# Patient Record
Sex: FEMALE | Race: WHITE | NOT HISPANIC OR LATINO | Employment: FULL TIME | URBAN - METROPOLITAN AREA
[De-identification: names, ages, dates, MRNs, and addresses within clinical notes are randomized per-mention and may not be internally consistent; named-entity substitution may affect disease eponyms.]

---

## 2017-01-02 ENCOUNTER — ALLIED HEALTH/NURSE VISIT (OUTPATIENT)
Dept: EDUCATION SERVICES | Facility: CLINIC | Age: 37
End: 2017-01-02
Payer: COMMERCIAL

## 2017-01-02 DIAGNOSIS — E11.21 TYPE 2 DIABETES MELLITUS WITH DIABETIC NEPHROPATHY, WITH LONG-TERM CURRENT USE OF INSULIN (H): Primary | ICD-10-CM

## 2017-01-02 DIAGNOSIS — Z79.4 TYPE 2 DIABETES MELLITUS WITH DIABETIC NEPHROPATHY, WITH LONG-TERM CURRENT USE OF INSULIN (H): Primary | ICD-10-CM

## 2017-01-02 PROCEDURE — 95250 CONT GLUC MNTR PHYS/QHP EQP: CPT

## 2017-01-02 NOTE — PROGRESS NOTES
"Diabetes Self Management Training: Professional Continuous Glucose Monitor Insertion    SUBJECTIVE:   Marie Vogel is accompanied by self    Patient concerns: \"I've been under a lot of stress lately due to several deaths, including my mother passing away this past Oct\". States she is surprised that her A1c was as good as it was. Is currently using a Medtronic pump.    OBJECTIVE:    Lab Results:  A1C      7.2   12/27/2016   GLC      103   3/12/2016  HDL       45   9/28/2016    Vitals:  There were no vitals taken for this visit.    ASSESSMENT:    DexCom sensor started today.  DexCom sensor (Lot # [id number], Expiration date 2017-03-28) was inserted with no resistance or bleeding at insertion site.    Pt will plan to wear the sensor until Jan. 8th.    WRITTEN AND VERBAL INFORMATION GIVEN TO SUPPORT UNDERSTANDING OF:  DexCom CGM: Charge  for only 1-3 hours at most when low battery indicator appears. Sensor insertion, intention of monitoring for 7 days and avoiding swimming more than 30 minutes. SMBG at least 2 times after 2-3 hours wearing initially, then at least 1 time every 12 hours, enter events of food intake, medications/insulin, exercise, hypoglycemia. Understanding of program screens, alarms, use of buttons, graphs available for viewing, sample sensor reports, trouble shooting, emergency contact, removal of sensor, stop sensor when prompted, need to leave sensor unit and data collection sheets at clinic at designated time/date.    Can purchase 3M medical tape if more tape necessary for adhesion.       Patient verbalizes understanding of how to remove sensor and all instructions provided.     Educational and other materials:  Food/exercise/medication log sheets  Contact information  Return Check List  Medtronic 630G with Enlite Sensor    PLAN:  Pt was given instructions for tracking BG, medications, food intake and activity.    See Patient Instructions, AVS printed and provided to patient " today.    Follow-up:    Patient to return all items associated with professional Continuous Glucose Monitor System to the [facility] by Jan.9th.   Return visit to review CGM report scheduled for Jan. 16th at 12:30.    Piedad Garcia RD, CDE  Diabetes     Time Spent: 60 minutes  Encounter Type: Individual

## 2017-01-02 NOTE — PATIENT INSTRUCTIONS
"1. After 2 hours, you will be prompted to enter 2 blood sugar readings to calibrate the . Take two different samples (from different fingers). Wash your hands well before calibrating.    2. Check blood sugar once at least every 12 hours and enter it into the Dexcom  to calibrate. Checking blood sugar before meals and/or at bedtime is recommended. Blood sugar levels must be between  for the  to accept the calibration.    3. Record what you eat at meals and snacks with portion eaten. Record amount of carbohydrate grams in the Dexcom  when you eat to help track response to food intake. Record exercise type and time. Record medications you take and the time they are taken.     4. When monitor reads that \"Sensor needs to be Changed,\" go into menu function and hit \"STOP\" (NOT shutdown)    5. If battery power is low (will see indicator on monitor) and need to recharge the monitor, only charge for 1-3 hours.  DO NOT charge for more than 3 hours since it can damage monitor!    6. Avoid taking Tylenol while wearing the Dexcom, as it can cause inaccurate glucose readings.     7. Return all equipment and any food/exercise/medication logs to the [facility] on 1/9/2017 (see checklist).    8. Follow-up appointment for review of sensor reports schedule for Mon, Jan 16 at 12:30.    "

## 2017-01-02 NOTE — MR AVS SNAPSHOT
"              After Visit Summary   1/2/2017    Marie Vogel    MRN: [id number]           Patient Information     Date Of Birth          1980        Visit Information        Provider Department      1/2/2017 12:30 PM  DIABETIC ED [facility]        Care Instructions    1. After 2 hours, you will be prompted to enter 2 blood sugar readings to calibrate the . Take two different samples (from different fingers). Wash your hands well before calibrating.    2. Check blood sugar once at least every 12 hours and enter it into the Dexcom  to calibrate. Checking blood sugar before meals and/or at bedtime is recommended. Blood sugar levels must be between  for the  to accept the calibration.    3. Record what you eat at meals and snacks with portion eaten. Record amount of carbohydrate grams in the Dexcom  when you eat to help track response to food intake. Record exercise type and time. Record medications you take and the time they are taken.     4. When monitor reads that \"Sensor needs to be Changed,\" go into menu function and hit \"STOP\" (NOT shutdown)    5. If battery power is low (will see indicator on monitor) and need to recharge the monitor, only charge for 1-3 hours.  DO NOT charge for more than 3 hours since it can damage monitor!    6. Avoid taking Tylenol while wearing the Dexcom, as it can cause inaccurate glucose readings.     7. Return all equipment and any food/exercise/medication logs to the [facility] on 1/9/2017 (see checklist).    8. Follow-up appointment for review of sensor reports schedule for Mon, Jan 16 at 12:30.          Follow-ups after your visit        Your next 10 appointments already scheduled     Jan 16, 2017 12:30 PM   Diabetic Education with  DIABETIC ED [facility] ([facility])    [address]  [address]  [place] [address] [phone]            Mar 27, 2017 " 10:00 AM   Return Visit with Zita Fuentes MD   [facility] [place] ([facility])    [address]  [address]  [place] [address] [phone]              Who to contact     If you have questions or need follow up information about today's clinic visit or your schedule please contact [facility] directly at [phone].  Normal or non-critical lab and imaging results will be communicated to you by MyChart, letter or phone within 4 business days after the clinic has received the results. If you do not hear from us within 7 days, please contact the clinic through [employer] or phone. If you have a critical or abnormal lab result, we will notify you by phone as soon as possible.  Submit refill requests through [employer] or call your pharmacy and they will forward the refill request to us. Please allow 3 business days for your refill to be completed.          Additional Information About Your Visit        [employer] Information     [employer] gives you secure access to your electronic health record. If you see a primary care provider, you can also send messages to your care team and make appointments. If you have questions, please call your primary care clinic.  If you do not have a primary care provider, please call [phone] and they will assist you.         Blood Pressure from Last 3 Encounters:   12/27/16 112/70   11/16/16 119/84   11/06/16 137/89    Weight from Last 3 Encounters:   12/27/16 83.462 kg (184 lb)   11/16/16 84.278 kg (185 lb 12.8 oz)   11/06/16 85.503 kg (188 lb 8 oz)              Today, you had the following     No orders found for display       Primary Care Provider Office Phone # Fax #    Alan Paredes [phone]       [facility] [address] [place] [address] [address] [place] [address]        Thank you!     Thank you for choosing [facility]  for your care. Our goal is always to provide you with excellent care. Hearing back from  our patients is one way we can continue to improve our services. Please take a few minutes to complete the written survey that you may receive in the mail after your visit with us. Thank you!             Your Updated Medication List - Protect others around you: Learn how to safely use, store and throw away your medicines at www.disposemymeds.org.          This list is accurate as of: 1/2/17  1:40 PM.  Always use your most recent med list.                   Brand Name Dispense Instructions for use    acetone (Urine) test Strp     1 strip    1 strip by In Vitro route daily as needed.       ADVIL 200 MG tablet   Generic drug:  ibuprofen      Take 200 mg by mouth every 4 hours as needed.       albuterol 108 (90 BASE) MCG/ACT Inhaler    PROAIR HFA/PROVENTIL HFA/VENTOLIN HFA    1 Inhaler    Inhale 2 puffs into the lungs every 4 hours as needed for shortness of breath / dyspnea or wheezing       aspirin 81 MG tablet     90 tablet    Take 1 tablet (81 mg) by mouth daily       PEPE CONTOUR NEXT test strip   Generic drug:  blood glucose monitoring          blood glucose monitoring lancets     100 each    1 Device See Admin Instructions. Use up to 5 times daily for blood sugar checks.       blood glucose monitoring meter device kit    no brand specified    1 kit    Use to test blood sugar 6 times daily and as needed.       cetirizine 10 MG tablet    zyrTEC    90 tablet    Take 10 mg by mouth 2 times daily       EPINEPHrine 0.3 MG/0.3ML injection     2 each    Inject 0.3 mLs (0.3 mg) into the muscle once as needed for anaphylaxis       ergocalciferol 33377 UNITS capsule    ERGOCALCIFEROL    14 capsule    Take 1 capsule (50,000 Units) by mouth once a week       insulin aspart 100 UNIT/ML injection    NovoLOG PEN    3 Month    With insulin pump. About 80 units/day. Vial.       * insulin cartridge misc pump supply     30 each    Change every 3 days1 applicator every 72 hours Change every 3 days       * infusion set misc pump  supply     30 each    Change every 3 days as directed       insulin pump infusion     1 pump    7/3/12 [employer] Minimed: Model 723 BASAL: 12 [place]: 0.900 units/hr 10:00:  0.875 units/hr 14:00:  0.900 units/hr CARB RATIO: 00:00 1:8 1600  1:7 C. Factor: 27 mg/dL BLOOD GLUCOSE TARGET and times: 12   AM (midnight):  7     AM:  90 - 100 11   PM:  Active Insulin Time:  4 hours Basal to Bolus Ratio:  48 % to 52 % Sensor:  No Carelink / Diasend username:  [email or web address] Carelink / Diasend Password:  [email or web address]       losartan 50 MG tablet    COZAAR    135 tablet    Take 1.5 tablets (75 mg) by mouth daily Take 1 tab ( 50 mg)  qam and 1/2 tab ( 25 mg)  q pm       metFORMIN 500 MG 24 hr tablet    GLUCOPHAGE-XR    120 tablet    Take 2 tablets (1,000 mg) by mouth 2 times daily (with meals)       montelukast 10 MG tablet    SINGULAIR    90 tablet    Take 1 tablet (10 mg) by mouth At Bedtime       MULTI VITAMIN/MINERALS PO      Take 1 each by mouth daily.       omeprazole 40 MG capsule    priLOSEC    90 capsule    Take 1 capsule (40 mg) by mouth daily       order for DME     100 each    Equipment being ordered: skin prep wipes       simvastatin 10 MG tablet    ZOCOR    90 tablet    Take 1 tablet (10 mg) by mouth every morning       SUMAtriptan 25 MG tablet    IMITREX    8 tablet    Take 25-100mg one time. May repeat 25mg every two hours up to a maximum of 200mg in 24 hours.       * Notice:  This list has 2 medication(s) that are the same as other medications prescribed for you. Read the directions carefully, and ask your doctor or other care provider to review them with you.

## 2017-01-17 ENCOUNTER — VIRTUAL VISIT (OUTPATIENT)
Dept: EDUCATION SERVICES | Facility: CLINIC | Age: 37
End: 2017-01-17
Payer: COMMERCIAL

## 2017-01-17 DIAGNOSIS — Z79.4 TYPE 2 DIABETES MELLITUS WITH DIABETIC NEPHROPATHY, WITH LONG-TERM CURRENT USE OF INSULIN (H): Primary | ICD-10-CM

## 2017-01-17 DIAGNOSIS — E11.21 TYPE 2 DIABETES MELLITUS WITH DIABETIC NEPHROPATHY, WITH LONG-TERM CURRENT USE OF INSULIN (H): Primary | ICD-10-CM

## 2017-01-17 PROCEDURE — 99207 ZZC NO BILLABLE SERVICE THIS VISIT: CPT

## 2017-01-17 NOTE — PROGRESS NOTES
Dexcom Professional Continuous Glucose Monitor Interpretation     Patient History:   1. Type of Diabetes: Type 2 diabetes  2. Duration of diabetes or year of diagnosis: age 21  3. Most Recent A1c Result:  A1C  7.2   12/27/2016  4. Current treatment regimen (include all diabetes medications, dose & dosing frequency/timing):     Diabetes Medication(s)     Biguanides Sig    metFORMIN (GLUCOPHAGE-XR) 500 MG 24 hr tablet Take 2 tablets (1,000 mg) by mouth 2 times daily (with meals)    Insulin Sig    INSULIN PUMP - OUTPATIENT 01/17/17:  [email or web address]: Model 723  BASAL:  00:00: 1.0 units/hr  10:30: 0.95  14:00: 1.10  CARB RATIO:  00:00 1:7  1600  1:8  C. Factor:  27 mg/dL  BLOOD GLUCOSE TARGET and times:  12   AM (midnight):   Active Insulin Time:  3 hours  Basal to Bolus Ratio:   Sensor:  No  Carelink / Diasend username:  [email or web address]  Carelink / Diasend Password:  [email or web address]    insulin aspart (NOVOLOG PEN) 100 UNIT/ML injection With insulin pump. About 80 units/day. Vial.          5. 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    Trend Patterns:      Hourly glucose ranges and averages:    Statistics:   1. Total number of sensor values is 1781. This averages to 288 values on each of the full days. 288 per day is ideal (the first and last day generally provide half or less of the usual number of readings because these are typically not full days).  The test is not optimal if <50% of the readings are available per day.  2. Number of meter values and paired readings is adequate.  Less than 2 (q 12 hours) paired readings per day may be associated with a less reliable test.  3. Standard deviation is: 59.  Standard deviation (SD) indicates how variable sensor readings are.  Patients who do not  have diabetes may have a SD around 20, while someone with suboptimal diabetes control may have a SD of 60 or more.  4. Statistics using target range  mg/dl:   Percent in target is: 46%  Percent above target is: 36%  Percent below target is: 18%                        Data evaluation:   1. Sensor modal day evaluation shows the followin. Consistent day-to-day patterns noted: no specific patterns noted, +glucose variability with frequent hypoglycemia day and night.  2. Average blood sugar: 130 mg/dL.  3. The overnight ranges from < mg/dL. The average BG on all nights ranges from 126-149 mg/dL.  4. The premeal is 37-54% at target range.   5. The postmeal is 37-66% at target range.    Patient's Logbook shows the following:   Carbohydrate counting is: accurate  Medication and/or insulin dosing is: accurate     Assessment and Plan:  Consider a reduction in basal rate and/or use of a lower temp basal for higher activity days (while at work, etc).  Also noted at previous pump download, patient is changing infusion site every 5 days instead of FDA recommendation of every 3 days. Will also discuss with patient.   CDE appointment 17.    Piedad Garcia RD, CDE  Diabetes     Time Spent: 30 minutes  Any diabetes medication dose changes were made via the CDE Protocol and Collaborative Practice Agreement with the patient's endocrinology provider. A copy of this encounter was shared with the provider.

## 2017-01-17 NOTE — Clinical Note
Hi Dr. Fuentes, Adam- CGM report per your request. Please bill using code [id number] as usual.    Thank you! Piedad Garcia RD, CDE Diabetes

## 2017-01-22 DIAGNOSIS — E55.9 VITAMIN D DEFICIENCY: ICD-10-CM

## 2017-01-22 DIAGNOSIS — R80.9 MICROALBUMINURIA: ICD-10-CM

## 2017-01-22 NOTE — TELEPHONE ENCOUNTER
Vitamin D 50,000 units      Last Written Prescription Date: 11/24/15  Last Fill Quantity: 14,  # refills: 3   Last Office Visit with [facility], [facility] or [facility] prescribing provider: 12/27/2016                                         Next 5 appointments (look out 90 days)     Mar 27, 2017 10:00 AM   Return Visit with Zita Fuentes MD   [facility] ([facility])    [address] [place] [address] [phone]                  Kingsley Adame  [facility]  [phone]

## 2017-01-24 ENCOUNTER — ALLIED HEALTH/NURSE VISIT (OUTPATIENT)
Dept: EDUCATION SERVICES | Facility: CLINIC | Age: 37
End: 2017-01-24
Payer: COMMERCIAL

## 2017-01-24 DIAGNOSIS — Z79.4 TYPE 2 DIABETES MELLITUS WITH DIABETIC NEPHROPATHY, WITH LONG-TERM CURRENT USE OF INSULIN (H): Primary | ICD-10-CM

## 2017-01-24 DIAGNOSIS — E11.21 TYPE 2 DIABETES MELLITUS WITH DIABETIC NEPHROPATHY, WITH LONG-TERM CURRENT USE OF INSULIN (H): Primary | ICD-10-CM

## 2017-01-24 PROCEDURE — G0108 DIAB MANAGE TRN  PER INDIV: HCPCS

## 2017-01-24 NOTE — TELEPHONE ENCOUNTER
Last Vit D 2/23/16:    Vitamin D Deficiency screening 45 20 - 75 ug/L Final     Routing refill request to provider for review/approval because:  Drug not on the [facility] refill protocol for high dosage    Pattie Dunn RN

## 2017-01-24 NOTE — Clinical Note
Adam Bentley Dr.- patient seen for review of CGM report and pump follow up. I slightly reduced one of her basal rates, and also showed her how to use a temp basal for increased activity at work. Recommended that she consider a Medtronic pump upgrade with CGM.  Thank you! Piedad Garcia RD, CDE Diabetes

## 2017-01-24 NOTE — TELEPHONE ENCOUNTER
Most recent vit D level reviewed  Not clear how long Dr Alford wanted to continue this regimen based on last note  Should follow-up with endocrine

## 2017-01-26 NOTE — TELEPHONE ENCOUNTER
Pt calling back, notified as per 's note. She says that  wanted her to continue the high dose even when her vitamin D level is in normal range. Pt is willing to wait till  review her lab results & advise on it. Pt is aware that  won't be in till Monday(01/30).    Mara, RN  Triage Nurse

## 2017-01-26 NOTE — TELEPHONE ENCOUNTER
Left message for patient to call us back. She has an appointment scheduled with Dr Fuentes on 3/27. Please inform her of Dr Paredes's message about her Vit D refill.

## 2017-01-27 NOTE — PROGRESS NOTES
"Diabetes Self Management Training: Follow-up Visit    Marie Vogel presents today for review of CGM report related to Type 2 diabetes.    She is accompanied by self    Patient's diabetes management related comments/concerns: patient wore Dexcom CGM for one week (see full interpretation dated 1/17 virtual visit).   States \"I didn't know I ran such frequent low blood sugars\".    Patient would like this visit to be focused around the following diabetes-related behaviors and goals: Problem Solving    ASSESSMENT:  Patient Problem List reviewed for relevant medical history and current medical status.    Current Diabetes Management per Patient:       Diabetes Medication(s)     Biguanides Sig    metFORMIN (GLUCOPHAGE-XR) 500 MG 24 hr tablet Take 2 tablets (1,000 mg) by mouth 2 times daily (with meals)    Insulin Sig    INSULIN PUMP - OUTPATIENT 01/17/17:  [employer]: Model 723  BASAL:  00:00: 1.0 units/hr  10:30: 0.95  14:00: 1.10  CARB RATIO:  00:00 1:7  1600  1:8  C. Factor:  27 mg/dL  BLOOD GLUCOSE TARGET and times:  12   AM (midnight):   Active Insulin Time:  3 hours  Basal to Bolus Ratio:   Sensor:  No  Carelink / Diasend username:  [email or web address]  Carelink / Diasend Password:  [email or web address]    insulin aspart (NOVOLOG PEN) 100 UNIT/ML injection With insulin pump. About 80 units/day. Vial.        Patient glucose self monitoring as follows: 1-4x/day.   BG meter: Contour Next USB Link (with Medtronic Pump) meter    Per pump download 1/10/1/23:  Using Continuous Glucose Monitor in conjunction with pump? No  Avg BG (mg/dl) 120 +/- 47  BG Readings /day  3.8  Readings above Target:  28 %  Readings below Target: 13 %  BG/carbs/boluses:             Dexcom pattern snapshot (1/3-1/9):        Patient's most recent A1C  7.2   12/27/2016 is not meeting goal of <7.0    Nutrition:  Patient counts carbohydrates in grams. Reports that she sometimes forgets to bolus (eg noted high BG excursion on Jan. 7th- went to bed after " "eating without taking a bolus).  Avg Daily Carbs (g)  183 +/- 98    Biggest Challenge to Healthy Eating: lack of time    Physical Activity:    Work as a nurse  Wants to get back to regular exercise    Diabetes Complications:  Acute Complications: At risk for hypoglycemia? yes  Symptoms of low blood sugar? yes  Patient carries a carbohydrate source with them regularly: Yes     Vitals:  Wt Readings from Last 3 Encounters:   12/27/16 83.462 kg (184 lb)   11/16/16 84.278 kg (185 lb 12.8 oz)   11/06/16 85.503 kg (188 lb 8 oz)     Estimated body mass index is 31.57 kg/(m^2) as calculated from the following:    Height as of 11/16/16: 1.626 m (5' 4\").    Weight as of 12/27/16: 83.462 kg (184 lb).   Last 3 BP:   BP Readings from Last 3 Encounters:   12/27/16 112/70   11/16/16 119/84   11/06/16 137/89       History   Smoking status     Former Smoker     Types: Cigarettes     Quit date: 10/24/2005   Smokeless tobacco     Former User     Comment: States only smokes when drinks maybe 2x/year     Labs:  A1C      7.2   12/27/2016  GLC      103   3/12/2016  LDL       98   9/28/2016  HDL CHOLESTEROL   Date Value Ref Range Status   09/28/2016 45* >49 mg/dL Final   ]  GFR ESTIMATE   Date Value Ref Range Status   03/12/2016 89 >60 mL/min/1.7m2 Final     Comment:     Non  GFR Calc     GFR ESTIMATE IF BLACK   Date Value Ref Range Status   03/12/2016 >90   GFR Calc   >60 mL/min/1.7m2 Final     CR     0.73   3/12/2016  No results found for this basename: microalbumin    Health Beliefs and Attitudes:   Patient Activation Measure Survey Score:  SADIA Score (Last Two) 10/7/2009   SADIA Raw Score 46   Activation Score 75.3   SADIA Level 4     Stage of Change: ACTION (Actively working towards change)    Patient is comfortable making pump setting changes independently: Yes  Patient understands and demonstrates accurate pump use: Yes  Opportunities/education identified by which patient would have increased benefit from " "pump therapy:  Use of temp basal, need for infusion site changes q 3 days instead of 5 days.    Symptoms of low blood sugar (hypoglycemia:sweating, shaky, weak, dizzy, blurred vision, confusion)? Yes    Patient carries a carbohydrate source with them regularly: Yes      ASSESSMENT:  CGM report, pump download reviewed. Discussed safety concerns regarding frequent hypoglycemia.     Patient learning needs: Problem Solving     Education Provided:  Reviewed CGM report, and recommended personal CGM- however patient stated \"insurance does not cover since she has T2DM\". However discussed with patient, that if documented frequent hypoglycemia is a safety concern- insurance may consider. Patient may be a good candidate for Medtronic pump upgrade to the 630G with Enlite sensor. She is hesitant at this time to consider that option. She was willing to make a slight reduction in her basal rate.   Also reviewed use of temp basal rates, and the recommended schedule for infusion site changes.     PLAN:  Reduce basal rate at 2:00 pm to 1.05.  Also recommended use of temp basal as needed, for increased physical activity to help decrease risk of hypoglycemia.   Change infusion site q 3 days (rather than 5 days).     Follow-up:    CDE follow-up as needed.   Chart routed to referring provider.    Ongoing plan for education and support: Follow-up visit with diabetes educator  and Follow-up with primary care provider    Piedad Garcia RD, CDE  Diabetes     Time Spent: 60 minutes  Encounter Type: Individual    Any diabetes medication dose changes were made via the CDE Protocol and Collaborative Practice Agreement with the patient's endocrinology provider. A copy of this encounter was shared with the provider.      "

## 2017-01-30 RX ORDER — ERGOCALCIFEROL 1.25 MG/1
50000 CAPSULE ORAL WEEKLY
Qty: 14 CAPSULE | Refills: 3 | Status: SHIPPED | OUTPATIENT
Start: 2017-01-30 | End: 2017-10-24

## 2017-01-31 ENCOUNTER — DOCUMENTATION ONLY (OUTPATIENT)
Dept: ENDOCRINOLOGY | Facility: CLINIC | Age: 37
End: 2017-01-31
Payer: COMMERCIAL

## 2017-01-31 DIAGNOSIS — Z79.4 TYPE 2 DIABETES MELLITUS WITH DIABETIC NEPHROPATHY, WITH LONG-TERM CURRENT USE OF INSULIN (H): Primary | ICD-10-CM

## 2017-01-31 DIAGNOSIS — E11.21 TYPE 2 DIABETES MELLITUS WITH DIABETIC NEPHROPATHY, WITH LONG-TERM CURRENT USE OF INSULIN (H): Primary | ICD-10-CM

## 2017-01-31 PROCEDURE — 95251 CONT GLUC MNTR ANALYSIS I&R: CPT | Performed by: INTERNAL MEDICINE

## 2017-01-31 NOTE — PROGRESS NOTES
Statistics:    1. Total number of sensor values is 1781. This averages to 288 values on each of the full days. 288 per day is ideal (the first and last day generally provide half or less of the usual number of readings because these are typically not full days).  The test is not optimal if <50% of the readings are available per day.  2. Number of meter values and paired readings is adequate.  Less than 2 (q 12 hours) paired readings per day may be associated with a less reliable test.  3. Standard deviation is: 59.  Standard deviation (SD) indicates how variable sensor readings are.  Patients who do not have diabetes may have a SD around 20, while someone with suboptimal diabetes control may have a SD of 60 or more.  4. Statistics using target range  mg/dl:  Percent in target is: 46%  Percent above target is: 36%  Percent below target is: 18%                        Data evaluation:    1. Sensor modal day evaluation shows the followin. Consistent day-to-day patterns noted: no specific patterns noted, +glucose variability with frequent hypoglycemia day and night.  2. Average blood sugar: 130 mg/dL.  3. The overnight ranges from < mg/dL. The average BG on all nights ranges from 126-149 mg/dL.  4. The premeal is 37-54% at target range.    5. The postmeal is 37-66% at target range.    Patient's Logbook shows the following:    Carbohydrate counting is: accurate  Medication and/or insulin dosing is: accurate     Assessment and Plan:  Consider a reduction in basal rate and/or use of a lower temp basal for higher activity days (while at work, etc).  Also noted at previous pump download, patient is changing infusion site every 5 days instead of FDA recommendation of every 3 days. Will also discuss with patient.      CGM data reviewed.  Diabetic Educator Assessment and plan reviewed.    I agree with CGM data assessment and plan.    Zita Fuentes

## 2017-02-16 ENCOUNTER — OFFICE VISIT (OUTPATIENT)
Dept: PEDIATRICS | Facility: CLINIC | Age: 37
End: 2017-02-16
Payer: COMMERCIAL

## 2017-02-16 ENCOUNTER — TELEPHONE (OUTPATIENT)
Dept: PEDIATRICS | Facility: CLINIC | Age: 37
End: 2017-02-16

## 2017-02-16 VITALS
HEIGHT: 64 IN | OXYGEN SATURATION: 98 % | SYSTOLIC BLOOD PRESSURE: 110 MMHG | WEIGHT: 185 LBS | BODY MASS INDEX: 31.58 KG/M2 | DIASTOLIC BLOOD PRESSURE: 70 MMHG | TEMPERATURE: 98.3 F | HEART RATE: 92 BPM

## 2017-02-16 DIAGNOSIS — G43.809 OTHER MIGRAINE WITHOUT STATUS MIGRAINOSUS, NOT INTRACTABLE: ICD-10-CM

## 2017-02-16 DIAGNOSIS — S93.602A FOOT SPRAIN, LEFT, INITIAL ENCOUNTER: Primary | ICD-10-CM

## 2017-02-16 DIAGNOSIS — Z91.018 NUT ALLERGY: ICD-10-CM

## 2017-02-16 PROCEDURE — 99213 OFFICE O/P EST LOW 20 MIN: CPT | Performed by: INTERNAL MEDICINE

## 2017-02-16 RX ORDER — SUMATRIPTAN 25 MG/1
TABLET, FILM COATED ORAL
Qty: 8 TABLET | Refills: 6 | Status: SHIPPED | OUTPATIENT
Start: 2017-02-16 | End: 2017-10-24

## 2017-02-16 RX ORDER — EPINEPHRINE 0.3 MG/.3ML
0.3 INJECTION SUBCUTANEOUS
Qty: 0.3 ML | Refills: 11 | Status: SHIPPED | OUTPATIENT
Start: 2017-02-16 | End: 2020-11-07

## 2017-02-16 NOTE — PROGRESS NOTES
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    Description  Location: left foot    Intensity:  moderate    Accompanying signs and symptoms: ?swelling    History  Previous similar problem: no   Previous evaluation:  none    Precipitating or alleviating factors:  Trauma or overuse: YES-   Aggravating factors include: walking and climbing stairs    Therapies tried and outcome: rest/inactivity       Patient Active Problem List   Diagnosis     Allergic rhinitis     HYPERLIPIDEMIA LDL GOAL <100     Family history of other cardiovascular diseases     Health Care Home     Microalbuminuria     Insulin pump in place     Vitamin D deficiency     Nut allergy     IUD (intrauterine device) in place     Type 2 diabetes mellitus with diabetic nephropathy     Diabetic gastroparesis (H)     Hypertrophic obstructive cardiomyopathy(425.11)     PFO (patent foramen ovale)     Other migraine without status migrainosus, not intractable     Past Surgical History   Procedure Laterality Date     C induced abortn by d&c            Hc remove tonsils/adenoids,<9 y/o       T &A     Cholecystectomy, laporoscopic       Cholecystectomy, Laparoscopic     C/section, low transverse  6/25/10     , Low Transverse     C iud,mirena  8/10/10, 7/28/15     Esophagoscopy, gastroscopy, duodenoscopy (egd), combined N/A 2016     Procedure: COMBINED ESOPHAGOSCOPY, GASTROSCOPY, DUODENOSCOPY (EGD), BIOPSY SINGLE OR MULTIPLE;  Surgeon: Fadi Hunter MD;  Location:  GI      esophageal motility study N/A 2016     Procedure: ESOPHAGEAL MOTILITY STUDY;  Surgeon: Fadi Hunter MD;  Location:  GI       Social History   Substance Use Topics      Smoking status: Former Smoker     Types: Cigarettes     Quit date: 10/24/2005     Smokeless tobacco: Former User      Comment: States only smokes when drinks maybe 2x/year     Alcohol use No     Family History   Problem Relation Age of Onset     Cardiovascular Mother      hypertrophic cardiomyopathy     Cardiovascular Brother      hypertrophic cardiomyopathy     Hypertension Paternal Grandfather      high bp     Genitourinary Problems Mother      gallbladder disease     Genitourinary Problems Maternal Grandmother      gallbladder disease     Genitourinary Problems Paternal Grandmother      gallbladder disease     DIABETES Father      type 2     DIABETES Mother      drug induced     CEREBROVASCULAR DISEASE Paternal Grandfather      DIABETES Brother      type 2     Hypertension Father      Other - See Comments Mother      heart transplant 11/23/2005         Current Outpatient Prescriptions   Medication Sig Dispense Refill     EPINEPHrine 0.3 MG/0.3ML injection Inject 0.3 mLs (0.3 mg) into the muscle once as needed for anaphylaxis 0.3 mL 11     SUMAtriptan (IMITREX) 25 MG tablet Take 25-100mg one time. May repeat 25mg every two hours up to a maximum of 200mg in 24 hours. 8 tablet 6     order for DME Equipment being ordered: post-op shoe 1 Units 0     ergocalciferol (ERGOCALCIFEROL) 10369 UNITS capsule Take 1 capsule (50,000 Units) by mouth once a week 14 capsule 3     INSULIN PUMP - OUTPATIENT Updated 1/27/17:  Medtronic Minimed: Model 723  BASAL:  00:00: 1.0 units/hr  10:30: 0.95  14:00: 1.05  CARB RATIO:  00:00 1:7  1600  1:8  C. Factor:  27 mg/dL  BLOOD GLUCOSE TARGET and times:  12   AM (midnight):   Active Insulin Time:  3 hours  Basal to Bolus Ratio:   Sensor:  No  Carelink / Diasend username:  [email or web address]  Carelink / Diasend Password:  [email or web address]       metFORMIN (GLUCOPHAGE-XR) 500 MG 24 hr tablet Take 2 tablets (1,000 mg) by mouth 2 times daily (with meals) 120 tablet 3     insulin cartridge (PARADIGM 3ML)  "misc pump supply Change every 3 days1 applicator every 72 hours Change every 3 days 30 each 3     infusion set (PARADIGM QUICK-SET 23\" 9MM) misc pump supply Change every 3 days as directed 30 each 3     insulin aspart (NOVOLOG PEN) 100 UNIT/ML injection With insulin pump. About 80 units/day. Vial. 3 Month 3     PEPE CONTOUR NEXT test strip        albuterol (PROAIR HFA, PROVENTIL HFA, VENTOLIN HFA) 108 (90 BASE) MCG/ACT inhaler Inhale 2 puffs into the lungs every 4 hours as needed for shortness of breath / dyspnea or wheezing 1 Inhaler 1     blood glucose monitoring (NO BRAND SPECIFIED) meter device kit Use to test blood sugar 6 times daily and as needed. 1 kit 0     omeprazole (PRILOSEC) 40 MG capsule Take 1 capsule (40 mg) by mouth daily 90 capsule 1     simvastatin (ZOCOR) 10 MG tablet Take 1 tablet (10 mg) by mouth every morning 90 tablet 0     montelukast (SINGULAIR) 10 MG tablet Take 1 tablet (10 mg) by mouth At Bedtime 90 tablet 1     order for DME Equipment being ordered: skin prep wipes 100 each prn     losartan (COZAAR) 50 MG tablet Take 1.5 tablets (75 mg) by mouth daily Take 1 tab ( 50 mg)  qam and 1/2 tab ( 25 mg)  q pm 135 tablet 3     aspirin 81 MG tablet Take 1 tablet (81 mg) by mouth daily 90 tablet 3     cetirizine (ZYRTEC) 10 MG tablet Take 10 mg by mouth 2 times daily  90 tablet 3     Multiple Vitamins-Minerals (MULTI VITAMIN/MINERALS PO) Take 1 each by mouth daily.       Acetone, Urine, Test STRP 1 strip by In Vitro route daily as needed. 1 strip prn     ibuprofen (ADVIL) 200 MG tablet Take 200 mg by mouth every 4 hours as needed.       Lancets (MICROLET) MISC 1 Device See Admin Instructions. Use up to 5 times daily for blood sugar checks. 100 each prn     [DISCONTINUED] SUMAtriptan (IMITREX) 25 MG tablet Take 25-100mg one time. May repeat 25mg every two hours up to a maximum of 200mg in 24 hours. 8 tablet 0       OBJECTIVE:                                                    /70 (Cuff " "Size: Adult Regular)  Pulse 92  Temp 98.3  F (36.8  C) (Oral)  Ht 5' 4\" (1.626 m)  Wt 185 lb (83.9 kg)  SpO2 98%  BMI 31.76 kg/m2 Body mass index is 31.76 kg/(m^2).  GENERAL:  alert,  no distress  MS: extremities- no edema     Left foot: ankle-nontender without effusion, FROM      4th 5th metatarsals nontender.  Subjective tenderness between metatarsals with compression/flexion     ASSESSMENT/PLAN:                                                        ICD-10-CM    1. Foot sprain, left, initial encounter S93.602A order for DME     Midfoot sprain.  Ace wrap with post op shoe when weight bearing until pain resolves       Alan Paredes MD  [facility]     "

## 2017-02-16 NOTE — PATIENT INSTRUCTIONS
INSTRUCTIONS FOR TODAY:     foot sprain--post-op shoe when weight bearing for the next week or until pain resolves    Dr Paredes

## 2017-02-16 NOTE — NURSING NOTE
"Chief Complaint   Patient presents with     Musculoskeletal Problem       Initial /70 (Cuff Size: Adult Regular)  Pulse 92  Temp 98.3  F (36.8  C) (Oral)  Ht 5' 4\" (1.626 m)  Wt 185 lb (83.9 kg)  SpO2 98%  BMI 31.76 kg/m2 Estimated body mass index is 31.76 kg/(m^2) as calculated from the following:    Height as of this encounter: 5' 4\" (1.626 m).    Weight as of this encounter: 185 lb (83.9 kg).  Medication Reconciliation: complete  "

## 2017-02-16 NOTE — MR AVS SNAPSHOT
After Visit Summary   2/16/2017    Marie Vogel    MRN: [id number]           Patient Information     Date Of Birth          1980        Visit Information        Provider Department      2/16/2017 3:15 PM Alan Paredes MD [facility]3]        Today's Diagnoses     Foot sprain, left, initial encounter    -  1    Nut allergy        Other migraine without status migrainosus, not intractable          Care Instructions    INSTRUCTIONS FOR TODAY:     foot sprain--post-op shoe when weight bearing for the next week or until pain resolves    Dr Paredes          Follow-ups after your visit        Your next 10 appointments already scheduled     Mar 27, 2017 10:00 AM CDT   Return Visit with Zita Fuentes MD   [facility]3] ([facility]3])    [address]  [address]  [facility] [address] [phone]              Who to contact     If you have questions or need follow up information about today's clinic visit or your schedule please contact [facility] directly at [phone].  Normal or non-critical lab and imaging results will be communicated to you by [employer] in [employer] Boxhart, letter or phone within 4 business days after the clinic has received the results. If you do not hear from us within 7 days, please contact the clinic through [employer] or phone. If you have a critical or abnormal lab result, we will notify you by phone as soon as possible.  Submit refill requests through [facility]Plus or call your pharmacy and they will forward the refill request to us. Please allow 3 business days for your refill to be completed.          Additional Information About Your Visit        Jack in the Boxhart Information     [facility]Plus gives you secure access to your electronic health record. If you see a primary care provider, you can also send messages to your care team and make appointments. If you have questions, please call your primary care clinic.  If you do not have a primary  "care provider, please call [phone] and they will assist you.        Care EveryWhere ID     This is your Care EveryWhere ID. This could be used by other organizations to access your [place] medical records  [id number]        Your Vitals Were     Pulse Temperature Height Pulse Oximetry BMI (Body Mass Index)       92 98.3  F (36.8  C) (Oral) 5' 4\" (1.626 m) 98% 31.76 kg/m2        Blood Pressure from Last 3 Encounters:   02/16/17 110/70   12/27/16 112/70   11/16/16 119/84    Weight from Last 3 Encounters:   02/16/17 185 lb (83.9 kg)   12/27/16 184 lb (83.5 kg)   11/16/16 185 lb 12.8 oz (84.3 kg)              Today, you had the following     No orders found for display         Today's Medication Changes          These changes are accurate as of: 2/16/17  3:47 PM.  If you have any questions, ask your nurse or doctor.               These medicines have changed or have updated prescriptions.        Dose/Directions    * order for DME   This may have changed:  Another medication with the same name was added. Make sure you understand how and when to take each.   Used for:  Type 2 diabetes, HbA1c goal < 7% (H)   Changed by:  Nancy Yeager MD        Equipment being ordered: skin prep wipes   Quantity:  100 each   Refills:  prn       * order for DME   This may have changed:  You were already taking a medication with the same name, and this prescription was added. Make sure you understand how and when to take each.   Used for:  Foot sprain, left, initial encounter   Changed by:  Alan Paredes MD        Equipment being ordered: post-op shoe   Quantity:  1 Units   Refills:  0       * Notice:  This list has 2 medication(s) that are the same as other medications prescribed for you. Read the directions carefully, and ask your doctor or other care provider to review them with you.         Where to get your medicines      These medications were sent to [facility] [place], [place] - [address], [place] [address]     Phone:  [phone]     EPINEPHrine 0.3 MG/0.3ML injection    SUMAtriptan 25 MG tablet         Some of these will need a paper prescription and others can be bought over the counter.  Ask your nurse if you have questions.     Bring a paper prescription for each of these medications     order for DME                Primary Care Provider Office Phone # Fax #    Alan Paredes [phone]       [facility] [address] [place] [address]        Thank you!     Thank you for choosing [facility]  for your care. Our goal is always to provide you with excellent care. Hearing back from our patients is one way we can continue to improve our services. Please take a few minutes to complete the written survey that you may receive in the mail after your visit with us. Thank you!             Your Updated Medication List - Protect others around you: Learn how to safely use, store and throw away your medicines at www.disposemymeds.org.          This list is accurate as of: 2/16/17  3:47 PM.  Always use your most recent med list.                   Brand Name Dispense Instructions for use    acetone (Urine) test Strp     1 strip    1 strip by In Vitro route daily as needed.       ADVIL 200 MG tablet   Generic drug:  ibuprofen      Take 200 mg by mouth every 4 hours as needed.       albuterol 108 (90 BASE) MCG/ACT Inhaler    PROAIR HFA/PROVENTIL HFA/VENTOLIN HFA    1 Inhaler    Inhale 2 puffs into the lungs every 4 hours as needed for shortness of breath / dyspnea or wheezing       aspirin 81 MG tablet     90 tablet    Take 1 tablet (81 mg) by mouth daily       PEPE CONTOUR NEXT test strip   Generic drug:  blood glucose monitoring          blood glucose monitoring lancets     100 each    1 Device See Admin Instructions. Use up to 5 times daily for blood sugar checks.       blood glucose monitoring meter device kit    no  brand specified    1 kit    Use to test blood sugar 6 times daily and as needed.       cetirizine 10 MG tablet    zyrTEC    90 tablet    Take 10 mg by mouth 2 times daily       EPINEPHrine 0.3 MG/0.3ML injection     0.3 mL    Inject 0.3 mLs (0.3 mg) into the muscle once as needed for anaphylaxis       ergocalciferol 36574 UNITS capsule    ERGOCALCIFEROL    14 capsule    Take 1 capsule (50,000 Units) by mouth once a week       insulin aspart 100 UNIT/ML injection    NovoLOG PEN    3 Month    With insulin pump. About 80 units/day. Vial.       * insulin cartridge misc pump supply     30 each    Change every 3 days1 applicator every 72 hours Change every 3 days       * infusion set misc pump supply     30 each    Change every 3 days as directed       insulin pump infusion      Updated 1/27/17: [employer] MinimGam[employer]ee: Model 723 BASAL: 00:00: 1.0 units/hr 10:30: 0.95 14:00: 1.05 CARB RATIO: 00:00 1:7 1600  1:8 C. Factor: 27 mg/dL BLOOD GLUCOSE TARGET and times: 12   AM (midnight):  Active Insulin Time:  3 hours Basal to Bolus Ratio:  Sensor:  No Carelink / Diasend username:  [email or web address] Carelink / Diasend Password:  [email or web address]       losartan 50 MG tablet    COZAAR    135 tablet    Take 1.5 tablets (75 mg) by mouth daily Take 1 tab ( 50 mg)  qam and 1/2 tab ( 25 mg)  q pm       metFORMIN 500 MG 24 hr tablet    GLUCOPHAGE-XR    120 tablet    Take 2 tablets (1,000 mg) by mouth 2 times daily (with meals)       montelukast 10 MG tablet    SINGULAIR    90 tablet    Take 1 tablet (10 mg) by mouth At Bedtime       MULTI VITAMIN/MINERALS PO      Take 1 each by mouth daily.       omeprazole 40 MG capsule    priLOSEC    90 capsule    Take 1 capsule (40 mg) by mouth daily       * order for DME     100 each    Equipment being ordered: skin prep wipes       * order for DME     1 Units    Equipment being ordered: post-op shoe       simvastatin 10 MG tablet    ZOCOR    90 tablet    Take 1 tablet (10 mg) by mouth every morning        SUMAtriptan 25 MG tablet    IMITREX    8 tablet    Take 25-100mg one time. May repeat 25mg every two hours up to a maximum of 200mg in 24 hours.       * Notice:  This list has 4 medication(s) that are the same as other medications prescribed for you. Read the directions carefully, and ask your doctor or other care provider to review them with you.

## 2017-02-16 NOTE — TELEPHONE ENCOUNTER
"Patient states that a few days ago her 55# daughter stepped on her left foot (her foot was in a \"sideways position\").  Since this time, has had pain at medial aspect of foot and across the top of the foot.  No ecchymosis noted.  Foot is swollen, as her shoe feels tight.  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  Has not been able to elevate her foot.  Pain is increasing, and she would like an appointment today for evaluation.  Appointment scheduled with Dr. Paredes this afternoon.  No further questions.  MIGDALIA Robles RN    "

## 2017-02-26 ENCOUNTER — OFFICE VISIT (OUTPATIENT)
Dept: URGENT CARE | Facility: URGENT CARE | Age: 37
End: 2017-02-26
Payer: COMMERCIAL

## 2017-02-26 VITALS
TEMPERATURE: 99.5 F | SYSTOLIC BLOOD PRESSURE: 122 MMHG | WEIGHT: 187.8 LBS | DIASTOLIC BLOOD PRESSURE: 80 MMHG | HEART RATE: 100 BPM | BODY MASS INDEX: 32.24 KG/M2 | OXYGEN SATURATION: 100 %

## 2017-02-26 DIAGNOSIS — L01.00 IMPETIGO: Primary | ICD-10-CM

## 2017-02-26 DIAGNOSIS — R30.0 DYSURIA: ICD-10-CM

## 2017-02-26 DIAGNOSIS — N39.0 URINARY TRACT INFECTION, SITE UNSPECIFIED: ICD-10-CM

## 2017-02-26 LAB
ALBUMIN UR-MCNC: >=300 MG/DL
APPEARANCE UR: ABNORMAL
BACTERIA #/AREA URNS HPF: ABNORMAL /HPF
BILIRUB UR QL STRIP: ABNORMAL
COLOR UR AUTO: YELLOW
GLUCOSE UR STRIP-MCNC: 250 MG/DL
HGB UR QL STRIP: ABNORMAL
HYALINE CASTS #/AREA URNS LPF: ABNORMAL /LPF (ref 0–2)
KETONES UR STRIP-MCNC: NEGATIVE MG/DL
LEUKOCYTE ESTERASE UR QL STRIP: NEGATIVE
NITRATE UR QL: POSITIVE
NON-SQ EPI CELLS #/AREA URNS LPF: ABNORMAL /LPF
PH UR STRIP: 5.5 PH (ref 5–7)
RBC #/AREA URNS AUTO: ABNORMAL /HPF (ref 0–2)
SP GR UR STRIP: >1.03 (ref 1–1.03)
URN SPEC COLLECT METH UR: ABNORMAL
UROBILINOGEN UR STRIP-ACNC: 0.2 EU/DL (ref 0.2–1)
WBC #/AREA URNS AUTO: ABNORMAL /HPF (ref 0–2)

## 2017-02-26 PROCEDURE — 87186 SC STD MICRODIL/AGAR DIL: CPT | Performed by: PHYSICIAN ASSISTANT

## 2017-02-26 PROCEDURE — 87086 URINE CULTURE/COLONY COUNT: CPT | Performed by: PHYSICIAN ASSISTANT

## 2017-02-26 PROCEDURE — 81001 URINALYSIS AUTO W/SCOPE: CPT | Performed by: PHYSICIAN ASSISTANT

## 2017-02-26 PROCEDURE — 99213 OFFICE O/P EST LOW 20 MIN: CPT | Performed by: PHYSICIAN ASSISTANT

## 2017-02-26 PROCEDURE — 87088 URINE BACTERIA CULTURE: CPT | Performed by: PHYSICIAN ASSISTANT

## 2017-02-26 RX ORDER — CLINDAMYCIN HCL 300 MG
300 CAPSULE ORAL 3 TIMES DAILY
Qty: 21 CAPSULE | Refills: 0 | Status: SHIPPED | OUTPATIENT
Start: 2017-02-26 | End: 2017-03-05

## 2017-02-26 RX ORDER — CIPROFLOXACIN 500 MG/1
500 TABLET, FILM COATED ORAL 2 TIMES DAILY
Qty: 6 TABLET | Refills: 0 | Status: SHIPPED | OUTPATIENT
Start: 2017-02-26 | End: 2017-03-01

## 2017-02-26 RX ORDER — MUPIROCIN CALCIUM 20 MG/G
CREAM TOPICAL 3 TIMES DAILY
Qty: 15 G | Refills: 0 | Status: SHIPPED | OUTPATIENT
Start: 2017-02-26 | End: 2017-12-04

## 2017-02-26 NOTE — MR AVS SNAPSHOT
After Visit Summary   2/26/2017    Marie Vogel    MRN: [id number]           Patient Information     Date Of Birth          1980        Visit Information        Provider Department      2/26/2017 11:30 AM Nikki Johnson PA-C [facility]        Today's Diagnoses     Impetigo    -  1    Dysuria        Urinary tract infection, site unspecified           Follow-ups after your visit        Your next 10 appointments already scheduled     Apr 17, 2017 11:30 AM CDT   Return Visit with Zita Fuentes MD   [facility] ([facility]    [address] [place] [address]  [address]  [facility] [id number] [phone]              Who to contact     If you have questions or need follow up information about today's clinic visit or your schedule please contact [facility] directly at [phone].  Normal or non-critical lab and imaging results will be communicated to you by MyChart, letter or phone within 4 business days after the clinic has received the results. If you do not hear from us within 7 days, please contact the clinic through [email or web address] or phone. If you have a critical or abnormal lab result, we will notify you by phone as soon as possible.  Submit refill requests through [employer] or call your pharmacy and they will forward the refill request to us. Please allow 3 business days for your refill to be completed.          Additional Information About Your Visit        MyChart Information     [employer] gives you secure access to your electronic health record. If you see a primary care provider, you can also send messages to your care team and make appointments. If you have questions, please call your primary care clinic.  If you do not have a primary care provider, please call [phone] and they will assist you.        Care EveryWhere ID     This is your Care EveryWhere ID. This could be used by other organizations to access your  [place] medical records  [id number]        Your Vitals Were     Pulse Temperature Pulse Oximetry BMI (Body Mass Index)          100 99.5  F (37.5  C) (Tympanic) 100% 32.24 kg/m2         Blood Pressure from Last 3 Encounters:   02/26/17 122/80   02/16/17 110/70   12/27/16 112/70    Weight from Last 3 Encounters:   02/26/17 187 lb 12.8 oz (85.2 kg)   02/16/17 185 lb (83.9 kg)   12/27/16 184 lb (83.5 kg)              We Performed the Following     *UA reflex to Microscopic and Culture ([facility] (except [place] and [place])     Urine Culture Aerobic Bacterial     Urine Microscopic          Today's Medication Changes          These changes are accurate as of: 2/26/17 11:59 PM.  If you have any questions, ask your nurse or doctor.               Start taking these medicines.        Dose/Directions    ciprofloxacin 500 MG tablet   Commonly known as:  CIPRO   Used for:  Urinary tract infection, site unspecified   Started by:  Nikki Johnson PA-C        Dose:  500 mg   Take 1 tablet (500 mg) by mouth 2 times daily for 3 days   Quantity:  6 tablet   Refills:  0       clindamycin 300 MG capsule   Commonly known as:  CLEOCIN   Used for:  Impetigo   Started by:  Nikki Johnson PA-C        Dose:  300 mg   Take 1 capsule (300 mg) by mouth 3 times daily for 7 days   Quantity:  21 capsule   Refills:  0       mupirocin 2 % cream   Commonly known as:  BACTROBAN   Used for:  Impetigo   Started by:  Nikki Johnson PA-C        Apply topically 3 times daily   Quantity:  15 g   Refills:  0            Where to get your medicines      These medications were sent to [facility] [address] [place] [address] & [address] [address]    Hours:  24-hours Phone:  [phone]     ciprofloxacin 500 MG tablet    clindamycin 300 MG capsule    mupirocin 2 % cream                Primary Care Provider Office Phone #  Fax #    Alan Paredes [phone]       [address] [place] [address]        Thank you!     Thank you for choosing [facility]  for your care. Our goal is always to provide you with excellent care. Hearing back from our patients is one way we can continue to improve our services. Please take a few minutes to complete the written survey that you may receive in the mail after your visit with us. Thank you!             Your Updated Medication List - Protect others around you: Learn how to safely use, store and throw away your medicines at www.disposemymeds.org.          This list is accurate as of: 2/26/17 11:59 PM.  Always use your most recent med list.                   Brand Name Dispense Instructions for use    acetone (Urine) test Strp     1 strip    1 strip by In Vitro route daily as needed.       ADVIL 200 MG tablet   Generic drug:  ibuprofen      Take 200 mg by mouth every 4 hours as needed.       albuterol 108 (90 BASE) MCG/ACT Inhaler    PROAIR HFA/PROVENTIL HFA/VENTOLIN HFA    1 Inhaler    Inhale 2 puffs into the lungs every 4 hours as needed for shortness of breath / dyspnea or wheezing       aspirin 81 MG tablet     90 tablet    Take 1 tablet (81 mg) by mouth daily       blood glucose monitoring lancets     100 each    1 Device See Admin Instructions. Use up to 5 times daily for blood sugar checks.       blood glucose monitoring meter device kit    no brand specified    1 kit    Use to test blood sugar 6 times daily and as needed.       cetirizine 10 MG tablet    zyrTEC    90 tablet    Take 10 mg by mouth 2 times daily       ciprofloxacin 500 MG tablet    CIPRO    6 tablet    Take 1 tablet (500 mg) by mouth 2 times daily for 3 days       clindamycin 300 MG capsule    CLEOCIN    21 capsule    Take 1 capsule (300 mg) by mouth 3 times daily for 7 days       EPINEPHrine 0.3 MG/0.3ML injection     0.3 mL    Inject 0.3 mLs (0.3 mg) into  the muscle once as needed for anaphylaxis       ergocalciferol 29174 UNITS capsule    ERGOCALCIFEROL    14 capsule    Take 1 capsule (50,000 Units) by mouth once a week       insulin aspart 100 UNIT/ML injection    NovoLOG PEN    3 Month    With insulin pump. About 80 units/day. Vial.       * insulin cartridge misc pump supply     30 each    Change every 3 days1 applicator every 72 hours Change every 3 days       * infusion set misc pump supply     30 each    Change every 3 days as directed       insulin pump infusion      Updated 1/27/17: Medtronic Minimed: Model 723 BASAL: 00:00: 1.0 units/hr 10:30: 0.95 14:00: 1.05 CARB RATIO: 00:00 1:7 1600  1:8 C. Factor: 27 mg/dL BLOOD GLUCOSE TARGET and times: 12   AM (midnight):  Active Insulin Time:  3 hours Basal to Bolus Ratio:  Sensor:  No Carelink / Diasend username:  [email or web address] Carelink / Diasend Password:  [email or web address]!       metFORMIN 500 MG 24 hr tablet    GLUCOPHAGE-XR    120 tablet    Take 2 tablets (1,000 mg) by mouth 2 times daily (with meals)       montelukast 10 MG tablet    SINGULAIR    90 tablet    Take 1 tablet (10 mg) by mouth At Bedtime       MULTI VITAMIN/MINERALS PO      Take 1 each by mouth daily.       mupirocin 2 % cream    BACTROBAN    15 g    Apply topically 3 times daily       * order for DME     100 each    Equipment being ordered: skin prep wipes       * order for DME     1 Units    Equipment being ordered: post-op shoe       SUMAtriptan 25 MG tablet    IMITREX    8 tablet    Take 25-100mg one time. May repeat 25mg every two hours up to a maximum of 200mg in 24 hours.       * Notice:  This list has 4 medication(s) that are the same as other medications prescribed for you. Read the directions carefully, and ask your doctor or other care provider to review them with you.

## 2017-02-28 ENCOUNTER — TELEPHONE (OUTPATIENT)
Dept: URGENT CARE | Facility: URGENT CARE | Age: 37
End: 2017-02-28

## 2017-02-28 LAB
BACTERIA SPEC CULT: ABNORMAL
MICRO REPORT STATUS: ABNORMAL
MICROORGANISM SPEC CULT: ABNORMAL
SPECIMEN SOURCE: ABNORMAL

## 2017-02-28 NOTE — TELEPHONE ENCOUNTER
Called pt and informed of culture results.  Pt verb understanding and would like rx sent to [facility].      Called rx into [facility].    Brooke Olson CMA                                2/28/2017 5:24 PM

## 2017-03-06 DIAGNOSIS — Z79.4 TYPE 2 DIABETES MELLITUS WITH DIABETIC NEPHROPATHY, WITH LONG-TERM CURRENT USE OF INSULIN (H): Primary | ICD-10-CM

## 2017-03-06 DIAGNOSIS — R80.9 MICROALBUMINURIA: ICD-10-CM

## 2017-03-06 DIAGNOSIS — E11.21 TYPE 2 DIABETES MELLITUS WITH DIABETIC NEPHROPATHY, WITH LONG-TERM CURRENT USE OF INSULIN (H): Primary | ICD-10-CM

## 2017-03-06 NOTE — TELEPHONE ENCOUNTER
PEPE CONTOUR NEXT TEST STRIP         Last Written Prescription Date: 06/15/16 PATIENT REPORTED AND NEW METER DEVICE AND SUPPLIES (NO BRAND SPECIFIED) 11/01/16  Last Fill Quantity: NOT LISTED, # refills: NOT LISTED  Last Office Visit with [facility], [facility] or [facility] prescribing provider:  12/27/16   Next 5 appointments (look out 90 days)     Mar 27, 2017 10:00 AM CDT   Return Visit with Zita Fuentes MD   [facility]    [address] [place] [address]  [address]  [facility] [address] [phone]                   BP Readings from Last 3 Encounters:   02/26/17 122/80   02/16/17 110/70   12/27/16 112/70     Lab Results   Component Value Date    MICROL 1400 09/27/2016     No results found for: MICROALBUMIN  Creatinine   Date Value Ref Range Status   03/12/2016 0.73 0.52 - 1.04 mg/dL Final   ]  GFR Estimate   Date Value Ref Range Status   03/12/2016 89 >60 mL/min/1.7m2 Final     Comment:     Non  GFR Calc   02/23/2016 87 >60 mL/min/1.7m2 Final     Comment:     Non  GFR Calc   03/31/2015 >90  Non  GFR Calc   >60 mL/min/1.7m2 Final     GFR Estimate If Black   Date Value Ref Range Status   03/12/2016 >90   GFR Calc   >60 mL/min/1.7m2 Final   02/23/2016 >90   GFR Calc   >60 mL/min/1.7m2 Final   03/31/2015 >90   GFR Calc   >60 mL/min/1.7m2 Final     Lab Results   Component Value Date    CHOL 162 09/28/2016     Lab Results   Component Value Date    HDL 45 09/28/2016     Lab Results   Component Value Date    LDL 98 09/28/2016     Lab Results   Component Value Date    TRIG 99 09/28/2016     Lab Results   Component Value Date    CHOLHDLRATIO 4.5 07/01/2015     Lab Results   Component Value Date    AST 21 03/12/2016     Lab Results   Component Value Date    ALT 32 12/27/2016     Lab Results   Component Value Date    A1C 7.2 12/27/2016    A1C 7.1 09/27/2016    A1C 7.3 02/22/2016    A1C 8.2  11/24/2015    A1C 7.8 07/01/2015     Potassium   Date Value Ref Range Status   03/12/2016 3.6 3.4 - 5.3 mmol/L Final     QUICK SERTER INSERTION DEVICE      Last Written Prescription Date: NO INFORMATION LOCATED OR LISTED ON REFILL REQUEST  Last Fill Quantity:  ,  # refills:     Last Office Visit with G, P or [facility] prescribing provider: 12/27/16                                         Next 5 appointments (look out 90 days)     Mar 27, 2017 10:00 AM CDT   Return Visit with Zita Fuentes MD   [facility] ([facility]    [address] [place] [address]  [address] [place] [id number] [phone]

## 2017-03-06 NOTE — TELEPHONE ENCOUNTER
LOSARTAN 50MG      Last Written Prescription Date: 12/1/2015  Last Fill Quantity: 135, # refills: 3  Last Office Visit with [facility], [facility] or [facility] prescribing provider: 2/26/2017  Next 5 appointments (look out 90 days)     Mar 27, 2017 10:00 AM CDT   Return Visit with Zita Fuentes MD   [facility]    [address] [place] [address] [phone]                   Potassium   Date Value Ref Range Status   03/12/2016 3.6 3.4 - 5.3 mmol/L Final     Creatinine   Date Value Ref Range Status   03/12/2016 0.73 0.52 - 1.04 mg/dL Final     BP Readings from Last 3 Encounters:   02/26/17 122/80   02/16/17 110/70   12/27/16 112/70

## 2017-03-07 DIAGNOSIS — E78.5 HYPERLIPIDEMIA LDL GOAL <100: ICD-10-CM

## 2017-03-07 RX ORDER — SIMVASTATIN 10 MG
10 TABLET ORAL EVERY MORNING
Qty: 90 TABLET | Refills: 1 | Status: SHIPPED | OUTPATIENT
Start: 2017-03-07 | End: 2017-12-04 | Stop reason: ALTCHOICE

## 2017-03-07 RX ORDER — LOSARTAN POTASSIUM 50 MG/1
75 TABLET ORAL DAILY
Qty: 135 TABLET | Refills: 0 | Status: SHIPPED | OUTPATIENT
Start: 2017-03-07 | End: 2017-06-30

## 2017-03-07 NOTE — TELEPHONE ENCOUNTER
Simvastatin 10 MG     Last Written Prescription Date: 8/8/16  Last Fill Quantity: 90, # refills: 0  Last Office Visit with [facility], U[facility] or [facility] prescribing provider: 2/16/17  Next 5 appointments (look out 90 days)     Mar 27, 2017 10:00 AM CDT   Return Visit with Zita Fuentes MD   [facility] ([facility]    [address] [place] [id number] [phone]                   Lab Results   Component Value Date    CHOL 162 09/28/2016     Lab Results   Component Value Date    HDL 45 09/28/2016     Lab Results   Component Value Date    LDL 98 09/28/2016     Lab Results   Component Value Date    TRIG 99 09/28/2016     Lab Results   Component Value Date    CHOLHDLRATIO 4.5 07/01/2015

## 2017-03-07 NOTE — TELEPHONE ENCOUNTER
Patient calling that she is out and is traveling in 2 days. Requesting a 3 mo refill since goes to mail order. Patient notified that she is due for a lab for the losartan. Will send one refill  and patient will come in for labs. Lab order placed.   Pattie Dunn RN

## 2017-03-07 NOTE — TELEPHONE ENCOUNTER
Patient calling back that prescription went to wrong pharmacy. Doesn't want to go to mail order. Wants to another [place] pharmacy. Called pharmacy and transferred.   Pattie Dunn RN

## 2017-03-10 ENCOUNTER — TELEPHONE (OUTPATIENT)
Dept: ENDOCRINOLOGY | Facility: CLINIC | Age: 37
End: 2017-03-10

## 2017-03-10 NOTE — TELEPHONE ENCOUNTER
Fax notification received that Jacque Contour Next Strips require a PA.  Form put in Dr. Fuentes's in basket to complete.

## 2017-03-15 DIAGNOSIS — R80.9 MICROALBUMINURIA: ICD-10-CM

## 2017-03-15 LAB
ANION GAP SERPL CALCULATED.3IONS-SCNC: 8 MMOL/L (ref 3–14)
BUN SERPL-MCNC: 11 MG/DL (ref 7–30)
CALCIUM SERPL-MCNC: 8.6 MG/DL (ref 8.5–10.1)
CHLORIDE SERPL-SCNC: 108 MMOL/L (ref 94–109)
CO2 SERPL-SCNC: 25 MMOL/L (ref 20–32)
CREAT SERPL-MCNC: 0.68 MG/DL (ref 0.52–1.04)
GFR SERPL CREATININE-BSD FRML MDRD: ABNORMAL ML/MIN/1.7M2
GLUCOSE SERPL-MCNC: 190 MG/DL (ref 70–99)
POTASSIUM SERPL-SCNC: 4.4 MMOL/L (ref 3.4–5.3)
SODIUM SERPL-SCNC: 141 MMOL/L (ref 133–144)

## 2017-03-15 PROCEDURE — 36415 COLL VENOUS BLD VENIPUNCTURE: CPT | Performed by: INTERNAL MEDICINE

## 2017-03-15 PROCEDURE — 80048 BASIC METABOLIC PNL TOTAL CA: CPT | Performed by: INTERNAL MEDICINE

## 2017-03-22 NOTE — TELEPHONE ENCOUNTER
Check with CDE if that is the one that is compatible with glucometer.  If he has then proceeded PA

## 2017-03-26 DIAGNOSIS — K21.9 GASTROESOPHAGEAL REFLUX DISEASE WITHOUT ESOPHAGITIS: ICD-10-CM

## 2017-03-26 DIAGNOSIS — K44.9 HIATAL HERNIA: ICD-10-CM

## 2017-03-26 RX ORDER — OMEPRAZOLE 40 MG/1
40 CAPSULE, DELAYED RELEASE ORAL DAILY
Qty: 90 CAPSULE | Refills: 1 | Status: CANCELLED | OUTPATIENT
Start: 2017-03-26

## 2017-03-26 NOTE — TELEPHONE ENCOUNTER
omeprazole (PRILOSEC) 40 MG capsule      Last Written Prescription Date: 09-  Last Fill Quantity: 90,  # refills: 1   Last Office Visit with FM[facility], UMP or [facility] prescribing provider: 02-                                         Next 5 appointments (look out 90 days)     Apr 17, 2017 11:30 AM CDT   Return Visit with Zita Fuentes MD   [facility]    [address] [place] [address]  [address] [place] [id number] [phone]

## 2017-03-27 DIAGNOSIS — K44.9 HIATAL HERNIA: ICD-10-CM

## 2017-03-27 DIAGNOSIS — K21.9 GASTROESOPHAGEAL REFLUX DISEASE WITHOUT ESOPHAGITIS: ICD-10-CM

## 2017-03-27 RX ORDER — OMEPRAZOLE 40 MG/1
40 CAPSULE, DELAYED RELEASE ORAL DAILY
Qty: 90 CAPSULE | Refills: 1 | Status: SHIPPED | OUTPATIENT
Start: 2017-03-27 | End: 2017-11-08

## 2017-03-27 NOTE — TELEPHONE ENCOUNTER
omeprazole  Last Written Prescription Date:  9/20/16  Last Fill Quantity: 90,   # refills: 1  Last Office Visit : 9/29/16  Future Office visit:  no

## 2017-03-28 NOTE — PROGRESS NOTES
"SUBJECTIVE:   Marie Vogel is a 36 year old female presenting with a chief complaint of nasal congestion, rhinorrhea, \"cold symptoms\", cough  and also with crusted lesion on left side of upper lip.  Thought cold sore and using OTC med but getting worse.  .  Onset of symptoms was 1 week(s) ago.  Course of illness is worsening.    Severity moderate  Current and Associated symptoms: none  Treatment measures tried include Fluids, OTC meds and Rest.  Predisposing factors include None.  Also with UTI sx for the past day.  Frequency and urgency.  No blood    Past Medical History:   Diagnosis Date     Allergic rhinitis due to pollen      Atypical glandular cells on Pap smear 3/1108     PFO (patent foramen ovale)      Type II or unspecified type diabetes mellitus without mention of complication, not stated as uncontrolled 2002    onset was gestational in 2001     Current Outpatient Prescriptions   Medication Sig Dispense Refill     mupirocin (BACTROBAN) 2 % cream Apply topically 3 times daily 15 g 0     EPINEPHrine 0.3 MG/0.3ML injection Inject 0.3 mLs (0.3 mg) into the muscle once as needed for anaphylaxis 0.3 mL 11     SUMAtriptan (IMITREX) 25 MG tablet Take 25-100mg one time. May repeat 25mg every two hours up to a maximum of 200mg in 24 hours. 8 tablet 6     order for DME Equipment being ordered: post-op shoe 1 Units 0     ergocalciferol (ERGOCALCIFEROL) 45299 UNITS capsule Take 1 capsule (50,000 Units) by mouth once a week 14 capsule 3     INSULIN PUMP - OUTPATIENT Updated 1/27/17:  Medtronic Minimed: Model 723  BASAL:  00:00: 1.0 units/hr  10:30: 0.95  14:00: 1.05  CARB RATIO:  00:00 1:7  1600  1:8  C. Factor:  27 mg/dL  BLOOD GLUCOSE TARGET and times:  12   AM (midnight):   Active Insulin Time:  3 hours  Basal to Bolus Ratio:   Sensor:  No  Carelink / Diasend username:  [email or web address]  Carelink / Diasend Password:  [email or web address]!       metFORMIN (GLUCOPHAGE-XR) 500 MG 24 hr tablet Take 2 tablets (1,000 mg) by " "mouth 2 times daily (with meals) 120 tablet 3     insulin cartridge (PARADIGM 3ML) misc pump supply Change every 3 days1 applicator every 72 hours Change every 3 days 30 each 3     infusion set (PARADIGM QUICK-SET 23\" 9MM) misc pump supply Change every 3 days as directed 30 each 3     insulin aspart (NOVOLOG PEN) 100 UNIT/ML injection With insulin pump. About 80 units/day. Vial. 3 Month 3     albuterol (PROAIR HFA, PROVENTIL HFA, VENTOLIN HFA) 108 (90 BASE) MCG/ACT inhaler Inhale 2 puffs into the lungs every 4 hours as needed for shortness of breath / dyspnea or wheezing 1 Inhaler 1     blood glucose monitoring (NO BRAND SPECIFIED) meter device kit Use to test blood sugar 6 times daily and as needed. 1 kit 0     montelukast (SINGULAIR) 10 MG tablet Take 1 tablet (10 mg) by mouth At Bedtime 90 tablet 1     order for DME Equipment being ordered: skin prep wipes 100 each prn     aspirin 81 MG tablet Take 1 tablet (81 mg) by mouth daily 90 tablet 3     cetirizine (ZYRTEC) 10 MG tablet Take 10 mg by mouth 2 times daily  90 tablet 3     Multiple Vitamins-Minerals (MULTI VITAMIN/MINERALS PO) Take 1 each by mouth daily.       Acetone, Urine, Test STRP 1 strip by In Vitro route daily as needed. 1 strip prn     ibuprofen (ADVIL) 200 MG tablet Take 200 mg by mouth every 4 hours as needed.       Lancets (MICROLET) MISC 1 Device See Admin Instructions. Use up to 5 times daily for blood sugar checks. 100 each prn     omeprazole (PRILOSEC) 40 MG capsule Take 1 capsule (40 mg) by mouth daily 90 capsule 1     losartan (COZAAR) 50 MG tablet Take 1.5 tablets (75 mg) by mouth daily Take 1 tab ( 50 mg)  qam and 1/2 tab ( 25 mg)  q pm 135 tablet 0     simvastatin (ZOCOR) 10 MG tablet Take 1 tablet (10 mg) by mouth every morning 90 tablet 1     PEPE CONTOUR NEXT test strip Check 4 times/day 120 each 3     Social History   Substance Use Topics     Smoking status: Former Smoker     Types: Cigarettes     Quit date: 10/24/2005     Smokeless " tobacco: Former User      Comment: States only smokes when drinks maybe 2x/year     Alcohol use No       ROS:  Review of systems negative except as stated above.    OBJECTIVE  :/80  Pulse 100  Temp 99.5  F (37.5  C) (Tympanic)  Wt 187 lb 12.8 oz (85.2 kg)  SpO2 100%  BMI 32.24 kg/m2  GENERAL APPEARANCE: healthy, alert and no distress  EYES: EOMI,  PERRL, conjunctiva clear  HENT: TM's normal bilaterally, oral mucous membranes moist, no erythema noted and honely crusted lesions on left side of upper lip  NECK: supple, nontender, no lymphadenopathy  RESP: lungs clear to auscultation - no rales, rhonchi or wheezes  CV: regular rates and rhythm, normal S1 S2, no murmur noted  ABDOMEN:  soft, nontender, no HSM or masses and bowel sounds normal  SKIN: no suspicious lesions or rashes    Results for orders placed or performed in visit on 02/26/17   *UA reflex to Microscopic and Culture ([facility] (except [place] and [place])   Result Value Ref Range    Color Urine Yellow     Appearance Urine Slightly Cloudy     Glucose Urine 250 (A) NEG mg/dL    Bilirubin Urine (A) NEG     Small  This is an unconfirmed screening test result. A positive result may be false.      Ketones Urine Negative NEG mg/dL    Specific Gravity Urine >1.030 1.003 - 1.035    Blood Urine Small (A) NEG    pH Urine 5.5 5.0 - 7.0 pH    Protein Albumin Urine >=300 (A) NEG mg/dL    Urobilinogen Urine 0.2 0.2 - 1.0 EU/dL    Nitrite Urine Positive (A) NEG    Leukocyte Esterase Urine Negative NEG    Source Midstream Urine    Urine Microscopic   Result Value Ref Range    WBC Urine 5-10 (A) 0 - 2 /HPF    RBC Urine O - 2 0 - 2 /HPF    Hyaline Casts O - 2 0 - 2 /LPF    Squamous Epithelial /LPF Urine Few FEW /LPF    Bacteria Urine Many (A) NEG /HPF   Urine Culture Aerobic Bacterial   Result Value Ref Range    Specimen Description Midstream Urine     Culture Micro (A)      >100,000 colonies/mL Escherichia coli  <10,000  colonies/mL mixed urogenital nii Susceptibility testing not routinely   done      Micro Report Status FINAL 02/28/2017     Organism: >100,000 colonies/mL Escherichia coli        Susceptibility    >100,000 colonies/ml escherichia coli (regino) -  (no method available)     AMPICILLIN >=32 Resistant  ug/mL     CEFAZOLIN Value in next row  ug/mL      8 SusceptibleCefazolin REGINO breakpoints are for the treatment of uncomplicated urinary tract infections.  For the treatment of systemic infections, please contact the laboratory for additional testing.     CEFOXITIN Value in next row  ug/mL      8 SusceptibleCefazolin REGINO breakpoints are for the treatment of uncomplicated urinary tract infections.  For the treatment of systemic infections, please contact the laboratory for additional testing.     CEFTAZIDIME Value in next row  ug/mL      8 SusceptibleCefazolin REGINO breakpoints are for the treatment of uncomplicated urinary tract infections.  For the treatment of systemic infections, please contact the laboratory for additional testing.     CEFTRIAXONE Value in next row  ug/mL      8 SusceptibleCefazolin REGINO breakpoints are for the treatment of uncomplicated urinary tract infections.  For the treatment of systemic infections, please contact the laboratory for additional testing.     CIPROFLOXACIN Value in next row  ug/mL      8 SusceptibleCefazolin REGINO breakpoints are for the treatment of uncomplicated urinary tract infections.  For the treatment of systemic infections, please contact the laboratory for additional testing.     GENTAMICIN Value in next row  ug/mL      8 SusceptibleCefazolin REGINO breakpoints are for the treatment of uncomplicated urinary tract infections.  For the treatment of systemic infections, please contact the laboratory for additional testing.     LEVOFLOXACIN Value in next row  ug/mL      8 SusceptibleCefazolin REGINO breakpoints are for the treatment of uncomplicated urinary tract infections.  For the  treatment of systemic infections, please contact the laboratory for additional testing.     NITROFURANTOIN Value in next row  ug/mL      8 SusceptibleCefazolin JUAN C breakpoints are for the treatment of uncomplicated urinary tract infections.  For the treatment of systemic infections, please contact the laboratory for additional testing.     TOBRAMYCIN Value in next row  ug/mL      8 SusceptibleCefazolin JUAN C breakpoints are for the treatment of uncomplicated urinary tract infections.  For the treatment of systemic infections, please contact the laboratory for additional testing.     Trimethoprim/Sulfa Value in next row  ug/mL      8 SusceptibleCefazolin JUAN C breakpoints are for the treatment of uncomplicated urinary tract infections.  For the treatment of systemic infections, please contact the laboratory for additional testing.     AMPICILLIN/SULBACTAM Value in next row  ug/mL      8 SusceptibleCefazolin JUAN C breakpoints are for the treatment of uncomplicated urinary tract infections.  For the treatment of systemic infections, please contact the laboratory for additional testing.     Piperacillin/Tazo Value in next row  ug/mL      8 SusceptibleCefazolin JUAN C breakpoints are for the treatment of uncomplicated urinary tract infections.  For the treatment of systemic infections, please contact the laboratory for additional testing.     CEFEPIME Value in next row  ug/mL      8 SusceptibleCefazolin JUAN C breakpoints are for the treatment of uncomplicated urinary tract infections.  For the treatment of systemic infections, please contact the laboratory for additional testing.         assessment/plan:  (L01.00) Impetigo  (primary encounter diagnosis)  Comment:   Plan: Urine Microscopic, clindamycin (CLEOCIN) 300 MG        capsule, mupirocin (BACTROBAN) 2 % cream        Med as directed and OTC  Med for sx relief.  FU with PCP as needed if sx worsen.      (R30.0) Dysuria  Comment:   Plan: *UA reflex to Microscopic and Culture          ([facility] (except         [place] and [place]), Urine Culture Aerobic        Bacterial           (N39.0) Urinary tract infection, site unspecified  Comment:   Plan: ciprofloxacin (CIPRO) 500 MG tablet         Med as directed and culture pending.

## 2017-04-16 ENCOUNTER — HOSPITAL ENCOUNTER (EMERGENCY)
Facility: CLINIC | Age: 37
Discharge: HOME | End: 2017-04-16
Attending: INTERNAL MEDICINE | Admitting: INTERNAL MEDICINE
Payer: COMMERCIAL

## 2017-04-16 VITALS
RESPIRATION RATE: 12 BRPM | HEART RATE: 113 BPM | DIASTOLIC BLOOD PRESSURE: 80 MMHG | SYSTOLIC BLOOD PRESSURE: 122 MMHG | TEMPERATURE: 98.5 F | OXYGEN SATURATION: 96 % | WEIGHT: 189.82 LBS | BODY MASS INDEX: 32.58 KG/M2

## 2017-04-16 DIAGNOSIS — E83.42 HYPOMAGNESEMIA: ICD-10-CM

## 2017-04-16 DIAGNOSIS — R00.2 PALPITATIONS: ICD-10-CM

## 2017-04-16 LAB
ALBUMIN SERPL-MCNC: 3.3 G/DL (ref 3.4–5)
ALP SERPL-CCNC: 77 U/L (ref 40–150)
ALT SERPL W P-5'-P-CCNC: 25 U/L (ref 0–50)
ANION GAP SERPL CALCULATED.3IONS-SCNC: 10 MMOL/L (ref 3–14)
AST SERPL W P-5'-P-CCNC: 14 U/L (ref 0–45)
BASOPHILS # BLD AUTO: 0 10E9/L (ref 0–0.2)
BASOPHILS NFR BLD AUTO: 0.4 %
BILIRUB SERPL-MCNC: 0.3 MG/DL (ref 0.2–1.3)
BUN SERPL-MCNC: 18 MG/DL (ref 7–30)
CALCIUM SERPL-MCNC: 8.2 MG/DL (ref 8.5–10.1)
CHLORIDE SERPL-SCNC: 110 MMOL/L (ref 94–109)
CO2 SERPL-SCNC: 23 MMOL/L (ref 20–32)
CREAT SERPL-MCNC: 0.86 MG/DL (ref 0.52–1.04)
CRP SERPL-MCNC: <2.9 MG/L (ref 0–8)
DIFFERENTIAL METHOD BLD: NORMAL
EOSINOPHIL # BLD AUTO: 0.5 10E9/L (ref 0–0.7)
EOSINOPHIL NFR BLD AUTO: 6 %
ERYTHROCYTE [DISTWIDTH] IN BLOOD BY AUTOMATED COUNT: 12.2 % (ref 10–15)
GFR SERPL CREATININE-BSD FRML MDRD: 73 ML/MIN/1.7M2
GLUCOSE SERPL-MCNC: 147 MG/DL (ref 70–99)
HCT VFR BLD AUTO: 36.8 % (ref 35–47)
HGB BLD-MCNC: 12.2 G/DL (ref 11.7–15.7)
IMM GRANULOCYTES # BLD: 0 10E9/L (ref 0–0.4)
IMM GRANULOCYTES NFR BLD: 0.2 %
LYMPHOCYTES # BLD AUTO: 2.9 10E9/L (ref 0.8–5.3)
LYMPHOCYTES NFR BLD AUTO: 35.9 %
MAGNESIUM SERPL-MCNC: 1.5 MG/DL (ref 1.6–2.3)
MCH RBC QN AUTO: 31 PG (ref 26.5–33)
MCHC RBC AUTO-ENTMCNC: 33.2 G/DL (ref 31.5–36.5)
MCV RBC AUTO: 94 FL (ref 78–100)
MONOCYTES # BLD AUTO: 0.7 10E9/L (ref 0–1.3)
MONOCYTES NFR BLD AUTO: 8.7 %
NEUTROPHILS # BLD AUTO: 4 10E9/L (ref 1.6–8.3)
NEUTROPHILS NFR BLD AUTO: 48.8 %
NRBC # BLD AUTO: 0 10*3/UL
NRBC BLD AUTO-RTO: 0 /100
PLATELET # BLD AUTO: 310 10E9/L (ref 150–450)
POTASSIUM SERPL-SCNC: 4 MMOL/L (ref 3.4–5.3)
PROT SERPL-MCNC: 6.6 G/DL (ref 6.8–8.8)
RBC # BLD AUTO: 3.93 10E12/L (ref 3.8–5.2)
SODIUM SERPL-SCNC: 143 MMOL/L (ref 133–144)
TROPONIN I SERPL-MCNC: NORMAL UG/L (ref 0–0.04)
TSH SERPL DL<=0.005 MIU/L-ACNC: 2.06 MU/L (ref 0.4–4)
WBC # BLD AUTO: 8.2 10E9/L (ref 4–11)

## 2017-04-16 PROCEDURE — 84484 ASSAY OF TROPONIN QUANT: CPT | Performed by: INTERNAL MEDICINE

## 2017-04-16 PROCEDURE — 84443 ASSAY THYROID STIM HORMONE: CPT | Performed by: INTERNAL MEDICINE

## 2017-04-16 PROCEDURE — 80053 COMPREHEN METABOLIC PANEL: CPT | Performed by: INTERNAL MEDICINE

## 2017-04-16 PROCEDURE — 99285 EMERGENCY DEPT VISIT HI MDM: CPT | Mod: 25 | Performed by: INTERNAL MEDICINE

## 2017-04-16 PROCEDURE — 85025 COMPLETE CBC W/AUTO DIFF WBC: CPT | Performed by: INTERNAL MEDICINE

## 2017-04-16 PROCEDURE — 86140 C-REACTIVE PROTEIN: CPT | Performed by: INTERNAL MEDICINE

## 2017-04-16 PROCEDURE — 83735 ASSAY OF MAGNESIUM: CPT | Performed by: INTERNAL MEDICINE

## 2017-04-16 PROCEDURE — 96365 THER/PROPH/DIAG IV INF INIT: CPT | Performed by: INTERNAL MEDICINE

## 2017-04-16 PROCEDURE — 99284 EMERGENCY DEPT VISIT MOD MDM: CPT | Mod: 25 | Performed by: INTERNAL MEDICINE

## 2017-04-16 PROCEDURE — 93005 ELECTROCARDIOGRAM TRACING: CPT | Performed by: INTERNAL MEDICINE

## 2017-04-16 PROCEDURE — 25000125 ZZHC RX 250: Performed by: INTERNAL MEDICINE

## 2017-04-16 PROCEDURE — 25000128 H RX IP 250 OP 636: Performed by: INTERNAL MEDICINE

## 2017-04-16 PROCEDURE — 93010 ELECTROCARDIOGRAM REPORT: CPT | Mod: Z6 | Performed by: INTERNAL MEDICINE

## 2017-04-16 PROCEDURE — 96361 HYDRATE IV INFUSION ADD-ON: CPT | Performed by: INTERNAL MEDICINE

## 2017-04-16 RX ADMIN — Medication 2 G: at 21:09

## 2017-04-16 RX ADMIN — SODIUM CHLORIDE 500 ML: 0.9 INJECTION, SOLUTION INTRAVENOUS at 20:15

## 2017-04-16 ASSESSMENT — ENCOUNTER SYMPTOMS
DIFFICULTY URINATING: 0
FATIGUE: 1
ABDOMINAL PAIN: 0
PALPITATIONS: 1
LIGHT-HEADEDNESS: 0
SHORTNESS OF BREATH: 1
NUMBNESS: 0
FEVER: 0
TROUBLE SWALLOWING: 0
WEAKNESS: 0

## 2017-04-16 NOTE — ED AVS SNAPSHOT
[facility], Emergency Department    [address] [address]    Phone:  [phone]                                       Marie Vogel   MRN: [id number]    Department:  [facility], Emergency Department   Date of Visit:  4/16/2017           Patient Information     Date Of Birth          1980        Your diagnoses for this visit were:     Palpitations     Hypomagnesemia        You were seen by Danis Conner MD.      Follow-up Information     Follow up with Alan Paredes MD.    Specialties:  Internal Medicine, Pediatrics    Contact information:    [facility]  [address] [place] [address]  [phone]          Discharge Instructions       Continue your regular medications.  Return or follow up with cardiology if you continue to note palpitations.    Future Appointments        Provider Department Dept Phone Center    4/17/2017 11:30 AM Zita Fuentes MD [facility] [phone] [facility]    5/15/2017 9:30 AM [facility] [phone] [facility]      24 Hour Appointment Hotline       To make an appointment at any [facility], call [phone] ([phone]). If you don't have a family doctor or clinic, we will help you find one. [facility] are conveniently located to serve the needs of you and your family.             Review of your medicines      Our records show that you are taking the medicines listed below. If these are incorrect, please call your family doctor or clinic.        Dose / Directions Last dose taken    acetone (Urine) test Strp   Dose:  1 strip   Quantity:  1 strip        1 strip by In Vitro route daily as needed.   Refills:  prn        ADVIL 200 MG tablet   Dose:  200 mg   Generic drug:  ibuprofen        Take 200 mg by mouth every 4 hours as needed.   Refills:  0        albuterol 108 (90 BASE) MCG/ACT Inhaler   Commonly known as:  PROAIR HFA/PROVENTIL HFA/VENTOLIN HFA   Dose:   2 puff   Quantity:  1 Inhaler        Inhale 2 puffs into the lungs every 4 hours as needed for shortness of breath / dyspnea or wheezing   Refills:  1        aspirin 81 MG tablet   Dose:  81 mg   Quantity:  90 tablet        Take 1 tablet (81 mg) by mouth daily   Refills:  3        PEPE CONTOUR NEXT test strip   Quantity:  120 each   Generic drug:  blood glucose monitoring        Check 4 times/day   Refills:  3        blood glucose monitoring lancets   Dose:  1 Device   Quantity:  100 each        1 Device See Admin Instructions. Use up to 5 times daily for blood sugar checks.   Refills:  prn        blood glucose monitoring meter device kit   Commonly known as:  no brand specified   Quantity:  1 kit        Use to test blood sugar 6 times daily and as needed.   Refills:  0        cetirizine 10 MG tablet   Commonly known as:  zyrTEC   Dose:  10 mg   Quantity:  90 tablet        Take 10 mg by mouth 2 times daily   Refills:  3        EPINEPHrine 0.3 MG/0.3ML injection   Dose:  0.3 mg   Quantity:  0.3 mL        Inject 0.3 mLs (0.3 mg) into the muscle once as needed for anaphylaxis   Refills:  11        ergocalciferol 61199 UNITS capsule   Commonly known as:  ERGOCALCIFEROL   Dose:  19782 Units   Quantity:  14 capsule        Take 1 capsule (50,000 Units) by mouth once a week   Refills:  3        insulin aspart 100 UNIT/ML injection   Commonly known as:  NovoLOG PEN   Quantity:  3 Month        With insulin pump. About 80 units/day. Vial.   Refills:  3        * insulin cartridge misc pump supply   Quantity:  30 each        Change every 3 days1 applicator every 72 hours Change every 3 days   Refills:  3        * infusion set misc pump supply   Quantity:  30 each        Change every 3 days as directed   Refills:  3        insulin pump infusion        Updated 1/27/17: Medtronic Minimed: Model 723 BASAL: 00:00: 1.0 units/hr 10:30: 0.95 14:00: 1.05 CARB RATIO: 00:00 1:7 1600  1:8 C. Factor: 27 mg/dL BLOOD GLUCOSE TARGET and  times: 12   AM (midnight):  Active Insulin Time:  3 hours Basal to Bolus Ratio:  Sensor:  No Carelink / Diasend username:  [email or web address] Carelink / Diasend Password:  [email or web address]   Refills:  0        losartan 50 MG tablet   Commonly known as:  COZAAR   Dose:  75 mg   Quantity:  135 tablet        Take 1.5 tablets (75 mg) by mouth daily Take 1 tab ( 50 mg)  qam and 1/2 tab ( 25 mg)  q pm   Refills:  0        metFORMIN 500 MG 24 hr tablet   Commonly known as:  GLUCOPHAGE-XR   Dose:  1000 mg   Quantity:  120 tablet        Take 2 tablets (1,000 mg) by mouth 2 times daily (with meals)   Refills:  3        montelukast 10 MG tablet   Commonly known as:  SINGULAIR   Dose:  10 mg   Quantity:  90 tablet        Take 1 tablet (10 mg) by mouth At Bedtime   Refills:  1        MULTI VITAMIN/MINERALS PO   Dose:  1 each        Take 1 each by mouth daily.   Refills:  0        mupirocin 2 % cream   Commonly known as:  BACTROBAN   Quantity:  15 g        Apply topically 3 times daily   Refills:  0        omeprazole 40 MG capsule   Commonly known as:  priLOSEC   Dose:  40 mg   Quantity:  90 capsule        Take 1 capsule (40 mg) by mouth daily   Refills:  1        * order for DME   Quantity:  100 each        Equipment being ordered: skin prep wipes   Refills:  prn        * order for DME   Quantity:  1 Units        Equipment being ordered: post-op shoe   Refills:  0        simvastatin 10 MG tablet   Commonly known as:  ZOCOR   Dose:  10 mg   Quantity:  90 tablet        Take 1 tablet (10 mg) by mouth every morning   Refills:  1        SUMAtriptan 25 MG tablet   Commonly known as:  IMITREX   Quantity:  8 tablet        Take 25-100mg one time. May repeat 25mg every two hours up to a maximum of 200mg in 24 hours.   Refills:  6        * Notice:  This list has 4 medication(s) that are the same as other medications prescribed for you. Read the directions carefully, and ask your doctor or other care provider to review them with you.             Procedures and tests performed during your visit     CBC with platelets differential    CRP inflammation    Cardiac Continuous Monitoring    Comprehensive metabolic panel    EKG 12-lead, tracing only    Magnesium    Saline Lock IV    TSH with free T4 reflex    Troponin I      Orders Needing Specimen Collection     None      Pending Results     Date and Time Order Name Status Description    4/16/2017 1943 EKG 12-lead, tracing only Preliminary             Pending Culture Results     No orders found from 4/14/2017 to 4/17/2017.            Thank you for choosing [facility]       Thank you for choosing [facility] for your care. Our goal is always to provide you with excellent care. Hearing back from our patients is one way we can continue to improve our services. Please take a few minutes to complete the written survey that you may receive in the mail after you visit with us. Thank you!        [employer] Information     [employer] gives you secure access to your electronic health record. If you see a primary care provider, you can also send messages to your care team and make appointments. If you have questions, please call your primary care clinic.  If you do not have a primary care provider, please call [phone] and they will assist you.        Care EveryWhere ID     This is your Care EveryWhere ID. This could be used by other organizations to access your [facility] medical records  [id number]        After Visit Summary       This is your record. Keep this with you and show to your community pharmacist(s) and doctor(s) at your next visit.

## 2017-04-16 NOTE — ED AVS SNAPSHOT
[facility], [place], Emergency Department    [address] [place] [address]    Phone:  [phone]                                       Marie Vogel   MRN: [id number]    Department:  [facility], Emergency Department   Date of Visit:  4/16/2017           After Visit Summary Signature Page     I have received my discharge instructions, and my questions have been answered. I have discussed any challenges I see with this plan with the nurse or doctor.    ..........................................................................................................................................  Patient/Patient Representative Signature      ..........................................................................................................................................  Patient Representative Print Name and Relationship to Patient    ..................................................               ................................................  Date                                            Time    ..........................................................................................................................................  Reviewed by Signature/Title    ...................................................              ..............................................  Date                                                            Time

## 2017-04-17 ENCOUNTER — OFFICE VISIT (OUTPATIENT)
Dept: ENDOCRINOLOGY | Facility: CLINIC | Age: 37
End: 2017-04-17
Payer: COMMERCIAL

## 2017-04-17 ENCOUNTER — OFFICE VISIT (OUTPATIENT)
Dept: OPTOMETRY | Facility: CLINIC | Age: 37
End: 2017-04-17
Payer: COMMERCIAL

## 2017-04-17 VITALS
BODY MASS INDEX: 32.6 KG/M2 | HEART RATE: 70 BPM | SYSTOLIC BLOOD PRESSURE: 133 MMHG | TEMPERATURE: 98.2 F | DIASTOLIC BLOOD PRESSURE: 90 MMHG | WEIGHT: 189.9 LBS

## 2017-04-17 DIAGNOSIS — E11.21 TYPE 2 DIABETES MELLITUS WITH DIABETIC NEPHROPATHY, WITH LONG-TERM CURRENT USE OF INSULIN (H): Primary | ICD-10-CM

## 2017-04-17 DIAGNOSIS — H31.013: ICD-10-CM

## 2017-04-17 DIAGNOSIS — H52.13 MYOPIA WITH ASTIGMATISM, BILATERAL: ICD-10-CM

## 2017-04-17 DIAGNOSIS — Z79.4 TYPE 2 DIABETES MELLITUS WITH DIABETIC NEPHROPATHY, WITH LONG-TERM CURRENT USE OF INSULIN (H): Primary | ICD-10-CM

## 2017-04-17 DIAGNOSIS — E78.5 HYPERLIPIDEMIA LDL GOAL <100: ICD-10-CM

## 2017-04-17 DIAGNOSIS — R80.9 MICROALBUMINURIA: ICD-10-CM

## 2017-04-17 DIAGNOSIS — Z96.41 INSULIN PUMP IN PLACE: ICD-10-CM

## 2017-04-17 DIAGNOSIS — H52.203 MYOPIA WITH ASTIGMATISM, BILATERAL: ICD-10-CM

## 2017-04-17 LAB
HBA1C MFR BLD: 7.2 % (ref 4.3–6)
INTERPRETATION ECG - MUSE: NORMAL

## 2017-04-17 PROCEDURE — 99214 OFFICE O/P EST MOD 30 MIN: CPT | Performed by: INTERNAL MEDICINE

## 2017-04-17 PROCEDURE — 92015 DETERMINE REFRACTIVE STATE: CPT | Performed by: OPTOMETRIST

## 2017-04-17 PROCEDURE — 83036 HEMOGLOBIN GLYCOSYLATED A1C: CPT | Performed by: INTERNAL MEDICINE

## 2017-04-17 PROCEDURE — 36415 COLL VENOUS BLD VENIPUNCTURE: CPT | Performed by: INTERNAL MEDICINE

## 2017-04-17 PROCEDURE — 92014 COMPRE OPH EXAM EST PT 1/>: CPT | Performed by: OPTOMETRIST

## 2017-04-17 ASSESSMENT — VISUAL ACUITY
OS_CC: 20/30
OD_CC: 20/30
CORRECTION_TYPE: GLASSES
OS_SC: 20/150
OS_CC: 20/20
METHOD: SNELLEN - LINEAR
OD_SC: 20/150
OD_CC: 20/20

## 2017-04-17 ASSESSMENT — REFRACTION_MANIFEST
OS_AXIS: 055
OD_AXIS: 112
OD_SPHERE: -3.25
OD_SPHERE: -4.75
OS_AXIS: 059
OD_CYLINDER: +1.25
METHOD_AUTOREFRACTION: 1
OS_CYLINDER: +2.00
OD_AXIS: 107
OS_SPHERE: -3.75
OD_CYLINDER: +2.00
OS_CYLINDER: +2.00
OS_SPHERE: -3.50

## 2017-04-17 ASSESSMENT — KERATOMETRY
OS_K1POWER_DIOPTERS: 45.75
OS_K2POWER_DIOPTERS: 48.25
OD_AXISANGLE_DEGREES: 95
OD_AXISANGLE2_DEGREES: 5
OD_K1POWER_DIOPTERS: 44.87
OS_AXISANGLE_DEGREES: 73
METHOD_AUTO_MANUAL: AUTOMATED
OS_AXISANGLE2_DEGREES: 163
OD_K2POWER_DIOPTERS: 47.87

## 2017-04-17 ASSESSMENT — TONOMETRY
IOP_METHOD: APPLANATION
OD_IOP_MMHG: 17
OS_IOP_MMHG: 17

## 2017-04-17 ASSESSMENT — EXTERNAL EXAM - RIGHT EYE: OD_EXAM: NORMAL

## 2017-04-17 ASSESSMENT — CONF VISUAL FIELD
OS_NORMAL: 1
OD_NORMAL: 1

## 2017-04-17 ASSESSMENT — CUP TO DISC RATIO
OS_RATIO: 0.45
OD_RATIO: 0.5

## 2017-04-17 ASSESSMENT — REFRACTION
OD_CYLINDER: +2.00
OD_AXIS: 107
OS_AXIS: 060
OS_CYLINDER: +2.00
OS_SPHERE: -3.75
OD_SPHERE: -4.25

## 2017-04-17 ASSESSMENT — REFRACTION_WEARINGRX
SPECS_TYPE: SVL
OS_SPHERE: -4.00
OS_CYLINDER: +2.50
OD_SPHERE: -5.00
OS_AXIS: 060
OD_CYLINDER: +2.50
OD_AXIS: 104

## 2017-04-17 ASSESSMENT — SLIT LAMP EXAM - LIDS
COMMENTS: NORMAL
COMMENTS: NORMAL

## 2017-04-17 ASSESSMENT — EXTERNAL EXAM - LEFT EYE: OS_EXAM: NORMAL

## 2017-04-17 NOTE — DISCHARGE INSTRUCTIONS
Continue your regular medications.  Return or follow up with cardiology if you continue to note palpitations.

## 2017-04-17 NOTE — PROGRESS NOTES
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  glasses    Distance Vision Acuity: Satisfied with vision    Near Vision Acuity: Satisfied with vision while reading  with glasses    Eye Comfort: good  Do you use eye drops? : No  Occupation or Hobbies: Nurse/Computers/ Works in dark sometimes    Last MR: 2016  -4.75+2.25x106   -4.00+2.00x059  Medical, surgical and family histories reviewed and updated 4/17/2017.       OBJECTIVE: See Ophthalmology exam    ASSESSMENT:    ICD-10-CM    1. Type 2 diabetes mellitus with diabetic nephropathy, with long-term current use of insulin (H) E11.21 EYE EXAM (SIMPLE-NONBILLABLE)    Z79.4    2. Myopia with astigmatism, bilateral H52.13 REFRACTION    H52.203 EYE EXAM (SIMPLE-NONBILLABLE)   3. Scars of posterior pole of chorioretina, bilateral H31.013         PLAN:  She will see Retina at some point, I could photo document here, however this would not enter into epic, as our system is not integrated  Should be followed for neovascular membrane / OCT /FA  Marie Vogel aware  eye exam results will be sent to Alan Paredes.    Gabriela Becker OD

## 2017-04-17 NOTE — PROGRESS NOTES
Name: Marie Vogel  Seen at the request of Esteban Tate for Diabetes. MOIRA   HPI:  Marie Vogel is a 36 year old female who presents for the evaluation/management of DM.  Had CGM in 1/2017.  Multiple hypoglycemia episodes were noted.  Adjustment made after that.  Reports less hypoglycemic episodes since then.  Thinking about moving to [place] in next 3 years.    Here for f/u. Previously has seen endo at [facility] ( Dr Aguilera and Dr Zhou and ). 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 Concerned about weight gain.   H/o cardiomyopathy. Followed by cardiology.    1. Type 2 DM:  Orginally diagnosed at the age of: 21 with GDM during her first pregnancy. Diagnosed with DM 2 in 1/2002, diet controlled for 3 years, then started on metformin. In 2010 during her second pregnancy, started on insulin and then insulin pump therapy in 2012.   Current Regimen: Insulin pump therapy ( Medtronic Minimed Paradigm), metformin XR 1000 mg bid  BS checks: 3.8 per day  Average Meter Download: 166. FBGs OK. Not much data during day. Sometimes in 200s. No major episodes of hypoglycemia. Did not check BG X 3 days in last 15 days.      Current Regimen:   Insulin pump -   Time Rate (U/hr)   0000-10.30 1.00   6665-6384 0.950   6346-9178 1.0     Carbohydrate Ratio -    Time Ratio   1103-2608 7   9599-7574 8     Sensitivity   27   Active Insulin Time  3 hours   Basal  49%   Bolus   51%   Total Carbohydrates/day  171   Total Insulin/day  50   Average Blood Sugar 166   BS Checks    Care Link Username-   Password-         Complications:   Diabetes Complications  Description / Detail    Diabetic Retinopathy  No   CAD / PAD  No   Neuropathy  No   Nephropathy / Microalbuminuria  Yes: microalbuminuria. ON cozaar.   Gastroparesis  No   Hypoglycemia [place]  No     2. Hypertension: Blood Pressure today:   BP Readings from Last 3 Encounters:   04/17/17 133/90   04/16/17 122/80    17 122/80     Takes medications everyday without forgetting a dose.  Denies feeling lightheaded or dizzy.    3. Hyperlipidemia:   Denies muscle aches of pains.       PMH/PSH:  Past Medical History:   Diagnosis Date     Allergic rhinitis due to pollen      Atypical glandular cells on Pap smear 3/1108     PFO (patent foramen ovale)      Type II or unspecified type diabetes mellitus without mention of complication, not stated as uncontrolled     onset was gestational in      Past Surgical History:   Procedure Laterality Date     C INDUCED ABORTN BY D&C           C IUD,MIRENA  8/10/10, 7/28/15     C/SECTION, LOW TRANSVERSE  6/25/10    , Low Transverse     CHOLECYSTECTOMY, LAPOROSCOPIC      Cholecystectomy, Laparoscopic     ESOPHAGOSCOPY, GASTROSCOPY, DUODENOSCOPY (EGD), COMBINED N/A 2016    Procedure: COMBINED ESOPHAGOSCOPY, GASTROSCOPY, DUODENOSCOPY (EGD), BIOPSY SINGLE OR MULTIPLE;  Surgeon: Fadi Hunter MD;  Location: [facility] ESOPHAGEAL MOTILITY STUDY N/A 2016    Procedure: ESOPHAGEAL MOTILITY STUDY;  Surgeon: Fadi Hunter MD;  Location:  GI      REMOVE TONSILS/ADENOIDS,<13 Y/O      T &A     Family Hx:  Family History   Problem Relation Age of Onset     Cardiovascular Mother      hypertrophic cardiomyopathy     Genitourinary Problems Mother      gallbladder disease     DIABETES Mother      drug induced     Other - See Comments Mother      heart transplant 2005     DIABETES Father      type 2     Hypertension Father      Genitourinary Problems Maternal Grandmother      gallbladder disease     Genitourinary Problems Paternal Grandmother      gallbladder disease     Hypertension Paternal Grandfather      high bp     CEREBROVASCULAR DISEASE Paternal Grandfather      Cardiovascular Brother      hypertrophic cardiomyopathy     DIABETES Brother      type 2     Thyroid disease: No         DM2: Yes - father and mother         Autoimmune:  DM1, SLE, RA, Vitiligo No    Social Hx:  Social History     Social History     Marital status:      Spouse name: N/A     Number of children: 2     Years of education: 14     Occupational History     nurse      Social History Main Topics     Smoking status: Former Smoker     Types: Cigarettes     Quit date: 10/24/2005     Smokeless tobacco: Former User      Comment: States only smokes when drinks maybe 2x/year     Alcohol use No     Drug use: No     Sexual activity: Yes     Partners: Male     Birth control/ protection: IUD      Comment: Mirena IUD 7/28/15     Other Topics Concern     Not on file     Social History Narrative    Caffeine intake/servings daily - 6-7    Calcium intake/servings daily - 2-3 yogurt, milk, cheese    Exercise 1 times weekly - describe aerobics    Sunscreen used - Yes    Seatbelts used - Yes    Guns stored in the home - No    Self Breast Exam - Yes    Pap test up to date -  Yes    Eye exam up to date -  Yes    Dental exam up to date -  Yes    DEXA scan up to date -  Not Applicable    Flex Sig/Colonoscopy up to date -  Not Applicable    Mammography up to date -  Not Applicable    Immunizations reviewed and up to date - Yes    Abuse: Current or Past (Physical, Sexual or Emotional) - No    Do you feel safe in your environment - Yes    Do you cope well with stress - Yes    Do you suffer from insomnia - Yes     Last updated by: Tatianna Lacey  5/9/2005              MEDICATIONS:  has a current medication list which includes the following prescription(s): omeprazole, losartan, simvastatin, alina contour next, mupirocin, epinephrine, sumatriptan, order for dme, ergocalciferol, insulin pump, metformin, insulin cartridge, infusion set, insulin aspart, albuterol, blood glucose monitoring, montelukast, order for dme, aspirin, cetirizine, multiple vitamins-minerals, acetone (urine) test, ibuprofen, and blood glucose monitoring.    ROS     ROS: 10 point ROS neg other than the symptoms noted above in  the HPI.    Physical Exam   VS: /90  Pulse 70  Temp 98.2  F (36.8  C) (Tympanic)  Wt 86.1 kg (189 lb 14.4 oz)  BMI 32.6 kg/m2    GENERAL: AXOX3, NAD, well dressed, answering questions appropriately, appears stated age.  GENERAL: NAD, well dressed, answering questions appropriately, appears stated age.  HEENT: no exopthalmous, no proptosis, EOMI, no lig lag, no retraction, no scleral icterus  RESPIRATORY: Normal respiratory effort.  CARDIOVASCULAR: No peripheral edema.  Abdo: +BS  NEUROLOGY: No tremors  MSK: Normal gait and station.  PSYCH: Intact judgment and insight. A&OX3 with a cordial affect.    LABS:  A1c:   Lab Results   Component Value Date    A1C 7.2 04/17/2017    A1C 7.2 12/27/2016    A1C 7.1 09/27/2016    A1C 7.3 02/22/2016    A1C 8.2 11/24/2015       Basic Metabolic Panel:  Last Basic Metabolic Panel:  NA      141   3/12/2016   POTASSIUM      3.6   3/12/2016  CHLORIDE      107   3/12/2016  BILLY      8.7   3/12/2016  CO2       27   3/12/2016  BUN       15   3/12/2016  CR     0.73   3/12/2016  GLC      103   3/12/2016        LFTS/Cholesterol Panel:  Recent Labs   Lab Test  09/28/16   0811  07/01/15   0612  03/27/14   1022   CHOL  162  190  146   HDL  45*  42*  32*   LDL  98  96  77   TRIG  99  260*  186*   CHOLHDLRATIO   --   4.5  4.5     Liver Function Studies -   Recent Labs   Lab Test  03/12/16   0003   PROTTOTAL  6.6*   ALBUMIN  3.4   BILITOTAL  0.3   ALKPHOS  70   AST  21   ALT  39           Thyroid Function:  ENDO THYROID LABS-[facility] Latest Ref Rng 9/27/2016 4/2/2014   TSH 0.40 - 4.00 mU/L 2.17 1.09   T4 FREE 0.70 - 1.85 ng/dL  0.75       Urine MicroAlbumin:  MICROL     2180   3/31/2015  MICROALBUMIN   1415.58   3/31/2015     Ref. Range 9/27/2016 16:37   Albumin Urine mg/g Cr Latest Ref Range: 0-25 mg/g Cr 1166.67 (H)   Albumin Urine mg/L Latest Units: mg/L 1400     Vitamin D:  Vitamin D Deficiency Screening Results:  Lab Results   Component Value Date    VITDT 39 09/27/2016    VITDT 45  02/23/2016    VITDT 31 03/31/2015    VITDT 29 (L) 03/27/2014    VITDT 32 09/24/2013         All pertinent notes, labs, and images personally reviewed by me.     A/P  Ms.Erica WILLIAM Vogel is a 34 year old here for the evaluation/management of diabetes:    1. DM2 - Under Fair control.A1c 7.2%. BG in general under good control but limited data avaialble.  Continue current settings.  The patient is advised to Make better food choices: reduce carbs, Reduce portion size, weight loss and exercise 3-4 times a week.  Will benefit from personal CGM. Discussed Dexcom and to contact insurance company.  Labs today.    Time Rate (U/hr)   0000-10.30 1.00   8981-6070 0.950   4193-7906 1.0     Carbohydrate Ratio -    Time Ratio   0051-3789 7   3199-0009 8     Prevention    Flu Shot- done  Pneumovax- 1/13  Opthalmology-Yes, 3/15  Dental-Yes  ASA-No  Smoking- No    2. Hypertension - Under Good control.  Blood pressure medications include cozaar 75 mg qd. Need aggressive BP/glycmeic control.    3. Hyperlipidemia - Under fair control.TG high with low HDL, LD at 98, HDL 45. Takes zocor 10 mg for lipid control.  Continue current meds.  -- consider increasing dose (goal LDL < 70)    4. Microalbuminuria:  MICROL     1400   9/27/2016  No results found for this basename: microalbumin  Recommend strict BG control.  Continue Cozaar        Labs ordered today:   Orders Placed This Encounter   Procedures     Hemoglobin A1c     Hemoglobin A1c     All questions were answered.  The patient indicates understanding of the above issues and agrees with the plan set forth.       More than 50% of face to face time spent with Ms. Dino Vogel on counseling / coordinating her care.  Total appointment times was 30 minutes.      Follow-up:  follow up in 3 months    Zita Fuentes MD  Endocrinology   [facility]    CC: Alan Paredes    Addendum to above note and clinic visit:    Labs reviewed.    See result note/telephone  encounter.

## 2017-04-17 NOTE — MR AVS SNAPSHOT
After Visit Summary   4/17/2017    Marie Vogel    MRN: [id number]           Patient Information     Date Of Birth          1980        Visit Information        Provider Department      4/17/2017 1:30 PM Gabriela Becker OD [facility]        Today's Diagnoses     Type 2 diabetes mellitus with diabetic nephropathy, with long-term current use of insulin (H)    -  1    Myopia with astigmatism, bilateral        Scars of posterior pole of chorioretina, bilateral          Care Instructions    Blood glucose should be below 120 in order to prevent ocular complications of diabetes. Each 1% decrease in your A1c significantly reduces your progression of diabetic retinopathy. Controlling diseases such as cholesterol and  blood pressure will further decrease your chance of diabetic eye disease loss.          Follow-ups after your visit        Follow-up notes from your care team     Return in about 1 year (around 4/17/2018) for Annual Visit.      Your next 10 appointments already scheduled     May 15, 2017  9:30 AM CDT   (Arrive by 9:15 AM)   Hearing Aid Evaluation with Hilda Montemayor [facility] ([facility])    [address] [place] [id number] [phone]           Please see your medical professional for ear cleaning prior to this appointment if you believe wax buildup may be an issue. All patients are required to have a physician's order stating the medical reason for the hearing test. Your doctor can send an electronic order, use their own form or we have provided a form (called Physician's Order for Audiology Services). It states that there is a medical reason for your exam. Without an order you may need to be rescheduled until the order can be obtained.              Future tests that were ordered for you today     Open Future Orders        Priority Expected Expires Ordered    Hemoglobin A1c Routine  3/17/2018  4/17/2017            Who to contact     If you have questions or need follow up information about today's clinic visit or your schedule please contact [facility] directly at [phone].  Normal or non-critical lab and imaging results will be communicated to you by MyChart, letter or phone within 4 business days after the clinic has received the results. If you do not hear from us within 7 days, please contact the clinic through MyChart or phone. If you have a critical or abnormal lab result, we will notify you by phone as soon as possible.  Submit refill requests through [facility] or call your pharmacy and they will forward the refill request to us. Please allow 3 business days for your refill to be completed.          Additional Information About Your Visit        Agency System[employer] Information     [facility] gives you secure access to your electronic health record. If you see a primary care provider, you can also send messages to your care team and make appointments. If you have questions, please call your primary care clinic.  If you do not have a primary care provider, please call [phone] and they will assist you.        Care EveryWhere ID     This is your Care EveryWhere ID. This could be used by other organizations to access your [place] medical records  [id number]         Blood Pressure from Last 3 Encounters:   04/17/17 133/90   04/16/17 122/80   02/26/17 122/80    Weight from Last 3 Encounters:   04/17/17 86.1 kg (189 lb 14.4 oz)   04/16/17 86.1 kg (189 lb 13.1 oz)   02/26/17 85.2 kg (187 lb 12.8 oz)              We Performed the Following     EYE EXAM (SIMPLE-NONBILLABLE)     REFRACTION        Primary Care Provider Office Phone # Fax #    Alan Paredes [phone]       [facility] [address] [place] [address]        Thank you!     [facility] you for choosing [facility]  for your care. Our goal is always to provide you with excellent care.  Hearing back from our patients is one way we can continue to improve our services. Please take a few minutes to complete the written survey that you may receive in the mail after your visit with us. Thank you!             Your Updated Medication List - Protect others around you: Learn how to safely use, store and throw away your medicines at www.disposemymeds.org.          This list is accurate as of: 4/17/17  2:37 PM.  Always use your most recent med list.                   Brand Name Dispense Instructions for use    acetone (Urine) test Strp     1 strip    1 strip by In Vitro route daily as needed.       ADVIL 200 MG tablet   Generic drug:  ibuprofen      Take 200 mg by mouth every 4 hours as needed.       albuterol 108 (90 BASE) MCG/ACT Inhaler    PROAIR HFA/PROVENTIL HFA/VENTOLIN HFA    1 Inhaler    Inhale 2 puffs into the lungs every 4 hours as needed for shortness of breath / dyspnea or wheezing       aspirin 81 MG tablet     90 tablet    Take 1 tablet (81 mg) by mouth daily       PEPE CONTOUR NEXT test strip   Generic drug:  blood glucose monitoring     120 each    Check 4 times/day       blood glucose monitoring lancets     100 each    1 Device See Admin Instructions. Use up to 5 times daily for blood sugar checks.       blood glucose monitoring meter device kit    no brand specified    1 kit    Use to test blood sugar 6 times daily and as needed.       cetirizine 10 MG tablet    zyrTEC    90 tablet    Take 10 mg by mouth 2 times daily       EPINEPHrine 0.3 MG/0.3ML injection     0.3 mL    Inject 0.3 mLs (0.3 mg) into the muscle once as needed for anaphylaxis       ergocalciferol 78665 UNITS capsule    ERGOCALCIFEROL    14 capsule    Take 1 capsule (50,000 Units) by mouth once a week       insulin aspart 100 UNIT/ML injection    NovoLOG PEN    3 Month    With insulin pump. About 80 units/day. Vial.       * insulin cartridge misc pump supply     30 each    Change every 3 days1 applicator every 72 hours  Change every 3 days       * infusion set [facility] pump supply     30 each    Change every 3 days as directed       insulin pump infusion      Updated 1/27/17: Medtronic Minimed: Model 723 BASAL: 00:00: 1.0 units/hr 10:30: 0.95 14:00: 1.05 CARB RATIO: 00:00 1:7 1600  1:8 C. Factor: 27 mg/dL BLOOD GLUCOSE TARGET and times: 12   AM (midnight):  Active Insulin Time:  3 hours Basal to Bolus Ratio:  Sensor:  No Carelink / Diasend username:  [email or web address] Carelink / Diasend Password:  [email or web address]       losartan 50 MG tablet    COZAAR    135 tablet    Take 1.5 tablets (75 mg) by mouth daily Take 1 tab ( 50 mg)  qam and 1/2 tab ( 25 mg)  q pm       metFORMIN 500 MG 24 hr tablet    GLUCOPHAGE-XR    120 tablet    Take 2 tablets (1,000 mg) by mouth 2 times daily (with meals)       montelukast 10 MG tablet    SINGULAIR    90 tablet    Take 1 tablet (10 mg) by mouth At Bedtime       MULTI VITAMIN/MINERALS PO      Take 1 each by mouth daily.       mupirocin 2 % cream    BACTROBAN    15 g    Apply topically 3 times daily       omeprazole 40 MG capsule    priLOSEC    90 capsule    Take 1 capsule (40 mg) by mouth daily       * order for DME     100 each    Equipment being ordered: skin prep wipes       * order for DME     1 Units    Equipment being ordered: post-op shoe       simvastatin 10 MG tablet    ZOCOR    90 tablet    Take 1 tablet (10 mg) by mouth every morning       SUMAtriptan 25 MG tablet    IMITREX    8 tablet    Take 25-100mg one time. May repeat 25mg every two hours up to a maximum of 200mg in 24 hours.       * Notice:  This list has 4 medication(s) that are the same as other medications prescribed for you. Read the directions carefully, and ask your doctor or other care provider to review them with you.

## 2017-04-17 NOTE — NURSING NOTE
"Chief Complaint   Patient presents with     Diabetes       Initial /90  Pulse 70  Temp 98.2  F (36.8  C) (Tympanic)  Wt 189 lb 14.4 oz (86.1 kg)  BMI 32.6 kg/m2 Estimated body mass index is 32.6 kg/(m^2) as calculated from the following:    Height as of 2/16/17: 5' 4\" (1.626 m).    Weight as of this encounter: 189 lb 14.4 oz (86.1 kg).  Medication Reconciliation: complete    "

## 2017-04-17 NOTE — ED NOTES
"Arrived to ED d/t c/o palpitations, hx of cardiomyopathy and DM, last BG 91, has had palpitation episodes in the past but they usual resolved, \"this one has been going on for a few hours\", pulse running , other VSS, denies any SOB, chest pain or nausea  "

## 2017-04-17 NOTE — PATIENT INSTRUCTIONS
[facility]   Dr Fuentes, Endocrinology Department      [facility]   [address] [address]  Appointment [phone]  Fax: [phone]   Monday and Tuesday         [facility]   [address] [place] [address]  Appointment [phone]  Fax: [phone]  Wednesday and Thursday           To provide the best diabetic care, please bring your blood glucose meter to each and every visit with your Endocrinologist.  Your blood glucose meter/insulin pump will be downloaded at every appointment.      Please arrive 15 minutes before your scheduled appointment.  This will allow for your blood glucose meter/insulin pump to be downloaded and glycosylated hemoglobin (A1c) to be obtained prior to your appointment.    No change in pump settings  Labs in 3 months  Please make a lab appointment for blood work and follow up clinic appointment in 1 week after that to discuss results.  If Blood glucose are low more often-> 2-3 times/week- give us a call.  The patient is advised to Make better food choices: reduce carbs, Reduce portion size, weight loss and exercise 3-4 times a week.    Check with insurance if Dexcom is covered.

## 2017-04-17 NOTE — ED PROVIDER NOTES
History     Chief Complaint   Patient presents with     Palpitations     HPI  Maire Vogel is a 36 year old female with a history of type 2 diabetes mellitus and cardiomyopathy who presents to the Emergency Department palpitations. She states that her heart has been beating faster than normal. Patient states that she has had episodes like this that last few minutes but this episode has been ongoing for a few hours. She does not feel that her heart is skipping beats. She states that she feels fatigued with the increased heart rate and some shortness of breath. No chest pain. No leg pain or swelling. No lightheadedness. Her blood sugar was 91 earlier this evening.     She takes 50 mg Cozaar in the morning and 25 mg at night. She reports that she does not have any upcoming appointments with Cardiology.     I have reviewed the Medications, Allergies, Past Medical and Surgical History, and Social History in the [facility] system.    PAST MEDICAL HISTORY  Past Medical History:   Diagnosis Date     Allergic rhinitis due to pollen      Atypical glandular cells on Pap smear 3/1108     PFO (patent foramen ovale)      Type II or unspecified type diabetes mellitus without mention of complication, not stated as uncontrolled     onset was gestational in      PAST SURGICAL HISTORY  Past Surgical History:   Procedure Laterality Date     C INDUCED ABORTN BY D&C           C IUD,MIRENA  8/10/10, 7/28/15     C/SECTION, LOW TRANSVERSE  6/25/10    , Low Transverse     CHOLECYSTECTOMY, LAPOROSCOPIC      Cholecystectomy, Laparoscopic     ESOPHAGOSCOPY, GASTROSCOPY, DUODENOSCOPY (EGD), COMBINED N/A 2016    Procedure: COMBINED ESOPHAGOSCOPY, GASTROSCOPY, DUODENOSCOPY (EGD), BIOPSY SINGLE OR MULTIPLE;  Surgeon: Fadi Hunter MD;  Location: [facility] ESOPHAGEAL MOTILITY STUDY N/A 2016    Procedure: ESOPHAGEAL MOTILITY STUDY;  Surgeon: Fadi Hunter MD;  Location: [facility]  "REMOVE TONSILS/ADENOIDS,<13 Y/O  1987    T &A     FAMILY HISTORY  Family History   Problem Relation Age of Onset     Cardiovascular Mother      hypertrophic cardiomyopathy     Cardiovascular Brother      hypertrophic cardiomyopathy     Hypertension Paternal Grandfather      high bp     Genitourinary Problems Mother      gallbladder disease     Genitourinary Problems Maternal Grandmother      gallbladder disease     Genitourinary Problems Paternal Grandmother      gallbladder disease     DIABETES Father      type 2     DIABETES Mother      drug induced     CEREBROVASCULAR DISEASE Paternal Grandfather      DIABETES Brother      type 2     Hypertension Father      Other - See Comments Mother      heart transplant 11/23/2005     SOCIAL HISTORY  Social History   Substance Use Topics     Smoking status: Former Smoker     Types: Cigarettes     Quit date: 10/24/2005     Smokeless tobacco: Former User      Comment: States only smokes when drinks maybe 2x/year     Alcohol use No     MEDICATIONS  No current facility-administered medications for this encounter.      Current Outpatient Prescriptions   Medication     omeprazole (PRILOSEC) 40 MG capsule     losartan (COZAAR) 50 MG tablet     simvastatin (ZOCOR) 10 MG tablet     PEPE CONTOUR NEXT test strip     mupirocin (BACTROBAN) 2 % cream     EPINEPHrine 0.3 MG/0.3ML injection     SUMAtriptan (IMITREX) 25 MG tablet     order for DME     ergocalciferol (ERGOCALCIFEROL) 94100 UNITS capsule     INSULIN PUMP - OUTPATIENT     metFORMIN (GLUCOPHAGE-XR) 500 MG 24 hr tablet     insulin cartridge (PARADIGM 3ML) misc pump supply     infusion set (PARADIGM QUICK-SET 23\" 9MM) misc pump supply     insulin aspart (NOVOLOG PEN) 100 UNIT/ML injection     albuterol (PROAIR HFA, PROVENTIL HFA, VENTOLIN HFA) 108 (90 BASE) MCG/ACT inhaler     blood glucose monitoring (NO BRAND SPECIFIED) meter device kit     montelukast (SINGULAIR) 10 MG tablet     order for DME     aspirin 81 MG tablet     " cetirizine (ZYRTEC) 10 MG tablet     Multiple Vitamins-Minerals (MULTI VITAMIN/MINERALS PO)     Acetone, Urine, Test STRP     ibuprofen (ADVIL) 200 MG tablet     Lancets (MICROLET) MISC     ALLERGIES  Allergies   Allergen Reactions     Ivp Dye [Contrast Dye] Itching     Pt reports that throat itches     Nuts Anaphylaxis     [place] nuts only     Bactrim Swelling     Pt reaction was swelling in mouth and tongue and itchy mouth.  Resolved with benadryl and prednisone     Penicillins Hives     Cephalosporins Itching     Seasonal Allergies Other (See Comments)     Molds, trees, grass, dust..  Upper congestion     Atorvastatin      myalgias     Shellfish Allergy Rash     Clams only      Review of Systems   Constitutional: Positive for fatigue. Negative for fever.   HENT: Negative for trouble swallowing.    Eyes: Negative for visual disturbance.   Respiratory: Positive for shortness of breath.    Cardiovascular: Positive for palpitations. Negative for chest pain and leg swelling.   Gastrointestinal: Negative for abdominal pain.   Genitourinary: Negative for difficulty urinating.   Neurological: Negative for syncope, weakness, light-headedness and numbness.   All other systems reviewed and are negative.      Physical Exam   BP: (!) 145/92  Pulse: 113  Temp: 98.5  F (36.9  C)  Resp: 18  Weight: 86.1 kg (189 lb 13.1 oz)  SpO2: 98 %  Physical Exam   Constitutional: She is oriented to person, place, and time. She appears well-developed and well-nourished. No distress.   HENT:   Head: Normocephalic and atraumatic.   Right Ear: External ear normal.   Left Ear: External ear normal.   Nose: Nose normal.   Mouth/Throat: Oropharynx is clear and moist. No oropharyngeal exudate.   Eyes: EOM are normal. Pupils are equal, round, and reactive to light. No scleral icterus.   Neck: Normal range of motion. Neck supple.   Cardiovascular: Normal rate, regular rhythm and normal heart sounds.  Exam reveals no friction rub.    No murmur  heard.  Pulmonary/Chest: Effort normal and breath sounds normal. She has no wheezes. She has no rales.   Abdominal: Soft. Bowel sounds are normal. There is no tenderness. There is no rebound and no guarding.   Musculoskeletal: She exhibits no edema.   Neurological: She is alert and oriented to person, place, and time. No cranial nerve deficit.   Skin: Skin is warm and dry.   Psychiatric: She has a normal mood and affect. Her behavior is normal.   Nursing note and vitals reviewed.      ED Course     ED Course     Procedures             EKG Interpretation:      Interpreted by SHER VERGARA  Time reviewed: 1946  Symptoms at time of EKG: palpitations   Rhythm: normal sinus   Rate: Normal  Axis: Normal  Ectopy: none  Conduction: normal  ST Segments/ T Waves: T wave inversion III  Q Waves: none  Comparison to prior: Unchanged from 03/12/16    Clinical Impression: no acute changes and non-specific EKG      Labs/Imaging    Results for orders placed or performed during the hospital encounter of 04/16/17 (from the past 24 hour(s))   EKG 12-lead, tracing only   Result Value Ref Range    Interpretation ECG Click View Image link to view waveform and result    CBC with platelets differential   Result Value Ref Range    WBC 8.2 4.0 - 11.0 10e9/L    RBC Count 3.93 3.8 - 5.2 10e12/L    Hemoglobin 12.2 11.7 - 15.7 g/dL    Hematocrit 36.8 35.0 - 47.0 %    MCV 94 78 - 100 fl    MCH 31.0 26.5 - 33.0 pg    MCHC 33.2 31.5 - 36.5 g/dL    RDW 12.2 10.0 - 15.0 %    Platelet Count 310 150 - 450 10e9/L    Diff Method Automated Method     % Neutrophils 48.8 %    % Lymphocytes 35.9 %    % Monocytes 8.7 %    % Eosinophils 6.0 %    % Basophils 0.4 %    % Immature Granulocytes 0.2 %    Nucleated RBCs 0 0 /100    Absolute Neutrophil 4.0 1.6 - 8.3 10e9/L    Absolute Lymphocytes 2.9 0.8 - 5.3 10e9/L    Absolute Monocytes 0.7 0.0 - 1.3 10e9/L    Absolute Eosinophils 0.5 0.0 - 0.7 10e9/L    Absolute Basophils 0.0 0.0 - 0.2 10e9/L    Abs Immature  Granulocytes 0.0 0 - 0.4 10e9/L    Absolute Nucleated RBC 0.0    Comprehensive metabolic panel   Result Value Ref Range    Sodium 143 133 - 144 mmol/L    Potassium 4.0 3.4 - 5.3 mmol/L    Chloride 110 (H) 94 - 109 mmol/L    Carbon Dioxide 23 20 - 32 mmol/L    Anion Gap 10 3 - 14 mmol/L    Glucose 147 (H) 70 - 99 mg/dL    Urea Nitrogen 18 7 - 30 mg/dL    Creatinine 0.86 0.52 - 1.04 mg/dL    GFR Estimate 73 >60 mL/min/1.7m2    GFR Estimate If Black 89 >60 mL/min/1.7m2    Calcium 8.2 (L) 8.5 - 10.1 mg/dL    Bilirubin Total 0.3 0.2 - 1.3 mg/dL    Albumin 3.3 (L) 3.4 - 5.0 g/dL    Protein Total 6.6 (L) 6.8 - 8.8 g/dL    Alkaline Phosphatase 77 40 - 150 U/L    ALT 25 0 - 50 U/L    AST 14 0 - 45 U/L   CRP inflammation   Result Value Ref Range    CRP Inflammation <2.9 0.0 - 8.0 mg/L   Magnesium   Result Value Ref Range    Magnesium 1.5 (L) 1.6 - 2.3 mg/dL   TSH with free T4 reflex   Result Value Ref Range    TSH 2.06 0.40 - 4.00 mU/L   Troponin I   Result Value Ref Range    Troponin I ES  0.000 - 0.045 ug/L     <0.015  The 99th percentile for upper reference range is 0.045 ug/L.  Troponin values in   the range of 0.045 - 0.120 ug/L may be associated with risks of adverse   clinical events.           Assessments & Plan (with Medical Decision Making)   Impression:  Middle aged female with a history of mild septal familial hypertrophic cardiomyopathy extensively evaluated in October of 2015 with echo, cardiac MRI, and holter monitor showing no ectopy. She presents with rapid pulse tonight. She does not note irregular pulse. She is in sinus rhythm on EKG. She was observed on monitor and had no significant ectopy. Her pulse rate decreased from 115 to the 80 range with fluids and she felt better. Labs are unremarkable other than a mildly low magnesium, which was replaced.    I have reviewed the nursing notes.    I have reviewed the findings, diagnosis, plan and need for follow up with the patient.    New Prescriptions    No  medications on file       Final diagnoses:   Palpitations   Hypomagnesemia     I, Juwan Villegas, am serving as a trained medical scribe to document services personally performed by Danis Conner MD, based on the provider's statements to me.      IDanis MD, was physically present and have reviewed and verified the accuracy of this note documented by Juwan Villegas.    4/16/2017   [facility], [place], EMERGENCY DEPARTMENT     Danis Conner MD  04/16/17 4635

## 2017-04-17 NOTE — MR AVS SNAPSHOT
After Visit Summary   2017    Marie Vogel    MRN: [id number]           Patient Information     Date Of Birth          1980        Visit Information        Provider Department      2017 11:30 AM Zita Fuentes MD [facility]        Today's Diagnoses     Type 2 diabetes mellitus with diabetic nephropathy, with long-term current use of insulin (H)    -  1      Care Instructions    [facility]   Dr Fuentes, Endocrinology Department      [facility]   [address] [address]  Appointment [phone]  Fax: [phone]   Monday and Tuesday         [place] [address] [place] [address]  Appointment [phone]  Fax: [phone]  Wednesday and Thursday           To provide the best diabetic care, please bring your blood glucose meter to each and every visit with your Endocrinologist.  Your blood glucose meter/insulin pump will be downloaded at every appointment.      Please arrive 15 minutes before your scheduled appointment.  This will allow for your blood glucose meter/insulin pump to be downloaded and glycosylated hemoglobin (A1c) to be obtained prior to your appointment.    No change in pump settings  Labs in 3 months  Please make a lab appointment for blood work and follow up clinic appointment in 1 week after that to discuss results.  If Blood glucose are low more often-> 2-3 times/week- give us a call.  The patient is advised to Make better food choices: reduce carbs, Reduce portion size, weight loss and exercise 3-4 times a week.    Check with insurance if Dexcom is covered.           Follow-ups after your visit        Your next 10 appointments already scheduled     May 15, 2017  9:30 AM CDT   (Arrive by 9:15 AM)   Hearing Aid Evaluation with Dinora Holloway [facility] Audiology ([facility]  [place])    [address]  [address] [facility] [place] [address] [phone]           Please see your medical professional for ear cleaning prior to this appointment if you believe wax buildup may be an issue. All patients are required to have a physician's order stating the medical reason for the hearing test. Your doctor can send an electronic order, use their own form or we have provided a form (called Physician's Order for Audiology Services). It states that there is a medical reason for your exam. Without an order you may need to be rescheduled until the order can be obtained.              Who to contact     If you have questions or need follow up information about today's clinic visit or your schedule please contact [facility] directly at [phone].  Normal or non-critical lab and imaging results will be communicated to you by [employer], letter or phone within 4 business days after the clinic has received the results. If you do not hear from us within 7 days, please contact the clinic through [employer] or phone. If you have a critical or abnormal lab result, we will notify you by phone as soon as possible.  Submit refill requests through [employer] or call your pharmacy and they will forward the refill request to us. Please allow 3 business days for your refill to be completed.          Additional Information About Your Visit        [employer] Information     [employer] gives you secure access to your electronic health record. If you see a primary care provider, you can also send messages to your care team and make appointments. If you have questions, please call your primary care clinic.  If you do not have a primary care provider, please call [phone] and they will assist you.        Care EveryWhere ID     This is your Care EveryWhere ID. This could be used by other organizations to access your [place] medical records  [id number]        Your Vitals Were     Pulse Temperature BMI (Body Mass  Index)             70 98.2  F (36.8  C) (Tympanic) 32.6 kg/m2          Blood Pressure from Last 3 Encounters:   04/17/17 133/90   04/16/17 122/80   02/26/17 122/80    Weight from Last 3 Encounters:   04/17/17 189 lb 14.4 oz (86.1 kg)   04/16/17 189 lb 13.1 oz (86.1 kg)   02/26/17 187 lb 12.8 oz (85.2 kg)              We Performed the Following     Hemoglobin A1c        Primary Care Provider Office Phone # Fax #    Alan Paredes [phone]       [facility] [address] [place] [address]        Thank you!     Thank you for choosing [facility]]  for your care. Our goal is always to provide you with excellent care. Hearing back from our patients is one way we can continue to improve our services. Please take a few minutes to complete the written survey that you may receive in the mail after your visit with us. Thank you!             Your Updated Medication List - Protect others around you: Learn how to safely use, store and throw away your medicines at www.disposemymeds.org.          This list is accurate as of: 4/17/17 11:54 AM.  Always use your most recent med list.                   Brand Name Dispense Instructions for use    acetone (Urine) test Strp     1 strip    1 strip by In Vitro route daily as needed.       ADVIL 200 MG tablet   Generic drug:  ibuprofen      Take 200 mg by mouth every 4 hours as needed.       albuterol 108 (90 BASE) MCG/ACT Inhaler    PROAIR HFA/PROVENTIL HFA/VENTOLIN HFA    1 Inhaler    Inhale 2 puffs into the lungs every 4 hours as needed for shortness of breath / dyspnea or wheezing       aspirin 81 MG tablet     90 tablet    Take 1 tablet (81 mg) by mouth daily       PEPE CONTOUR NEXT test strip   Generic drug:  blood glucose monitoring     120 each    Check 4 times/day       blood glucose monitoring lancets     100 each    1 Device See Admin Instructions. Use up to 5 times daily for blood sugar checks.       blood glucose  monitoring meter device kit    no brand specified    1 kit    Use to test blood sugar 6 times daily and as needed.       cetirizine 10 MG tablet    zyrTEC    90 tablet    Take 10 mg by mouth 2 times daily       EPINEPHrine 0.3 MG/0.3ML injection     0.3 mL    Inject 0.3 mLs (0.3 mg) into the muscle once as needed for anaphylaxis       ergocalciferol 32773 UNITS capsule    ERGOCALCIFEROL    14 capsule    Take 1 capsule (50,000 Units) by mouth once a week       insulin aspart 100 UNIT/ML injection    NovoLOG PEN    3 Month    With insulin pump. About 80 units/day. Vial.       * insulin cartridge misc pump supply     30 each    Change every 3 days1 applicator every 72 hours Change every 3 days       * infusion set misc pump supply     30 each    Change every 3 days as directed       insulin pump infusion      Updated 1/27/17: [employer]: Model 723 BASAL: 00:00: 1.0 units/hr 10:30: 0.95 14:00: 1.05 CARB RATIO: 00:00 1:7 1600  1:8 C. Factor: 27 mg/dL BLOOD GLUCOSE TARGET and times: 12   AM (midnight):  Active Insulin Time:  3 hours Basal to Bolus Ratio:  Sensor:  No Carelink / Diasend username:  [email or web address] Carelink / Diasend Password:  [email or web address]!       losartan 50 MG tablet    COZAAR    135 tablet    Take 1.5 tablets (75 mg) by mouth daily Take 1 tab ( 50 mg)  qam and 1/2 tab ( 25 mg)  q pm       metFORMIN 500 MG 24 hr tablet    GLUCOPHAGE-XR    120 tablet    Take 2 tablets (1,000 mg) by mouth 2 times daily (with meals)       montelukast 10 MG tablet    SINGULAIR    90 tablet    Take 1 tablet (10 mg) by mouth At Bedtime       MULTI VITAMIN/MINERALS PO      Take 1 each by mouth daily.       mupirocin 2 % cream    BACTROBAN    15 g    Apply topically 3 times daily       omeprazole 40 MG capsule    priLOSEC    90 capsule    Take 1 capsule (40 mg) by mouth daily       * order for DME     100 each    Equipment being ordered: skin prep wipes       * order for DME     1 Units    Equipment being ordered: post-op shoe        simvastatin 10 MG tablet    ZOCOR    90 tablet    Take 1 tablet (10 mg) by mouth every morning       SUMAtriptan 25 MG tablet    IMITREX    8 tablet    Take 25-100mg one time. May repeat 25mg every two hours up to a maximum of 200mg in 24 hours.       * Notice:  This list has 4 medication(s) that are the same as other medications prescribed for you. Read the directions carefully, and ask your doctor or other care provider to review them with you.

## 2017-04-26 ENCOUNTER — TELEPHONE (OUTPATIENT)
Dept: ENDOCRINOLOGY | Facility: CLINIC | Age: 37
End: 2017-04-26

## 2017-04-26 NOTE — TELEPHONE ENCOUNTER
Fax received from [facility] for review and signature.  Put in Dr. Fuentes's in basket.

## 2017-04-27 NOTE — TELEPHONE ENCOUNTER
Forms/paperwork reviewed, completed and signed.  Please fax or send the papers as requested, document in chart and close the encounter.    Thank you.    Zita Fuentes

## 2017-05-01 ENCOUNTER — TELEPHONE (OUTPATIENT)
Dept: ENDOCRINOLOGY | Facility: CLINIC | Age: 37
End: 2017-05-01

## 2017-05-04 ENCOUNTER — MEDICAL CORRESPONDENCE (OUTPATIENT)
Dept: HEALTH INFORMATION MANAGEMENT | Facility: CLINIC | Age: 37
End: 2017-05-04

## 2017-05-15 ENCOUNTER — OFFICE VISIT (OUTPATIENT)
Dept: AUDIOLOGY | Facility: CLINIC | Age: 37
End: 2017-05-15

## 2017-05-15 DIAGNOSIS — H90.3 SENSORY HEARING LOSS, BILATERAL: Primary | ICD-10-CM

## 2017-05-15 NOTE — PROGRESS NOTES
AUDIOLOGY REPORT    SUBJECTIVE:  Marie Vogel is a 36 year old female who was scheduled in Audiology at the [facility] and [facility] for audiologic evaluation and hearing aid consultation.  The patient has been seen previously in this clinic in 2012 for assessment and results indicated borderline normal to moderate sensorineural hearing loss for the right ear and mild to moderate sensorineural hearing loss for the left ear. The patient was fit with binaural hearing amplification at this facility a number of years ago but she has misplaced one device and would like to consider an upgrade in hearing technology.    OBJECTIVE:  Otoscopic exam indicates ears are clear of cerumen bilaterally     Pure Tone Thresholds assessed using conventional audiometry with poor  reliability from 250-8000 Hz bilaterally using circumaural headphones. Poor reliability, patient re-instructed multiple times. Testing ceased per patient request prior to fully obtaining accurate air conduction thresholds    RIGHT:  Borderline normal to moderate hearing loss, bone conduction not completed to determine type of loss    LEFT:  Mild to moderately severe hearing loss, bone conduction not completed to determine type of loss    Tympanogram:    RIGHT: normal eardrum mobility    LEFT:   normal eardrum mobility    Reflexes (reported by stimulus ear):  RIGHT: Ipsilateral could not be assessed due to lack of seal  RIGHT: Contralateral is absent at frequencies tested  LEFT:   Ipsilateral is absent at frequencies tested  LEFT:   Contralateral could not be assessed due to lack of seal      Speech Reception Threshold:    RIGHT: Did not assess, testing ceased by patient    LEFT:   Did not assess, testing ceased by patient  Word Recognition Score:     RIGHT: Did not assess, testing ceased by patient    LEFT:   Did not assess, testing ceased by patient      ASSESSMENT:     ICD-10-CM    1. Sensory hearing loss, bilateral  H90.3 Tymps / Reflex   (44541)     Audiometry/Air   (81773)       Compared to patient's previous audiogram dated 1/23/2013, hearing may have decreased 15 dB 3000 and 8000 Hz for the right ear and 20 dB at 250 Hz left ear but the patient ceased testing prior to completing air conduction testing.     PLAN:  The patient has ceased testing and left the appointment stating that she would like a different provider.      Jamil Galarza.  Licensed Audiologist  [place] #[id number]

## 2017-05-15 NOTE — MR AVS SNAPSHOT
After Visit Summary   5/15/2017    Marie Vogel    MRN: [id number]           Patient Information     Date Of Birth          1980        Visit Information        Provider Department      5/15/2017 9:30 AM Hilda Montemayor AuD M [facility] Audiology        Today's Diagnoses     Sensory hearing loss, bilateral    -  1       Follow-ups after your visit        Who to contact     Please call your clinic at [phone] to:    Ask questions about your health    Make or cancel appointments    Discuss your medicines    Learn about your test results    Speak to your doctor   If you have compliments or concerns about an experience at your clinic, or if you wish to file a complaint, please contact [facility] Patient Relations at [phone] or email us at [email or web address]         Additional Information About Your Visit        MyChart Information     [employer] gives you secure access to your electronic health record. If you see a primary care provider, you can also send messages to your care team and make appointments. If you have questions, please call your primary care clinic.  If you do not have a primary care provider, please call [phone] and they will assist you.      [employer] is an electronic gateway that provides easy, online access to your medical records. With [employer], you can request a clinic appointment, read your test results, renew a prescription or communicate with your care team.     To access your existing account, please contact your [facility] or call [phone] for assistance.        Care EveryWhere ID     This is your Care EveryWhere ID. This could be used by other organizations to access your [place] medical records  [id number]         Blood Pressure from Last 3 Encounters:   04/17/17 133/90   04/16/17 122/80   02/26/17 122/80    Weight from Last 3 Encounters:   04/17/17 86.1 kg (189 lb 14.4 oz)   04/16/17  86.1 kg (189 lb 13.1 oz)   02/26/17 85.2 kg (187 lb 12.8 oz)              We Performed the Following     Audiometry/Air   (49988)     Tymps / Reflex   (53859)        Primary Care Provider Office Phone # Fax #    Alan Paredes [phone]       [facility] [address] [place] [address] [address] [place] [address]        Thank you!     Thank you for choosing [facility] AUDIOLOGY  for your care. Our goal is always to provide you with excellent care. Hearing back from our patients is one way we can continue to improve our services. Please take a few minutes to complete the written survey that you may receive in the mail after your visit with us. Thank you!             Your Updated Medication List - Protect others around you: Learn how to safely use, store and throw away your medicines at www.disposemymeds.org.          This list is accurate as of: 5/15/17 11:23 AM.  Always use your most recent med list.                   Brand Name Dispense Instructions for use    acetone (Urine) test Strp     1 strip    1 strip by In Vitro route daily as needed.       ADVIL 200 MG tablet   Generic drug:  ibuprofen      Take 200 mg by mouth every 4 hours as needed.       albuterol 108 (90 BASE) MCG/ACT Inhaler    PROAIR HFA/PROVENTIL HFA/VENTOLIN HFA    1 Inhaler    Inhale 2 puffs into the lungs every 4 hours as needed for shortness of breath / dyspnea or wheezing       aspirin 81 MG tablet     90 tablet    Take 1 tablet (81 mg) by mouth daily       PEPE CONTOUR NEXT test strip   Generic drug:  blood glucose monitoring     120 each    Check 4 times/day       blood glucose monitoring lancets     100 each    1 Device See Admin Instructions. Use up to 5 times daily for blood sugar checks.       blood glucose monitoring meter device kit    no brand specified    1 kit    Use to test blood sugar 6 times daily and as needed.       cetirizine 10 MG tablet    zyrTEC    90 tablet    Take 10 mg by mouth 2 times daily        EPINEPHrine 0.3 MG/0.3ML injection     0.3 mL    Inject 0.3 mLs (0.3 mg) into the muscle once as needed for anaphylaxis       ergocalciferol 45197 UNITS capsule    ERGOCALCIFEROL    14 capsule    Take 1 capsule (50,000 Units) by mouth once a week       insulin aspart 100 UNIT/ML injection    NovoLOG PEN    3 Month    With insulin pump. About 80 units/day. Vial.       * insulin cartridge misc pump supply     30 each    Change every 3 days1 applicator every 72 hours Change every 3 days       * infusion set misc pump supply     30 each    Change every 3 days as directed       insulin pump infusion      Updated 1/27/17: [employer]: Model 723 BASAL: 00:00: 1.0 units/hr 10:30: 0.95 14:00: 1.05 CARB RATIO: 00:00 1:7 1600  1:8 C. Factor: 27 mg/dL BLOOD GLUCOSE TARGET and times: 12   AM (midnight):  Active Insulin Time:  3 hours Basal to Bolus Ratio:  Sensor:  No Carelink / Diasend username:  [email or web address] Carelink / Diasend Password:  [email or web address]!       losartan 50 MG tablet    COZAAR    135 tablet    Take 1.5 tablets (75 mg) by mouth daily Take 1 tab ( 50 mg)  qam and 1/2 tab ( 25 mg)  q pm       metFORMIN 500 MG 24 hr tablet    GLUCOPHAGE-XR    120 tablet    Take 2 tablets (1,000 mg) by mouth 2 times daily (with meals)       montelukast 10 MG tablet    SINGULAIR    90 tablet    Take 1 tablet (10 mg) by mouth At Bedtime       MULTI VITAMIN/MINERALS PO      Take 1 each by mouth daily.       mupirocin 2 % cream    BACTROBAN    15 g    Apply topically 3 times daily       omeprazole 40 MG capsule    priLOSEC    90 capsule    Take 1 capsule (40 mg) by mouth daily       * order for DME     100 each    Equipment being ordered: skin prep wipes       * order for DME     1 Units    Equipment being ordered: post-op shoe       simvastatin 10 MG tablet    ZOCOR    90 tablet    Take 1 tablet (10 mg) by mouth every morning       SUMAtriptan 25 MG tablet    IMITREX    8 tablet    Take 25-100mg one time. May repeat 25mg every two  hours up to a maximum of 200mg in 24 hours.       * Notice:  This list has 4 medication(s) that are the same as other medications prescribed for you. Read the directions carefully, and ask your doctor or other care provider to review them with you.

## 2017-05-31 ENCOUNTER — HOSPITAL ENCOUNTER (EMERGENCY)
Facility: CLINIC | Age: 37
Discharge: HOME | End: 2017-05-31
Attending: EMERGENCY MEDICINE | Admitting: EMERGENCY MEDICINE
Payer: COMMERCIAL

## 2017-05-31 VITALS
TEMPERATURE: 97.7 F | DIASTOLIC BLOOD PRESSURE: 97 MMHG | OXYGEN SATURATION: 97 % | SYSTOLIC BLOOD PRESSURE: 129 MMHG | HEART RATE: 91 BPM | RESPIRATION RATE: 13 BRPM | HEIGHT: 63 IN | WEIGHT: 189.82 LBS | BODY MASS INDEX: 33.63 KG/M2

## 2017-05-31 DIAGNOSIS — R00.2 PALPITATIONS: ICD-10-CM

## 2017-05-31 LAB
ANION GAP SERPL CALCULATED.3IONS-SCNC: 7 MMOL/L (ref 3–14)
BASOPHILS # BLD AUTO: 0 10E9/L (ref 0–0.2)
BASOPHILS NFR BLD AUTO: 0.4 %
BUN SERPL-MCNC: 15 MG/DL (ref 7–30)
CALCIUM SERPL-MCNC: 9.2 MG/DL (ref 8.5–10.1)
CHLORIDE SERPL-SCNC: 104 MMOL/L (ref 94–109)
CO2 SERPL-SCNC: 26 MMOL/L (ref 20–32)
CREAT SERPL-MCNC: 0.72 MG/DL (ref 0.52–1.04)
DIFFERENTIAL METHOD BLD: NORMAL
EOSINOPHIL # BLD AUTO: 0.2 10E9/L (ref 0–0.7)
EOSINOPHIL NFR BLD AUTO: 2.6 %
ERYTHROCYTE [DISTWIDTH] IN BLOOD BY AUTOMATED COUNT: 12 % (ref 10–15)
GFR SERPL CREATININE-BSD FRML MDRD: ABNORMAL ML/MIN/1.7M2
GLUCOSE SERPL-MCNC: 214 MG/DL (ref 70–99)
HCT VFR BLD AUTO: 39.3 % (ref 35–47)
HGB BLD-MCNC: 13.1 G/DL (ref 11.7–15.7)
IMM GRANULOCYTES # BLD: 0 10E9/L (ref 0–0.4)
IMM GRANULOCYTES NFR BLD: 0.3 %
INTERPRETATION ECG - MUSE: NORMAL
LYMPHOCYTES # BLD AUTO: 2.8 10E9/L (ref 0.8–5.3)
LYMPHOCYTES NFR BLD AUTO: 29.6 %
MAGNESIUM SERPL-MCNC: 2.1 MG/DL (ref 1.6–2.3)
MCH RBC QN AUTO: 30.7 PG (ref 26.5–33)
MCHC RBC AUTO-ENTMCNC: 33.3 G/DL (ref 31.5–36.5)
MCV RBC AUTO: 92 FL (ref 78–100)
MONOCYTES # BLD AUTO: 0.9 10E9/L (ref 0–1.3)
MONOCYTES NFR BLD AUTO: 9.9 %
NEUTROPHILS # BLD AUTO: 5.3 10E9/L (ref 1.6–8.3)
NEUTROPHILS NFR BLD AUTO: 57.2 %
NRBC # BLD AUTO: 0 10*3/UL
NRBC BLD AUTO-RTO: 0 /100
PLATELET # BLD AUTO: 327 10E9/L (ref 150–450)
POTASSIUM SERPL-SCNC: 3.9 MMOL/L (ref 3.4–5.3)
RBC # BLD AUTO: 4.27 10E12/L (ref 3.8–5.2)
SODIUM SERPL-SCNC: 138 MMOL/L (ref 133–144)
WBC # BLD AUTO: 9.3 10E9/L (ref 4–11)

## 2017-05-31 PROCEDURE — 80048 BASIC METABOLIC PNL TOTAL CA: CPT | Performed by: EMERGENCY MEDICINE

## 2017-05-31 PROCEDURE — 99284 EMERGENCY DEPT VISIT MOD MDM: CPT | Performed by: EMERGENCY MEDICINE

## 2017-05-31 PROCEDURE — 83735 ASSAY OF MAGNESIUM: CPT | Performed by: EMERGENCY MEDICINE

## 2017-05-31 PROCEDURE — 93010 ELECTROCARDIOGRAM REPORT: CPT | Mod: Z6 | Performed by: EMERGENCY MEDICINE

## 2017-05-31 PROCEDURE — 93005 ELECTROCARDIOGRAM TRACING: CPT | Performed by: EMERGENCY MEDICINE

## 2017-05-31 PROCEDURE — 85025 COMPLETE CBC W/AUTO DIFF WBC: CPT | Performed by: EMERGENCY MEDICINE

## 2017-05-31 PROCEDURE — 99284 EMERGENCY DEPT VISIT MOD MDM: CPT | Mod: 25 | Performed by: EMERGENCY MEDICINE

## 2017-05-31 ASSESSMENT — ENCOUNTER SYMPTOMS
CONFUSION: 0
LIGHT-HEADEDNESS: 0
NAUSEA: 0
ABDOMINAL PAIN: 0
COUGH: 0
SHORTNESS OF BREATH: 0
WEAKNESS: 0
HEADACHES: 0
PALPITATIONS: 1
VOMITING: 0
DIZZINESS: 0
CHILLS: 0
DYSURIA: 0
FEVER: 0
BACK PAIN: 0

## 2017-05-31 NOTE — ED AVS SNAPSHOT
[facility], Emergency Department    [address] [facility] [address]    Phone:  [phone]                                       Marie Vogel   MRN: [id number]    Department:  [facility], Emergency Department   Date of Visit:  5/31/2017           Patient Information     Date Of Birth          1980        Your diagnoses for this visit were:     Palpitations        You were seen by Edson Clemons MD.        Discharge Instructions       All of your tests are normal, including your EKG.      I recommend that you follow up with your cardiologist who can address your concerns.    Future Appointments        Provider Department Dept Phone Center    7/13/2017 1:30 PM Delma Susan [facility] [phone] [facility]      24 Hour Appointment Hotline       To make an appointment at any [facility], call [phone] ([phone]). If you don't have a family doctor or clinic, we will help you find one. [place] clinics are conveniently located to serve the needs of you and your family.             Review of your medicines      Our records show that you are taking the medicines listed below. If these are incorrect, please call your family doctor or clinic.        Dose / Directions Last dose taken    acetone (Urine) test Strp   Dose:  1 strip   Quantity:  1 strip        1 strip by In Vitro route daily as needed.   Refills:  prn        ADVIL 200 MG tablet   Dose:  200 mg   Generic drug:  ibuprofen        Take 200 mg by mouth every 4 hours as needed.   Refills:  0        albuterol 108 (90 BASE) MCG/ACT Inhaler   Commonly known as:  PROAIR HFA/PROVENTIL HFA/VENTOLIN HFA   Dose:  2 puff   Quantity:  1 Inhaler        Inhale 2 puffs into the lungs every 4 hours as needed for shortness of breath / dyspnea or wheezing   Refills:  1        aspirin 81 MG tablet   Dose:  81 mg   Quantity:  90 tablet        Take 1 tablet (81 mg) by mouth daily   Refills:  3        PEPE CONTOUR NEXT  test strip   Quantity:  120 each   Generic drug:  blood glucose monitoring        Check 4 times/day   Refills:  3        blood glucose monitoring lancets   Dose:  1 Device   Quantity:  100 each        1 Device See Admin Instructions. Use up to 5 times daily for blood sugar checks.   Refills:  prn        blood glucose monitoring meter device kit   Commonly known as:  no brand specified   Quantity:  1 kit        Use to test blood sugar 6 times daily and as needed.   Refills:  0        cetirizine 10 MG tablet   Commonly known as:  zyrTEC   Dose:  10 mg   Quantity:  90 tablet        Take 10 mg by mouth 2 times daily   Refills:  3        EPINEPHrine 0.3 MG/0.3ML injection   Dose:  0.3 mg   Quantity:  0.3 mL        Inject 0.3 mLs (0.3 mg) into the muscle once as needed for anaphylaxis   Refills:  11        ergocalciferol 26279 UNITS capsule   Commonly known as:  ERGOCALCIFEROL   Dose:  38636 Units   Quantity:  14 capsule        Take 1 capsule (50,000 Units) by mouth once a week   Refills:  3        insulin aspart 100 UNIT/ML injection   Commonly known as:  NovoLOG PEN   Quantity:  3 Month        With insulin pump. About 80 units/day. Vial.   Refills:  3        * insulin cartridge misc pump supply   Quantity:  30 each        Change every 3 days1 applicator every 72 hours Change every 3 days   Refills:  3        * infusion set misc pump supply   Quantity:  30 each        Change every 3 days as directed   Refills:  3        insulin pump infusion        Updated 1/27/17: Medtronic Minimed: Model 723 BASAL: 00:00: 1.0 units/hr 10:30: 0.95 14:00: 1.05 CARB RATIO: 00:00 1:7 1600  1:8 C. Factor: 27 mg/dL BLOOD GLUCOSE TARGET and times: 12   AM (midnight):  Active Insulin Time:  3 hours Basal to Bolus Ratio:  Sensor:  No Carelink / Diasend username:  [email or web address] Carelink / Diasend Password:  [email or web address]   Refills:  0        losartan 50 MG tablet   Commonly known as:  COZAAR   Dose:  75 mg   Quantity:  135 tablet        Take 1.5  tablets (75 mg) by mouth daily Take 1 tab ( 50 mg)  qam and 1/2 tab ( 25 mg)  q pm   Refills:  0        metFORMIN 500 MG 24 hr tablet   Commonly known as:  GLUCOPHAGE-XR   Dose:  1000 mg   Quantity:  120 tablet        Take 2 tablets (1,000 mg) by mouth 2 times daily (with meals)   Refills:  3        montelukast 10 MG tablet   Commonly known as:  SINGULAIR   Dose:  10 mg   Quantity:  90 tablet        Take 1 tablet (10 mg) by mouth At Bedtime   Refills:  1        MULTI VITAMIN/MINERALS PO   Dose:  1 each        Take 1 each by mouth daily.   Refills:  0        mupirocin 2 % cream   Commonly known as:  BACTROBAN   Quantity:  15 g        Apply topically 3 times daily   Refills:  0        omeprazole 40 MG capsule   Commonly known as:  priLOSEC   Dose:  40 mg   Quantity:  90 capsule        Take 1 capsule (40 mg) by mouth daily   Refills:  1        * order for DME   Quantity:  100 each        Equipment being ordered: skin prep wipes   Refills:  prn        * order for DME   Quantity:  1 Units        Equipment being ordered: post-op shoe   Refills:  0        simvastatin 10 MG tablet   Commonly known as:  ZOCOR   Dose:  10 mg   Quantity:  90 tablet        Take 1 tablet (10 mg) by mouth every morning   Refills:  1        SUMAtriptan 25 MG tablet   Commonly known as:  IMITREX   Quantity:  8 tablet        Take 25-100mg one time. May repeat 25mg every two hours up to a maximum of 200mg in 24 hours.   Refills:  6        * Notice:  This list has 4 medication(s) that are the same as other medications prescribed for you. Read the directions carefully, and ask your doctor or other care provider to review them with you.            Procedures and tests performed during your visit     Basic metabolic panel    CBC with platelets differential    Cardiac Continuous Monitoring    EKG 12-lead, tracing only    Magnesium    Peripheral IV: Standard      Orders Needing Specimen Collection     None      Pending Results     No orders found from  5/29/2017 to 6/1/2017.            Pending Culture Results     No orders found from 5/29/2017 to 6/1/2017.            Pending Results Instructions     If you had any lab results that were not finalized at the time of your Discharge, you can call the ED Lab Result RN at [phone]. You will be contacted by this team for any positive Lab results or changes in treatment. The nurses are available 7 days a week from 10A to 6:30P.  You can leave a message 24 hours per day and they will return your call.        Thank you for choosing [facility]       Thank you for choosing [facility] for your care. Our goal is always to provide you with excellent care. Hearing back from our patients is one way we can continue to improve our services. Please take a few minutes to complete the written survey that you may receive in the mail after you visit with us. Thank you!        [employer] Information     [employer] gives you secure access to your electronic health record. If you see a primary care provider, you can also send messages to your care team and make appointments. If you have questions, please call your primary care clinic.  If you do not have a primary care provider, please call [phone] and they will assist you.        Care EveryWhere ID     This is your Care EveryWhere ID. This could be used by other organizations to access your [facility] medical records  [id number]        After Visit Summary       This is your record. Keep this with you and show to your community pharmacist(s) and doctor(s) at your next visit.

## 2017-05-31 NOTE — DISCHARGE INSTRUCTIONS
All of your tests are normal, including your EKG.      I recommend that you follow up with your cardiologist who can address your concerns.

## 2017-05-31 NOTE — ED AVS SNAPSHOT
[facility], [place], Emergency Department    [address] [place] [address]    Phone:  [phone]                                       Marie Vogel   MRN: [id number]    Department:  [facility], Emergency Department   Date of Visit:  5/31/2017           After Visit Summary Signature Page     I have received my discharge instructions, and my questions have been answered. I have discussed any challenges I see with this plan with the nurse or doctor.    ..........................................................................................................................................  Patient/Patient Representative Signature      ..........................................................................................................................................  Patient Representative Print Name and Relationship to Patient    ..................................................               ................................................  Date                                            Time    ..........................................................................................................................................  Reviewed by Signature/Title    ...................................................              ..............................................  Date                                                            Time

## 2017-05-31 NOTE — ED PROVIDER NOTES
History     Chief Complaint   Patient presents with     Tachycardia     HPI  Marie Vogel is a 36 year old female with a past medical history of DM2 (with insulin pump), hypertension, a cardiomyopathy, and GERD who presents after developing palpitations.  Patient works at [employer] in [place] and was finishing her coffee this morning when she began to develop palpitations acutely.  She denies any other symptoms such light headedness, dizziness, shortness of breath, chest pain, nausea or vomiting.  She states a similar episode occurred 4/16/2017, which also brought her to the ER.  Her EKG was normal and she was monitored for several hours.  Her magnesium was noted to be 1.5.  It was replaced at that time, but since then patient has continued to take a daily oral replacement (400 mg Magnesium Oxide daily).  She has not had any further episodes of palpitations since then.      Of note, patient has a hereditary cardiomyopathy and is followed by Cardiology.  She received an extensive workup including a cardiac MRI 8/2017 which showed a normal LV with an EF of 64%.  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     I have reviewed the Medications, Allergies, Past Medical and Surgical History, and Social History in the [employer] system.  Past Medical History:   Diagnosis Date     Allergic rhinitis due to pollen      Atypical glandular cells on Pap smear 3/1108     Gastroesophageal reflux disease      PFO (patent foramen ovale)      Type II or unspecified type diabetes mellitus without mention of complication, not stated as uncontrolled 2002    onset was gestational in 2001       Review of Systems   Constitutional: Negative for chills and fever.   HENT: Negative for congestion.    Respiratory: Negative for cough and shortness of breath.    Cardiovascular: Positive for palpitations. Negative for chest pain and leg swelling.   Gastrointestinal:  "Negative for abdominal pain, nausea and vomiting.   Genitourinary: Negative for dysuria.   Musculoskeletal: Negative for back pain.   Neurological: Negative for dizziness, syncope, weakness, light-headedness and headaches.   Psychiatric/Behavioral: Negative for confusion.       Physical Exam   BP: (!) 158/103  Pulse: 114  Temp: 97.7  F (36.5  C)  Resp: 18  Height: 160 cm (5' 3\")  Weight: 86.1 kg (189 lb 13.1 oz)  SpO2: 96 %  Physical Exam   Constitutional: She is oriented to person, place, and time. She appears well-developed and well-nourished.   HENT:   Head: Normocephalic.   Eyes: Conjunctivae are normal.   Neck: Normal range of motion. Neck supple.   Cardiovascular: Normal rate, regular rhythm and normal heart sounds.    No murmur heard.  Pulmonary/Chest: Effort normal and breath sounds normal.   Abdominal: Soft. Bowel sounds are normal. She exhibits no distension. There is no tenderness. There is no rebound.   Musculoskeletal: Normal range of motion. She exhibits no edema or tenderness.   Neurological: She is alert and oriented to person, place, and time.   Skin: No rash noted. No erythema. No pallor.   Psychiatric: She has a normal mood and affect. Her behavior is normal. Judgment and thought content normal.   Vitals reviewed.      ED Course     ED Course     Procedures             EKG Interpretation:      Interpreted by Nikki Kwong  Time reviewed: 5/31/2017 at 9:15 am  Symptoms at time of EKG: palpitations   Rhythm: normal sinus   Rate: Normal  Axis: Normal  Ectopy: none  Conduction: normal  ST Segments/ T Waves: T wave inversion III  Q Waves: none  Comparison to prior: Unchanged from 4/16    Clinical Impression: normal EKG                Critical Care time:  none      Labs Ordered and Resulted from Time of ED Arrival Up to the Time of Departure from the ED   BASIC METABOLIC PANEL - Abnormal; Notable for the following:        Result Value    Glucose 214 (*)     All other components within normal " limits   CBC WITH PLATELETS DIFFERENTIAL   MAGNESIUM   CARDIAC CONTINUOUS MONITORING   PERIPHERAL IV CATHETER            Assessments & Plan (with Medical Decision Making)   Patient is a 36 year old female with a past medical history of DM2 (with insulin pump), hypertension, a cardiomyopathy, and GERD who presented after developing palpitations.  Differential for patient's palpitations include electrolyte abnormalities, caffeine, dehydration, anxiety, hypoglycemia, beta blocker withdrawal, valvular disease, and a cardiomyopathy.  Her heart rate was less than 110 during observation and her EKG was NSR.  Her electrolytes were normal and her blood glucose was 214.   She was not having any symptoms other than intermittent palpitations, so she was discharged.  Since patient has a cardiomyopathy, we did recommend that patient follow up with her cardiologist.      Nikki Kwong M.D.  PGY-2, Family Medicine    This data collected with the Resident working in the Emergency Department.  Patient was seen and evaluated by myself and I repeated the history and physical exam with the patient.  The plan of care was discussed with them.  The key portions of the note including the entire assessment and plan reflect my documentation.  hysical examination:  General: Awake, alert, no distress  Cardiac: Regular rate and rhythm no murmurs rubs or gallops  Respiratory: Lungs are clear    Assessment and plan: 36-year-old female here with concerns about paations.  Her EKG shows normal sinus rhythm without ischemic changes or ectopy her routine labs are normal we'll discharge her home to follow up with her cardiologist.      I have reviewed the nursing notes.    I have reviewed the findings, diagnosis, plan and need for follow up with the patient.    Current Discharge Medication List          Final diagnoses:   Palpitations       5/31/2017   [facility], [place], EMERGENCY DEPARTMENT     Edson Clemons MD  05/31/17 1748

## 2017-05-31 NOTE — ED NOTES
36 year old female with history of DM and PFO, presents with complaints of acute onset of tachy after drinking caffeine beverage this about 20 minutes ago.  Patient denies shortness of breath or chest pain.

## 2017-06-30 DIAGNOSIS — Z79.4 TYPE 2 DIABETES MELLITUS WITH DIABETIC NEPHROPATHY, WITH LONG-TERM CURRENT USE OF INSULIN (H): ICD-10-CM

## 2017-06-30 DIAGNOSIS — R80.9 MICROALBUMINURIA: ICD-10-CM

## 2017-06-30 DIAGNOSIS — E11.21 TYPE 2 DIABETES MELLITUS WITH DIABETIC NEPHROPATHY, WITH LONG-TERM CURRENT USE OF INSULIN (H): ICD-10-CM

## 2017-06-30 RX ORDER — LOSARTAN POTASSIUM 50 MG/1
TABLET ORAL
Qty: 135 TABLET | Refills: 0 | Status: SHIPPED | OUTPATIENT
Start: 2017-06-30 | End: 2017-11-06

## 2017-06-30 RX ORDER — METFORMIN HCL 500 MG
1000 TABLET, EXTENDED RELEASE 24 HR ORAL 2 TIMES DAILY WITH MEALS
Qty: 360 TABLET | Refills: 0 | Status: SHIPPED | OUTPATIENT
Start: 2017-06-30 | End: 2017-12-15

## 2017-06-30 NOTE — TELEPHONE ENCOUNTER
metFORMIN (GLUCOPHAGE-XR) 500 MG 24 hr tablet      And     losartan (COZAAR) 50 MG tablet      Last Written Prescription Date:12- and 03-  Last Fill Quantity: 120/120 , # refills: 0  Last Office Visit with G, P or [facility] prescribing provider: 04-  Next 5 appointments (look out 90 days)     Jul 25, 2017  1:30 PM CDT   [facility]   [facility]    [address] [place] [address]  [address]  [facility] [address] [phone]                   BP Readings from Last 3 Encounters:   05/31/17 (!) 129/97   04/17/17 133/90   04/16/17 122/80     Lab Results   Component Value Date    MICROL 1400 09/27/2016     Lab Results   Component Value Date    UMALCR 1166.67 09/27/2016     Creatinine   Date Value Ref Range Status   05/31/2017 0.72 0.52 - 1.04 mg/dL Final   ]  GFR Estimate   Date Value Ref Range Status   05/31/2017 >90  Non  GFR Calc   >60 mL/min/1.7m2 Final   04/16/2017 73 >60 mL/min/1.7m2 Final     Comment:     Non  GFR Calc   03/15/2017 >90  Non  GFR Calc   >60 mL/min/1.7m2 Final     GFR Estimate If Black   Date Value Ref Range Status   05/31/2017 >90   GFR Calc   >60 mL/min/1.7m2 Final   04/16/2017 89 >60 mL/min/1.7m2 Final     Comment:      GFR Calc   03/15/2017 >90   GFR Calc   >60 mL/min/1.7m2 Final     Lab Results   Component Value Date    CHOL 162 09/28/2016     Lab Results   Component Value Date    HDL 45 09/28/2016     Lab Results   Component Value Date    LDL 98 09/28/2016     Lab Results   Component Value Date    TRIG 99 09/28/2016     Lab Results   Component Value Date    CHOLHDLRATIO 4.5 07/01/2015     Lab Results   Component Value Date    AST 14 04/16/2017     Lab Results   Component Value Date    ALT 25 04/16/2017     Lab Results   Component Value Date    A1C 7.2 04/17/2017    A1C 7.2 12/27/2016    A1C 7.1 09/27/2016     A1C 7.3 02/22/2016    A1C 8.2 11/24/2015     Potassium   Date Value Ref Range Status   05/31/2017 3.9 3.4 - 5.3 mmol/L Final     Patient is out of the Cozar Medication and at the pharmacy I am sending her over to speak to the nurse

## 2017-07-13 ENCOUNTER — OFFICE VISIT (OUTPATIENT)
Dept: AUDIOLOGY | Facility: CLINIC | Age: 37
End: 2017-07-13

## 2017-07-13 DIAGNOSIS — H90.3 SENSORY HEARING LOSS, BILATERAL: Primary | ICD-10-CM

## 2017-07-13 NOTE — MR AVS SNAPSHOT
After Visit Summary   7/13/2017    Marie Vogel    MRN: [id number]           Patient Information     Date Of Birth          1980        Visit Information        Provider Department      7/13/2017 1:30 PM Delma Davis AuD M [facility] Audiology         Follow-ups after your visit        Your next 10 appointments already scheduled     Jul 25, 2017  1:30 PM CDT   [facility]    [address] [place] [address]  [address]  [facility] [address]   [phone]            Aug 28, 2017  3:00 PM CDT   New Lifestyle Weight Management with Rosmery Cross RD   [facility] ([facility])    [facility]  [address]  [facility] [address]   [phone]            Aug 28, 2017  3:00 PM CDT   New Lifestyle Weight Management with Calvin Milton MD   [facility] ([facility])    [facility]  [address]  [facility] [address]   [phone]            Aug 31, 2017  2:00 PM CDT   Hearing Aid Fitting with Dinora Rdz [facility] Audiology ([facility])    [address] [place] [address] [phone]            Sep 13, 2017  2:00 PM CDT   (Arrive by 1:45 PM)   Initial Review Program with Dinora Rdz [facility] Audiology ([facility])    [address]  [address] [facility] [address] [phone]              Who to contact     Please call your clinic at [phone] to:    Ask questions about your health    Make or cancel appointments    Discuss your medicines    Learn about your test results    Speak to your doctor   If you have compliments or concerns about an experience at your clinic, or if you wish to file a complaint, please contact [facility] Patient Relations at [phone] or email us at  [email or web address]         Additional Information About Your Visit        Michellehart Information     aDealiohart gives you secure access to your electronic health record. If you see a primary care provider, you can also send messages to your care team and make appointments. If you have questions, please call your primary care clinic.  If you do not have a primary care provider, please call [phone] and they will assist you.      [employer] is an electronic gateway that provides easy, online access to your medical records. With [employer], you can request a clinic appointment, read your test results, renew a prescription or communicate with your care team.     To access your existing account, please contact your [facility] or call [phone] for assistance.        Care EveryWhere ID     This is your Care EveryWhere ID. This could be used by other organizations to access your [place] medical records  [id number]         Blood Pressure from Last 3 Encounters:   05/31/17 (!) 129/97   04/17/17 133/90   04/16/17 122/80    Weight from Last 3 Encounters:   05/31/17 86.1 kg (189 lb 13.1 oz)   04/17/17 86.1 kg (189 lb 14.4 oz)   04/16/17 86.1 kg (189 lb 13.1 oz)              Today, you had the following     No orders found for display       Primary Care Provider Office Phone # Fax #    Alan Davi Paredes [phone]       [facility] [address] [place] [address]        Equal Access to Services     KO PUGA AH: Hadii aad ku hadasho Soomaali, waaxda luqadaha, qaybta kaalmada adeegyada, wax casandrain hayivetten britta peralta . So [facility] [phone].    ATENCIÓN: Si habla español, tiene a woodruff disposición servicios gratuitos de asistencia lingüística. Llame al [phone].    We comply with applicable federal civil rights laws and [place] laws. We do not discriminate on the basis of race, color, national origin, age, disability sex, sexual  orientation or gender identity.            Thank you!     Thank you for choosing [facility] AUDIOLOGY  for your care. Our goal is always to provide you with excellent care. Hearing back from our patients is one way we can continue to improve our services. Please take a few minutes to complete the written survey that you may receive in the mail after your visit with us. Thank you!             Your Updated Medication List - Protect others around you: Learn how to safely use, store and throw away your medicines at www.disposemymeds.org.          This list is accurate as of: 7/13/17  2:58 PM.  Always use your most recent med list.                   Brand Name Dispense Instructions for use Diagnosis    acetone (Urine) test Strp     1 strip    1 strip by In Vitro route daily as needed.    Type 2 diabetes, HbA1c goal < 7% (H)       ADVIL 200 MG tablet   Generic drug:  ibuprofen      Take 200 mg by mouth every 4 hours as needed.        albuterol 108 (90 BASE) MCG/ACT Inhaler    PROAIR HFA/PROVENTIL HFA/VENTOLIN HFA    1 Inhaler    Inhale 2 puffs into the lungs every 4 hours as needed for shortness of breath / dyspnea or wheezing    Acute bronchitis with symptoms > 10 days       aspirin 81 MG tablet     90 tablet    Take 1 tablet (81 mg) by mouth daily    PFO (patent foramen ovale)       PEPE CONTOUR NEXT test strip   Generic drug:  blood glucose monitoring     120 each    Check 4 times/day    Type 2 diabetes mellitus with diabetic nephropathy, with long-term current use of insulin (H)       blood glucose monitoring lancets     100 each    1 Device See Admin Instructions. Use up to 5 times daily for blood sugar checks.    Type 2 diabetes, HbA1c goal < 7% (H)       blood glucose monitoring meter device kit    no brand specified    1 kit    Use to test blood sugar 6 times daily and as needed.    Type 2 diabetes mellitus with diabetic nephropathy (H)       cetirizine 10 MG tablet    zyrTEC    90 tablet    Take 10 mg by mouth 2  times daily        EPINEPHrine 0.3 MG/0.3ML injection     0.3 mL    Inject 0.3 mLs (0.3 mg) into the muscle once as needed for anaphylaxis    Nut allergy       ergocalciferol 08949 UNITS capsule    ERGOCALCIFEROL    14 capsule    Take 1 capsule (50,000 Units) by mouth once a week    Vitamin D deficiency, Microalbuminuria       insulin aspart 100 UNIT/ML injection    NovoLOG PEN    3 Month    With insulin pump. About 80 units/day. Vial.    Type 2 diabetes mellitus with diabetic nephropathy, with long-term current use of insulin (H)       * insulin cartridge misc pump supply     30 each    Change every 3 days1 applicator every 72 hours Change every 3 days    Type 2 diabetes mellitus with diabetic nephropathy, with long-term current use of insulin (H)       * infusion set misc pump supply     30 each    Change every 3 days as directed    Type 2 diabetes mellitus with diabetic nephropathy, with long-term current use of insulin (H)       insulin pump infusion      Updated 1/27/17: [employer] Minimed: Model 723 BASAL: 00:00: 1.0 units/hr 10:30: 0.95 14:00: 1.05 CARB RATIO: 00:00 1:7 1600  1:8 C. Factor: 27 mg/dL BLOOD GLUCOSE TARGET and times: 12   AM (midnight):  Active Insulin Time:  3 hours Basal to Bolus Ratio:  Sensor:  No Carelink / Diasend username:  [email or web address] Carelink / Diasend Password:  [email or web address]    Type 2 diabetes mellitus with diabetic nephropathy, with long-term current use of insulin (H)       losartan 50 MG tablet    COZAAR    135 tablet    Take 1 tab ( 50 mg)  qam and 1/2 tab ( 25 mg)  q pm    Microalbuminuria       metFORMIN 500 MG 24 hr tablet    GLUCOPHAGE-XR    360 tablet    Take 2 tablets (1,000 mg) by mouth 2 times daily (with meals)    Type 2 diabetes mellitus with diabetic nephropathy, with long-term current use of insulin (H)       montelukast 10 MG tablet    SINGULAIR    90 tablet    Take 1 tablet (10 mg) by mouth At Bedtime    Allergic rhinitis       MULTI VITAMIN/MINERALS PO      Take 1  each by mouth daily.        mupirocin 2 % cream    BACTROBAN    15 g    Apply topically 3 times daily    Impetigo       omeprazole 40 MG capsule    priLOSEC    90 capsule    Take 1 capsule (40 mg) by mouth daily    Gastroesophageal reflux disease without esophagitis, Hiatal hernia       * order for DME     100 each    Equipment being ordered: skin prep wipes    Type 2 diabetes, HbA1c goal < 7% (H)       * order for DME     1 Units    Equipment being ordered: post-op shoe    Foot sprain, left, initial encounter       simvastatin 10 MG tablet    ZOCOR    90 tablet    Take 1 tablet (10 mg) by mouth every morning    Hyperlipidemia LDL goal <100       SUMAtriptan 25 MG tablet    IMITREX    8 tablet    Take 25-100mg one time. May repeat 25mg every two hours up to a maximum of 200mg in 24 hours.    Other migraine without status migrainosus, not intractable       * Notice:  This list has 4 medication(s) that are the same as other medications prescribed for you. Read the directions carefully, and ask your doctor or other care provider to review them with you.

## 2017-07-14 ENCOUNTER — PRE VISIT (OUTPATIENT)
Dept: CARDIOLOGY | Facility: CLINIC | Age: 37
End: 2017-07-14

## 2017-07-14 NOTE — TELEPHONE ENCOUNTER
S-(situation): patient had called to see if she needed an ECHO prior to her next visit with Dr. Dewey. She has been to the ED twice within two months with palpitations. The first time she was dehydrated and had a low magnesium. Mg++ and fluids replaced in  ED. The second time she was told that she was having an anxiety attack.    B-(background):  Ms. Vogel is a pleasant 36 y.o.woman with type 2 diabetes and very strong family history of hypertrophic cardiomyopathy. She herself does not carry this diagnosis. She has insulin requiring diabetes. She uses an insulin pump and recent HbA1c level is 7.1%  She has hypertension and hyperlipidemia. Her family history of HCM is impressive, her mother was diagnosed with HCM in her mid to late 30's and her brother has HCM and several family members on her maternal side are affected by the disease (see below). There is no family history of SCD. She thinks she has had genetic testing along with her family members at [facility] but does not have any records with her. She is no aware of the genetic mutation (s) her family members carry    A-(assessment): palpitations    R-(recommendations): Will ask Dr. Dewey if a cardiac monitor is warranted.

## 2017-08-14 DIAGNOSIS — J30.9 ALLERGIC RHINITIS: Primary | ICD-10-CM

## 2017-08-14 NOTE — TELEPHONE ENCOUNTER
Montelukast 10mg      Last Written Prescription Date: 08/08/16  Last Fill Quantity: 90,  # refills: 1   Last Office Visit with [facility], [facility] or [facility] prescribing provider: 04/17/2017                                         Next 5 appointments (look out 90 days)     Sep 12, 2017 11:30 AM CDT   Return Visit with Zita Fuentes MD   [facility] ([facility])    [address] [place] [address] [phone]                   Thank you,  Taylor Urbano CPhT  [facility] Discharge

## 2017-08-15 ENCOUNTER — TRANSFERRED RECORDS (OUTPATIENT)
Dept: HEALTH INFORMATION MANAGEMENT | Facility: CLINIC | Age: 37
End: 2017-08-15

## 2017-08-16 ENCOUNTER — TRANSFERRED RECORDS (OUTPATIENT)
Dept: HEALTH INFORMATION MANAGEMENT | Facility: CLINIC | Age: 37
End: 2017-08-16

## 2017-08-16 ENCOUNTER — OFFICE VISIT (OUTPATIENT)
Dept: AUDIOLOGY | Facility: CLINIC | Age: 37
End: 2017-08-16

## 2017-08-16 DIAGNOSIS — H90.3 SENSORY HEARING LOSS, BILATERAL: Primary | ICD-10-CM

## 2017-08-16 RX ORDER — MONTELUKAST SODIUM 10 MG/1
10 TABLET ORAL AT BEDTIME
Qty: 90 TABLET | Refills: 1 | Status: SHIPPED | OUTPATIENT
Start: 2017-08-16 | End: 2018-05-16

## 2017-08-16 NOTE — PROGRESS NOTES
AUDIOLOGY REPORT    SUBJECTIVE: Marie Vogel is a 37 year old female who was seen in the Audiology Clinic at the [facility] for a fitting of binaural Widex Beyond 330 Fusion BTE hearing aids. Previous results have revealed right normal sloping to moderate sensorineural hearing loss, and left mild sloping to moderately-severe sensorineural hearing loss. The patient was given medical clearance to pursue amplification by Melvin Stubbs MD..    OBJECTIVE: The hearing aid conformity evaluation was completed.The hearing aids were placed and they provided a good fit. Real-ear-probe-microphone measurements were completed on the BodeTree system and were a good match to NAL-NL1 target with soft sounds audible, moderate sounds comfortable, and loud sounds below discomfort (the left hearing aid was done via simulated real ear, due to suspected pinched probe tube). UCLs are verified through maximum power output measures and demonstrate appropriate limiting of loud inputs. Marie was oriented to proper hearing aid use, care, cleaning (no water, dry brush), batteries (size 312, insertion/removal, toxicity, low-battery signal), aid insertion/removal, user booklet, warranty information, storage cases, and other hearing aid details. The patient confirmed understanding of hearing aid use and care, and showed proper insertion of hearing aid and batteries while in the office today. Marie reported the left ear sounded slightly loud, and the low frequency gain was turned down 3 dB. Marie reported good volume and sound quality today. The hearing aids were then paired to Marie's iPhone, and she was instructed on use of the mathew.  Hearing aids were programmed as follows:  Program 1: Universal    ASSESSMENT: Widex Beyond 330 Fusion BTE hearing aids were fit today. Verification measures were performed. Marie signed the Hearing Aid Purchase Agreement and was given a copy, as well as details on her hearing  aids.    PLAN: Marie will return for follow-up in 2-3 weeks for a hearing aid review appointment. Please call this clinic with questions regarding today s appointment.    Mariposa Dobbins M.A.  Audiology Doctoral Extern, #[id number]    I was present with the patient for the entire Audiology appointment including all procedures/testing performed by the AuD student, and agree with the student s assessment and plan as documented.      Jamil Kwan., [facility]  Licensed Audiologist  [place] #[id number]

## 2017-08-16 NOTE — TELEPHONE ENCOUNTER
LOV 2/16/17    Prescription approved per [facility] Refill Protocol.

## 2017-08-16 NOTE — MR AVS SNAPSHOT
After Visit Summary   8/16/2017    Maire Vogel    MRN: [id number]           Patient Information     Date Of Birth          1980        Visit Information        Provider Department      8/16/2017 3:00 PM Delma Davis AuD M [facility] Audiology        Today's Diagnoses     Sensory hearing loss, bilateral    -  1       Follow-ups after your visit        Your next 10 appointments already scheduled     Aug 28, 2017  3:00 PM CDT   New Lifestyle Weight Management with Rosmery Cross RD   [facility] ([facility])    [address], [facility] [address]  [facility] [address]   [phone]            Aug 28, 2017  3:00 PM CDT   New Lifestyle Weight Management with Calvin Milton MD   [facility] ([facility])    [address], [facility]  [facility] [address]   [phone]            Aug 31, 2017  3:00 PM CDT   (Arrive by 2:45 PM)   Initial Review Program with Dinora Rdz [facility] Audiology ([facility])    [address] [place] [address] [phone]            Sep 12, 2017 11:00 AM CDT   LAB with EA LAB   [facility])    [address] [place] [address]  [address]  [facility] [id number] [phone]           Patient must bring picture ID. Patient should be prepared to give a urine specimen  Please do not eat 10-12 hours before your appointment if you are coming in fasting for labs on lipids, cholesterol, or glucose (sugar). Pregnant women should follow their Care Team instructions. Water with medications is okay. Do not drink coffee or other fluids. If you have concerns about taking  your medications, please ask at office or if scheduling via [email or web address], send a message by clicking on Secure Messaging, Message Your Care Team.            Sep 12, 2017 11:30 AM CDT   Return Visit with Zita Fuentes MD   [facility] ([facility])    [address]  [address]  [facility] [address]   [phone]            Sep 13, 2017  2:00 PM CDT   (Arrive by 1:45 PM)   Initial Review Program with Dinora Rdz [facility] Audiology ([facility])    [address]  [address]th [address]  [facility] [address] [phone]            Dec 04, 2017  7:30 AM CST   (Arrive by 7:15 AM)   Return Visit with MD DANIEL Underwood [facility] Heart Care ([facility])    [address]  [address]rd Floor  [facility] [id number] [phone]              Who to contact     Please call your clinic at [phone] to:    Ask questions about your health    Make or cancel appointments    Discuss your medicines    Learn about your test results    Speak to your doctor   If you have compliments or concerns about an experience at your clinic, or if you wish to file a complaint, please contact [facility] Patient Relations at [phone] or email us at [email or web address]         Additional Information About Your Visit        [employer] Information     VoiceTrustt gives you secure access to your electronic health record. If you see a primary care provider, you can also send messages to your care team and make appointments. If you have questions, please call your primary care clinic.  If you do not have a primary care provider, please call [phone] and they will assist you.      [employer] is an electronic gateway that provides easy, online access to your medical records. With [employer], you can request a clinic appointment, read your test results, renew a prescription or communicate with your care team.     To access your existing account, please contact your [facility] or call [phone] for assistance.        Care EveryWhere ID     This is your Care EveryWhere ID. This could be used by other organizations to  access your [place] medical records  [id number]         Blood Pressure from Last 3 Encounters:   05/31/17 (!) 129/97   04/17/17 133/90   04/16/17 122/80    Weight from Last 3 Encounters:   05/31/17 86.1 kg (189 lb 13.1 oz)   04/17/17 86.1 kg (189 lb 14.4 oz)   04/16/17 86.1 kg (189 lb 13.1 oz)              We Performed the Following     Hearing Aid Fitting        Primary Care Provider Office Phone # Fax #    Alan Paredes [phone] [address] [place] [address]        Equal Access to Services     [facility]: Hadii carl otero hadheather Sodelicia, waaxda luqadaha, qaybta kaalmada galen, jr peralta . So [facility] [phone].    ATENCIÓN: Si habla español, tiene a woodruff disposición servicios gratuitos de asistencia lingüística. Llame al [phone].    We comply with applicable federal civil rights laws and [place] laws. We do not discriminate on the basis of race, color, national origin, age, disability sex, sexual orientation or gender identity.            Thank you!     Thank you for choosing [facility] AUDIOLOGY  for your care. Our goal is always to provide you with excellent care. Hearing back from our patients is one way we can continue to improve our services. Please take a few minutes to complete the written survey that you may receive in the mail after your visit with us. Thank you!             Your Updated Medication List - Protect others around you: Learn how to safely use, store and throw away your medicines at www.disposemymeds.org.          This list is accurate as of: 8/16/17  7:21 PM.  Always use your most recent med list.                   Brand Name Dispense Instructions for use Diagnosis    acetone (Urine) test Strp     1 strip    1 strip by In Vitro route daily as needed.    Type 2 diabetes, HbA1c goal < 7% (H)       ADVIL 200 MG tablet   Generic drug:  ibuprofen      Take 200 mg by mouth every 4 hours as needed.         albuterol 108 (90 BASE) MCG/ACT Inhaler    PROAIR HFA/PROVENTIL HFA/VENTOLIN HFA    1 Inhaler    Inhale 2 puffs into the lungs every 4 hours as needed for shortness of breath / dyspnea or wheezing    Acute bronchitis with symptoms > 10 days       aspirin 81 MG tablet     90 tablet    Take 1 tablet (81 mg) by mouth daily    PFO (patent foramen ovale)       PEPE CONTOUR NEXT test strip   Generic drug:  blood glucose monitoring     120 each    Check 4 times/day    Type 2 diabetes mellitus with diabetic nephropathy, with long-term current use of insulin (H)       blood glucose monitoring lancets     100 each    1 Device See Admin Instructions. Use up to 5 times daily for blood sugar checks.    Type 2 diabetes, HbA1c goal < 7% (H)       blood glucose monitoring meter device kit    no brand specified    1 kit    Use to test blood sugar 6 times daily and as needed.    Type 2 diabetes mellitus with diabetic nephropathy (H)       cetirizine 10 MG tablet    zyrTEC    90 tablet    Take 10 mg by mouth 2 times daily        EPINEPHrine 0.3 MG/0.3ML injection 2-pack    EPIPEN/ADRENACLICK/or ANY BX GENERIC EQUIV    0.3 mL    Inject 0.3 mLs (0.3 mg) into the muscle once as needed for anaphylaxis    Nut allergy       ergocalciferol 77002 UNITS capsule    ERGOCALCIFEROL    14 capsule    Take 1 capsule (50,000 Units) by mouth once a week    Vitamin D deficiency, Microalbuminuria       insulin aspart 100 UNIT/ML injection    NovoLOG PEN    3 Month    With insulin pump. About 80 units/day. Vial.    Type 2 diabetes mellitus with diabetic nephropathy, with long-term current use of insulin (H)       * insulin cartridge misc pump supply     30 each    Change every 3 days1 applicator every 72 hours Change every 3 days    Type 2 diabetes mellitus with diabetic nephropathy, with long-term current use of insulin (H)       * infusion set misc pump supply     30 each    Change every 3 days as directed    Type 2 diabetes mellitus with diabetic  nephropathy, with long-term current use of insulin (H)       insulin pump infusion      Updated 1/27/17: [employer] Minimed: Model 723 BASAL: 00:00: 1.0 units/hr 10:30: 0.95 14:00: 1.05 CARB RATIO: 00:00 1:7 1600  1:8 C. Factor: 27 mg/dL BLOOD GLUCOSE TARGET and times: 12   AM (midnight):  Active Insulin Time:  3 hours Basal to Bolus Ratio:  Sensor:  No Carelink / Diasend username:  [email or web address] Carelink / Diasend Password:  [email or web address]    Type 2 diabetes mellitus with diabetic nephropathy, with long-term current use of insulin (H)       losartan 50 MG tablet    COZAAR    135 tablet    Take 1 tab ( 50 mg)  qam and 1/2 tab ( 25 mg)  q pm    Microalbuminuria       metFORMIN 500 MG 24 hr tablet    GLUCOPHAGE-XR    360 tablet    Take 2 tablets (1,000 mg) by mouth 2 times daily (with meals)    Type 2 diabetes mellitus with diabetic nephropathy, with long-term current use of insulin (H)       montelukast 10 MG tablet    SINGULAIR    90 tablet    Take 1 tablet (10 mg) by mouth At Bedtime    Allergic rhinitis       MULTI VITAMIN/MINERALS PO      Take 1 each by mouth daily.        mupirocin 2 % cream    BACTROBAN    15 g    Apply topically 3 times daily    Impetigo       omeprazole 40 MG capsule    priLOSEC    90 capsule    Take 1 capsule (40 mg) by mouth daily    Gastroesophageal reflux disease without esophagitis, Hiatal hernia       * order for DME     100 each    Equipment being ordered: skin prep wipes    Type 2 diabetes, HbA1c goal < 7% (H)       * order for DME     1 Units    Equipment being ordered: post-op shoe    Foot sprain, left, initial encounter       simvastatin 10 MG tablet    ZOCOR    90 tablet    Take 1 tablet (10 mg) by mouth every morning    Hyperlipidemia LDL goal <100       SUMAtriptan 25 MG tablet    IMITREX    8 tablet    Take 25-100mg one time. May repeat 25mg every two hours up to a maximum of 200mg in 24 hours.    Other migraine without status migrainosus, not intractable       * Notice:  This list  has 4 medication(s) that are the same as other medications prescribed for you. Read the directions carefully, and ask your doctor or other care provider to review them with you.

## 2017-08-26 ENCOUNTER — MYC MEDICAL ADVICE (OUTPATIENT)
Dept: ENDOCRINOLOGY | Facility: CLINIC | Age: 37
End: 2017-08-26

## 2017-08-31 ENCOUNTER — OFFICE VISIT (OUTPATIENT)
Dept: AUDIOLOGY | Facility: CLINIC | Age: 37
End: 2017-08-31

## 2017-08-31 NOTE — PROGRESS NOTES
"AUDIOLOGY REPORT    BACKGROUND INFORMATION: Marie Vogel was seen in the Audiology Clinic at the [facility] on 8/31/2017 for follow-up. The patient has been seen previously in this clinic on 7/13/2017 and results revealed bilateral mild to moderately-severe sensorineural hearing loss. The patient was fit with Widex Beyond 330 Fusion behind-the-ear hearing aids on 8/16/2017.  The patient reports that fan and wind noise have been too loud with the hearing aids, and can drown out speech at times. In other quiet situations, she feels that she has been hearing very well with the hearing aids. She also reports that she has switched the left hearing aid to a tulip dome instead of a double bass dome because the double bass dome caused her ear to feel plugged.    TEST RESULTS AND PROCEDURES: A first follow-up was performed. The following adjustments were made to the advanced features of the hearing aid: Comfort balance was reduced to step 2 (two steps toward increased comfort), TrueSound impulse management was increased to True Sound plus, and the Speech in Noise setting was changed from \"speech enhancer\" to \"comfort in noise.\" Noise management for soft sounds remained at maximum, as programmed at her previous appointment. Additionally, gain for the low frequencies was reduced by 3 dB. The patient reported a decrease in fan noise and liking the new sound.We also breifly discussed that she may need to think about custom earmolds.    SUMMARY AND RECOMMENDATIONS: A hearing aid check was performed today and the patient reports that she has been hearing well in quiet situations but has been hearing too much fan and wind noise overall.  Adjustments were made as noted above and the patient will return as needed to address any concerns. Please call this clinic with questions regarding today s visit.    CHRISSY Larry.  Audiology Doctoral Extern, #[id number]    I was present with the patient for the " entire Audiology appointment including all procedures/testing performed by the AuD student, and agree with the student s assessment and plan as documented.      Jamil Kwan., [facility]  Licensed Audiologist  [place] #[id number]

## 2017-08-31 NOTE — MR AVS SNAPSHOT
After Visit Summary   8/31/2017    Marie Vogel    MRN: [id number]           Patient Information     Date Of Birth          1980        Visit Information        Provider Department      8/31/2017 3:00 PM Delma Davis AuD M [facility] Audiology        Today's Diagnoses     Asymmetrical sensorineural hearing loss of both ears    -  1       Follow-ups after your visit        Your next 10 appointments already scheduled     Sep 12, 2017 11:00 AM CDT   LAB with EA LAB   [facility] ([facility])    [address] [place] [address]  [address]  [facility] [id number] [phone]           Patient must bring picture ID. Patient should be prepared to give a urine specimen  Please do not eat 10-12 hours before your appointment if you are coming in fasting for labs on lipids, cholesterol, or glucose (sugar). Pregnant women should follow their Care Team instructions. Water with medications is okay. Do not drink coffee or other fluids. If you have concerns about taking  your medications, please ask at office or if scheduling via [email or web address], send a message by clicking on Secure Messaging, Message Your Care Team.            Sep 12, 2017 11:30 AM CDT   [facility] with Zita Fuentes [facility]   [facility] ([facility])    [address] [place] [address]  [address]  [facility] [address] [phone]            Sep 13, 2017  2:00 PM CDT   (Arrive by 1:45 PM)   Initial Review Program with Dinora Rdz [facility] Audiology ([facility])    [address] [place] [address]  4th [facility] [address] [phone]            Dec 04, 2017  7:30 AM CST   (Arrive by 7:15 AM)   Return Visit with MD DANIEL Underwood [facility] Heart Care ([facility])    [address] [place] [address]  3rd [facility] [address] [phone]              Who to contact     Please call your clinic at [phone] to:    Ask questions about  your health    Make or cancel appointments    Discuss your medicines    Learn about your test results    Speak to your doctor   If you have compliments or concerns about an experience at your clinic, or if you wish to file a complaint, please contact [facility] Patient Relations at [phone] or email us at [email or web address]         Additional Information About Your Visit        MyChart Information     [employer] gives you secure access to your electronic health record. If you see a primary care provider, you can also send messages to your care team and make appointments. If you have questions, please call your primary care clinic.  If you do not have a primary care provider, please call [phone] and they will assist you.      "[employer] is an electronic gateway that provides easy, online access to your medical records. With "[employer] you can request a clinic appointment, read your test results, renew a prescription or communicate with your care team.     To access your existing account, please contact your [facility] or call [phone] for assistance.        Care EveryWhere ID     This is your Care EveryWhere ID. This could be used by other organizations to access your [place] medical records  [id number]         Blood Pressure from Last 3 Encounters:   05/31/17 (!) 129/97   04/17/17 133/90   04/16/17 122/80    Weight from Last 3 Encounters:   05/31/17 86.1 kg (189 lb 13.1 oz)   04/17/17 86.1 kg (189 lb 14.4 oz)   04/16/17 86.1 kg (189 lb 13.1 oz)              We Performed the Following     No Charge, Hearing Aid Clinic Visit        Primary Care Provider Office Phone # Fax #    Alan Paredes [phone] [address] [place] [address]        Equal Access to Services     KO PUGA : Hema Weber, kj mondragon, jr de leon  ah. So [facility] [phone].    ATENCIÓN: Si monique whipple, tiene a woodruff disposición servicios gratuitos de asistencia lingüística. Tiffanie harris [phone].    We comply with applicable federal civil rights laws and [place] laws. We do not discriminate on the basis of race, color, national origin, age, disability sex, sexual orientation or gender identity.            Thank you!     Thank you for choosing [facility] AUDIOLOGY  for your care. Our goal is always to provide you with excellent care. Hearing back from our patients is one way we can continue to improve our services. Please take a few minutes to complete the written survey that you may receive in the mail after your visit with us. Thank you!             Your Updated Medication List - Protect others around you: Learn how to safely use, store and throw away your medicines at www.disposemymeds.org.          This list is accurate as of: 8/31/17 11:59 PM.  Always use your most recent med list.                   Brand Name Dispense Instructions for use Diagnosis    acetone (Urine) test Strp     1 strip    1 strip by In Vitro route daily as needed.    Type 2 diabetes, HbA1c goal < 7% (H)       ADVIL 200 MG tablet   Generic drug:  ibuprofen      Take 200 mg by mouth every 4 hours as needed.        albuterol 108 (90 BASE) MCG/ACT Inhaler    PROAIR HFA/PROVENTIL HFA/VENTOLIN HFA    1 Inhaler    Inhale 2 puffs into the lungs every 4 hours as needed for shortness of breath / dyspnea or wheezing    Acute bronchitis with symptoms > 10 days       aspirin 81 MG tablet     90 tablet    Take 1 tablet (81 mg) by mouth daily    PFO (patent foramen ovale)       PEPE CONTOUR NEXT test strip   Generic drug:  blood glucose monitoring     120 each    Check 4 times/day    Type 2 diabetes mellitus with diabetic nephropathy, with long-term current use of insulin (H)       blood glucose monitoring lancets     100 each    1 Device See Admin Instructions. Use up to 5 times daily for blood sugar  checks.    Type 2 diabetes, HbA1c goal < 7% (H)       blood glucose monitoring meter device kit    no brand specified    1 kit    Use to test blood sugar 6 times daily and as needed.    Type 2 diabetes mellitus with diabetic nephropathy (H)       cetirizine 10 MG tablet    zyrTEC    90 tablet    Take 10 mg by mouth 2 times daily        EPINEPHrine 0.3 MG/0.3ML injection 2-pack    EPIPEN/ADRENACLICK/or ANY BX GENERIC EQUIV    0.3 mL    Inject 0.3 mLs (0.3 mg) into the muscle once as needed for anaphylaxis    Nut allergy       ergocalciferol 69088 UNITS capsule    ERGOCALCIFEROL    14 capsule    Take 1 capsule (50,000 Units) by mouth once a week    Vitamin D deficiency, Microalbuminuria       insulin aspart 100 UNIT/ML injection    NovoLOG PEN    3 Month    With insulin pump. About 80 units/day. Vial.    Type 2 diabetes mellitus with diabetic nephropathy, with long-term current use of insulin (H)       * insulin cartridge misc pump supply     30 each    Change every 3 days1 applicator every 72 hours Change every 3 days    Type 2 diabetes mellitus with diabetic nephropathy, with long-term current use of insulin (H)       * infusion set misc pump supply     30 each    Change every 3 days as directed    Type 2 diabetes mellitus with diabetic nephropathy, with long-term current use of insulin (H)       insulin pump infusion      Updated 1/27/17: Medtronic MinimWundrbar: Model 723 BASAL: 00:00: 1.0 units/hr 10:30: 0.95 14:00: 1.05 CARB RATIO: 00:00 1:7 1600  1:8 C. Factor: 27 mg/dL BLOOD GLUCOSE TARGET and times: 12   AM (midnight):  Active Insulin Time:  3 hours Basal to Bolus Ratio:  Sensor:  No Carelink / Diasend username:  [email or web address] Carelink / Diasend Password:  [email or web address]    Type 2 diabetes mellitus with diabetic nephropathy, with long-term current use of insulin (H)       losartan 50 MG tablet    COZAAR    135 tablet    Take 1 tab ( 50 mg)  qam and 1/2 tab ( 25 mg)  q pm    Microalbuminuria       metFORMIN 500 MG 24 hr  tablet    GLUCOPHAGE-XR    360 tablet    Take 2 tablets (1,000 mg) by mouth 2 times daily (with meals)    Type 2 diabetes mellitus with diabetic nephropathy, with long-term current use of insulin (H)       montelukast 10 MG tablet    SINGULAIR    90 tablet    Take 1 tablet (10 mg) by mouth At Bedtime    Allergic rhinitis       MULTI VITAMIN/MINERALS PO      Take 1 each by mouth daily.        mupirocin 2 % cream    BACTROBAN    15 g    Apply topically 3 times daily    Impetigo       omeprazole 40 MG capsule    priLOSEC    90 capsule    Take 1 capsule (40 mg) by mouth daily    Gastroesophageal reflux disease without esophagitis, Hiatal hernia       * order for DME     100 each    Equipment being ordered: skin prep wipes    Type 2 diabetes, HbA1c goal < 7% (H)       * order for DME     1 Units    Equipment being ordered: post-op shoe    Foot sprain, left, initial encounter       simvastatin 10 MG tablet    ZOCOR    90 tablet    Take 1 tablet (10 mg) by mouth every morning    Hyperlipidemia LDL goal <100       SUMAtriptan 25 MG tablet    IMITREX    8 tablet    Take 25-100mg one time. May repeat 25mg every two hours up to a maximum of 200mg in 24 hours.    Other migraine without status migrainosus, not intractable       * Notice:  This list has 4 medication(s) that are the same as other medications prescribed for you. Read the directions carefully, and ask your doctor or other care provider to review them with you.

## 2017-09-13 ENCOUNTER — OFFICE VISIT (OUTPATIENT)
Dept: AUDIOLOGY | Facility: CLINIC | Age: 37
End: 2017-09-13

## 2017-09-13 DIAGNOSIS — H90.3 SENSORY HEARING LOSS, BILATERAL: Primary | ICD-10-CM

## 2017-09-13 NOTE — PROGRESS NOTES
AUDIOLOGY REPORT    BACKGROUND INFORMATION: Marie Vogel was seen in the Audiology Clinic at the [facility] on 9/13/2017 for follow-up.  The patient has been seen previously in this clinic on 7/13/2017 and results revealed right normal sloping to moderate sensorineural hearing loss, and left mild sloping to moderately-severe sensorineural hearing loss.  The patient was fit with Widex Beyond 330 Fusion behind-the-ear hearing aids on 8/16/2017.  The patient reports that the right hearing aid sounds louder than the left, and that she has been turning down the base and mids in her iPhone mathew to decrease the annoying hum that she hears.    TEST RESULTS AND PROCEDURES:   Adjustments were made including the overall gain in the right hearing aid was decreased by 3 dB. Additionally, the low and mid frequency gain for soft and moderate input levels was decreased by 2-4 dB.  The patient trialed the new settings by walking out into the lobby area, and reported feeling more balanced between the ears, and the low level hum was decreased. No charge visit, pt is in warranty.    SUMMARY AND RECOMMENDATIONS: A hearing aid follow-up was performed today and the patient reports good sound quality.  Adjustments were made as noted above and the patient will return as needed or at least every 4-6 months for cleaning and assessment of hearing aid.  Call this clinic with questions regarding today s visit.    Mariposa Dobbins M.A.  Audiology Doctoral Extern, #[id number]    I was present with the patient for the entire Audiology appointment including all procedures/testing performed by the AuD student, and agree with the student s assessment and plan as documented.      Clovis Kwan, [facility]-A  Licensed Audiologist  [place] #[id number]

## 2017-09-13 NOTE — MR AVS SNAPSHOT
After Visit Summary   9/13/2017    Marie Vogel    MRN: [id number]           Patient Information     Date Of Birth          1980        Visit Information        Provider Department      9/13/2017 2:00 PM Delma Davis AuD [facility] Audiology        Today's Diagnoses     Sensory hearing loss, bilateral    -  1       Follow-ups after your visit        Your next 10 appointments already scheduled     Oct 10, 2017 10:40 AM CDT   LAB with EA LAB   [facility] ([facility]    [address] [place] [address]  [address]  [facility] [address] [phone]           Patient must bring picture ID. Patient should be prepared to give a urine specimen  Please do not eat 10-12 hours before your appointment if you are coming in fasting for labs on lipids, cholesterol, or glucose (sugar). Pregnant women should follow their Care Team instructions. Water with medications is okay. Do not drink coffee or other fluids. If you have concerns about taking  your medications, please ask at office or if scheduling via [employer], send a message by clicking on Secure Messaging, Message Your Care Team.            Oct 10, 2017 11:30 AM CDT   Return Visit with Zita Fuentes MD   [facility] ([facility]    [address] [place] [address]  [address]  [employer] [facility] [address] [phone]            Dec 04, 2017  7:30 AM CST   (Arrive by 7:15 AM)   Return Visit with Melanie Dewey MD   [facility] ([facility])    [address] [place] [address] [phone]              Who to contact     Please call your clinic at [phone] to:    Ask questions about your health    Make or cancel appointments    Discuss your medicines    Learn about your test results    Speak to your doctor   If you have compliments or concerns about an experience at your clinic, or if you wish to file a complaint, please contact [facility]  [place] [facility] Patient Relations at [phone] or email us at [email or web address]         Additional Information About Your Visit        AppF[employer]hart Information     [employer] gives you secure access to your electronic health record. If you see a primary care provider, you can also send messages to your care team and make appointments. If you have questions, please call your primary care clinic.  If you do not have a primary care provider, please call [phone] and they will assist you.      [employer] is an electronic gateway that provides easy, online access to your medical records. With [employer], you can request a clinic appointment, read your test results, renew a prescription or communicate with your care team.     To access your existing account, please contact your [facility] or call [phone] for assistance.        Care EveryWhere ID     This is your Care EveryWhere ID. This could be used by other organizations to access your [place] medical records  [id number]         Blood Pressure from Last 3 Encounters:   05/31/17 (!) 129/97   04/17/17 133/90   04/16/17 122/80    Weight from Last 3 Encounters:   05/31/17 86.1 kg (189 lb 13.1 oz)   04/17/17 86.1 kg (189 lb 14.4 oz)   04/16/17 86.1 kg (189 lb 13.1 oz)              We Performed the Following     No Charge, Hearing Aid Clinic Visit        Primary Care Provider Office Phone # Fax #    Alan Paredes -[phone] [address] [place] [address]        Equal Access to Services     KO PUGA : Hadii aad ku hadasho Soomaali, waaxda luqadaha, qaybta kaalmada adeegyada, jr pickard. So [facility] [phone].    ATENCIÓN: Si habla español, tiene a woodruff disposición servicios gratuitos de asistencia lingüística. Llame al [phone].    We comply with applicable federal civil rights laws and [place] laws. We do not discriminate on the basis of  race, color, national origin, age, disability sex, sexual orientation or gender identity.            Thank you!     Thank you for choosing [facility] AUDIOLOGY  for your care. Our goal is always to provide you with excellent care. Hearing back from our patients is one way we can continue to improve our services. Please take a few minutes to complete the written survey that you may receive in the mail after your visit with us. Thank you!             Your Updated Medication List - Protect others around you: Learn how to safely use, store and throw away your medicines at www.disposemymeds.org.          This list is accurate as of: 9/13/17  3:12 PM.  Always use your most recent med list.                   Brand Name Dispense Instructions for use Diagnosis    acetone (Urine) test Strp     1 strip    1 strip by In Vitro route daily as needed.    Type 2 diabetes, HbA1c goal < 7% (H)       ADVIL 200 MG tablet   Generic drug:  ibuprofen      Take 200 mg by mouth every 4 hours as needed.        albuterol 108 (90 BASE) MCG/ACT Inhaler    PROAIR HFA/PROVENTIL HFA/VENTOLIN HFA    1 Inhaler    Inhale 2 puffs into the lungs every 4 hours as needed for shortness of breath / dyspnea or wheezing    Acute bronchitis with symptoms > 10 days       aspirin 81 MG tablet     90 tablet    Take 1 tablet (81 mg) by mouth daily    PFO (patent foramen ovale)       PEPE CONTOUR NEXT test strip   Generic drug:  blood glucose monitoring     120 each    Check 4 times/day    Type 2 diabetes mellitus with diabetic nephropathy, with long-term current use of insulin (H)       blood glucose monitoring lancets     100 each    1 Device See Admin Instructions. Use up to 5 times daily for blood sugar checks.    Type 2 diabetes, HbA1c goal < 7% (H)       blood glucose monitoring meter device kit    no brand specified    1 kit    Use to test blood sugar 6 times daily and as needed.    Type 2 diabetes mellitus with diabetic nephropathy (H)       cetirizine 10  MG tablet    zyrTEC    90 tablet    Take 10 mg by mouth 2 times daily        EPINEPHrine 0.3 MG/0.3ML injection 2-pack    EPIPEN/ADRENACLICK/or ANY BX GENERIC EQUIV    0.3 mL    Inject 0.3 mLs (0.3 mg) into the muscle once as needed for anaphylaxis    Nut allergy       ergocalciferol 89318 UNITS capsule    ERGOCALCIFEROL    14 capsule    Take 1 capsule (50,000 Units) by mouth once a week    Vitamin D deficiency, Microalbuminuria       insulin aspart 100 UNIT/ML injection    NovoLOG PEN    3 Month    With insulin pump. About 80 units/day. Vial.    Type 2 diabetes mellitus with diabetic nephropathy, with long-term current use of insulin (H)       * insulin cartridge misc pump supply     30 each    Change every 3 days1 applicator every 72 hours Change every 3 days    Type 2 diabetes mellitus with diabetic nephropathy, with long-term current use of insulin (H)       * infusion set misc pump supply     30 each    Change every 3 days as directed    Type 2 diabetes mellitus with diabetic nephropathy, with long-term current use of insulin (H)       insulin pump infusion      Updated 1/27/17: [employer]: Model 723 BASAL: 00:00: 1.0 units/hr 10:30: 0.95 14:00: 1.05 CARB RATIO: 00:00 1:7 1600  1:8 C. Factor: 27 mg/dL BLOOD GLUCOSE TARGET and times: 12   AM (midnight):  Active Insulin Time:  3 hours Basal to Bolus Ratio:  Sensor:  No Carelink / Diasend username:  [email or web address] Carelink / Diasend Password:  [email or web address]    Type 2 diabetes mellitus with diabetic nephropathy, with long-term current use of insulin (H)       losartan 50 MG tablet    COZAAR    135 tablet    Take 1 tab ( 50 mg)  qam and 1/2 tab ( 25 mg)  q pm    Microalbuminuria       metFORMIN 500 MG 24 hr tablet    GLUCOPHAGE-XR    360 tablet    Take 2 tablets (1,000 mg) by mouth 2 times daily (with meals)    Type 2 diabetes mellitus with diabetic nephropathy, with long-term current use of insulin (H)       montelukast 10 MG tablet    SINGULAIR    90 tablet     Take 1 tablet (10 mg) by mouth At Bedtime    Allergic rhinitis       MULTI VITAMIN/MINERALS PO      Take 1 each by mouth daily.        mupirocin 2 % cream    BACTROBAN    15 g    Apply topically 3 times daily    Impetigo       omeprazole 40 MG capsule    priLOSEC    90 capsule    Take 1 capsule (40 mg) by mouth daily    Gastroesophageal reflux disease without esophagitis, Hiatal hernia       * order for DME     100 each    Equipment being ordered: skin prep wipes    Type 2 diabetes, HbA1c goal < 7% (H)       * order for DME     1 Units    Equipment being ordered: post-op shoe    Foot sprain, left, initial encounter       simvastatin 10 MG tablet    ZOCOR    90 tablet    Take 1 tablet (10 mg) by mouth every morning    Hyperlipidemia LDL goal <100       SUMAtriptan 25 MG tablet    IMITREX    8 tablet    Take 25-100mg one time. May repeat 25mg every two hours up to a maximum of 200mg in 24 hours.    Other migraine without status migrainosus, not intractable       * Notice:  This list has 4 medication(s) that are the same as other medications prescribed for you. Read the directions carefully, and ask your doctor or other care provider to review them with you.

## 2017-10-19 ENCOUNTER — DOCUMENTATION ONLY (OUTPATIENT)
Dept: LAB | Facility: CLINIC | Age: 37
End: 2017-10-19

## 2017-10-19 DIAGNOSIS — Z79.4 TYPE 2 DIABETES MELLITUS WITH DIABETIC NEPHROPATHY, WITH LONG-TERM CURRENT USE OF INSULIN (H): Primary | ICD-10-CM

## 2017-10-19 DIAGNOSIS — E11.21 TYPE 2 DIABETES MELLITUS WITH DIABETIC NEPHROPATHY, WITH LONG-TERM CURRENT USE OF INSULIN (H): Primary | ICD-10-CM

## 2017-10-24 ENCOUNTER — TELEPHONE (OUTPATIENT)
Dept: ENDOCRINOLOGY | Facility: CLINIC | Age: 37
End: 2017-10-24

## 2017-10-24 ENCOUNTER — OFFICE VISIT (OUTPATIENT)
Dept: ENDOCRINOLOGY | Facility: CLINIC | Age: 37
End: 2017-10-24
Payer: COMMERCIAL

## 2017-10-24 VITALS
BODY MASS INDEX: 34.34 KG/M2 | HEART RATE: 94 BPM | OXYGEN SATURATION: 98 % | TEMPERATURE: 98.3 F | SYSTOLIC BLOOD PRESSURE: 120 MMHG | WEIGHT: 193.8 LBS | DIASTOLIC BLOOD PRESSURE: 80 MMHG | HEIGHT: 63 IN

## 2017-10-24 DIAGNOSIS — E78.5 HYPERLIPIDEMIA LDL GOAL <100: ICD-10-CM

## 2017-10-24 DIAGNOSIS — Z79.4 TYPE 2 DIABETES MELLITUS WITH DIABETIC NEPHROPATHY, WITH LONG-TERM CURRENT USE OF INSULIN (H): Primary | ICD-10-CM

## 2017-10-24 DIAGNOSIS — E55.9 VITAMIN D DEFICIENCY: ICD-10-CM

## 2017-10-24 DIAGNOSIS — E11.21 TYPE 2 DIABETES MELLITUS WITH DIABETIC NEPHROPATHY, WITH LONG-TERM CURRENT USE OF INSULIN (H): Primary | ICD-10-CM

## 2017-10-24 DIAGNOSIS — E11.21 TYPE 2 DIABETES MELLITUS WITH DIABETIC NEPHROPATHY, WITH LONG-TERM CURRENT USE OF INSULIN (H): ICD-10-CM

## 2017-10-24 DIAGNOSIS — G43.809 OTHER MIGRAINE WITHOUT STATUS MIGRAINOSUS, NOT INTRACTABLE: ICD-10-CM

## 2017-10-24 DIAGNOSIS — R80.9 MICROALBUMINURIA: ICD-10-CM

## 2017-10-24 DIAGNOSIS — Z79.4 TYPE 2 DIABETES MELLITUS WITH DIABETIC NEPHROPATHY, WITH LONG-TERM CURRENT USE OF INSULIN (H): ICD-10-CM

## 2017-10-24 LAB
ALBUMIN SERPL-MCNC: 3.4 G/DL (ref 3.4–5)
ALP SERPL-CCNC: 84 U/L (ref 40–150)
ALT SERPL W P-5'-P-CCNC: 42 U/L (ref 0–50)
ANION GAP SERPL CALCULATED.3IONS-SCNC: 9 MMOL/L (ref 3–14)
AST SERPL W P-5'-P-CCNC: 27 U/L (ref 0–45)
BILIRUB SERPL-MCNC: 0.8 MG/DL (ref 0.2–1.3)
BUN SERPL-MCNC: 17 MG/DL (ref 7–30)
CALCIUM SERPL-MCNC: 8.8 MG/DL (ref 8.5–10.1)
CHLORIDE SERPL-SCNC: 105 MMOL/L (ref 94–109)
CHOLEST SERPL-MCNC: 192 MG/DL
CO2 SERPL-SCNC: 27 MMOL/L (ref 20–32)
CREAT SERPL-MCNC: 0.81 MG/DL (ref 0.52–1.04)
CREAT UR-MCNC: 129 MG/DL
GFR SERPL CREATININE-BSD FRML MDRD: 78 ML/MIN/1.7M2
GLUCOSE SERPL-MCNC: 129 MG/DL (ref 70–99)
HBA1C MFR BLD: 8.5 % (ref 4.3–6)
HDLC SERPL-MCNC: 47 MG/DL
LDLC SERPL CALC-MCNC: 100 MG/DL
MICROALBUMIN UR-MCNC: 1510 MG/L
MICROALBUMIN/CREAT UR: 1170.54 MG/G CR (ref 0–25)
NONHDLC SERPL-MCNC: 145 MG/DL
POTASSIUM SERPL-SCNC: 4.5 MMOL/L (ref 3.4–5.3)
PROT SERPL-MCNC: 6.5 G/DL (ref 6.8–8.8)
SODIUM SERPL-SCNC: 141 MMOL/L (ref 133–144)
TRIGL SERPL-MCNC: 227 MG/DL

## 2017-10-24 PROCEDURE — 36415 COLL VENOUS BLD VENIPUNCTURE: CPT | Performed by: INTERNAL MEDICINE

## 2017-10-24 PROCEDURE — 80061 LIPID PANEL: CPT | Performed by: INTERNAL MEDICINE

## 2017-10-24 PROCEDURE — 82043 UR ALBUMIN QUANTITATIVE: CPT | Performed by: INTERNAL MEDICINE

## 2017-10-24 PROCEDURE — 83036 HEMOGLOBIN GLYCOSYLATED A1C: CPT | Performed by: INTERNAL MEDICINE

## 2017-10-24 PROCEDURE — 80053 COMPREHEN METABOLIC PANEL: CPT | Performed by: INTERNAL MEDICINE

## 2017-10-24 PROCEDURE — 99214 OFFICE O/P EST MOD 30 MIN: CPT | Performed by: INTERNAL MEDICINE

## 2017-10-24 RX ORDER — SUMATRIPTAN 25 MG/1
TABLET, FILM COATED ORAL
Qty: 8 TABLET | Refills: 6 | Status: SHIPPED | OUTPATIENT
Start: 2017-10-24 | End: 2019-02-05

## 2017-10-24 RX ORDER — ERGOCALCIFEROL 1.25 MG/1
50000 CAPSULE ORAL WEEKLY
Qty: 14 CAPSULE | Refills: 0 | Status: SHIPPED | OUTPATIENT
Start: 2017-10-24 | End: 2018-02-17

## 2017-10-24 NOTE — PATIENT INSTRUCTIONS
[facility]   Dr Fuentes, Endocrinology Department      [facility]   [address] [address]  Appointment [phone]  Fax: [phone]   Monday and Tuesday         [facility]   [address] [facility] [place] [address]  Appointment [phone]  Fax: [phone]  Wednesday and Thursday           Labs in 3 months  Please make a lab appointment for blood work and follow up clinic appointment in 1 week after that to discuss results.    Recommend checking blood sugars before meals and at bedtime.    If Blood glucose are low more often-> 2-3 times/week- give us a call.  The patient is advised to Make better food choices: reduce carbs, Reduce portion size, weight loss and exercise 3-4 times a week.  Discussed hypoglycemia signs and symptoms as well as management in detail.

## 2017-10-24 NOTE — PROGRESS NOTES
Name: Marie Vogel  Seen for f/u of DM  HPI:  Marie Vogel is a 37 year old female who presents for the evaluation/management of DM.  Had CGM in 1/2017.  On pump  Medtronic Minimed Paradigm+ CGM though not using sensors currently.    Here for f/u. Previously has seen endo at [place] ( Dr Aguilera and Dr Zhou and ). 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 Concerned about weight gain.   H/o cardiomyopathy. Followed by cardiology.    Had rough few weeks. Daughter is ill and was in ER.  Not much family support.    1. Type 2 DM:  Orginally diagnosed at the age of: 21 with GDM during her first pregnancy. Diagnosed with DM 2 in 1/2002, diet controlled for 3 years, then started on metformin. In 2010 during her second pregnancy, started on insulin and then insulin pump therapy in 2012.   Current Regimen: Insulin pump therapy ( Medtronic Minimed Paradigm), metformin XR 1000 mg bid  BS checks: 4-5 times per day  Average Meter Download: 204.  Blood sugars are high almost most of the time.  No major episodes of hypoglycemia noted.    Current Regimen:   Insulin pump -   Time Rate (U/hr)   0000-10.30 1.00   0154-8999 0.950   4581-6590 1.05     Carbohydrate Ratio -    Time Ratio   2709-0986 7   3682-7867 8     Sensitivity   27   Active Insulin Time  3 hours   Basal  46%   Bolus   54%   Total Carbohydrates/day  170   Total Insulin/day  52   Average Blood Sugar 204   BS Checks    Care Link Username-   Password-         Complications:   Diabetes Complications  Description / Detail    Diabetic Retinopathy  No   CAD / PAD  No   Neuropathy  No   Nephropathy / Microalbuminuria  Yes: microalbuminuria. ON cozaar.   Gastroparesis  No   Hypoglycemia [place]  No     2. Hypertension: Blood Pressure today:   BP Readings from Last 3 Encounters:   10/24/17 120/80   05/31/17 (!) 129/97   04/17/17 133/90     Takes medications everyday without forgetting a dose.  Denies feeling  lightheaded or dizzy.    3. Hyperlipidemia:   Denies muscle aches of pains.       PMH/PSH:  Past Medical History:   Diagnosis Date     Allergic rhinitis due to pollen      Atypical glandular cells on Pap smear 3/1108     Gastroesophageal reflux disease      PFO (patent foramen ovale)      Type II or unspecified type diabetes mellitus without mention of complication, not stated as uncontrolled     onset was gestational in      Past Surgical History:   Procedure Laterality Date     C INDUCED ABORTN BY D&C           C IUD,MIRENA  8/10/10, 7/28/15     C/SECTION, LOW TRANSVERSE  6/25/10    , Low Transverse     CHOLECYSTECTOMY, LAPOROSCOPIC      Cholecystectomy, Laparoscopic     ESOPHAGOSCOPY, GASTROSCOPY, DUODENOSCOPY (EGD), COMBINED N/A 2016    Procedure: COMBINED ESOPHAGOSCOPY, GASTROSCOPY, DUODENOSCOPY (EGD), BIOPSY SINGLE OR MULTIPLE;  Surgeon: Fadi Hunter MD;  Location: [facility] ESOPHAGEAL MOTILITY STUDY N/A 2016    Procedure: ESOPHAGEAL MOTILITY STUDY;  Surgeon: Fadi Hunter MD;  Location: [facility] REMOVE TONSILS/ADENOIDS,<11 Y/O      T &A     Family Hx:  Family History   Problem Relation Age of Onset     Cardiovascular Mother      hypertrophic cardiomyopathy     Genitourinary Problems Mother      gallbladder disease     DIABETES Mother      drug induced     Other - See Comments Mother      heart transplant 2005     DIABETES Father      type 2     Hypertension Father      Genitourinary Problems Maternal Grandmother      gallbladder disease     Genitourinary Problems Paternal Grandmother      gallbladder disease     Hypertension Paternal Grandfather      high bp     CEREBROVASCULAR DISEASE Paternal Grandfather      Cardiovascular Brother      hypertrophic cardiomyopathy     DIABETES Brother      type 2     Thyroid disease: No         DM2: Yes - father and mother         Autoimmune: DM1, SLE, RA, Vitiligo No    Social Hx:  Social History      Social History     Marital status:      Spouse name: N/A     Number of children: 2     Years of education: 14     Occupational History     nurse      Social History Main Topics     Smoking status: Former Smoker     Types: Cigarettes     Quit date: 10/24/2005     Smokeless tobacco: Former User      Comment: States only smokes when drinks maybe 2x/year     Alcohol use No     Drug use: No     Sexual activity: Yes     Partners: Male     Birth control/ protection: IUD      Comment: Mirena IUD 7/28/15     Other Topics Concern     Not on file     Social History Narrative    Caffeine intake/servings daily - 6-7    Calcium intake/servings daily - 2-3 yogurt, milk, cheese    Exercise 1 times weekly - describe aerobics    Sunscreen used - Yes    Seatbelts used - Yes    Guns stored in the home - No    Self Breast Exam - Yes    Pap test up to date -  Yes    Eye exam up to date -  Yes    Dental exam up to date -  Yes    DEXA scan up to date -  Not Applicable    Flex Sig/Colonoscopy up to date -  Not Applicable    Mammography up to date -  Not Applicable    Immunizations reviewed and up to date - Yes    Abuse: Current or Past (Physical, Sexual or Emotional) - No    Do you feel safe in your environment - Yes    Do you cope well with stress - Yes    Do you suffer from insomnia - Yes     Last updated by: Tatianna Lacey  5/9/2005              MEDICATIONS:  has a current medication list which includes the following prescription(s): ergocalciferol, alina contour next, insulin aspart, montelukast, losartan, metformin, omeprazole, simvastatin, mupirocin, epinephrine, sumatriptan, order for dme, insulin pump, insulin cartridge, infusion set, albuterol, blood glucose monitoring, order for dme, aspirin, cetirizine, multiple vitamins-minerals, acetone (urine) test, ibuprofen, and blood glucose monitoring.    ROS     ROS: 10 point ROS neg other than the symptoms noted above in the HPI.    Physical Exam   VS: /80  Pulse 94   "Temp 98.3  F (36.8  C) (Oral)  Ht 1.6 m (5' 3\")  Wt 87.9 kg (193 lb 12.8 oz)  SpO2 98%  BMI 34.33 kg/m2    GENERAL: AXOX3, NAD, well dressed, answering questions appropriately, appears stated age.  GENERAL: NAD, well dressed, answering questions appropriately, appears stated age.  HEENT: no exopthalmous, no proptosis, EOMI, no lig lag, no retraction, no scleral icterus  RESPIRATORY: Normal respiratory effort.  CARDIOVASCULAR: No peripheral edema.  Abdo: +BS  NEUROLOGY: No tremors  MSK: Normal gait and station.  PSYCH: Intact judgment and insight. A&OX3 with a cordial affect.    LABS:  A1c:   Lab Results   Component Value Date    A1C 7.2 04/17/2017    A1C 7.2 12/27/2016    A1C 7.1 09/27/2016    A1C 7.3 02/22/2016    A1C 8.2 11/24/2015       Basic Metabolic Panel:  Last Basic Metabolic Panel:  NA      141   3/12/2016   POTASSIUM      3.6   3/12/2016  CHLORIDE      107   3/12/2016  BILLY      8.7   3/12/2016  CO2       27   3/12/2016  BUN       15   3/12/2016  CR     0.73   3/12/2016  GLC      103   3/12/2016        LFTS/Cholesterol Panel:  Recent Labs   Lab Test  09/28/16   0811  07/01/15   0612  03/27/14   1022   CHOL  162  190  146   HDL  45*  42*  32*   LDL  98  96  77   TRIG  99  260*  186*   CHOLHDLRATIO   --   4.5  4.5     Liver Function Studies -   Recent Labs   Lab Test  03/12/16   0003   PROTTOTAL  6.6*   ALBUMIN  3.4   BILITOTAL  0.3   ALKPHOS  70   AST  21   ALT  39           Thyroid Function:  ENDO THYROID LABS-P Latest Ref Rng 9/27/2016 4/2/2014   TSH 0.40 - 4.00 mU/L 2.17 1.09   T4 FREE 0.70 - 1.85 ng/dL  0.75       Urine MicroAlbumin:  MICROL     2180   3/31/2015  MICROALBUMIN   1415.58   3/31/2015     Ref. Range 9/27/2016 16:37   Albumin Urine mg/g Cr Latest Ref Range: 0-25 mg/g Cr 1166.67 (H)   Albumin Urine mg/L Latest Units: mg/L 1400     Vitamin D:  Vitamin D Deficiency Screening Results:  Lab Results   Component Value Date    VITDT 39 09/27/2016    VITDT 45 02/23/2016    VITDT 31 03/31/2015    " VITDT 29 (L) 03/27/2014    VITDT 32 09/24/2013         All pertinent notes, labs, and images personally reviewed by me.     A/P  Ms.Erica WILLIAM Vogel is a 34 year old here for the evaluation/management of diabetes:    1. DM2 - Under Fair control.A1c 7.2%. BG in general under good control but limited data avaialble.  Continue current settings.  The patient is advised to Make better food choices: reduce carbs, Reduce portion size, weight loss and exercise 3-4 times a week.  Will benefit from continued use of Dexcom.  As BG are high throughout the day will increase basal rate as below  Rest of the settings will remain same.    Time Rate (U/hr)   0000-10.30 1.00-->1.1   9975-3793 0.950-->1.1   3656-9126 1.05-->1.15     Carbohydrate Ratio -  No change  Time Ratio   1491-6681 7   6379-5505 8     Prevention    Flu Shot- done  Pneumovax- 1/13  Opthalmology-Yes, 3/15  Dental-Yes  ASA-No  Smoking- No    2. Hypertension - Under Good control.  Blood pressure medications include cozaar 75 mg qd. Need aggressive BP/glycmeic control.    3. Hyperlipidemia - Under fair control.TG high with low HDL, LD at 98, HDL 45. Takes zocor 10 mg for lipid control.  Continue current meds.  -- consider increasing dose (goal LDL < 70)    4. Microalbuminuria:  MICROL     1400   9/27/2016  No results found for this basename: microalbumin  Recommend strict BG control.  Continue Cozaar    5. Vit D deficiency:  On high dose vit D replacement  Recheck labs with next labs        Labs ordered today:   Orders Placed This Encounter   Procedures     Hemoglobin A1c     Vitamin D Deficiency     All questions were answered.  The patient indicates understanding of the above issues and agrees with the plan set forth.       More than 50% of face to face time spent with Ms. Dino Vogel on counseling / coordinating her care.  Total appointment times was 30 minutes.      Follow-up:  follow up in 3 months    Zita Fuentes MD  Endocrinology   [facility]  Andrew/Anny    CC: Alan Paredes    Addendum to above note and clinic visit:    Labs reviewed.    See result note/telephone encounter.

## 2017-10-24 NOTE — NURSING NOTE
"Chief Complaint   Patient presents with     RECHECK     DM2, hyperlipidemia       Initial /80  Pulse 94  Temp 98.3  F (36.8  C) (Oral)  Ht 1.6 m (5' 3\")  Wt 87.9 kg (193 lb 12.8 oz)  SpO2 98%  BMI 34.33 kg/m2 Estimated body mass index is 34.33 kg/(m^2) as calculated from the following:    Height as of this encounter: 1.6 m (5' 3\").    Weight as of this encounter: 87.9 kg (193 lb 12.8 oz).  Medication Reconciliation: complete    "

## 2017-10-24 NOTE — MR AVS SNAPSHOT
After Visit Summary   10/24/2017    Marie Vogel    MRN: [id number]           Patient Information     Date Of Birth          1980        Visit Information        Provider Department      10/24/2017 11:30 AM Zita Fuentes MD [facility]        Today's Diagnoses     Type 2 diabetes mellitus with diabetic nephropathy, with long-term current use of insulin (H)    -  1    Hyperlipidemia LDL goal <100        Vitamin D deficiency        Microalbuminuria          Care Instructions    [facility]   Dr Fuentes, Endocrinology Department      [facility]   [address] [address]  Appointment [phone]  Fax: [phone]   Monday and Tuesday         [address] [place] [address]  Appointment [phone]  Fax: [phone]  Wednesday and Thursday           Labs in 3 months  Please make a lab appointment for blood work and follow up clinic appointment in 1 week after that to discuss results.    Recommend checking blood sugars before meals and at bedtime.    If Blood glucose are low more often-> 2-3 times/week- give us a call.  The patient is advised to Make better food choices: reduce carbs, Reduce portion size, weight loss and exercise 3-4 times a week.  Discussed hypoglycemia signs and symptoms as well as management in detail.                Follow-ups after your visit        Your next 10 appointments already scheduled     Dec 04, 2017  7:30 AM CST   (Arrive by 7:15 AM)   Return Visit with Melanie Dewey MD   [facility] ([facility])    [address] [place] [id number] [phone]              Future tests that were ordered for you today     Open Future Orders        Priority Expected Expires Ordered    Hemoglobin A1c ASAP 2017 2018 10/24/2017           "  Who to contact     If you have questions or need follow up information about today's clinic visit or your schedule please contact [facility] directly at [phone].  Normal or non-critical lab and imaging results will be communicated to you by MyChart, letter or phone within 4 business days after the clinic has received the results. If you do not hear from us within 7 days, please contact the clinic through mobiManagehart or phone. If you have a critical or abnormal lab result, we will notify you by phone as soon as possible.  Submit refill requests through [facility] or call your pharmacy and they will forward the refill request to us. Please allow 3 business days for your refill to be completed.          Additional Information About Your Visit        [facility] Information     [facility] gives you secure access to your electronic health record. If you see a primary care provider, you can also send messages to your care team and make appointments. If you have questions, please call your primary care clinic.  If you do not have a primary care provider, please call [phone] and they will assist you.        Care EveryWhere ID     This is your Care EveryWhere ID. This could be used by other organizations to access your [facility] medical records  [id number]        Your Vitals Were     Pulse Temperature Height Pulse Oximetry BMI (Body Mass Index)       94 98.3  F (36.8  C) (Oral) 1.6 m (5' 3\") 98% 34.33 kg/m2        Blood Pressure from Last 3 Encounters:   10/24/17 120/80   05/31/17 (!) 129/97   04/17/17 133/90    Weight from Last 3 Encounters:   10/24/17 87.9 kg (193 lb 12.8 oz)   05/31/17 86.1 kg (189 lb 13.1 oz)   04/17/17 86.1 kg (189 lb 14.4 oz)                 Where to get your medicines      These medications were sent to [facility] [place], [place] - [address]  [address] [address]     Phone:  [phone]     PEPE CONTOUR NEXT test strip    ergocalciferol " 00628 UNITS capsule    insulin aspart 100 UNIT/ML injection          Primary Care Provider Office Phone # Fax #    Alan Paredes [phone] [address] [place] [address]        Equal Access to Services     [facility] VIVIEN : Hadii aad ku carlieo Sodianaali, waaxda luqadaha, qaybta kaalmada adereynold, jr sutherlandbertrand neeraj. So [facility] [phone].    ATENCIÓN: Si habla español, tiene a woodruff disposición servicios gratuitos de asistencia lingüística. Llame al [phone].    We comply with applicable federal civil rights laws and [place] laws. We do not discriminate on the basis of race, color, national origin, age, disability, sex, sexual orientation, or gender identity.            Thank you!     Thank you for choosing [facility]  for your care. Our goal is always to provide you with excellent care. Hearing back from our patients is one way we can continue to improve our services. Please take a few minutes to complete the written survey that you may receive in the mail after your visit with us. Thank you!             Your Updated Medication List - Protect others around you: Learn how to safely use, store and throw away your medicines at www.disposemymeds.org.          This list is accurate as of: 10/24/17 12:10 PM.  Always use your most recent med list.                   Brand Name Dispense Instructions for use Diagnosis    acetone (Urine) test Strp     1 strip    1 strip by In Vitro route daily as needed.    Type 2 diabetes, HbA1c goal < 7% (H)       ADVIL 200 MG tablet   Generic drug:  ibuprofen      Take 200 mg by mouth every 4 hours as needed.        albuterol 108 (90 BASE) MCG/ACT Inhaler    PROAIR HFA/PROVENTIL HFA/VENTOLIN HFA    1 Inhaler    Inhale 2 puffs into the lungs every 4 hours as needed for shortness of breath / dyspnea or wheezing    Acute bronchitis with symptoms > 10 days       aspirin 81 MG tablet     90 tablet    Take 1 tablet  (81 mg) by mouth daily    PFO (patent foramen ovale)       PEPE CONTOUR NEXT test strip   Generic drug:  blood glucose monitoring     120 each    Check 4 times/day    Type 2 diabetes mellitus with diabetic nephropathy, with long-term current use of insulin (H)       blood glucose monitoring lancets     100 each    1 Device See Admin Instructions. Use up to 5 times daily for blood sugar checks.    Type 2 diabetes, HbA1c goal < 7% (H)       blood glucose monitoring meter device kit    no brand specified    1 kit    Use to test blood sugar 6 times daily and as needed.    Type 2 diabetes mellitus with diabetic nephropathy (H)       cetirizine 10 MG tablet    zyrTEC    90 tablet    Take 10 mg by mouth 2 times daily        EPINEPHrine 0.3 MG/0.3ML injection 2-pack    EPIPEN/ADRENACLICK/or ANY BX GENERIC EQUIV    0.3 mL    Inject 0.3 mLs (0.3 mg) into the muscle once as needed for anaphylaxis    Nut allergy       ergocalciferol 05178 UNITS capsule    ERGOCALCIFEROL    14 capsule    Take 1 capsule (50,000 Units) by mouth once a week    Vitamin D deficiency, Microalbuminuria       insulin aspart 100 UNIT/ML injection    NovoLOG PEN    7 mL    With insulin pump. About 80 units/day. Vial.    Type 2 diabetes mellitus with diabetic nephropathy, with long-term current use of insulin (H)       * insulin cartridge misc pump supply     30 each    Change every 3 days1 applicator every 72 hours Change every 3 days    Type 2 diabetes mellitus with diabetic nephropathy, with long-term current use of insulin (H)       * infusion set misc pump supply     30 each    Change every 3 days as directed    Type 2 diabetes mellitus with diabetic nephropathy, with long-term current use of insulin (H)       insulin pump infusion      Updated 1/27/17: TextPower: Model 723 BASAL: 00:00: 1.0 units/hr 10:30: 0.95 14:00: 1.05 CARB RATIO: 00:00 1:7 1600  1:8 C. Factor: 27 mg/dL BLOOD GLUCOSE TARGET and times: 12   AM (midnight):  Active  Insulin Time:  3 hours Basal to Bolus Ratio:  Sensor:  No Carelink / Diasend username:  [email or web address] Carelink / Diasend Password:  [email or web address]    Type 2 diabetes mellitus with diabetic nephropathy, with long-term current use of insulin (H)       losartan 50 MG tablet    COZAAR    135 tablet    Take 1 tab ( 50 mg)  qam and 1/2 tab ( 25 mg)  q pm    Microalbuminuria       metFORMIN 500 MG 24 hr tablet    GLUCOPHAGE-XR    360 tablet    Take 2 tablets (1,000 mg) by mouth 2 times daily (with meals)    Type 2 diabetes mellitus with diabetic nephropathy, with long-term current use of insulin (H)       montelukast 10 MG tablet    SINGULAIR    90 tablet    Take 1 tablet (10 mg) by mouth At Bedtime    Allergic rhinitis       MULTI VITAMIN/MINERALS PO      Take 1 each by mouth daily.        mupirocin 2 % cream    BACTROBAN    15 g    Apply topically 3 times daily    Impetigo       omeprazole 40 MG capsule    priLOSEC    90 capsule    Take 1 capsule (40 mg) by mouth daily    Gastroesophageal reflux disease without esophagitis, Hiatal hernia       * order for DME     100 each    Equipment being ordered: skin prep wipes    Type 2 diabetes, HbA1c goal < 7% (H)       * order for DME     1 Units    Equipment being ordered: post-op shoe    Foot sprain, left, initial encounter       simvastatin 10 MG tablet    ZOCOR    90 tablet    Take 1 tablet (10 mg) by mouth every morning    Hyperlipidemia LDL goal <100       SUMAtriptan 25 MG tablet    IMITREX    8 tablet    Take 25-100mg one time. May repeat 25mg every two hours up to a maximum of 200mg in 24 hours.    Other migraine without status migrainosus, not intractable       * Notice:  This list has 4 medication(s) that are the same as other medications prescribed for you. Read the directions carefully, and ask your doctor or other care provider to review them with you.

## 2017-11-06 DIAGNOSIS — R80.9 MICROALBUMINURIA: ICD-10-CM

## 2017-11-06 RX ORDER — LOSARTAN POTASSIUM 50 MG/1
TABLET ORAL
Qty: 135 TABLET | Refills: 1 | Status: SHIPPED | OUTPATIENT
Start: 2017-11-06 | End: 2018-07-18

## 2017-11-08 DIAGNOSIS — K21.9 GASTROESOPHAGEAL REFLUX DISEASE WITHOUT ESOPHAGITIS: ICD-10-CM

## 2017-11-08 DIAGNOSIS — K44.9 HIATAL HERNIA: ICD-10-CM

## 2017-11-10 RX ORDER — OMEPRAZOLE 40 MG/1
40 CAPSULE, DELAYED RELEASE ORAL DAILY
Qty: 90 CAPSULE | Refills: 0 | Status: SHIPPED | OUTPATIENT
Start: 2017-11-10 | End: 2018-02-15

## 2017-11-10 NOTE — TELEPHONE ENCOUNTER
Requested Prescriptions   Pending Prescriptions Disp Refills     omeprazole (PRILOSEC) 40 MG capsule 90 capsule 1     Sig: Take 1 capsule (40 mg) by mouth daily    PPI Protocol Passed    11/8/2017  2:21 PM       Passed - Not on Clopidogrel (unless Pantoprazole ordered)       Passed - No diagnosis of osteoporosis on record       Passed - Recent or future visit with authorizing provider's specialty    Patient had office visit in the last year or has a visit in the next 30 days with authorizing provider.  See chart review.          Passed - Patient is age 18 or older       Passed - No active pregnacy on record       Passed - No positive pregnancy test in past 12 months        Prescription approved per [facility] Refill Protocol.  This was prescribed by GI provider, may need to have this ordered by PCP in the future.     Yeny Vincent, RN  Triage Nurse

## 2017-11-26 ASSESSMENT — ENCOUNTER SYMPTOMS
TREMORS: 0
SPEECH CHANGE: 0
TINGLING: 0
SINUS PAIN: 1
SORE THROAT: 1
SINUS CONGESTION: 1
WEAKNESS: 0
SEIZURES: 0
DIZZINESS: 0
LOSS OF CONSCIOUSNESS: 0
MEMORY LOSS: 0
PARALYSIS: 0
DISTURBANCES IN COORDINATION: 0
NUMBNESS: 0
HEADACHES: 1

## 2017-11-30 ENCOUNTER — PRE VISIT (OUTPATIENT)
Dept: CARDIOLOGY | Facility: CLINIC | Age: 37
End: 2017-11-30

## 2017-11-30 DIAGNOSIS — I42.1 HYPERTROPHIC OBSTRUCTIVE CARDIOMYOPATHY (H): Primary | ICD-10-CM

## 2017-12-02 ENCOUNTER — OFFICE VISIT (OUTPATIENT)
Dept: URGENT CARE | Facility: URGENT CARE | Age: 37
End: 2017-12-02
Payer: COMMERCIAL

## 2017-12-02 VITALS
HEART RATE: 101 BPM | OXYGEN SATURATION: 96 % | TEMPERATURE: 97.9 F | WEIGHT: 191.4 LBS | BODY MASS INDEX: 33.9 KG/M2 | RESPIRATION RATE: 16 BRPM | DIASTOLIC BLOOD PRESSURE: 88 MMHG | SYSTOLIC BLOOD PRESSURE: 136 MMHG

## 2017-12-02 DIAGNOSIS — J01.90 ACUTE SINUSITIS WITH SYMPTOMS > 10 DAYS: Primary | ICD-10-CM

## 2017-12-02 PROCEDURE — 99214 OFFICE O/P EST MOD 30 MIN: CPT | Performed by: FAMILY MEDICINE

## 2017-12-02 RX ORDER — DOXYCYCLINE 100 MG/1
100 CAPSULE ORAL 2 TIMES DAILY
Qty: 20 CAPSULE | Refills: 0 | Status: SHIPPED | OUTPATIENT
Start: 2017-12-02 | End: 2018-01-23

## 2017-12-02 RX ORDER — FLUTICASONE PROPIONATE 50 MCG
1-2 SPRAY, SUSPENSION (ML) NASAL DAILY
Qty: 1 BOTTLE | Refills: 0 | Status: SHIPPED | OUTPATIENT
Start: 2017-12-02

## 2017-12-02 NOTE — NURSING NOTE
"Chief Complaint   Patient presents with     Urgent Care     URI     sinus congestion, cough, HA, pain in teeth, thick nasal discharge- 2 weeks        Initial /88 (BP Location: Right arm)  Pulse 101  Temp 97.9  F (36.6  C) (Oral)  Wt 191 lb 6.4 oz (86.8 kg)  SpO2 96%  BMI 33.9 kg/m2 Estimated body mass index is 33.9 kg/(m^2) as calculated from the following:    Height as of 10/24/17: 5' 3\" (1.6 m).    Weight as of this encounter: 191 lb 6.4 oz (86.8 kg).  Medication Reconciliation: complete     Kelle Martinez CMA.............................December 2, 2017 9:14 AM       "

## 2017-12-02 NOTE — MR AVS SNAPSHOT
After Visit Summary   12/2/2017    Marie Vogel    MRN: [id number]           Patient Information     Date Of Birth          1980        Visit Information        Provider Department      12/2/2017 9:05 AM Kennedy Puri MD [facility]        Today's Diagnoses     Acute sinusitis with symptoms > 10 days    -  1      Care Instructions    Okay to take ibuprofen 200 mg - 4 tablets (800 mg) every 8 hours as needed.  Okay to take tylenol 500 mg - 2 tablets (1000 mg) every 6-8 hours as needed, do not exceed 3000 mg in 24 hours.  Take full course of antibiotic.  Use flonase to help with sinus congestion.      Sinusitis (Antibiotic Treatment)    The sinuses are air-filled spaces within the bones of the face. They connect to the inside of the nose. Sinusitis is an inflammation of the tissue lining the sinus cavity. Sinus inflammation can occur during a cold. It can also be due to allergies to pollens and other particles in the air. Sinusitis can cause symptoms of sinus congestion and fullness. A sinus infection causes fever, headache and facial pain. There is often green or yellow drainage from the nose or into the back of the throat (post-nasal drip). You have been given antibiotics to treat this condition.  Home care:    Take the full course of antibiotics as instructed. Do not stop taking them, even if you feel better.    Drink plenty of water, hot tea, and other liquids. This may help thin mucus. It also may promote sinus drainage.    Heat may help soothe painful areas of the face. Use a towel soaked in hot water. Or,  the shower and direct the hot spray onto your face. Using a vaporizer along with a menthol rub at night may also help.     An expectorant containing guaifenesin may help thin the mucus and promote drainage from the sinuses.    Over-the-counter decongestants may be used unless a similar medicine was prescribed. Nasal sprays work the fastest. Use one that  contains phenylephrine or oxymetazoline. First blow the nose gently. Then use the spray. Do not use these medicines more often than directed on the label or symptoms may get worse. You may also use tablets containing pseudoephedrine. Avoid products that combine ingredients, because side effects may be increased. Read labels. You can also ask the pharmacist for help. (NOTE: Persons with high blood pressure should not use decongestants. They can raise blood pressure.)    Over-the-counter antihistamines may help if allergies contributed to your sinusitis.      Do not use nasal rinses or irrigation during an acute sinus infection, unless told to by your health care provider. Rinsing may spread the infection to other sinuses.    Use acetaminophen or ibuprofen to control pain, unless another pain medicine was prescribed. (If you have chronic liver or kidney disease or ever had a stomach ulcer, talk with your doctor before using these medicines. Aspirin should never be used in anyone under 18 years of age who is ill with a fever. It may cause severe liver damage.)    Don't smoke. This can worsen symptoms.  Follow-up care  Follow up with your healthcare provider or our staff if you are not improving within the next week.  When to seek medical advice  Call your healthcare provider if any of these occur:    Facial pain or headache becoming more severe    Stiff neck    Unusual drowsiness or confusion    Swelling of the forehead or eyelids    Vision problems, including blurred or double vision    Fever of 100.4 F (38 C) or higher, or as directed by your healthcare provider    Seizure    Breathing problems    Symptoms not resolving within 10 days  Date Last Reviewed: 4/13/2015 2000-2017 [employer]. [address] [place] [address], [place] [address]. All rights reserved. This information is not intended as a substitute for professional medical care. Always follow your healthcare professional's instructions.                 Follow-ups after your visit        Your next 10 appointments already scheduled     Dec 04, 2017  7:30 AM CST   (Arrive by 7:15 AM)   Return Visit with Melanie Dewey MD   [facility] ([facility])    [address]  [address] [facility] [address] [phone]              Who to contact     If you have questions or need follow up information about today's clinic visit or your schedule please contact [facility] directly at [phone].  Normal or non-critical lab and imaging results will be communicated to you by [employer], letter or phone within 4 business days after the clinic has received the results. If you do not hear from us within 7 days, please contact the clinic through [employer] or phone. If you have a critical or abnormal lab result, we will notify you by phone as soon as possible.  Submit refill requests through [employer] or call your pharmacy and they will forward the refill request to us. Please allow 3 business days for your refill to be completed.          Additional Information About Your Visit        [employer] Information     [employer] gives you secure access to your electronic health record. If you see a primary care provider, you can also send messages to your care team and make appointments. If you have questions, please call your primary care clinic.  If you do not have a primary care provider, please call [phone] and they will assist you.        Care EveryWhere ID     This is your Care EveryWhere ID. This could be used by other organizations to access your [facility] medical records  [id number]        Your Vitals Were     Pulse Temperature Respirations Pulse Oximetry Breastfeeding? BMI (Body Mass Index)    101 97.9  F (36.6  C) (Oral) 16 96% No 33.9 kg/m2       Blood Pressure from Last 3 Encounters:   12/02/17 136/88   10/24/17 120/80   05/31/17 (!) 129/97    Weight from Last 3 Encounters:   12/02/17 191 lb 6.4 oz (86.8 kg)   10/24/17  193 lb 12.8 oz (87.9 kg)   05/31/17 189 lb 13.1 oz (86.1 kg)              Today, you had the following     No orders found for display         Today's Medication Changes          These changes are accurate as of: 12/2/17  9:35 AM.  If you have any questions, ask your nurse or doctor.               Start taking these medicines.        Dose/Directions    doxycycline 100 MG capsule   Commonly known as:  VIBRAMYCIN   Used for:  Acute sinusitis with symptoms > 10 days   Started by:  Kennedy Puri MD        Dose:  100 mg   Take 1 capsule (100 mg) by mouth 2 times daily   Quantity:  20 capsule   Refills:  0       fluticasone 50 MCG/ACT spray   Commonly known as:  FLONASE   Used for:  Acute sinusitis with symptoms > 10 days   Started by:  Kennedy Puri MD        Dose:  1-2 spray   Spray 1-2 sprays into both nostrils daily   Quantity:  1 Bottle   Refills:  0            Where to get your medicines      These medications were sent to [facility] - [address]   [address], [place] [address]     Phone:  [phone]     doxycycline 100 MG capsule    fluticasone 50 MCG/ACT spray                Primary Care Provider Office Phone # Fax #    Alan Paredes [phone]       [address] [place] [address] [address] [place] [address]        Equal Access to Services     [facility] AH: Hadii carl ku hadasho Soomaali, waaxda luqadaha, qaybta kaalmada adeegyada, waxhailey guajardoin hayivetten britta peralta . So [facility] [phone].    ATENCIÓN: Si habla español, tiene a woodruff disposición servicios gratuitos de asistencia lingüística. Llame al [phone].    We comply with applicable federal civil rights laws and [place] laws. We do not discriminate on the basis of race, color, national origin, age, disability, sex, sexual orientation, or gender identity.            Thank you!     Thank you for choosing [facility]  for your care. Our goal is always to provide  you with excellent care. Hearing back from our patients is one way we can continue to improve our services. Please take a few minutes to complete the written survey that you may receive in the mail after your visit with us. Thank you!             Your Updated Medication List - Protect others around you: Learn how to safely use, store and throw away your medicines at www.disposemymeds.org.          This list is accurate as of: 12/2/17  9:35 AM.  Always use your most recent med list.                   Brand Name Dispense Instructions for use Diagnosis    acetone (Urine) test Strp     1 strip    1 strip by In Vitro route daily as needed.    Type 2 diabetes, HbA1c goal < 7% (H)       ADVIL 200 MG tablet   Generic drug:  ibuprofen      Take 200 mg by mouth every 4 hours as needed.        albuterol 108 (90 BASE) MCG/ACT Inhaler    PROAIR HFA/PROVENTIL HFA/VENTOLIN HFA    1 Inhaler    Inhale 2 puffs into the lungs every 4 hours as needed for shortness of breath / dyspnea or wheezing    Acute bronchitis with symptoms > 10 days       aspirin 81 MG tablet     90 tablet    Take 1 tablet (81 mg) by mouth daily    PFO (patent foramen ovale)       PEPE CONTOUR NEXT test strip   Generic drug:  blood glucose monitoring     120 each    Check 4 times/day    Type 2 diabetes mellitus with diabetic nephropathy, with long-term current use of insulin (H)       blood glucose monitoring lancets     100 each    1 Device See Admin Instructions. Use up to 5 times daily for blood sugar checks.    Type 2 diabetes, HbA1c goal < 7% (H)       blood glucose monitoring meter device kit    no brand specified    1 kit    Use to test blood sugar 6 times daily and as needed.    Type 2 diabetes mellitus with diabetic nephropathy (H)       cetirizine 10 MG tablet    zyrTEC    90 tablet    Take 10 mg by mouth 2 times daily        doxycycline 100 MG capsule    VIBRAMYCIN    20 capsule    Take 1 capsule (100 mg) by mouth 2 times daily    Acute sinusitis with  symptoms > 10 days       EPINEPHrine 0.3 MG/0.3ML injection 2-pack    EPIPEN/ADRENACLICK/or ANY BX GENERIC EQUIV    0.3 mL    Inject 0.3 mLs (0.3 mg) into the muscle once as needed for anaphylaxis    Nut allergy       ergocalciferol 20761 UNITS capsule    ERGOCALCIFEROL    14 capsule    Take 1 capsule (50,000 Units) by mouth once a week    Vitamin D deficiency, Microalbuminuria       fluticasone 50 MCG/ACT spray    FLONASE    1 Bottle    Spray 1-2 sprays into both nostrils daily    Acute sinusitis with symptoms > 10 days       insulin aspart 100 UNITS/ML injection    NovoLOG VIAL    30 mL    With insulin pump. Uses about 80 units/day.    Type 2 diabetes mellitus with diabetic nephropathy, with long-term current use of insulin (H)       * insulin cartridge misc pump supply     30 each    Change every 3 days1 applicator every 72 hours Change every 3 days    Type 2 diabetes mellitus with diabetic nephropathy, with long-term current use of insulin (H)       * infusion set misc pump supply     30 each    Change every 3 days as directed    Type 2 diabetes mellitus with diabetic nephropathy, with long-term current use of insulin (H)       insulin pump infusion      Updated 1/27/17: [employer]: Model 723 BASAL: 00:00: 1.0 units/hr 10:30: 0.95 14:00: 1.05 CARB RATIO: 00:00 1:7 1600  1:8 C. Factor: 27 mg/dL BLOOD GLUCOSE TARGET and times: 12   AM (midnight):  Active Insulin Time:  3 hours Basal to Bolus Ratio:  Sensor:  No Carelink / Diasend username:  [email or web address] Carelink / Diasend Password:  [email or web address]    Type 2 diabetes mellitus with diabetic nephropathy, with long-term current use of insulin (H)       losartan 50 MG tablet    COZAAR    135 tablet    Take 1 tab ( 50 mg)  qam and 1/2 tab ( 25 mg)  q pm    Microalbuminuria       metFORMIN 500 MG 24 hr tablet    GLUCOPHAGE-XR    360 tablet    Take 2 tablets (1,000 mg) by mouth 2 times daily (with meals)    Type 2 diabetes mellitus with diabetic nephropathy, with  long-term current use of insulin (H)       montelukast 10 MG tablet    SINGULAIR    90 tablet    Take 1 tablet (10 mg) by mouth At Bedtime    Allergic rhinitis       MULTI VITAMIN/MINERALS PO      Take 1 each by mouth daily.        mupirocin 2 % cream    BACTROBAN    15 g    Apply topically 3 times daily    Impetigo       omeprazole 40 MG capsule    priLOSEC    90 capsule    Take 1 capsule (40 mg) by mouth daily    Gastroesophageal reflux disease without esophagitis, Hiatal hernia       * order for DME     100 each    Equipment being ordered: skin prep wipes    Type 2 diabetes, HbA1c goal < 7% (H)       * order for DME     1 Units    Equipment being ordered: post-op shoe    Foot sprain, left, initial encounter       simvastatin 10 MG tablet    ZOCOR    90 tablet    Take 1 tablet (10 mg) by mouth every morning    Hyperlipidemia LDL goal <100       SUMAtriptan 25 MG tablet    IMITREX    8 tablet    Take 25-100mg one time. May repeat 25mg every two hours up to a maximum of 200mg in 24 hours.    Other migraine without status migrainosus, not intractable       * Notice:  This list has 4 medication(s) that are the same as other medications prescribed for you. Read the directions carefully, and ask your doctor or other care provider to review them with you.

## 2017-12-02 NOTE — PATIENT INSTRUCTIONS
Okay to take ibuprofen 200 mg - 4 tablets (800 mg) every 8 hours as needed.  Okay to take tylenol 500 mg - 2 tablets (1000 mg) every 6-8 hours as needed, do not exceed 3000 mg in 24 hours.  Take full course of antibiotic.  Use flonase to help with sinus congestion.      Sinusitis (Antibiotic Treatment)    The sinuses are air-filled spaces within the bones of the face. They connect to the inside of the nose. Sinusitis is an inflammation of the tissue lining the sinus cavity. Sinus inflammation can occur during a cold. It can also be due to allergies to pollens and other particles in the air. Sinusitis can cause symptoms of sinus congestion and fullness. A sinus infection causes fever, headache and facial pain. There is often green or yellow drainage from the nose or into the back of the throat (post-nasal drip). You have been given antibiotics to treat this condition.  Home care:    Take the full course of antibiotics as instructed. Do not stop taking them, even if you feel better.    Drink plenty of water, hot tea, and other liquids. This may help thin mucus. It also may promote sinus drainage.    Heat may help soothe painful areas of the face. Use a towel soaked in hot water. Or,  the shower and direct the hot spray onto your face. Using a vaporizer along with a menthol rub at night may also help.     An expectorant containing guaifenesin may help thin the mucus and promote drainage from the sinuses.    Over-the-counter decongestants may be used unless a similar medicine was prescribed. Nasal sprays work the fastest. Use one that contains phenylephrine or oxymetazoline. First blow the nose gently. Then use the spray. Do not use these medicines more often than directed on the label or symptoms may get worse. You may also use tablets containing pseudoephedrine. Avoid products that combine ingredients, because side effects may be increased. Read labels. You can also ask the pharmacist for help. (NOTE: Persons  with high blood pressure should not use decongestants. They can raise blood pressure.)    Over-the-counter antihistamines may help if allergies contributed to your sinusitis.      Do not use nasal rinses or irrigation during an acute sinus infection, unless told to by your health care provider. Rinsing may spread the infection to other sinuses.    Use acetaminophen or ibuprofen to control pain, unless another pain medicine was prescribed. (If you have chronic liver or kidney disease or ever had a stomach ulcer, talk with your doctor before using these medicines. Aspirin should never be used in anyone under 18 years of age who is ill with a fever. It may cause severe liver damage.)    Don't smoke. This can worsen symptoms.  Follow-up care  Follow up with your healthcare provider or our staff if you are not improving within the next week.  When to seek medical advice  Call your healthcare provider if any of these occur:    Facial pain or headache becoming more severe    Stiff neck    Unusual drowsiness or confusion    Swelling of the forehead or eyelids    Vision problems, including blurred or double vision    Fever of 100.4 F (38 C) or higher, or as directed by your healthcare provider    Seizure    Breathing problems    Symptoms not resolving within 10 days  Date Last Reviewed: 4/13/2015 2000-2017 [employer]. [address] [place] [address], [place] [address]. All rights reserved. This information is not intended as a substitute for professional medical care. Always follow your healthcare professional's instructions.

## 2017-12-02 NOTE — PROGRESS NOTES
SUBJECTIVE:   Marie Vogel is a 37 year old female presenting with a chief complaint of cough, sinus congestion, headache, purulent rhinitis.  Endorse fatigue.  Low grade fever.  Last sinus infection was last year.  Onset of symptoms was over 2 week(s) ago.  Course of illness is worsening.    Severity moderate  Current and Associated symptoms: sinus congestion, purulent rhinitis  Treatment measures tried include Tylenol/Ibuprofen, Fluids and Rest.  Predisposing factors include seasonal and environmental allergies and DM.    Past Medical History:   Diagnosis Date     Allergic rhinitis due to pollen      Atypical glandular cells on Pap smear 3/1108     Gastroesophageal reflux disease      PFO (patent foramen ovale)      Type II or unspecified type diabetes mellitus without mention of complication, not stated as uncontrolled 2002    onset was gestational in 2001     Current Outpatient Prescriptions   Medication Sig Dispense Refill     omeprazole (PRILOSEC) 40 MG capsule Take 1 capsule (40 mg) by mouth daily 90 capsule 0     losartan (COZAAR) 50 MG tablet Take 1 tab ( 50 mg)  qam and 1/2 tab ( 25 mg)  q pm 135 tablet 1     ergocalciferol (ERGOCALCIFEROL) 99114 UNITS capsule Take 1 capsule (50,000 Units) by mouth once a week 14 capsule 0     PEPE CONTOUR NEXT test strip Check 4 times/day 120 each 11     insulin aspart (NOVOLOG VIAL) 100 UNITS/ML injection With insulin pump. Uses about 80 units/day. 30 mL 3     SUMAtriptan (IMITREX) 25 MG tablet Take 25-100mg one time. May repeat 25mg every two hours up to a maximum of 200mg in 24 hours. 8 tablet 6     montelukast (SINGULAIR) 10 MG tablet Take 1 tablet (10 mg) by mouth At Bedtime 90 tablet 1     metFORMIN (GLUCOPHAGE-XR) 500 MG 24 hr tablet Take 2 tablets (1,000 mg) by mouth 2 times daily (with meals) 360 tablet 0     simvastatin (ZOCOR) 10 MG tablet Take 1 tablet (10 mg) by mouth every morning 90 tablet 1     mupirocin (BACTROBAN) 2 % cream Apply topically 3  "times daily 15 g 0     EPINEPHrine 0.3 MG/0.3ML injection Inject 0.3 mLs (0.3 mg) into the muscle once as needed for anaphylaxis 0.3 mL 11     order for DME Equipment being ordered: post-op shoe 1 Units 0     INSULIN PUMP - OUTPATIENT Updated 1/27/17:  [employer] Minimed: Model 723  BASAL:  00:00: 1.0 units/hr  10:30: 0.95  14:00: 1.05  CARB RATIO:  00:00 1:7  1600  1:8  C. Factor:  27 mg/dL  BLOOD GLUCOSE TARGET and times:  12   AM (midnight):   Active Insulin Time:  3 hours  Basal to Bolus Ratio:   Sensor:  No  Carelink / Diasend username:  [email or web address]  [employer] / [employer] Password:  [email or web address]       insulin cartridge (PARADIGM 3ML) misc pump supply Change every 3 days1 applicator every 72 hours Change every 3 days 30 each 3     infusion set (PARADIGM QUICK-SET 23\" 9MM) misc pump supply Change every 3 days as directed 30 each 3     albuterol (PROAIR HFA, PROVENTIL HFA, VENTOLIN HFA) 108 (90 BASE) MCG/ACT inhaler Inhale 2 puffs into the lungs every 4 hours as needed for shortness of breath / dyspnea or wheezing 1 Inhaler 1     blood glucose monitoring (NO BRAND SPECIFIED) meter device kit Use to test blood sugar 6 times daily and as needed. 1 kit 0     order for DME Equipment being ordered: skin prep wipes 100 each prn     aspirin 81 MG tablet Take 1 tablet (81 mg) by mouth daily 90 tablet 3     cetirizine (ZYRTEC) 10 MG tablet Take 10 mg by mouth 2 times daily  90 tablet 3     Multiple Vitamins-Minerals (MULTI VITAMIN/MINERALS PO) Take 1 each by mouth daily.       Acetone, Urine, Test STRP 1 strip by In Vitro route daily as needed. 1 strip prn     ibuprofen (ADVIL) 200 MG tablet Take 200 mg by mouth every 4 hours as needed.       Lancets (MICROLET) MISC 1 Device See Admin Instructions. Use up to 5 times daily for blood sugar checks. 100 each prn     Social History   Substance Use Topics     Smoking status: Former Smoker     Types: Cigarettes     Quit date: 10/24/2005     Smokeless tobacco: Former User      " Comment: States only smokes when drinks maybe 2x/year     Alcohol use No       ROS:  CONSTITUTIONAL:NEGATIVE for fever, chills, change in weight and POSITIVE  for fatigue and malaise  INTEGUMENTARY/SKIN: NEGATIVE for worrisome rashes, moles or lesions  ENT/MOUTH: POSITIVE for hoarseness, nasal congestion, postnasal drainage, rhinorrhea-purulent and sinus pressure  RESP:POSITIVE for cough-non productive and cough-productive  CV: NEGATIVE for chest pain, palpitations or peripheral edema  GI: NEGATIVE for nausea, abdominal pain, heartburn, or change in bowel habits    OBJECTIVE:  /88 (BP Location: Right arm)  Pulse 101  Temp 97.9  F (36.6  C) (Oral)  Resp 16  Wt 191 lb 6.4 oz (86.8 kg)  SpO2 96%  BMI 33.9 kg/m2  GENERAL APPEARANCE: healthy, alert and no distress  EYES: EOMI,  PERRL, conjunctiva clear  HENT: ear canals and TM's normal.  Nose and mouth without ulcers, erythema or lesions.  Tenderness on bilateral frontal and maxillary sinus on percussion  NECK: supple, nontender, no lymphadenopathy  RESP: lungs clear to auscultation - no rales, rhonchi or wheezes  CV: regular rates and rhythm, normal S1 S2, no murmur noted  Extremities: no peripheral edema or tenderness, peripheral pulses normal  SKIN: no suspicious lesions or rashes  PSYCH: mentation appears normal and affect normal/bright    ASSESSMENT/PLAN:  (J01.90) Acute sinusitis with symptoms > 10 days  (primary encounter diagnosis)  Plan: doxycycline (VIBRAMYCIN) 100 MG capsule,         fluticasone (FLONASE) 50 MCG/ACT spray            Reviewed symptomatic treatment, plenty of fluids and rest.  RX doxycycline given for prolong and worsening symptoms.  RX flonase given to help with symptoms, encourage to use netti pot.    Return to clinic if no resolution of symptoms.    Kennedy Puri MD  December 2, 2017 9:39 AM

## 2017-12-04 ENCOUNTER — OFFICE VISIT (OUTPATIENT)
Dept: CARDIOLOGY | Facility: CLINIC | Age: 37
End: 2017-12-04
Attending: INTERNAL MEDICINE
Payer: COMMERCIAL

## 2017-12-04 VITALS
OXYGEN SATURATION: 95 % | BODY MASS INDEX: 32.56 KG/M2 | WEIGHT: 190.7 LBS | DIASTOLIC BLOOD PRESSURE: 87 MMHG | SYSTOLIC BLOOD PRESSURE: 128 MMHG | HEIGHT: 64 IN | HEART RATE: 94 BPM

## 2017-12-04 DIAGNOSIS — R00.0 SINUS TACHYCARDIA: ICD-10-CM

## 2017-12-04 DIAGNOSIS — Z82.49 FAMILY HISTORY OF HYPERTROPHIC CARDIOMYOPATHY: Primary | ICD-10-CM

## 2017-12-04 DIAGNOSIS — I42.1 HYPERTROPHIC OBSTRUCTIVE CARDIOMYOPATHY (H): ICD-10-CM

## 2017-12-04 DIAGNOSIS — E11.21 TYPE 2 DIABETES MELLITUS WITH DIABETIC NEPHROPATHY, WITH LONG-TERM CURRENT USE OF INSULIN (H): ICD-10-CM

## 2017-12-04 DIAGNOSIS — Z79.4 TYPE 2 DIABETES MELLITUS WITH DIABETIC NEPHROPATHY, WITH LONG-TERM CURRENT USE OF INSULIN (H): ICD-10-CM

## 2017-12-04 PROCEDURE — 99213 OFFICE O/P EST LOW 20 MIN: CPT | Mod: ZF

## 2017-12-04 PROCEDURE — 99214 OFFICE O/P EST MOD 30 MIN: CPT | Mod: GC | Performed by: INTERNAL MEDICINE

## 2017-12-04 RX ORDER — ATENOLOL 25 MG/1
25 TABLET ORAL DAILY
Qty: 90 TABLET | Refills: 3 | Status: SHIPPED | OUTPATIENT
Start: 2017-12-04 | End: 2018-01-17

## 2017-12-04 RX ORDER — ATORVASTATIN CALCIUM 20 MG/1
20 TABLET, FILM COATED ORAL DAILY
Qty: 90 TABLET | Refills: 3 | Status: SHIPPED | OUTPATIENT
Start: 2017-12-04 | End: 2019-02-05

## 2017-12-04 ASSESSMENT — PAIN SCALES - GENERAL: PAINLEVEL: NO PAIN (0)

## 2017-12-04 NOTE — MR AVS SNAPSHOT
After Visit Summary   12/4/2017    Marie Vogel    MRN: [id number]           Patient Information     Date Of Birth          1980        Visit Information        Provider Department      12/4/2017 7:30 AM Melanie Dewey MD [facility]        Today's Diagnoses     Hypertension    -  1    Type 2 diabetes mellitus (H)        Hyperlipidemia LDL goal <100          Care Instructions    Patient Instructions:  It was a pleasure to see you in the cardiology clinic today.      If you have any questions, you can reach my nurse, Dahlia Kamara, at [phone].  Press Option #1 for the [facility], and then press Option #3 for nursing.  We are encouraging the use of Recoup to communicate with your HealthCare Provider    Note the new medications: Lipitor (Atorvastatin) 20 mg by mouth daily  Lipid panel is 6-8 weeks  Start taking Atenolol 25 mg by mouth everyday  Stop the following medications: Simvastatin    Follow the American Heart Association Diet and Lifestyle recommendations:  Limit saturated fat, trans fat, sodium, red meat, sweets and sugar-sweetened beverages. If you choose to eat red meat, compare labels and select the leanest cuts available.  Aim for at least 150 minutes of moderate physical activity or 75 minutes of vigorous physical activity - or an equal combination of both - each week.    The results from today include: none  Please follow up with Dr. Melanie Dewey in one year with an ECHO    Sincerely,    Melanie Dewey MD     If you have an urgent need after hours (8:00 am to 4:30 pm) please call [phone] and ask for the cardiology fellow on call.                    Follow-ups after your visit        Follow-up notes from your care team     Return in about 1 year (around 12/4/2018), or if symptoms worsen or fail to improve.      Future tests that were ordered for you today     Open Future Orders        Priority Expected Expires Ordered     "Echocardiogram Complete Routine  12/4/2018 12/4/2017    Lipid Profile Routine 1/15/2018 12/4/2018 12/4/2017            Who to contact     If you have questions or need follow up information about today's clinic visit or your schedule please contact [facility] directly at [phone].  Normal or non-critical lab and imaging results will be communicated to you by MyChart, letter or phone within 4 business days after the clinic has received the results. If you do not hear from us within 7 days, please contact the clinic through Ensynt or phone. If you have a critical or abnormal lab result, we will notify you by phone as soon as possible.  Submit refill requests through "[employer]" or call your pharmacy and they will forward the refill request to us. Please allow 3 business days for your refill to be completed.          Additional Information About Your Visit        [employer] Information     "[employer] gives you secure access to your electronic health record. If you see a primary care provider, you can also send messages to your care team and make appointments. If you have questions, please call your primary care clinic.  If you do not have a primary care provider, please call [phone] and they will assist you.        Care EveryWhere ID     This is your Care EveryWhere ID. This could be used by other organizations to access your [facility] medical records  [id number]        Your Vitals Were     Pulse Height Pulse Oximetry BMI (Body Mass Index)          94 1.626 m (5' 4\") 95% 32.73 kg/m2         Blood Pressure from Last 3 Encounters:   12/04/17 128/87   12/02/17 136/88   10/24/17 120/80    Weight from Last 3 Encounters:   12/04/17 86.5 kg (190 lb 11.2 oz)   12/02/17 86.8 kg (191 lb 6.4 oz)   10/24/17 87.9 kg (193 lb 12.8 oz)                 Today's Medication Changes          These changes are accurate as of: 12/4/17  8:46 AM.  If you have any questions, ask your nurse or doctor.               Start taking these " medicines.        Dose/Directions    atenolol 25 MG tablet   Commonly known as:  TENORMIN   Used for:  Hypertension   Started by:  Melanie Dewey MD        Dose:  25 mg   Take 1 tablet (25 mg) by mouth daily   Quantity:  90 tablet   Refills:  3       atorvastatin 20 MG tablet   Commonly known as:  LIPITOR   Used for:  Type 2 diabetes mellitus (H)   Started by:  Melanie Dewey MD        Dose:  20 mg   Take 1 tablet (20 mg) by mouth daily   Quantity:  90 tablet   Refills:  3         Stop taking these medicines if you haven't already. Please contact your care team if you have questions.     simvastatin 10 MG tablet   Commonly known as:  ZOCOR   Stopped by:  Melanie Dewey MD                Where to get your medicines      These medications were sent to [facility] - [address]   [address] [place] [address]     Phone:  [phone]     atenolol 25 MG tablet    atorvastatin 20 MG tablet                Primary Care Provider Office Phone # Fax #    Alan Paredes [phone]       [facility] [place] [address]        Equal Access to Services     [facility]: Hadii carl ku hadasho Soomaali, waaxda luqadaha, qaybta kaalmada aderichardyaneida, jr peralta . So [facility] [phone].    ATENCIÓN: Si habla español, tiene a woodruff disposición servicios gratuitos de asistencia lingüística. Llame al [phone].    We comply with applicable federal civil rights laws and [place] laws. We do not discriminate on the basis of race, color, national origin, age, disability, sex, sexual orientation, or gender identity.            Thank you!     Thank you for choosing [facility]  for your care. Our goal is always to provide you with excellent care. Hearing back from our patients is one way we can continue to improve our services. Please take a few minutes to complete the written survey that you may receive  in the mail after your visit with us. Thank you!             Your Updated Medication List - Protect others around you: Learn how to safely use, store and throw away your medicines at www.disposemymeds.org.          This list is accurate as of: 12/4/17  8:46 AM.  Always use your most recent med list.                   Brand Name Dispense Instructions for use Diagnosis    ADVIL 200 MG tablet   Generic drug:  ibuprofen      Take 200 mg by mouth every 4 hours as needed.        aspirin 81 MG tablet     90 tablet    Take 1 tablet (81 mg) by mouth daily    PFO (patent foramen ovale)       atenolol 25 MG tablet    TENORMIN    90 tablet    Take 1 tablet (25 mg) by mouth daily    Hypertension       atorvastatin 20 MG tablet    LIPITOR    90 tablet    Take 1 tablet (20 mg) by mouth daily    Type 2 diabetes mellitus (H)       PEPE CONTOUR NEXT test strip   Generic drug:  blood glucose monitoring     120 each    Check 4 times/day    Type 2 diabetes mellitus with diabetic nephropathy, with long-term current use of insulin (H)       blood glucose monitoring lancets     100 each    1 Device See Admin Instructions. Use up to 5 times daily for blood sugar checks.    Type 2 diabetes, HbA1c goal < 7% (H)       blood glucose monitoring meter device kit    no brand specified    1 kit    Use to test blood sugar 6 times daily and as needed.    Type 2 diabetes mellitus with diabetic nephropathy (H)       cetirizine 10 MG tablet    zyrTEC    90 tablet    Take 10 mg by mouth 2 times daily        doxycycline 100 MG capsule    VIBRAMYCIN    20 capsule    Take 1 capsule (100 mg) by mouth 2 times daily    Acute sinusitis with symptoms > 10 days       EPINEPHrine 0.3 MG/0.3ML injection 2-pack    EPIPEN/ADRENACLICK/or ANY BX GENERIC EQUIV    0.3 mL    Inject 0.3 mLs (0.3 mg) into the muscle once as needed for anaphylaxis    Nut allergy       ergocalciferol 47741 UNITS capsule    ERGOCALCIFEROL    14 capsule    Take 1 capsule (50,000 Units) by  mouth once a week    Vitamin D deficiency, Microalbuminuria       fluticasone 50 MCG/ACT spray    FLONASE    1 Bottle    Spray 1-2 sprays into both nostrils daily    Acute sinusitis with symptoms > 10 days       insulin aspart 100 UNITS/ML injection    NovoLOG VIAL    30 mL    With insulin pump. Uses about 80 units/day.    Type 2 diabetes mellitus with diabetic nephropathy, with long-term current use of insulin (H)       * insulin cartridge misc pump supply     30 each    Change every 3 days1 applicator every 72 hours Change every 3 days    Type 2 diabetes mellitus with diabetic nephropathy, with long-term current use of insulin (H)       * infusion set misc pump supply     30 each    Change every 3 days as directed    Type 2 diabetes mellitus with diabetic nephropathy, with long-term current use of insulin (H)       insulin pump infusion      Updated 1/27/17: [employer]: Model 723 BASAL: 00:00: 1.0 units/hr 10:30: 0.95 14:00: 1.05 CARB RATIO: 00:00 1:7 1600  1:8 C. Factor: 27 mg/dL BLOOD GLUCOSE TARGET and times: 12   AM (midnight):  Active Insulin Time:  3 hours Basal to Bolus Ratio:  Sensor:  No Carelink / Diasend username:  [email or web address] Carelink / Diasend Password:  [email or web address]    Type 2 diabetes mellitus with diabetic nephropathy, with long-term current use of insulin (H)       losartan 50 MG tablet    COZAAR    135 tablet    Take 1 tab ( 50 mg)  qam and 1/2 tab ( 25 mg)  q pm    Microalbuminuria       metFORMIN 500 MG 24 hr tablet    GLUCOPHAGE-XR    360 tablet    Take 2 tablets (1,000 mg) by mouth 2 times daily (with meals)    Type 2 diabetes mellitus with diabetic nephropathy, with long-term current use of insulin (H)       montelukast 10 MG tablet    SINGULAIR    90 tablet    Take 1 tablet (10 mg) by mouth At Bedtime    Allergic rhinitis       MULTI VITAMIN/MINERALS PO      Take 1 each by mouth daily.        omeprazole 40 MG capsule    priLOSEC    90 capsule    Take 1 capsule (40 mg) by mouth daily     Gastroesophageal reflux disease without esophagitis, Hiatal hernia       order for DME     100 each    Equipment being ordered: skin prep wipes    Type 2 diabetes, HbA1c goal < 7% (H)       SUMAtriptan 25 MG tablet    IMITREX    8 tablet    Take 25-100mg one time. May repeat 25mg every two hours up to a maximum of 200mg in 24 hours.    Other migraine without status migrainosus, not intractable       * Notice:  This list has 2 medication(s) that are the same as other medications prescribed for you. Read the directions carefully, and ask your doctor or other care provider to review them with you.

## 2017-12-04 NOTE — NURSING NOTE
Cardiac Testing: Patient given instructions regarding  echocardiogram . Discussed purpose, preparation, procedure and when to expect results reported back to the patient. Patient demonstrated understanding of this information and agreed to call with further questions or concerns.  Diet: Patient instructed regarding a heart healthy diet, including discussion of reduced fat and sodium intake. Patient demonstrated understanding of this information and agreed to call with further questions or concerns.  Labs: Patient was given results of the laboratory testing obtained today. Patient was instructed to return for the next laboratory testing in 6 weeks . Patient demonstrated understanding of this information and agreed to call with further questions or concerns.   Med Reconcile: Reviewed and verified all current medications with the patient. The updated medication list was printed and given to the patient.  New Medication: Patient was educated regarding newly prescribed medication, including discussion of  the indication, administration, side effects, and when to report to MD or RN. Patient demonstrated understanding of this information and agreed to call with further questions or concerns.  Return Appointment: Patient given instructions regarding scheduling next clinic visit. Patient demonstrated understanding of this information and agreed to call with further questions or concerns.  Medication Change: Patient was educated regarding prescribed medication change, including discussion of the indication, administration, side effects, and when to report to MD or RN. Patient demonstrated understanding of this information and agreed to call with further questions or concerns.  Patient stated she understood all health information given and agreed to call with further questions or concerns.

## 2017-12-04 NOTE — NURSING NOTE
Chief Complaint   Patient presents with     Follow Up For     manage family history of hypertrophic cardiomyopathy/last visit October 2016     Vitals were taken and medications were reconciled.     Catarino Palumbo MA  7:44 AM

## 2017-12-04 NOTE — LETTER
12/4/2017      RE: Marie Vogel  [address] [facility] [address]  [place] [address]       Dear Colleague,    Thank you for the opportunity to participate in the care of your patient, Marie Vogel, at the [facility]. Please see a copy of my visit note below.    Cardiology clinic follow up    HPI:  Ms. Vogel is a pleasant 37 y.o.woman with insulin requiring type 2 diabetes and a family history of hypertrophic cardiomyopathy. She has hypertension and hyperlipidemia.She has had ECHOs and MRI periodically during the last 23 years since her mother was diagnosed with HC.   Her most recent MRI on 8/8/2016 was suggestive of hypertropic cardiomyopathy, asymmetrical septal hypertrophy measuring 1.5 cm; systolic anterior motion of the mitral valve; late gadolinium enhancement imaging,with mild patchy hyperenhancement in the septal segments to suggest myocardial fibrosis.        Interval History:   Patient last seen in October 2016. She had treadmill stress test and Holter that did not demonstrate VT or hypotension. She was seen by our genetic counselor. She otherwise has no chest pain, shortness of breath, syncope but she mentions palpitations. She has visited the ED a couple of times with palpitations and she was found to have tachycardia.     PAST MEDICAL HISTORY:  Past Medical History:   Diagnosis Date     Allergic rhinitis due to pollen      Atypical glandular cells on Pap smear 3/1108     Gastroesophageal reflux disease      PFO (patent foramen ovale)      Type II or unspecified type diabetes mellitus without mention of complication, not stated as uncontrolled 2002    onset was gestational in 2001       CURRENT MEDICATIONS:  Current Outpatient Prescriptions   Medication Sig Dispense Refill     atenolol (TENORMIN) 25 MG tablet Take 1 tablet (25 mg) by mouth daily 90 tablet 3     atorvastatin (LIPITOR) 20 MG tablet Take 1 tablet (20 mg) by mouth daily  "90 tablet 3     doxycycline (VIBRAMYCIN) 100 MG capsule Take 1 capsule (100 mg) by mouth 2 times daily 20 capsule 0     fluticasone (FLONASE) 50 MCG/ACT spray Spray 1-2 sprays into both nostrils daily 1 Bottle 0     omeprazole (PRILOSEC) 40 MG capsule Take 1 capsule (40 mg) by mouth daily 90 capsule 0     losartan (COZAAR) 50 MG tablet Take 1 tab ( 50 mg)  qam and 1/2 tab ( 25 mg)  q pm 135 tablet 1     ergocalciferol (ERGOCALCIFEROL) 88568 UNITS capsule Take 1 capsule (50,000 Units) by mouth once a week 14 capsule 0     PEPE CONTOUR NEXT test strip Check 4 times/day 120 each 11     insulin aspart (NOVOLOG VIAL) 100 UNITS/ML injection With insulin pump. Uses about 80 units/day. 30 mL 3     SUMAtriptan (IMITREX) 25 MG tablet Take 25-100mg one time. May repeat 25mg every two hours up to a maximum of 200mg in 24 hours. 8 tablet 6     montelukast (SINGULAIR) 10 MG tablet Take 1 tablet (10 mg) by mouth At Bedtime 90 tablet 1     metFORMIN (GLUCOPHAGE-XR) 500 MG 24 hr tablet Take 2 tablets (1,000 mg) by mouth 2 times daily (with meals) 360 tablet 0     EPINEPHrine 0.3 MG/0.3ML injection Inject 0.3 mLs (0.3 mg) into the muscle once as needed for anaphylaxis 0.3 mL 11     INSULIN PUMP - OUTPATIENT Updated 1/27/17:  Medtronic Minimed: Model 723  BASAL:  00:00: 1.0 units/hr  10:30: 0.95  14:00: 1.05  CARB RATIO:  00:00 1:7  1600  1:8  C. Factor:  27 mg/dL  BLOOD GLUCOSE TARGET and times:  12   AM (midnight):   Active Insulin Time:  3 hours  Basal to Bolus Ratio:   Sensor:  No  Carelink / Diasend username:  [email or web address]  Carelink / Diasend Password:  [email or web address]       insulin cartridge (PARADIGM 3ML) misc pump supply Change every 3 days1 applicator every 72 hours Change every 3 days 30 each 3     infusion set (PARADIGM QUICK-SET 23\" 9MM) misc pump supply Change every 3 days as directed 30 each 3     blood glucose monitoring (NO BRAND SPECIFIED) meter device kit Use to test blood sugar 6 times daily and as needed. 1 kit 0     " order for DME Equipment being ordered: skin prep wipes 100 each prn     aspirin 81 MG tablet Take 1 tablet (81 mg) by mouth daily 90 tablet 3     cetirizine (ZYRTEC) 10 MG tablet Take 10 mg by mouth 2 times daily  90 tablet 3     ibuprofen (ADVIL) 200 MG tablet Take 200 mg by mouth every 4 hours as needed.       Lancets (MICROLET) MISC 1 Device See Admin Instructions. Use up to 5 times daily for blood sugar checks. 100 each prn     Multiple Vitamins-Minerals (MULTI VITAMIN/MINERALS PO) Take 1 each by mouth daily.         PAST SURGICAL HISTORY:  Past Surgical History:   Procedure Laterality Date     C INDUCED ABORTN BY D&C           C IUD,MIRENA  8/10/10, 7/28/15     C/SECTION, LOW TRANSVERSE  6/25/10    , Low Transverse     CHOLECYSTECTOMY, LAPOROSCOPIC      Cholecystectomy, Laparoscopic     ESOPHAGOSCOPY, GASTROSCOPY, DUODENOSCOPY (EGD), COMBINED N/A 2016    Procedure: COMBINED ESOPHAGOSCOPY, GASTROSCOPY, DUODENOSCOPY (EGD), BIOPSY SINGLE OR MULTIPLE;  Surgeon: Fadi Hunter MD;  Location: [facility] ESOPHAGEAL MOTILITY STUDY N/A 2016    Procedure: ESOPHAGEAL MOTILITY STUDY;  Surgeon: Fadi Hunter MD;  Location: [facility] REMOVE TONSILS/ADENOIDS,<11 Y/O      T &A       ALLERGIES     Allergies   Allergen Reactions     Ivp Dye [Contrast Dye] Itching     Pt reports that throat itches     Nuts Anaphylaxis     [place] nuts only     Bactrim Swelling     Pt reaction was swelling in mouth and tongue and itchy mouth.  Resolved with benadryl and prednisone     Penicillins Hives     Cephalosporins Itching     Seasonal Allergies Other (See Comments)     Molds, trees, grass, dust..  Upper congestion     Atorvastatin      myalgias     Shellfish Allergy Rash     Clams only       FAMILY HISTORY:  Family History   Problem Relation Age of Onset     Cardiovascular Mother      hypertrophic cardiomyopathy     Genitourinary Problems Mother      gallbladder disease     DIABETES  Mother      drug induced     Other - See Comments Mother      heart transplant 11/23/2005     DIABETES Father      type 2     Hypertension Father      Genitourinary Problems Maternal Grandmother      gallbladder disease     Genitourinary Problems Paternal Grandmother      gallbladder disease     Hypertension Paternal Grandfather      high bp     CEREBROVASCULAR DISEASE Paternal Grandfather      Cardiovascular Brother      hypertrophic cardiomyopathy     DIABETES Brother      type 2       SOCIAL HISTORY:  Social History     Social History     Marital status:      Spouse name: N/A     Number of children: 2     Years of education: 14     Occupational History     nurse      Social History Main Topics     Smoking status: Former Smoker     Types: Cigarettes     Quit date: 10/24/2005     Smokeless tobacco: Former User      Comment: States only smokes when drinks maybe 2x/year     Alcohol use No     Drug use: No     Sexual activity: Yes     Partners: Male     Birth control/ protection: IUD      Comment: Mirena IUD 7/28/15     Other Topics Concern     None     Social History Narrative    Caffeine intake/servings daily - 6-7    Calcium intake/servings daily - 2-3 yogurt, milk, cheese    Exercise 1 times weekly - describe aerobics    Sunscreen used - Yes    Seatbelts used - Yes    Guns stored in the home - No    Self Breast Exam - Yes    Pap test up to date -  Yes    Eye exam up to date -  Yes    Dental exam up to date -  Yes    DEXA scan up to date -  Not Applicable    Flex Sig/Colonoscopy up to date -  Not Applicable    Mammography up to date -  Not Applicable    Immunizations reviewed and up to date - Yes    Abuse: Current or Past (Physical, Sexual or Emotional) - No    Do you feel safe in your environment - Yes    Do you cope well with stress - Yes    Do you suffer from insomnia - Yes     Last updated by: Tatianna Lacey  5/9/2005           ROS:   Constitutional: No fever, chills, or sweats. No weight gain/loss  "  ENT: No visual disturbance, ear ache, epistaxis, sore throat  Allergies/Immunologic: Negative.   Respiratory: No cough, hemoptysia  Cardiovascular: As per HPI  GI: No nausea, vomiting, hematemesis, melena, or hematochezia  : No urinary frequency, dysuria, or hematuria  Integument: Negative  Psychiatric: Negative  Neuro: Negative  Endocrinology: Negative   Musculoskeletal: Negative    EXAM:  /87 (BP Location: Left arm, Cuff Size: Adult Regular)  Pulse 94  Ht 1.626 m (5' 4\")  Wt 86.5 kg (190 lb 11.2 oz)  SpO2 95%  BMI 32.73 kg/m2  In general, the patient is a pleasant female in no apparent distress.    HEENT: NC/AT.  PERRLA.  EOMI.  Sclerae white, not injected.  Nares clear.  Pharynx without erythema or exudate.  Dentition intact.    Neck: No adenopathy.  No thyromegaly. Carotids +4/4 bilaterally without bruits.  No jugular venous distension.   Heart: RRR. Normal S1, S2 splits physiologically. No murmur, rub, click, or gallop. The PMI is in the 5th ICS in the midclavicular line. There is no heave.    Lungs: CTA.  No ronchi, wheezes, rales.  No dullness to percussion.   Abdomen: Soft, nontender, nondistended. No organomegaly.  No bruits.   Extremities: No clubbing, cyanosis, or edema.  The pulses are +4/4 at the radial, brachial, femoral, popliteal, DP, and PT sites bilaterally.  No bruits are noted.  Neurologic: Alert and oriented to person/place/time, normal speech, gait and affect  Skin: No petechiae, purpura or rash.    Labs:  LIPID RESULTS:  Lab Results   Component Value Date    CHOL 192 10/24/2017    HDL 47 (L) 10/24/2017     (H) 10/24/2017    TRIG 227 (H) 10/24/2017    CHOLHDLRATIO 4.5 07/01/2015    NHDL 145 (H) 10/24/2017       LIVER ENZYME RESULTS:  Lab Results   Component Value Date    AST 27 10/24/2017    ALT 42 10/24/2017       CBC RESULTS:  Lab Results   Component Value Date    WBC 9.3 05/31/2017    RBC 4.27 05/31/2017    HGB 13.1 05/31/2017    HCT 39.3 05/31/2017    MCV 92 05/31/2017 "    MCH 30.7 05/31/2017    MCHC 33.3 05/31/2017    RDW 12.0 05/31/2017     05/31/2017       BMP RESULTS:  Lab Results   Component Value Date     10/24/2017    POTASSIUM 4.5 10/24/2017    CHLORIDE 105 10/24/2017    CO2 27 10/24/2017    ANIONGAP 9 10/24/2017     (H) 10/24/2017    BUN 17 10/24/2017    CR 0.81 10/24/2017    GFRESTIMATED 78 10/24/2017    GFRESTBLACK >90 10/24/2017    BILLY 8.8 10/24/2017        A1C RESULTS:  Lab Results   Component Value Date    A1C 8.5 (H) 10/24/2017       INR RESULTS:  Lab Results   Component Value Date    INR 1.07 11/15/2005       Cardiac data:  CMR 8/18/16:  1.  Normal left ventricular size and systolic function with a calculated ejection fraction of  64 %.  2.  Normal right ventricular size and systolic function with a calculated ejection fraction of 67%.    3.  Asymmetrical septal hypertrophy measuring 1.5 cm.    4.  There is systolic anterior motion of the mitral valve.    5.  On late gadolinium enhancement imaging, there is mild patchy hyperenhancement in the septal segments to suggest myocardial fibrosis.    6.  Overall this findings are suggestive of hypertropic cardiomyopathy     ECG May 2017  NSR no acute ST-T changes       Echo 6-25-15  There is borderline concentric left ventricular hypertrophy. The visual ejection fraction is estimated at 55-60%. Cannot exclude, but doubt small ASD.--Consider bubble study if concerned, but there is no right heart enlargement. The left atrium is borderline dilated.      Assessment and Plan:   Ms. Sun is a 37 y.o.woman with      1. HCM, CMR suggestive with asymmetrical septal hypertrophy of 15 mm  2. Family history of HCM  3. Insulin requiring DM, controlled  4. Obesity  5. Hiatal hernia, gastroparesis  6. Blood pressure controlled  7. Hypertension, treated  8. Hyperlipidemia, treated        It is very reassuring that Ms. Sun does not have any high risk features for SCD - septum is <30mm, no family history of SCD,  personal history of syncope, VT or hypotension with exercise.   --Will add atenolol today for tachycardia, given her recent palpitations  --Will switch to lipitor 20mg daily from simvastatin. States she is able to tolerate low dose lipitor  --Lipid panel in 6-8 weeks  --Will ask CV genetic counselor to follow up with Ms. Sun  --F/u 1 year    Patient seen and discussed with Dr. Zuleima Jimenez MD  Cardiology Fellow  [phone]    ATTENDING ATTESTATION:  This patient has been seen and examined by me December 4, 2017 with Dr. Jimenez. I have reviewed the vitals, laboratory and imaging data relevant to this patient's care. I have edited this note to reflect our joint assessment and plan, and discussed the plan with the patient.    Melanie Dewey MD, MS  Staff Cardiologist  Pager: [phone]    CC  Patient Care Team:  Janett Estrada MD as PCP - General (Internal Medicine)  Melanie Dewey MD as MD (Cardiology)  Prakash García MD as Resident (Student in organized health care education/training program)  Calvin Milton MD as MD (Family Practice)  Rosmery Cross RD as Registered Dietitian  JANETT ESTRADA

## 2017-12-04 NOTE — PATIENT INSTRUCTIONS
Patient Instructions:  It was a pleasure to see you in the cardiology clinic today.      If you have any questions, you can reach my nurse, Dahlia Kamara, at [phone].  Press Option #1 for the [facility], and then press Option #3 for nursing.  We are encouraging the use of Intellikine to communicate with your HealthCare Provider    Note the new medications: Lipitor (Atorvastatin) 20 mg by mouth daily  Lipid panel is 6-8 weeks  Start taking Atenolol 25 mg by mouth everyday  Stop the following medications: Simvastatin    Follow the American Heart Association Diet and Lifestyle recommendations:  Limit saturated fat, trans fat, sodium, red meat, sweets and sugar-sweetened beverages. If you choose to eat red meat, compare labels and select the leanest cuts available.  Aim for at least 150 minutes of moderate physical activity or 75 minutes of vigorous physical activity - or an equal combination of both - each week.    The results from today include: none  Please follow up with Dr. Melanie Dewey in one year with an ECHO    Sincerely,    Melanie Dewey MD     If you have an urgent need after hours (8:00 am to 4:30 pm) please call [phone] and ask for the cardiology fellow on call.

## 2017-12-04 NOTE — PROGRESS NOTES
Cardiology clinic follow up    HPI:  Ms. Vogel is a pleasant 37 y.o.woman with insulin requiring type 2 diabetes and a family history of hypertrophic cardiomyopathy. She has hypertension and hyperlipidemia.She has had ECHOs and MRI periodically during the last 23 years since her mother was diagnosed with HC.  Her most recent MRI on 8/8/2016 was suggestive of hypertropic cardiomyopathy, asymmetrical septal hypertrophy measuring 1.5 cm; systolic anterior motion of the mitral valve; late gadolinium enhancement imaging,with mild patchy hyperenhancement in the septal segments to suggest myocardial fibrosis.        Interval History:   Patient last seen in October 2016. She had treadmill stress test and Holter that did not demonstrate VT or hypotension. She was seen by our genetic counselor. She otherwise has no chest pain, shortness of breath, syncope but she mentions palpitations. She has visited the ED a couple of times with palpitations and she was found to have tachycardia.     PAST MEDICAL HISTORY:  Past Medical History:   Diagnosis Date     Allergic rhinitis due to pollen      Atypical glandular cells on Pap smear 3/1108     Gastroesophageal reflux disease      PFO (patent foramen ovale)      Type II or unspecified type diabetes mellitus without mention of complication, not stated as uncontrolled 2002    onset was gestational in 2001       CURRENT MEDICATIONS:  Current Outpatient Prescriptions   Medication Sig Dispense Refill     atenolol (TENORMIN) 25 MG tablet Take 1 tablet (25 mg) by mouth daily 90 tablet 3     atorvastatin (LIPITOR) 20 MG tablet Take 1 tablet (20 mg) by mouth daily 90 tablet 3     doxycycline (VIBRAMYCIN) 100 MG capsule Take 1 capsule (100 mg) by mouth 2 times daily 20 capsule 0     fluticasone (FLONASE) 50 MCG/ACT spray Spray 1-2 sprays into both nostrils daily 1 Bottle 0     omeprazole (PRILOSEC) 40 MG capsule Take 1 capsule (40 mg) by mouth daily 90 capsule 0     losartan (COZAAR) 50 MG  "tablet Take 1 tab ( 50 mg)  qam and 1/2 tab ( 25 mg)  q pm 135 tablet 1     ergocalciferol (ERGOCALCIFEROL) 49836 UNITS capsule Take 1 capsule (50,000 Units) by mouth once a week 14 capsule 0     PEPE CONTOUR NEXT test strip Check 4 times/day 120 each 11     insulin aspart (NOVOLOG VIAL) 100 UNITS/ML injection With insulin pump. Uses about 80 units/day. 30 mL 3     SUMAtriptan (IMITREX) 25 MG tablet Take 25-100mg one time. May repeat 25mg every two hours up to a maximum of 200mg in 24 hours. 8 tablet 6     montelukast (SINGULAIR) 10 MG tablet Take 1 tablet (10 mg) by mouth At Bedtime 90 tablet 1     metFORMIN (GLUCOPHAGE-XR) 500 MG 24 hr tablet Take 2 tablets (1,000 mg) by mouth 2 times daily (with meals) 360 tablet 0     EPINEPHrine 0.3 MG/0.3ML injection Inject 0.3 mLs (0.3 mg) into the muscle once as needed for anaphylaxis 0.3 mL 11     INSULIN PUMP - OUTPATIENT Updated 1/27/17:  Medtronic Minimed: Model 723  BASAL:  00:00: 1.0 units/hr  10:30: 0.95  14:00: 1.05  CARB RATIO:  00:00 1:7  1600  1:8  C. Factor:  27 mg/dL  BLOOD GLUCOSE TARGET and times:  12   AM (midnight):   Active Insulin Time:  3 hours  Basal to Bolus Ratio:   Sensor:  No  Carelink / Diasend username:  [email or web address]  Carelink / Diasend Password:  [email or web address]       insulin cartridge (PARADIGM 3ML) misc pump supply Change every 3 days1 applicator every 72 hours Change every 3 days 30 each 3     infusion set (PARADIGM QUICK-SET 23\" 9MM) misc pump supply Change every 3 days as directed 30 each 3     blood glucose monitoring (NO BRAND SPECIFIED) meter device kit Use to test blood sugar 6 times daily and as needed. 1 kit 0     order for DME Equipment being ordered: skin prep wipes 100 each prn     aspirin 81 MG tablet Take 1 tablet (81 mg) by mouth daily 90 tablet 3     cetirizine (ZYRTEC) 10 MG tablet Take 10 mg by mouth 2 times daily  90 tablet 3     ibuprofen (ADVIL) 200 MG tablet Take 200 mg by mouth every 4 hours as needed.       Lancets " (MICROLET) MISC 1 Device See Admin Instructions. Use up to 5 times daily for blood sugar checks. 100 each prn     Multiple Vitamins-Minerals (MULTI VITAMIN/MINERALS PO) Take 1 each by mouth daily.         PAST SURGICAL HISTORY:  Past Surgical History:   Procedure Laterality Date     C INDUCED ABORTN BY D&C           C IUD,MIRENA  8/10/10, 7/28/15     C/SECTION, LOW TRANSVERSE  6/25/10    , Low Transverse     CHOLECYSTECTOMY, LAPOROSCOPIC      Cholecystectomy, Laparoscopic     ESOPHAGOSCOPY, GASTROSCOPY, DUODENOSCOPY (EGD), COMBINED N/A 2016    Procedure: COMBINED ESOPHAGOSCOPY, GASTROSCOPY, DUODENOSCOPY (EGD), BIOPSY SINGLE OR MULTIPLE;  Surgeon: Fadi Hunter MD;  Location: [facility] ESOPHAGEAL MOTILITY STUDY N/A 2016    Procedure: ESOPHAGEAL MOTILITY STUDY;  Surgeon: Fadi Hunter MD;  Location:  GI      REMOVE TONSILS/ADENOIDS,<11 Y/O      T &A       ALLERGIES     Allergies   Allergen Reactions     Ivp Dye [Contrast Dye] Itching     Pt reports that throat itches     Nuts Anaphylaxis     [place] nuts only     Bactrim Swelling     Pt reaction was swelling in mouth and tongue and itchy mouth.  Resolved with benadryl and prednisone     Penicillins Hives     Cephalosporins Itching     Seasonal Allergies Other (See Comments)     Molds, trees, grass, dust..  Upper congestion     Atorvastatin      myalgias     Shellfish Allergy Rash     Clams only       FAMILY HISTORY:  Family History   Problem Relation Age of Onset     Cardiovascular Mother      hypertrophic cardiomyopathy     Genitourinary Problems Mother      gallbladder disease     DIABETES Mother      drug induced     Other - See Comments Mother      heart transplant 2005     DIABETES Father      type 2     Hypertension Father      Genitourinary Problems Maternal Grandmother      gallbladder disease     Genitourinary Problems Paternal Grandmother      gallbladder disease     Hypertension Paternal  Grandfather      high bp     CEREBROVASCULAR DISEASE Paternal Grandfather      Cardiovascular Brother      hypertrophic cardiomyopathy     DIABETES Brother      type 2       SOCIAL HISTORY:  Social History     Social History     Marital status:      Spouse name: N/A     Number of children: 2     Years of education: 14     Occupational History     nurse      Social History Main Topics     Smoking status: Former Smoker     Types: Cigarettes     Quit date: 10/24/2005     Smokeless tobacco: Former User      Comment: States only smokes when drinks maybe 2x/year     Alcohol use No     Drug use: No     Sexual activity: Yes     Partners: Male     Birth control/ protection: IUD      Comment: Mirena IUD 7/28/15     Other Topics Concern     None     Social History Narrative    Caffeine intake/servings daily - 6-7    Calcium intake/servings daily - 2-3 yogurt, milk, cheese    Exercise 1 times weekly - describe aerobics    Sunscreen used - Yes    Seatbelts used - Yes    Guns stored in the home - No    Self Breast Exam - Yes    Pap test up to date -  Yes    Eye exam up to date -  Yes    Dental exam up to date -  Yes    DEXA scan up to date -  Not Applicable    Flex Sig/Colonoscopy up to date -  Not Applicable    Mammography up to date -  Not Applicable    Immunizations reviewed and up to date - Yes    Abuse: Current or Past (Physical, Sexual or Emotional) - No    Do you feel safe in your environment - Yes    Do you cope well with stress - Yes    Do you suffer from insomnia - Yes     Last updated by: Tatianna Lacey  5/9/2005           ROS:   Constitutional: No fever, chills, or sweats. No weight gain/loss   ENT: No visual disturbance, ear ache, epistaxis, sore throat  Allergies/Immunologic: Negative.   Respiratory: No cough, hemoptysia  Cardiovascular: As per HPI  GI: No nausea, vomiting, hematemesis, melena, or hematochezia  : No urinary frequency, dysuria, or hematuria  Integument: Negative  Psychiatric:  "Negative  Neuro: Negative  Endocrinology: Negative   Musculoskeletal: Negative    EXAM:  /87 (BP Location: Left arm, Cuff Size: Adult Regular)  Pulse 94  Ht 1.626 m (5' 4\")  Wt 86.5 kg (190 lb 11.2 oz)  SpO2 95%  BMI 32.73 kg/m2  In general, the patient is a pleasant female in no apparent distress.    HEENT: NC/AT.  PERRLA.  EOMI.  Sclerae white, not injected.  Nares clear.  Pharynx without erythema or exudate.  Dentition intact.    Neck: No adenopathy.  No thyromegaly. Carotids +4/4 bilaterally without bruits.  No jugular venous distension.   Heart: RRR. Normal S1, S2 splits physiologically. No murmur, rub, click, or gallop. The PMI is in the 5th ICS in the midclavicular line. There is no heave.    Lungs: CTA.  No ronchi, wheezes, rales.  No dullness to percussion.   Abdomen: Soft, nontender, nondistended. No organomegaly.  No bruits.   Extremities: No clubbing, cyanosis, or edema.  The pulses are +4/4 at the radial, brachial, femoral, popliteal, DP, and PT sites bilaterally.  No bruits are noted.  Neurologic: Alert and oriented to person/place/time, normal speech, gait and affect  Skin: No petechiae, purpura or rash.    Labs:  LIPID RESULTS:  Lab Results   Component Value Date    CHOL 192 10/24/2017    HDL 47 (L) 10/24/2017     (H) 10/24/2017    TRIG 227 (H) 10/24/2017    CHOLHDLRATIO 4.5 07/01/2015    NHDL 145 (H) 10/24/2017       LIVER ENZYME RESULTS:  Lab Results   Component Value Date    AST 27 10/24/2017    ALT 42 10/24/2017       CBC RESULTS:  Lab Results   Component Value Date    WBC 9.3 05/31/2017    RBC 4.27 05/31/2017    HGB 13.1 05/31/2017    HCT 39.3 05/31/2017    MCV 92 05/31/2017    MCH 30.7 05/31/2017    MCHC 33.3 05/31/2017    RDW 12.0 05/31/2017     05/31/2017       BMP RESULTS:  Lab Results   Component Value Date     10/24/2017    POTASSIUM 4.5 10/24/2017    CHLORIDE 105 10/24/2017    CO2 27 10/24/2017    ANIONGAP 9 10/24/2017     (H) 10/24/2017    BUN 17 " 10/24/2017    CR 0.81 10/24/2017    GFRESTIMATED 78 10/24/2017    GFRESTBLACK >90 10/24/2017    BILLY 8.8 10/24/2017        A1C RESULTS:  Lab Results   Component Value Date    A1C 8.5 (H) 10/24/2017       INR RESULTS:  Lab Results   Component Value Date    INR 1.07 11/15/2005       Cardiac data:  CMR 8/18/16:  1.  Normal left ventricular size and systolic function with a calculated ejection fraction of  64 %.  2.  Normal right ventricular size and systolic function with a calculated ejection fraction of 67%.    3.  Asymmetrical septal hypertrophy measuring 1.5 cm.    4.  There is systolic anterior motion of the mitral valve.    5.  On late gadolinium enhancement imaging, there is mild patchy hyperenhancement in the septal segments to suggest myocardial fibrosis.    6.  Overall this findings are suggestive of hypertropic cardiomyopathy     ECG May 2017  NSR no acute ST-T changes       Echo 6-25-15  There is borderline concentric left ventricular hypertrophy. The visual ejection fraction is estimated at 55-60%. Cannot exclude, but doubt small ASD.--Consider bubble study if concerned, but there is no right heart enlargement. The left atrium is borderline dilated.      Assessment and Plan:   Ms. Sun is a 37 y.o.woman with      1. HCM, CMR suggestive with asymmetrical septal hypertrophy of 15 mm  2. Family history of HCM  3. Insulin requiring DM, controlled  4. Obesity  5. Hiatal hernia, gastroparesis  6. Blood pressure controlled  7. Hypertension, treated  8. Hyperlipidemia, treated        It is very reassuring that Ms. Sun does not have any high risk features for SCD - septum is <30mm, no family history of SCD, personal history of syncope, VT or hypotension with exercise.   --Will add atenolol today for tachycardia, given her recent palpitations  --Will switch to lipitor 20mg daily from simvastatin. States she is able to tolerate low dose lipitor  --Lipid panel in 6-8 weeks  --Will ask CV genetic counselor to  follow up with Ms. Sun  --F/u 1 year    Patient seen and discussed with Dr. Zuleima Jimenez MD  Cardiology Fellow  [phone]    ATTENDING ATTESTATION:  This patient has been seen and examined by me December 4, 2017 with Dr. Jimenez. I have reviewed the vitals, laboratory and imaging data relevant to this patient's care. I have edited this note to reflect our joint assessment and plan, and discussed the plan with the patient.    Melanie Dewey MD, MS  Staff Cardiologist  Pager: [phone]    CC  Patient Care Team:  Janett Estrada MD as PCP - General (Internal Medicine)  Melanie Dewey MD as MD (Cardiology)  Prakash García MD as Resident (Student in organized health care education/training program)  Calvin Milton MD as MD (Family Practice)  Rosmery Cross RD as Registered Dietitian  JANETT ESTRADA

## 2017-12-15 DIAGNOSIS — E11.21 TYPE 2 DIABETES MELLITUS WITH DIABETIC NEPHROPATHY, WITH LONG-TERM CURRENT USE OF INSULIN (H): ICD-10-CM

## 2017-12-15 DIAGNOSIS — Z79.4 TYPE 2 DIABETES MELLITUS WITH DIABETIC NEPHROPATHY, WITH LONG-TERM CURRENT USE OF INSULIN (H): ICD-10-CM

## 2017-12-19 RX ORDER — METFORMIN HCL 500 MG
1000 TABLET, EXTENDED RELEASE 24 HR ORAL 2 TIMES DAILY WITH MEALS
Qty: 360 TABLET | Refills: 0 | Status: SHIPPED | OUTPATIENT
Start: 2017-12-19 | End: 2018-05-16

## 2017-12-20 NOTE — TELEPHONE ENCOUNTER
Requested Prescriptions   Pending Prescriptions Disp Refills     metFORMIN (GLUCOPHAGE-XR) 500 MG 24 hr tablet 360 tablet 0     Sig: Take 2 tablets (1,000 mg) by mouth 2 times daily (with meals)    Biguanide Agents Failed    12/15/2017  7:31 PM       Failed - Patient does NOT have a diagnosis of CHF.       Passed - Patient's BP is less than 140/90    BP Readings from Last 3 Encounters:   12/04/17 128/87   12/02/17 136/88   10/24/17 120/80                Passed - Patient has documented LDL within the past 12 mos.    Recent Labs   Lab Test  10/24/17   1108   LDL  100*            Passed - Patient has had a Microalbumin in the past 12 mos.    Recent Labs   Lab Test  10/24/17   1107   MICROL  1510   UMALCR  1170.54*            Passed - Patient is age 10 or older       Passed - Patient has documented A1c within the specified period of time.    Recent Labs   Lab Test  10/24/17   1108   A1C  8.5*            Passed - Patient's CR is NOT>1.4 OR Patient's EGFR is NOT<45 within past 12 mos.    Recent Labs   Lab Test  10/24/17   1108   GFRESTIMATED  78   GFRESTBLACK  >90       Recent Labs   Lab Test  10/24/17   1108   CR  0.81            Passed - Patient is not pregnant       Passed - Patient has not had a positive pregnancy test within the past 12 mos.        Passed - Recent (6 mos) or future visit with authorizing provider's specialty    Patient had office visit in the last 6 months or has a visit in the next 30 days with authorizing provider.  See chart review.             Saw Dr. Alford 10-24-17 with f/u recommended in .  Medication is being filled for 1 time refill only due to:  appt will be due   Sandhya Shah RN

## 2018-01-12 ENCOUNTER — HOSPITAL ENCOUNTER (EMERGENCY)
Facility: CLINIC | Age: 38
Discharge: HOME | End: 2018-01-13
Attending: EMERGENCY MEDICINE | Admitting: EMERGENCY MEDICINE
Payer: OTHER MISCELLANEOUS

## 2018-01-12 ENCOUNTER — APPOINTMENT (OUTPATIENT)
Dept: GENERAL RADIOLOGY | Facility: CLINIC | Age: 38
End: 2018-01-12
Attending: EMERGENCY MEDICINE
Payer: OTHER MISCELLANEOUS

## 2018-01-12 VITALS
HEIGHT: 64 IN | WEIGHT: 193 LBS | SYSTOLIC BLOOD PRESSURE: 113 MMHG | DIASTOLIC BLOOD PRESSURE: 98 MMHG | RESPIRATION RATE: 18 BRPM | BODY MASS INDEX: 32.95 KG/M2 | OXYGEN SATURATION: 97 % | TEMPERATURE: 98.4 F | HEART RATE: 80 BPM

## 2018-01-12 DIAGNOSIS — M25.531 RIGHT WRIST PAIN: ICD-10-CM

## 2018-01-12 PROCEDURE — 99284 EMERGENCY DEPT VISIT MOD MDM: CPT | Mod: Z6 | Performed by: EMERGENCY MEDICINE

## 2018-01-12 PROCEDURE — 99284 EMERGENCY DEPT VISIT MOD MDM: CPT | Performed by: EMERGENCY MEDICINE

## 2018-01-12 PROCEDURE — 29125 APPL SHORT ARM SPLINT STATIC: CPT | Mod: RT | Performed by: EMERGENCY MEDICINE

## 2018-01-12 PROCEDURE — 73110 X-RAY EXAM OF WRIST: CPT | Mod: RT

## 2018-01-12 ASSESSMENT — ENCOUNTER SYMPTOMS
LIGHT-HEADEDNESS: 0
FEVER: 0
ARTHRALGIAS: 1
POLYDIPSIA: 0
BRUISES/BLEEDS EASILY: 0
COLOR CHANGE: 0
SHORTNESS OF BREATH: 0
ADENOPATHY: 0
BACK PAIN: 0
NAUSEA: 0
VOMITING: 0
CHILLS: 0
JOINT SWELLING: 0
AGITATION: 0
DIFFICULTY URINATING: 0
ABDOMINAL PAIN: 0
NECK PAIN: 0
NECK STIFFNESS: 0

## 2018-01-12 NOTE — ED AVS SNAPSHOT
[facility], [place], Emergency Department    [address] [place] [id number]    Phone:  [phone]                                       Marie Vogel   MRN: [id number]    Department:  [facility], Emergency Department   Date of Visit:  1/12/2018           After Visit Summary Signature Page     I have received my discharge instructions, and my questions have been answered. I have discussed any challenges I see with this plan with the nurse or doctor.    ..........................................................................................................................................  Patient/Patient Representative Signature      ..........................................................................................................................................  Patient Representative Print Name and Relationship to Patient    ..................................................               ................................................  Date                                            Time    ..........................................................................................................................................  Reviewed by Signature/Title    ...................................................              ..............................................  Date                                                            Time

## 2018-01-12 NOTE — ED AVS SNAPSHOT
[facility], Emergency Department    [address] [address]    Phone:  [phone]                                       Marie Vogel   MRN: [id number]    Department:  [facility], Emergency Department   Date of Visit:  1/12/2018           Patient Information     Date Of Birth          1980        Your diagnoses for this visit were:     Right wrist pain        You were seen by Raysa Tucker MD.        Discharge Instructions       Please make an appointment to follow up with [employer] (phone: [phone]) in 3 days for further evaluation and clearance to return to work.      Discharge References/Attachments     UPPER EXTREMITY CONTUSION (ENGLISH)      24 Hour Appointment Hotline       To make an appointment at any [facility] clinic, call [phone] ([phone]). If you don't have a family doctor or clinic, we will help you find one. [facility] clinics are conveniently located to serve the needs of you and your family.          ED Discharge Orders     Titan wrist support w/thumb                    Review of your medicines      START taking        Dose / Directions Last dose taken    naproxen 500 MG tablet   Commonly known as:  NAPROSYN   Dose:  500 mg   Quantity:  16 tablet        Take 1 tablet (500 mg) by mouth 2 times daily (with meals) for 8 days   Refills:  0          Our records show that you are taking the medicines listed below. If these are incorrect, please call your family doctor or clinic.        Dose / Directions Last dose taken    ADVIL 200 MG tablet   Dose:  200 mg   Generic drug:  ibuprofen        Take 200 mg by mouth every 4 hours as needed.   Refills:  0        aspirin 81 MG tablet   Dose:  81 mg   Quantity:  90 tablet        Take 1 tablet (81 mg) by mouth daily   Refills:  3        atenolol 25 MG tablet   Commonly known as:  TENORMIN   Dose:  25 mg   Quantity:  90 tablet        Take 1 tablet (25 mg) by mouth daily   Refills:  3         atorvastatin 20 MG tablet   Commonly known as:  LIPITOR   Dose:  20 mg   Quantity:  90 tablet        Take 1 tablet (20 mg) by mouth daily   Refills:  3        PEPE CONTOUR NEXT test strip   Quantity:  120 each   Generic drug:  blood glucose monitoring        Check 4 times/day   Refills:  11        blood glucose monitoring lancets   Dose:  1 Device   Quantity:  100 each        1 Device See Admin Instructions. Use up to 5 times daily for blood sugar checks.   Refills:  prn        blood glucose monitoring meter device kit   Commonly known as:  no brand specified   Quantity:  1 kit        Use to test blood sugar 6 times daily and as needed.   Refills:  0        cetirizine 10 MG tablet   Commonly known as:  zyrTEC   Dose:  10 mg   Quantity:  90 tablet        Take 10 mg by mouth 2 times daily   Refills:  3        doxycycline 100 MG capsule   Commonly known as:  VIBRAMYCIN   Dose:  100 mg   Quantity:  20 capsule        Take 1 capsule (100 mg) by mouth 2 times daily   Refills:  0        EPINEPHrine 0.3 MG/0.3ML injection 2-pack   Commonly known as:  EPIPEN/ADRENACLICK/or ANY BX GENERIC EQUIV   Dose:  0.3 mg   Quantity:  0.3 mL        Inject 0.3 mLs (0.3 mg) into the muscle once as needed for anaphylaxis   Refills:  11        ergocalciferol 38885 UNITS capsule   Commonly known as:  ERGOCALCIFEROL   Dose:  59325 Units   Quantity:  14 capsule        Take 1 capsule (50,000 Units) by mouth once a week   Refills:  0        fluticasone 50 MCG/ACT spray   Commonly known as:  FLONASE   Dose:  1-2 spray   Quantity:  1 Bottle        Spray 1-2 sprays into both nostrils daily   Refills:  0        insulin aspart 100 UNITS/ML injection   Commonly known as:  NovoLOG VIAL   Quantity:  30 mL        With insulin pump. Uses about 80 units/day.   Refills:  3        * insulin cartridge misc pump supply   Quantity:  30 each        Change every 3 days1 applicator every 72 hours Change every 3 days   Refills:  3        * infusion set misc pump  supply   Quantity:  30 each        Change every 3 days as directed   Refills:  3        insulin pump infusion        Updated 1/27/17: Medtronic Minimed: Model 723 BASAL: 00:00: 1.0 units/hr 10:30: 0.95 14:00: 1.05 CARB RATIO: 00:00 1:7 1600  1:8 C. Factor: 27 mg/dL BLOOD GLUCOSE TARGET and times: 12   AM (midnight):  Active Insulin Time:  3 hours Basal to Bolus Ratio:  Sensor:  No Carelink / Diasend username:  [email or web address] Carelink / Diasend Password:  [email or web address]   Refills:  0        losartan 50 MG tablet   Commonly known as:  COZAAR   Quantity:  135 tablet        Take 1 tab ( 50 mg)  qam and 1/2 tab ( 25 mg)  q pm   Refills:  1        metFORMIN 500 MG 24 hr tablet   Commonly known as:  GLUCOPHAGE-XR   Dose:  1000 mg   Quantity:  360 tablet        Take 2 tablets (1,000 mg) by mouth 2 times daily (with meals)   Refills:  0        montelukast 10 MG tablet   Commonly known as:  SINGULAIR   Dose:  10 mg   Quantity:  90 tablet        Take 1 tablet (10 mg) by mouth At Bedtime   Refills:  1        MULTI VITAMIN/MINERALS PO   Dose:  1 each        Take 1 each by mouth daily.   Refills:  0        omeprazole 40 MG capsule   Commonly known as:  priLOSEC   Dose:  40 mg   Quantity:  90 capsule        Take 1 capsule (40 mg) by mouth daily   Refills:  0        order for DME   Quantity:  100 each        Equipment being ordered: skin prep wipes   Refills:  prn        SUMAtriptan 25 MG tablet   Commonly known as:  IMITREX   Quantity:  8 tablet        Take 25-100mg one time. May repeat 25mg every two hours up to a maximum of 200mg in 24 hours.   Refills:  6        * Notice:  This list has 2 medication(s) that are the same as other medications prescribed for you. Read the directions carefully, and ask your doctor or other care provider to review them with you.            Prescriptions were sent or printed at these locations (1 Prescription)                   Other Prescriptions                Printed at Department/Unit printer (1 of 1)          naproxen (NAPROSYN) 500 MG tablet                Procedures and tests performed during your visit     Wrist XR, G/E 3 views, right      Orders Needing Specimen Collection     None      Pending Results     Date and Time Order Name Status Description    1/12/2018 2309 Wrist XR, G/E 3 views, right In process             Pending Culture Results     No orders found for last 3 day(s).            Pending Results Instructions     If you had any lab results that were not finalized at the time of your Discharge, you can call the ED Lab Result RN at [phone]. You will be contacted by this team for any positive Lab results or changes in treatment. The nurses are available 7 days a week from 10A to 6:30P.  You can leave a message 24 hours per day and they will return your call.        Thank you for choosing [facility]       Thank you for choosing [facility] for your care. Our goal is always to provide you with excellent care. Hearing back from our patients is one way we can continue to improve our services. Please take a few minutes to complete the written survey that you may receive in the mail after you visit with us. Thank you!        [employer] Information     [employer] gives you secure access to your electronic health record. If you see a primary care provider, you can also send messages to your care team and make appointments. If you have questions, please call your primary care clinic.  If you do not have a primary care provider, please call [phone] and they will assist you.        Care EveryWhere ID     This is your Care EveryWhere ID. This could be used by other organizations to access your [facility] medical records  [id number]        Equal Access to Services     KO PUGA : Hema Weber, kj mondragon, qaprabhakar kaalmajr cortes . So [facility] [phone].    ATENCIÓN: Si habla español, tiene a woodruff disposición servicios gratuitos de asistencia  bertrand Montoyaelly al [phone].    We comply with applicable federal civil rights laws and [place] laws. We do not discriminate on the basis of race, color, national origin, age, disability, sex, sexual orientation, or gender identity.            After Visit Summary       This is your record. Keep this with you and show to your community pharmacist(s) and doctor(s) at your next visit.

## 2018-01-13 RX ORDER — NAPROXEN 500 MG/1
500 TABLET ORAL 2 TIMES DAILY WITH MEALS
Qty: 16 TABLET | Refills: 0 | Status: SHIPPED | OUTPATIENT
Start: 2018-01-13 | End: 2018-01-21

## 2018-01-13 NOTE — ED PROVIDER NOTES
"    [place] EMERGENCY DEPARTMENT ([facility])  18   History     Chief Complaint   Patient presents with     Wrist Pain     HPI  Marie Vogel is a 37 year old female who presents to the Emergency Department for evaluation of right wrist pain.  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  She reports that this happened at approximately 2 PM and was immediately followed by a burning pain in her right wrist.  She states that this pain improved; however, at approximately 7 PM tonight the pain returned and her right thumb started getting cold.  She also notes that she has a \"weird bump\" at the base of her right thumb.  Pain is mild, throbbing, nonradiating, constant, nothing makes it better or worse.  She did not take any medications for pain.  She denies any history of osteoporosis.    I have reviewed the Medications, Allergies, Past Medical and Surgical History, and Social History in the [employer] system.    Past Medical History:   Diagnosis Date     Allergic rhinitis due to pollen      Atypical glandular cells on Pap smear 3/1108     Gastroesophageal reflux disease      PFO (patent foramen ovale)      Type II or unspecified type diabetes mellitus without mention of complication, not stated as uncontrolled     onset was gestational in        Past Surgical History:   Procedure Laterality Date     C INDUCED ABORTN BY D&C           C IUD,MIRENA  8/10/10, 7/28/15     C/SECTION, LOW TRANSVERSE  6/25/10    , Low Transverse     CHOLECYSTECTOMY, LAPOROSCOPIC      Cholecystectomy, Laparoscopic     ESOPHAGOSCOPY, GASTROSCOPY, DUODENOSCOPY (EGD), COMBINED N/A 2016    Procedure: COMBINED ESOPHAGOSCOPY, GASTROSCOPY, DUODENOSCOPY (EGD), BIOPSY SINGLE OR MULTIPLE;  Surgeon: Fadi Hunter MD;  Location:  GI      ESOPHAGEAL MOTILITY STUDY N/A 2016    Procedure: ESOPHAGEAL " "MOTILITY STUDY;  Surgeon: Fadi Hunter MD;  Location:  GI     HC REMOVE TONSILS/ADENOIDS,<11 Y/O  1987    T &A       Family History   Problem Relation Age of Onset     Cardiovascular Mother      hypertrophic cardiomyopathy     Genitourinary Problems Mother      gallbladder disease     DIABETES Mother      drug induced     Other - See Comments Mother      heart transplant 11/23/2005     DIABETES Father      type 2     Hypertension Father      Genitourinary Problems Maternal Grandmother      gallbladder disease     Genitourinary Problems Paternal Grandmother      gallbladder disease     Hypertension Paternal Grandfather      high bp     CEREBROVASCULAR DISEASE Paternal Grandfather      Cardiovascular Brother      hypertrophic cardiomyopathy     DIABETES Brother      type 2       Social History   Substance Use Topics     Smoking status: Former Smoker     Types: Cigarettes     Quit date: 10/24/2005     Smokeless tobacco: Former User      Comment: States only smokes when drinks maybe 2x/year     Alcohol use No       No current facility-administered medications for this encounter.      Current Outpatient Prescriptions   Medication     naproxen (NAPROSYN) 500 MG tablet     metFORMIN (GLUCOPHAGE-XR) 500 MG 24 hr tablet     atenolol (TENORMIN) 25 MG tablet     atorvastatin (LIPITOR) 20 MG tablet     doxycycline (VIBRAMYCIN) 100 MG capsule     fluticasone (FLONASE) 50 MCG/ACT spray     omeprazole (PRILOSEC) 40 MG capsule     losartan (COZAAR) 50 MG tablet     ergocalciferol (ERGOCALCIFEROL) 61960 UNITS capsule     PEPE CONTOUR NEXT test strip     insulin aspart (NOVOLOG VIAL) 100 UNITS/ML injection     SUMAtriptan (IMITREX) 25 MG tablet     montelukast (SINGULAIR) 10 MG tablet     EPINEPHrine 0.3 MG/0.3ML injection     INSULIN PUMP - OUTPATIENT     insulin cartridge (PARADIGM 3ML) [facility]     infusion set (PARADIGM QUICK-SET 23\" 9MM) [facility] pump supply     blood glucose monitoring (NO BRAND SPECIFIED) meter " "device kit     order for DME     aspirin 81 MG tablet     cetirizine (ZYRTEC) 10 MG tablet     Multiple Vitamins-Minerals (MULTI VITAMIN/MINERALS PO)     ibuprofen (ADVIL) 200 MG tablet     Lancets (MICROLET) MISC        Allergies   Allergen Reactions     Ivp Dye [Contrast Dye] Itching     Pt reports that throat itches     Nuts Anaphylaxis     [place]ariola nuts only     Bactrim Swelling     Pt reaction was swelling in mouth and tongue and itchy mouth.  Resolved with benadryl and prednisone     Penicillins Hives     Cephalosporins Itching     Seasonal Allergies Other (See Comments)     Molds, trees, grass, dust..  Upper congestion     Atorvastatin      myalgias     Shellfish Allergy Rash     Clams only         Review of Systems   Constitutional: Negative for chills and fever.   HENT: Negative for congestion.    Eyes: Negative for visual disturbance.   Respiratory: Negative for shortness of breath.    Cardiovascular: Negative for chest pain.   Gastrointestinal: Negative for abdominal pain, nausea and vomiting.   Endocrine: Negative for polydipsia and polyuria.   Genitourinary: Negative for difficulty urinating.   Musculoskeletal: Positive for arthralgias (right wrist pain). Negative for back pain, joint swelling, neck pain and neck stiffness.   Skin: Negative for color change.   Neurological: Negative for light-headedness.   Hematological: Negative for adenopathy. Does not bruise/bleed easily.   Psychiatric/Behavioral: Negative for agitation and behavioral problems.   All other systems reviewed and are negative.      Physical Exam   BP: (!) 113/98  Pulse: 80  Temp: 98.4  F (36.9  C)  Resp: 18  Height: 162.6 cm (5' 4\")  Weight: 87.5 kg (193 lb)  SpO2: 97 %      Physical Exam   Constitutional: She is oriented to person, place, and time. She appears well-developed and well-nourished. No distress.   HENT:   Head: Normocephalic.   Eyes: Conjunctivae and EOM are normal.   Neck: Normal range of motion.   Cardiovascular: Normal " rate.    Pulses:       Radial pulses are 2+ on the right side, and 2+ on the left side.   Pulmonary/Chest: Effort normal. No respiratory distress.   Musculoskeletal: Normal range of motion. She exhibits tenderness. She exhibits no edema or deformity.   Tenderness to palpation of anterior, radial aspect of right wrist.  There is no snuffbox tenderness in the right wrist.  Full range of motion, active and passive, and right wrist and all the digits of right hand.  No obvious deformity in right wrist.   Neurological: She is alert and oriented to person, place, and time. She has normal strength. No cranial nerve deficit or sensory deficit. She exhibits normal muscle tone. Coordination and gait normal. GCS eye subscore is 4. GCS verbal subscore is 5. GCS motor subscore is 6.   Skin: Skin is warm. No rash noted. She is not diaphoretic. No erythema.   Psychiatric: She has a normal mood and affect. Her behavior is normal. Judgment and thought content normal.   Nursing note and vitals reviewed.      ED Course   11:05 PM  The patient was seen and examined by Santana Tucker MD in [facility].     ED Course     Procedures             Critical Care time:  none             Labs Ordered and Resulted from Time of ED Arrival Up to the Time of Departure from the ED - No data to display         Assessments & Plan (with Medical Decision Making)   37-year-old woman presenting with right wrist pain.  Differential diagnosis: Contusion, bone bruise, sprain, fracture.    After thorough history and physical examination, patient appears to be in no acute distress.  I will obtain an x-ray for further diagnostic evaluation.  Patient agrees with the plan.  She also agrees to file a report through her employer since this was a work-related injury.    I reviewed patient's x-ray and I discussed it with the radiologist, Dr. Connelly; there is no evidence of fracture or dislocation.  Patient was placed in a thumb spica splint for comfort and will be  discharged with employee health follow-up since this was a work-related injury.  She agrees with the plan.    This part of the medical record was transcribed by Jhony Salmeron, Medical Scribe, from a dictation done by Santana Tucker MD.       I have reviewed the nursing notes.    I have reviewed the findings, diagnosis, plan and need for follow up with the patient.    New Prescriptions    NAPROXEN (NAPROSYN) 500 MG TABLET    Take 1 tablet (500 mg) by mouth 2 times daily (with meals) for 8 days       Final diagnoses:   Right wrist pain     I was physically present and have reviewed and verified the accuracy of this note documented by my scribe.    Raysa Tucker MD      1/12/2018   [facility], [place], EMERGENCY DEPARTMENT     Raysa Tucker MD  01/13/18 0019

## 2018-01-13 NOTE — DISCHARGE INSTRUCTIONS
Please make an appointment to follow up with Employee Health Services (phone: [phone]) in 3 days for further evaluation and clearance to return to work.

## 2018-01-13 NOTE — ED NOTES
Pt arrived in triage with concerns of a small hematoma on her R thumb. Pt hit wrist today on a bed at work and developed a small bruised bump. Hx of diabetes and on insulin pump.  in triage and gave 7.1 units of Novolog with her insulin pump.

## 2018-01-16 ENCOUNTER — TELEPHONE (OUTPATIENT)
Dept: CARDIOLOGY | Facility: CLINIC | Age: 38
End: 2018-01-16

## 2018-01-16 NOTE — TELEPHONE ENCOUNTER
Patient calling reporting that she started Atenolol and Lipitor in December.  Since that time patient has experienced leg cramps during the night and she also feels very fatigued.  Please call to discuss.  [phone].    January 17, 2018  Spoke with Marie about her leg cramping, she states that it's not all the time and she thinks she can tolerate it. I told her she should call if the cramping becomes worse.  In regards to the fatigue she will stay on the Atenolol but cut the dose to 12.5 mg and take it at night.

## 2018-01-17 RX ORDER — ATENOLOL 25 MG/1
12.5 TABLET ORAL DAILY
Qty: 90 TABLET | Refills: 3 | COMMUNITY
Start: 2018-01-17 | End: 2019-09-24

## 2018-01-23 ENCOUNTER — RADIANT APPOINTMENT (OUTPATIENT)
Dept: GENERAL RADIOLOGY | Facility: CLINIC | Age: 38
End: 2018-01-23
Attending: INTERNAL MEDICINE
Payer: COMMERCIAL

## 2018-01-23 ENCOUNTER — OFFICE VISIT (OUTPATIENT)
Dept: PEDIATRICS | Facility: CLINIC | Age: 38
End: 2018-01-23
Payer: COMMERCIAL

## 2018-01-23 VITALS
SYSTOLIC BLOOD PRESSURE: 108 MMHG | HEIGHT: 64 IN | HEART RATE: 91 BPM | WEIGHT: 193.9 LBS | TEMPERATURE: 98 F | DIASTOLIC BLOOD PRESSURE: 78 MMHG | BODY MASS INDEX: 33.1 KG/M2

## 2018-01-23 DIAGNOSIS — S69.91XA THUMB INJURY, RIGHT, INITIAL ENCOUNTER: Primary | ICD-10-CM

## 2018-01-23 DIAGNOSIS — S69.91XA THUMB INJURY, RIGHT, INITIAL ENCOUNTER: ICD-10-CM

## 2018-01-23 PROCEDURE — 99213 OFFICE O/P EST LOW 20 MIN: CPT | Performed by: INTERNAL MEDICINE

## 2018-01-23 PROCEDURE — 73140 X-RAY EXAM OF FINGER(S): CPT | Mod: RT

## 2018-01-23 RX ORDER — OMEGA-3/DHA/EPA/FISH OIL 60 MG-90MG
1 CAPSULE ORAL
COMMUNITY
End: 2024-09-10

## 2018-01-23 NOTE — MR AVS SNAPSHOT
"              After Visit Summary   1/23/2018    Marie Vogel    MRN: [id number]           Patient Information     Date Of Birth          1980        Visit Information        Provider Department      1/23/2018 8:20 AM Alan Paredes MD [facility]        Today's Diagnoses     Thumb injury, right, initial encounter    -  1       Follow-ups after your visit        Follow-up notes from your care team     Return if symptoms worsen or fail to improve.      Who to contact     If you have questions or need follow up information about today's clinic visit or your schedule please contact [facility] directly at [phone].  Normal or non-critical lab and imaging results will be communicated to you by MyChart, letter or phone within 4 business days after the clinic has received the results. If you do not hear from us within 7 days, please contact the clinic through [employer] or phone. If you have a critical or abnormal lab result, we will notify you by phone as soon as possible.  Submit refill requests through [employer] or call your pharmacy and they will forward the refill request to us. Please allow 3 business days for your refill to be completed.          Additional Information About Your Visit        MyChart Information     [employer]reet gives you secure access to your electronic health record. If you see a primary care provider, you can also send messages to your care team and make appointments. If you have questions, please call your primary care clinic.  If you do not have a primary care provider, please call [phone] and they will assist you.        Care EveryWhere ID     This is your Care EveryWhere ID. This could be used by other organizations to access your [facility] medical records  [id number]        Your Vitals Were     Pulse Temperature Height BMI (Body Mass Index)          91 98  F (36.7  C) (Oral) 5' 4\" (1.626 m) 33.28 kg/m2         Blood Pressure from Last 3 Encounters: "   01/23/18 108/78   01/12/18 (!) 113/98   12/04/17 128/87    Weight from Last 3 Encounters:   01/23/18 193 lb 14.4 oz (88 kg)   01/12/18 193 lb (87.5 kg)   12/04/17 190 lb 11.2 oz (86.5 kg)               Primary Care Provider Office Phone # Fax #    Alan Paredes [phone] [address] [place] [address]        Equal Access to Services     [facility]: Hadii aad ku hadasho Soomaali, waaxda luqadaha, qaybta kaalmada adeegyada, waxay idiin hayaan adeeg lisseth peralta . So [facility] [phone].    ATENCIÓN: Si habla español, tiene a woodruff disposición servicios gratuitos de asistencia lingüística. [facility] [phone].    We comply with applicable federal civil rights laws and [place] laws. We do not discriminate on the basis of race, color, national origin, age, disability, sex, sexual orientation, or gender identity.            Thank you!     Thank you for choosing [facility]  for your care. Our goal is always to provide you with excellent care. Hearing back from our patients is one way we can continue to improve our services. Please take a few minutes to complete the written survey that you may receive in the mail after your visit with us. Thank you!             Your Updated Medication List - Protect others around you: Learn how to safely use, store and throw away your medicines at www.disposemymeds.org.          This list is accurate as of: 1/23/18 10:47 AM.  Always use your most recent med list.                   Brand Name Dispense Instructions for use Diagnosis    ADVIL 200 MG tablet   Generic drug:  ibuprofen      Take 200 mg by mouth every 4 hours as needed.        aspirin 81 MG tablet     90 tablet    Take 1 tablet (81 mg) by mouth daily    PFO (patent foramen ovale)       atenolol 25 MG tablet    TENORMIN    90 tablet    Take 0.5 tablets (12.5 mg) by mouth daily        atorvastatin 20 MG tablet    LIPITOR    90 tablet    Take 1 tablet (20 mg) by mouth  daily        PEPE CONTOUR NEXT test strip   Generic drug:  blood glucose monitoring     120 each    Check 4 times/day    Type 2 diabetes mellitus with diabetic nephropathy, with long-term current use of insulin (H)       blood glucose monitoring lancets     100 each    1 Device See Admin Instructions. Use up to 5 times daily for blood sugar checks.    Type 2 diabetes, HbA1c goal < 7% (H)       blood glucose monitoring meter device kit    no brand specified    1 kit    Use to test blood sugar 6 times daily and as needed.    Type 2 diabetes mellitus with diabetic nephropathy (H)       cetirizine 10 MG tablet    zyrTEC    90 tablet    Take 10 mg by mouth 2 times daily        EPINEPHrine 0.3 MG/0.3ML injection 2-pack    EPIPEN/ADRENACLICK/or ANY BX GENERIC EQUIV    0.3 mL    Inject 0.3 mLs (0.3 mg) into the muscle once as needed for anaphylaxis    Nut allergy       ergocalciferol 01354 UNITS capsule    ERGOCALCIFEROL    14 capsule    Take 1 capsule (50,000 Units) by mouth once a week    Vitamin D deficiency, Microalbuminuria       Fish Oil 500 MG Caps      Take 1 capsule by mouth        fluticasone 50 MCG/ACT spray    FLONASE    1 Bottle    Spray 1-2 sprays into both nostrils daily    Acute sinusitis with symptoms > 10 days       insulin aspart 100 UNITS/ML injection    NovoLOG VIAL    30 mL    With insulin pump. Uses about 80 units/day.    Type 2 diabetes mellitus with diabetic nephropathy, with long-term current use of insulin (H)       * insulin cartridge misc pump supply     30 each    Change every 3 days1 applicator every 72 hours Change every 3 days    Type 2 diabetes mellitus with diabetic nephropathy, with long-term current use of insulin (H)       * infusion set misc pump supply     30 each    Change every 3 days as directed    Type 2 diabetes mellitus with diabetic nephropathy, with long-term current use of insulin (H)       insulin pump infusion      Updated 1/27/17: [employer]: Model 723 BASAL:  00:00: 1.0 units/hr 10:30: 0.95 14:00: 1.05 CARB RATIO: 00:00 1:7 1600  1:8 C. Factor: 27 mg/dL BLOOD GLUCOSE TARGET and times: 12   AM (midnight):  Active Insulin Time:  3 hours Basal to Bolus Ratio:  Sensor:  No Carelink / Diasend username:  erick Carelink / Diasend Password:  [email or web address]    Type 2 diabetes mellitus with diabetic nephropathy, with long-term current use of insulin (H)       losartan 50 MG tablet    COZAAR    135 tablet    Take 1 tab ( 50 mg)  qam and 1/2 tab ( 25 mg)  q pm    Microalbuminuria       MAGNESIUM OXIDE PO      Take 400 mg by mouth daily        metFORMIN 500 MG 24 hr tablet    GLUCOPHAGE-XR    360 tablet    Take 2 tablets (1,000 mg) by mouth 2 times daily (with meals)    Type 2 diabetes mellitus with diabetic nephropathy, with long-term current use of insulin (H)       montelukast 10 MG tablet    SINGULAIR    90 tablet    Take 1 tablet (10 mg) by mouth At Bedtime    Allergic rhinitis       MULTI VITAMIN/MINERALS PO      Take 1 each by mouth daily.        omeprazole 40 MG capsule    priLOSEC    90 capsule    Take 1 capsule (40 mg) by mouth daily    Gastroesophageal reflux disease without esophagitis, Hiatal hernia       order for DME     100 each    Equipment being ordered: skin prep wipes    Type 2 diabetes, HbA1c goal < 7% (H)       SUMAtriptan 25 MG tablet    IMITREX    8 tablet    Take 25-100mg one time. May repeat 25mg every two hours up to a maximum of 200mg in 24 hours.    Other migraine without status migrainosus, not intractable       * Notice:  This list has 2 medication(s) that are the same as other medications prescribed for you. Read the directions carefully, and ask your doctor or other care provider to review them with you.

## 2018-01-23 NOTE — NURSING NOTE
"Chief Complaint   Patient presents with     Thumb Discomfort     smashed right thumb in car door 2 days ago       Initial /78 (Cuff Size: Adult Regular)  Pulse 91  Temp 98  F (36.7  C) (Oral)  Ht 5' 4\" (1.626 m)  Wt 193 lb 14.4 oz (88 kg)  BMI 33.28 kg/m2 Estimated body mass index is 33.28 kg/(m^2) as calculated from the following:    Height as of this encounter: 5' 4\" (1.626 m).    Weight as of this encounter: 193 lb 14.4 oz (88 kg).  Medication Reconciliation: complete   Milla Koo LPN    "

## 2018-01-23 NOTE — PROGRESS NOTES
SUBJECTIVE:   Marie Vogel is a 37 year old female who presents to clinic today for the following health issues:      Right thumb discomfort      Duration: 2 days ago    Description (location/character/radiation): right thumb closed in car door    Intensity:  mild    Accompanying signs and symptoms: thumb feels numb at tip, throbbing    History (similar episodes/previous evaluation): None    Precipitating or alleviating factors: None    Therapies tried and outcome: iced         Patient Active Problem List   Diagnosis     Allergic rhinitis     HYPERLIPIDEMIA LDL GOAL <100     Family history of other cardiovascular diseases     Health Care Home     Microalbuminuria     Insulin pump in place     Vitamin D deficiency     Nut allergy     IUD (intrauterine device) in place     Type 2 diabetes mellitus with diabetic nephropathy     Diabetic gastroparesis (H)     Hypertrophic obstructive cardiomyopathy (H)     PFO (patent foramen ovale)     Other migraine without status migrainosus, not intractable     Scars of posterior pole of chorioretina, bilateral     Past Surgical History:   Procedure Laterality Date     C INDUCED ABORTN BY D&C           C IUD,MIRENA  8/10/10, 7/28/15     C/SECTION, LOW TRANSVERSE  6/25/10    , Low Transverse     CHOLECYSTECTOMY, LAPOROSCOPIC      Cholecystectomy, Laparoscopic     ESOPHAGOSCOPY, GASTROSCOPY, DUODENOSCOPY (EGD), COMBINED N/A 2016    Procedure: COMBINED ESOPHAGOSCOPY, GASTROSCOPY, DUODENOSCOPY (EGD), BIOPSY SINGLE OR MULTIPLE;  Surgeon: Fadi Hunter MD;  Location: [facility] ESOPHAGEAL MOTILITY STUDY N/A 2016    Procedure: ESOPHAGEAL MOTILITY STUDY;  Surgeon: Fadi Hunter MD;  Location: [facility] REMOVE TONSILS/ADENOIDS,<11 Y/O      T &A       Social History   Substance Use Topics     Smoking status: Former Smoker     Types: Cigarettes     Quit date: 10/24/2005     Smokeless tobacco: Former User      Comment:  States only smokes when drinks maybe 2x/year     Alcohol use No     Family History   Problem Relation Age of Onset     Cardiovascular Mother      hypertrophic cardiomyopathy     Genitourinary Problems Mother      gallbladder disease     DIABETES Mother      drug induced     Other - See Comments Mother      heart transplant 11/23/2005     DIABETES Father      type 2     Hypertension Father      Genitourinary Problems Maternal Grandmother      gallbladder disease     Genitourinary Problems Paternal Grandmother      gallbladder disease     Hypertension Paternal Grandfather      high bp     CEREBROVASCULAR DISEASE Paternal Grandfather      Cardiovascular Brother      hypertrophic cardiomyopathy     DIABETES Brother      type 2         Current Outpatient Prescriptions   Medication Sig Dispense Refill     Omega-3 Fatty Acids (FISH OIL) 500 MG CAPS Take 1 capsule by mouth       MAGNESIUM OXIDE PO Take 400 mg by mouth daily       atenolol (TENORMIN) 25 MG tablet Take 0.5 tablets (12.5 mg) by mouth daily 90 tablet 3     metFORMIN (GLUCOPHAGE-XR) 500 MG 24 hr tablet Take 2 tablets (1,000 mg) by mouth 2 times daily (with meals) 360 tablet 0     atorvastatin (LIPITOR) 20 MG tablet Take 1 tablet (20 mg) by mouth daily 90 tablet 3     fluticasone (FLONASE) 50 MCG/ACT spray Spray 1-2 sprays into both nostrils daily 1 Bottle 0     omeprazole (PRILOSEC) 40 MG capsule Take 1 capsule (40 mg) by mouth daily 90 capsule 0     losartan (COZAAR) 50 MG tablet Take 1 tab ( 50 mg)  qam and 1/2 tab ( 25 mg)  q pm 135 tablet 1     ergocalciferol (ERGOCALCIFEROL) 02129 UNITS capsule Take 1 capsule (50,000 Units) by mouth once a week 14 capsule 0     PEPE CONTOUR NEXT test strip Check 4 times/day 120 each 11     insulin aspart (NOVOLOG VIAL) 100 UNITS/ML injection With insulin pump. Uses about 80 units/day. 30 mL 3     SUMAtriptan (IMITREX) 25 MG tablet Take 25-100mg one time. May repeat 25mg every two hours up to a maximum of 200mg in 24 hours.  "8 tablet 6     montelukast (SINGULAIR) 10 MG tablet Take 1 tablet (10 mg) by mouth At Bedtime 90 tablet 1     EPINEPHrine 0.3 MG/0.3ML injection Inject 0.3 mLs (0.3 mg) into the muscle once as needed for anaphylaxis 0.3 mL 11     INSULIN PUMP - OUTPATIENT Updated 1/27/17:  Medtronic Minimed: Model 723  BASAL:  00:00: 1.0 units/hr  10:30: 0.95  14:00: 1.05  CARB RATIO:  00:00 1:7  1600  1:8  C. Factor:  27 mg/dL  BLOOD GLUCOSE TARGET and times:  12   AM (midnight):   Active Insulin Time:  3 hours  Basal to Bolus Ratio:   Sensor:  No  Carelink / Diasend username:  [email or web address]  Carelink / Diasend Password:  [email or web address]       insulin cartridge (PARADIGM 3ML) misc pump supply Change every 3 days1 applicator every 72 hours Change every 3 days 30 each 3     infusion set (PARADIGM QUICK-SET 23\" 9MM) misc pump supply Change every 3 days as directed 30 each 3     blood glucose monitoring (NO BRAND SPECIFIED) meter device kit Use to test blood sugar 6 times daily and as needed. 1 kit 0     order for DME Equipment being ordered: skin prep wipes 100 each prn     aspirin 81 MG tablet Take 1 tablet (81 mg) by mouth daily 90 tablet 3     cetirizine (ZYRTEC) 10 MG tablet Take 10 mg by mouth 2 times daily  90 tablet 3     Multiple Vitamins-Minerals (MULTI VITAMIN/MINERALS PO) Take 1 each by mouth daily.       ibuprofen (ADVIL) 200 MG tablet Take 200 mg by mouth every 4 hours as needed.       Lancets (MICROLET) MISC 1 Device See Admin Instructions. Use up to 5 times daily for blood sugar checks. 100 each prn       OBJECTIVE:                                                    /78 (Cuff Size: Adult Regular)  Pulse 91  Temp 98  F (36.7  C) (Oral)  Ht 5' 4\" (1.626 m)  Wt 193 lb 14.4 oz (88 kg)  BMI 33.28 kg/m2 Body mass index is 33.28 kg/(m^2).  GENERAL:  alert,  no distress  Ext: right thumb: FROM, ecchymoses distal phalanx with small subungual hematoma     Mild tenderness to palpation over distal phalanx     ASSESSMENT/PLAN: "                                                        ICD-10-CM    1. Thumb injury, right, initial encounter S69.91XA XR Finger Right G/E 2 Views      Plain films today negative for fx  Contusion thumb limited to distal phalanx  rec ice, tylenol as needed, follow-up in 2 weeks if sx persist    Alan Paredes MD  [facility]

## 2018-01-26 ENCOUNTER — HOSPITAL ENCOUNTER (EMERGENCY)
Facility: CLINIC | Age: 38
Discharge: HOME | End: 2018-01-26
Attending: EMERGENCY MEDICINE | Admitting: EMERGENCY MEDICINE
Payer: COMMERCIAL

## 2018-01-26 VITALS
OXYGEN SATURATION: 99 % | SYSTOLIC BLOOD PRESSURE: 128 MMHG | RESPIRATION RATE: 16 BRPM | HEART RATE: 74 BPM | TEMPERATURE: 96.7 F | DIASTOLIC BLOOD PRESSURE: 84 MMHG

## 2018-01-26 DIAGNOSIS — T78.40XA ALLERGIC REACTION, INITIAL ENCOUNTER: ICD-10-CM

## 2018-01-26 PROCEDURE — 25000125 ZZHC RX 250: Performed by: EMERGENCY MEDICINE

## 2018-01-26 PROCEDURE — 99284 EMERGENCY DEPT VISIT MOD MDM: CPT | Mod: Z6 | Performed by: EMERGENCY MEDICINE

## 2018-01-26 PROCEDURE — 25000128 H RX IP 250 OP 636: Performed by: EMERGENCY MEDICINE

## 2018-01-26 PROCEDURE — 96375 TX/PRO/DX INJ NEW DRUG ADDON: CPT | Performed by: EMERGENCY MEDICINE

## 2018-01-26 PROCEDURE — 96374 THER/PROPH/DIAG INJ IV PUSH: CPT | Performed by: EMERGENCY MEDICINE

## 2018-01-26 PROCEDURE — 96361 HYDRATE IV INFUSION ADD-ON: CPT | Performed by: EMERGENCY MEDICINE

## 2018-01-26 PROCEDURE — 99284 EMERGENCY DEPT VISIT MOD MDM: CPT | Mod: 25 | Performed by: EMERGENCY MEDICINE

## 2018-01-26 RX ORDER — METHYLPREDNISOLONE SODIUM SUCCINATE 125 MG/2ML
125 INJECTION, POWDER, LYOPHILIZED, FOR SOLUTION INTRAMUSCULAR; INTRAVENOUS ONCE
Status: COMPLETED | OUTPATIENT
Start: 2018-01-26 | End: 2018-01-26

## 2018-01-26 RX ORDER — DIPHENHYDRAMINE HYDROCHLORIDE 50 MG/ML
25 INJECTION INTRAMUSCULAR; INTRAVENOUS ONCE
Status: COMPLETED | OUTPATIENT
Start: 2018-01-26 | End: 2018-01-26

## 2018-01-26 RX ORDER — HYDROXYZINE HYDROCHLORIDE 50 MG/1
50 TABLET, FILM COATED ORAL 3 TIMES DAILY
Qty: 15 TABLET | Refills: 0 | Status: SHIPPED | OUTPATIENT
Start: 2018-01-26 | End: 2018-01-31

## 2018-01-26 RX ORDER — PREDNISONE 20 MG/1
TABLET ORAL
Qty: 10 TABLET | Refills: 0 | Status: SHIPPED | OUTPATIENT
Start: 2018-01-26 | End: 2018-03-06

## 2018-01-26 RX ADMIN — FAMOTIDINE 20 MG: 10 INJECTION, SOLUTION INTRAVENOUS at 16:18

## 2018-01-26 RX ADMIN — DIPHENHYDRAMINE HYDROCHLORIDE 25 MG: 50 INJECTION, SOLUTION INTRAMUSCULAR; INTRAVENOUS at 16:19

## 2018-01-26 RX ADMIN — METHYLPREDNISOLONE 125 MG: 125 INJECTION, POWDER, LYOPHILIZED, FOR SOLUTION INTRAMUSCULAR; INTRAVENOUS at 16:18

## 2018-01-26 RX ADMIN — SODIUM CHLORIDE 1000 ML: 9 INJECTION, SOLUTION INTRAVENOUS at 16:19

## 2018-01-26 ASSESSMENT — ENCOUNTER SYMPTOMS
FEVER: 0
SHORTNESS OF BREATH: 0

## 2018-01-26 NOTE — LETTER
January 26, 2018      To Whom It May Concern:      Marie Vogel was seen in our Emergency Department today, 01/26/18.  I expect her condition to improve over the next day.  She may not return to work/school until 1/28/18.    Sincerely,        Linwood Nelson MD, MD

## 2018-01-26 NOTE — ED PROVIDER NOTES
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  Patient states that her voice is starting to get a little scratchy.  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  The patient states that normally she has delayed reactions and is glad that she did not go home after her shift only to go into an anaphylaxis at home and instead started having her symptoms while still at work.  Patient presents here for evaluation. She denies any fevers or chest pain.    I have reviewed the Medications, Allergies, Past Medical and Surgical History, and Social History in the [email or web address] system.    Past Medical History:   Diagnosis Date     Allergic rhinitis due to pollen      Atypical glandular cells on Pap smear 3/1108     Gastroesophageal reflux disease      PFO (patent foramen ovale)      Type II or unspecified type diabetes mellitus without mention of complication, not stated as uncontrolled     onset was gestational in        Past Surgical History:   Procedure Laterality Date     C INDUCED ABORTN BY D&C           C IUD,MIRENA  8/10/10, 7/28/15     C/SECTION, LOW TRANSVERSE  6/25/10    , Low Transverse     CHOLECYSTECTOMY, LAPOROSCOPIC      Cholecystectomy, Laparoscopic     ESOPHAGOSCOPY, GASTROSCOPY, DUODENOSCOPY (EGD), COMBINED N/A 2016    Procedure: COMBINED ESOPHAGOSCOPY, GASTROSCOPY, DUODENOSCOPY (EGD), BIOPSY SINGLE OR MULTIPLE;  Surgeon: Fadi Hunter MD;  Location: [facility] ESOPHAGEAL MOTILITY STUDY N/A 2016    Procedure: ESOPHAGEAL MOTILITY STUDY;  Surgeon: Fadi Hunter MD;  Location: [facility]  "REMOVE TONSILS/ADENOIDS,<13 Y/O  1987    T &A       Family History   Problem Relation Age of Onset     Cardiovascular Mother      hypertrophic cardiomyopathy     Genitourinary Problems Mother      gallbladder disease     DIABETES Mother      drug induced     Other - See Comments Mother      heart transplant 11/23/2005     DIABETES Father      type 2     Hypertension Father      Genitourinary Problems Maternal Grandmother      gallbladder disease     Genitourinary Problems Paternal Grandmother      gallbladder disease     Hypertension Paternal Grandfather      high bp     CEREBROVASCULAR DISEASE Paternal Grandfather      Cardiovascular Brother      hypertrophic cardiomyopathy     DIABETES Brother      type 2       Social History   Substance Use Topics     Smoking status: Former Smoker     Types: Cigarettes     Quit date: 10/24/2005     Smokeless tobacco: Former User      Comment: States only smokes when drinks maybe 2x/year     Alcohol use No       Previous Medications    ASPIRIN 81 MG TABLET    Take 1 tablet (81 mg) by mouth daily    ATENOLOL (TENORMIN) 25 MG TABLET    Take 0.5 tablets (12.5 mg) by mouth daily    ATORVASTATIN (LIPITOR) 20 MG TABLET    Take 1 tablet (20 mg) by mouth daily    PEPE CONTOUR NEXT TEST STRIP    Check 4 times/day    BLOOD GLUCOSE MONITORING (NO BRAND SPECIFIED) METER DEVICE KIT    Use to test blood sugar 6 times daily and as needed.    CETIRIZINE (ZYRTEC) 10 MG TABLET    Take 10 mg by mouth 2 times daily     EPINEPHRINE 0.3 MG/0.3ML INJECTION    Inject 0.3 mLs (0.3 mg) into the muscle once as needed for anaphylaxis    ERGOCALCIFEROL (ERGOCALCIFEROL) 17669 UNITS CAPSULE    Take 1 capsule (50,000 Units) by mouth once a week    FLUTICASONE (FLONASE) 50 MCG/ACT SPRAY    Spray 1-2 sprays into both nostrils daily    IBUPROFEN (ADVIL) 200 MG TABLET    Take 200 mg by mouth every 4 hours as needed.    INFUSION SET (PARADIGM QUICK-SET 23\" 9MM) MISC PUMP SUPPLY    Change every 3 days as directed    " INSULIN ASPART (NOVOLOG VIAL) 100 UNITS/ML INJECTION    With insulin pump. Uses about 80 units/day.    INSULIN CARTRIDGE (PARADIGM 3ML) MISC PUMP SUPPLY    Change every 3 days1 applicator every 72 hours Change every 3 days    INSULIN PUMP - OUTPATIENT    Updated 1/27/17:  Medtronic Minimed: Model 723  BASAL:  00:00: 1.0 units/hr  10:30: 0.95  14:00: 1.05  CARB RATIO:  00:00 1:7  1600  1:8  C. Factor:  27 mg/dL  BLOOD GLUCOSE TARGET and times:  12   AM (midnight):   Active Insulin Time:  3 hours  Basal to Bolus Ratio:   Sensor:  No  Carelink / Diasend username:  [email or web address]  Carelink / Diasend Password:  [email or web address]    LANCETS (MICROLET) MISC    1 Device See Admin Instructions. Use up to 5 times daily for blood sugar checks.    LOSARTAN (COZAAR) 50 MG TABLET    Take 1 tab ( 50 mg)  qam and 1/2 tab ( 25 mg)  q pm    MAGNESIUM OXIDE PO    Take 400 mg by mouth daily    METFORMIN (GLUCOPHAGE-XR) 500 MG 24 HR TABLET    Take 2 tablets (1,000 mg) by mouth 2 times daily (with meals)    MONTELUKAST (SINGULAIR) 10 MG TABLET    Take 1 tablet (10 mg) by mouth At Bedtime    MULTIPLE VITAMINS-MINERALS (MULTI VITAMIN/MINERALS PO)    Take 1 each by mouth daily.    OMEGA-3 FATTY ACIDS (FISH OIL) 500 MG CAPS    Take 1 capsule by mouth    OMEPRAZOLE (PRILOSEC) 40 MG CAPSULE    Take 1 capsule (40 mg) by mouth daily    ORDER FOR DME    Equipment being ordered: skin prep wipes    SUMATRIPTAN (IMITREX) 25 MG TABLET    Take 25-100mg one time. May repeat 25mg every two hours up to a maximum of 200mg in 24 hours.        Allergies   Allergen Reactions     Ivp Dye [Contrast Dye] Itching     Pt reports that throat itches     Nuts Anaphylaxis     [place] nuts only     Bactrim Swelling     Pt reaction was swelling in mouth and tongue and itchy mouth.  Resolved with benadryl and prednisone     Penicillins Hives     Cephalosporins Itching     Seasonal Allergies Other (See Comments)     Molds, trees, grass, dust..  Upper congestion     Atorvastatin       myalgias     Shellfish Allergy Rash     Clams only      Review of Systems   Constitutional: Negative for fever.   HENT:        Itchy throat   Respiratory: Negative for shortness of breath.    Cardiovascular: Negative for chest pain.   Skin: Negative for rash.   All other systems reviewed and are negative.      Physical Exam   BP: 134/82  Heart Rate: 85  Temp: 96.7  F (35.9  C)  Resp: 16  SpO2: 95 %      Physical Exam   Constitutional: She is oriented to person, place, and time. No distress.   HENT:   Head: Atraumatic.   Mouth/Throat: Oropharynx is clear and moist.   Eyes: EOM are normal. Pupils are equal, round, and reactive to light.   Neck: Neck supple.   Cardiovascular: Normal heart sounds.    Pulmonary/Chest: Breath sounds normal. She has no wheezes.   Abdominal: Soft. There is no tenderness.   Musculoskeletal: She exhibits no edema or tenderness.   Neurological: She is alert and oriented to person, place, and time. No cranial nerve deficit.   Grossly intact and symmetric   Skin: No rash noted.   Psychiatric: She has a normal mood and affect.       ED Course     ED Course     Procedures        She checked her own blood sugar and it was in the 100s so no labs were deemed necessary at this time    Assessments & Plan (with Medical Decision Making)     I have reviewed the nursing notes.    Medications   sodium chloride (PF) 0.9% PF flush 3 mL (not administered)   sodium chloride (PF) 0.9% PF flush 3 mL (3 mLs Intracatheter Not Given 1/26/18 1703)   0.9% sodium chloride BOLUS (0 mLs Intravenous Stopped 1/26/18 1704)   famotidine (PEPCID) injection 20 mg (20 mg Intravenous Given 1/26/18 1618)   methylPREDNISolone sodium succinate (solu-MEDROL) injection 125 mg (125 mg Intravenous Given 1/26/18 1618)   diphenhydrAMINE (BENADRYL) injection 25 mg (25 mg Intravenous Given 1/26/18 1619)     Patient was reevaluated approximately 45 minutes after medications were given and had some improvement in her voice with no  worsening of her symptoms or development of other symptoms.  At this time the patient will be sent home on the medications below.  Patient does have an EpiPen in her purse    I have reviewed the findings, diagnosis, plan and need for follow up with the patient.    New Prescriptions    HYDROXYZINE (ATARAX) 50 MG TABLET    Take 1 tablet (50 mg) by mouth 3 times daily for 5 days    PREDNISONE (DELTASONE) 20 MG TABLET    Take two tablets (= 40mg) each day for 5 (five) days    RANITIDINE (ZANTAC) 150 MG TABLET    Take 1 tablet (150 mg) by mouth 2 times daily for 7 days       Final diagnoses:   Allergic reaction, initial encounter     Monitor your blood glucose while on prednisone.    Please make an appointment to follow up with Your Primary Care Provider in 5-7 days for recheck unless symptoms completely resolve.    Return to the ER for any worsening.  Routine discharge instructions were given for this diagnosis.  Work note given to be off until 1/28/2018.    MD DERICK Padgett Christopher M. Kirby, am serving as a trained medical scribe to document services personally performed by Linwood Nelson MD, based on the provider's statements to me.   ILinwood MD, was physically present and have reviewed and verified the accuracy of this note documented by Joel Menjivar.     1/26/2018   [facility], [place], EMERGENCY DEPARTMENT     Linwood Nelson MD  01/26/18 [id number]

## 2018-01-26 NOTE — ED AVS SNAPSHOT
[facility], Emergency Department    [address] [address]    Phone:  [phone]                                       Marie Vogel   MRN: [id number]    Department:  [facility], Emergency Department   Date of Visit:  1/26/2018           Patient Information     Date Of Birth          1980        Your diagnoses for this visit were:     Allergic reaction, initial encounter        You were seen by Linwood Nelson MD.        Discharge Instructions       Monitor your blood glucose while on prednisone.    Please make an appointment to follow up with Your Primary Care Provider in 5-7 days for recheck unless symptoms completely resolve.    Return to the ER for any worsening.    Discharge References/Attachments     ALLERGIC REACTIONS, GENERAL (ENGLISH)      24 Hour Appointment Hotline       To make an appointment at any [facility] clinic, call [phone] ([phone]). If you don't have a family doctor or clinic, we will help you find one. [facility] clinics are conveniently located to serve the needs of you and your family.             Review of your medicines      START taking        Dose / Directions Last dose taken    hydrOXYzine 50 MG tablet   Commonly known as:  ATARAX   Dose:  50 mg   Quantity:  15 tablet        Take 1 tablet (50 mg) by mouth 3 times daily for 5 days   Refills:  0        predniSONE 20 MG tablet   Commonly known as:  DELTASONE   Quantity:  10 tablet        Take two tablets (= 40mg) each day for 5 (five) days   Refills:  0        ranitidine 150 MG tablet   Commonly known as:  ZANTAC   Dose:  150 mg   Quantity:  14 tablet        Take 1 tablet (150 mg) by mouth 2 times daily for 7 days   Refills:  0          Our records show that you are taking the medicines listed below. If these are incorrect, please call your family doctor or clinic.        Dose / Directions Last dose taken    ADVIL 200 MG tablet   Dose:  200 mg   Generic drug:  ibuprofen        Take  200 mg by mouth every 4 hours as needed.   Refills:  0        aspirin 81 MG tablet   Dose:  81 mg   Quantity:  90 tablet        Take 1 tablet (81 mg) by mouth daily   Refills:  3        atenolol 25 MG tablet   Commonly known as:  TENORMIN   Dose:  12.5 mg   Quantity:  90 tablet        Take 0.5 tablets (12.5 mg) by mouth daily   Refills:  3        atorvastatin 20 MG tablet   Commonly known as:  LIPITOR   Dose:  20 mg   Quantity:  90 tablet        Take 1 tablet (20 mg) by mouth daily   Refills:  3        PEPE CONTOUR NEXT test strip   Quantity:  120 each   Generic drug:  blood glucose monitoring        Check 4 times/day   Refills:  11        blood glucose monitoring lancets   Dose:  1 Device   Quantity:  100 each        1 Device See Admin Instructions. Use up to 5 times daily for blood sugar checks.   Refills:  prn        blood glucose monitoring meter device kit   Commonly known as:  no brand specified   Quantity:  1 kit        Use to test blood sugar 6 times daily and as needed.   Refills:  0        cetirizine 10 MG tablet   Commonly known as:  zyrTEC   Dose:  10 mg   Quantity:  90 tablet        Take 10 mg by mouth 2 times daily   Refills:  3        EPINEPHrine 0.3 MG/0.3ML injection 2-pack   Commonly known as:  EPIPEN/ADRENACLICK/or ANY BX GENERIC EQUIV   Dose:  0.3 mg   Quantity:  0.3 mL        Inject 0.3 mLs (0.3 mg) into the muscle once as needed for anaphylaxis   Refills:  11        ergocalciferol 79095 UNITS capsule   Commonly known as:  ERGOCALCIFEROL   Dose:  58530 Units   Quantity:  14 capsule        Take 1 capsule (50,000 Units) by mouth once a week   Refills:  0        Fish Oil 500 MG Caps   Dose:  1 capsule        Take 1 capsule by mouth   Refills:  0        fluticasone 50 MCG/ACT spray   Commonly known as:  FLONASE   Dose:  1-2 spray   Quantity:  1 Bottle        Spray 1-2 sprays into both nostrils daily   Refills:  0        insulin aspart 100 UNITS/ML injection   Commonly known as:  NovoLOG VIAL    Quantity:  30 mL        With insulin pump. Uses about 80 units/day.   Refills:  3        * insulin cartridge misc pump supply   Quantity:  30 each        Change every 3 days1 applicator every 72 hours Change every 3 days   Refills:  3        * infusion set misc pump supply   Quantity:  30 each        Change every 3 days as directed   Refills:  3        insulin pump infusion        Updated 1/27/17: Medtronic Minimed: Model 723 BASAL: 00:00: 1.0 units/hr 10:30: 0.95 14:00: 1.05 CARB RATIO: 00:00 1:7 1600  1:8 C. Factor: 27 mg/dL BLOOD GLUCOSE TARGET and times: 12   AM (midnight):  Active Insulin Time:  3 hours Basal to Bolus Ratio:  Sensor:  No Carelink / Diasend username:  [email or web address] Carelink / Diasend Password:  [email or web address]   Refills:  0        losartan 50 MG tablet   Commonly known as:  COZAAR   Quantity:  135 tablet        Take 1 tab ( 50 mg)  qam and 1/2 tab ( 25 mg)  q pm   Refills:  1        MAGNESIUM OXIDE PO   Dose:  400 mg        Take 400 mg by mouth daily   Refills:  0        metFORMIN 500 MG 24 hr tablet   Commonly known as:  GLUCOPHAGE-XR   Dose:  1000 mg   Quantity:  360 tablet        Take 2 tablets (1,000 mg) by mouth 2 times daily (with meals)   Refills:  0        montelukast 10 MG tablet   Commonly known as:  SINGULAIR   Dose:  10 mg   Quantity:  90 tablet        Take 1 tablet (10 mg) by mouth At Bedtime   Refills:  1        MULTI VITAMIN/MINERALS PO   Dose:  1 each        Take 1 each by mouth daily.   Refills:  0        omeprazole 40 MG capsule   Commonly known as:  priLOSEC   Dose:  40 mg   Quantity:  90 capsule        Take 1 capsule (40 mg) by mouth daily   Refills:  0        order for DME   Quantity:  100 each        Equipment being ordered: skin prep wipes   Refills:  prn        SUMAtriptan 25 MG tablet   Commonly known as:  IMITREX   Quantity:  8 tablet        Take 25-100mg one time. May repeat 25mg every two hours up to a maximum of 200mg in 24 hours.   Refills:  6        * Notice:  This  list has 2 medication(s) that are the same as other medications prescribed for you. Read the directions carefully, and ask your doctor or other care provider to review them with you.            Prescriptions were sent or printed at these locations (3 Prescriptions)                   Other Prescriptions                Printed at Department/Unit printer (3 of 3)         predniSONE (DELTASONE) 20 MG tablet               hydrOXYzine (ATARAX) 50 MG tablet               ranitidine (ZANTAC) 150 MG tablet                Orders Needing Specimen Collection     None      Pending Results     No orders found from 1/24/2018 to 1/27/2018.            Pending Culture Results     No orders found from 1/24/2018 to 1/27/2018.            Pending Results Instructions     If you had any lab results that were not finalized at the time of your Discharge, you can call the ED Lab Result RN at [phone]. You will be contacted by this team for any positive Lab results or changes in treatment. The nurses are available 7 days a week from 10A to 6:30P.  You can leave a message 24 hours per day and they will return your call.        Thank you for choosing [facility]       Thank you for choosing [facility] for your care. Our goal is always to provide you with excellent care. Hearing back from our patients is one way we can continue to improve our services. Please take a few minutes to complete the written survey that you may receive in the mail after you visit with us. Thank you!        [employer] Information     [facility] gives you secure access to your electronic health record. If you see a primary care provider, you can also send messages to your care team and make appointments. If you have questions, please call your primary care clinic.  If you do not have a primary care provider, please call [phone] and they will assist you.        Care EveryWhere ID     This is your Care EveryWhere ID. This could be used by other organizations to access your  [facility] medical records  [id number]        Equal Access to Services     KO PUGA : Hema Weber, kj mondragon, jeanie aaron, jr pickard. So [facility] [phone].    ATENCIÓN: Si habla español, tiene a woodruff disposición servicios gratuitos de asistencia lingüística. Llame al [phone].    We comply with applicable federal civil rights laws and [place] laws. We do not discriminate on the basis of race, color, national origin, age, disability, sex, sexual orientation, or gender identity.            After Visit Summary       This is your record. Keep this with you and show to your community pharmacist(s) and doctor(s) at your next visit.

## 2018-01-26 NOTE — DISCHARGE INSTRUCTIONS
Monitor your blood glucose while on prednisone.    Please make an appointment to follow up with Your Primary Care Provider in 5-7 days for recheck unless symptoms completely resolve.    Return to the ER for any worsening.

## 2018-01-26 NOTE — ED NOTES
Pt comes in having an allergic reaction to hazelnut. Pt says usually the reaction is delayed, but feels herself getting a scratchy throat and hoarseness already. Hx anaphylaxis with hazelnut. Alert and oriented, vss.

## 2018-01-26 NOTE — ED AVS SNAPSHOT
[facility], [place], Emergency Department    [address] [place] [address]    Phone:  [phone]                                       Marie Vogel   MRN: [id number]    Department:  [facility], Emergency Department   Date of Visit:  1/26/2018           After Visit Summary Signature Page     I have received my discharge instructions, and my questions have been answered. I have discussed any challenges I see with this plan with the nurse or doctor.    ..........................................................................................................................................  Patient/Patient Representative Signature      ..........................................................................................................................................  Patient Representative Print Name and Relationship to Patient    ..................................................               ................................................  Date                                            Time    ..........................................................................................................................................  Reviewed by Signature/Title    ...................................................              ..............................................  Date                                                            Time

## 2018-01-28 ENCOUNTER — NURSE TRIAGE (OUTPATIENT)
Dept: NURSING | Facility: CLINIC | Age: 38
End: 2018-01-28

## 2018-02-01 ENCOUNTER — TELEPHONE (OUTPATIENT)
Dept: ENDOCRINOLOGY | Facility: CLINIC | Age: 38
End: 2018-02-01

## 2018-02-01 NOTE — TELEPHONE ENCOUNTER
Panel Management Review      Patient has the following on her problem list:     Diabetes    ASA: Passed    Last A1C  Lab Results   Component Value Date    A1C 8.5 10/24/2017    A1C 7.2 04/17/2017    A1C 7.2 12/27/2016    A1C 7.1 09/27/2016    A1C 7.3 02/22/2016     A1C tested: FAILED    Last LDL:    Lab Results   Component Value Date    CHOL 192 10/24/2017     Lab Results   Component Value Date    HDL 47 10/24/2017     Lab Results   Component Value Date     10/24/2017     Lab Results   Component Value Date    TRIG 227 10/24/2017     Lab Results   Component Value Date    CHOLHDLRATIO 4.5 07/01/2015     Lab Results   Component Value Date    NHDL 145 10/24/2017       Is the patient on a Statin? YES             Is the patient on Aspirin? YES    Medications     HMG CoA Reductase Inhibitors    atorvastatin (LIPITOR) 20 MG tablet    Salicylates    aspirin 81 MG tablet          Last three blood pressure readings:  BP Readings from Last 3 Encounters:   01/26/18 128/84   01/23/18 108/78   01/12/18 (!) 113/98       Date of last diabetes office visit: 10/24/17     Tobacco History:     History   Smoking Status     Former Smoker     Types: Cigarettes     Quit date: 10/24/2005   Smokeless Tobacco     Former User     Comment: States only smokes when drinks maybe 2x/year           Composite cancer screening  Chart review shows that this patient is due/due soon for the following None  Summary:    Patient is due/failing the following:   FOLLOW UP    Action needed:   Patient needs office visit for dm.    Type of outreach:    Sent S[employer] message.    Questions for provider review:    None                                                                                                                                    Milena Wilson CMA ([facility])       Chart routed to closed.

## 2018-02-15 DIAGNOSIS — K21.9 GASTROESOPHAGEAL REFLUX DISEASE WITHOUT ESOPHAGITIS: ICD-10-CM

## 2018-02-15 DIAGNOSIS — K44.9 HIATAL HERNIA: ICD-10-CM

## 2018-02-16 RX ORDER — OMEPRAZOLE 40 MG/1
40 CAPSULE, DELAYED RELEASE ORAL DAILY
Qty: 90 CAPSULE | Refills: 0 | Status: SHIPPED | OUTPATIENT
Start: 2018-02-16 | End: 2018-07-09

## 2018-02-16 NOTE — TELEPHONE ENCOUNTER
"Requested Prescriptions   Pending Prescriptions Disp Refills     omeprazole (PRILOSEC) 40 MG capsule  Last Written Prescription Date:  11/10/2017  Last Fill Quantity: 90 capsule,  # refills: 0   Last office visit: 1/23/2018 with prescribing provider:  Alan Paredes MD   Future Office Visit:       90 capsule 0     Sig: Take 1 capsule (40 mg) by mouth daily    PPI Protocol Passed    2/15/2018  7:39 PM       Passed - Not on Clopidogrel (unless Pantoprazole ordered)       Passed - No diagnosis of osteoporosis on record       Passed - Recent or future visit with authorizing provider's specialty    Patient had office visit in the last year or has a visit in the next 30 days with authorizing provider.  See \"Patient Info\" tab in inbasket, or \"Choose Columns\" in Meds & Orders section of the refill encounter.            Passed - Patient is age 18 or older       Passed - No active pregnacy on record       Passed - No positive pregnancy test in past 12 months          "

## 2018-02-16 NOTE — TELEPHONE ENCOUNTER
Prescription approved per [facility] Refill Protocol.    Linda GAONA RN, BSN, PHN  [employer] RN

## 2018-02-17 DIAGNOSIS — E55.9 VITAMIN D DEFICIENCY: ICD-10-CM

## 2018-02-17 DIAGNOSIS — R80.9 MICROALBUMINURIA: ICD-10-CM

## 2018-02-17 NOTE — TELEPHONE ENCOUNTER
ergocalciferol (ERGOCALCIFEROL) 13803 UNITS capsule      Last Written Prescription Date:  10/24/17  Last Fill Quantity: 14 CAPSULE,   # refills: 0  Last Office Visit: 1/23/18  Future Office visit:       Routing refill request to provider for review/approval because:  Drug not on the [facility], [facility] or [facility] refill protocol or controlled substance

## 2018-02-19 RX ORDER — ERGOCALCIFEROL 1.25 MG/1
50000 CAPSULE ORAL WEEKLY
Qty: 12 CAPSULE | Refills: 0 | Status: SHIPPED | OUTPATIENT
Start: 2018-02-19 | End: 2018-09-27

## 2018-02-19 NOTE — TELEPHONE ENCOUNTER
I will do one time.  Due for vit D levels and A1c.  Please make a lab appointment for blood work and follow up clinic appointment in 1 week after that to discuss results.    Please inform Marie through [employer].

## 2018-02-19 NOTE — TELEPHONE ENCOUNTER
Unable to fill per standing order routed to Dr. Fuentes for approval.  LOV: 10/24/17. No future appointment seen.       Component      Latest Ref Rng & Units 9/27/2016   Vitamin D Deficiency screening      20 - 75 ug/L 39

## 2018-03-02 ENCOUNTER — NURSE TRIAGE (OUTPATIENT)
Dept: NURSING | Facility: CLINIC | Age: 38
End: 2018-03-02

## 2018-03-02 ENCOUNTER — OFFICE VISIT (OUTPATIENT)
Dept: URGENT CARE | Facility: URGENT CARE | Age: 38
End: 2018-03-02
Payer: COMMERCIAL

## 2018-03-02 VITALS
HEART RATE: 75 BPM | SYSTOLIC BLOOD PRESSURE: 108 MMHG | DIASTOLIC BLOOD PRESSURE: 82 MMHG | TEMPERATURE: 98.5 F | OXYGEN SATURATION: 99 %

## 2018-03-02 DIAGNOSIS — N30.01 ACUTE CYSTITIS WITH HEMATURIA: Primary | ICD-10-CM

## 2018-03-02 LAB
ALBUMIN UR-MCNC: >=300 MG/DL
APPEARANCE UR: ABNORMAL
BACTERIA #/AREA URNS HPF: ABNORMAL /HPF
BILIRUB UR QL STRIP: NEGATIVE
COLOR UR AUTO: ABNORMAL
GLUCOSE UR STRIP-MCNC: NEGATIVE MG/DL
HGB UR QL STRIP: ABNORMAL
KETONES UR STRIP-MCNC: NEGATIVE MG/DL
LEUKOCYTE ESTERASE UR QL STRIP: ABNORMAL
NITRATE UR QL: NEGATIVE
NON-SQ EPI CELLS #/AREA URNS LPF: ABNORMAL /LPF
PH UR STRIP: 7 PH (ref 5–7)
RBC #/AREA URNS AUTO: >100 /HPF
SOURCE: ABNORMAL
SP GR UR STRIP: 1.02 (ref 1–1.03)
UROBILINOGEN UR STRIP-ACNC: 0.2 EU/DL (ref 0.2–1)
WBC #/AREA URNS AUTO: >100 /HPF

## 2018-03-02 PROCEDURE — 87086 URINE CULTURE/COLONY COUNT: CPT | Performed by: FAMILY MEDICINE

## 2018-03-02 PROCEDURE — 81001 URINALYSIS AUTO W/SCOPE: CPT | Performed by: PHYSICIAN ASSISTANT

## 2018-03-02 PROCEDURE — 87186 SC STD MICRODIL/AGAR DIL: CPT | Performed by: FAMILY MEDICINE

## 2018-03-02 PROCEDURE — 99213 OFFICE O/P EST LOW 20 MIN: CPT | Performed by: FAMILY MEDICINE

## 2018-03-02 PROCEDURE — 87088 URINE BACTERIA CULTURE: CPT | Performed by: FAMILY MEDICINE

## 2018-03-02 RX ORDER — NITROFURANTOIN 25; 75 MG/1; MG/1
100 CAPSULE ORAL 2 TIMES DAILY
Qty: 14 CAPSULE | Refills: 0 | Status: SHIPPED | OUTPATIENT
Start: 2018-03-02 | End: 2018-04-16

## 2018-03-02 NOTE — MR AVS SNAPSHOT
After Visit Summary   3/2/2018    Marie Vogel    MRN: [id number]           Patient Information     Date Of Birth          1980        Visit Information        Provider Department      3/2/2018 3:30 PM Melchor Chinchilla MD [facility] [place] [facility]        Today's Diagnoses     Acute cystitis with hematuria    -  1       Follow-ups after your visit        Who to contact     If you have questions or need follow up information about today's clinic visit or your schedule please contact [facility] directly at [phone].  Normal or non-critical lab and imaging results will be communicated to you by Enteloshart, letter or phone within 4 business days after the clinic has received the results. If you do not hear from us within 7 days, please contact the clinic through Heliost or phone. If you have a critical or abnormal lab result, we will notify you by phone as soon as possible.  Submit refill requests through S[employer] or call your pharmacy and they will forward the refill request to us. Please allow 3 business days for your refill to be completed.          Additional Information About Your Visit        MyChart Information     [employer] gives you secure access to your electronic health record. If you see a primary care provider, you can also send messages to your care team and make appointments. If you have questions, please call your primary care clinic.  If you do not have a primary care provider, please call [phone] and they will assist you.        Care EveryWhere ID     This is your Care EveryWhere ID. This could be used by other organizations to access your [facility] medical records  [id number]        Your Vitals Were     Pulse Temperature Pulse Oximetry             75 98.5  F (36.9  C) (Tympanic) 99%          Blood Pressure from Last 3 Encounters:   03/02/18 108/82   01/26/18 128/84   01/23/18 108/78    Weight from Last 3 Encounters:   01/23/18 193 lb 14.4 oz (88 kg)    01/12/18 193 lb (87.5 kg)   12/04/17 190 lb 11.2 oz (86.5 kg)              We Performed the Following     UA reflex to Microscopic and Culture     Urine Culture Aerobic Bacterial     Urine Microscopic          Today's Medication Changes          These changes are accurate as of 3/2/18  4:06 PM.  If you have any questions, ask your nurse or doctor.               Start taking these medicines.        Dose/Directions    nitroFURantoin (macrocrystal-monohydrate) 100 MG capsule   Commonly known as:  MACROBID   Used for:  Acute cystitis with hematuria        Dose:  100 mg   Take 1 capsule (100 mg) by mouth 2 times daily   Quantity:  14 capsule   Refills:  0            Where to get your medicines      These medications were sent to [facility] - [address]   [address] [place] [address]     Phone:  [phone]     nitroFURantoin (macrocrystal-monohydrate) 100 MG capsule                Primary Care Provider Office Phone # Fax #    Alan Paredes [phone]       [address] [place] [address] [address] [place] [address]        Equal Access to Services     [facility]: Hadii aad ku hadasho Soomaali, waaxda luqadaha, qaybta kaalmada adeegyada, jr peralta . So [facility] [phone].    ATENCIÓN: Si habla español, tiene a woodruff disposición servicios gratuitos de asistencia lingüística. Llame al [phone].    We comply with applicable federal civil rights laws and [place] laws. We do not discriminate on the basis of race, color, national origin, age, disability, sex, sexual orientation, or gender identity.            Thank you!     Thank you for choosing [facility]  for your care. Our goal is always to provide you with excellent care. Hearing back from our patients is one way we can continue to improve our services. Please take a few minutes to complete the written survey that you may receive in the mail  after your visit with us. Thank you!             Your Updated Medication List - Protect others around you: Learn how to safely use, store and throw away your medicines at www.disposemymeds.org.          This list is accurate as of 3/2/18  4:06 PM.  Always use your most recent med list.                   Brand Name Dispense Instructions for use Diagnosis    ADVIL 200 MG tablet   Generic drug:  ibuprofen      Take 200 mg by mouth every 4 hours as needed.        aspirin 81 MG tablet     90 tablet    Take 1 tablet (81 mg) by mouth daily    PFO (patent foramen ovale)       atenolol 25 MG tablet    TENORMIN    90 tablet    Take 0.5 tablets (12.5 mg) by mouth daily        atorvastatin 20 MG tablet    LIPITOR    90 tablet    Take 1 tablet (20 mg) by mouth daily        PEPE CONTOUR NEXT test strip   Generic drug:  blood glucose monitoring     120 each    Check 4 times/day    Type 2 diabetes mellitus with diabetic nephropathy, with long-term current use of insulin (H)       blood glucose monitoring lancets     100 each    1 Device See Admin Instructions. Use up to 5 times daily for blood sugar checks.    Type 2 diabetes, HbA1c goal < 7% (H)       blood glucose monitoring meter device kit    no brand specified    1 kit    Use to test blood sugar 6 times daily and as needed.    Type 2 diabetes mellitus with diabetic nephropathy (H)       cetirizine 10 MG tablet    zyrTEC    90 tablet    Take 10 mg by mouth 2 times daily        EPINEPHrine 0.3 MG/0.3ML injection 2-pack    EPIPEN/ADRENACLICK/or ANY BX GENERIC EQUIV    0.3 mL    Inject 0.3 mLs (0.3 mg) into the muscle once as needed for anaphylaxis    Nut allergy       ergocalciferol 02556 UNITS capsule    ERGOCALCIFEROL    12 capsule    Take 1 capsule (50,000 Units) by mouth once a week    Vitamin D deficiency, Microalbuminuria       Fish Oil 500 MG Caps      Take 1 capsule by mouth        fluticasone 50 MCG/ACT spray    FLONASE    1 Bottle    Spray 1-2 sprays into both nostrils  daily    Acute sinusitis with symptoms > 10 days       insulin aspart 100 UNITS/ML injection    NovoLOG VIAL    30 mL    With insulin pump. Uses about 80 units/day.    Type 2 diabetes mellitus with diabetic nephropathy, with long-term current use of insulin (H)       * insulin cartridge misc pump supply     30 each    Change every 3 days1 applicator every 72 hours Change every 3 days    Type 2 diabetes mellitus with diabetic nephropathy, with long-term current use of insulin (H)       * infusion set misc pump supply     30 each    Change every 3 days as directed    Type 2 diabetes mellitus with diabetic nephropathy, with long-term current use of insulin (H)       insulin pump infusion      Updated 1/27/17: [employer]: Model 723 BASAL: 00:00: 1.0 units/hr 10:30: 0.95 14:00: 1.05 CARB RATIO: 00:00 1:7 1600  1:8 C. Factor: 27 mg/dL BLOOD GLUCOSE TARGET and times: 12   AM (midnight):  Active Insulin Time:  3 hours Basal to Bolus Ratio:  Sensor:  No Carelink / Diasend username:  [email or web address] Carelink / Diasend Password:  [email or web address]    Type 2 diabetes mellitus with diabetic nephropathy, with long-term current use of insulin (H)       losartan 50 MG tablet    COZAAR    135 tablet    Take 1 tab ( 50 mg)  qam and 1/2 tab ( 25 mg)  q pm    Microalbuminuria       MAGNESIUM OXIDE PO      Take 400 mg by mouth daily        metFORMIN 500 MG 24 hr tablet    GLUCOPHAGE-XR    360 tablet    Take 2 tablets (1,000 mg) by mouth 2 times daily (with meals)    Type 2 diabetes mellitus with diabetic nephropathy, with long-term current use of insulin (H)       montelukast 10 MG tablet    SINGULAIR    90 tablet    Take 1 tablet (10 mg) by mouth At Bedtime    Allergic rhinitis       MULTI VITAMIN/MINERALS PO      Take 1 each by mouth daily.        nitroFURantoin (macrocrystal-monohydrate) 100 MG capsule    MACROBID    14 capsule    Take 1 capsule (100 mg) by mouth 2 times daily    Acute cystitis with hematuria       omeprazole 40 MG  capsule    priLOSEC    90 capsule    Take 1 capsule (40 mg) by mouth daily    Gastroesophageal reflux disease without esophagitis, Hiatal hernia       order for DME     100 each    Equipment being ordered: skin prep wipes    Type 2 diabetes, HbA1c goal < 7% (H)       predniSONE 20 MG tablet    DELTASONE    10 tablet    Take two tablets (= 40mg) each day for 5 (five) days        SUMAtriptan 25 MG tablet    IMITREX    8 tablet    Take 25-100mg one time. May repeat 25mg every two hours up to a maximum of 200mg in 24 hours.    Other migraine without status migrainosus, not intractable       * Notice:  This list has 2 medication(s) that are the same as other medications prescribed for you. Read the directions carefully, and ask your doctor or other care provider to review them with you.

## 2018-03-02 NOTE — PROGRESS NOTES
SUBJECTIVE:   Marie Vogel is a 37 year old female who  presents today for a possible UTI. Symptoms of dysuria and frequency have been going on for 1day(s).  Hematuria yes just now.  gradual onset and still presentand moderate.  There is no history of fever, chills, nausea or vomiting.  No history of vaginal or penile discharge. This patient does have a history of urinary tract infections. Patient denies long duration, rigors and flank pain or vaginal discharge.   Has some back pain on both sides    Past Medical History:   Diagnosis Date     Allergic rhinitis due to pollen      Atypical glandular cells on Pap smear 3/1108     Gastroesophageal reflux disease      PFO (patent foramen ovale)      Type II or unspecified type diabetes mellitus without mention of complication, not stated as uncontrolled 2002    onset was gestational in 2001     Current Outpatient Prescriptions   Medication Sig Dispense Refill     ergocalciferol (ERGOCALCIFEROL) 54618 UNITS capsule Take 1 capsule (50,000 Units) by mouth once a week 12 capsule 0     omeprazole (PRILOSEC) 40 MG capsule Take 1 capsule (40 mg) by mouth daily 90 capsule 0     predniSONE (DELTASONE) 20 MG tablet Take two tablets (= 40mg) each day for 5 (five) days 10 tablet 0     Omega-3 Fatty Acids (FISH OIL) 500 MG CAPS Take 1 capsule by mouth       MAGNESIUM OXIDE PO Take 400 mg by mouth daily       atenolol (TENORMIN) 25 MG tablet Take 0.5 tablets (12.5 mg) by mouth daily 90 tablet 3     metFORMIN (GLUCOPHAGE-XR) 500 MG 24 hr tablet Take 2 tablets (1,000 mg) by mouth 2 times daily (with meals) 360 tablet 0     atorvastatin (LIPITOR) 20 MG tablet Take 1 tablet (20 mg) by mouth daily 90 tablet 3     fluticasone (FLONASE) 50 MCG/ACT spray Spray 1-2 sprays into both nostrils daily 1 Bottle 0     losartan (COZAAR) 50 MG tablet Take 1 tab ( 50 mg)  qam and 1/2 tab ( 25 mg)  q pm 135 tablet 1     PEPE CONTOUR NEXT test strip Check 4 times/day 120 each 11     insulin  "aspart (NOVOLOG VIAL) 100 UNITS/ML injection With insulin pump. Uses about 80 units/day. 30 mL 3     SUMAtriptan (IMITREX) 25 MG tablet Take 25-100mg one time. May repeat 25mg every two hours up to a maximum of 200mg in 24 hours. 8 tablet 6     montelukast (SINGULAIR) 10 MG tablet Take 1 tablet (10 mg) by mouth At Bedtime 90 tablet 1     EPINEPHrine 0.3 MG/0.3ML injection Inject 0.3 mLs (0.3 mg) into the muscle once as needed for anaphylaxis 0.3 mL 11     INSULIN PUMP - OUTPATIENT Updated 1/27/17:  Medtronic Minimed: Model 723  BASAL:  00:00: 1.0 units/hr  10:30: 0.95  14:00: 1.05  CARB RATIO:  00:00 1:7  1600  1:8  C. Factor:  27 mg/dL  BLOOD GLUCOSE TARGET and times:  12   AM (midnight):   Active Insulin Time:  3 hours  Basal to Bolus Ratio:   Sensor:  No  Carelink / Diasend username:  [email or web address]  Carelink / Diasend Password:  [email or web address]       insulin cartridge (PARADIGM 3ML) misc pump supply Change every 3 days1 applicator every 72 hours Change every 3 days 30 each 3     infusion set (PARADIGM QUICK-SET 23\" 9MM) misc pump supply Change every 3 days as directed 30 each 3     blood glucose monitoring (NO BRAND SPECIFIED) meter device kit Use to test blood sugar 6 times daily and as needed. 1 kit 0     order for DME Equipment being ordered: skin prep wipes 100 each prn     aspirin 81 MG tablet Take 1 tablet (81 mg) by mouth daily 90 tablet 3     cetirizine (ZYRTEC) 10 MG tablet Take 10 mg by mouth 2 times daily  90 tablet 3     Multiple Vitamins-Minerals (MULTI VITAMIN/MINERALS PO) Take 1 each by mouth daily.       ibuprofen (ADVIL) 200 MG tablet Take 200 mg by mouth every 4 hours as needed.       Lancets (MICROLET) MISC 1 Device See Admin Instructions. Use up to 5 times daily for blood sugar checks. 100 each prn     Social History   Substance Use Topics     Smoking status: Former Smoker     Types: Cigarettes     Quit date: 10/24/2005     Smokeless tobacco: Former User      Comment: States only smokes when drinks " maybe 2x/year     Alcohol use No       ROS:   CONSTITUTIONAL:NEGATIVE for fever, chills, change in weight  INTEGUMENTARY/SKIN: NEGATIVE for worrisome rashes, moles or lesions    OBJECTIVE:  /82 (BP Location: Right arm, Patient Position: Chair, Cuff Size: Adult Regular)  Pulse 75  Temp 98.5  F (36.9  C) (Tympanic)  SpO2 99%     GENERAL APPEARANCE: healthy, alert and no distress  RESP: lungs clear to auscultation - no rales, rhonchi or wheezes  CV: regular rates and rhythm, normal S1 S2, no murmur noted  ABDOMEN:  soft, nontender, no HSM or masses and bowel sounds normal  BACK: No CVA tenderness  SKIN: no suspicious lesions or rashes    ASSESSMENT:   Lower, uncomplicated urinary tract infection.    PLAN:  Macrobid  As per ordered above  Drink plenty of fluids.  Prevention and treatment of UTI's discussed.Signs and symptoms of pyelonephritis mentioned.  Follow up with primary care physician if not improving   Will follow cultures given prior sensitivities and abx intolerances

## 2018-03-03 NOTE — TELEPHONE ENCOUNTER
Caller is diagnosed with UTI; has hematuria, and severe dysuria unrelieved with AZO and only has taken one dose of Macrobid;  Triage protocol reviewed  Advised to  take OTC analgesia and sitz bath  Advised to continue antibiotic and AZO  Advised to call back or be seen in UC/ED if symptoms are unbearable  Martina Freire RN  [facility]    Reason for Disposition    [1] SEVERE pain with urination  (e.g., excruciating) AND [2] not improved after 2 hours of pain medicine and Sitz bath    Additional Information    [1] Taking antibiotic < 72 hours (3 days) for UTI AND [2] painful urination or frequency not improved  (all triage questions negative)    Protocols used: URINATION PAIN - FEMALE-ADULT-, URINARY TRACT INFECTION ON ANTIBIOTIC FOLLOW-UP CALL - FEMALE-ADULT-

## 2018-03-04 LAB
BACTERIA SPEC CULT: ABNORMAL
BACTERIA SPEC CULT: ABNORMAL
SPECIMEN SOURCE: ABNORMAL

## 2018-03-06 ENCOUNTER — OFFICE VISIT (OUTPATIENT)
Dept: PEDIATRICS | Facility: CLINIC | Age: 38
End: 2018-03-06
Payer: COMMERCIAL

## 2018-03-06 VITALS
HEIGHT: 64 IN | WEIGHT: 196 LBS | RESPIRATION RATE: 16 BRPM | HEART RATE: 91 BPM | TEMPERATURE: 97.9 F | SYSTOLIC BLOOD PRESSURE: 112 MMHG | DIASTOLIC BLOOD PRESSURE: 68 MMHG | OXYGEN SATURATION: 99 % | BODY MASS INDEX: 33.46 KG/M2

## 2018-03-06 DIAGNOSIS — N30.00 ACUTE CYSTITIS WITHOUT HEMATURIA: ICD-10-CM

## 2018-03-06 DIAGNOSIS — M54.50 LOW BACK PAIN WITHOUT SCIATICA, UNSPECIFIED BACK PAIN LATERALITY, UNSPECIFIED CHRONICITY: Primary | ICD-10-CM

## 2018-03-06 LAB
ALBUMIN UR-MCNC: >=300 MG/DL
APPEARANCE UR: CLEAR
BACTERIA #/AREA URNS HPF: ABNORMAL /HPF
BILIRUB UR QL STRIP: NEGATIVE
COLOR UR AUTO: YELLOW
GLUCOSE UR STRIP-MCNC: NEGATIVE MG/DL
HGB UR QL STRIP: NEGATIVE
HYALINE CASTS #/AREA URNS LPF: ABNORMAL /LPF
KETONES UR STRIP-MCNC: ABNORMAL MG/DL
LEUKOCYTE ESTERASE UR QL STRIP: NEGATIVE
MUCOUS THREADS #/AREA URNS LPF: PRESENT /LPF
NITRATE UR QL: NEGATIVE
NON-SQ EPI CELLS #/AREA URNS LPF: ABNORMAL /LPF
PH UR STRIP: 5 PH (ref 5–7)
RBC #/AREA URNS AUTO: ABNORMAL /HPF
SOURCE: ABNORMAL
SP GR UR STRIP: 1.02 (ref 1–1.03)
UROBILINOGEN UR STRIP-ACNC: 0.2 EU/DL (ref 0.2–1)
WBC #/AREA URNS AUTO: ABNORMAL /HPF

## 2018-03-06 PROCEDURE — 99214 OFFICE O/P EST MOD 30 MIN: CPT | Performed by: INTERNAL MEDICINE

## 2018-03-06 PROCEDURE — 81001 URINALYSIS AUTO W/SCOPE: CPT | Performed by: INTERNAL MEDICINE

## 2018-03-06 PROCEDURE — 87086 URINE CULTURE/COLONY COUNT: CPT | Performed by: INTERNAL MEDICINE

## 2018-03-06 RX ORDER — CYCLOBENZAPRINE HCL 10 MG
10 TABLET ORAL
Qty: 20 TABLET | Refills: 0 | Status: SHIPPED | OUTPATIENT
Start: 2018-03-06 | End: 2019-01-13

## 2018-03-06 NOTE — LETTER
March 6, 2018      Marie Vogel  [address] [place] [address]        To Whom It May Concern:    Marie Vogel was seen in our clinic. She may return to work with the following restrictions on or about March 6, 2018 :  Should not lift, push or pull more than 10 lbs for the next 2 weeks.      Sincerely,        Alan Paredes MD, MD

## 2018-03-06 NOTE — PROGRESS NOTES
SUBJECTIVE:   Marie Vogel is a 37 year old female who presents to clinic today for the following health issues:      ED/UC Followup:    Facility:  [facility]  Date of visit: 03/02/2018  Reason for visit: flank pain, blood in the urine  Current Status: still having flank pain     37-year-old woman with a history of insulin-dependent diabetes presents today for follow-up of recent urgent care visit a few days ago for urinary tract infection.  Patient presented with complaints of dysuria, urinary frequency and new onset hematuria.  Prior history of recurrent urinary tract infections.  Patient had no complaints of nausea fevers or flank pain at presentation.  Patient was discharged on a course of nitrofurantoin and notes that the hematuria has resolved in past 24 hrs.  Concern today is bilateral mid thoracic back pain-patient is concerned she has a kidney infection.  Urine culture result reviewed today demonstrates E. coli sensitive to nitrofurantoin.    Patient gives history of recurrent problems with mid back pain.  Current symptoms are similar.  Denies work injury.  Does state she needs to do a fair amount of lifting throughout the day, aggravates sx.  Denies numbness paresthesias or weakness of the lower extremities.  Denies changes in bowel or bladder function apart from UTI symptoms.      Patient Active Problem List   Diagnosis     Allergic rhinitis     HYPERLIPIDEMIA LDL GOAL <100     Family history of other cardiovascular diseases     Health Care Home     Microalbuminuria     Insulin pump in place     Vitamin D deficiency     Nut allergy     IUD (intrauterine device) in place     Type 2 diabetes mellitus with diabetic nephropathy     Diabetic gastroparesis (H)     Hypertrophic obstructive cardiomyopathy (H)     PFO (patent foramen ovale)     Other migraine without status migrainosus, not intractable     Scars of posterior pole of chorioretina, bilateral     Past Surgical History:    Procedure Laterality Date     C INDUCED ABORTN BY D&C           C IUD,MIRENA  8/10/10, 7/28/15     C/SECTION, LOW TRANSVERSE  6/25/10    , Low Transverse     CHOLECYSTECTOMY, LAPOROSCOPIC      Cholecystectomy, Laparoscopic     ESOPHAGOSCOPY, GASTROSCOPY, DUODENOSCOPY (EGD), COMBINED N/A 2016    Procedure: COMBINED ESOPHAGOSCOPY, GASTROSCOPY, DUODENOSCOPY (EGD), BIOPSY SINGLE OR MULTIPLE;  Surgeon: Fadi Hunter MD;  Location:  GI      ESOPHAGEAL MOTILITY STUDY N/A 2016    Procedure: ESOPHAGEAL MOTILITY STUDY;  Surgeon: Fadi Hunter MD;  Location:  GI      REMOVE TONSILS/ADENOIDS,<11 Y/O  1987    T &A       Social History   Substance Use Topics     Smoking status: Former Smoker     Types: Cigarettes     Quit date: 10/24/2005     Smokeless tobacco: Former User      Comment: States only smokes when drinks maybe 2x/year     Alcohol use No     Family History   Problem Relation Age of Onset     Cardiovascular Mother      hypertrophic cardiomyopathy     Genitourinary Problems Mother      gallbladder disease     DIABETES Mother      drug induced     Other - See Comments Mother      heart transplant 2005     DIABETES Father      type 2     Hypertension Father      Genitourinary Problems Maternal Grandmother      gallbladder disease     Genitourinary Problems Paternal Grandmother      gallbladder disease     Hypertension Paternal Grandfather      high bp     CEREBROVASCULAR DISEASE Paternal Grandfather      Cardiovascular Brother      hypertrophic cardiomyopathy     DIABETES Brother      type 2         Current Outpatient Prescriptions   Medication Sig Dispense Refill     cyclobenzaprine (FLEXERIL) 10 MG tablet Take 1 tablet (10 mg) by mouth nightly as needed for muscle spasms 20 tablet 0     nitroFURantoin, macrocrystal-monohydrate, (MACROBID) 100 MG capsule Take 1 capsule (100 mg) by mouth 2 times daily 14 capsule 0     ergocalciferol (ERGOCALCIFEROL) 41526  "UNITS capsule Take 1 capsule (50,000 Units) by mouth once a week 12 capsule 0     omeprazole (PRILOSEC) 40 MG capsule Take 1 capsule (40 mg) by mouth daily 90 capsule 0     Omega-3 Fatty Acids (FISH OIL) 500 MG CAPS Take 1 capsule by mouth       MAGNESIUM OXIDE PO Take 400 mg by mouth daily       atenolol (TENORMIN) 25 MG tablet Take 0.5 tablets (12.5 mg) by mouth daily 90 tablet 3     metFORMIN (GLUCOPHAGE-XR) 500 MG 24 hr tablet Take 2 tablets (1,000 mg) by mouth 2 times daily (with meals) 360 tablet 0     atorvastatin (LIPITOR) 20 MG tablet Take 1 tablet (20 mg) by mouth daily 90 tablet 3     fluticasone (FLONASE) 50 MCG/ACT spray Spray 1-2 sprays into both nostrils daily 1 Bottle 0     losartan (COZAAR) 50 MG tablet Take 1 tab ( 50 mg)  qam and 1/2 tab ( 25 mg)  q pm 135 tablet 1     PEPE CONTOUR NEXT test strip Check 4 times/day 120 each 11     insulin aspart (NOVOLOG VIAL) 100 UNITS/ML injection With insulin pump. Uses about 80 units/day. 30 mL 3     SUMAtriptan (IMITREX) 25 MG tablet Take 25-100mg one time. May repeat 25mg every two hours up to a maximum of 200mg in 24 hours. 8 tablet 6     montelukast (SINGULAIR) 10 MG tablet Take 1 tablet (10 mg) by mouth At Bedtime 90 tablet 1     EPINEPHrine 0.3 MG/0.3ML injection Inject 0.3 mLs (0.3 mg) into the muscle once as needed for anaphylaxis 0.3 mL 11     INSULIN PUMP - OUTPATIENT Updated 1/27/17:  Medtronic Minimed: Model 723  BASAL:  00:00: 1.0 units/hr  10:30: 0.95  14:00: 1.05  CARB RATIO:  00:00 1:7  1600  1:8  C. Factor:  27 mg/dL  BLOOD GLUCOSE TARGET and times:  12   AM (midnight):   Active Insulin Time:  3 hours  Basal to Bolus Ratio:   Sensor:  No  Carelink / Diasend username:  [email or web address]  Carelink / Diasend Password:  [email or web address]       insulin cartridge (PARADIGM 3ML) misc pump supply Change every 3 days1 applicator every 72 hours Change every 3 days 30 each 3     infusion set (PARADIGM QUICK-SET 23\" 9MM) [facility] Change every 3 days as " "directed 30 each 3     blood glucose monitoring (NO BRAND SPECIFIED) meter device kit Use to test blood sugar 6 times daily and as needed. 1 kit 0     order for DME Equipment being ordered: skin prep wipes 100 each prn     aspirin 81 MG tablet Take 1 tablet (81 mg) by mouth daily 90 tablet 3     cetirizine (ZYRTEC) 10 MG tablet Take 10 mg by mouth 2 times daily  90 tablet 3     Multiple Vitamins-Minerals (MULTI VITAMIN/MINERALS PO) Take 1 each by mouth daily.       ibuprofen (ADVIL) 200 MG tablet Take 200 mg by mouth every 4 hours as needed.       Lancets (MICROLET) MISC 1 Device See Admin Instructions. Use up to 5 times daily for blood sugar checks. 100 each prn       ROS: The following systems have been completely reviewed and are negative except as noted in the HPI: CONSTITUTIONAL,  GASTROINTESTINAL, GENITOURINARY, DERMATOLOGIC, NEUROLOGIC    OBJECTIVE:                                                    /68 (BP Location: Right arm, Cuff Size: Adult Regular)  Pulse 91  Temp 97.9  F (36.6  C) (Oral)  Resp 16  Ht 5' 4\" (1.626 m)  Wt 196 lb (88.9 kg)  SpO2 99%  BMI 33.64 kg/m2 Body mass index is 33.64 kg/(m^2).  GENERAL:  alert,  no distress  RESP: lungs clear to auscultation - no rales, no rhonchi, no wheezes  CV: regular rates and rhythm, normal S1 S2, no S3 or S4 and no murmur, no click or rub -  Back: Without CVA tenderness.  Subjective tenderness to palpation over thoracic paraspinous muscles bilaterally.  Negative straight leg raise bilaterally.  Neuro: Motor exam, DTRs lower extremity are normal  ABDOMEN: soft, no tenderness, no  hepatosplenomegaly, no masses, normal bowel sounds  MS: extremities- no edema     Results for orders placed or performed in visit on 03/06/18   UA reflex to Microscopic   Result Value Ref Range    Color Urine Yellow     Appearance Urine Clear     Glucose Urine Negative NEG^Negative mg/dL    Bilirubin Urine Negative NEG^Negative    Ketones Urine Trace (A) NEG^Negative " mg/dL    Specific Gravity Urine 1.025 1.003 - 1.035    Blood Urine Negative NEG^Negative    pH Urine 5.0 5.0 - 7.0 pH    Protein Albumin Urine >=300 (A) NEG^Negative mg/dL    Urobilinogen Urine 0.2 0.2 - 1.0 EU/dL    Nitrite Urine Negative NEG^Negative    Leukocyte Esterase Urine Negative NEG^Negative    Source Midstream Urine    Urine Microscopic   Result Value Ref Range    WBC Urine 0 - 5 OTO5^0 - 5 /HPF    RBC Urine O - 2 OTO2^O - 2 /HPF    Hyaline Casts O - 2 OTO2^O - 2 /LPF    Squamous Epithelial /LPF Urine Moderate (A) FEW^Few /LPF    Bacteria Urine Few (A) NEG^Negative /HPF    Mucous Urine Present (A) NEG^Negative /LPF   Urine Culture Aerobic Bacterial   Result Value Ref Range    Specimen Description Midstream Urine     Culture Micro       <10,000 colonies/mL  mixed urogenital nii  Susceptibility testing not routinely done        ASSESSMENT/PLAN:                                                        ICD-10-CM    1. Low back pain without sciatica, unspecified back pain laterality, unspecified chronicity M54.5 cyclobenzaprine (FLEXERIL) 10 MG tablet     [facility]     Urine Microscopic      Urinalysis reviewed today negative for persistent pyuria.  Follow-up urine culture without significant growth.  Suspect recurrent musculoskeletal back pain without radicular sx.  Recommended course of physical therapy for core strengthening.  Start cyclobenzaprine as needed QHS-side effects reviewed     2. Acute cystitis without hematuria N30.00 UA reflex to Microscopic     Urine Culture Aerobic Bacterial      Cystitis.  No evidence of pyelonephritis.  Resolving-completing course of nitrofurantoin.        Alan Paredes MD  [facility]

## 2018-03-06 NOTE — MR AVS SNAPSHOT
After Visit Summary   3/6/2018    Marie Vogel    MRN: [id number]           Patient Information     Date Of Birth          1980        Visit Information        Provider Department      3/6/2018 2:00 PM Alan Paredes MD [facility]        Today's Diagnoses     Low back pain without sciatica, unspecified back pain laterality, unspecified chronicity    -  1    Acute cystitis without hematuria          Care Instructions    INSTRUCTIONS FOR TODAY:     low back pain.   ibuprofen as needed        Flexeril as needed for spasm (may cause sleepiness)        Schedule visit with PT     UTI         Rechecking urine test today.    Continue nitrofurantoin    Dr Paredes    When You Have Low Back Pain    Caring for Your Back  You are not alone.    Low back pain is very common. Nearly half of all adults have low back pain in any given year. The good news is that back pain is rarely a danger to your health. Most people can manage their back pain on their own and about half of them start feeling better within 2 weeks. In 9 out of 10 cases, low back pain goes away or no longer limits daily activity within 6 weeks.     Your outlook is good!    Your symptoms tell us that your low back pain is most likely not a danger to you. Most of the time we do not know the exact cause of low back pain, even if you see a doctor or have an MRI. However, treatment can still work without knowing the cause of the pain. Less than 1 in 100 people need surgery for their back pain.     What can I do about my low back pain?     There are three things you can do to ease low back pain and help it go away.    Use heat or cold packs.    Take medicine as directed.    Use positions, movements and exercises.     Using heat or cold packs    Try cold packs or gentle heat to ease your pain. Use whichever gives you the most relief. Apply the cold pack or heat for 15 minutes at a time, as often as needed.    Taking medicine       If your doctor has prescribed medicine, be sure to follow the directions.    If you take over-the-counter medicine, read and follow the directions.    Talk to your doctor if you have any questions.     Using positions, movements and exercises    Research tells us that moving your joints and muscles can help you recover from back pain. Such activity should be simple and gentle. Use the positions in the photos as well as walking to help relieve your pain. Try taking a short walk every 3 to 4 hours during the day. Walk for a few minutes inside your home or take longer walks outside, on a treadmill or at a mall. Slowly increase the amount of time you walk. Expect discomfort when you begin, but it should lessen as your back starts to heal. When your back feels better, walk daily to keep your back and body healthy.    Finding a comfortable position    When your back pain is new, certain positions will ease your pain. Gently try each of the positions below until you find one that is helpful. Once you find a position of comfort, use it as often as you like when you are resting. You will recover faster if you combine rest with activity.         Lie on your back with your legs bent. You can do this by placing a pillow under your knees. Or you may lie on the floor and rest your lower legs on the seat of a chair.       Lie on your side with your knees bent, and place a pillow between your knees.       Lie on your stomach over pillows.      When should I call my doctor?    Your back pain should improve over the first couple of weeks. As it improves, you should be able to return to your normal activities. But call your doctor if:    You have a sudden change in your ability to control your bladder or bowels.    You feel tingling in your groin or legs.    The pain spreads down your leg and into your foot.    Your toes, feet or leg muscles feel weak.    You feel generally unwell or sick.    Your pain does not get better or gets  worse.      If you are deaf or hard of hearing, please let us know. We provide many free services including sign language interpreters,oral interpreters, TTYs, telephone amplifiers, note takers and written materials.    For informational purposes only. Not to replace the advice of your health care provider. Copyright   2013 [facility]. All rights reserved. [employer] 285794 - Rev 06/14.           Follow-ups after your visit        Additional Services     [facility] PT, HAND, AND CHIROPRACTIC REFERRAL       **This order will print in the [facility] Scheduling Office**    Physical Therapy, Hand Therapy and Chiropractic Care are available through:    *[facility]  *[facility]  *[facility]    Call one number to schedule at any of the above locations: [phone].    Your provider has referred you to: Physical Therapy at [facility] or [facility]    Indication/Reason for Referral: low back pain  Onset of Illness:   Therapy Orders: Evaluate and Treat  Special Programs: None  Special Request: None    Nataly Ingram      Additional Comments for the Therapist or Chiropractor:       Please be aware that coverage of these services is subject to the terms and limitations of your health insurance plan.  Call member services at your health plan with any benefit or coverage questions.      Please bring the following to your appointment:    *Your personal calendar for scheduling future appointments  *Comfortable clothing                  Who to contact     If you have questions or need follow up information about today's clinic visit or your schedule please contact [facility] directly at [phone].  Normal or non-critical lab and imaging results will be communicated to you by MyChart, letter or phone within 4 business days after the clinic has received the results. If you do not hear from us within 7 days, please contact the clinic through MyChart or phone. If you have a  "critical or abnormal lab result, we will notify you by phone as soon as possible.  Submit refill requests through [employer] or call your pharmacy and they will forward the refill request to us. Please allow 3 business days for your refill to be completed.          Additional Information About Your Visit        [facility] Information     [employer] gives you secure access to your electronic health record. If you see a primary care provider, you can also send messages to your care team and make appointments. If you have questions, please call your primary care clinic.  If you do not have a primary care provider, please call [phone] and they will assist you.        Care EveryWhere ID     This is your Care EveryWhere ID. This could be used by other organizations to access your [facility] medical records  [id number]        Your Vitals Were     Pulse Temperature Respirations Height Pulse Oximetry BMI (Body Mass Index)    91 97.9  F (36.6  C) (Oral) 16 5' 4\" (1.626 m) 99% 33.64 kg/m2       Blood Pressure from Last 3 Encounters:   03/06/18 112/68   03/02/18 108/82   01/26/18 128/84    Weight from Last 3 Encounters:   03/06/18 196 lb (88.9 kg)   01/23/18 193 lb 14.4 oz (88 kg)   01/12/18 193 lb (87.5 kg)              We Performed the Following     [facility] PT, HAND, AND CHIROPRACTIC REFERRAL     UA reflex to Microscopic     Urine Culture Aerobic Bacterial          Today's Medication Changes          These changes are accurate as of 3/6/18  2:36 PM.  If you have any questions, ask your nurse or doctor.               Start taking these medicines.        Dose/Directions    cyclobenzaprine 10 MG tablet   Commonly known as:  FLEXERIL   Used for:  Low back pain without sciatica, unspecified back pain laterality, unspecified chronicity   Started by:  Alan Paredes MD        Dose:  10 mg   Take 1 tablet (10 mg) by mouth nightly as needed for muscle spasms   Quantity:  20 tablet   Refills:  0         Stop taking these medicines if you " haven't already. Please contact your care team if you have questions.     predniSONE 20 MG tablet   Commonly known as:  DELTASONE   Stopped by:  Alan Paredes MD                Where to get your medicines      These medications were sent to [facility] - [address]   [address] [place] [address] [address]     Phone:  [phone]     cyclobenzaprine 10 MG tablet                Primary Care Provider Office Phone # Fax #    Alan aPredes [phone]       [address] [place] [address]        Equal Access to Services     [facility]: Hadii aad ku hadasho Soomaali, waaxda luqadaha, qaybta kaalmada adeegyada, waxay idiin hayaan aderichard greenaracuca peralta . So [facility] [phone].    ATENCIÓN: Si habla español, tiene a woodruff disposición servicios gratuitos de asistencia lingüística. [facility] [phone].    We comply with applicable federal civil rights laws and [place] laws. We do not discriminate on the basis of race, color, national origin, age, disability, sex, sexual orientation, or gender identity.            Thank you!     Thank you for choosing [facility]  for your care. Our goal is always to provide you with excellent care. Hearing back from our patients is one way we can continue to improve our services. Please take a few minutes to complete the written survey that you may receive in the mail after your visit with us. Thank you!             Your Updated Medication List - Protect others around you: Learn how to safely use, store and throw away your medicines at www.disposemymeds.org.          This list is accurate as of 3/6/18  2:36 PM.  Always use your most recent med list.                   Brand Name Dispense Instructions for use Diagnosis    ADVIL 200 MG tablet   Generic drug:  ibuprofen      Take 200 mg by mouth every 4 hours as needed.        aspirin 81 MG tablet     90 tablet    Take 1 tablet (81  mg) by mouth daily    PFO (patent foramen ovale)       atenolol 25 MG tablet    TENORMIN    90 tablet    Take 0.5 tablets (12.5 mg) by mouth daily        atorvastatin 20 MG tablet    LIPITOR    90 tablet    Take 1 tablet (20 mg) by mouth daily        PEPE CONTOUR NEXT test strip   Generic drug:  blood glucose monitoring     120 each    Check 4 times/day    Type 2 diabetes mellitus with diabetic nephropathy, with long-term current use of insulin (H)       blood glucose monitoring lancets     100 each    1 Device See Admin Instructions. Use up to 5 times daily for blood sugar checks.    Type 2 diabetes, HbA1c goal < 7% (H)       blood glucose monitoring meter device kit    no brand specified    1 kit    Use to test blood sugar 6 times daily and as needed.    Type 2 diabetes mellitus with diabetic nephropathy (H)       cetirizine 10 MG tablet    zyrTEC    90 tablet    Take 10 mg by mouth 2 times daily        cyclobenzaprine 10 MG tablet    FLEXERIL    20 tablet    Take 1 tablet (10 mg) by mouth nightly as needed for muscle spasms    Low back pain without sciatica, unspecified back pain laterality, unspecified chronicity       EPINEPHrine 0.3 MG/0.3ML injection 2-pack    EPIPEN/ADRENACLICK/or ANY BX GENERIC EQUIV    0.3 mL    Inject 0.3 mLs (0.3 mg) into the muscle once as needed for anaphylaxis    Nut allergy       ergocalciferol 12945 UNITS capsule    ERGOCALCIFEROL    12 capsule    Take 1 capsule (50,000 Units) by mouth once a week    Vitamin D deficiency, Microalbuminuria       Fish Oil 500 MG Caps      Take 1 capsule by mouth        fluticasone 50 MCG/ACT spray    FLONASE    1 Bottle    Spray 1-2 sprays into both nostrils daily    Acute sinusitis with symptoms > 10 days       insulin aspart 100 UNITS/ML injection    NovoLOG VIAL    30 mL    With insulin pump. Uses about 80 units/day.    Type 2 diabetes mellitus with diabetic nephropathy, with long-term current use of insulin (H)       * insulin cartridge misc pump  supply     30 each    Change every 3 days1 applicator every 72 hours Change every 3 days    Type 2 diabetes mellitus with diabetic nephropathy, with long-term current use of insulin (H)       * infusion set misc pump supply     30 each    Change every 3 days as directed    Type 2 diabetes mellitus with diabetic nephropathy, with long-term current use of insulin (H)       insulin pump infusion      Updated 1/27/17: Medtronic Minimed: Model 723 BASAL: 00:00: 1.0 units/hr 10:30: 0.95 14:00: 1.05 CARB RATIO: 00:00 1:7 1600  1:8 C. Factor: 27 mg/dL BLOOD GLUCOSE TARGET and times: 12   AM (midnight):  Active Insulin Time:  3 hours Basal to Bolus Ratio:  Sensor:  No Carelink / Diasend username:  [email or web address] Carelink / Diasend Password:  [email or web address]    Type 2 diabetes mellitus with diabetic nephropathy, with long-term current use of insulin (H)       losartan 50 MG tablet    COZAAR    135 tablet    Take 1 tab ( 50 mg)  qam and 1/2 tab ( 25 mg)  q pm    Microalbuminuria       MAGNESIUM OXIDE PO      Take 400 mg by mouth daily        metFORMIN 500 MG 24 hr tablet    GLUCOPHAGE-XR    360 tablet    Take 2 tablets (1,000 mg) by mouth 2 times daily (with meals)    Type 2 diabetes mellitus with diabetic nephropathy, with long-term current use of insulin (H)       montelukast 10 MG tablet    SINGULAIR    90 tablet    Take 1 tablet (10 mg) by mouth At Bedtime    Allergic rhinitis       MULTI VITAMIN/MINERALS PO      Take 1 each by mouth daily.        nitroFURantoin (macrocrystal-monohydrate) 100 MG capsule    MACROBID    14 capsule    Take 1 capsule (100 mg) by mouth 2 times daily    Acute cystitis with hematuria       omeprazole 40 MG capsule    priLOSEC    90 capsule    Take 1 capsule (40 mg) by mouth daily    Gastroesophageal reflux disease without esophagitis, Hiatal hernia       order for DME     100 each    Equipment being ordered: skin prep wipes    Type 2 diabetes, HbA1c goal < 7% (H)       SUMAtriptan 25 MG tablet     IMITREX    8 tablet    Take 25-100mg one time. May repeat 25mg every two hours up to a maximum of 200mg in 24 hours.    Other migraine without status migrainosus, not intractable       * Notice:  This list has 2 medication(s) that are the same as other medications prescribed for you. Read the directions carefully, and ask your doctor or other care provider to review them with you.

## 2018-03-06 NOTE — PATIENT INSTRUCTIONS
INSTRUCTIONS FOR TODAY:     low back pain.   ibuprofen as needed        Flexeril as needed for spasm (may cause sleepiness)        Schedule visit with PT     UTI         Rechecking urine test today.    Continue nitrofurantoin    Dr Paredes    When You Have Low Back Pain    Caring for Your Back  You are not alone.    Low back pain is very common. Nearly half of all adults have low back pain in any given year. The good news is that back pain is rarely a danger to your health. Most people can manage their back pain on their own and about half of them start feeling better within 2 weeks. In 9 out of 10 cases, low back pain goes away or no longer limits daily activity within 6 weeks.     Your outlook is good!    Your symptoms tell us that your low back pain is most likely not a danger to you. Most of the time we do not know the exact cause of low back pain, even if you see a doctor or have an MRI. However, treatment can still work without knowing the cause of the pain. Less than 1 in 100 people need surgery for their back pain.     What can I do about my low back pain?     There are three things you can do to ease low back pain and help it go away.    Use heat or cold packs.    Take medicine as directed.    Use positions, movements and exercises.     Using heat or cold packs    Try cold packs or gentle heat to ease your pain. Use whichever gives you the most relief. Apply the cold pack or heat for 15 minutes at a time, as often as needed.    Taking medicine      If your doctor has prescribed medicine, be sure to follow the directions.    If you take over-the-counter medicine, read and follow the directions.    Talk to your doctor if you have any questions.     Using positions, movements and exercises    Research tells us that moving your joints and muscles can help you recover from back pain. Such activity should be simple and gentle. Use the positions in the photos as well as walking to help relieve your pain. Try taking a  short walk every 3 to 4 hours during the day. Walk for a few minutes inside your home or take longer walks outside, on a treadmill or at a mall. Slowly increase the amount of time you walk. Expect discomfort when you begin, but it should lessen as your back starts to heal. When your back feels better, walk daily to keep your back and body healthy.    Finding a comfortable position    When your back pain is new, certain positions will ease your pain. Gently try each of the positions below until you find one that is helpful. Once you find a position of comfort, use it as often as you like when you are resting. You will recover faster if you combine rest with activity.         Lie on your back with your legs bent. You can do this by placing a pillow under your knees. Or you may lie on the floor and rest your lower legs on the seat of a chair.       Lie on your side with your knees bent, and place a pillow between your knees.       Lie on your stomach over pillows.      When should I call my doctor?    Your back pain should improve over the first couple of weeks. As it improves, you should be able to return to your normal activities. But call your doctor if:    You have a sudden change in your ability to control your bladder or bowels.    You feel tingling in your groin or legs.    The pain spreads down your leg and into your foot.    Your toes, feet or leg muscles feel weak.    You feel generally unwell or sick.    Your pain does not get better or gets worse.      If you are deaf or hard of hearing, please let us know. We provide many free services including sign language interpreters,oral interpreters, TTYs, telephone amplifiers, note takers and written materials.    For informational purposes only. Not to replace the advice of your health care provider. Copyright   2013 [employer]. All rights reserved. [employer] 585173 - Rev 06/14.

## 2018-03-07 LAB
BACTERIA SPEC CULT: NORMAL
SPECIMEN SOURCE: NORMAL

## 2018-03-15 ENCOUNTER — THERAPY VISIT (OUTPATIENT)
Dept: PHYSICAL THERAPY | Facility: CLINIC | Age: 38
End: 2018-03-15
Payer: COMMERCIAL

## 2018-03-15 DIAGNOSIS — M54.9 BACK PAIN: Primary | ICD-10-CM

## 2018-03-15 PROCEDURE — 97161 PT EVAL LOW COMPLEX 20 MIN: CPT | Mod: GP | Performed by: PHYSICAL THERAPIST

## 2018-03-15 PROCEDURE — 97110 THERAPEUTIC EXERCISES: CPT | Mod: GP | Performed by: PHYSICAL THERAPIST

## 2018-03-15 NOTE — PROGRESS NOTES
[facility] Initial Evaluation  Subjective:  Patient is a 37 year old female presenting with rehab back hpi. The history is provided by the patient. No  was used.   Marie Vogel is a 37 year old female with a thoracic condition.  Condition occurred with:  Other reason.  Condition occurred: for unknown reasons.  This is a new condition  The patient is reporting mid to low thoracic back pain beginning around March 2, 2018. States the pain began at the same time she was diagnosed with a bladder infection. This has resolved with antibiotics but the back pain remains. Patient reports this is her 2nd episode of back pain. No complaints of arm or leg radiation. Taking flexeril to sleep at night. Saw her chiropractor last week, with some relief.     Is currently on lifting restrictions for work..    Patient reports pain:  Lower thoracic spine.  Radiates to:  No radiation.  Pain is described as aching and is intermittent and reported as 3/10.  Associated with: none. Pain is worse during the night and worse in the A.M..  Symptoms are exacerbated by stress, lifting and lying down and relieved by muscle relaxants, heat and activity/movement.  Since onset symptoms are unchanged.    Previous treatment includes chiropractic.  There was mild improvement following previous treatment.    Pertinent medical history includes:  Diabetes, overweight, heart problems and migraines.  Medical allergies: yes (see epic).  Other surgeries include:  Other.  Current medications:  Cardiac medication, muscle relaxants, high blood pressure medication and other (insulin, metformin, singulair, see epic).  Current occupation is Operating room.  Patient is working in normal job with restrictions.  Primary job tasks include:  Prolonged standing, lifting, repetitive tasks and other (computer, push/pull).                                Objective:  Standing Alignment:    Cervical/Thoracic:  Forward  head  Shoulder/UE:  Rounded shoulders  Lumbar:  Lordosis decr                           Lumbar/SI Evaluation  ROM:  Arom wnl lumbar: REIL: pain resolves.  AROM Lumbar:   Flexion:        Distal shins  Ext:                    50%   Side Bend:        Left:     Right:   Rotation:           Left:     Right:   Side Glide:        Left:     Right:         Strength: TrA Contraction: poor  Lumbar Myotomes:  normal                    Lumbar Palpation:    Tenderness present at Left:    PSIS  Tenderness present at Right: PSIS             Cervical/Thoracic Evaluation  Arom wnl thoracic: repeated thoracic ext: increases ROM, possible centralization.  AROM:    AROM Thoracic:    Flexion:             75%  Extension:          10%  Rotation:            Left:     Right:        Cervical Myotomes:  normal                                                                          General     ROS    Assessment/Plan:    Patient is a 37 year old female with thoracic complaints.    Patient has the following significant findings with corresponding treatment plan.                Diagnosis 1:  Back pain  Pain -  hot/cold therapy, manual therapy, education, directional preference exercise and home program  Decreased ROM/flexibility - manual therapy and therapeutic exercise  Decreased strength - therapeutic exercise and therapeutic activities  Impaired muscle performance - neuro re-education  Decreased function - therapeutic activities  Impaired posture - neuro re-education    Therapy Evaluation Codes:   1) History comprised of:   Personal factors that impact the plan of care:      Profession.    Comorbidity factors that impact the plan of care are:      Diabetes, Heart problems, Migraines/headaches, Overweight and Pain at night/rest.     Medications impacting care: Cardiac, High blood pressure and Muscle relaxant.  2) Examination of Body Systems comprised of:   Body structures and functions that impact the plan of care:      Thoracic  Spine.   Activity limitations that impact the plan of care are:      Bending, Lifting, Sleeping and Laying down.  3) Clinical presentation characteristics are:   Stable/Uncomplicated.  4) Decision-Making    Low complexity using standardized patient assessment instrument and/or measureable assessment of functional outcome.  Cumulative Therapy Evaluation is: Low complexity.    Previous and current functional limitations:  (See Goal Flow Sheet for this information)    Short term and Long term goals: (See Goal Flow Sheet for this information)     Communication ability:  Patient appears to be able to clearly communicate and understand verbal and written communication and follow directions correctly.  Treatment Explanation - The following has been discussed with the patient:   RX ordered/plan of care  Anticipated outcomes  Possible risks and side effects  This patient would benefit from PT intervention to resume normal activities.   Rehab potential is good.    Frequency:  1 X week, once daily  Duration:  for 6 weeks  Discharge Plan:  Achieve all LTG.  Independent in home treatment program.  Reach maximal therapeutic benefit.    Please refer to the daily flowsheet for treatment today, total treatment time and time spent performing 1:1 timed codes.

## 2018-03-22 ENCOUNTER — THERAPY VISIT (OUTPATIENT)
Dept: PHYSICAL THERAPY | Facility: CLINIC | Age: 38
End: 2018-03-22
Payer: COMMERCIAL

## 2018-03-22 DIAGNOSIS — M54.6 ACUTE THORACIC BACK PAIN, UNSPECIFIED BACK PAIN LATERALITY: ICD-10-CM

## 2018-03-22 PROCEDURE — 97112 NEUROMUSCULAR REEDUCATION: CPT | Mod: GP | Performed by: PHYSICAL THERAPIST

## 2018-03-22 PROCEDURE — 97110 THERAPEUTIC EXERCISES: CPT | Mod: GP | Performed by: PHYSICAL THERAPIST

## 2018-03-29 ENCOUNTER — THERAPY VISIT (OUTPATIENT)
Dept: PHYSICAL THERAPY | Facility: CLINIC | Age: 38
End: 2018-03-29
Payer: COMMERCIAL

## 2018-03-29 DIAGNOSIS — M54.6 ACUTE THORACIC BACK PAIN, UNSPECIFIED BACK PAIN LATERALITY: ICD-10-CM

## 2018-03-29 PROCEDURE — 97140 MANUAL THERAPY 1/> REGIONS: CPT | Mod: GP | Performed by: PHYSICAL THERAPIST

## 2018-03-29 PROCEDURE — 97110 THERAPEUTIC EXERCISES: CPT | Mod: GP | Performed by: PHYSICAL THERAPIST

## 2018-03-29 PROCEDURE — 97112 NEUROMUSCULAR REEDUCATION: CPT | Mod: GP | Performed by: PHYSICAL THERAPIST

## 2018-04-16 ENCOUNTER — OFFICE VISIT (OUTPATIENT)
Dept: PEDIATRICS | Facility: CLINIC | Age: 38
End: 2018-04-16
Payer: COMMERCIAL

## 2018-04-16 VITALS
BODY MASS INDEX: 31.76 KG/M2 | WEIGHT: 185 LBS | SYSTOLIC BLOOD PRESSURE: 102 MMHG | HEART RATE: 64 BPM | DIASTOLIC BLOOD PRESSURE: 60 MMHG | TEMPERATURE: 97.6 F

## 2018-04-16 DIAGNOSIS — Z79.4 TYPE 2 DIABETES MELLITUS WITH DIABETIC NEPHROPATHY, WITH LONG-TERM CURRENT USE OF INSULIN (H): Primary | ICD-10-CM

## 2018-04-16 DIAGNOSIS — E11.21 TYPE 2 DIABETES MELLITUS WITH DIABETIC NEPHROPATHY, WITH LONG-TERM CURRENT USE OF INSULIN (H): Primary | ICD-10-CM

## 2018-04-16 PROCEDURE — 99213 OFFICE O/P EST LOW 20 MIN: CPT | Performed by: INTERNAL MEDICINE

## 2018-04-16 NOTE — PROGRESS NOTES
SUBJECTIVE:   Marie Vogel is a 37 year old female who presents to clinic today for the following health issues:    Presents for FMLA form completion.  Needs FMLA to cover time off work for 1) Dialectic Behavioral Sessions with her daughter and 2) diabetes follow-up visits with Endocrinology  DBT sessions are once  every other week for pt and once weekly for daughter--pt generally has to leave between 1hr and 15min and 1 hr and 45mn early from her work shift to make the appointments.   Also needs to leave early for diabetes visits with Endocrinology every 3 months.    Patient Active Problem List   Diagnosis     Allergic rhinitis     HYPERLIPIDEMIA LDL GOAL <100     Family history of other cardiovascular diseases     Health Care Home     Microalbuminuria     Insulin pump in place     Vitamin D deficiency     Nut allergy     IUD (intrauterine device) in place     Type 2 diabetes mellitus with diabetic nephropathy     Diabetic gastroparesis (H)     Hypertrophic obstructive cardiomyopathy (H)     PFO (patent foramen ovale)     Other migraine without status migrainosus, not intractable     Scars of posterior pole of chorioretina, bilateral     Back pain     Past Surgical History:   Procedure Laterality Date     C INDUCED ABORTN BY D&C           C IUD,MIRENA  8/10/10, 7/28/15     C/SECTION, LOW TRANSVERSE  6/25/10    , Low Transverse     CHOLECYSTECTOMY, LAPOROSCOPIC      Cholecystectomy, Laparoscopic     ESOPHAGOSCOPY, GASTROSCOPY, DUODENOSCOPY (EGD), COMBINED N/A 2016    Procedure: COMBINED ESOPHAGOSCOPY, GASTROSCOPY, DUODENOSCOPY (EGD), BIOPSY SINGLE OR MULTIPLE;  Surgeon: Fadi Hunter MD;  Location: [facility] ESOPHAGEAL MOTILITY STUDY N/A 2016    Procedure: ESOPHAGEAL MOTILITY STUDY;  Surgeon: Fadi Hunter MD;  Location: [facility] REMOVE TONSILS/ADENOIDS,<13 Y/O      T &A       Social History   Substance Use Topics     Smoking status: Former  Smoker     Types: Cigarettes     Quit date: 10/24/2005     Smokeless tobacco: Former User      Comment: States only smokes when drinks maybe 2x/year     Alcohol use No     Family History   Problem Relation Age of Onset     Cardiovascular Mother      hypertrophic cardiomyopathy     Genitourinary Problems Mother      gallbladder disease     DIABETES Mother      drug induced     Other - See Comments Mother      heart transplant 11/23/2005     DIABETES Father      type 2     Hypertension Father      Genitourinary Problems Maternal Grandmother      gallbladder disease     Genitourinary Problems Paternal Grandmother      gallbladder disease     Hypertension Paternal Grandfather      high bp     CEREBROVASCULAR DISEASE Paternal Grandfather      Cardiovascular Brother      hypertrophic cardiomyopathy     DIABETES Brother      type 2         Current Outpatient Prescriptions   Medication Sig Dispense Refill     cyclobenzaprine (FLEXERIL) 10 MG tablet Take 1 tablet (10 mg) by mouth nightly as needed for muscle spasms 20 tablet 0     ergocalciferol (ERGOCALCIFEROL) 55599 UNITS capsule Take 1 capsule (50,000 Units) by mouth once a week 12 capsule 0     omeprazole (PRILOSEC) 40 MG capsule Take 1 capsule (40 mg) by mouth daily 90 capsule 0     Omega-3 Fatty Acids (FISH OIL) 500 MG CAPS Take 1 capsule by mouth       MAGNESIUM OXIDE PO Take 400 mg by mouth daily       atenolol (TENORMIN) 25 MG tablet Take 0.5 tablets (12.5 mg) by mouth daily 90 tablet 3     metFORMIN (GLUCOPHAGE-XR) 500 MG 24 hr tablet Take 2 tablets (1,000 mg) by mouth 2 times daily (with meals) 360 tablet 0     atorvastatin (LIPITOR) 20 MG tablet Take 1 tablet (20 mg) by mouth daily 90 tablet 3     fluticasone (FLONASE) 50 MCG/ACT spray Spray 1-2 sprays into both nostrils daily 1 Bottle 0     losartan (COZAAR) 50 MG tablet Take 1 tab ( 50 mg)  qam and 1/2 tab ( 25 mg)  q pm 135 tablet 1     PEPE CONTOUR NEXT test strip Check 4 times/day 120 each 11     insulin  "aspart (NOVOLOG VIAL) 100 UNITS/ML injection With insulin pump. Uses about 80 units/day. 30 mL 3     SUMAtriptan (IMITREX) 25 MG tablet Take 25-100mg one time. May repeat 25mg every two hours up to a maximum of 200mg in 24 hours. 8 tablet 6     montelukast (SINGULAIR) 10 MG tablet Take 1 tablet (10 mg) by mouth At Bedtime 90 tablet 1     EPINEPHrine 0.3 MG/0.3ML injection Inject 0.3 mLs (0.3 mg) into the muscle once as needed for anaphylaxis 0.3 mL 11     INSULIN PUMP - OUTPATIENT Updated 1/27/17:  Medtronic Minimed: Model 723  BASAL:  00:00: 1.0 units/hr  10:30: 0.95  14:00: 1.05  CARB RATIO:  00:00 1:7  1600  1:8  C. Factor:  27 mg/dL  BLOOD GLUCOSE TARGET and times:  12   AM (midnight):   Active Insulin Time:  3 hours  Basal to Bolus Ratio:   Sensor:  No  Carelink / Diasend username:  [email or web address]  Carelink / Diasend Password:  [email or web address]       insulin cartridge (PARADIGM 3ML) misc pump supply Change every 3 days1 applicator every 72 hours Change every 3 days 30 each 3     infusion set (PARADIGM QUICK-SET 23\" 9MM) misc pump supply Change every 3 days as directed 30 each 3     blood glucose monitoring (NO BRAND SPECIFIED) meter device kit Use to test blood sugar 6 times daily and as needed. 1 kit 0     order for DME Equipment being ordered: skin prep wipes 100 each prn     aspirin 81 MG tablet Take 1 tablet (81 mg) by mouth daily 90 tablet 3     cetirizine (ZYRTEC) 10 MG tablet Take 10 mg by mouth 2 times daily  90 tablet 3     Multiple Vitamins-Minerals (MULTI VITAMIN/MINERALS PO) Take 1 each by mouth daily.       ibuprofen (ADVIL) 200 MG tablet Take 200 mg by mouth every 4 hours as needed.       Lancets (MICROLET) MISC 1 Device See Admin Instructions. Use up to 5 times daily for blood sugar checks. 100 each prn         OBJECTIVE:                                                    /60 (Cuff Size: Adult Large)  Pulse 64  Temp 97.6  F (36.4  C) (Oral)  Wt 185 lb (83.9 kg)  BMI 31.76 kg/m2 Body mass index " is 31.76 kg/(m^2).     ASSESSMENT/PLAN:                                                        ICD-10-CM    1. Type 2 diabetes mellitus with diabetic nephropathy, with long-term current use of insulin (H) E11.21     Z79.4       FMLA forms completed for time off work for DBT sessions and diabetes follow-up   15 minutes spent face-to-face with patient today with greater than 50% of that time spent in counseling and coordination of care regarding the issues above.     Alan Paredes MD  [facility]

## 2018-05-16 ENCOUNTER — TELEPHONE (OUTPATIENT)
Dept: ENDOCRINOLOGY | Facility: CLINIC | Age: 38
End: 2018-05-16

## 2018-05-16 DIAGNOSIS — E11.21 TYPE 2 DIABETES MELLITUS WITH DIABETIC NEPHROPATHY, WITH LONG-TERM CURRENT USE OF INSULIN (H): ICD-10-CM

## 2018-05-16 DIAGNOSIS — E11.9 TYPE 2 DIABETES, HBA1C GOAL < 7% (H): ICD-10-CM

## 2018-05-16 DIAGNOSIS — J30.9 ALLERGIC RHINITIS: ICD-10-CM

## 2018-05-16 DIAGNOSIS — Z79.4 TYPE 2 DIABETES MELLITUS WITH DIABETIC NEPHROPATHY, WITH LONG-TERM CURRENT USE OF INSULIN (H): ICD-10-CM

## 2018-05-16 RX ORDER — METFORMIN HCL 500 MG
1000 TABLET, EXTENDED RELEASE 24 HR ORAL 2 TIMES DAILY WITH MEALS
Qty: 360 TABLET | Refills: 3 | Status: SHIPPED | OUTPATIENT
Start: 2018-05-16 | End: 2019-03-11

## 2018-05-16 RX ORDER — MONTELUKAST SODIUM 10 MG/1
10 TABLET ORAL AT BEDTIME
Qty: 90 TABLET | Refills: 3 | Status: SHIPPED | OUTPATIENT
Start: 2018-05-16 | End: 2019-05-21

## 2018-05-16 RX ORDER — INSULIN PUMP SYRINGE, 3 ML
EACH MISCELLANEOUS
Qty: 30 EACH | Refills: 3 | Status: SHIPPED | OUTPATIENT
Start: 2018-05-16 | End: 2018-09-27

## 2018-05-16 RX ORDER — INSULIN PUMP SYRINGE, 3 ML
EACH MISCELLANEOUS
Qty: 30 EACH | Refills: 0 | Status: SHIPPED | OUTPATIENT
Start: 2018-05-16 | End: 2018-05-16

## 2018-05-16 RX ORDER — METFORMIN HCL 500 MG
1000 TABLET, EXTENDED RELEASE 24 HR ORAL 2 TIMES DAILY WITH MEALS
Qty: 360 TABLET | Refills: 0 | Status: SHIPPED | OUTPATIENT
Start: 2018-05-16 | End: 2018-05-16

## 2018-05-16 RX ORDER — INFUSION SET FOR INSULIN PUMP
INFUSION SETS-PARAPHERNALIA MISCELLANEOUS
Qty: 30 EACH | Refills: 0 | Status: SHIPPED | OUTPATIENT
Start: 2018-05-16 | End: 2018-05-16

## 2018-05-16 RX ORDER — INFUSION SET FOR INSULIN PUMP
INFUSION SETS-PARAPHERNALIA MISCELLANEOUS
Qty: 30 EACH | Refills: 3 | Status: SHIPPED | OUTPATIENT
Start: 2018-05-16 | End: 2018-09-27

## 2018-05-16 NOTE — TELEPHONE ENCOUNTER
Pt   calls: [phone] (home) [phone] (work)    Calls, needs multiple medications refilled, all but 1 (Singulair) prescribed by Dr. Lyles. Pt has appointment scheduled with her on 6/19/18.     She also would like labs ordered that she can get done before the appointment-please place future orders as needed.      Singulair Prescription approved per [facility] Refill Protocol.    All diabetic medications are being filled for 1 time refill only due to:  PT needs labs and OV with crispin Moore RN

## 2018-05-16 NOTE — TELEPHONE ENCOUNTER
Rx sent.    Lab Results   Component Value Date    A1C 8.5 10/24/2017    A1C 7.2 04/17/2017    A1C 7.2 12/27/2016    A1C 7.1 09/27/2016    A1C 7.3 02/22/2016     Due for labs and clinic visit. Please send reminder.

## 2018-05-16 NOTE — TELEPHONE ENCOUNTER
Medication: Contour Next Test Strips        Other Information:    Patient Needs this kind to use with Pump      [facility]  Phone: [phone]  Fax: [phone]

## 2018-05-17 NOTE — TELEPHONE ENCOUNTER
PA Initiation    Medication: Contour Next Test Strips  Insurance Company: [employer] - Phone [phone] Fax [phone]  Pharmacy Filling the Rx: [facility] - [place], [place] - [facility] [address]  Filling Pharmacy Phone: [phone]  Filling Pharmacy Fax:    Start Date: 5/17/2018

## 2018-05-18 NOTE — TELEPHONE ENCOUNTER
Prior Authorization Approval    Authorization Effective Date: 5/18/2018  Authorization Expiration Date: 5/18/2019  Medication: Contour Next Test Strips - APPROVED   Approved Dose/Quantity:  Reference #:     Insurance Company: [employer] - Phone [phone] Fax [phone]  Expected CoPay:       CoPay Card Available:      Foundation Assistance Needed:    Which Pharmacy is filling the prescription (Not needed for infusion/clinic administered): [facility] - [place] [address]  Pharmacy Notified: Yes  Patient Notified: Yes

## 2018-05-22 NOTE — TELEPHONE ENCOUNTER
Patient's home number message on machine to call back.  Has Alfredo appointment 6- but no lab appointment.

## 2018-05-29 NOTE — TELEPHONE ENCOUNTER
Unable to reach patient again today. Message says OK to leave message on machine. Lab appointment due message left.

## 2018-06-15 DIAGNOSIS — Z79.4 TYPE 2 DIABETES MELLITUS WITH DIABETIC NEPHROPATHY, WITH LONG-TERM CURRENT USE OF INSULIN (H): ICD-10-CM

## 2018-06-15 DIAGNOSIS — E11.21 TYPE 2 DIABETES MELLITUS WITH DIABETIC NEPHROPATHY, WITH LONG-TERM CURRENT USE OF INSULIN (H): ICD-10-CM

## 2018-06-15 LAB
CHOLEST SERPL-MCNC: 184 MG/DL
HBA1C MFR BLD: 7.7 % (ref 0–5.6)
HDLC SERPL-MCNC: 35 MG/DL
LDLC SERPL CALC-MCNC: 111 MG/DL
NONHDLC SERPL-MCNC: 149 MG/DL
TRIGL SERPL-MCNC: 189 MG/DL

## 2018-06-15 PROCEDURE — 83036 HEMOGLOBIN GLYCOSYLATED A1C: CPT | Performed by: INTERNAL MEDICINE

## 2018-06-15 PROCEDURE — 36415 COLL VENOUS BLD VENIPUNCTURE: CPT | Performed by: INTERNAL MEDICINE

## 2018-06-15 PROCEDURE — 80061 LIPID PANEL: CPT | Performed by: INTERNAL MEDICINE

## 2018-06-19 ENCOUNTER — OFFICE VISIT (OUTPATIENT)
Dept: ENDOCRINOLOGY | Facility: CLINIC | Age: 38
End: 2018-06-19
Payer: COMMERCIAL

## 2018-06-19 VITALS
TEMPERATURE: 97.5 F | BODY MASS INDEX: 32.74 KG/M2 | WEIGHT: 191.8 LBS | HEART RATE: 86 BPM | SYSTOLIC BLOOD PRESSURE: 104 MMHG | DIASTOLIC BLOOD PRESSURE: 82 MMHG | HEIGHT: 64 IN | OXYGEN SATURATION: 99 %

## 2018-06-19 DIAGNOSIS — E55.9 VITAMIN D DEFICIENCY: ICD-10-CM

## 2018-06-19 DIAGNOSIS — Z79.4 TYPE 2 DIABETES MELLITUS WITH DIABETIC NEPHROPATHY, WITH LONG-TERM CURRENT USE OF INSULIN (H): Primary | ICD-10-CM

## 2018-06-19 DIAGNOSIS — E11.43 DIABETIC GASTROPARESIS (H): ICD-10-CM

## 2018-06-19 DIAGNOSIS — E11.21 TYPE 2 DIABETES MELLITUS WITH DIABETIC NEPHROPATHY, WITH LONG-TERM CURRENT USE OF INSULIN (H): Primary | ICD-10-CM

## 2018-06-19 DIAGNOSIS — K31.84 DIABETIC GASTROPARESIS (H): ICD-10-CM

## 2018-06-19 DIAGNOSIS — E78.5 HYPERLIPIDEMIA LDL GOAL <100: ICD-10-CM

## 2018-06-19 PROCEDURE — 99214 OFFICE O/P EST MOD 30 MIN: CPT | Performed by: INTERNAL MEDICINE

## 2018-06-19 NOTE — PATIENT INSTRUCTIONS
[facility] [facility]   Dr Fuentes, Endocrinology Department      [facility]   [address] [address]  Appointment [phone]  Fax: [phone]   Monday and Tuesday         [facility]   [address] [place] [address]  Appointment [phone]  Fax: [phone]  Wednesday and Thursday           To provide the best diabetic care, please bring your blood glucose meter to each and every visit with your Endocrinologist.  Your blood glucose meter/insulin pump will be downloaded at every appointment.      Please arrive 15 minutes before your scheduled appointment.  This will allow for your blood glucose meter/insulin pump to be downloaded and glycosylated hemoglobin (A1c) to be obtained prior to your appointment.    Your provider has referred you to Diabetes Education: For all [facility]:  Phone [phone]; Fax [phone]  Please call and make the appointment.--> new insulin pump- medtronic 670 G    Labs in 3 months  Please make a lab appointment for blood work and follow up clinic appointment in 1 week after that to discuss results.    Be Consistent with atorvastatin    Recommend checking blood sugars before meals and at bedtime.    If Blood glucose are low more often-> 2-3 times/week- give us a call.  The patient is advised to Make better food choices: reduce carbs, Reduce portion size, weight loss and exercise 3-4 times a week.  Discussed hypoglycemia signs and symptoms as well as management in detail.

## 2018-06-19 NOTE — PROGRESS NOTES
Name: Marie Vogel  Seen for f/u of DM  HPI:  Marie Vogel is a 37 year old female who presents for the evaluation/management of DM.  Had CGM in 1/2017.  On pump  Medtronic Minimed Paradigm+ CGM though not using sensors currently.    Here for f/u. Previously has seen endo at [facility] ( Dr Aguilera and Dr Zhou and ). 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 Concerned about weight gain.   H/o cardiomyopathy. Followed by cardiology.    Not checking BG everyday. Does bolus based on carbs. Sometimes does not take any bolus if busy at work.    1. Type 2 DM:  Orginally diagnosed at the age of: 21 with GDM during her first pregnancy. Diagnosed with DM 2 in 1/2002, diet controlled for 3 years, then started on metformin. In 2010 during her second pregnancy, started on insulin and then insulin pump therapy in 2012.   Current Regimen: Insulin pump therapy ( Medtronic Minimed Paradigm), metformin XR 1000 mg bid  BS checks: 4-5 times per day  Average Meter Download:     Current Regimen:   Insulin pump -   Time Rate (U/hr)   0000-10.30 1.65   8140-2913 1.40   3921-3615 1.20     Carbohydrate Ratio -    Time Ratio   9086-8060 7   4618-4296 7     Sensitivity   27   Active Insulin Time  3 hours   Basal  74%   Bolus   26 %   Total Carbohydrates/day  120   Total Insulin/day   46 +14   Average Blood Sugar 144   BS Checks    Care Link Username-   Password-         Complications:   Diabetes Complications  Description / Detail    Diabetic Retinopathy  No   CAD / PAD  No   Neuropathy  No   Nephropathy / Microalbuminuria  Yes: microalbuminuria. ON cozaar.   Gastroparesis  No   Hypoglycemia [place]  No     2. Hypertension: Blood Pressure today:   BP Readings from Last 3 Encounters:   06/19/18 104/82   04/16/18 102/60   03/06/18 112/68     Takes medications everyday without forgetting a dose.  Denies feeling lightheaded or dizzy.    3. Hyperlipidemia:   Denies muscle aches of  pains.       PMH/PSH:  Past Medical History:   Diagnosis Date     Allergic rhinitis due to pollen      Atypical glandular cells on Pap smear 3/1108     Gastroesophageal reflux disease      PFO (patent foramen ovale)      Type II or unspecified type diabetes mellitus without mention of complication, not stated as uncontrolled     onset was gestational in      Past Surgical History:   Procedure Laterality Date     C INDUCED ABORTN BY D&C           C IUD,MIRENA  8/10/10, 7/28/15     C/SECTION, LOW TRANSVERSE  6/25/10    , Low Transverse     CHOLECYSTECTOMY, LAPOROSCOPIC      Cholecystectomy, Laparoscopic     ESOPHAGOSCOPY, GASTROSCOPY, DUODENOSCOPY (EGD), COMBINED N/A 2016    Procedure: COMBINED ESOPHAGOSCOPY, GASTROSCOPY, DUODENOSCOPY (EGD), BIOPSY SINGLE OR MULTIPLE;  Surgeon: Fadi Hunter MD;  Location: [facility] ESOPHAGEAL MOTILITY STUDY N/A 2016    Procedure: ESOPHAGEAL MOTILITY STUDY;  Surgeon: Fadi Hunter MD;  Location: [facility] REMOVE TONSILS/ADENOIDS,<13 Y/O      T &A     Family Hx:  Family History   Problem Relation Age of Onset     Cardiovascular Mother      hypertrophic cardiomyopathy     Genitourinary Problems Mother      gallbladder disease     Diabetes Mother      drug induced     Other - See Comments Mother      heart transplant 2005     Diabetes Father      type 2     Hypertension Father      Genitourinary Problems Maternal Grandmother      gallbladder disease     Genitourinary Problems Paternal Grandmother      gallbladder disease     Hypertension Paternal Grandfather      high bp     Cerebrovascular Disease Paternal Grandfather      Cardiovascular Brother      hypertrophic cardiomyopathy     Diabetes Brother      type 2     Thyroid disease: No         DM2: Yes - father and mother         Autoimmune: DM1, SLE, RA, Vitiligo No    Social Hx:  Social History     Social History     Marital status:      Spouse name: N/A  "    Number of children: 2     Years of education: 14     Occupational History     nurse      Social History Main Topics     Smoking status: Former Smoker     Types: Cigarettes     Quit date: 10/24/2005     Smokeless tobacco: Former User      Comment: States only smokes when drinks maybe 2x/year     Alcohol use No     Drug use: No     Sexual activity: Yes     Partners: Male     Birth control/ protection: IUD      Comment: Mirena IUD 7/28/15     Other Topics Concern     Not on file     Social History Narrative    Caffeine intake/servings daily - 6-7    Calcium intake/servings daily - 2-3 yogurt, milk, cheese    Exercise 1 times weekly - describe aerobics    Sunscreen used - Yes    Seatbelts used - Yes    Guns stored in the home - No    Self Breast Exam - Yes    Pap test up to date -  Yes    Eye exam up to date -  Yes    Dental exam up to date -  Yes    DEXA scan up to date -  Not Applicable    Flex Sig/Colonoscopy up to date -  Not Applicable    Mammography up to date -  Not Applicable    Immunizations reviewed and up to date - Yes    Abuse: Current or Past (Physical, Sexual or Emotional) - No    Do you feel safe in your environment - Yes    Do you cope well with stress - Yes    Do you suffer from insomnia - Yes     Last updated by: Tatianna Lacey  5/9/2005              MEDICATIONS:  has a current medication list which includes the following prescription(s): aspirin, atenolol, atorvastatin, blood glucose monitoring, blood glucose monitoring, cetirizine, ergocalciferol, fluticasone, infusion set, insulin aspart, insulin cartridge, insulin pump, blood glucose monitoring, losartan, magnesium oxide, metformin, montelukast, multiple vitamins-minerals, fish oil, omeprazole, order for dme, sumatriptan, cyclobenzaprine, epinephrine, and ibuprofen.    ROS     ROS: 10 point ROS neg other than the symptoms noted above in the HPI.    Physical Exam   VS: /82  Pulse 86  Temp 97.5  F (36.4  C) (Oral)  Ht 1.626 m (5' 4\")  " Wt 87 kg (191 lb 12.8 oz)  SpO2 99%  BMI 32.92 kg/m2    GENERAL: AXOX3, NAD, well dressed, answering questions appropriately, appears stated age.  HEENT: No exopthalmous, no proptosis, EOMI, no lig lag, no retraction  NECK: Thyroid normal in size, non tender, no nodules were palpated.  CV: RRR  LUNGS: CTAB  ABDOMEN: +BS  NEUROLOGY: CN grossly intact, no tremors  PSYCH: normal affect and mood    LABS:  A1c:   Lab Results   Component Value Date    A1C 7.7 06/15/2018    A1C 8.5 10/24/2017    A1C 7.2 04/17/2017    A1C 7.2 12/27/2016    A1C 7.1 09/27/2016       Basic Metabolic Panel:  Creatinine   Date Value Ref Range Status   10/24/2017 0.81 0.52 - 1.04 mg/dL Final       LFTS/Cholesterol Panel:  Recent Labs   Lab Test  06/15/18   0732  10/24/17   1108   07/01/15   0612  03/27/14   1022   CHOL  184  192   < >  190  146   HDL  35*  47*   < >  42*  32*   LDL  111*  100*   < >  96  77   TRIG  189*  227*   < >  260*  186*   CHOLHDLRATIO   --    --    --   4.5  4.5    < > = values in this interval not displayed.       Thyroid Function:  ENDO THYROID LABS-[facility] Latest Ref Rng & Units 4/16/2017   TSH 0.40 - 4.00 mU/L 2.06     ENDO THYROID LABS-[facility] Latest Ref Rng 9/27/2016 4/2/2014   TSH 0.40 - 4.00 mU/L 2.17 1.09   T4 FREE 0.70 - 1.85 ng/dL  0.75       Urine MicroAlbumin:  Lab Results   Component Value Date    UMALCR 1170.54 10/24/2017      Vitamin D:  Vitamin D Deficiency Screening Results:  Lab Results   Component Value Date    VITDT 39 09/27/2016    VITDT 45 02/23/2016    VITDT 31 03/31/2015    VITDT 29 (L) 03/27/2014    VITDT 32 09/24/2013         All pertinent notes, labs, and images personally reviewed by me.     A/P  Ms.Erica WILLIAM Vogel is a 37 year old here for the evaluation/management of diabetes:    1. DM2 - Under Fair control.A1c 7.7%. BG in general under good control but limited data avaialble.  Has variable shifts at work.  Not checking blood sugar every day and is not taking bolus based on blood sugar.  Some  days she is not using bolus at all.  Will benefit from continued use of Dexcom.  Discussed Medtronic 670 G + CGM in detail-- she will check with insurance.  Continue current pump settings for now.  Discussed importance of compliance and recommend to use wizard bolus and check blood sugar before each meal.    Prevention    Most Recent Immunizations   Administered Date(s) Administered     Influenza (IIV3) PF 10/21/2010     MMR 09/17/2007     TD (ADULT, 7+) 01/01/2002     TDAP Vaccine (Adacel) 04/06/2012     Opthalmology-2017  Dental-Yes  ASA-81 mg   Smoking- No    Recommend checking blood sugars before meals and at bedtime.    If Blood glucose are low more often-> 2-3 times/week- give us a call.  The patient is advised to Make better food choices: reduce carbs, Reduce portion size, weight loss and exercise 3-4 times a week.  Discussed hypoglycemia signs and symptoms as well as management in detail.        2. Hypertension - Under Good control.  Blood pressure medications include cozaar 75 mg qd. Need aggressive BP/glycmeic control.    3. Hyperlipidemia - Under fair control.TG high with low HDL, LD at 98, HDL 45. Takes Lipitor 20 mg for lipid control.  Continue current meds.  -- consider increasing dose (goal LDL < 70)  Recommend to use it consistently.    4. Microalbuminuria:  Recommend strict BG control.  Continue Cozaar    5. Vit D deficiency:  On high dose vit D replacement  Recheck labs with next labs    All questions were answered.  The patient indicates understanding of the above issues and agrees with the plan set forth.       More than 50% of face to face time spent with Ms. Dino Vogel on counseling / coordinating her care.  Total appointment times was 30 minutes.      Follow-up:  follow up in 3 months    Zita Fuentes MD  Endocrinology   [facility]    CC: Alan Paredes    Addendum to above note and clinic visit:    Labs reviewed.    See result note/telephone  encounter.

## 2018-06-19 NOTE — MR AVS SNAPSHOT
After Visit Summary   2018    Marie Vogel    MRN: [id number]           Patient Information     Date Of Birth          1980        Visit Information        Provider Department      2018 3:00 PM Zita Fuentes MD [facility]        Today's Diagnoses     Type 2 diabetes mellitus with diabetic nephropathy, with long-term current use of insulin (H)    -  1      Care Instructions    [facility]   Dr Fuentes, Endocrinology Department      [facility]   [address] [address]  Appointment [phone]  Fax: [phone]   Monday and Tuesday         [place] [address] [place] [address]  Appointment [phone]  Fax: [phone]  Wednesday and Thursday           To provide the best diabetic care, please bring your blood glucose meter to each and every visit with your Endocrinologist.  Your blood glucose meter/insulin pump will be downloaded at every appointment.      Please arrive 15 minutes before your scheduled appointment.  This will allow for your blood glucose meter/insulin pump to be downloaded and glycosylated hemoglobin (A1c) to be obtained prior to your appointment.    Your provider has referred you to Diabetes Education: For all [facility]:  Phone [phone]; Fax [phone]  Please call and make the appointment.--> new insulin pump- medtronic 670 G    Labs in 3 months  Please make a lab appointment for blood work and follow up clinic appointment in 1 week after that to discuss results.    Be Consistent with atorvastatin    Recommend checking blood sugars before meals and at bedtime.    If Blood glucose are low more often-> 2-3 times/week- give us a call.  The patient is advised to Make better food choices: reduce carbs, Reduce portion size, weight loss and exercise 3-4 times a week.  Discussed  hypoglycemia signs and symptoms as well as management in detail.                Follow-ups after your visit        Additional Services     DIABETES EDUCATOR REFERRAL       Your provider has referred you to Diabetes Education: For all [facility]:  Phone [phone]; Fax [phone]    This is a Previous Diagnosis     Type of diabetes is Type 2 - On Insulin   Medicare covers: 10 hours of initial DSMT in 12 month period from the time of first visit, plus 2 hours of follow-up DSMT annually, and additional hours as requested for insulin training.         Diabetes Co-Morbidities: dyslipidemia and hypertension               A1C Goal:  <7.0       A1C is: Lab Results       Component                Value               Date                       A1C                      7.7                 06/15/2018              MEDICAL NUTRITION THERAPY (MNT) for Diabetes    Medical Nutrition Therapy with a Registered Dietitian can be provided in coordination with Diabetes Self-Management Training to assist in achieving optimal diabetes management.    MNT Type and Hours: Previous diagnosis: Annual follow-up MNT - 2 hours                       Medicare will cover: 3 hours initial MNT in 12 month period after first visit, plus 2 hours of follow-up MNT annually                                                          A1C is: Lab Results       Component                Value               Date                       A1C                      7.7                 06/15/2018            If an urgent visit is needed or A1C is above 12, Care Team to call the diabetes education team at [phone] or send a message to the diabetes education pool (P DIAB ED-PATIENT CARE).    Diabetes education focus: Insulin pump therapy Current pump user and Pre-pump start education      Education needs: None                                                                                                                                                      Please  be aware that coverage of these services is subject to the terms and limitations of your health insurance plan.  Call member services at your health plan to determine Diabetes Self-Management Training benefits and ask which blood glucose monitor brands are covered by your plan.      Please bring the following to your appointment:    -   List of current medications   -   List of blood glucose monitor brands that are covered by your insurance plan  -   Blood glucose monitor and log book  -   Food records for the 3 days prior to your visit      The Certified Diabetes Educator may make diabetes medication adjustments per  the CDE Protocol and Collaborative Practice Agreement.                  Future tests that were ordered for you today     Open Future Orders        Priority Expected Expires Ordered    Hemoglobin A1c ASAP 8/18/2018 4/15/2019 6/19/2018            Who to contact     If you have questions or need follow up information about today's clinic visit or your schedule please contact [facility] directly at [phone].  Normal or non-critical lab and imaging results will be communicated to you by MyChart, letter or phone within 4 business days after the clinic has received the results. If you do not hear from us within 7 days, please contact the clinic through [employer] or phone. If you have a critical or abnormal lab result, we will notify you by phone as soon as possible.  Submit refill requests through [employer] or call your pharmacy and they will forward the refill request to us. Please allow 3 business days for your refill to be completed.          Additional Information About Your Visit        MyChart Information     [employer] gives you secure access to your electronic health record. If you see a primary care provider, you can also send messages to your care team and make appointments. If you have questions, please call your primary care clinic.  If you do not have a primary care provider, please call  "[phone] and they will assist you.        Care EveryWhere ID     This is your Care EveryWhere ID. This could be used by other organizations to access your [facility] medical records  [id number]        Your Vitals Were     Pulse Temperature Height Pulse Oximetry BMI (Body Mass Index)       86 97.5  F (36.4  C) (Oral) 1.626 m (5' 4\") 99% 32.92 kg/m2        Blood Pressure from Last 3 Encounters:   06/19/18 104/82   04/16/18 102/60   03/06/18 112/68    Weight from Last 3 Encounters:   06/19/18 87 kg (191 lb 12.8 oz)   04/16/18 83.9 kg (185 lb)   03/06/18 88.9 kg (196 lb)              We Performed the Following     DIABETES EDUCATOR REFERRAL        Primary Care Provider Office Phone # Fax #    Alan Paredes [phone] [address] [place] [address]        Equal Access to Services     [facility]: Hadii aad ku hadasho Soomaali, waaxda luqadaha, qaybta kaalmada adeegyada, waxay idiin hayaan britta montanez lafelicityn . So [facility] [phone].    ATENCIÓN: Si habla español, tiene a woodruff disposición servicios gratuitos de asistencia lingüística. [facility] [phone].    We comply with applicable federal civil rights laws and [place] laws. We do not discriminate on the basis of race, color, national origin, age, disability, sex, sexual orientation, or gender identity.            Thank you!     Thank you for choosing [facility]  for your care. Our goal is always to provide you with excellent care. Hearing back from our patients is one way we can continue to improve our services. Please take a few minutes to complete the written survey that you may receive in the mail after your visit with us. Thank you!             Your Updated Medication List - Protect others around you: Learn how to safely use, store and throw away your medicines at www.disposemymeds.org.          This list is accurate as of 6/19/18  3:59 PM.  Always use your most recent med list.                   " Brand Name Dispense Instructions for use Diagnosis    ADVIL 200 MG tablet   Generic drug:  ibuprofen      Take 200 mg by mouth every 4 hours as needed.        aspirin 81 MG tablet     90 tablet    Take 1 tablet (81 mg) by mouth daily    PFO (patent foramen ovale)       atenolol 25 MG tablet    TENORMIN    90 tablet    Take 0.5 tablets (12.5 mg) by mouth daily        atorvastatin 20 MG tablet    LIPITOR    90 tablet    Take 1 tablet (20 mg) by mouth daily        blood glucose monitoring lancets     100 each    1 Device See Admin Instructions. Use up to 5 times daily for blood sugar checks.    Type 2 diabetes, HbA1c goal < 7% (H)       blood glucose monitoring meter device kit    no brand specified    1 kit    Use to test blood sugar 6 times daily and as needed.    Type 2 diabetes mellitus with diabetic nephropathy (H)       blood glucose monitoring test strip    PEPE CONTOUR NEXT    120 each    Check 4 times/day    Type 2 diabetes mellitus with diabetic nephropathy, with long-term current use of insulin (H)       cetirizine 10 MG tablet    zyrTEC    90 tablet    Take 10 mg by mouth 2 times daily        cyclobenzaprine 10 MG tablet    FLEXERIL    20 tablet    Take 1 tablet (10 mg) by mouth nightly as needed for muscle spasms    Low back pain without sciatica, unspecified back pain laterality, unspecified chronicity       EPINEPHrine 0.3 MG/0.3ML injection 2-pack    EPIPEN/ADRENACLICK/or ANY BX GENERIC EQUIV    0.3 mL    Inject 0.3 mLs (0.3 mg) into the muscle once as needed for anaphylaxis    Nut allergy       ergocalciferol 45716 units capsule    ERGOCALCIFEROL    12 capsule    Take 1 capsule (50,000 Units) by mouth once a week    Vitamin D deficiency, Microalbuminuria       Fish Oil 500 MG Caps      Take 1 capsule by mouth        fluticasone 50 MCG/ACT spray    FLONASE    1 Bottle    Spray 1-2 sprays into both nostrils daily    Acute sinusitis with symptoms > 10 days       * infusion set [facility] pump supply     30  each    Change every 3 days as directed    Type 2 diabetes mellitus with diabetic nephropathy, with long-term current use of insulin (H)       * insulin cartridge misc pump supply     30 each    Change every 3 days1 applicator every 72 hours Change every 3 days    Type 2 diabetes mellitus with diabetic nephropathy, with long-term current use of insulin (H)       insulin aspart 100 UNITS/ML injection    NovoLOG VIAL    30 mL    With insulin pump. Uses about 80 units/day.    Type 2 diabetes mellitus with diabetic nephropathy, with long-term current use of insulin (H)       insulin pump infusion      Updated 1/27/17: [employer]: Model 723 BASAL: 00:00: 1.0 units/hr 10:30: 0.95 14:00: 1.05 CARB RATIO: 00:00 1:7 1600  1:8 C. Factor: 27 mg/dL BLOOD GLUCOSE TARGET and times: 12   AM (midnight):  Active Insulin Time:  3 hours Basal to Bolus Ratio:  Sensor:  No Carelink / Diasend username:  [email or web address] Carelink / Diasend Password:  [email or web address]    Type 2 diabetes mellitus with diabetic nephropathy, with long-term current use of insulin (H)       losartan 50 MG tablet    COZAAR    135 tablet    Take 1 tab ( 50 mg)  qam and 1/2 tab ( 25 mg)  q pm    Microalbuminuria       MAGNESIUM OXIDE PO      Take 400 mg by mouth daily        metFORMIN 500 MG 24 hr tablet    GLUCOPHAGE-XR    360 tablet    Take 2 tablets (1,000 mg) by mouth 2 times daily (with meals)    Type 2 diabetes mellitus with diabetic nephropathy, with long-term current use of insulin (H)       montelukast 10 MG tablet    SINGULAIR    90 tablet    Take 1 tablet (10 mg) by mouth At Bedtime    Allergic rhinitis       MULTI VITAMIN/MINERALS PO      Take 1 each by mouth daily.        omeprazole 40 MG capsule    priLOSEC    90 capsule    Take 1 capsule (40 mg) by mouth daily    Gastroesophageal reflux disease without esophagitis, Hiatal hernia       order for DME     100 each    Equipment being ordered: skin prep wipes for insulin pump    Type 2 diabetes, HbA1c goal  < 7% (H)       SUMAtriptan 25 MG tablet    IMITREX    8 tablet    Take 25-100mg one time. May repeat 25mg every two hours up to a maximum of 200mg in 24 hours.    Other migraine without status migrainosus, not intractable       * Notice:  This list has 2 medication(s) that are the same as other medications prescribed for you. Read the directions carefully, and ask your doctor or other care provider to review them with you.

## 2018-06-19 NOTE — LETTER
6/19/2018         RE: Marie Vogel  [address] [place] [address]        Dear Colleague,    Thank you for referring your patient, Marie Vogel, to the [facility]. Please see a copy of my visit note below.    Name: Marie Vogel  Seen for f/u of DM  HPI:  Marie Vogel is a 37 year old female who presents for the evaluation/management of DM.  Had CGM in 1/2017.  On pump  Medtronic Minimed Paradigm+ CGM though not using sensors currently.    Here for f/u. Previously has seen endo at [facility] ( Dr Aguilera and Dr Zhou and ). 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 Concerned about weight gain.   H/o cardiomyopathy. Followed by cardiology.    Not checking BG everyday. Does bolus based on carbs. Sometimes does not take any bolus if busy at work.    1. Type 2 DM:  Orginally diagnosed at the age of: 21 with GDM during her first pregnancy. Diagnosed with DM 2 in 1/2002, diet controlled for 3 years, then started on metformin. In 2010 during her second pregnancy, started on insulin and then insulin pump therapy in 2012.   Current Regimen: Insulin pump therapy ( Medtronic Minimed Paradigm), metformin XR 1000 mg bid  BS checks: 4-5 times per day  Average Meter Download:     Current Regimen:   Insulin pump -   Time Rate (U/hr)   0000-10.30 1.65   3316-0103 1.40   8038-3792 1.20     Carbohydrate Ratio -    Time Ratio   4109-3662 7   0079-0384 7     Sensitivity   27   Active Insulin Time  3 hours   Basal  74%   Bolus   26 %   Total Carbohydrates/day  120   Total Insulin/day   46 +14   Average Blood Sugar 144   BS Checks    Care Link Username-   Password-         Complications:   Diabetes Complications  Description / Detail    Diabetic Retinopathy  No   CAD / PAD  No   Neuropathy  No   Nephropathy / Microalbuminuria  Yes: microalbuminuria. ON cozaar.   Gastroparesis  No   Hypoglycemia [place]  No     2. Hypertension:  Blood Pressure today:   BP Readings from Last 3 Encounters:   18 104/82   18 102/60   18 112/68     Takes medications everyday without forgetting a dose.  Denies feeling lightheaded or dizzy.    3. Hyperlipidemia:   Denies muscle aches of pains.       PMH/PSH:  Past Medical History:   Diagnosis Date     Allergic rhinitis due to pollen      Atypical glandular cells on Pap smear 3/1108     Gastroesophageal reflux disease      PFO (patent foramen ovale)      Type II or unspecified type diabetes mellitus without mention of complication, not stated as uncontrolled     onset was gestational in      Past Surgical History:   Procedure Laterality Date     C INDUCED ABORTN BY D&C           C IUD,MIRENA  8/10/10, 7/28/15     C/SECTION, LOW TRANSVERSE  6/25/10    , Low Transverse     CHOLECYSTECTOMY, LAPOROSCOPIC      Cholecystectomy, Laparoscopic     ESOPHAGOSCOPY, GASTROSCOPY, DUODENOSCOPY (EGD), COMBINED N/A 2016    Procedure: COMBINED ESOPHAGOSCOPY, GASTROSCOPY, DUODENOSCOPY (EGD), BIOPSY SINGLE OR MULTIPLE;  Surgeon: Fadi Hunter MD;  Location: [facility] ESOPHAGEAL MOTILITY STUDY N/A 2016    Procedure: ESOPHAGEAL MOTILITY STUDY;  Surgeon: Fadi Hunter MD;  Location: [facility] REMOVE TONSILS/ADENOIDS,<11 Y/O      T &A     Family Hx:  Family History   Problem Relation Age of Onset     Cardiovascular Mother      hypertrophic cardiomyopathy     Genitourinary Problems Mother      gallbladder disease     Diabetes Mother      drug induced     Other - See Comments Mother      heart transplant 2005     Diabetes Father      type 2     Hypertension Father      Genitourinary Problems Maternal Grandmother      gallbladder disease     Genitourinary Problems Paternal Grandmother      gallbladder disease     Hypertension Paternal Grandfather      high bp     Cerebrovascular Disease Paternal Grandfather      Cardiovascular Brother       hypertrophic cardiomyopathy     Diabetes Brother      type 2     Thyroid disease: No         DM2: Yes - father and mother         Autoimmune: DM1, SLE, RA, Vitiligo No    Social Hx:  Social History     Social History     Marital status:      Spouse name: N/A     Number of children: 2     Years of education: 14     Occupational History     nurse      Social History Main Topics     Smoking status: Former Smoker     Types: Cigarettes     Quit date: 10/24/2005     Smokeless tobacco: Former User      Comment: States only smokes when drinks maybe 2x/year     Alcohol use No     Drug use: No     Sexual activity: Yes     Partners: Male     Birth control/ protection: IUD      Comment: Mirena IUD 7/28/15     Other Topics Concern     Not on file     Social History Narrative    Caffeine intake/servings daily - 6-7    Calcium intake/servings daily - 2-3 yogurt, milk, cheese    Exercise 1 times weekly - describe aerobics    Sunscreen used - Yes    Seatbelts used - Yes    Guns stored in the home - No    Self Breast Exam - Yes    Pap test up to date -  Yes    Eye exam up to date -  Yes    Dental exam up to date -  Yes    DEXA scan up to date -  Not Applicable    Flex Sig/Colonoscopy up to date -  Not Applicable    Mammography up to date -  Not Applicable    Immunizations reviewed and up to date - Yes    Abuse: Current or Past (Physical, Sexual or Emotional) - No    Do you feel safe in your environment - Yes    Do you cope well with stress - Yes    Do you suffer from insomnia - Yes     Last updated by: Tatianna Lacey  5/9/2005              MEDICATIONS:  has a current medication list which includes the following prescription(s): aspirin, atenolol, atorvastatin, blood glucose monitoring, blood glucose monitoring, cetirizine, ergocalciferol, fluticasone, infusion set, insulin aspart, insulin cartridge, insulin pump, blood glucose monitoring, losartan, magnesium oxide, metformin, montelukast, multiple vitamins-minerals, fish oil,  "omeprazole, order for dme, sumatriptan, cyclobenzaprine, epinephrine, and ibuprofen.    ROS     ROS: 10 point ROS neg other than the symptoms noted above in the HPI.    Physical Exam   VS: /82  Pulse 86  Temp 97.5  F (36.4  C) (Oral)  Ht 1.626 m (5' 4\")  Wt 87 kg (191 lb 12.8 oz)  SpO2 99%  BMI 32.92 kg/m2    GENERAL: AXOX3, NAD, well dressed, answering questions appropriately, appears stated age.  HEENT: No exopthalmous, no proptosis, EOMI, no lig lag, no retraction  NECK: Thyroid normal in size, non tender, no nodules were palpated.  CV: RRR  LUNGS: CTAB  ABDOMEN: +BS  NEUROLOGY: CN grossly intact, no tremors  PSYCH: normal affect and mood    LABS:  A1c:   Lab Results   Component Value Date    A1C 7.7 06/15/2018    A1C 8.5 10/24/2017    A1C 7.2 04/17/2017    A1C 7.2 12/27/2016    A1C 7.1 09/27/2016       Basic Metabolic Panel:  Creatinine   Date Value Ref Range Status   10/24/2017 0.81 0.52 - 1.04 mg/dL Final       LFTS/Cholesterol Panel:  Recent Labs   Lab Test  06/15/18   0732  10/24/17   1108   07/01/15   0612  03/27/14   1022   CHOL  184  192   < >  190  146   HDL  35*  47*   < >  42*  32*   LDL  111*  100*   < >  96  77   TRIG  189*  227*   < >  260*  186*   CHOLHDLRATIO   --    --    --   4.5  4.5    < > = values in this interval not displayed.       Thyroid Function:  ENDO THYROID LABS-P Latest Ref Rng & Units 4/16/2017   TSH 0.40 - 4.00 mU/L 2.06     ENDO THYROID LABS-UMP Latest Ref Rng 9/27/2016 4/2/2014   TSH 0.40 - 4.00 mU/L 2.17 1.09   T4 FREE 0.70 - 1.85 ng/dL  0.75       Urine MicroAlbumin:  Lab Results   Component Value Date    UMALCR 1170.54 10/24/2017      Vitamin D:  Vitamin D Deficiency Screening Results:  Lab Results   Component Value Date    VITDT 39 09/27/2016    VITDT 45 02/23/2016    VITDT 31 03/31/2015    VITDT 29 (L) 03/27/2014    VITDT 32 09/24/2013         All pertinent notes, labs, and images personally reviewed by me.     A/P  Ms.Marie Vogel is a 37 year old " here for the evaluation/management of diabetes:    1. DM2 - Under Fair control.A1c 7.7%. BG in general under good control but limited data avaialble.  Has variable shifts at work.  Not checking blood sugar every day and is not taking bolus based on blood sugar.  Some days she is not using bolus at all.  Will benefit from continued use of Dexcom.  Discussed Medtronic 670 G + CGM in detail-- she will check with insurance.  Continue current pump settings for now.  Discussed importance of compliance and recommend to use wizard bolus and check blood sugar before each meal.    Prevention    Most Recent Immunizations   Administered Date(s) Administered     Influenza (IIV3) PF 10/21/2010     MMR 09/17/2007     TD (ADULT, 7+) 01/01/2002     TDAP Vaccine (Adacel) 04/06/2012     Opthalmology-2017  Dental-Yes  ASA-81 mg   Smoking- No    Recommend checking blood sugars before meals and at bedtime.    If Blood glucose are low more often-> 2-3 times/week- give us a call.  The patient is advised to Make better food choices: reduce carbs, Reduce portion size, weight loss and exercise 3-4 times a week.  Discussed hypoglycemia signs and symptoms as well as management in detail.        2. Hypertension - Under Good control.  Blood pressure medications include cozaar 75 mg qd. Need aggressive BP/glycmeic control.    3. Hyperlipidemia - Under fair control.TG high with low HDL, LD at 98, HDL 45. Takes Lipitor 20 mg for lipid control.  Continue current meds.  -- consider increasing dose (goal LDL < 70)  Recommend to use it consistently.    4. Microalbuminuria:  Recommend strict BG control.  Continue Cozaar    5. Vit D deficiency:  On high dose vit D replacement  Recheck labs with next labs    All questions were answered.  The patient indicates understanding of the above issues and agrees with the plan set forth.       More than 50% of face to face time spent with Ms. Dino Vogel on counseling / coordinating her care.  Total appointment  times was 30 minutes.      Follow-up:  follow up in 3 months    Zita Fuentes MD  Endocrinology   [facility]    CC: Alan Paredes    Addendum to above note and clinic visit:    Labs reviewed.    See result note/telephone encounter.                Again, thank you for allowing me to participate in the care of your patient.        Sincerely,        Zita Fuentes MD

## 2018-06-27 ENCOUNTER — TELEPHONE (OUTPATIENT)
Dept: AUDIOLOGY | Facility: CLINIC | Age: 38
End: 2018-06-27

## 2018-06-27 NOTE — TELEPHONE ENCOUNTER
[facility] Call Center    Phone Message    May a detailed message be left on voicemail: yes    Reason for Call: Other: Please follow up with pt, she is ready to purchase the hearing aids. Pt states she can pay the deductible as soon as possible.     Action Taken: Message routed to:  [facility] ([facility] tyshawn reyna

## 2018-06-28 ENCOUNTER — OFFICE VISIT (OUTPATIENT)
Dept: AUDIOLOGY | Facility: CLINIC | Age: 38
End: 2018-06-28

## 2018-06-28 DIAGNOSIS — H90.3 SENSORY HEARING LOSS, BILATERAL: Primary | ICD-10-CM

## 2018-06-28 NOTE — MR AVS SNAPSHOT
After Visit Summary   6/28/2018    Marie Vogel    MRN: [id number]           Patient Information     Date Of Birth          1980        Visit Information        Provider Department      6/28/2018 10:30 AM Delma Davis AuD M [facility] Audiology         Follow-ups after your visit        Who to contact     Please call your clinic at [phone] to:    Ask questions about your health    Make or cancel appointments    Discuss your medicines    Learn about your test results    Speak to your doctor            Additional Information About Your Visit        MyChart Information     [employer] gives you secure access to your electronic health record. If you see a primary care provider, you can also send messages to your care team and make appointments. If you have questions, please call your primary care clinic.  If you do not have a primary care provider, please call [phone] and they will assist you.      [employer] is an electronic gateway that provides easy, online access to your medical records. With [employer], you can request a clinic appointment, read your test results, renew a prescription or communicate with your care team.     To access your existing account, please contact your [facility] or call [phone] for assistance.        Care EveryWhere ID     This is your Care EveryWhere ID. This could be used by other organizations to access your [place] medical records  [id number]         Blood Pressure from Last 3 Encounters:   No data found for BP    Weight from Last 3 Encounters:   No data found for Wt              Today, you had the following     No orders found for display       Primary Care Provider Office Phone # Fax #    Alan Paredes [phone]       [facility] [place] [address]        Equal Access to Services     KO PUGA : kj Meek, jr de leon  reny francie maddoxaan ah. So [facility] [phone].    ATENCIÓN: Si dmitryla sarabjit, tiene a woodruff disposición servicios gratuitos de asistencia lingüística. Tiffanie al [phone].    We comply with applicable federal civil rights laws and [place] laws. We do not discriminate on the basis of race, color, national origin, age, disability, sex, sexual orientation, or gender identity.            Thank you!     Thank you for choosing [facility] AUDIOLOGY  for your care. Our goal is always to provide you with excellent care. Hearing back from our patients is one way we can continue to improve our services. Please take a few minutes to complete the written survey that you may receive in the mail after your visit with us. Thank you!             Your Updated Medication List - Protect others around you: Learn how to safely use, store and throw away your medicines at www.disposemymeds.org.          This list is accurate as of 6/28/18 11:59 PM.  Always use your most recent med list.                   Brand Name Dispense Instructions for use Diagnosis    ADVIL 200 MG tablet   Generic drug:  ibuprofen      Take 200 mg by mouth every 4 hours as needed.        aspirin 81 MG tablet     90 tablet    Take 1 tablet (81 mg) by mouth daily    PFO (patent foramen ovale)       atenolol 25 MG tablet    TENORMIN    90 tablet    Take 0.5 tablets (12.5 mg) by mouth daily        atorvastatin 20 MG tablet    LIPITOR    90 tablet    Take 1 tablet (20 mg) by mouth daily        blood glucose monitoring lancets     100 each    1 Device See Admin Instructions. Use up to 5 times daily for blood sugar checks.    Type 2 diabetes, HbA1c goal < 7% (H)       blood glucose monitoring meter device kit    no brand specified    1 kit    Use to test blood sugar 6 times daily and as needed.    Type 2 diabetes mellitus with diabetic nephropathy (H)       blood glucose monitoring test strip    PEPE CONTOUR NEXT    120 each    Check 4 times/day    Type 2  diabetes mellitus with diabetic nephropathy, with long-term current use of insulin (H)       cetirizine 10 MG tablet    zyrTEC    90 tablet    Take 10 mg by mouth 2 times daily        cyclobenzaprine 10 MG tablet    FLEXERIL    20 tablet    Take 1 tablet (10 mg) by mouth nightly as needed for muscle spasms    Low back pain without sciatica, unspecified back pain laterality, unspecified chronicity       EPINEPHrine 0.3 MG/0.3ML injection 2-pack    EPIPEN/ADRENACLICK/or ANY BX GENERIC EQUIV    0.3 mL    Inject 0.3 mLs (0.3 mg) into the muscle once as needed for anaphylaxis    Nut allergy       ergocalciferol 68919 units capsule    ERGOCALCIFEROL    12 capsule    Take 1 capsule (50,000 Units) by mouth once a week    Vitamin D deficiency, Microalbuminuria       Fish Oil 500 MG Caps      Take 1 capsule by mouth        fluticasone 50 MCG/ACT spray    FLONASE    1 Bottle    Spray 1-2 sprays into both nostrils daily    Acute sinusitis with symptoms > 10 days       * infusion set misc pump supply     30 each    Change every 3 days as directed    Type 2 diabetes mellitus with diabetic nephropathy, with long-term current use of insulin (H)       * insulin cartridge misc pump supply     30 each    Change every 3 days1 applicator every 72 hours Change every 3 days    Type 2 diabetes mellitus with diabetic nephropathy, with long-term current use of insulin (H)       insulin aspart 100 UNITS/ML injection    NovoLOG VIAL    30 mL    With insulin pump. Uses about 80 units/day.    Type 2 diabetes mellitus with diabetic nephropathy, with long-term current use of insulin (H)       insulin pump infusion      Updated 1/27/17: [employer]: Model 723 BASAL: 00:00: 1.0 units/hr 10:30: 0.95 14:00: 1.05 CARB RATIO: 00:00 1:7 1600  1:8 C. Factor: 27 mg/dL BLOOD GLUCOSE TARGET and times: 12   AM (midnight):  Active Insulin Time:  3 hours Basal to Bolus Ratio:  Sensor:  No Carelink / Diasend username:  [email or web address] Carelink / Diasend  Password:  [email or web address]    Type 2 diabetes mellitus with diabetic nephropathy, with long-term current use of insulin (H)       losartan 50 MG tablet    COZAAR    135 tablet    Take 1 tab ( 50 mg)  qam and 1/2 tab ( 25 mg)  q pm    Microalbuminuria       MAGNESIUM OXIDE PO      Take 400 mg by mouth daily        metFORMIN 500 MG 24 hr tablet    GLUCOPHAGE-XR    360 tablet    Take 2 tablets (1,000 mg) by mouth 2 times daily (with meals)    Type 2 diabetes mellitus with diabetic nephropathy, with long-term current use of insulin (H)       montelukast 10 MG tablet    SINGULAIR    90 tablet    Take 1 tablet (10 mg) by mouth At Bedtime    Allergic rhinitis       MULTI VITAMIN/MINERALS PO      Take 1 each by mouth daily.        omeprazole 40 MG capsule    priLOSEC    90 capsule    Take 1 capsule (40 mg) by mouth daily    Gastroesophageal reflux disease without esophagitis, Hiatal hernia       order for DME     100 each    Equipment being ordered: skin prep wipes for insulin pump    Type 2 diabetes, HbA1c goal < 7% (H)       SUMAtriptan 25 MG tablet    IMITREX    8 tablet    Take 25-100mg one time. May repeat 25mg every two hours up to a maximum of 200mg in 24 hours.    Other migraine without status migrainosus, not intractable       * Notice:  This list has 2 medication(s) that are the same as other medications prescribed for you. Read the directions carefully, and ask your doctor or other care provider to review them with you.

## 2018-06-28 NOTE — TELEPHONE ENCOUNTER
Called patient back and confirmed we will order Left replacement hearing aids. She would like t pay her $250.00 deductible today, so she is transfered to [facility] so she can complete that transaction. We will call her when in.       Clovis Kwan, VARGAS-A

## 2018-07-05 ENCOUNTER — TRANSFERRED RECORDS (OUTPATIENT)
Dept: HEALTH INFORMATION MANAGEMENT | Facility: CLINIC | Age: 38
End: 2018-07-05

## 2018-07-09 DIAGNOSIS — K21.9 GASTROESOPHAGEAL REFLUX DISEASE WITHOUT ESOPHAGITIS: ICD-10-CM

## 2018-07-09 DIAGNOSIS — K44.9 HIATAL HERNIA: ICD-10-CM

## 2018-07-09 NOTE — TELEPHONE ENCOUNTER
"Requested Prescriptions   Pending Prescriptions Disp Refills     omeprazole (PRILOSEC) 40 MG capsule [Pharmacy Med Name: OMEPRAZOLE 40MG CPDR]  Last Written Prescription Date:  02/16/2018  Last Fill Quantity: 90 capsule,  # refills: 0   Last office visit: 4/16/2018 with prescribing provider:  Alan Paredes MD   Future Office Visit:     90 capsule 0     Sig: TAKE ONE CAPSULE BY MOUTH EVERY DAY    PPI Protocol Passed    7/9/2018  4:38 PM       Passed - Not on Clopidogrel (unless Pantoprazole ordered)       Passed - No diagnosis of osteoporosis on record       Passed - Recent (12 mo) or future (30 days) visit within the authorizing provider's specialty    Patient had office visit in the last 12 months or has a visit in the next 30 days with authorizing provider or within the authorizing provider's specialty.  See \"Patient Info\" tab in inbasket, or \"Choose Columns\" in Meds & Orders section of the refill encounter.           Passed - Patient is age 18 or older       Passed - No active pregnacy on record       Passed - No positive pregnancy test in past 12 months          "

## 2018-07-10 RX ORDER — OMEPRAZOLE 40 MG/1
CAPSULE, DELAYED RELEASE ORAL
Qty: 90 CAPSULE | Refills: 2 | Status: SHIPPED | OUTPATIENT
Start: 2018-07-10 | End: 2019-05-21

## 2018-07-22 ENCOUNTER — NURSE TRIAGE (OUTPATIENT)
Dept: NURSING | Facility: CLINIC | Age: 38
End: 2018-07-22

## 2018-07-22 ENCOUNTER — OFFICE VISIT (OUTPATIENT)
Dept: URGENT CARE | Facility: URGENT CARE | Age: 38
End: 2018-07-22
Payer: COMMERCIAL

## 2018-07-22 ENCOUNTER — HOSPITAL ENCOUNTER (EMERGENCY)
Facility: CLINIC | Age: 38
Discharge: HOME | End: 2018-07-22
Attending: EMERGENCY MEDICINE | Admitting: EMERGENCY MEDICINE
Payer: COMMERCIAL

## 2018-07-22 VITALS
HEIGHT: 64 IN | DIASTOLIC BLOOD PRESSURE: 68 MMHG | SYSTOLIC BLOOD PRESSURE: 113 MMHG | OXYGEN SATURATION: 98 % | RESPIRATION RATE: 16 BRPM | HEART RATE: 70 BPM | TEMPERATURE: 98.4 F | BODY MASS INDEX: 31.93 KG/M2

## 2018-07-22 VITALS
TEMPERATURE: 98.8 F | WEIGHT: 186 LBS | BODY MASS INDEX: 31.76 KG/M2 | HEIGHT: 64 IN | SYSTOLIC BLOOD PRESSURE: 128 MMHG | DIASTOLIC BLOOD PRESSURE: 80 MMHG | OXYGEN SATURATION: 97 % | HEART RATE: 74 BPM

## 2018-07-22 DIAGNOSIS — R31.9 URINARY TRACT INFECTION WITH HEMATURIA, SITE UNSPECIFIED: ICD-10-CM

## 2018-07-22 DIAGNOSIS — R30.0 DYSURIA: Primary | ICD-10-CM

## 2018-07-22 DIAGNOSIS — Z11.3 SCREEN FOR STD (SEXUALLY TRANSMITTED DISEASE): ICD-10-CM

## 2018-07-22 DIAGNOSIS — N39.0 URINARY TRACT INFECTION WITH HEMATURIA, SITE UNSPECIFIED: ICD-10-CM

## 2018-07-22 DIAGNOSIS — N30.01 ACUTE CYSTITIS WITH HEMATURIA: ICD-10-CM

## 2018-07-22 LAB
ALBUMIN UR-MCNC: 100 MG/DL
ALBUMIN UR-MCNC: ABNORMAL MG/DL
ANION GAP SERPL CALCULATED.3IONS-SCNC: 6 MMOL/L (ref 3–14)
APPEARANCE UR: ABNORMAL
APPEARANCE UR: ABNORMAL
BACTERIA #/AREA URNS HPF: ABNORMAL /HPF
BASOPHILS # BLD AUTO: 0 10E9/L (ref 0–0.2)
BASOPHILS NFR BLD AUTO: 0.2 %
BILIRUB UR QL STRIP: ABNORMAL
BILIRUB UR QL STRIP: NEGATIVE
BUN SERPL-MCNC: 16 MG/DL (ref 7–30)
CALCIUM SERPL-MCNC: 9 MG/DL (ref 8.5–10.1)
CHLORIDE SERPL-SCNC: 106 MMOL/L (ref 94–109)
CO2 SERPL-SCNC: 27 MMOL/L (ref 20–32)
COLOR UR AUTO: ABNORMAL
COLOR UR AUTO: ABNORMAL
CREAT SERPL-MCNC: 0.92 MG/DL (ref 0.52–1.04)
DIFFERENTIAL METHOD BLD: ABNORMAL
EOSINOPHIL # BLD AUTO: 0.3 10E9/L (ref 0–0.7)
EOSINOPHIL NFR BLD AUTO: 2.3 %
ERYTHROCYTE [DISTWIDTH] IN BLOOD BY AUTOMATED COUNT: 12.1 % (ref 10–15)
GFR SERPL CREATININE-BSD FRML MDRD: 67 ML/MIN/1.7M2
GLUCOSE SERPL-MCNC: 108 MG/DL (ref 70–99)
GLUCOSE UR STRIP-MCNC: 500 MG/DL
GLUCOSE UR STRIP-MCNC: ABNORMAL MG/DL
HCT VFR BLD AUTO: 38.7 % (ref 35–47)
HGB BLD-MCNC: 13.1 G/DL (ref 11.7–15.7)
HGB UR QL STRIP: ABNORMAL
HGB UR QL STRIP: ABNORMAL
IMM GRANULOCYTES # BLD: 0 10E9/L (ref 0–0.4)
IMM GRANULOCYTES NFR BLD: 0.3 %
KETONES UR STRIP-MCNC: ABNORMAL MG/DL
KETONES UR STRIP-MCNC: NEGATIVE MG/DL
LEUKOCYTE ESTERASE UR QL STRIP: ABNORMAL
LEUKOCYTE ESTERASE UR QL STRIP: NEGATIVE
LYMPHOCYTES # BLD AUTO: 3.2 10E9/L (ref 0.8–5.3)
LYMPHOCYTES NFR BLD AUTO: 24.9 %
MCH RBC QN AUTO: 31.7 PG (ref 26.5–33)
MCHC RBC AUTO-ENTMCNC: 33.9 G/DL (ref 31.5–36.5)
MCV RBC AUTO: 94 FL (ref 78–100)
MONOCYTES # BLD AUTO: 0.7 10E9/L (ref 0–1.3)
MONOCYTES NFR BLD AUTO: 5.6 %
MUCOUS THREADS #/AREA URNS LPF: PRESENT /LPF
NEUTROPHILS # BLD AUTO: 8.6 10E9/L (ref 1.6–8.3)
NEUTROPHILS NFR BLD AUTO: 66.7 %
NITRATE UR QL: ABNORMAL
NITRATE UR QL: POSITIVE
NON-SQ EPI CELLS #/AREA URNS LPF: ABNORMAL /LPF
NRBC # BLD AUTO: 0 10*3/UL
NRBC BLD AUTO-RTO: 0 /100
PH UR STRIP: 6.5 PH (ref 5–7)
PH UR STRIP: ABNORMAL PH (ref 5–7)
PLATELET # BLD AUTO: 288 10E9/L (ref 150–450)
POTASSIUM SERPL-SCNC: 4.3 MMOL/L (ref 3.4–5.3)
RBC # BLD AUTO: 4.13 10E12/L (ref 3.8–5.2)
RBC #/AREA URNS AUTO: >100 /HPF
RBC #/AREA URNS AUTO: >182 /HPF (ref 0–2)
SODIUM SERPL-SCNC: 140 MMOL/L (ref 133–144)
SOURCE: ABNORMAL
SOURCE: ABNORMAL
SP GR UR STRIP: 1.01 (ref 1–1.03)
SP GR UR STRIP: ABNORMAL (ref 1–1.03)
SPECIMEN SOURCE: NORMAL
UROBILINOGEN UR STRIP-ACNC: 0.2 EU/DL (ref 0.2–1)
UROBILINOGEN UR STRIP-MCNC: ABNORMAL MG/DL (ref 0–2)
WBC # BLD AUTO: 12.9 10E9/L (ref 4–11)
WBC #/AREA URNS AUTO: 3840 /HPF (ref 0–5)
WBC #/AREA URNS AUTO: ABNORMAL /HPF
WET PREP SPEC: NORMAL

## 2018-07-22 PROCEDURE — 87088 URINE BACTERIA CULTURE: CPT | Performed by: FAMILY MEDICINE

## 2018-07-22 PROCEDURE — 87491 CHLMYD TRACH DNA AMP PROBE: CPT | Performed by: FAMILY MEDICINE

## 2018-07-22 PROCEDURE — 85025 COMPLETE CBC W/AUTO DIFF WBC: CPT | Performed by: EMERGENCY MEDICINE

## 2018-07-22 PROCEDURE — 81001 URINALYSIS AUTO W/SCOPE: CPT | Performed by: FAMILY MEDICINE

## 2018-07-22 PROCEDURE — 99284 EMERGENCY DEPT VISIT MOD MDM: CPT | Mod: Z6 | Performed by: EMERGENCY MEDICINE

## 2018-07-22 PROCEDURE — 99284 EMERGENCY DEPT VISIT MOD MDM: CPT | Mod: 25 | Performed by: EMERGENCY MEDICINE

## 2018-07-22 PROCEDURE — 25000128 H RX IP 250 OP 636: Performed by: EMERGENCY MEDICINE

## 2018-07-22 PROCEDURE — 81001 URINALYSIS AUTO W/SCOPE: CPT | Performed by: EMERGENCY MEDICINE

## 2018-07-22 PROCEDURE — 99213 OFFICE O/P EST LOW 20 MIN: CPT | Performed by: FAMILY MEDICINE

## 2018-07-22 PROCEDURE — 87591 N.GONORRHOEAE DNA AMP PROB: CPT | Performed by: FAMILY MEDICINE

## 2018-07-22 PROCEDURE — 87210 SMEAR WET MOUNT SALINE/INK: CPT | Performed by: FAMILY MEDICINE

## 2018-07-22 PROCEDURE — 87086 URINE CULTURE/COLONY COUNT: CPT | Performed by: FAMILY MEDICINE

## 2018-07-22 PROCEDURE — 80048 BASIC METABOLIC PNL TOTAL CA: CPT | Performed by: EMERGENCY MEDICINE

## 2018-07-22 PROCEDURE — 87186 SC STD MICRODIL/AGAR DIL: CPT | Performed by: FAMILY MEDICINE

## 2018-07-22 PROCEDURE — 96365 THER/PROPH/DIAG IV INF INIT: CPT | Performed by: EMERGENCY MEDICINE

## 2018-07-22 PROCEDURE — 87086 URINE CULTURE/COLONY COUNT: CPT | Performed by: EMERGENCY MEDICINE

## 2018-07-22 PROCEDURE — 25000132 ZZH RX MED GY IP 250 OP 250 PS 637: Performed by: EMERGENCY MEDICINE

## 2018-07-22 RX ORDER — ACETAMINOPHEN 325 MG/1
650 TABLET ORAL ONCE
Status: COMPLETED | OUTPATIENT
Start: 2018-07-22 | End: 2018-07-22

## 2018-07-22 RX ORDER — CIPROFLOXACIN 500 MG/1
500 TABLET, FILM COATED ORAL 2 TIMES DAILY
Qty: 14 TABLET | Refills: 0 | Status: SHIPPED | OUTPATIENT
Start: 2018-07-22 | End: 2018-07-29

## 2018-07-22 RX ORDER — SODIUM CHLORIDE 9 MG/ML
1000 INJECTION, SOLUTION INTRAVENOUS CONTINUOUS
Status: DISCONTINUED | OUTPATIENT
Start: 2018-07-22 | End: 2018-07-23 | Stop reason: HOSPADM

## 2018-07-22 RX ORDER — CIPROFLOXACIN 2 MG/ML
400 INJECTION, SOLUTION INTRAVENOUS ONCE
Status: COMPLETED | OUTPATIENT
Start: 2018-07-22 | End: 2018-07-22

## 2018-07-22 RX ORDER — NITROFURANTOIN 25; 75 MG/1; MG/1
100 CAPSULE ORAL 2 TIMES DAILY
Qty: 14 CAPSULE | Refills: 0 | Status: SHIPPED | OUTPATIENT
Start: 2018-07-22 | End: 2018-07-29

## 2018-07-22 RX ADMIN — ACETAMINOPHEN 650 MG: 325 TABLET, FILM COATED ORAL at 22:00

## 2018-07-22 RX ADMIN — CIPROFLOXACIN 400 MG: 2 INJECTION, SOLUTION INTRAVENOUS at 22:00

## 2018-07-22 RX ADMIN — SODIUM CHLORIDE 1000 ML: 9 INJECTION, SOLUTION INTRAVENOUS at 21:58

## 2018-07-22 ASSESSMENT — ENCOUNTER SYMPTOMS
NAUSEA: 1
FEVER: 0
BACK PAIN: 1
VOMITING: 0
DYSURIA: 1
ABDOMINAL PAIN: 1
HEMATURIA: 1

## 2018-07-22 NOTE — MR AVS SNAPSHOT
After Visit Summary   7/22/2018    Marie Vogel    MRN: [id number]           Patient Information     Date Of Birth          1980        Visit Information        Provider Department      7/22/2018 3:05 PM León Cedeño MD [facility] Urgent [facility]        Today's Diagnoses     Dysuria    -  1    Screen for STD (sexually transmitted disease)        Acute cystitis with hematuria          Care Instructions    follow up with your primary care provider if not better in 5-8 days.     Drink plenty of liquids                Follow-ups after your visit        Who to contact     If you have questions or need follow up information about today's clinic visit or your schedule please contact [facility] URGENT CARE directly at [phone].  Normal or non-critical lab and imaging results will be communicated to you by MyChart, letter or phone within 4 business days after the clinic has received the results. If you do not hear from us within 7 days, please contact the clinic through txtrhart or phone. If you have a critical or abnormal lab result, we will notify you by phone as soon as possible.  Submit refill requests through [employer] or call your pharmacy and they will forward the refill request to us. Please allow 3 business days for your refill to be completed.          Additional Information About Your Visit        MyChart Information     [employer] gives you secure access to your electronic health record. If you see a primary care provider, you can also send messages to your care team and make appointments. If you have questions, please call your primary care clinic.  If you do not have a primary care provider, please call [phone] and they will assist you.        Care EveryWhere ID     This is your Care EveryWhere ID. This could be used by other organizations to access your [place] medical records  [id number]        Your Vitals Were     Pulse Temperature Height Pulse Oximetry BMI (Body Mass  "Index)       74 98.8  F (37.1  C) (Oral) 5' 4\" (1.626 m) 97% 31.93 kg/m2        Blood Pressure from Last 3 Encounters:   07/22/18 128/80   06/19/18 104/82   04/16/18 102/60    Weight from Last 3 Encounters:   07/22/18 186 lb (84.4 kg)   06/19/18 191 lb 12.8 oz (87 kg)   04/16/18 185 lb (83.9 kg)              We Performed the Following     *UA reflex to Microscopic and Culture ([place] and [facility] (except [place] and [place])     Chlamydia trachomatis PCR     Neisseria gonorrhoeae PCR     Urine Culture Aerobic Bacterial     Urine Microscopic     Wet prep          Today's Medication Changes          These changes are accurate as of 7/22/18  3:54 PM.  If you have any questions, ask your nurse or doctor.               Start taking these medicines.        Dose/Directions    nitroFURantoin (macrocrystal-monohydrate) 100 MG capsule   Commonly known as:  MACROBID   Used for:  Acute cystitis with hematuria   Started by:  León Cedeño MD        Dose:  100 mg   Take 1 capsule (100 mg) by mouth 2 times daily for 7 days   Quantity:  14 capsule   Refills:  0            Where to get your medicines      Some of these will need a paper prescription and others can be bought over the counter.  Ask your nurse if you have questions.     Bring a paper prescription for each of these medications     nitroFURantoin (macrocrystal-monohydrate) 100 MG capsule                Primary Care Provider Office Phone # Fax #    Alan Paredes [phone] [address] [place] [address]        Equal Access to Services     [facility] AH: Hadii carl sextono Sodelicia, waaxda luqadaha, qaybta kaalmada jr aaron. So [facility] [phone].    ATENCIÓN: Si habla español, tiene a woodruff disposición servicios gratuitos de asistencia lingüística. Llame al [phone].    We comply with applicable federal civil rights laws and [place] laws. We do not discriminate on the " basis of race, color, national origin, age, disability, sex, sexual orientation, or gender identity.            Thank you!     Thank you for choosing [facility] URGENT CARE  for your care. Our goal is always to provide you with excellent care. Hearing back from our patients is one way we can continue to improve our services. Please take a few minutes to complete the written survey that you may receive in the mail after your visit with us. Thank you!             Your Updated Medication List - Protect others around you: Learn how to safely use, store and throw away your medicines at www.disposemymeds.org.          This list is accurate as of 7/22/18  3:54 PM.  Always use your most recent med list.                   Brand Name Dispense Instructions for use Diagnosis    ADVIL 200 MG tablet   Generic drug:  ibuprofen      Take 200 mg by mouth every 4 hours as needed.        aspirin 81 MG tablet     90 tablet    Take 1 tablet (81 mg) by mouth daily    PFO (patent foramen ovale)       atenolol 25 MG tablet    TENORMIN    90 tablet    Take 0.5 tablets (12.5 mg) by mouth daily        atorvastatin 20 MG tablet    LIPITOR    90 tablet    Take 1 tablet (20 mg) by mouth daily        blood glucose monitoring lancets     100 each    1 Device See Admin Instructions. Use up to 5 times daily for blood sugar checks.    Type 2 diabetes, HbA1c goal < 7% (H)       blood glucose monitoring meter device kit    no brand specified    1 kit    Use to test blood sugar 6 times daily and as needed.    Type 2 diabetes mellitus with diabetic nephropathy (H)       blood glucose monitoring test strip    PEPE CONTOUR NEXT    120 each    Check 4 times/day    Type 2 diabetes mellitus with diabetic nephropathy, with long-term current use of insulin (H)       cetirizine 10 MG tablet    zyrTEC    90 tablet    Take 10 mg by mouth 2 times daily        cyclobenzaprine 10 MG tablet    FLEXERIL    20 tablet    Take 1 tablet (10 mg) by mouth nightly as  needed for muscle spasms    Low back pain without sciatica, unspecified back pain laterality, unspecified chronicity       EPINEPHrine 0.3 MG/0.3ML injection 2-pack    EPIPEN/ADRENACLICK/or ANY BX GENERIC EQUIV    0.3 mL    Inject 0.3 mLs (0.3 mg) into the muscle once as needed for anaphylaxis    Nut allergy       ergocalciferol 44976 units capsule    ERGOCALCIFEROL    12 capsule    Take 1 capsule (50,000 Units) by mouth once a week    Vitamin D deficiency, Microalbuminuria       Fish Oil 500 MG Caps      Take 1 capsule by mouth        fluticasone 50 MCG/ACT spray    FLONASE    1 Bottle    Spray 1-2 sprays into both nostrils daily    Acute sinusitis with symptoms > 10 days       * infusion set misc pump supply     30 each    Change every 3 days as directed    Type 2 diabetes mellitus with diabetic nephropathy, with long-term current use of insulin (H)       * insulin cartridge misc pump supply     30 each    Change every 3 days1 applicator every 72 hours Change every 3 days    Type 2 diabetes mellitus with diabetic nephropathy, with long-term current use of insulin (H)       insulin aspart 100 UNITS/ML injection    NovoLOG VIAL    30 mL    With insulin pump. Uses about 80 units/day.    Type 2 diabetes mellitus with diabetic nephropathy, with long-term current use of insulin (H)       insulin pump infusion      Updated 1/27/17: [employer]: Model 723 BASAL: 00:00: 1.0 units/hr 10:30: 0.95 14:00: 1.05 CARB RATIO: 00:00 1:7 1600  1:8 C. Factor: 27 mg/dL BLOOD GLUCOSE TARGET and times: 12   AM (midnight):  Active Insulin Time:  3 hours Basal to Bolus Ratio:  Sensor:  No Carelink / Diasend username:  [email or web address] Carelink / Diasend Password:  [email or web address]    Type 2 diabetes mellitus with diabetic nephropathy, with long-term current use of insulin (H)       losartan 50 MG tablet    COZAAR    135 tablet    TAKE ONE TABLET BY MOUTH EVERY MORNING AND TAKE ONE-HALF TABLET BY MOUTH EVERY EVENING    Microalbuminuria        MAGNESIUM OXIDE PO      Take 400 mg by mouth daily        metFORMIN 500 MG 24 hr tablet    GLUCOPHAGE-XR    360 tablet    Take 2 tablets (1,000 mg) by mouth 2 times daily (with meals)    Type 2 diabetes mellitus with diabetic nephropathy, with long-term current use of insulin (H)       montelukast 10 MG tablet    SINGULAIR    90 tablet    Take 1 tablet (10 mg) by mouth At Bedtime    Allergic rhinitis       MULTI VITAMIN/MINERALS PO      Take 1 each by mouth daily.        nitroFURantoin (macrocrystal-monohydrate) 100 MG capsule    MACROBID    14 capsule    Take 1 capsule (100 mg) by mouth 2 times daily for 7 days    Acute cystitis with hematuria       omeprazole 40 MG capsule    priLOSEC    90 capsule    TAKE ONE CAPSULE BY MOUTH EVERY DAY    Gastroesophageal reflux disease without esophagitis, Hiatal hernia       order for DME     100 each    Equipment being ordered: skin prep wipes for insulin pump    Type 2 diabetes, HbA1c goal < 7% (H)       SUMAtriptan 25 MG tablet    IMITREX    8 tablet    Take 25-100mg one time. May repeat 25mg every two hours up to a maximum of 200mg in 24 hours.    Other migraine without status migrainosus, not intractable       * Notice:  This list has 2 medication(s) that are the same as other medications prescribed for you. Read the directions carefully, and ask your doctor or other care provider to review them with you.

## 2018-07-22 NOTE — NURSING NOTE
"Marie Sun Vogel;   Chief Complaint   Patient presents with     Urinary Problem     hematuria, urgency, pelvic pain, onset an hour ago      Urgent Care     Initial /80 (BP Location: Right arm, Patient Position: Chair, Cuff Size: Adult Regular)  Pulse 74  Temp 98.8  F (37.1  C) (Oral)  Ht 5' 4\" (1.626 m)  Wt 186 lb (84.4 kg)  SpO2 97%  BMI 31.93 kg/m2 Estimated body mass index is 31.93 kg/(m^2) as calculated from the following:    Height as of this encounter: 5' 4\" (1.626 m).    Weight as of this encounter: 186 lb (84.4 kg)..  BP completed using cuff size regular.  Lauren Colvin R.N.  "

## 2018-07-22 NOTE — ED AVS SNAPSHOT
[facility], [place], Emergency Department    [address] [place] [address]    Phone:  [phone]                                       Marie Vogel   MRN: [id number]    Department:  [facility], Emergency Department   Date of Visit:  7/22/2018           After Visit Summary Signature Page     I have received my discharge instructions, and my questions have been answered. I have discussed any challenges I see with this plan with the nurse or doctor.    ..........................................................................................................................................  Patient/Patient Representative Signature      ..........................................................................................................................................  Patient Representative Print Name and Relationship to Patient    ..................................................               ................................................  Date                                            Time    ..........................................................................................................................................  Reviewed by Signature/Title    ...................................................              ..............................................  Date                                                            Time

## 2018-07-22 NOTE — ED AVS SNAPSHOT
" [facility], Emergency Department    [address] [facility] [address]    Phone:  [phone]                                       Marie Vogel   MRN: [id number]    Department:  [facility], Emergency Department   Date of Visit:  7/22/2018           Patient Information     Date Of Birth          1980        Your diagnoses for this visit were:     Urinary tract infection with hematuria, site unspecified        You were seen by Fanta Enriquez MD.        Discharge Instructions            Take the antibiotics as prescribed.   Take ibuprofen/tylenol as needed for pain.   Please make an appointment to follow up with Your Primary Care Provider in 3-4 days even if entirely better.  Return to the ER if symptoms worsen-fever, chills, vomiting.     * BLADDER INFECTION,Female (Adult)    A bladder infection (\"cystitis\" or \"UTI\") usually causes a constant urge to urinate and a burning when passing urine. Urine may be cloudy, smelly or dark. There may be pain in the lower abdomen. A bladder infection occurs when bacteria from the vaginal area enter the bladder opening (urethra). This can occur from sexual intercourse, wearing tight clothing, dehydration and other factors.  HOME CARE:  1. Drink lots of fluids (at least 6-8 glasses a day, unless you must restrict fluids for other medical reasons). This will force the medicine into your urinary system and flush the bacteria out of your body. Cranberry juice has been shown to help clear out the bacteria.  2. Avoid sexual intercourse until your symptoms are gone.  3. A bladder infection is treated with antibiotics. You may also be given Pyridium (generic = phenazopyridine) to reduce the burning sensation. This medicine will cause your urine to become a bright orange color. The orange urine may stain clothing. You may wear a pad or panty-liner to protect clothing.  PREVENTING FUTURE INFECTIONS:  1. Always wipe from front to back after a bowel " movement.  2. Keep the genital area clean and dry.  3. Drink plenty of fluids each day to avoid dehydration.  4. Urinate right after intercourse to flush out the bladder.  5. Wear cotton underwear and cotton-lined panty hose; avoid tight-fitting pants.  6. If you are on birth control pills and are having frequent bladder infections, discuss with your doctor.  FOLLOW UP: Return to this facility or see your doctor if ALL symptoms are not gone after three days of treatment.  GET PROMPT MEDICAL ATTENTION if any of the following occur:    Fever over 101 F (38.3 C)    No improvement by the third day of treatment    Increasing back or abdominal pain    Repeated vomiting; unable to keep medicine down    Weakness, dizziness or fainting    Vaginal discharge    Pain, redness or swelling in the labia (outer vaginal area)    2673-9160 [employer] [address] [place] [id number]. All rights reserved. This information is not intended as a substitute for professional medical care. Always follow your healthcare professional's instructions.  This information has been modified by your health care provider with permission from the publisher.      Blood in the Urine    Blood in the urine (hematuria) has many possible causes. If it occurs after an injury (such as a car accident or fall), it is most often a sign of bruising to the kidney or bladder. Common causes of blood in the urine include urinary tract infections, kidney stones, inflammation, tumors, or certain other diseases of the kidney or bladder. Menstruation can cause blood to appear in the urine sample, although it is not coming from the urinary tract.  If only a trace amount of blood is present, it will show up on the urine test, even though the urine may be yellow and not pink or red. This may occur with any of the above conditions, as well as heavy exercise or high fever. In this case, your doctor may want to repeat the urine test on another day.  This will show if the blood is still present. If it is, then other tests can be done to find out the cause.  Home care  Follow these home care guidelines:    If your urine does not appear bloody (pink, brown or red) then you do not need to restrict your activity in any way.    If you can see blood in your urine, rest and avoid heavy exertion until your next exam. Do not use aspirin, blood thinners, or anti-platelet or anti-inflammatory medicines. These include ibuprofen and naproxen. These thin the blood and may increase bleeding.  Follow-up care  Follow up with your healthcare provider, or as advised. If you were injured and had blood in your urine, you should have a repeat urine test in 1 to 2 days. Contact your doctor for this test.  A radiologist will review any X-rays that were taken. You will be told of any new findings that may affect your care.  When to seek medical advice  Call your healthcare provider right away if any of these occur:    Bright red blood or blood clots in the urine (if you did not have this before)    Weakness, dizziness or fainting    New groin, abdominal, or back pain    Fever of 100.4 F (38 C) or higher, or as directed by your healthcare provider    Repeated vomiting    Bleeding from the nose or gums or easy bruising  Date Last Reviewed: 9/1/2016 2000-2017 [employer]. [address] [place] [address]. All rights reserved. This information is not intended as a substitute for professional medical care. Always follow your healthcare professional's instructions.          24 Hour Appointment Hotline       To make an appointment at any [facility], call [phone] ([phone]). If you don't have a family doctor or clinic, we will help you find one. [facility] are conveniently located to serve the needs of you and your family.             Review of your medicines      START taking        Dose / Directions Last dose taken    ciprofloxacin 500 MG  tablet   Commonly known as:  CIPRO   Dose:  500 mg   Quantity:  14 tablet        Take 1 tablet (500 mg) by mouth 2 times daily for 7 days   Refills:  0          Our records show that you are taking the medicines listed below. If these are incorrect, please call your family doctor or clinic.        Dose / Directions Last dose taken    ADVIL 200 MG tablet   Dose:  200 mg   Generic drug:  ibuprofen        Take 200 mg by mouth every 4 hours as needed.   Refills:  0        aspirin 81 MG tablet   Dose:  81 mg   Quantity:  90 tablet        Take 1 tablet (81 mg) by mouth daily   Refills:  3        atenolol 25 MG tablet   Commonly known as:  TENORMIN   Dose:  12.5 mg   Quantity:  90 tablet        Take 0.5 tablets (12.5 mg) by mouth daily   Refills:  3        atorvastatin 20 MG tablet   Commonly known as:  LIPITOR   Dose:  20 mg   Quantity:  90 tablet        Take 1 tablet (20 mg) by mouth daily   Refills:  3        blood glucose monitoring lancets   Dose:  1 Device   Quantity:  100 each        1 Device See Admin Instructions. Use up to 5 times daily for blood sugar checks.   Refills:  prn        blood glucose monitoring meter device kit   Commonly known as:  no brand specified   Quantity:  1 kit        Use to test blood sugar 6 times daily and as needed.   Refills:  0        blood glucose monitoring test strip   Commonly known as:  PEPE CONTOUR NEXT   Quantity:  120 each        Check 4 times/day   Refills:  11        cetirizine 10 MG tablet   Commonly known as:  zyrTEC   Dose:  10 mg   Quantity:  90 tablet        Take 10 mg by mouth 2 times daily   Refills:  3        cyclobenzaprine 10 MG tablet   Commonly known as:  FLEXERIL   Dose:  10 mg   Quantity:  20 tablet        Take 1 tablet (10 mg) by mouth nightly as needed for muscle spasms   Refills:  0        EPINEPHrine 0.3 MG/0.3ML injection 2-pack   Commonly known as:  EPIPEN/ADRENACLICK/or ANY BX GENERIC EQUIV   Dose:  0.3 mg   Quantity:  0.3 mL        Inject 0.3 mLs (0.3  mg) into the muscle once as needed for anaphylaxis   Refills:  11        ergocalciferol 16057 units capsule   Commonly known as:  ERGOCALCIFEROL   Dose:  13079 Units   Quantity:  12 capsule        Take 1 capsule (50,000 Units) by mouth once a week   Refills:  0        Fish Oil 500 MG Caps   Dose:  1 capsule        Take 1 capsule by mouth   Refills:  0        fluticasone 50 MCG/ACT spray   Commonly known as:  FLONASE   Dose:  1-2 spray   Quantity:  1 Bottle        Spray 1-2 sprays into both nostrils daily   Refills:  0        * infusion set misc pump supply   Quantity:  30 each        Change every 3 days as directed   Refills:  3        * insulin cartridge misc pump supply   Quantity:  30 each        Change every 3 days1 applicator every 72 hours Change every 3 days   Refills:  3        insulin aspart 100 UNITS/ML injection   Commonly known as:  NovoLOG VIAL   Quantity:  30 mL        With insulin pump. Uses about 80 units/day.   Refills:  3        insulin pump infusion        Updated 1/27/17: [employer]: Model 723 BASAL: 00:00: 1.0 units/hr 10:30: 0.95 14:00: 1.05 CARB RATIO: 00:00 1:7 1600  1:8 C. Factor: 27 mg/dL BLOOD GLUCOSE TARGET and times: 12   AM (midnight):  Active Insulin Time:  3 hours Basal to Bolus Ratio:  Sensor:  No Carelink / Diasend username:  [email or web address] Carelink / Diasend Password:  [email or web address]   Refills:  0        losartan 50 MG tablet   Commonly known as:  COZAAR   Quantity:  135 tablet        TAKE ONE TABLET BY MOUTH EVERY MORNING AND TAKE ONE-HALF TABLET BY MOUTH EVERY EVENING   Refills:  0        MAGNESIUM OXIDE PO   Dose:  400 mg        Take 400 mg by mouth daily   Refills:  0        metFORMIN 500 MG 24 hr tablet   Commonly known as:  GLUCOPHAGE-XR   Dose:  1000 mg   Quantity:  360 tablet        Take 2 tablets (1,000 mg) by mouth 2 times daily (with meals)   Refills:  3        montelukast 10 MG tablet   Commonly known as:  SINGULAIR   Dose:  10 mg   Quantity:  90 tablet        Take 1  tablet (10 mg) by mouth At Bedtime   Refills:  3        MULTI VITAMIN/MINERALS PO   Dose:  1 each        Take 1 each by mouth daily.   Refills:  0        nitroFURantoin (macrocrystal-monohydrate) 100 MG capsule   Commonly known as:  MACROBID   Dose:  100 mg   Quantity:  14 capsule        Take 1 capsule (100 mg) by mouth 2 times daily for 7 days   Refills:  0        omeprazole 40 MG capsule   Commonly known as:  priLOSEC   Quantity:  90 capsule        TAKE ONE CAPSULE BY MOUTH EVERY DAY   Refills:  2        order for DME   Quantity:  100 each        Equipment being ordered: skin prep wipes for insulin pump   Refills:  0        SUMAtriptan 25 MG tablet   Commonly known as:  IMITREX   Quantity:  8 tablet        Take 25-100mg one time. May repeat 25mg every two hours up to a maximum of 200mg in 24 hours.   Refills:  6        * Notice:  This list has 2 medication(s) that are the same as other medications prescribed for you. Read the directions carefully, and ask your doctor or other care provider to review them with you.            Prescriptions were sent or printed at these locations (1 Prescription)                   Other Prescriptions                Printed at Department/Unit printer (1 of 1)         ciprofloxacin (CIPRO) 500 MG tablet                Procedures and tests performed during your visit     Basic metabolic panel    CBC with platelets differential    UA with Microscopic    Urine Culture      Orders Needing Specimen Collection     None      Pending Results     Date and Time Order Name Status Description    7/22/2018 2029 Urine Culture Preliminary     7/22/2018 1534 URINE CULTURE AEROBIC BACTERIAL In process     7/22/2018 1514 CHLAMYDIA TRACHOMATIS PCR In process     7/22/2018 1514 NEISSERIA GONORRHOEAE PCR In process             Pending Culture Results     Date and Time Order Name Status Description    7/22/2018 2029 Urine Culture Preliminary     7/22/2018 1534 URINE CULTURE AEROBIC BACTERIAL In process      7/22/2018 1514 CHLAMYDIA TRACHOMATIS PCR In process     7/22/2018 1514 NEISSERIA GONORRHOEAE PCR In process             Pending Results Instructions     If you had any lab results that were not finalized at the time of your Discharge, you can call the ED Lab Result RN at [phone]. You will be contacted by this team for any positive Lab results or changes in treatment. The nurses are available 7 days a week from 10A to 6:30P.  You can leave a message 24 hours per day and they will return your call.        Thank you for choosing [facility]       Thank you for choosing [facility] for your care. Our goal is always to provide you with excellent care. Hearing back from our patients is one way we can continue to improve our services. Please take a few minutes to complete the written survey that you may receive in the mail after you visit with us. Thank you!        [email or web address] Information     [employer] gives you secure access to your electronic health record. If you see a primary care provider, you can also send messages to your care team and make appointments. If you have questions, please call your primary care clinic.  If you do not have a primary care provider, please call [phone] and they will assist you.        Care EveryWhere ID     This is your Care EveryWhere ID. This could be used by other organizations to access your [facility] medical records  [id number]        Equal Access to Services     KO PUGA : Hema Weber, waaxda luqadaha, qaybta kaalmada adereynold, jr pickard. So [facility] [phone].    ATENCIÓN: Si habla español, tiene a woodruff disposición servicios gratuitos de asistencia lingüística. Llame al [phone].    We comply with applicable federal civil rights laws and [place] laws. We do not discriminate on the basis of race, color, national origin, age, disability, sex, sexual orientation, or gender identity.            After Visit Summary       This is  your record. Keep this with you and show to your community pharmacist(s) and doctor(s) at your next visit.

## 2018-07-22 NOTE — PROGRESS NOTES
"SUBJECTIVE:  38 year old female with a history of diabetes mellitus who  presents today for a possible UTI. Symptoms of urinary urgency, hematuria, left pelvic pain have been going on for the past hour since today.  Hematuria yes. .  sudden onset.  There is no history of fever, chills, nausea or vomiting.  No history of vaginal discharge. This patient does have a history of urinary tract infections.     Patient would like a vaginal wet prep and gonorrhea and chlamydia because her annual exam is coming up soon. She is sexually active and does not have a history of STDs    Past Medical History:   Diagnosis Date     Allergic rhinitis due to pollen      Atypical glandular cells on Pap smear 3/1108     Gastroesophageal reflux disease      PFO (patent foramen ovale)      Type II or unspecified type diabetes mellitus without mention of complication, not stated as uncontrolled 2002    onset was gestational in 2001     Current Outpatient Prescriptions   Medication Sig Dispense Refill     aspirin 81 MG tablet Take 1 tablet (81 mg) by mouth daily 90 tablet 3     atenolol (TENORMIN) 25 MG tablet Take 0.5 tablets (12.5 mg) by mouth daily 90 tablet 3     atorvastatin (LIPITOR) 20 MG tablet Take 1 tablet (20 mg) by mouth daily 90 tablet 3     cetirizine (ZYRTEC) 10 MG tablet Take 10 mg by mouth 2 times daily  90 tablet 3     ergocalciferol (ERGOCALCIFEROL) 92996 UNITS capsule Take 1 capsule (50,000 Units) by mouth once a week 12 capsule 0     fluticasone (FLONASE) 50 MCG/ACT spray Spray 1-2 sprays into both nostrils daily 1 Bottle 0     ibuprofen (ADVIL) 200 MG tablet Take 200 mg by mouth every 4 hours as needed.       infusion set (PARADIGM QUICK-SET 23\" 9MM) misc pump supply Change every 3 days as directed 30 each 3     insulin aspart (NOVOLOG VIAL) 100 UNITS/ML injection With insulin pump. Uses about 80 units/day. 30 mL 3     insulin cartridge (PARADIGM 3ML) misc pump supply Change every 3 days1 applicator every 72 hours Change " every 3 days 30 each 3     INSULIN PUMP - OUTPATIENT Updated 1/27/17:  [employer]: Model 723  BASAL:  00:00: 1.0 units/hr  10:30: 0.95  14:00: 1.05  CARB RATIO:  00:00 1:7  1600  1:8  C. Factor:  27 mg/dL  BLOOD GLUCOSE TARGET and times:  12   AM (midnight):   Active Insulin Time:  3 hours  Basal to Bolus Ratio:   Sensor:  No  Carelink / Diasend username:  [email or web address]  Carelink / Diasend Password:  [email or web address]!       Lancets (MICROLET) MISC 1 Device See Admin Instructions. Use up to 5 times daily for blood sugar checks. 100 each prn     losartan (COZAAR) 50 MG tablet TAKE ONE TABLET BY MOUTH EVERY MORNING AND TAKE ONE-HALF TABLET BY MOUTH EVERY EVENING 135 tablet 0     MAGNESIUM OXIDE PO Take 400 mg by mouth daily       metFORMIN (GLUCOPHAGE-XR) 500 MG 24 hr tablet Take 2 tablets (1,000 mg) by mouth 2 times daily (with meals) 360 tablet 3     montelukast (SINGULAIR) 10 MG tablet Take 1 tablet (10 mg) by mouth At Bedtime 90 tablet 3     Multiple Vitamins-Minerals (MULTI VITAMIN/MINERALS PO) Take 1 each by mouth daily.       Omega-3 Fatty Acids (FISH OIL) 500 MG CAPS Take 1 capsule by mouth       omeprazole (PRILOSEC) 40 MG capsule TAKE ONE CAPSULE BY MOUTH EVERY DAY 90 capsule 2     order for DME Equipment being ordered: skin prep wipes for insulin pump 100 each 0     SUMAtriptan (IMITREX) 25 MG tablet Take 25-100mg one time. May repeat 25mg every two hours up to a maximum of 200mg in 24 hours. 8 tablet 6     blood glucose monitoring (PEPE CONTOUR NEXT) test strip Check 4 times/day 120 each 11     blood glucose monitoring (NO BRAND SPECIFIED) meter device kit Use to test blood sugar 6 times daily and as needed. 1 kit 0     cyclobenzaprine (FLEXERIL) 10 MG tablet Take 1 tablet (10 mg) by mouth nightly as needed for muscle spasms (Patient not taking: Reported on 6/19/2018) 20 tablet 0     EPINEPHrine 0.3 MG/0.3ML injection Inject 0.3 mLs (0.3 mg) into the muscle once as needed for anaphylaxis (Patient not  "taking: Reported on 6/19/2018) 0.3 mL 11     Social History   Substance Use Topics     Smoking status: Former Smoker     Types: Cigarettes     Quit date: 10/24/2005     Smokeless tobacco: Former User      Comment: States only smokes when drinks maybe 2x/year     Alcohol use No       ROS:   Review of systems negative except as stated above.    OBJECTIVE:  /80 (BP Location: Right arm, Patient Position: Chair, Cuff Size: Adult Regular)  Pulse 74  Temp 98.8  F (37.1  C) (Oral)  Ht 5' 4\" (1.626 m)  Wt 186 lb (84.4 kg)  SpO2 97%  BMI 31.93 kg/m2  GENERAL APPEARANCE: healthy, alert and no distress  BACK: No CVA tenderness    LABS:    Results for orders placed or performed in visit on 07/22/18   *UA reflex to Microscopic and Culture ([facility] (except [place] and [place])   Result Value Ref Range    Color Urine Red     Appearance Urine Cloudy     Glucose Urine 500 (A) NEG^Negative mg/dL    Bilirubin Urine Negative NEG^Negative    Ketones Urine Negative NEG^Negative mg/dL    Specific Gravity Urine 1.015 1.003 - 1.035    Blood Urine Moderate (A) NEG^Negative    pH Urine 6.5 5.0 - 7.0 pH    Protein Albumin Urine 100 (A) NEG^Negative mg/dL    Urobilinogen Urine 0.2 0.2 - 1.0 EU/dL    Nitrite Urine Positive (A) NEG^Negative    Leukocyte Esterase Urine Negative NEG^Negative    Source Midstream Urine    Urine Microscopic   Result Value Ref Range    WBC Urine  (A) OTO5^0 - 5 /HPF    RBC Urine >100 (A) OTO2^O - 2 /HPF    Squamous Epithelial /LPF Urine Few FEW^Few /LPF    Bacteria Urine Many (A) NEG^Negative /HPF   Wet prep   Result Value Ref Range    Specimen Description Vagina     Wet Prep No Trichomonas seen     Wet Prep No yeast seen     Wet Prep No clue cells seen          ASSESSMENT:   Dysuria  Acute Cystitis with Hematuria  Screen for Gonorrhea and Chlamydia    PLAN:  Rx:  Macrobid  Drink plenty of fluids.  Prevention and treatment of UTI's discussed.Signs and symptoms of pyelonephritis " mentioned.    Follow up with primary care physician if not improving  Pending labs:  Gonorrhea and chlamydia urine tests.  Urine culture is also pending    León Cedeño MD

## 2018-07-22 NOTE — LETTER
July 22, 2018      To Whom It May Concern:      Marie Vogel was seen in our Emergency Department today, 07/22/18.  I expect her condition to improve over the next 2 days.  She may return to work/school when improved.    Sincerely,        Fanta Enriquez MD

## 2018-07-22 NOTE — PATIENT INSTRUCTIONS
follow up with your primary care provider if not better in 5-8 days.     Drink plenty of liquids

## 2018-07-23 LAB
BACTERIA SPEC CULT: NORMAL
BACTERIA SPEC CULT: NORMAL
Lab: NORMAL
SPECIMEN SOURCE: NORMAL

## 2018-07-23 NOTE — DISCHARGE INSTRUCTIONS
"     Take the antibiotics as prescribed.   Take ibuprofen/tylenol as needed for pain.   Please make an appointment to follow up with Your Primary Care Provider in 3-4 days even if entirely better.  Return to the ER if symptoms worsen-fever, chills, vomiting.     * BLADDER INFECTION,Female (Adult)    A bladder infection (\"cystitis\" or \"UTI\") usually causes a constant urge to urinate and a burning when passing urine. Urine may be cloudy, smelly or dark. There may be pain in the lower abdomen. A bladder infection occurs when bacteria from the vaginal area enter the bladder opening (urethra). This can occur from sexual intercourse, wearing tight clothing, dehydration and other factors.  HOME CARE:  1. Drink lots of fluids (at least 6-8 glasses a day, unless you must restrict fluids for other medical reasons). This will force the medicine into your urinary system and flush the bacteria out of your body. Cranberry juice has been shown to help clear out the bacteria.  2. Avoid sexual intercourse until your symptoms are gone.  3. A bladder infection is treated with antibiotics. You may also be given Pyridium (generic = phenazopyridine) to reduce the burning sensation. This medicine will cause your urine to become a bright orange color. The orange urine may stain clothing. You may wear a pad or panty-liner to protect clothing.  PREVENTING FUTURE INFECTIONS:  1. Always wipe from front to back after a bowel movement.  2. Keep the genital area clean and dry.  3. Drink plenty of fluids each day to avoid dehydration.  4. Urinate right after intercourse to flush out the bladder.  5. Wear cotton underwear and cotton-lined panty hose; avoid tight-fitting pants.  6. If you are on birth control pills and are having frequent bladder infections, discuss with your doctor.  FOLLOW UP: Return to this facility or see your doctor if ALL symptoms are not gone after three days of treatment.  GET PROMPT MEDICAL ATTENTION if any of the following " occur:    Fever over 101 F (38.3 C)    No improvement by the third day of treatment    Increasing back or abdominal pain    Repeated vomiting; unable to keep medicine down    Weakness, dizziness or fainting    Vaginal discharge    Pain, redness or swelling in the labia (outer vaginal area)    7091-9723 [employer]. [address] [place] [address]. All rights reserved. This information is not intended as a substitute for professional medical care. Always follow your healthcare professional's instructions.  This information has been modified by your health care provider with permission from the publisher.      Blood in the Urine    Blood in the urine (hematuria) has many possible causes. If it occurs after an injury (such as a car accident or fall), it is most often a sign of bruising to the kidney or bladder. Common causes of blood in the urine include urinary tract infections, kidney stones, inflammation, tumors, or certain other diseases of the kidney or bladder. Menstruation can cause blood to appear in the urine sample, although it is not coming from the urinary tract.  If only a trace amount of blood is present, it will show up on the urine test, even though the urine may be yellow and not pink or red. This may occur with any of the above conditions, as well as heavy exercise or high fever. In this case, your doctor may want to repeat the urine test on another day. This will show if the blood is still present. If it is, then other tests can be done to find out the cause.  Home care  Follow these home care guidelines:    If your urine does not appear bloody (pink, brown or red) then you do not need to restrict your activity in any way.    If you can see blood in your urine, rest and avoid heavy exertion until your next exam. Do not use aspirin, blood thinners, or anti-platelet or anti-inflammatory medicines. These include ibuprofen and naproxen. These thin the blood and may increase  bleeding.  Follow-up care  Follow up with your healthcare provider, or as advised. If you were injured and had blood in your urine, you should have a repeat urine test in 1 to 2 days. Contact your doctor for this test.  A radiologist will review any X-rays that were taken. You will be told of any new findings that may affect your care.  When to seek medical advice  Call your healthcare provider right away if any of these occur:    Bright red blood or blood clots in the urine (if you did not have this before)    Weakness, dizziness or fainting    New groin, abdominal, or back pain    Fever of 100.4 F (38 C) or higher, or as directed by your healthcare provider    Repeated vomiting    Bleeding from the nose or gums or easy bruising  Date Last Reviewed: 9/1/2016 2000-2017 [employer]. [address] [place] [address], [place] [address]. All rights reserved. This information is not intended as a substitute for professional medical care. Always follow your healthcare professional's instructions.

## 2018-07-23 NOTE — ED PROVIDER NOTES
History     Chief Complaint   Patient presents with     Hematuria     HPI  Marie Vogel is a 38 year old female with a history of GERD, DM2, patent foramen ovale, and s/p cholecystectomy who presents for evaluation of hematuria. Patient reports yesterday she had the onset of lower back pain and then this morning developed dysuria, hematuria with pink-colored urine, and left lower quadrant abdominal pain. Her discomfort increased this afternoon, so she initially presented to the Urgent Care in [place], [place] where she states she had bright red hematuria with dark-colored clots. She was diagnosed with a UTI at that time and started on a course of Macrobid. She reports she took the first dose of Macrobid as well as pyridium around 4 PM this afternoon. She also took one dose of Flexeril for her back and abdominal discomfort around 5 PM. Currently, she complains she is continuing to have hematuria with large blood clots. She also complains of associated nausea. No fever or vomiting. No known history of kidney stones.     I have reviewed the Medications, Allergies, Past Medical and Surgical History, and Social History in the [facility] system.  Past Medical History:   Diagnosis Date     Allergic rhinitis due to pollen      Atypical glandular cells on Pap smear 3/1108     Gastroesophageal reflux disease      PFO (patent foramen ovale)      Type II or unspecified type diabetes mellitus without mention of complication, not stated as uncontrolled     onset was gestational in        Past Surgical History:   Procedure Laterality Date     C INDUCED ABORTN BY D&C           C IUD,MIRENA  8/10/10, 7/28/15     C/SECTION, LOW TRANSVERSE  6/25/10    , Low Transverse     CHOLECYSTECTOMY, LAPOROSCOPIC      Cholecystectomy, Laparoscopic     ESOPHAGOSCOPY, GASTROSCOPY, DUODENOSCOPY (EGD), COMBINED N/A 2016    Procedure: COMBINED ESOPHAGOSCOPY, GASTROSCOPY, DUODENOSCOPY (EGD), BIOPSY SINGLE OR  MULTIPLE;  Surgeon: Fadi Hunter MD;  Location:  GI      ESOPHAGEAL MOTILITY STUDY N/A 9/14/2016    Procedure: ESOPHAGEAL MOTILITY STUDY;  Surgeon: Fadi Hunter MD;  Location:  GI      REMOVE TONSILS/ADENOIDS,<13 Y/O  1987    T &A       Family History   Problem Relation Age of Onset     Cardiovascular Mother      hypertrophic cardiomyopathy     Genitourinary Problems Mother      gallbladder disease     Diabetes Mother      drug induced     Other - See Comments Mother      heart transplant 11/23/2005     Diabetes Father      type 2     Hypertension Father      Genitourinary Problems Maternal Grandmother      gallbladder disease     Genitourinary Problems Paternal Grandmother      gallbladder disease     Hypertension Paternal Grandfather      high bp     Cerebrovascular Disease Paternal Grandfather      Cardiovascular Brother      hypertrophic cardiomyopathy     Diabetes Brother      type 2       Social History   Substance Use Topics     Smoking status: Former Smoker     Types: Cigarettes     Quit date: 10/24/2005     Smokeless tobacco: Former User      Comment: States only smokes when drinks maybe 2x/year     Alcohol use No       No current facility-administered medications for this encounter.      Current Outpatient Prescriptions   Medication     aspirin 81 MG tablet     atenolol (TENORMIN) 25 MG tablet     atorvastatin (LIPITOR) 20 MG tablet     cetirizine (ZYRTEC) 10 MG tablet     cyclobenzaprine (FLEXERIL) 10 MG tablet     fluticasone (FLONASE) 50 MCG/ACT spray     ibuprofen (ADVIL) 200 MG tablet     losartan (COZAAR) 50 MG tablet     MAGNESIUM OXIDE PO     metFORMIN (GLUCOPHAGE-XR) 500 MG 24 hr tablet     montelukast (SINGULAIR) 10 MG tablet     Multiple Vitamins-Minerals (MULTI VITAMIN/MINERALS PO)     nitroFURantoin, macrocrystal-monohydrate, (MACROBID) 100 MG capsule     Omega-3 Fatty Acids (FISH OIL) 500 MG CAPS     omeprazole (PRILOSEC) 40 MG capsule     blood glucose monitoring  "(PEPE CONTOUR NEXT) test strip     blood glucose monitoring (NO BRAND SPECIFIED) meter device kit     EPINEPHrine 0.3 MG/0.3ML injection     ergocalciferol (ERGOCALCIFEROL) 85965 UNITS capsule     infusion set (PARADIGM QUICK-SET 23\" 9MM) [facility] pump supply     insulin aspart (NOVOLOG VIAL) 100 UNITS/ML injection     insulin cartridge (PARADIGM 3ML) misc pump supply     INSULIN PUMP - OUTPATIENT     Lancets (MICROLET) MISC     order for DME     SUMAtriptan (IMITREX) 25 MG tablet        Allergies   Allergen Reactions     Ivp Dye [Contrast Dye] Itching     Pt reports that throat itches     Nuts Anaphylaxis     [place] nuts only     Bactrim Swelling     Pt reaction was swelling in mouth and tongue and itchy mouth.  Resolved with benadryl and prednisone     Penicillins Hives     Cephalosporins Itching     Seasonal Allergies Other (See Comments)     Molds, trees, grass, dust..  Upper congestion     Atorvastatin      myalgias     Shellfish Allergy Rash     Clams only       Review of Systems   Constitutional: Negative for fever.   Gastrointestinal: Positive for abdominal pain and nausea. Negative for vomiting.   Genitourinary: Positive for dysuria and hematuria.   Musculoskeletal: Positive for back pain.   All other systems reviewed and are negative.      Physical Exam   BP: (!) 138/95  Heart Rate: 110  Temp: 98.4  F (36.9  C)  Height: 162.6 cm (5' 4\")  SpO2: 97 %      Physical Exam   Constitutional: She is oriented to person, place, and time. She appears well-developed and well-nourished.   HENT:   Head: Normocephalic and atraumatic.   Neck: Normal range of motion.   Cardiovascular: Normal rate, regular rhythm and normal heart sounds.    Pulmonary/Chest: Effort normal and breath sounds normal. No respiratory distress.   Abdominal: Soft. She exhibits no distension. There is no tenderness. There is no rebound.   Musculoskeletal: She exhibits no edema, tenderness or deformity.   No flank pain on exam   Neurological: She is " alert and oriented to person, place, and time.   Skin: Skin is warm and dry.   Psychiatric: She has a normal mood and affect. Her behavior is normal. Thought content normal.       ED Course     ED Course     Procedures       8:43 PM  The patient was seen and examined by Dr. Enriquez in Room 3.          Critical Care time:  none         Results for orders placed or performed during the hospital encounter of 07/22/18   UA with Microscopic   Result Value Ref Range    Color Urine Red     Appearance Urine Cloudy     Glucose Urine Interfering substances, unable to perform test (A) NEG^Negative mg/dL    Bilirubin Urine Interfering substances, unable to perform test (A) NEG^Negative    Ketones Urine Interfering substances, unable to perform test (A) NEG^Negative mg/dL    Specific Gravity Urine Interfering substances, unable to perform test 1.003 - 1.035    Blood Urine Interfering substances, unable to perform test (A) NEG^Negative    pH Urine Interfering substances, unable to perform test 5.0 - 7.0 pH    Protein Albumin Urine Interfering substances, unable to perform test (A) NEG^Negative mg/dL    Urobilinogen mg/dL Interfering substances, unable to perform test 0.0 - 2.0 mg/dL    Nitrite Urine Interfering substances, unable to perform test (A) NEG^Negative    Leukocyte Esterase Urine Interfering substances, unable to perform test (A) NEG^Negative    Source Midstream Urine     WBC Urine 3840 (H) 0 - 5 /HPF    RBC Urine >182 (H) 0 - 2 /HPF    Mucous Urine Present (A) NEG^Negative /LPF   CBC with platelets differential   Result Value Ref Range    WBC 12.9 (H) 4.0 - 11.0 10e9/L    RBC Count 4.13 3.8 - 5.2 10e12/L    Hemoglobin 13.1 11.7 - 15.7 g/dL    Hematocrit 38.7 35.0 - 47.0 %    MCV 94 78 - 100 fl    MCH 31.7 26.5 - 33.0 pg    MCHC 33.9 31.5 - 36.5 g/dL    RDW 12.1 10.0 - 15.0 %    Platelet Count 288 150 - 450 10e9/L    Diff Method Automated Method     % Neutrophils 66.7 %    % Lymphocytes 24.9 %    % Monocytes 5.6 %    %  Eosinophils 2.3 %    % Basophils 0.2 %    % Immature Granulocytes 0.3 %    Nucleated RBCs 0 0 /100    Absolute Neutrophil 8.6 (H) 1.6 - 8.3 10e9/L    Absolute Lymphocytes 3.2 0.8 - 5.3 10e9/L    Absolute Monocytes 0.7 0.0 - 1.3 10e9/L    Absolute Eosinophils 0.3 0.0 - 0.7 10e9/L    Absolute Basophils 0.0 0.0 - 0.2 10e9/L    Abs Immature Granulocytes 0.0 0 - 0.4 10e9/L    Absolute Nucleated RBC 0.0    Basic metabolic panel   Result Value Ref Range    Sodium 140 133 - 144 mmol/L    Potassium 4.3 3.4 - 5.3 mmol/L    Chloride 106 94 - 109 mmol/L    Carbon Dioxide 27 20 - 32 mmol/L    Anion Gap 6 3 - 14 mmol/L    Glucose 108 (H) 70 - 99 mg/dL    Urea Nitrogen 16 7 - 30 mg/dL    Creatinine 0.92 0.52 - 1.04 mg/dL    GFR Estimate 67 >60 mL/min/1.7m2    GFR Estimate If Black 81 >60 mL/min/1.7m2    Calcium 9.0 8.5 - 10.1 mg/dL   Urine Culture   Result Value Ref Range    Specimen Description Midstream Urine     Special Requests Specimen received in preservative     Culture Micro PENDING      Medications   0.9% sodium chloride BOLUS (0 mLs Intravenous Stopped 7/22/18 2322)     Followed by   sodium chloride 0.9% infusion (not administered)   ciprofloxacin (CIPRO) infusion 400 mg (0 mg Intravenous Stopped 7/22/18 2322)   acetaminophen (TYLENOL) tablet 650 mg (650 mg Oral Given 7/22/18 2200)            Labs Ordered and Resulted from Time of ED Arrival Up to the Time of Departure from the ED   ROUTINE UA WITH MICROSCOPIC   URINE CULTURE AEROBIC BACTERIAL            Assessments & Plan (with Medical Decision Making)   Patient is a 38 year old female who presents to the ER due to 1 day of urinary tract infection symptoms. Patient developed urinary tract infection symptoms earlier today and went to an Urgent Care. She was prescribed Macrobid and pyridium. Patient says that she went home and took the medication but was still having cramping and still having hematuria so became concerned and came to the ER. By the time the patient came  here, she actually was feeling better. She felt like her pyridium had started working. She had no fever. She had no signs of a kidney stone in terms of the descriptions specified by her pain. She had mild, achy, low back pain but no sharp, intense pain that you get with a kidney stone. Patient also has no history of a kidney stone. Patient here had a UA that shows large amounts of WBCs and RBCs. This was consistent with a previous UA that was done earlier today. Due to the patient feeling dehydrated, she was given some IV hydration and an IV dose of ciprofloxacin. Patient will continue her oral antibiotics at home. She does have a mild leukocytosis of 12,000, likely due to her infection. Patient instructed to return to the ER if symptoms worsen.    This part of the document was transcribed by Onofre Squires, Medical Scribe.     I have reviewed the nursing notes.    I have reviewed the findings, diagnosis, plan and need for follow up with the patient.    New Prescriptions    No medications on file       Final diagnoses:   Urinary tract infection with hematuria, site unspecified   IMarylou, am serving as a trained medical scribe to document services personally performed by Fanta Enriquez MD, based on the provider's statements to me.   Fanat SEPULVEDA MD, was physically present and have reviewed and verified the accuracy of this note documented by Marylou Gibson.      7/22/2018   [facility], [place], EMERGENCY DEPARTMENT     Fanta Enriquez MD  07/23/18 0032

## 2018-07-23 NOTE — ED TRIAGE NOTES
Marie comes in with bright red blood per urine after being diagnosed with a UTI and started on macrobid, her first dose of antibiotics was taken today around 1600 and taking OTC pyridium.

## 2018-07-23 NOTE — TELEPHONE ENCOUNTER
Pt reports she took her first dose of abx at 4pm today.  She got home at 4:30pm and has been sitting on the toilet since, passing large blood clots, has seen two greater than the size of a dime.      Disposition:  ED.  Pt stated her understanding and had no further questions.     Reason for Disposition    Passing pure blood or large blood clots (i.e., size > a dime)  (Exceptions: flecks, small strands, or pinkish-red color)    Additional Information    Negative: Shock suspected (e.g., cold/pale/clammy skin, too weak to stand, low BP, rapid pulse)    Negative: Sounds like a life-threatening emergency to the triager    Negative: Urinary tract infection suspected, but not taking antibiotics    Negative: [1] Unable to urinate (or only a few drops) > 4 hours AND     [2] bladder feels very full (e.g., palpable bladder or strong urge to urinate)    Protocols used: URINARY TRACT INFECTION ON ANTIBIOTIC FOLLOW-UP CALL - FEMALE-ADULT-

## 2018-07-24 LAB
BACTERIA SPEC CULT: ABNORMAL
C TRACH DNA SPEC QL NAA+PROBE: NEGATIVE
N GONORRHOEA DNA SPEC QL NAA+PROBE: NEGATIVE
SPECIMEN SOURCE: ABNORMAL
SPECIMEN SOURCE: NORMAL
SPECIMEN SOURCE: NORMAL

## 2018-08-20 PROBLEM — M54.9 BACK PAIN: Status: RESOLVED | Noted: 2018-03-15 | Resolved: 2018-08-20

## 2018-08-20 NOTE — PROGRESS NOTES
Discharge Note    Progress reporting period is from initial eval to Mar 29, 2018.     Marie failed to return for next follow up visit and current status is unknown.  Please see information below for last relevant information on current status.  Patient seen for 3 visits.  SUBJECTIVE  Subjective changes noted by patient:  States her back is sore today. Had a 16 hour shift last night, and had a lot of heavy lifting. Also feels her R hip is out from standing in an ackward position.  .  Current pain level is 1/10.     Previous pain level was  3/10.   Changes in function:  Yes (See Goal flowsheet attached for changes in current functional level)  Adverse reaction to treatment or activity: None    OBJECTIVE  Changes noted in objective findings: R ant innominate rotation     ASSESSMENT/PLAN  Diagnosis: back pain (thoracic)   DIAGP:  The encounter diagnosis was Acute thoracic back pain, unspecified back pain laterality.  Updated problem list and treatment plan:   Pain - HEP  Impaired muscle performance - HEP  STG/LTGs have been met or progress has been made towards goals:  Yes, please see goal flowsheet for most current information  Assessment of Progress: current status is unknown.    Last current status: Pt is progressing as expected   Self Management Plans:  HEP  I have re-evaluated this patient and find that the nature, scope, duration and intensity of the therapy is appropriate for the medical condition of the patient.  Marie continues to require the following intervention to meet STG and LTG's:  HEP.    Recommendations:  Discharge with current home program.  Patient to follow up with MD as needed.    Please refer to the daily flowsheet for treatment today, total treatment time and time spent performing 1:1 timed codes.

## 2018-09-13 ASSESSMENT — ENCOUNTER SYMPTOMS
SLEEP DISTURBANCES DUE TO BREATHING: 0
LIGHT-HEADEDNESS: 0
HYPOTENSION: 0
PALPITATIONS: 1
HYPERTENSION: 1
ORTHOPNEA: 0
EXERCISE INTOLERANCE: 0
SYNCOPE: 0
LEG PAIN: 0

## 2018-09-17 ENCOUNTER — OFFICE VISIT (OUTPATIENT)
Dept: CARDIOLOGY | Facility: CLINIC | Age: 38
End: 2018-09-17
Attending: INTERNAL MEDICINE
Payer: COMMERCIAL

## 2018-09-17 ENCOUNTER — RADIANT APPOINTMENT (OUTPATIENT)
Dept: CARDIOLOGY | Facility: CLINIC | Age: 38
End: 2018-09-17
Payer: COMMERCIAL

## 2018-09-17 VITALS
HEART RATE: 81 BPM | HEIGHT: 64 IN | OXYGEN SATURATION: 97 % | WEIGHT: 190.4 LBS | DIASTOLIC BLOOD PRESSURE: 89 MMHG | BODY MASS INDEX: 32.5 KG/M2 | SYSTOLIC BLOOD PRESSURE: 129 MMHG

## 2018-09-17 DIAGNOSIS — Z82.49 FAMILY HISTORY OF HYPERTROPHIC CARDIOMYOPATHY: ICD-10-CM

## 2018-09-17 DIAGNOSIS — I42.2 HYPERTROPHIC CARDIOMYOPATHY (H): Primary | ICD-10-CM

## 2018-09-17 DIAGNOSIS — Z79.4 TYPE 2 DIABETES MELLITUS WITH DIABETIC NEPHROPATHY, WITH LONG-TERM CURRENT USE OF INSULIN (H): ICD-10-CM

## 2018-09-17 DIAGNOSIS — E11.21 TYPE 2 DIABETES MELLITUS WITH DIABETIC NEPHROPATHY, WITH LONG-TERM CURRENT USE OF INSULIN (H): ICD-10-CM

## 2018-09-17 PROCEDURE — 99214 OFFICE O/P EST MOD 30 MIN: CPT | Mod: GC | Performed by: INTERNAL MEDICINE

## 2018-09-17 PROCEDURE — 93228 REMOTE 30 DAY ECG REV/REPORT: CPT | Mod: ZP | Performed by: INTERNAL MEDICINE

## 2018-09-17 PROCEDURE — 93010 ELECTROCARDIOGRAM REPORT: CPT | Mod: ZP | Performed by: INTERNAL MEDICINE

## 2018-09-17 PROCEDURE — 93005 ELECTROCARDIOGRAM TRACING: CPT | Mod: ZF

## 2018-09-17 PROCEDURE — G0463 HOSPITAL OUTPT CLINIC VISIT: HCPCS | Mod: 25,ZF

## 2018-09-17 ASSESSMENT — PAIN SCALES - GENERAL: PAINLEVEL: NO PAIN (0)

## 2018-09-17 NOTE — NURSING NOTE
Cardiac Monitors: Patient was instructed regarding the indication, function, care and prompt return of a continuous loop monitor. The monitor was placed on the patient with instructions regarding care of the skin electrodes and monitor, as well as documentation in the patient diary. Patient demonstrated understanding of this information and agreed to call with further questions or concerns.  Med Reconcile: Reviewed and verified all current medications with the patient. The updated medication list was printed and given to the patient.  Return Appointment: Patient given instructions regarding scheduling next clinic visit. Patient demonstrated understanding of this information and agreed to call with further questions or concerns.  Patient stated she understood all health information given and agreed to call with further questions or concerns.

## 2018-09-17 NOTE — NURSING NOTE
Per Melanie Stockton patient to have 14 day cardionet monitor placed.  Diagnosis: Hypertrophic Cardiomyopathy  Monitor placed: Yes  Patient Instructed: Yes  Patient verbalized understanding: Yes  Holter # [id number]  KIT ID: [id number]    Placed by NOE Baumann

## 2018-09-17 NOTE — PROGRESS NOTES
Cardiology clinic follow up    HPI:    Ms. Vogel is a pleasant 38 y.o.woman with insulin requiring type 2 diabetes and a family history of hypertrophic cardiomyopathy. She has hypertension and hyperlipidemia.She has had ECHOs and MRI periodically during the last 23 years since her mother was diagnosed with HC.  Her most recent MRI on 8/8/2016 was suggestive of hypertropic cardiomyopathy, asymmetrical septal hypertrophy measuring 1.5 cm; systolic anterior motion of the mitral valve; late gadolinium enhancement imaging,with mild patchy hyperenhancement in the septal segments to suggest myocardial fibrosis.      She had treadmill stress test and Holter that did not demonstrate VT or hypotension. She was seen by our genetic counselor.     Interval History:  Pt reports she wears a FitBit, and she has had several times a week episodes of heart rates into 130s-140s. Usually this is related to when she forgets to take her atenolol and/or is not drinking enough fluids at work. She otherwise has no chest pain, shortness of breath, syncope, weight gain, LE edema, orthopnea, or PND. Pt has no other complaints at this time, but is wondering if there is anything else that can be done for her tachycardia.    ROS:  A complete 10-point ROS was negative except as above.     PAST MEDICAL HISTORY:  Past Medical History:   Diagnosis Date     Allergic rhinitis due to pollen      Atypical glandular cells on Pap smear 3/1108     Gastroesophageal reflux disease      PFO (patent foramen ovale)      Type II or unspecified type diabetes mellitus without mention of complication, not stated as uncontrolled 2002    onset was gestational in 2001       CURRENT MEDICATIONS:  Current Outpatient Prescriptions   Medication Sig Dispense Refill     aspirin 81 MG tablet Take 1 tablet (81 mg) by mouth daily 90 tablet 3     atenolol (TENORMIN) 25 MG tablet Take 0.5 tablets (12.5 mg) by mouth daily 90 tablet 3     atorvastatin (LIPITOR) 20 MG tablet Take 1  "tablet (20 mg) by mouth daily 90 tablet 3     blood glucose monitoring (PEPE CONTOUR NEXT) test strip Check 4 times/day 120 each 11     blood glucose monitoring (NO BRAND SPECIFIED) meter device kit Use to test blood sugar 6 times daily and as needed. 1 kit 0     cetirizine (ZYRTEC) 10 MG tablet Take 10 mg by mouth 2 times daily  90 tablet 3     cyclobenzaprine (FLEXERIL) 10 MG tablet Take 1 tablet (10 mg) by mouth nightly as needed for muscle spasms 20 tablet 0     EPINEPHrine 0.3 MG/0.3ML injection Inject 0.3 mLs (0.3 mg) into the muscle once as needed for anaphylaxis 0.3 mL 11     ergocalciferol (ERGOCALCIFEROL) 69351 UNITS capsule Take 1 capsule (50,000 Units) by mouth once a week 12 capsule 0     fluticasone (FLONASE) 50 MCG/ACT spray Spray 1-2 sprays into both nostrils daily 1 Bottle 0     ibuprofen (ADVIL) 200 MG tablet Take 200 mg by mouth every 4 hours as needed.       infusion set (PARADIGM QUICK-SET 23\" 9MM) misc pump supply Change every 3 days as directed 30 each 3     insulin aspart (NOVOLOG VIAL) 100 UNITS/ML injection With insulin pump. Uses about 80 units/day. 30 mL 3     insulin cartridge (PARADIGM 3ML) misc pump supply Change every 3 days1 applicator every 72 hours Change every 3 days 30 each 3     INSULIN PUMP - OUTPATIENT Updated 1/27/17:  [employer]tronic Minimed: Model 723  BASAL:  00:00: 1.0 units/hr  10:30: 0.95  14:00: 1.05  CARB RATIO:  00:00 1:7  1600  1:8  C. Factor:  27 mg/dL  BLOOD GLUCOSE TARGET and times:  12   AM (midnight):   Active Insulin Time:  3 hours  Basal to Bolus Ratio:   Sensor:  No  Carelink / Diasend username:  [email or web address]  Carelink / Diasend Password:  [email or web address]       Lancets (MICROLET) MISC 1 Device See Admin Instructions. Use up to 5 times daily for blood sugar checks. 100 each prn     losartan (COZAAR) 50 MG tablet TAKE ONE TABLET BY MOUTH EVERY MORNING AND TAKE ONE-HALF TABLET BY MOUTH EVERY EVENING 135 tablet 0     MAGNESIUM OXIDE PO Take 400 mg by mouth daily       " metFORMIN (GLUCOPHAGE-XR) 500 MG 24 hr tablet Take 2 tablets (1,000 mg) by mouth 2 times daily (with meals) 360 tablet 3     montelukast (SINGULAIR) 10 MG tablet Take 1 tablet (10 mg) by mouth At Bedtime 90 tablet 3     Multiple Vitamins-Minerals (MULTI VITAMIN/MINERALS PO) Take 1 each by mouth daily.       Omega-3 Fatty Acids (FISH OIL) 500 MG CAPS Take 1 capsule by mouth       omeprazole (PRILOSEC) 40 MG capsule TAKE ONE CAPSULE BY MOUTH EVERY DAY 90 capsule 2     order for DME Equipment being ordered: skin prep wipes for insulin pump 100 each 0     SUMAtriptan (IMITREX) 25 MG tablet Take 25-100mg one time. May repeat 25mg every two hours up to a maximum of 200mg in 24 hours. 8 tablet 6       PAST SURGICAL HISTORY:  Past Surgical History:   Procedure Laterality Date     C INDUCED ABORTN BY D&C           C IUD,MIRENA  8/10/10, 7/28/15     C/SECTION, LOW TRANSVERSE  6/25/10    , Low Transverse     CHOLECYSTECTOMY, LAPOROSCOPIC      Cholecystectomy, Laparoscopic     ESOPHAGOSCOPY, GASTROSCOPY, DUODENOSCOPY (EGD), COMBINED N/A 2016    Procedure: COMBINED ESOPHAGOSCOPY, GASTROSCOPY, DUODENOSCOPY (EGD), BIOPSY SINGLE OR MULTIPLE;  Surgeon: Fadi Hunter MD;  Location: [facility] ESOPHAGEAL MOTILITY STUDY N/A 2016    Procedure: ESOPHAGEAL MOTILITY STUDY;  Surgeon: Fadi Hunter MD;  Location: [facility] REMOVE TONSILS/ADENOIDS,<13 Y/O      T &A       ALLERGIES     Allergies   Allergen Reactions     Ivp Dye [Contrast Dye] Itching     Pt reports that throat itches     Nuts Anaphylaxis     [place] nuts only     Bactrim Swelling     Pt reaction was swelling in mouth and tongue and itchy mouth.  Resolved with benadryl and prednisone     Penicillins Hives     Cephalosporins Itching     Seasonal Allergies Other (See Comments)     Molds, trees, grass, dust..  Upper congestion     Atorvastatin      myalgias     Shellfish Allergy Rash     Clams only       FAMILY  HISTORY:  Family History   Problem Relation Age of Onset     Cardiovascular Mother      hypertrophic cardiomyopathy     Genitourinary Problems Mother      gallbladder disease     Diabetes Mother      drug induced     Other - See Comments Mother      heart transplant 11/23/2005     Diabetes Father      type 2     Hypertension Father      Genitourinary Problems Maternal Grandmother      gallbladder disease     Genitourinary Problems Paternal Grandmother      gallbladder disease     Hypertension Paternal Grandfather      high bp     Cerebrovascular Disease Paternal Grandfather      Cardiovascular Brother      hypertrophic cardiomyopathy     Diabetes Brother      type 2       SOCIAL HISTORY:  Social History     Social History     Marital status:      Spouse name: N/A     Number of children: 2     Years of education: 14     Occupational History     nurse      Social History Main Topics     Smoking status: Former Smoker     Types: Cigarettes     Quit date: 10/24/2005     Smokeless tobacco: Former User      Comment: States only smokes when drinks maybe 2x/year     Alcohol use No     Drug use: No     Sexual activity: Yes     Partners: Male     Birth control/ protection: IUD      Comment: Mirena IUD 7/28/15     Other Topics Concern     None     Social History Narrative    Caffeine intake/servings daily - 6-7    Calcium intake/servings daily - 2-3 yogurt, milk, cheese    Exercise 1 times weekly - describe aerobics    Sunscreen used - Yes    Seatbelts used - Yes    Guns stored in the home - No    Self Breast Exam - Yes    Pap test up to date -  Yes    Eye exam up to date -  Yes    Dental exam up to date -  Yes    DEXA scan up to date -  Not Applicable    Flex Sig/Colonoscopy up to date -  Not Applicable    Mammography up to date -  Not Applicable    Immunizations reviewed and up to date - Yes    Abuse: Current or Past (Physical, Sexual or Emotional) - No    Do you feel safe in your environment - Yes    Do you cope well  "with stress - Yes    Do you suffer from insomnia - Yes     Last updated by: Tatianna Lacey  5/9/2005           EXAM:  /89 (BP Location: Right arm, Patient Position: Chair, Cuff Size: Adult Regular)  Pulse 81  Ht 1.626 m (5' 4\")  Wt 86.4 kg (190 lb 6.4 oz)  SpO2 97%  BMI 32.68 kg/m2  General: alert, no acute distress  HEENT: NC/AT. EOMI  Neck: No adenopathy. No jugular venous distension.   Heart: RRR. Normal S1, S2. No murmur, rub, click, or gallop  Lungs: CTA.  No ronchi, wheezes, rales  Abdomen: Soft, nontender, nondistended. No organomegaly  Extremities: No clubbing, cyanosis, or edema, 2+ peripheral pulses  Neurologic: Alert and oriented, no focal deficits  Skin: No rash on exposed surfaces    Labs:  LIPID RESULTS:  Lab Results   Component Value Date    CHOL 184 06/15/2018    HDL 35 (L) 06/15/2018     (H) 06/15/2018    TRIG 189 (H) 06/15/2018    CHOLHDLRATIO 4.5 07/01/2015    NHDL 149 (H) 06/15/2018       LIVER ENZYME RESULTS:  Lab Results   Component Value Date    AST 27 10/24/2017    ALT 42 10/24/2017       CBC RESULTS:  Lab Results   Component Value Date    WBC 12.9 (H) 07/22/2018    RBC 4.13 07/22/2018    HGB 13.1 07/22/2018    HCT 38.7 07/22/2018    MCV 94 07/22/2018    MCH 31.7 07/22/2018    MCHC 33.9 07/22/2018    RDW 12.1 07/22/2018     07/22/2018       BMP RESULTS:  Lab Results   Component Value Date     07/22/2018    POTASSIUM 4.3 07/22/2018    CHLORIDE 106 07/22/2018    CO2 27 07/22/2018    ANIONGAP 6 07/22/2018     (H) 07/22/2018    BUN 16 07/22/2018    CR 0.92 07/22/2018    GFRESTIMATED 67 07/22/2018    GFRESTBLACK 81 07/22/2018    BILLY 9.0 07/22/2018        A1C RESULTS:  Lab Results   Component Value Date    A1C 7.7 (H) 06/15/2018       INR RESULTS:  Lab Results   Component Value Date    INR 1.07 11/15/2005       Cardiac data:  ECG today: NSR with no acute ST changes      CMR 8/18/16:  1.  Normal left ventricular size and systolic function with a calculated " ejection fraction of  64 %.  2.  Normal right ventricular size and systolic function with a calculated ejection fraction of 67%.    3.  Asymmetrical septal hypertrophy measuring 1.5 cm.    4.  There is systolic anterior motion of the mitral valve.    5.  On late gadolinium enhancement imaging, there is mild patchy hyperenhancement in the septal segments to suggest myocardial fibrosis.    6.  Overall this findings are suggestive of hypertropic cardiomyopathy     ECG May 2017  NSR no acute ST-T changes    Echo 6-25-15  There is borderline concentric left ventricular hypertrophy. The visual ejection fraction is estimated at 55-60%. Cannot exclude, but doubt small ASD.--Consider bubble study if concerned, but there is no right heart enlargement. The left atrium is borderline dilated.      Assessment and Plan:   Ms. Sun is a 38 y.o.woman with      1. HCM, CMR suggestive with asymmetrical septal hypertrophy of 15 mm  2. Family history of HCM  3. Insulin requiring DM, controlled  4. Obesity  5. Hiatal hernia, gastroparesis  6. Blood pressure controlled  7. Hypertension, treated  8. Hyperlipidemia, treated      It is very reassuring that Ms. Sun does not have any high risk features for SCD - septum is <30mm, no family history of SCD, personal history of syncope, VT, or hypotension with exercise.  - Order Cardionet monitor to assess for any episodes of VT or AFib  - Continue atenolol for tachycardia, and encouraged Pt to take this as prescribed, if tachycardia persists despite appropriate hydration and regular atenolol use, can consider switching to a different agent  - Continue ASA 81 mg daily and atorvastatin 20 mg daily    Follow-Up: Pending Cardionet monitor, otherwise 1 year if normal    Patient seen and discussed with Dr. Dewey.    Elaina Figueroa MD  Cardiology Fellow  [phone]        ATTENDING ATTESTATION:  This patient has been seen and examined by me September 17, 2018 with Elaina Figueroa MD,  cardiology fellow. I have reviewed the vitals, laboratory and imaging data relevant to this patient's care. I have edited this note to reflect our joint assessment and plan, and discussed the plan with the patient.    Marie returns for a follow-up.  She denies any presyncope syncope chest pain or shortness of breath.  She has had a few episodes of tachycardia primarily picked up by her Fitbit.  She does admit to some noncompliance with the atenolol.     She underwent an echocardiogram today in preparation for the visit which is notable for normal biventricular size and function.  Septal thickness about 15 mm unchanged from her MRI in 2016.  No evidence of LVOT gradient or ZIA.  Will plan on a CardioNet to assess for occult arrhythmia. Follow up after the testing. If abnormal will arrange for follow up sooner otherwise in 1 year.    Melanie Dewey MD, MS  Staff Cardiologist  Pager: [phone]      CC  Patient Care Team:  Janett Estrada MD as PCP - General (Internal Medicine)  Melanie Dewey MD as MD (Cardiology)  Calvin Milton MD as MD (Family Practice)  Rosmery Cross RD as Registered Dietitian  JANETT ESTRADA

## 2018-09-17 NOTE — MR AVS SNAPSHOT
After Visit Summary   9/17/2018    Marie Vogel    MRN: [id number]           Patient Information     Date Of Birth          1980        Visit Information        Provider Department      9/17/2018 10:30 AM Melanie Dewey MD [facility]        Today's Diagnoses     Hypertrophic cardiomyopathy (H)    -  1    Family history of hypertrophic cardiomyopathy        Type 2 diabetes mellitus with diabetic nephropathy, with long-term current use of insulin (H)          Care Instructions    Patient Instructions:  It was a pleasure to see you in the cardiology clinic today.      If you have any questions, call  Dahlia Kamara RN, at [phone].  Press Option #1 for the [facility], and then press Option #3 for nursing.  We are encouraging the use of Telestream to communicate with your HealthCare Provider    Note the new medications: none  Stop the following medications: none    The results from today include: pending results of telemetry  Please follow up with Dr. Melanie Dewey in one year    Keep yourself hydrated and maintain compliance with medications.      If you have an urgent need after hours (8:00 am to 4:30 pm) please call [phone] and ask for the cardiology fellow on call.            Follow-ups after your visit        Additional Services     Follow-Up with Cardiologist                 Future tests that were ordered for you today     Open Future Orders        Priority Expected Expires Ordered    Follow-Up with Cardiologist Routine 7/17/2019 9/17/2019 9/17/2018            Who to contact     If you have questions or need follow up information about today's clinic visit or your schedule please contact [facility] directly at [phone].  Normal or non-critical lab and imaging results will be communicated to you by MyChart, letter or phone within 4 business days after the clinic has received the results. If you do not hear from us within 7 days,  "please contact the clinic through [email or web address] or phone. If you have a critical or abnormal lab result, we will notify you by phone as soon as possible.  Submit refill requests through [email or web address] or call your pharmacy and they will forward the refill request to us. Please allow 3 business days for your refill to be completed.          Additional Information About Your Visit        [employer] Information     [email or web address] gives you secure access to your electronic health record. If you see a primary care provider, you can also send messages to your care team and make appointments. If you have questions, please call your primary care clinic.  If you do not have a primary care provider, please call [phone] and they will assist you.        Care EveryWhere ID     This is your Care EveryWhere ID. This could be used by other organizations to access your [facility] medical records  [id number]        Your Vitals Were     Pulse Height Pulse Oximetry BMI (Body Mass Index)          81 1.626 m (5' 4\") 97% 32.68 kg/m2         Blood Pressure from Last 3 Encounters:   09/17/18 129/89   07/22/18 113/68   07/22/18 128/80    Weight from Last 3 Encounters:   09/17/18 86.4 kg (190 lb 6.4 oz)   07/22/18 84.4 kg (186 lb)   06/19/18 87 kg (191 lb 12.8 oz)              We Performed the Following     EKG 12-lead, tracing only (Same Day)     Mobile Telemetry        Primary Care Provider Office Phone # Fax #    Alan Paredes [phone] [address] [place] [address]        Equal Access to Services     [facility] VIVIEN : Hadii aad ku hadasho Soomaali, waaxda luqadaha, qaybta kaalmada adeegyada, jr pickard. So [facility] [phone].    ATENCIÓN: Si habla español, tiene a woodruff disposición servicios gratuitos de asistencia lingüística. Llame al [phone].    We comply with applicable federal civil rights laws and [place] laws. We do not discriminate on the basis of race, color, national " origin, age, disability, sex, sexual orientation, or gender identity.            Thank you!     Thank you for choosing [facility]  for your care. Our goal is always to provide you with excellent care. Hearing back from our patients is one way we can continue to improve our services. Please take a few minutes to complete the written survey that you may receive in the mail after your visit with us. Thank you!             Your Updated Medication List - Protect others around you: Learn how to safely use, store and throw away your medicines at www.disposemymeds.org.          This list is accurate as of 9/17/18 11:59 PM.  Always use your most recent med list.                   Brand Name Dispense Instructions for use Diagnosis    ADVIL 200 MG tablet   Generic drug:  ibuprofen      Take 200 mg by mouth every 4 hours as needed.        aspirin 81 MG tablet     90 tablet    Take 1 tablet (81 mg) by mouth daily    PFO (patent foramen ovale)       atenolol 25 MG tablet    TENORMIN    90 tablet    Take 0.5 tablets (12.5 mg) by mouth daily        atorvastatin 20 MG tablet    LIPITOR    90 tablet    Take 1 tablet (20 mg) by mouth daily        blood glucose monitoring lancets     100 each    1 Device See Admin Instructions. Use up to 5 times daily for blood sugar checks.    Type 2 diabetes, HbA1c goal < 7% (H)       blood glucose monitoring meter device kit    no brand specified    1 kit    Use to test blood sugar 6 times daily and as needed.    Type 2 diabetes mellitus with diabetic nephropathy (H)       blood glucose monitoring test strip    PEPE CONTOUR NEXT    120 each    Check 4 times/day    Type 2 diabetes mellitus with diabetic nephropathy, with long-term current use of insulin (H)       cetirizine 10 MG tablet    zyrTEC    90 tablet    Take 10 mg by mouth 2 times daily        cyclobenzaprine 10 MG tablet    FLEXERIL    20 tablet    Take 1 tablet (10 mg) by mouth nightly as needed for muscle spasms    Low back pain  without sciatica, unspecified back pain laterality, unspecified chronicity       EPINEPHrine 0.3 MG/0.3ML injection 2-pack    EPIPEN/ADRENACLICK/or ANY BX GENERIC EQUIV    0.3 mL    Inject 0.3 mLs (0.3 mg) into the muscle once as needed for anaphylaxis    Nut allergy       ergocalciferol 93660 units capsule    ERGOCALCIFEROL    12 capsule    Take 1 capsule (50,000 Units) by mouth once a week    Vitamin D deficiency, Microalbuminuria       Fish Oil 500 MG Caps      Take 1 capsule by mouth        fluticasone 50 MCG/ACT spray    FLONASE    1 Bottle    Spray 1-2 sprays into both nostrils daily    Acute sinusitis with symptoms > 10 days       * infusion set misc pump supply     30 each    Change every 3 days as directed    Type 2 diabetes mellitus with diabetic nephropathy, with long-term current use of insulin (H)       * insulin cartridge misc pump supply     30 each    Change every 3 days1 applicator every 72 hours Change every 3 days    Type 2 diabetes mellitus with diabetic nephropathy, with long-term current use of insulin (H)       insulin aspart 100 UNITS/ML injection    NovoLOG VIAL    30 mL    With insulin pump. Uses about 80 units/day.    Type 2 diabetes mellitus with diabetic nephropathy, with long-term current use of insulin (H)       insulin pump infusion      Updated 1/27/17: [employer] MinimCliptone: Model 723 BASAL: 00:00: 1.0 units/hr 10:30: 0.95 14:00: 1.05 CARB RATIO: 00:00 1:7 1600  1:8 C. Factor: 27 mg/dL BLOOD GLUCOSE TARGET and times: 12   AM (midnight):  Active Insulin Time:  3 hours Basal to Bolus Ratio:  Sensor:  No Carelink / Diasend username:  [email or web address] Carelink / Diasend Password:  [email or web address]    Type 2 diabetes mellitus with diabetic nephropathy, with long-term current use of insulin (H)       losartan 50 MG tablet    COZAAR    135 tablet    TAKE ONE TABLET BY MOUTH EVERY MORNING AND TAKE ONE-HALF TABLET BY MOUTH EVERY EVENING    Microalbuminuria       MAGNESIUM OXIDE PO      Take 400 mg by  mouth daily        metFORMIN 500 MG 24 hr tablet    GLUCOPHAGE-XR    360 tablet    Take 2 tablets (1,000 mg) by mouth 2 times daily (with meals)    Type 2 diabetes mellitus with diabetic nephropathy, with long-term current use of insulin (H)       montelukast 10 MG tablet    SINGULAIR    90 tablet    Take 1 tablet (10 mg) by mouth At Bedtime    Allergic rhinitis       MULTI VITAMIN/MINERALS PO      Take 1 each by mouth daily.        omeprazole 40 MG capsule    priLOSEC    90 capsule    TAKE ONE CAPSULE BY MOUTH EVERY DAY    Gastroesophageal reflux disease without esophagitis, Hiatal hernia       order for DME     100 each    Equipment being ordered: skin prep wipes for insulin pump    Type 2 diabetes, HbA1c goal < 7% (H)       SUMAtriptan 25 MG tablet    IMITREX    8 tablet    Take 25-100mg one time. May repeat 25mg every two hours up to a maximum of 200mg in 24 hours.    Other migraine without status migrainosus, not intractable       * Notice:  This list has 2 medication(s) that are the same as other medications prescribed for you. Read the directions carefully, and ask your doctor or other care provider to review them with you.

## 2018-09-17 NOTE — NURSING NOTE
Chief Complaint   Patient presents with     Follow Up For     manage family history of hypertrophic cardiomyopathy     Vitals were taken and medications were reconciled. EKG was performed.    Aicha Walker MA    10:34 AM

## 2018-09-17 NOTE — LETTER
9/17/2018      RE: Marie Vogel  [address]  [place] [address]       Dear Colleague,    Thank you for the opportunity to participate in the care of your patient, Marie Vogel, at the [facility]. Please see a copy of my visit note below.    Cardiology clinic follow up    HPI:    Ms. Vogel is a pleasant 38 y.o.woman with insulin requiring type 2 diabetes and a family history of hypertrophic cardiomyopathy. She has hypertension and hyperlipidemia.She has had ECHOs and MRI periodically during the last 23 years since her mother was diagnosed with HC.  Her most recent MRI on 8/8/2016 was suggestive of hypertropic cardiomyopathy, asymmetrical septal hypertrophy measuring 1.5 cm; systolic anterior motion of the mitral valve; late gadolinium enhancement imaging,with mild patchy hyperenhancement in the septal segments to suggest myocardial fibrosis.      She had treadmill stress test and Holter that did not demonstrate VT or hypotension. She was seen by our genetic counselor.     Interval History:  Pt reports she wears a FitBit, and she has had several times a week episodes of heart rates into 130s-140s. Usually this is related to when she forgets to take her atenolol and/or is not drinking enough fluids at work. She otherwise has no chest pain, shortness of breath, syncope, weight gain, LE edema, orthopnea, or PND. Pt has no other complaints at this time, but is wondering if there is anything else that can be done for her tachycardia.    ROS:  A complete 10-point ROS was negative except as above.     PAST MEDICAL HISTORY:  Past Medical History:   Diagnosis Date     Allergic rhinitis due to pollen      Atypical glandular cells on Pap smear 3/1108     Gastroesophageal reflux disease      PFO (patent foramen ovale)      Type II or unspecified type diabetes mellitus without mention of complication, not stated as uncontrolled 2002     "onset was gestational in 2001       CURRENT MEDICATIONS:  Current Outpatient Prescriptions   Medication Sig Dispense Refill     aspirin 81 MG tablet Take 1 tablet (81 mg) by mouth daily 90 tablet 3     atenolol (TENORMIN) 25 MG tablet Take 0.5 tablets (12.5 mg) by mouth daily 90 tablet 3     atorvastatin (LIPITOR) 20 MG tablet Take 1 tablet (20 mg) by mouth daily 90 tablet 3     blood glucose monitoring (PEPE CONTOUR NEXT) test strip Check 4 times/day 120 each 11     blood glucose monitoring (NO BRAND SPECIFIED) meter device kit Use to test blood sugar 6 times daily and as needed. 1 kit 0     cetirizine (ZYRTEC) 10 MG tablet Take 10 mg by mouth 2 times daily  90 tablet 3     cyclobenzaprine (FLEXERIL) 10 MG tablet Take 1 tablet (10 mg) by mouth nightly as needed for muscle spasms 20 tablet 0     EPINEPHrine 0.3 MG/0.3ML injection Inject 0.3 mLs (0.3 mg) into the muscle once as needed for anaphylaxis 0.3 mL 11     ergocalciferol (ERGOCALCIFEROL) 76422 UNITS capsule Take 1 capsule (50,000 Units) by mouth once a week 12 capsule 0     fluticasone (FLONASE) 50 MCG/ACT spray Spray 1-2 sprays into both nostrils daily 1 Bottle 0     ibuprofen (ADVIL) 200 MG tablet Take 200 mg by mouth every 4 hours as needed.       infusion set (PARADIGM QUICK-SET 23\" 9MM) misc pump supply Change every 3 days as directed 30 each 3     insulin aspart (NOVOLOG VIAL) 100 UNITS/ML injection With insulin pump. Uses about 80 units/day. 30 mL 3     insulin cartridge (PARADIGM 3ML) misc pump supply Change every 3 days1 applicator every 72 hours Change every 3 days 30 each 3     INSULIN PUMP - OUTPATIENT Updated 1/27/17:  Medtronic Minimed: Model 723  BASAL:  00:00: 1.0 units/hr  10:30: 0.95  14:00: 1.05  CARB RATIO:  00:00 1:7  1600  1:8  C. Factor:  27 mg/dL  BLOOD GLUCOSE TARGET and times:  12   AM (midnight):   Active Insulin Time:  3 hours  Basal to Bolus Ratio:   Sensor:  No  Carelink / Diasend username:  [email or web address]  Carelink / Diasend " Password:  [email or web address]       Lancets (MICROLET) MISC 1 Device See Admin Instructions. Use up to 5 times daily for blood sugar checks. 100 each prn     losartan (COZAAR) 50 MG tablet TAKE ONE TABLET BY MOUTH EVERY MORNING AND TAKE ONE-HALF TABLET BY MOUTH EVERY EVENING 135 tablet 0     MAGNESIUM OXIDE PO Take 400 mg by mouth daily       metFORMIN (GLUCOPHAGE-XR) 500 MG 24 hr tablet Take 2 tablets (1,000 mg) by mouth 2 times daily (with meals) 360 tablet 3     montelukast (SINGULAIR) 10 MG tablet Take 1 tablet (10 mg) by mouth At Bedtime 90 tablet 3     Multiple Vitamins-Minerals (MULTI VITAMIN/MINERALS PO) Take 1 each by mouth daily.       Omega-3 Fatty Acids (FISH OIL) 500 MG CAPS Take 1 capsule by mouth       omeprazole (PRILOSEC) 40 MG capsule TAKE ONE CAPSULE BY MOUTH EVERY DAY 90 capsule 2     order for DME Equipment being ordered: skin prep wipes for insulin pump 100 each 0     SUMAtriptan (IMITREX) 25 MG tablet Take 25-100mg one time. May repeat 25mg every two hours up to a maximum of 200mg in 24 hours. 8 tablet 6       PAST SURGICAL HISTORY:  Past Surgical History:   Procedure Laterality Date     C INDUCED ABORTN BY D&C           C IUD,MIRENA  8/10/10, 7/28/15     C/SECTION, LOW TRANSVERSE  6/25/10    , Low Transverse     CHOLECYSTECTOMY, LAPOROSCOPIC      Cholecystectomy, Laparoscopic     ESOPHAGOSCOPY, GASTROSCOPY, DUODENOSCOPY (EGD), COMBINED N/A 2016    Procedure: COMBINED ESOPHAGOSCOPY, GASTROSCOPY, DUODENOSCOPY (EGD), BIOPSY SINGLE OR MULTIPLE;  Surgeon: Fadi Hunter MD;  Location:  GI      ESOPHAGEAL MOTILITY STUDY N/A 2016    Procedure: ESOPHAGEAL MOTILITY STUDY;  Surgeon: Fadi Hunter MD;  Location:  GI      REMOVE TONSILS/ADENOIDS,<11 Y/O      T &A       ALLERGIES     Allergies   Allergen Reactions     Ivp Dye [Contrast Dye] Itching     Pt reports that throat itches     Nuts Anaphylaxis     Mariola nuts only     Bactrim Swelling     Pt  reaction was swelling in mouth and tongue and itchy mouth.  Resolved with benadryl and prednisone     Penicillins Hives     Cephalosporins Itching     Seasonal Allergies Other (See Comments)     Molds, trees, grass, dust..  Upper congestion     Atorvastatin      myalgias     Shellfish Allergy Rash     Clams only       FAMILY HISTORY:  Family History   Problem Relation Age of Onset     Cardiovascular Mother      hypertrophic cardiomyopathy     Genitourinary Problems Mother      gallbladder disease     Diabetes Mother      drug induced     Other - See Comments Mother      heart transplant 11/23/2005     Diabetes Father      type 2     Hypertension Father      Genitourinary Problems Maternal Grandmother      gallbladder disease     Genitourinary Problems Paternal Grandmother      gallbladder disease     Hypertension Paternal Grandfather      high bp     Cerebrovascular Disease Paternal Grandfather      Cardiovascular Brother      hypertrophic cardiomyopathy     Diabetes Brother      type 2       SOCIAL HISTORY:  Social History     Social History     Marital status:      Spouse name: N/A     Number of children: 2     Years of education: 14     Occupational History     nurse      Social History Main Topics     Smoking status: Former Smoker     Types: Cigarettes     Quit date: 10/24/2005     Smokeless tobacco: Former User      Comment: States only smokes when drinks maybe 2x/year     Alcohol use No     Drug use: No     Sexual activity: Yes     Partners: Male     Birth control/ protection: IUD      Comment: Mirena IUD 7/28/15     Other Topics Concern     None     Social History Narrative    Caffeine intake/servings daily - 6-7    Calcium intake/servings daily - 2-3 yogurt, milk, cheese    Exercise 1 times weekly - describe aerobics    Sunscreen used - Yes    Seatbelts used - Yes    Guns stored in the home - No    Self Breast Exam - Yes    Pap test up to date -  Yes    Eye exam up to date -  Yes    Dental exam up to  "date -  Yes    DEXA scan up to date -  Not Applicable    Flex Sig/Colonoscopy up to date -  Not Applicable    Mammography up to date -  Not Applicable    Immunizations reviewed and up to date - Yes    Abuse: Current or Past (Physical, Sexual or Emotional) - No    Do you feel safe in your environment - Yes    Do you cope well with stress - Yes    Do you suffer from insomnia - Yes     Last updated by: Tatianna Lacey  5/9/2005           EXAM:  /89 (BP Location: Right arm, Patient Position: Chair, Cuff Size: Adult Regular)  Pulse 81  Ht 1.626 m (5' 4\")  Wt 86.4 kg (190 lb 6.4 oz)  SpO2 97%  BMI 32.68 kg/m2  General: alert, no acute distress  HEENT: NC/AT. EOMI  Neck: No adenopathy. No jugular venous distension.   Heart: RRR. Normal S1, S2. No murmur, rub, click, or gallop  Lungs: CTA.  No ronchi, wheezes, rales  Abdomen: Soft, nontender, nondistended. No organomegaly  Extremities: No clubbing, cyanosis, or edema, 2+ peripheral pulses  Neurologic: Alert and oriented, no focal deficits  Skin: No rash on exposed surfaces    Labs:  LIPID RESULTS:  Lab Results   Component Value Date    CHOL 184 06/15/2018    HDL 35 (L) 06/15/2018     (H) 06/15/2018    TRIG 189 (H) 06/15/2018    CHOLHDLRATIO 4.5 07/01/2015    NHDL 149 (H) 06/15/2018       LIVER ENZYME RESULTS:  Lab Results   Component Value Date    AST 27 10/24/2017    ALT 42 10/24/2017       CBC RESULTS:  Lab Results   Component Value Date    WBC 12.9 (H) 07/22/2018    RBC 4.13 07/22/2018    HGB 13.1 07/22/2018    HCT 38.7 07/22/2018    MCV 94 07/22/2018    MCH 31.7 07/22/2018    MCHC 33.9 07/22/2018    RDW 12.1 07/22/2018     07/22/2018       BMP RESULTS:  Lab Results   Component Value Date     07/22/2018    POTASSIUM 4.3 07/22/2018    CHLORIDE 106 07/22/2018    CO2 27 07/22/2018    ANIONGAP 6 07/22/2018     (H) 07/22/2018    BUN 16 07/22/2018    CR 0.92 07/22/2018    GFRESTIMATED 67 07/22/2018    GFRESTBLACK 81 07/22/2018    BILLY 9.0 " 07/22/2018        A1C RESULTS:  Lab Results   Component Value Date    A1C 7.7 (H) 06/15/2018       INR RESULTS:  Lab Results   Component Value Date    INR 1.07 11/15/2005       Cardiac data:  ECG today: NSR with no acute ST changes      CMR 8/18/16:  1.  Normal left ventricular size and systolic function with a calculated ejection fraction of  64 %.  2.  Normal right ventricular size and systolic function with a calculated ejection fraction of 67%.    3.  Asymmetrical septal hypertrophy measuring 1.5 cm.    4.  There is systolic anterior motion of the mitral valve.    5.  On late gadolinium enhancement imaging, there is mild patchy hyperenhancement in the septal segments to suggest myocardial fibrosis.    6.  Overall this findings are suggestive of hypertropic cardiomyopathy     ECG May 2017  NSR no acute ST-T changes    Echo 6-25-15  There is borderline concentric left ventricular hypertrophy. The visual ejection fraction is estimated at 55-60%. Cannot exclude, but doubt small ASD.--Consider bubble study if concerned, but there is no right heart enlargement. The left atrium is borderline dilated.      Assessment and Plan:   Ms. Sun is a 38 y.o.woman with      1. HCM, CMR suggestive with asymmetrical septal hypertrophy of 15 mm  2. Family history of HCM  3. Insulin requiring DM, controlled  4. Obesity  5. Hiatal hernia, gastroparesis  6. Blood pressure controlled  7. Hypertension, treated  8. Hyperlipidemia, treated      It is very reassuring that Ms. Sun does not have any high risk features for SCD - septum is <30mm, no family history of SCD, personal history of syncope, VT, or hypotension with exercise.  - Order Cardionet monitor to assess for any episodes of VT or AFib  - Continue atenolol for tachycardia, and encouraged Pt to take this as prescribed, if tachycardia persists despite appropriate hydration and regular atenolol use, can consider switching to a different agent  - Continue ASA 81 mg daily and  atorvastatin 20 mg daily    Follow-Up: Pending Cardionet monitor, otherwise 1 year if normal    Patient seen and discussed with Dr. Dewey.    Elaina Figueroa MD  Cardiology Fellow  [phone]        ATTENDING ATTESTATION:  This patient has been seen and examined by me September 17, 2018 with Elaina Figueroa MD, cardiology fellow. I have reviewed the vitals, laboratory and imaging data relevant to this patient's care. I have edited this note to reflect our joint assessment and plan, and discussed the plan with the patient.    Marie returns for a follow-up.  She denies any presyncope syncope chest pain or shortness of breath.  She has had a few episodes of tachycardia primarily picked up by her Fitbit.  She does admit to some noncompliance with the atenolol.     She underwent an echocardiogram today in preparation for the visit which is notable for normal biventricular size and function.  Septal thickness about 15 mm unchanged from her MRI in 2016.  No evidence of LVOT gradient or ZIA.  Will plan on a CardioNet to assess for occult arrhythmia. Follow up after the testing. If abnormal will arrange for follow up sooner otherwise in 1 year.      Melanie Dewey MD, MS  Staff Cardiologist  Pager: [phone]      CC  Patient Care Team:  Janett Estrada MD as PCP - General (Internal Medicine)  Melanie Dewey MD as MD (Cardiology)  Calvin Milton MD as MD (Family Practice)  Rosmery Cross RD as Registered Dietitian  JANETT ESTRADA

## 2018-09-17 NOTE — PATIENT INSTRUCTIONS
Patient Instructions:  It was a pleasure to see you in the cardiology clinic today.      If you have any questions, call  Dahlia Kamara RN, at [phone].  Press Option #1 for the [facility], and then press Option #3 for nursing.  We are encouraging the use of Teamistohart to communicate with your HealthCare Provider    Note the new medications: none  Stop the following medications: none    The results from today include: pending results of telemetry  Please follow up with Dr. Melanie Dewey in one year    Keep yourself hydrated and maintain compliance with medications.      If you have an urgent need after hours (8:00 am to 4:30 pm) please call [phone] and ask for the cardiology fellow on call.

## 2018-09-17 NOTE — NURSING NOTE
Patient presents today for resting echo ordered by MD.   Echo Tech provided patient education.    Echo technician completed resting echo. Data transferred to [facility] for final interpretation through [facility].   Patient education provided about cardiology interpretation and primary provider will be notified of results.    Laina Orantes RDCS

## 2018-09-18 LAB — INTERPRETATION ECG - MUSE: NORMAL

## 2018-09-27 DIAGNOSIS — E11.21 TYPE 2 DIABETES MELLITUS WITH DIABETIC NEPHROPATHY, WITH LONG-TERM CURRENT USE OF INSULIN (H): ICD-10-CM

## 2018-09-27 DIAGNOSIS — Z79.4 TYPE 2 DIABETES MELLITUS WITH DIABETIC NEPHROPATHY, WITH LONG-TERM CURRENT USE OF INSULIN (H): ICD-10-CM

## 2018-09-27 DIAGNOSIS — R80.9 MICROALBUMINURIA: ICD-10-CM

## 2018-09-27 DIAGNOSIS — E55.9 VITAMIN D DEFICIENCY: ICD-10-CM

## 2018-09-27 RX ORDER — INSULIN PUMP SYRINGE, 3 ML
EACH MISCELLANEOUS
Qty: 30 EACH | Refills: 0 | Status: SHIPPED | OUTPATIENT
Start: 2018-09-27 | End: 2019-03-11

## 2018-09-27 RX ORDER — INFUSION SET FOR INSULIN PUMP
INFUSION SETS-PARAPHERNALIA MISCELLANEOUS
Qty: 30 EACH | Refills: 0 | Status: SHIPPED | OUTPATIENT
Start: 2018-09-27 | End: 2019-03-11

## 2018-09-27 RX ORDER — INSULIN PUMP SYRINGE, 3 ML
EACH MISCELLANEOUS
Qty: 30 EACH | Refills: 3 | Status: CANCELLED | OUTPATIENT
Start: 2018-09-27

## 2018-09-27 NOTE — TELEPHONE ENCOUNTER
Patient calling for refill on Novolog and paradigm.  This is a duplicate request.     See refill request from today.

## 2018-09-27 NOTE — TELEPHONE ENCOUNTER
"Patient calling for refill on meds.  Asking for a 90 day supply for her Novolog which is 6 vials (60ml).  States she has a future appointment scheduled.    Next 5 appointments (look out 90 days)     Nov 12, 2018  3:00 PM CST   Return Visit with Zita Fuentes MD   [facility] [place] ([facility])    [address]  [address]  [place] [address] [phone]                  Requested Prescriptions   Pending Prescriptions Disp Refills     insulin cartridge (PARADIGM 3ML) misc pump supply [Pharmacy Med Name: PARADIGM PUMP RESERVOIR 3ML  MISC] 30 each 0     Sig: CHANGE EVERY 3 DAYS AS DIRECTED    There is no refill protocol information for this order        infusion set (PARADIGM QUICK-SET 23\" 9MM) misc pump supply [Pharmacy Med Name: PARADIGM QUICK-SET 23\" 9MM  MISC] 30 each 0     Sig: CHANGE EVERY 3 DAYS AS DIRECTED    There is no refill protocol information for this order        insulin aspart (NOVOLOG VIAL) 100 UNITS/ML injection 30 mL 3     Sig: With insulin pump. Uses about 80 units/day.    Short Acting Insulin Protocol Passed    9/27/2018  2:06 PM       Passed - Blood pressure less than 140/90 in past 6 months    BP Readings from Last 3 Encounters:   09/17/18 129/89   07/22/18 113/68   07/22/18 128/80                Passed - LDL on file in past 12 months    Recent Labs   Lab Test  06/15/18   0732   LDL  111*            Passed - Microalbumin on file in past 12 months    Recent Labs   Lab Test  10/24/17   1107   MICROL  1510   UMALCR  1170.54*            Passed - Serum creatinine on file in past 12 months    Recent Labs   Lab Test  07/22/18   2159   CR  0.92            Passed - HgbA1C in past 3 or 6 months    If HgbA1C is 8 or greater, it needs to be on file within the past 3 months.  If less than 8, must be on file within the past 6 months.     Recent Labs   Lab Test  06/15/18   0732   A1C  7.7*            Passed - Patient is age 18 or older       Passed - Recent (6 mo) or " "future (30 days) visit within the authorizing provider's specialty    Patient had office visit in the last 6 months or has a visit in the next 30 days with authorizing provider or within the authorizing provider's specialty.  See \"Patient Info\" tab in inbasket, or \"Choose Columns\" in Meds & Orders section of the refill encounter.             Novolog refilled x 1 (3 month supply) d/t future appointment scheduled.    Routing Paradigm refill requests to provider for review/approval because:  Drugs not on the [facility] refill protocol.    Please advise, thanks.              "

## 2018-09-27 NOTE — TELEPHONE ENCOUNTER
Lab Results   Component Value Date    A1C 7.7 06/15/2018    A1C 8.5 10/24/2017    A1C 7.2 04/17/2017    A1C 7.2 12/27/2016    A1C 7.1 09/27/2016     Lov:  06/19/18  Future:  11/12/18

## 2018-09-28 NOTE — TELEPHONE ENCOUNTER
Requested Prescriptions   Pending Prescriptions Disp Refills     ergocalciferol (ERGOCALCIFEROL) 51056 units capsule        Last Written Prescription Date:  2/19/2018  Last Fill Quantity: 12,   # refills: 0  Last Office Visit: 4/16/2018  Future Office visit:    Next 5 appointments (look out 90 days)     Nov 12, 2018  3:00 PM CST   Return Visit with Zita Fuentes MD   [facility] ([facility])    [address] [place] [address]  [address] [place] [id number] [phone]                   Routing refill request to provider for review/approval because:  Drug not on the [facility], [facility] or  Health refill protocol or controlled substance   12 capsule 0     Sig: Take 1 capsule (50,000 Units) by mouth once a week    There is no refill protocol information for this order

## 2018-09-28 NOTE — TELEPHONE ENCOUNTER
Routing refill request to provider for review/approval because:  Labs not current:  Vitamin D  Last Vit D level was 39 on 9/27/16.    Dominique Rao RN

## 2018-09-29 ENCOUNTER — HEALTH MAINTENANCE LETTER (OUTPATIENT)
Age: 38
End: 2018-09-29

## 2018-10-01 RX ORDER — ERGOCALCIFEROL 1.25 MG/1
50000 CAPSULE ORAL WEEKLY
Qty: 4 CAPSULE | Refills: 0 | Status: SHIPPED | OUTPATIENT
Start: 2018-10-01 | End: 2018-11-27

## 2018-10-01 NOTE — TELEPHONE ENCOUNTER
Rx sent.     has upcoming lab appointment and OV 11/2018  If vit D levels are in normal range then dose not need high dose replacement.

## 2018-11-05 ENCOUNTER — DOCUMENTATION ONLY (OUTPATIENT)
Dept: LAB | Facility: CLINIC | Age: 38
End: 2018-11-05

## 2018-11-05 ENCOUNTER — OFFICE VISIT (OUTPATIENT)
Dept: PODIATRY | Facility: CLINIC | Age: 38
End: 2018-11-05
Payer: COMMERCIAL

## 2018-11-05 ENCOUNTER — OFFICE VISIT (OUTPATIENT)
Dept: OPTOMETRY | Facility: CLINIC | Age: 38
End: 2018-11-05
Payer: COMMERCIAL

## 2018-11-05 ENCOUNTER — TELEPHONE (OUTPATIENT)
Dept: OPTOMETRY | Facility: CLINIC | Age: 38
End: 2018-11-05

## 2018-11-05 VITALS
WEIGHT: 190.4 LBS | HEIGHT: 64 IN | DIASTOLIC BLOOD PRESSURE: 72 MMHG | SYSTOLIC BLOOD PRESSURE: 124 MMHG | BODY MASS INDEX: 32.5 KG/M2

## 2018-11-05 DIAGNOSIS — M72.2 PLANTAR FASCIITIS: ICD-10-CM

## 2018-11-05 DIAGNOSIS — Z79.4 TYPE 2 DIABETES MELLITUS WITH DIABETIC NEPHROPATHY, WITH LONG-TERM CURRENT USE OF INSULIN (H): Primary | ICD-10-CM

## 2018-11-05 DIAGNOSIS — H52.203 MYOPIA WITH ASTIGMATISM, BILATERAL: ICD-10-CM

## 2018-11-05 DIAGNOSIS — Z79.4 TYPE 2 DIABETES MELLITUS WITH DIABETIC NEPHROPATHY, WITH LONG-TERM CURRENT USE OF INSULIN (H): ICD-10-CM

## 2018-11-05 DIAGNOSIS — E11.21 TYPE 2 DIABETES MELLITUS WITH DIABETIC NEPHROPATHY, WITH LONG-TERM CURRENT USE OF INSULIN (H): ICD-10-CM

## 2018-11-05 DIAGNOSIS — E11.21 TYPE 2 DIABETES MELLITUS WITH DIABETIC NEPHROPATHY, WITH LONG-TERM CURRENT USE OF INSULIN (H): Primary | ICD-10-CM

## 2018-11-05 DIAGNOSIS — H31.013: ICD-10-CM

## 2018-11-05 DIAGNOSIS — E11.40 TYPE 2 DIABETES MELLITUS WITH SENSORY NEUROPATHY (H): Primary | ICD-10-CM

## 2018-11-05 DIAGNOSIS — H52.13 MYOPIA WITH ASTIGMATISM, BILATERAL: ICD-10-CM

## 2018-11-05 DIAGNOSIS — E11.9 TYPE 2 DIABETES MELLITUS WITHOUT RETINOPATHY (H): Primary | ICD-10-CM

## 2018-11-05 LAB
DEPRECATED CALCIDIOL+CALCIFEROL SERPL-MC: 33 UG/L (ref 20–75)
HBA1C MFR BLD: 8.8 % (ref 0–5.6)

## 2018-11-05 PROCEDURE — 92014 COMPRE OPH EXAM EST PT 1/>: CPT | Performed by: OPTOMETRIST

## 2018-11-05 PROCEDURE — 92015 DETERMINE REFRACTIVE STATE: CPT | Performed by: OPTOMETRIST

## 2018-11-05 PROCEDURE — 82306 VITAMIN D 25 HYDROXY: CPT | Performed by: INTERNAL MEDICINE

## 2018-11-05 PROCEDURE — 99204 OFFICE O/P NEW MOD 45 MIN: CPT | Performed by: PODIATRIST

## 2018-11-05 PROCEDURE — 82043 UR ALBUMIN QUANTITATIVE: CPT | Performed by: INTERNAL MEDICINE

## 2018-11-05 PROCEDURE — 92250 FUNDUS PHOTOGRAPHY W/I&R: CPT | Performed by: OPTOMETRIST

## 2018-11-05 PROCEDURE — 80076 HEPATIC FUNCTION PANEL: CPT | Performed by: INTERNAL MEDICINE

## 2018-11-05 PROCEDURE — 83036 HEMOGLOBIN GLYCOSYLATED A1C: CPT | Performed by: INTERNAL MEDICINE

## 2018-11-05 PROCEDURE — 36415 COLL VENOUS BLD VENIPUNCTURE: CPT | Performed by: INTERNAL MEDICINE

## 2018-11-05 ASSESSMENT — REFRACTION_MANIFEST
OS_SPHERE: -4.00
OS_AXIS: 052
OS_AXIS: 045
OD_CYLINDER: +2.25
OD_AXIS: 106
OD_CYLINDER: +2.00
OS_CYLINDER: +2.25
OS_CYLINDER: +2.25
OD_AXIS: 097
OD_SPHERE: -4.50
OD_SPHERE: -4.25
METHOD_AUTOREFRACTION: 1
OS_SPHERE: -4.00

## 2018-11-05 ASSESSMENT — REFRACTION_CURRENTRX
OD_BASECURVE: 8.7
OS_SPHERE: -1.75
OS_CYLINDER: -2.25
OS_BRAND: AIR OPTIX FOR ASTIGMATISM
OD_BRAND: AIR OPTIX FOR ASTIGMATISM
OD_SPHERE: -2.00
OS_BASECURVE: 8.7
OD_DIAMETER: 14.5
OD_AXIS: 020
OS_AXIS: 140
OD_CYLINDER: -2.25
OS_DIAMETER: 14.5

## 2018-11-05 ASSESSMENT — REFRACTION_WEARINGRX
OD_CYLINDER: +1.00
OS_CYLINDER: +2.00
OD_SPHERE: -4.75
OS_SPHERE: -3.75
OD_AXIS: 105
OS_AXIS: 060
SPECS_TYPE: SVL

## 2018-11-05 ASSESSMENT — VISUAL ACUITY
OS_SC: 20/400
OD_CC: 20/20
OD_CC: 20/20
OS_CC+: -1
OS_CC: 20/20
METHOD: SNELLEN - LINEAR
OD_CC+: -1
OS_CC: 20/20
CORRECTION_TYPE: GLASSES
OD_SC: 20/400

## 2018-11-05 ASSESSMENT — CUP TO DISC RATIO
OD_RATIO: 0.5
OS_RATIO: 0.45

## 2018-11-05 ASSESSMENT — KERATOMETRY
OS_K2POWER_DIOPTERS: 18.25
OS_AXISANGLE_DEGREES: 61
OD_AXISANGLE_DEGREES: 90
OD_K2POWER_DIOPTERS: 48.50
OS_K1POWER_DIOPTERS: 45.62
OD_AXISANGLE2_DEGREES: 180
OS_AXISANGLE2_DEGREES: 151
OD_K1POWER_DIOPTERS: 45.62

## 2018-11-05 ASSESSMENT — CONF VISUAL FIELD
OD_NORMAL: 1
OS_NORMAL: 1
METHOD: COUNTING FINGERS

## 2018-11-05 ASSESSMENT — EXTERNAL EXAM - RIGHT EYE: OD_EXAM: NORMAL

## 2018-11-05 ASSESSMENT — TONOMETRY
IOP_METHOD: APPLANATION
OS_IOP_MMHG: 17
OD_IOP_MMHG: 17

## 2018-11-05 ASSESSMENT — SLIT LAMP EXAM - LIDS
COMMENTS: NORMAL
COMMENTS: NORMAL

## 2018-11-05 ASSESSMENT — EXTERNAL EXAM - LEFT EYE: OS_EXAM: NORMAL

## 2018-11-05 NOTE — MR AVS SNAPSHOT
After Visit Summary   11/5/2018    Marie Vogel    MRN: [id number]           Patient Information     Date Of Birth          1980        Visit Information        Provider Department      11/5/2018 8:45 AM Jamal Liriano DPM [facility]        Today's Diagnoses     Type 2 diabetes mellitus with sensory neuropathy (H)    -  1    Plantar fasciitis          Care Instructions    Thank you for choosing [facility]!    DR. LIRIANO'S CLINIC LOCATIONS:   MONDAY - [place] TUESDAY - [place]   [address]   [place] [address] [place] [address]   [phone]       THURSDAY AM - [place] THURSDAY PM - [place]   [address]   [place] [address] [place], [place] [id number] [phone]       FRIDAY AM - [place] SET UP SURGERY: [phone] [address] APPOINTMENTS: [phone]   [place] [address] BILLING QUESTIONS: [phone] FAX NUMBER: [phone]     Follow Up: 6 wks    [facility] LOCATIONS  [facility]  [address]  [place] [address]  Phone: [phone]  Fax: [phone] [facility]  [address]  [place] [address]  Phone: [phone]   Fax: [phone]   [facility] [place]  [address]  [place] [address]  Phone: [phone]  Fax: [phone] [facility]  [address]  [place] [address]  Phone: [phone]   Fax: [phone]   [facility]   [address]  [place] [address]  Phone: [phone]   Fax: [phone] * Please call any location listed to make an appointment for a casting/fitting. Your referral was sent to their central office and they will all have the order on file.     PLANTAR FASCIITIS    Plantar fasciitis is often referred to as  heel spurs or heel pain. Plantar fasciitis is a very common problem that affects people of all foot shapes, age, weight and activity level. Pain may be in the arch or on the weight-bearing surface of the heel. The pain may come on without injury or identifiable cause. Pain is generally present when first getting out of bed in the morning or up from a seated break.     CAUSES  The plantar fascia is a dense fibrous band of tissue that stretches across the bottom surface of the foot. The fascia helps support the foot muscles and arch. Plantar fasciitis is thought to be caused by mechanical strain or overload. Frequent walking without shoes or wearing unsupportive shoes is thought to cause structural overload and ultimately inflammation of the plantar fascia. Some people have heel spurs that can be seen on x-ray. The heel spur is actually a minor component of plantar fascitis and is largely ignored.       SELF TREATMENT   The easiest solution is to stop walking around your home without shoes. Plantar fasciitis is largely a shoe problem. Shoes are either not being worn often enough or your current shoes are inadequate for your weight, foot structure or activity level. The majority of shoes on the market today are not sufficient to resist development of plantar fasciitis or to promote healing. Assume that your current shoes are inadequate and will need to be replaced. Even high quality shoes wear out with 6 months to one year of frequent use. Weight loss is another option. Losing ten pounds in the next two months may be enough to resolve the problem. Ice applied to the area of pain two to three times per day for ten minutes each session can be very helpful. This should continue until the problem resolves. Achilles tendon stretching is essential. Stretch multiple times daily to promote healing and to prevent recurrence in the future.     MEDICAL TREATMENT  Medical treatments often include custom arch supports, cortisone  injections, physical therapy, splints to be worn in bed, prescription medications and surgery. The home treatments listed above will be necessary regardless of these advanced medical treatments. Surgery is rarely needed but is very helpful in selected cases.     PROGNOSIS  Plantar fasciitis can last from one day to a lifetime. Some people get intermittent fascitis that is very short-lived. Others suffer daily for years. Excessive body weight, frequent bare foot walking, long hours on the feet, inadequate shoes, predisposing foot structures and excessive activity such as running are all potential issues that lead to chronic and/or recurring plantar fascitis. Having plantar fasciitis means that you are forever prone to this problem and will require modification of some of the above factors. Most people seek treatment within one to four months. Healing usually requires a similar one to four month time frame. Healing time is relative to the amount of effort spent treating the problem.   Plantar fasciitis is highly recurrent. Risk factors often continue, including return to bare foot walking, inadequate shoes, excessive body weight, excessive activities, etc. Your life style and foot structure may predispose you to recurrent plantar fasciitis. A daily prevention regimen can be very helpful. Ongoing use of shoe inserts, careful attention to appropriate shoes, daily Achilles stretching, etc. may prevent recurrence. Prompt attention at the earliest warning signs of heel pain can resolve the problem in as short as a few days.     EXERCISES    Stair Exercise: Step on the stairs with the ball of your foot and hold your position for at least 15 seconds, then slowly step down with the heels of your foot. You can do this daily and as often as you want.   Picking the Towel: Sit comfortably and then pick the towel up with your toes. You can use any object other than a towel as long as the material can be soft and you can pick it up  with your toes.  Rolling the Bottle: Use a small ball or frozen water bottle and then roll it around with your foot.   Flex the Toes: Sit comfortably and then flex your toes by pointing it towards the floor or towards your body. This will relax and flex your foot and exercise your plantar fascia, the calf, and the Achilles tendon. The inability of the foot to stretch often causes the bunching up of the plantar fascia area leading to the pain.  Calf/Achilles Stretching: Lay on you back and raise one foot, then point your toes towards the floor. See photo below:               Hold each stretch for 10 seconds. Stretch 10 times per set, three sets per day. Morning, afternoon and evening. If your heel pain is very severe in the morning, consider doing the first set of stretches before you get out of bed.    THERAPIES COVERED:  1.  Supportive Shoes: minimizing barefoot ambulation helps to provide cushion, padding and support to the ligament that is inflamed. Socks, flip flops, flats and some slippers are not typically sufficient to provide support. Shoes should be worn even indoors  2.  Insert/Orthotics: ones with an arch support built in to them provide further stress relief for the ligament. See the information below on recommended inserts.  3. Icing: using a frozen water bottle or orange, and rolling it along the bottom of the arch/heel can help to alleviate discomfort, and can act as a tissue massage to the painful, inflamed ligament.  There is evidence that shows icing at least three times daily can be beneficial  4.  Antiinflammatory (NSAID): Ibuprofen, Aleve, as well as Tylenol can be used to help decrease symptoms and improve pain levels. If you have high blood pressure, heart disease, stomach or kidney problems, use antiinflammatories sparingly. Tylenol should not be used if you have liver problems.   5. Activity Modifications: if there are certain things that you do, whether it's going barefoot or certain  "shoes/activities, you should try to minimize those activities as much as possible until your symptoms are sufficiently resolved. Certainly, some activities, such as running on the treadmill, are easier to take a break from versus others, such as work or chores at home. If there are certain activities that hurt your heel, and you keep doing those activities that hurt your heel, your heel will keep hurting.  **If these initial therapies are insufficient, we have our tier 2 therapies that can more aggressively work to improve your symptoms and get you back to the activities that you enjoy!    DIABETES AND YOUR FEET  Diabetes can result in several problems in the feet including ulcers (open sores) and amputations. Two of the most important reasons why people develop foot problems when they have diabetes is : 1. Neuropathy (loss of feeling)  2. Vascular disease (loss or decrease of blood flow).    Neuropathy is a term used to describe a loss of nerve function.  Patients with diabetes are at risk of developing neuropathy if their sugars continue to run high and are above the normal value. One theory for neuropathy is that the \"extra\" sugar in the body enters the nerves and is broken down. These by-products build up in the nerve causing it to swell and impairing nerve function. Often times, this can be prevented by controlling your sugars, dieting and exercise.    When a person develops neuropathy, they usually begin to feel numbness or tingling in their feet and sometime in their legs.  Other symptoms may include painful burning or hot feet, tingling or feeling like insects or ants are crawling on your feet or legs.  If the diabetes is sever and the sugars run high for long periods of time, neuropathy can also occur in the hands.    Vascular disease  is a term used to describe a loss or decrease in circulation (blood flow). There is a problem in getting blood and oxygen to areas that need it. Similar to neuropathy, sugars " can build up in the walls of the arteries (blood vessels) and cause them to become swollen, thickened and hardened. This decreases the amount of blood that can go to an area that needs it. Though this is common in the legs of diabetic patients, it can also affect other arteries (blood vessels) in the body such as in the heart and eyes.    In the legs, vascular disease usually results in cramping. Patients who develop leg cramps after walking the same distance every time (i.e. One block, half a mile, ect.) need to let their doctors know so that their circulation may be checked. Cramps causing severe pain in the feet and/or legs while sleeping and the cramps go away when you stand or hang your legs off the side of the bed, may also be a sign of poor blood circulation.  Occasional cramping in cold weather or on rare occasions with activity may not be due to poor circulation, but you should inform your doctor.    PREVENTION OF THESE DISEASES  The key to prevention is good blood sugar control. Poor blood sugar control is a big reason many of these problems start. Physical activity (exercise) is a very good way to help decrease your blood sugars. Exercise can lower your blood sugar, blood pressure, and cholesterol. It also reduces your risk for heart disease and stroke, relieves stress, and strengthens your heart, muscles and bones.  In addition, regular activity helps insulin work better, improves your blood circulation, and keeps your joints flexible. If you're trying to lose weight, a combination of exercise and wise food choices can help you reach your target weight and maintain it.      PAIN MANAGEMENT  1.Blood Sugar Control - Most important  2. Medications such as:  Amytriptylline, duloxetine, gabapentin, lyrica, tramadol  3. Nutritional therapy:  Vitamin B6 (100mg daily), Vitamin B12 (75mcg daily), Vitamin D 2000 IU daily), Alpha-Lipoic Acid (600-1800mg daily), Acetyl-L-Carnitine (500-1000mg TID, L-methyl folate  (1500mcg daily)    ** Metformin can block Vitamin B6 and B12 so it is important to supplement**    FOOT CARE RECOMMENDATIONS   1. Wash your feet with lukewarm water and a mild soap and then dry them thoroughly, especially between the toes.     2. Examine your feet daily looking for cuts, corns, blisters, cracks, ect, especially after wearing new shoes. Make sure to look between your toes. If you cannot see the bottom of your feet, set a mirror on the floor and hold your foot over it, or ask a spouse, friend or family member to examine your feet for you. Contact your doctor immediately if new problems are noted or if sores are not healing.     3. Immediately apply moisturizer to the tops and bottoms of your feet, avoiding areas between the toes. Hand lotion (Intesive Care, Janiya, Eucerin, Neutrogena, Curel, ect) is sufficient unless your doctor prescribes a medicated lotion. Apply sunscreen to your feet when going swimming outside.     4. Use clean comfortable shoes, wear white socks (if you have any bleeding or drainage, you will see it on white socks). Socks should not have thick seams or cut off the circulation around the leg. Break in new shoes slowly and rotate with older shoes until broken in. Check the inside of your shoes with your hand to look for areas of irritation or objects that may have fallen into your shoes.       5. Keep slippers by the side of your bed for use during the night.     6.  Shoes should be fitted by a professional and should not cause areas of irritation.  Check your feet regularly when wearing a new pair of shoes and replace them as needed.     7.  Talk to your doctor about proper exercise. Exercise and stretching stimulate blood flow to your feet and maintain proper glucose levels.     8.  Monitor your blood glucose level as instructed by your doctor. Notify your doctor immediately if your blood sugar is abnormally high or low.    9. Cut your nails straight across, but then gently  round any sharp edges with a cardboard nail file. If you have neuropathy, peripheral vascular disease or cannot see that well to trim your own toenails contact [facility] ([phone]) or [facility] ([phone]).      THINGS TO AVOID DOING   1.  Do not soak your feet if you have an open sore. Use only lukewarm water and always check the temperature with your hand as hot water can easily burn your feet.       2.  Never use a hot water bottle or heating pad on your feet. Also do not apply cold compresses to your feet. With decreased sensation, you could burn or freeze your feet.       3.  Do not apply any of these to your feet:    -  Over the counter medicine for corns or warts    -  Harsh chemicals like boric acid    -  Do not self-treat corns, cuts, blisters or infections. Always consult your doctor.       4.  Do not wear sandals, slippers or walk barefoot, especially on hot sand or concrete or other harsh surfaces.     5.  If you smoke, stop!!!            Body Mass Index (BMI)  Many things can cause foot and ankle problems. Foot structure, activity level, foot mechanics and injuries are common causes of pain. One very important issue that often goes unmentioned, is body weight. Extra weight can cause increased stress on muscles, ligaments, bones and tendons. Sometimes just a few extra pounds is all it takes to put one over her/his threshold. Without reducing that stress, it can be difficult to alleviate pain. Some people are uncomfortable addressing this issue, but we feel it is important for you to think about it. As Foot &  Ankle specialists, our job is addressing the lower extremity problem and possible causes. Regarding extra body weight, we encourage patients to discuss diet and weight management plans with their primary care doctors. It is this team approach that gives you the best opportunity for pain relief and getting you back on your feet.              Follow-ups after your visit        Additional  Services     ORTHOTICS REFERRAL       Please be aware that coverage of these services is subject to the terms and limitations of your health insurance plan.  Call member services at your health plan with any benefit or coverage questions.      Please bring the following to your appointment:    >>   Any x-rays, CTs or MRIs which have been performed.  Contact the facility where they were done to arrange for  prior to your scheduled appointment.  Any new CT, MRI or other procedures ordered by your specialist must be performed at a [facility] facility or coordinated by your clinic's referral office.    >>   List of current medications   >>   This referral request   >>   Any documents/labs given to you for this referral    ==This Referral PRINTS in the [facility] (ORTHOTICS & PROSTHETICS) Central scheduling office ==     The [facility] Central Scheduling staff will contact patient to arrange appointments. Central Scheduling Phone #:  [place]  [phone]     Orthotics: Foot Orthotics - extra-depth accommodative orthotics.                  Your next 10 appointments already scheduled     Nov 05, 2018 10:00 AM CST   [facility] New with Gabriela Becker OD   [facility]    [address] [place] [address]  [address]  [facility] [address]   [phone]            Nov 12, 2018  3:00 PM CST   Return Visit with Zita Fuentes MD   [facility])    [address] [place] [address]  [address]  [facility] [address]   [phone]            Nov 27, 2018  1:45 PM CST   Min OB-GYN Annual Physical with Maldonado Bruner MD   [facility])    [address] [place] [address]  [address]  [facility] [address]   [phone]              Who to contact     If you have questions or need follow up information about today's clinic visit or your schedule please contact [facility]  "[facility] directly at [phone].  Normal or non-critical lab and imaging results will be communicated to you by MyChart, letter or phone within 4 business days after the clinic has received the results. If you do not hear from us within 7 days, please contact the clinic through [employer] or phone. If you have a critical or abnormal lab result, we will notify you by phone as soon as possible.  Submit refill requests through [employer] or call your pharmacy and they will forward the refill request to us. Please allow 3 business days for your refill to be completed.          Additional Information About Your Visit        [employer] Information     [employer] gives you secure access to your electronic health record. If you see a primary care provider, you can also send messages to your care team and make appointments. If you have questions, please call your primary care clinic.  If you do not have a primary care provider, please call [phone] and they will assist you.        Care EveryWhere ID     This is your Care EveryWhere ID. This could be used by other organizations to access your [facility] medical records  [id number]        Your Vitals Were     Height BMI (Body Mass Index)                5' 4\" (1.626 m) 32.68 kg/m2           Blood Pressure from Last 3 Encounters:   11/05/18 124/72   09/17/18 129/89   07/22/18 113/68    Weight from Last 3 Encounters:   11/05/18 190 lb 6.4 oz (86.4 kg)   09/17/18 190 lb 6.4 oz (86.4 kg)   07/22/18 186 lb (84.4 kg)              We Performed the Following     ORTHOTICS REFERRAL        Primary Care Provider Office Phone # Fax #    Alan Paredes [phone] [address] [place] [address]        Equal Access to Services     [facility] AH: Hadii carl lofton Sodelicia, waaxda luqadaha, qaybta kaalmada brittayada, jr pickard. So [facility] [phone].    ATENCIÓN: Si habla español, tiene a woodruff disposición servicios gratuitos de " asistencia lingüística. Tiffanie al [phone].    We comply with applicable federal civil rights laws and [place] laws. We do not discriminate on the basis of race, color, national origin, age, disability, sex, sexual orientation, or gender identity.            Thank you!     Thank you for choosing [facility]  for your care. Our goal is always to provide you with excellent care. Hearing back from our patients is one way we can continue to improve our services. Please take a few minutes to complete the written survey that you may receive in the mail after your visit with us. Thank you!             Your Updated Medication List - Protect others around you: Learn how to safely use, store and throw away your medicines at www.disposemymeds.org.          This list is accurate as of 11/5/18  8:49 AM.  Always use your most recent med list.                   Brand Name Dispense Instructions for use Diagnosis    ADVIL 200 MG tablet   Generic drug:  ibuprofen      Take 200 mg by mouth every 4 hours as needed.        aspirin 81 MG tablet     90 tablet    Take 1 tablet (81 mg) by mouth daily    PFO (patent foramen ovale)       atenolol 25 MG tablet    TENORMIN    90 tablet    Take 0.5 tablets (12.5 mg) by mouth daily        atorvastatin 20 MG tablet    LIPITOR    90 tablet    Take 1 tablet (20 mg) by mouth daily        blood glucose monitoring lancets     100 each    1 Device See Admin Instructions. Use up to 5 times daily for blood sugar checks.    Type 2 diabetes, HbA1c goal < 7% (H)       blood glucose monitoring meter device kit    no brand specified    1 kit    Use to test blood sugar 6 times daily and as needed.    Type 2 diabetes mellitus with diabetic nephropathy (H)       blood glucose monitoring test strip    PEPE CONTOUR NEXT    120 each    Check 4 times/day    Type 2 diabetes mellitus with diabetic nephropathy, with long-term current use of insulin (H)       cetirizine 10 MG tablet    zyrTEC    90  tablet    Take 10 mg by mouth 2 times daily        cyclobenzaprine 10 MG tablet    FLEXERIL    20 tablet    Take 1 tablet (10 mg) by mouth nightly as needed for muscle spasms    Low back pain without sciatica, unspecified back pain laterality, unspecified chronicity       EPINEPHrine 0.3 MG/0.3ML injection 2-pack    EPIPEN/ADRENACLICK/or ANY BX GENERIC EQUIV    0.3 mL    Inject 0.3 mLs (0.3 mg) into the muscle once as needed for anaphylaxis    Nut allergy       ergocalciferol 71492 units capsule    ERGOCALCIFEROL    4 capsule    Take 1 capsule (50,000 Units) by mouth once a week    Vitamin D deficiency, Microalbuminuria       Fish Oil 500 MG Caps      Take 1 capsule by mouth        fluticasone 50 MCG/ACT spray    FLONASE    1 Bottle    Spray 1-2 sprays into both nostrils daily    Acute sinusitis with symptoms > 10 days       insulin aspart 100 UNITS/ML injection    NovoLOG VIAL    90 mL    With insulin pump. Uses about 80 units/day.    Type 2 diabetes mellitus with diabetic nephropathy, with long-term current use of insulin (H)       * insulin cartridge misc pump supply     30 each    CHANGE EVERY 3 DAYS AS DIRECTED    Type 2 diabetes mellitus with diabetic nephropathy, with long-term current use of insulin (H)       * infusion set misc pump supply     30 each    CHANGE EVERY 3 DAYS AS DIRECTED    Type 2 diabetes mellitus with diabetic nephropathy, with long-term current use of insulin (H)       insulin pump infusion      Updated 1/27/17: [employer]: Model 723 BASAL: 00:00: 1.0 units/hr 10:30: 0.95 14:00: 1.05 CARB RATIO: 00:00 1:7 1600  1:8 C. Factor: 27 mg/dL BLOOD GLUCOSE TARGET and times: 12   AM (midnight):  Active Insulin Time:  3 hours Basal to Bolus Ratio:  Sensor:  No Carelink / Diasend username:  [email or web address] Carelink / Diasend Password:  [email or web address]    Type 2 diabetes mellitus with diabetic nephropathy, with long-term current use of insulin (H)       losartan 50 MG tablet    COZAAR    135 tablet     TAKE ONE TABLET BY MOUTH EVERY MORNING AND TAKE ONE-HALF TABLET BY MOUTH EVERY EVENING    Microalbuminuria       MAGNESIUM OXIDE PO      Take 400 mg by mouth daily        metFORMIN 500 MG 24 hr tablet    GLUCOPHAGE-XR    360 tablet    Take 2 tablets (1,000 mg) by mouth 2 times daily (with meals)    Type 2 diabetes mellitus with diabetic nephropathy, with long-term current use of insulin (H)       montelukast 10 MG tablet    SINGULAIR    90 tablet    Take 1 tablet (10 mg) by mouth At Bedtime    Allergic rhinitis       MULTI VITAMIN/MINERALS PO      Take 1 each by mouth daily.        omeprazole 40 MG capsule    priLOSEC    90 capsule    TAKE ONE CAPSULE BY MOUTH EVERY DAY    Gastroesophageal reflux disease without esophagitis, Hiatal hernia       order for DME     100 each    Equipment being ordered: skin prep wipes for insulin pump    Type 2 diabetes, HbA1c goal < 7% (H)       SUMAtriptan 25 MG tablet    IMITREX    8 tablet    Take 25-100mg one time. May repeat 25mg every two hours up to a maximum of 200mg in 24 hours.    Other migraine without status migrainosus, not intractable       * Notice:  This list has 2 medication(s) that are the same as other medications prescribed for you. Read the directions carefully, and ask your doctor or other care provider to review them with you.

## 2018-11-05 NOTE — PATIENT INSTRUCTIONS
Thank you for choosing [facility]!    DR. COLLADO'S CLINIC LOCATIONS:   MONDAY - [place] TUESDAY - [place]   [address]   [place] [address] [place] [address]   [phone]       THURSDAY AM - [place] THURSDAY PM - [place]   [address]   [place] [address] [place], [place] [address] [phone]       FRIDAY AM - [place] SET UP SURGERY: [phone] [address] APPOINTMENTS: [phone]   [place] [address] BILLING QUESTIONS: [phone] FAX NUMBER: [phone]     Follow Up: 6 wks    [facility] LOCATIONS  [facility]  [address]  [place] [address]  Phone: [phone]  Fax: [phone] [facility]  [address]  [place] [address]  Phone: [phone]   Fax: [phone]   [facility]  [address]  [place] [address]  Phone: [phone]  Fax: [phone] [facility]  [address]  [place] [address]  Phone: [phone]   Fax: [phone]   [facility]   [address]  [place] [address]  Phone: [phone]   Fax: [phone] * Please call any location listed to make an appointment for a casting/fitting. Your referral was sent to their central office and they will all have the order on file.     PLANTAR FASCIITIS    Plantar fasciitis is often referred to as heel spurs or heel pain. Plantar fasciitis is a very common problem that affects people of all foot shapes, age, weight and activity level. Pain may be in the arch or on the weight-bearing surface of the heel. The pain may come on without injury or identifiable cause. Pain is generally present when first getting out of bed in the morning or up from a seated break.     CAUSES  The plantar fascia is a dense fibrous band  of tissue that stretches across the bottom surface of the foot. The fascia helps support the foot muscles and arch. Plantar fasciitis is thought to be caused by mechanical strain or overload. Frequent walking without shoes or wearing unsupportive shoes is thought to cause structural overload and ultimately inflammation of the plantar fascia. Some people have heel spurs that can be seen on x-ray. The heel spur is actually a minor component of plantar fascitis and is largely ignored.       SELF TREATMENT   The easiest solution is to stop walking around your home without shoes. Plantar fasciitis is largely a shoe problem. Shoes are either not being worn often enough or your current shoes are inadequate for your weight, foot structure or activity level. The majority of shoes on the market today are not sufficient to resist development of plantar fasciitis or to promote healing. Assume that your current shoes are inadequate and will need to be replaced. Even high quality shoes wear out with 6 months to one year of frequent use. Weight loss is another option. Losing ten pounds in the next two months may be enough to resolve the problem. Ice applied to the area of pain two to three times per day for ten minutes each session can be very helpful. This should continue until the problem resolves. Achilles tendon stretching is essential. Stretch multiple times daily to promote healing and to prevent recurrence in the future.     MEDICAL TREATMENT  Medical treatments often include custom arch supports, cortisone injections, physical therapy, splints to be worn in bed, prescription medications and surgery. The home treatments listed above will be necessary regardless of these advanced medical treatments. Surgery is rarely needed but is very helpful in selected cases.     PROGNOSIS  Plantar fasciitis can last from one day to a lifetime. Some people get intermittent fascitis that is very short-lived. Others suffer daily for years.  Excessive body weight, frequent bare foot walking, long hours on the feet, inadequate shoes, predisposing foot structures and excessive activity such as running are all potential issues that lead to chronic and/or recurring plantar fascitis. Having plantar fasciitis means that you are forever prone to this problem and will require modification of some of the above factors. Most people seek treatment within one to four months. Healing usually requires a similar one to four month time frame. Healing time is relative to the amount of effort spent treating the problem.   Plantar fasciitis is highly recurrent. Risk factors often continue, including return to bare foot walking, inadequate shoes, excessive body weight, excessive activities, etc. Your life style and foot structure may predispose you to recurrent plantar fasciitis. A daily prevention regimen can be very helpful. Ongoing use of shoe inserts, careful attention to appropriate shoes, daily Achilles stretching, etc. may prevent recurrence. Prompt attention at the earliest warning signs of heel pain can resolve the problem in as short as a few days.     EXERCISES    Stair Exercise: Step on the stairs with the ball of your foot and hold your position for at least 15 seconds, then slowly step down with the heels of your foot. You can do this daily and as often as you want.   Picking the Towel: Sit comfortably and then pick the towel up with your toes. You can use any object other than a towel as long as the material can be soft and you can pick it up with your toes.  Rolling the Bottle: Use a small ball or frozen water bottle and then roll it around with your foot.   Flex the Toes: Sit comfortably and then flex your toes by pointing it towards the floor or towards your body. This will relax and flex your foot and exercise your plantar fascia, the calf, and the Achilles tendon. The inability of the foot to stretch often causes the bunching up of the plantar fascia  area leading to the pain.  Calf/Achilles Stretching: Lay on you back and raise one foot, then point your toes towards the floor. See photo below:               Hold each stretch for 10 seconds. Stretch 10 times per set, three sets per day. Morning, afternoon and evening. If your heel pain is very severe in the morning, consider doing the first set of stretches before you get out of bed.    THERAPIES COVERED:  1.  Supportive Shoes: minimizing barefoot ambulation helps to provide cushion, padding and support to the ligament that is inflamed. Socks, flip flops, flats and some slippers are not typically sufficient to provide support. Shoes should be worn even indoors  2.  Insert/Orthotics: ones with an arch support built in to them provide further stress relief for the ligament. See the information below on recommended inserts.  3. Icing: using a frozen water bottle or orange, and rolling it along the bottom of the arch/heel can help to alleviate discomfort, and can act as a tissue massage to the painful, inflamed ligament.  There is evidence that shows icing at least three times daily can be beneficial  4.  Antiinflammatory (NSAID): Ibuprofen, Aleve, as well as Tylenol can be used to help decrease symptoms and improve pain levels. If you have high blood pressure, heart disease, stomach or kidney problems, use antiinflammatories sparingly. Tylenol should not be used if you have liver problems.   5. Activity Modifications: if there are certain things that you do, whether it's going barefoot or certain shoes/activities, you should try to minimize those activities as much as possible until your symptoms are sufficiently resolved. Certainly, some activities, such as running on the treadmill, are easier to take a break from versus others, such as work or chores at home. If there are certain activities that hurt your heel, and you keep doing those activities that hurt your heel, your heel will keep hurting.  **If these  "initial therapies are insufficient, we have our tier 2 therapies that can more aggressively work to improve your symptoms and get you back to the activities that you enjoy!    DIABETES AND YOUR FEET  Diabetes can result in several problems in the feet including ulcers (open sores) and amputations. Two of the most important reasons why people develop foot problems when they have diabetes is : 1. Neuropathy (loss of feeling)  2. Vascular disease (loss or decrease of blood flow).    Neuropathy is a term used to describe a loss of nerve function.  Patients with diabetes are at risk of developing neuropathy if their sugars continue to run high and are above the normal value. One theory for neuropathy is that the \"extra\" sugar in the body enters the nerves and is broken down. These by-products build up in the nerve causing it to swell and impairing nerve function. Often times, this can be prevented by controlling your sugars, dieting and exercise.    When a person develops neuropathy, they usually begin to feel numbness or tingling in their feet and sometime in their legs.  Other symptoms may include painful burning or hot feet, tingling or feeling like insects or ants are crawling on your feet or legs.  If the diabetes is sever and the sugars run high for long periods of time, neuropathy can also occur in the hands.    Vascular disease  is a term used to describe a loss or decrease in circulation (blood flow). There is a problem in getting blood and oxygen to areas that need it. Similar to neuropathy, sugars can build up in the walls of the arteries (blood vessels) and cause them to become swollen, thickened and hardened. This decreases the amount of blood that can go to an area that needs it. Though this is common in the legs of diabetic patients, it can also affect other arteries (blood vessels) in the body such as in the heart and eyes.    In the legs, vascular disease usually results in cramping. Patients who develop " leg cramps after walking the same distance every time (i.e. One block, half a mile, ect.) need to let their doctors know so that their circulation may be checked. Cramps causing severe pain in the feet and/or legs while sleeping and the cramps go away when you stand or hang your legs off the side of the bed, may also be a sign of poor blood circulation.  Occasional cramping in cold weather or on rare occasions with activity may not be due to poor circulation, but you should inform your doctor.    PREVENTION OF THESE DISEASES  The key to prevention is good blood sugar control. Poor blood sugar control is a big reason many of these problems start. Physical activity (exercise) is a very good way to help decrease your blood sugars. Exercise can lower your blood sugar, blood pressure, and cholesterol. It also reduces your risk for heart disease and stroke, relieves stress, and strengthens your heart, muscles and bones.  In addition, regular activity helps insulin work better, improves your blood circulation, and keeps your joints flexible. If you're trying to lose weight, a combination of exercise and wise food choices can help you reach your target weight and maintain it.      PAIN MANAGEMENT  1.Blood Sugar Control - Most important  2. Medications such as:  Amytriptylline, duloxetine, gabapentin, lyrica, tramadol  3. Nutritional therapy:  Vitamin B6 (100mg daily), Vitamin B12 (75mcg daily), Vitamin D 2000 IU daily), Alpha-Lipoic Acid (600-1800mg daily), Acetyl-L-Carnitine (500-1000mg TID, L-methyl folate (1500mcg daily)    ** Metformin can block Vitamin B6 and B12 so it is important to supplement**    FOOT CARE RECOMMENDATIONS   1. Wash your feet with lukewarm water and a mild soap and then dry them thoroughly, especially between the toes.     2. Examine your feet daily looking for cuts, corns, blisters, cracks, ect, especially after wearing new shoes. Make sure to look between your toes. If you cannot see the bottom of  your feet, set a mirror on the floor and hold your foot over it, or ask a spouse, friend or family member to examine your feet for you. Contact your doctor immediately if new problems are noted or if sores are not healing.     3. Immediately apply moisturizer to the tops and bottoms of your feet, avoiding areas between the toes. Hand lotion (Intesive Care, Janiya, Eucerin, Neutrogena, Curel, ect) is sufficient unless your doctor prescribes a medicated lotion. Apply sunscreen to your feet when going swimming outside.     4. Use clean comfortable shoes, wear white socks (if you have any bleeding or drainage, you will see it on white socks). Socks should not have thick seams or cut off the circulation around the leg. Break in new shoes slowly and rotate with older shoes until broken in. Check the inside of your shoes with your hand to look for areas of irritation or objects that may have fallen into your shoes.       5. Keep slippers by the side of your bed for use during the night.     6.  Shoes should be fitted by a professional and should not cause areas of irritation.  Check your feet regularly when wearing a new pair of shoes and replace them as needed.     7.  Talk to your doctor about proper exercise. Exercise and stretching stimulate blood flow to your feet and maintain proper glucose levels.     8.  Monitor your blood glucose level as instructed by your doctor. Notify your doctor immediately if your blood sugar is abnormally high or low.    9. Cut your nails straight across, but then gently round any sharp edges with a cardboard nail file. If you have neuropathy, peripheral vascular disease or cannot see that well to trim your own toenails contact [employer] ([phone]) or [facility] [employer] ([phone]).      THINGS TO AVOID DOING   1.  Do not soak your feet if you have an open sore. Use only lukewarm water and always check the temperature with your hand as hot water can easily burn your feet.       2.  Never  use a hot water bottle or heating pad on your feet. Also do not apply cold compresses to your feet. With decreased sensation, you could burn or freeze your feet.       3.  Do not apply any of these to your feet:    -  Over the counter medicine for corns or warts    -  Harsh chemicals like boric acid    -  Do not self-treat corns, cuts, blisters or infections. Always consult your doctor.       4.  Do not wear sandals, slippers or walk barefoot, especially on hot sand or concrete or other harsh surfaces.     5.  If you smoke, stop!!!            Body Mass Index (BMI)  Many things can cause foot and ankle problems. Foot structure, activity level, foot mechanics and injuries are common causes of pain. One very important issue that often goes unmentioned, is body weight. Extra weight can cause increased stress on muscles, ligaments, bones and tendons. Sometimes just a few extra pounds is all it takes to put one over her/his threshold. Without reducing that stress, it can be difficult to alleviate pain. Some people are uncomfortable addressing this issue, but we feel it is important for you to think about it. As Foot [facility]  Ankle specialists, our job is addressing the lower extremity problem and possible causes. Regarding extra body weight, we encourage patients to discuss diet and weight management plans with their primary care doctors. It is this team approach that gives you the best opportunity for pain relief and getting you back on your feet.

## 2018-11-05 NOTE — LETTER
11/5/2018         RE: Marie Vogel  [address]  [place] [address]        Dear Colleague,    Thank you for referring your patient, Marie Vogel, to the [facility]. Please see a copy of my visit note below.    Chief Complaint   Patient presents with     Diabetic Eye Exam     1.5 years since last exam, wants updated CL rx-signed waiver       Lab Results   Component Value Date    A1C 8.8 11/05/2018    A1C 7.7 06/15/2018    A1C 8.5 10/24/2017    A1C 7.2 04/17/2017    A1C 7.2 12/27/2016       Last Eye Exam:1.5 years ago  Dilated Previously: Yes    What are you currently using to see?  glasses, contacts- been out of contacts for awhile ([employer])    Distance Vision Acuity: Satisfied with vision    Near Vision Acuity: Satisfied with vision while reading  unaided    Eye Comfort: dry  Do you use eye drops? : No  Occupation or Hobbies: Nurse    Orly Vuong, Optometric Assistant     Medical, surgical and family histories reviewed and updated 11/5/2018.     OBJECTIVE: See Ophthalmology exam    ASSESSMENT:    ICD-10-CM    1. Type 2 diabetes mellitus without retinopathy (H) E11.9    2. Myopia with astigmatism, bilateral H52.13     H52.203    3. 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    Gabriela Becker OD         Again, thank you for allowing me to participate in the care of your patient.        Sincerely,        Gabriela Becker, OD

## 2018-11-05 NOTE — PROGRESS NOTES
Chief Complaint   Patient presents with     Diabetic Eye Exam     1.5 years since last exam, wants updated CL rx-signed waiver       Lab Results   Component Value Date    A1C 8.8 11/05/2018    A1C 7.7 06/15/2018    A1C 8.5 10/24/2017    A1C 7.2 04/17/2017    A1C 7.2 12/27/2016       Last Eye Exam:1.5 years ago  Dilated Previously: Yes    What are you currently using to see?  glasses, contacts- been out of contacts for awhile ([employer])    Distance Vision Acuity: Satisfied with vision    Near Vision Acuity: Satisfied with vision while reading  unaided    Eye Comfort: dry  Do you use eye drops? : No  Occupation or Hobbies: Nurse    Orly Vuong, Optometric Assistant     Medical, surgical and family histories reviewed and updated 11/5/2018.     OBJECTIVE: See Ophthalmology exam    ASSESSMENT:    ICD-10-CM    1. Type 2 diabetes mellitus without retinopathy (H) E11.9    2. Myopia with astigmatism, bilateral H52.13     H52.203    3. 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    Gabriela Becker OD

## 2018-11-05 NOTE — PROGRESS NOTES
"Foot & Ankle Surgery  2018    CC: \"diabetes and foot pain\"    I was asked to see Marie Vogel regarding the chief complaint by:  self    HPI:  Pt is a 38 year old female who presents with above complaint.  Bilateral lower extremity issues x 2 years. Describes deep ache.  Pain 7/10 \"at night\".  When she has her feet massaged, it feels like her feet are in bubble wrap.  Also thinks she may have plantar fasciitis.  \"Deep ache\" in feet.  Abnormal sensation in feet.  She has tried a boot, brozen bottles, adjustments, tennis balls, foot massage and a night splint.  Diabetic, A1c 18 8.8    ROS:   Pos for CC.  The patient denies current nausea, vomiting, chills, fevers, belly pain, calf pain, chest pain or SOB.  Complete remainder of ROS is otherwise neg.    VITALS:    Vitals:    18 0828   BP: 124/72   Weight: 190 lb 6.4 oz (86.4 kg)   Height: 5' 4\" (1.626 m)       PMH:    Past Medical History:   Diagnosis Date     Allergic rhinitis due to pollen      Atypical glandular cells on Pap smear 3/1108     Gastroesophageal reflux disease      PFO (patent foramen ovale)      Type II or unspecified type diabetes mellitus without mention of complication, not stated as uncontrolled     onset was gestational in        SXHX:    Past Surgical History:   Procedure Laterality Date     C INDUCED ABORTN BY D&C           C IUD,MIRENA  8/10/10, 7/28/15     C/SECTION, LOW TRANSVERSE  6/25/10    , Low Transverse     CHOLECYSTECTOMY, LAPOROSCOPIC      Cholecystectomy, Laparoscopic     ESOPHAGOSCOPY, GASTROSCOPY, DUODENOSCOPY (EGD), COMBINED N/A 2016    Procedure: COMBINED ESOPHAGOSCOPY, GASTROSCOPY, DUODENOSCOPY (EGD), BIOPSY SINGLE OR MULTIPLE;  Surgeon: Fadi Hunter MD;  Location: [facility][1] ESOPHAGEAL MOTILITY STUDY N/A 2016    Procedure: ESOPHAGEAL MOTILITY STUDY;  Surgeon: Fadi Hunter MD;  Location: [facility][1] REMOVE TONSILS/ADENOIDS,<11 Y/O  " "1987    T &A        MEDS:    Current Outpatient Prescriptions   Medication     aspirin 81 MG tablet     atenolol (TENORMIN) 25 MG tablet     atorvastatin (LIPITOR) 20 MG tablet     blood glucose monitoring (PEPE CONTOUR NEXT) test strip     blood glucose monitoring (NO BRAND SPECIFIED) meter device kit     cetirizine (ZYRTEC) 10 MG tablet     cyclobenzaprine (FLEXERIL) 10 MG tablet     EPINEPHrine 0.3 MG/0.3ML injection     ergocalciferol (ERGOCALCIFEROL) 14957 units capsule     fluticasone (FLONASE) 50 MCG/ACT spray     ibuprofen (ADVIL) 200 MG tablet     infusion set (PARADIGM QUICK-SET 23\" 9MM) misc pump supply     insulin aspart (NOVOLOG VIAL) 100 UNITS/ML injection     insulin cartridge (PARADIGM 3ML) misc pump supply     INSULIN PUMP - OUTPATIENT     Lancets (MICROLET) MISC     losartan (COZAAR) 50 MG tablet     MAGNESIUM OXIDE PO     metFORMIN (GLUCOPHAGE-XR) 500 MG 24 hr tablet     montelukast (SINGULAIR) 10 MG tablet     Multiple Vitamins-Minerals (MULTI VITAMIN/MINERALS PO)     Omega-3 Fatty Acids (FISH OIL) 500 MG CAPS     omeprazole (PRILOSEC) 40 MG capsule     order for DME     SUMAtriptan (IMITREX) 25 MG tablet     No current facility-administered medications for this visit.        ALL:     Allergies   Allergen Reactions     Ivp Dye [Contrast Dye] Itching     Pt reports that throat itches     Nuts Anaphylaxis     Mariola nuts only     Bactrim Swelling     Pt reaction was swelling in mouth and tongue and itchy mouth.  Resolved with benadryl and prednisone     Penicillins Hives     Cephalosporins Itching     Seasonal Allergies Other (See Comments)     Molds, trees, grass, dust..  Upper congestion     Atorvastatin      myalgias     Shellfish Allergy Rash     Clams only       FMH:    Family History   Problem Relation Age of Onset     Cardiovascular Mother      hypertrophic cardiomyopathy     Genitourinary Problems Mother      gallbladder disease     Diabetes Mother      drug induced     Other - See Comments " Mother      heart transplant 11/23/2005     Diabetes Father      type 2     Hypertension Father      Genitourinary Problems Maternal Grandmother      gallbladder disease     Genitourinary Problems Paternal Grandmother      gallbladder disease     Hypertension Paternal Grandfather      high bp     Cerebrovascular Disease Paternal Grandfather      Cardiovascular Brother      hypertrophic cardiomyopathy     Diabetes Brother      type 2       SocHx:    Social History     Social History     Marital status:      Spouse name: N/A     Number of children: 2     Years of education: 14     Occupational History     nurse      Social History Main Topics     Smoking status: Former Smoker     Types: Cigarettes     Quit date: 10/24/2005     Smokeless tobacco: Former User      Comment: States only smokes when drinks maybe 2x/year     Alcohol use No     Drug use: No     Sexual activity: Yes     Partners: Male     Birth control/ protection: IUD      Comment: Mirena IUD 7/28/15     Other Topics Concern     Not on file     Social History Narrative    Caffeine intake/servings daily - 6-7    Calcium intake/servings daily - 2-3 yogurt, milk, cheese    Exercise 1 times weekly - describe aerobics    Sunscreen used - Yes    Seatbelts used - Yes    Guns stored in the home - No    Self Breast Exam - Yes    Pap test up to date -  Yes    Eye exam up to date -  Yes    Dental exam up to date -  Yes    DEXA scan up to date -  Not Applicable    Flex Sig/Colonoscopy up to date -  Not Applicable    Mammography up to date -  Not Applicable    Immunizations reviewed and up to date - Yes    Abuse: Current or Past (Physical, Sexual or Emotional) - No    Do you feel safe in your environment - Yes    Do you cope well with stress - Yes    Do you suffer from insomnia - Yes     Last updated by: Tatianna Lacey  5/9/2005               EXAMINATION:  Gen:   No apparent distress  Neuro:   A&Ox3, no deficits  Psych:    Answering questions appropriately for age  and situation with normal affect  Head:    NCAT  Eye:    Visual scanning without deficit  Ear:    Response to auditory stimuli wnl  Lung:    Non-labored breathing on RA noted  Abd:    NTND per patient report  Lymph:    Neg for pitting/non-pitting edema BLE  Vasc:    Pulses palpable, CFT minimally delayed  Neuro:    Light touch sensation intact to all sensory nerve distributions with paresthesias.  Sw5.07 intact to all sensory distributions.    Derm:    Neg for nodules, lesions or ulcerations  MSK:    Tender along central band of plantar fascia bilateral   Calf:    Neg for redness, swelling or tenderness    Assessment:  38 year old female with DMII with sensory neuropathy; plantar fascial strain bilateral       Plan:  Discussed etiologies, anatomy and options  1.  DMII with sensory neuropathy  -personally reviewed A1c.  8.8 11/5/18, up from 7.7 6/15/18  -Regarding the patient's diabetes, we dispensed and discussed proper diabetic foot care.  This includes closely monitoring their blood sugars, closely monitoring their blood pressures, and evaluating their feet at least once per day.  We also discussed potential problems associated with barefoot/sock ambulation and soaking their feet.   -Orthotic Lab referral      2.  Plantar fascial strain central band bilateral   -Regarding the plantar fasciitis, the Plantar Fasciitis handout was dispensed and discussed.  We talked about stretching, resting/activity modification, icing, NSAID/tylenol use as tolerated, inserts, supportive/comfortable shoes and minimizing shoeless walking.    -discussed Achilles, plantar fascial and hamstring stretches  -OTC insert information dispensed and discussed   -Orthotic Lab referral  -will look for her night splint to start using again.     Follow up:  6 weeks or sooner with acute issues      Patient's medical history was reviewed today    Body mass index is 32.68 kg/(m^2).  Weight management plan: Patient was referred to their PCP to discuss  a diet and exercise plan.        Jamal Liriano DPM FACFAS FACFAOM  Podiatric Foot & Ankle Surgeon  [facility]  [phone]

## 2018-11-05 NOTE — PATIENT INSTRUCTIONS
Once your contact lens prescription is finalized and you are not having any problems with the trials or current lenses you can order your supply of lenses.   You can order your contact lenses online at [email or web address].  Click on services at the top of the page, then eye care and you will see the link to order contact lenses.  You can also order contact lenses at [phone]. There is no shipping fee if you order an annual supply otherwise  be sure to let them know which office you would like to  the lenses, [facility] [phone].    Will order contact lens trials which should be here within the week  Return to clinic for dispense appointment when they arrive

## 2018-11-05 NOTE — MR AVS SNAPSHOT
After Visit Summary   11/5/2018    Marie Vogel    MRN: [id number]           Patient Information     Date Of Birth          1980        Visit Information        Provider Department      11/5/2018 10:00 AM Gabriela Becker OD [facility]        Today's Diagnoses     Type 2 diabetes mellitus without retinopathy (H)    -  1    Myopia with astigmatism, bilateral        Scars of posterior pole of chorioretina, bilateral          Care Instructions    Once your contact lens prescription is finalized and you are not having any problems with the trials or current lenses you can order your supply of lenses.   You can order your contact lenses online at [email or web address].  Click on services at the top of the page, then eye care and you will see the link to order contact lenses.  You can also order contact lenses at [phone]. There is no shipping fee if you order an annual supply otherwise  be sure to let them know which office you would like to  the lenses, [facility] [phone].    Will order contact lens trials which should be here within the week  Return to clinic for dispense appointment when they arrive           Follow-ups after your visit        Your next 10 appointments already scheduled     Nov 12, 2018  3:00 PM CST   Return Visit with Zita Fuentes MD   [facility] ([facility]    [address] [place] [address]  [address]  [facility] [address] [phone]            Nov 27, 2018  1:45 PM CST   MyChart OB-GYN Annual Physical with Maldonado Bruner MD   [facility] ([facility])    [address] [place] [address]  [address]  [facility] [address] [phone]              Who to contact     If you have questions or need follow up information about today's clinic visit or your schedule please contact [facility] directly at [phone].  Normal or non-critical lab and imaging results will be  communicated to you by [employer], letter or phone within 4 business days after the clinic has received the results. If you do not hear from us within 7 days, please contact the clinic through [employer] or phone. If you have a critical or abnormal lab result, we will notify you by phone as soon as possible.  Submit refill requests through [employer] or call your pharmacy and they will forward the refill request to us. Please allow 3 business days for your refill to be completed.          Additional Information About Your Visit        [employer] Information     [employer] gives you secure access to your electronic health record. If you see a primary care provider, you can also send messages to your care team and make appointments. If you have questions, please call your primary care clinic.  If you do not have a primary care provider, please call [phone] and they will assist you.        Care EveryWhere ID     This is your Care EveryWhere ID. This could be used by other organizations to access your [place] medical records  [id number]         Blood Pressure from Last 3 Encounters:   11/05/18 124/72   09/17/18 129/89   07/22/18 113/68    Weight from Last 3 Encounters:   11/05/18 86.4 kg (190 lb 6.4 oz)   09/17/18 86.4 kg (190 lb 6.4 oz)   07/22/18 84.4 kg (186 lb)              We Performed the Following     EYE EXAM (SIMPLE-NONBILLABLE)     PHOTOGRAPY FUNDUS     REFRACTION        Primary Care Provider Office Phone # Fax #    Alan Paredes [phone] [address] [place] [address]        Equal Access to Services     [facility] AH: Hadii aad ku hadasho Soomaali, waaxda luqadaha, qaybta kaalmada adeegyada, jr pickard. So [facility] [phone].    ATENCIÓN: Si habla español, tiene a woodruff disposición servicios gratuitos de asistencia lingüística. Llame al [phone].    We comply with applicable federal civil rights laws and [place] laws. We do not  discriminate on the basis of race, color, national origin, age, disability, sex, sexual orientation, or gender identity.            Thank you!     Thank you for choosing [facility]  for your care. Our goal is always to provide you with excellent care. Hearing back from our patients is one way we can continue to improve our services. Please take a few minutes to complete the written survey that you may receive in the mail after your visit with us. Thank you!             Your Updated Medication List - Protect others around you: Learn how to safely use, store and throw away your medicines at www.disposemymeds.org.          This list is accurate as of 11/5/18 10:51 AM.  Always use your most recent med list.                   Brand Name Dispense Instructions for use Diagnosis    ADVIL 200 MG tablet   Generic drug:  ibuprofen      Take 200 mg by mouth every 4 hours as needed.        aspirin 81 MG tablet     90 tablet    Take 1 tablet (81 mg) by mouth daily    PFO (patent foramen ovale)       atenolol 25 MG tablet    TENORMIN    90 tablet    Take 0.5 tablets (12.5 mg) by mouth daily        atorvastatin 20 MG tablet    LIPITOR    90 tablet    Take 1 tablet (20 mg) by mouth daily        blood glucose monitoring lancets     100 each    1 Device See Admin Instructions. Use up to 5 times daily for blood sugar checks.    Type 2 diabetes, HbA1c goal < 7% (H)       blood glucose monitoring meter device kit    no brand specified    1 kit    Use to test blood sugar 6 times daily and as needed.    Type 2 diabetes mellitus with diabetic nephropathy (H)       blood glucose monitoring test strip    PEPE CONTOUR NEXT    120 each    Check 4 times/day    Type 2 diabetes mellitus with diabetic nephropathy, with long-term current use of insulin (H)       cetirizine 10 MG tablet    zyrTEC    90 tablet    Take 10 mg by mouth 2 times daily        cyclobenzaprine 10 MG tablet    FLEXERIL    20 tablet    Take 1 tablet (10 mg) by  mouth nightly as needed for muscle spasms    Low back pain without sciatica, unspecified back pain laterality, unspecified chronicity       EPINEPHrine 0.3 MG/0.3ML injection 2-pack    EPIPEN/ADRENACLICK/or ANY BX GENERIC EQUIV    0.3 mL    Inject 0.3 mLs (0.3 mg) into the muscle once as needed for anaphylaxis    Nut allergy       ergocalciferol 98959 units capsule    ERGOCALCIFEROL    4 capsule    Take 1 capsule (50,000 Units) by mouth once a week    Vitamin D deficiency, Microalbuminuria       Fish Oil 500 MG Caps      Take 1 capsule by mouth        fluticasone 50 MCG/ACT spray    FLONASE    1 Bottle    Spray 1-2 sprays into both nostrils daily    Acute sinusitis with symptoms > 10 days       insulin aspart 100 UNITS/ML injection    NovoLOG VIAL    90 mL    With insulin pump. Uses about 80 units/day.    Type 2 diabetes mellitus with diabetic nephropathy, with long-term current use of insulin (H)       * insulin cartridge misc pump supply     30 each    CHANGE EVERY 3 DAYS AS DIRECTED    Type 2 diabetes mellitus with diabetic nephropathy, with long-term current use of insulin (H)       * infusion set misc pump supply     30 each    CHANGE EVERY 3 DAYS AS DIRECTED    Type 2 diabetes mellitus with diabetic nephropathy, with long-term current use of insulin (H)       insulin pump infusion      Updated 1/27/17: [employer]: Model 723 BASAL: 00:00: 1.0 units/hr 10:30: 0.95 14:00: 1.05 CARB RATIO: 00:00 1:7 1600  1:8 C. Factor: 27 mg/dL BLOOD GLUCOSE TARGET and times: 12   AM (midnight):  Active Insulin Time:  3 hours Basal to Bolus Ratio:  Sensor:  No Carelink / Diasend username:  [email or web address] Carelink / Diasend Password:  [email or web address]    Type 2 diabetes mellitus with diabetic nephropathy, with long-term current use of insulin (H)       losartan 50 MG tablet    COZAAR    135 tablet    TAKE ONE TABLET BY MOUTH EVERY MORNING AND TAKE ONE-HALF TABLET BY MOUTH EVERY EVENING    Microalbuminuria       MAGNESIUM OXIDE PO       Take 400 mg by mouth daily        metFORMIN 500 MG 24 hr tablet    GLUCOPHAGE-XR    360 tablet    Take 2 tablets (1,000 mg) by mouth 2 times daily (with meals)    Type 2 diabetes mellitus with diabetic nephropathy, with long-term current use of insulin (H)       montelukast 10 MG tablet    SINGULAIR    90 tablet    Take 1 tablet (10 mg) by mouth At Bedtime    Allergic rhinitis       MULTI VITAMIN/MINERALS PO      Take 1 each by mouth daily.        omeprazole 40 MG capsule    priLOSEC    90 capsule    TAKE ONE CAPSULE BY MOUTH EVERY DAY    Gastroesophageal reflux disease without esophagitis, Hiatal hernia       order for DME     100 each    Equipment being ordered: skin prep wipes for insulin pump    Type 2 diabetes, HbA1c goal < 7% (H)       SUMAtriptan 25 MG tablet    IMITREX    8 tablet    Take 25-100mg one time. May repeat 25mg every two hours up to a maximum of 200mg in 24 hours.    Other migraine without status migrainosus, not intractable       * Notice:  This list has 2 medication(s) that are the same as other medications prescribed for you. Read the directions carefully, and ask your doctor or other care provider to review them with you.

## 2018-11-05 NOTE — LETTER
"    2018         RE: Marie Vogel  [address] [facility] [address]  [place] [address]        Dear Colleague,    Thank you for referring your patient, Marie Vogel, [facility]. Please see a copy of my visit note below.    Foot & Ankle Surgery  2018    CC: \"diabetes and foot pain\"    I was asked to see Marie Vogel regarding the chief complaint by:  self    HPI:  Pt is a 38 year old female who presents with above complaint.  Bilateral lower extremity issues x 2 years. Describes deep ache.  Pain 7/10 \"at night\".  When she has her feet massaged, it feels like her feet are in bubble wrap.  Also thinks she may have plantar fasciitis.  \"Deep ache\" in feet.  Abnormal sensation in feet.  She has tried a boot, brozen bottles, adjustments, tennis balls, foot massage and a night splint.  Diabetic, A1c 18 8.8    ROS:   Pos for CC.  The patient denies current nausea, vomiting, chills, fevers, belly pain, calf pain, chest pain or SOB.  Complete remainder of ROS is otherwise neg.    VITALS:    Vitals:    18 0828   BP: 124/72   Weight: 190 lb 6.4 oz (86.4 kg)   Height: 5' 4\" (1.626 m)       PMH:    Past Medical History:   Diagnosis Date     Allergic rhinitis due to pollen      Atypical glandular cells on Pap smear 3/1108     Gastroesophageal reflux disease      PFO (patent foramen ovale)      Type II or unspecified type diabetes mellitus without mention of complication, not stated as uncontrolled     onset was gestational in        SXHX:    Past Surgical History:   Procedure Laterality Date     C INDUCED ABORTN BY D&C           C IUD,MIRENA  8/10/10, 7/28/15     C/SECTION, LOW TRANSVERSE  6/25/10    , Low Transverse     CHOLECYSTECTOMY, LAPOROSCOPIC      Cholecystectomy, Laparoscopic     ESOPHAGOSCOPY, GASTROSCOPY, DUODENOSCOPY (EGD), COMBINED N/A 2016    Procedure: COMBINED ESOPHAGOSCOPY, GASTROSCOPY, DUODENOSCOPY (EGD), " "BIOPSY SINGLE OR MULTIPLE;  Surgeon: Fadi Hunter MD;  Location:  GI      ESOPHAGEAL MOTILITY STUDY N/A 9/14/2016    Procedure: ESOPHAGEAL MOTILITY STUDY;  Surgeon: Fadi Hunter MD;  Location:  GI     HC REMOVE TONSILS/ADENOIDS,<13 Y/O  1987    T &A        MEDS:    Current Outpatient Prescriptions   Medication     aspirin 81 MG tablet     atenolol (TENORMIN) 25 MG tablet     atorvastatin (LIPITOR) 20 MG tablet     blood glucose monitoring (PEPE CONTOUR NEXT) test strip     blood glucose monitoring (NO BRAND SPECIFIED) meter device kit     cetirizine (ZYRTEC) 10 MG tablet     cyclobenzaprine (FLEXERIL) 10 MG tablet     EPINEPHrine 0.3 MG/0.3ML injection     ergocalciferol (ERGOCALCIFEROL) 16245 units capsule     fluticasone (FLONASE) 50 MCG/ACT spray     ibuprofen (ADVIL) 200 MG tablet     infusion set (PARADIGM QUICK-SET 23\" 9MM) [facility] pump supply     insulin aspart (NOVOLOG VIAL) 100 UNITS/ML injection     insulin cartridge (PARADIGM 3ML) misc pump supply     INSULIN PUMP - OUTPATIENT     Lancets (MICROLET) MISC     losartan (COZAAR) 50 MG tablet     MAGNESIUM OXIDE PO     metFORMIN (GLUCOPHAGE-XR) 500 MG 24 hr tablet     montelukast (SINGULAIR) 10 MG tablet     Multiple Vitamins-Minerals (MULTI VITAMIN/MINERALS PO)     Omega-3 Fatty Acids (FISH OIL) 500 MG CAPS     omeprazole (PRILOSEC) 40 MG capsule     order for DME     SUMAtriptan (IMITREX) 25 MG tablet     No current facility-administered medications for this visit.        ALL:     Allergies   Allergen Reactions     Ivp Dye [Contrast Dye] Itching     Pt reports that throat itches     Nuts Anaphylaxis     Mariola nuts only     Bactrim Swelling     Pt reaction was swelling in mouth and tongue and itchy mouth.  Resolved with benadryl and prednisone     Penicillins Hives     Cephalosporins Itching     Seasonal Allergies Other (See Comments)     Molds, trees, grass, dust..  Upper congestion     Atorvastatin      myalgias     Shellfish Allergy " Rash     Clams only       FMH:    Family History   Problem Relation Age of Onset     Cardiovascular Mother      hypertrophic cardiomyopathy     Genitourinary Problems Mother      gallbladder disease     Diabetes Mother      drug induced     Other - See Comments Mother      heart transplant 11/23/2005     Diabetes Father      type 2     Hypertension Father      Genitourinary Problems Maternal Grandmother      gallbladder disease     Genitourinary Problems Paternal Grandmother      gallbladder disease     Hypertension Paternal Grandfather      high bp     Cerebrovascular Disease Paternal Grandfather      Cardiovascular Brother      hypertrophic cardiomyopathy     Diabetes Brother      type 2       SocHx:    Social History     Social History     Marital status:      Spouse name: N/A     Number of children: 2     Years of education: 14     Occupational History     nurse      Social History Main Topics     Smoking status: Former Smoker     Types: Cigarettes     Quit date: 10/24/2005     Smokeless tobacco: Former User      Comment: States only smokes when drinks maybe 2x/year     Alcohol use No     Drug use: No     Sexual activity: Yes     Partners: Male     Birth control/ protection: IUD      Comment: Mirena IUD 7/28/15     Other Topics Concern     Not on file     Social History Narrative    Caffeine intake/servings daily - 6-7    Calcium intake/servings daily - 2-3 yogurt, milk, cheese    Exercise 1 times weekly - describe aerobics    Sunscreen used - Yes    Seatbelts used - Yes    Guns stored in the home - No    Self Breast Exam - Yes    Pap test up to date -  Yes    Eye exam up to date -  Yes    Dental exam up to date -  Yes    DEXA scan up to date -  Not Applicable    Flex Sig/Colonoscopy up to date -  Not Applicable    Mammography up to date -  Not Applicable    Immunizations reviewed and up to date - Yes    Abuse: Current or Past (Physical, Sexual or Emotional) - No    Do you feel safe in your environment -  Yes    Do you cope well with stress - Yes    Do you suffer from insomnia - Yes     Last updated by: Tatianna Lacey  5/9/2005               EXAMINATION:  Gen:   No apparent distress  Neuro:   A&Ox3, no deficits  Psych:    Answering questions appropriately for age and situation with normal affect  Head:    NCAT  Eye:    Visual scanning without deficit  Ear:    Response to auditory stimuli wnl  Lung:    Non-labored breathing on RA noted  Abd:    NTND per patient report  Lymph:    Neg for pitting/non-pitting edema BLE  Vasc:    Pulses palpable, CFT minimally delayed  Neuro:    Light touch sensation intact to all sensory nerve distributions with paresthesias.  Sw5.07 intact to all sensory distributions.    Derm:    Neg for nodules, lesions or ulcerations  MSK:    Tender along central band of plantar fascia bilateral   Calf:    Neg for redness, swelling or tenderness    Assessment:  38 year old female with DMII with sensory neuropathy; plantar fascial strain bilateral       Plan:  Discussed etiologies, anatomy and options  1.  DMII with sensory neuropathy  -personally reviewed A1c.  8.8 11/5/18, up from 7.7 6/15/18  -Regarding the patient's diabetes, we dispensed and discussed proper diabetic foot care.  This includes closely monitoring their blood sugars, closely monitoring their blood pressures, and evaluating their feet at least once per day.  We also discussed potential problems associated with barefoot/sock ambulation and soaking their feet.   -Orthotic Lab referral      2.  Plantar fascial strain central band bilateral   -Regarding the plantar fasciitis, the Plantar Fasciitis handout was dispensed and discussed.  We talked about stretching, resting/activity modification, icing, NSAID/tylenol use as tolerated, inserts, supportive/comfortable shoes and minimizing shoeless walking.    -discussed Achilles, plantar fascial and hamstring stretches  -OTC insert information dispensed and discussed   -Orthotic Lab  referral  -will look for her night splint to start using again.     Follow up:  6 weeks or sooner with acute issues      Patient's medical history was reviewed today    Body mass index is 32.68 kg/(m^2).  Weight management plan: Patient was referred to their PCP to discuss a diet and exercise plan.        Jamal Liriano DPM FACFAS FACFAOM  Podiatric Foot & Ankle Surgeon  [facility]  [phone]      Again, thank you for allowing me to participate in the care of your patient.        Sincerely,        Jamal Liriano DPM, DPDANIEL

## 2018-11-05 NOTE — TELEPHONE ENCOUNTER
Current Contact Lens Rx (Trial Lens, Ordered)     Brand Base Curve Diameter Sphere Cylinder Axis   Right Air Optix for Astigmatism 8.7 14.5 -2.00 -2.25 020   Left Air Optix for Astigmatism 8.7 14.5 -1.75 -2.25 140        NS ordered trial lenses 11/5/2018, needs dispense appt

## 2018-11-05 NOTE — PROGRESS NOTES
Patient was for lab appt today. She needs her liver function and microalbumin checked. HM is due. Please put orders in her chart.  Thank you,  Lupis Johnston MLT

## 2018-11-06 LAB
ALBUMIN SERPL-MCNC: 3.6 G/DL (ref 3.4–5)
ALP SERPL-CCNC: 84 U/L (ref 40–150)
ALT SERPL W P-5'-P-CCNC: 32 U/L (ref 0–50)
AST SERPL W P-5'-P-CCNC: 22 U/L (ref 0–45)
BILIRUB DIRECT SERPL-MCNC: 0.1 MG/DL (ref 0–0.2)
BILIRUB SERPL-MCNC: 0.4 MG/DL (ref 0.2–1.3)
CREAT UR-MCNC: 162 MG/DL
MICROALBUMIN UR-MCNC: 1060 MG/L
MICROALBUMIN/CREAT UR: 654.32 MG/G CR (ref 0–25)
PROT SERPL-MCNC: 7 G/DL (ref 6.8–8.8)

## 2018-11-12 ENCOUNTER — OFFICE VISIT (OUTPATIENT)
Dept: ENDOCRINOLOGY | Facility: CLINIC | Age: 38
End: 2018-11-12
Payer: COMMERCIAL

## 2018-11-12 VITALS
TEMPERATURE: 97.6 F | SYSTOLIC BLOOD PRESSURE: 120 MMHG | DIASTOLIC BLOOD PRESSURE: 74 MMHG | HEIGHT: 64 IN | BODY MASS INDEX: 33.26 KG/M2 | OXYGEN SATURATION: 99 % | HEART RATE: 72 BPM | WEIGHT: 194.8 LBS

## 2018-11-12 DIAGNOSIS — Z79.4 TYPE 2 DIABETES MELLITUS WITHOUT COMPLICATION, WITH LONG-TERM CURRENT USE OF INSULIN (H): ICD-10-CM

## 2018-11-12 DIAGNOSIS — E11.21 TYPE 2 DIABETES MELLITUS WITH DIABETIC NEPHROPATHY, WITH LONG-TERM CURRENT USE OF INSULIN (H): ICD-10-CM

## 2018-11-12 DIAGNOSIS — R80.9 MICROALBUMINURIA: ICD-10-CM

## 2018-11-12 DIAGNOSIS — E11.9 TYPE 2 DIABETES MELLITUS WITHOUT COMPLICATION, WITH LONG-TERM CURRENT USE OF INSULIN (H): ICD-10-CM

## 2018-11-12 DIAGNOSIS — Z79.4 TYPE 2 DIABETES MELLITUS WITH DIABETIC NEPHROPATHY, WITH LONG-TERM CURRENT USE OF INSULIN (H): ICD-10-CM

## 2018-11-12 DIAGNOSIS — E55.9 VITAMIN D DEFICIENCY: ICD-10-CM

## 2018-11-12 PROCEDURE — 99214 OFFICE O/P EST MOD 30 MIN: CPT | Performed by: INTERNAL MEDICINE

## 2018-11-12 RX ORDER — ERGOCALCIFEROL 1.25 MG/1
50000 CAPSULE ORAL WEEKLY
Qty: 4 CAPSULE | Refills: 0 | Status: CANCELLED | OUTPATIENT
Start: 2018-11-12

## 2018-11-12 NOTE — PATIENT INSTRUCTIONS
[facility]   Dr Fuentes, Endocrinology Department      [facility]   [address] [address]  Appointment [phone]  Fax: [phone]   Monday and Tuesday         [facility]   [address] [place] [address]  Appointment [phone]  Fax: [phone]  Wednesday and Thursday           To provide the best diabetic care, please bring your blood glucose meter to each and every visit with your Endocrinologist.  Your blood glucose meter/insulin pump will be downloaded at every appointment.      Continue metformin  Continue same pump settings  Start Trulicity 0.75 mg ONCE a week  Follow up in 8 weeks    Byetta/exenatide (twice a day) , Buydureon/Exenatide (once a week), Ozempic/Semglutide (once a week), Trulicity/Dulaglutide (once a week), Victoza/Liraglutide (once a day)- these are alternatives. Please check cost with insurance.    Your provider has referred you to Diabetes Education: For all [facility]:  Phone [phone]; Fax [phone]  Please call and make the appointment.--> different insulin pump options.    Check with [employer] if you are eligible for pump upgrade.    Recommend checking blood sugars before meals and at bedtime.    If Blood glucose are low more often-> 2-3 times/week- give us a call.  The patient is advised to Make better food choices: reduce carbs, Reduce portion size, weight loss and exercise 3-4 times a week.  Discussed hypoglycemia signs and symptoms as well as management in detail.

## 2018-11-12 NOTE — MR AVS SNAPSHOT
After Visit Summary   2018    Marie Vogel    MRN: [id number]           Patient Information     Date Of Birth          1980        Visit Information        Provider Department      2018 3:00 PM Zita Fuentes MD [facility]        Today's Diagnoses     Type 2 diabetes mellitus with diabetic nephropathy, with long-term current use of insulin (H)        Vitamin D deficiency        Microalbuminuria        Type 2 diabetes mellitus without complication, with long-term current use of insulin (H)          Care Instructions    [facility]   Dr Fuentes, Endocrinology Department      [facility]   [address] [address]  Appointment [phone]  Fax: [phone]   Monday and Tuesday         [place] [address] [place] [address]  Appointment [phone]  Fax: [phone]  Wednesday and Thursday           To provide the best diabetic care, please bring your blood glucose meter to each and every visit with your Endocrinologist.  Your blood glucose meter/insulin pump will be downloaded at every appointment.      Continue metformin  Continue same pump settings  Start Trulicity 0.75 mg ONCE a week  Follow up in 8 weeks    Byetta/exenatide (twice a day) , Buydureon/Exenatide (once a week), Ozempic/Semglutide (once a week), Trulicity/Dulaglutide (once a week), Victoza/Liraglutide (once a day)- these are alternatives. Please check cost with insurance.    Your provider has referred you to Diabetes Education: For all [facility]:  Phone [phone]; Fax [phone]  Please call and make the appointment.--> different insulin pump options.    Check with [employer] if you are eligible for pump upgrade.    Recommend checking blood sugars before meals and at bedtime.    If Blood glucose are low more often-> 2-3 times/week- give us a  call.  The patient is advised to Make better food choices: reduce carbs, Reduce portion size, weight loss and exercise 3-4 times a week.  Discussed hypoglycemia signs and symptoms as well as management in detail.                          Follow-ups after your visit        Your next 10 appointments already scheduled     Nov 27, 2018  1:45 PM CST   Min OB-GYN Annual Physical with Maldonado Bruner MD   [facility]    [address] [place] [address]  [address]  [facility] [address] [phone]              Who to contact     If you have questions or need follow up information about today's clinic visit or your schedule please contact [facility] directly at [phone].  Normal or non-critical lab and imaging results will be communicated to you by Hello Curryhart, letter or phone within 4 business days after the clinic has received the results. If you do not hear from us within 7 days, please contact the clinic through Zientiat or phone. If you have a critical or abnormal lab result, we will notify you by phone as soon as possible.  Submit refill requests through Modality or call your pharmacy and they will forward the refill request to us. Please allow 3 business days for your refill to be completed.          Additional Information About Your Visit        Modality Information     Modality gives you secure access to your electronic health record. If you see a primary care provider, you can also send messages to your care team and make appointments. If you have questions, please call your primary care clinic.  If you do not have a primary care provider, please call [phone] and they will assist you.        Care EveryWhere ID     This is your Care EveryWhere ID. This could be used by other organizations to access your [place] medical records  [id number]        Your Vitals Were     Pulse Temperature Height Pulse Oximetry Breastfeeding? BMI (Body Mass Index)    72 97.6  F  "(36.4  C) (Oral) 1.626 m (5' 4\") 99% No 33.44 kg/m2       Blood Pressure from Last 3 Encounters:   11/12/18 120/74   11/05/18 124/72   09/17/18 129/89    Weight from Last 3 Encounters:   11/12/18 88.4 kg (194 lb 12.8 oz)   11/05/18 86.4 kg (190 lb 6.4 oz)   09/17/18 86.4 kg (190 lb 6.4 oz)              Today, you had the following     No orders found for display         Today's Medication Changes          These changes are accurate as of 11/12/18  3:39 PM.  If you have any questions, ask your nurse or doctor.               Start taking these medicines.        Dose/Directions    dulaglutide 0.75 MG/0.5ML pen   Commonly known as:  TRULICITY   Used for:  Type 2 diabetes mellitus with diabetic nephropathy, with long-term current use of insulin (H)   Started by:  Zita Fuentes MD        Dose:  0.75 mg   Inject 0.75 mg Subcutaneous every 7 days   Quantity:  9 mL   Refills:  1         These medicines have changed or have updated prescriptions.        Dose/Directions    order for DME   This may have changed:  additional instructions   Used for:  Type 2 diabetes mellitus without complication, with long-term current use of insulin (H)   Changed by:  Zita Fuentes MD        skin prep wipes for insulin pump   Quantity:  100 each   Refills:  0            Where to get your medicines      These medications were sent to [facility] - [address], [facility] [address]    Hours:  Mon-Fri 8:30am-5:00pm Toll Free [phone] Phone:  [phone]     dulaglutide 0.75 MG/0.5ML pen    insulin aspart 100 UNITS/ML injection         Some of these will need a paper prescription and others can be bought over the counter.  Ask your nurse if you have questions.     Bring a paper prescription for each of these medications     order for DME                Primary Care Provider Office Phone # Fax #    Alan Paredes [phone]       " [address] [place] [address]        Equal Access to Services     [facility] VIVIEN : Hadii aad ku hadbernardamadie Weber, wakrishanda giancarlo, qaanata jennamaneida aaron, jr greendiegocuca pickard. So [facility] [phone].    ATENCIÓN: Si habla español, tiene a woodruff disposición servicios gratuitos de asistencia lingüística. Llame al [phone].    We comply with applicable federal civil rights laws and [place] laws. We do not discriminate on the basis of race, color, national origin, age, disability, sex, sexual orientation, or gender identity.            Thank you!     Thank you for choosing [facility]  for your care. Our goal is always to provide you with excellent care. Hearing back from our patients is one way we can continue to improve our services. Please take a few minutes to complete the written survey that you may receive in the mail after your visit with us. Thank you!             Your Updated Medication List - Protect others around you: Learn how to safely use, store and throw away your medicines at www.disposemymeds.org.          This list is accurate as of 11/12/18  3:39 PM.  Always use your most recent med list.                   Brand Name Dispense Instructions for use Diagnosis    ADVIL 200 MG tablet   Generic drug:  ibuprofen      Take 200 mg by mouth every 4 hours as needed.        aspirin 81 MG tablet     90 tablet    Take 1 tablet (81 mg) by mouth daily    PFO (patent foramen ovale)       atenolol 25 MG tablet    TENORMIN    90 tablet    Take 0.5 tablets (12.5 mg) by mouth daily        atorvastatin 20 MG tablet    LIPITOR    90 tablet    Take 1 tablet (20 mg) by mouth daily        blood glucose monitoring lancets     100 each    1 Device See Admin Instructions. Use up to 5 times daily for blood sugar checks.    Type 2 diabetes, HbA1c goal < 7% (H)       blood glucose monitoring meter device kit    no brand specified    1 kit    Use to test blood sugar 6 times daily and  as needed.    Type 2 diabetes mellitus with diabetic nephropathy (H)       blood glucose monitoring test strip    PEPE CONTOUR NEXT    120 each    Check 4 times/day    Type 2 diabetes mellitus with diabetic nephropathy, with long-term current use of insulin (H)       cetirizine 10 MG tablet    zyrTEC    90 tablet    Take 10 mg by mouth 2 times daily        cyclobenzaprine 10 MG tablet    FLEXERIL    20 tablet    Take 1 tablet (10 mg) by mouth nightly as needed for muscle spasms    Low back pain without sciatica, unspecified back pain laterality, unspecified chronicity       dulaglutide 0.75 MG/0.5ML pen    TRULICITY    9 mL    Inject 0.75 mg Subcutaneous every 7 days    Type 2 diabetes mellitus with diabetic nephropathy, with long-term current use of insulin (H)       EPINEPHrine 0.3 MG/0.3ML injection 2-pack    EPIPEN/ADRENACLICK/or ANY BX GENERIC EQUIV    0.3 mL    Inject 0.3 mLs (0.3 mg) into the muscle once as needed for anaphylaxis    Nut allergy       ergocalciferol 70543 units capsule    ERGOCALCIFEROL    4 capsule    Take 1 capsule (50,000 Units) by mouth once a week    Vitamin D deficiency, Microalbuminuria       Fish Oil 500 MG Caps      Take 1 capsule by mouth        fluticasone 50 MCG/ACT spray    FLONASE    1 Bottle    Spray 1-2 sprays into both nostrils daily    Acute sinusitis with symptoms > 10 days       insulin aspart 100 UNITS/ML injection    NovoLOG VIAL    90 mL    With insulin pump. Uses about 80 units/day.    Type 2 diabetes mellitus with diabetic nephropathy, with long-term current use of insulin (H)       * insulin cartridge misc pump supply     30 each    CHANGE EVERY 3 DAYS AS DIRECTED    Type 2 diabetes mellitus with diabetic nephropathy, with long-term current use of insulin (H)       * infusion set misc pump supply     30 each    CHANGE EVERY 3 DAYS AS DIRECTED    Type 2 diabetes mellitus with diabetic nephropathy, with long-term current use of insulin (H)       insulin pump infusion       Updated 1/27/17: [employer] Minimed: Model 723 BASAL: 00:00: 1.0 units/hr 10:30: 0.95 14:00: 1.05 CARB RATIO: 00:00 1:7 1600  1:8 C. Factor: 27 mg/dL BLOOD GLUCOSE TARGET and times: 12   AM (midnight):  Active Insulin Time:  3 hours Basal to Bolus Ratio:  Sensor:  No Carelink / Diasend username:  erick Carelink / Diasend Password:  [email or web address]    Type 2 diabetes mellitus with diabetic nephropathy, with long-term current use of insulin (H)       losartan 50 MG tablet    COZAAR    135 tablet    TAKE ONE TABLET BY MOUTH EVERY MORNING AND TAKE ONE-HALF TABLET BY MOUTH EVERY EVENING    Microalbuminuria       MAGNESIUM OXIDE PO      Take 400 mg by mouth daily        metFORMIN 500 MG 24 hr tablet    GLUCOPHAGE-XR    360 tablet    Take 2 tablets (1,000 mg) by mouth 2 times daily (with meals)    Type 2 diabetes mellitus with diabetic nephropathy, with long-term current use of insulin (H)       montelukast 10 MG tablet    SINGULAIR    90 tablet    Take 1 tablet (10 mg) by mouth At Bedtime    Allergic rhinitis       MULTI VITAMIN/MINERALS PO      Take 1 each by mouth daily.        omeprazole 40 MG capsule    priLOSEC    90 capsule    TAKE ONE CAPSULE BY MOUTH EVERY DAY    Gastroesophageal reflux disease without esophagitis, Hiatal hernia       order for DME     100 each    skin prep wipes for insulin pump    Type 2 diabetes mellitus without complication, with long-term current use of insulin (H)       SUMAtriptan 25 MG tablet    IMITREX    8 tablet    Take 25-100mg one time. May repeat 25mg every two hours up to a maximum of 200mg in 24 hours.    Other migraine without status migrainosus, not intractable       * Notice:  This list has 2 medication(s) that are the same as other medications prescribed for you. Read the directions carefully, and ask your doctor or other care provider to review them with you.

## 2018-11-12 NOTE — LETTER
11/12/2018         RE: Marie Vogel  [address] [facility] [address]  [place] [address]        Dear Colleague,    Thank you for referring your patient, Marie Vogel, to the [facility]. Please see a copy of my visit note below.    Name: Marie Vogel  Seen for f/u of DM  HPI:  Marie Vogel is a 38 year old female who presents for the evaluation/management of DM.  Had CGM in 1/2017.  On pump  Medtronic Minimed Paradigm+ CGM (dexcom) though not using sensors currently.    Here for f/u. Previously has seen endo at [place] ( Dr Aguilera and Dr Zhou and ). 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 Busy at work, also variable schedule.. Concerned about weight gain.   H/o cardiomyopathy. Followed by cardiology.    Not checking BG everyday. Does bolus based on carbs. Sometimes does not take any bolus if busy at work.    1. Type 2 DM:  Orginally diagnosed at the age of: 21 with GDM during her first pregnancy. Diagnosed with DM 2 in 1/2002, diet controlled for 3 years, then started on metformin. In 2010 during her second pregnancy, started on insulin and then insulin pump therapy in 2012.   Current Regimen: Insulin pump therapy ( Medtronic Minimed Paradigm), metformin XR 1000 mg bid  BS checks: 4-5 times per day  Average Meter Download:     Current Regimen:   Insulin pump -   Time Rate (U/hr)   0000-10.30 1.65   8376-0607 1.40   8442-8879 1.20     Carbohydrate Ratio -    Time Ratio   4889-8275 7   8074-2899 7     Sensitivity   27   Active Insulin Time  3 hours   Basal  61%   Bolus   39 %   Total Carbohydrates/day  113   Total Insulin/day   56.2 + 8.9   Average Blood Sugar 197   BS Checks    Care Link Username-   Password-         Complications:   Diabetes Complications  Description / Detail    Diabetic Retinopathy  No   CAD / PAD  No   Neuropathy  No   Nephropathy / Microalbuminuria  Yes: microalbuminuria. ON cozaar.   Gastroparesis  No   Hypoglycemia [place]  No      2. Hypertension: Blood Pressure today:   BP Readings from Last 3 Encounters:   18 120/74   18 124/72   18 129/89     Takes medications everyday without forgetting a dose.  Denies feeling lightheaded or dizzy.    3. Hyperlipidemia:   Denies muscle aches of pains.       PMH/PSH:  Past Medical History:   Diagnosis Date     Allergic rhinitis due to pollen      Atypical glandular cells on Pap smear 3/1108     Gastroesophageal reflux disease      PFO (patent foramen ovale)      Type II or unspecified type diabetes mellitus without mention of complication, not stated as uncontrolled     onset was gestational in      Past Surgical History:   Procedure Laterality Date     C INDUCED ABORTN BY D&C           C IUD,MIRENA  8/10/10, 7/28/15     C/SECTION, LOW TRANSVERSE  6/25/10    , Low Transverse     CHOLECYSTECTOMY, LAPOROSCOPIC      Cholecystectomy, Laparoscopic     ESOPHAGOSCOPY, GASTROSCOPY, DUODENOSCOPY (EGD), COMBINED N/A 2016    Procedure: COMBINED ESOPHAGOSCOPY, GASTROSCOPY, DUODENOSCOPY (EGD), BIOPSY SINGLE OR MULTIPLE;  Surgeon: Fadi Hunter MD;  Location: [facility] ESOPHAGEAL MOTILITY STUDY N/A 2016    Procedure: ESOPHAGEAL MOTILITY STUDY;  Surgeon: Fadi Hunter MD;  Location: [facility] REMOVE TONSILS/ADENOIDS,<13 Y/O      T &A     Family Hx:  Family History   Problem Relation Age of Onset     Cardiovascular Mother      hypertrophic cardiomyopathy     Genitourinary Problems Mother      gallbladder disease     Diabetes Mother      drug induced     Other - See Comments Mother      heart transplant 2005     Diabetes Father      type 2     Hypertension Father      Genitourinary Problems Maternal Grandmother      gallbladder disease     Genitourinary Problems Paternal Grandmother      gallbladder disease     Hypertension Paternal Grandfather      high bp     Cerebrovascular Disease Paternal Grandfather      Cardiovascular  Brother      hypertrophic cardiomyopathy     Diabetes Brother      type 2     Glaucoma No family hx of      Macular Degeneration No family hx of      Thyroid disease: No         DM2: Yes - father and mother         Autoimmune: DM1, SLE, RA, Vitiligo No    Social Hx:  Social History     Social History     Marital status:      Spouse name: N/A     Number of children: 2     Years of education: 14     Occupational History     nurse      Social History Main Topics     Smoking status: Former Smoker     Types: Cigarettes     Quit date: 10/24/2005     Smokeless tobacco: Former User      Comment: States only smokes when drinks maybe 2x/year     Alcohol use No     Drug use: No     Sexual activity: Yes     Partners: Male     Birth control/ protection: IUD      Comment: Mirena IUD 7/28/15     Other Topics Concern     Not on file     Social History Narrative    Caffeine intake/servings daily - 6-7    Calcium intake/servings daily - 2-3 yogurt, milk, cheese    Exercise 1 times weekly - describe aerobics    Sunscreen used - Yes    Seatbelts used - Yes    Guns stored in the home - No    Self Breast Exam - Yes    Pap test up to date -  Yes    Eye exam up to date -  Yes    Dental exam up to date -  Yes    DEXA scan up to date -  Not Applicable    Flex Sig/Colonoscopy up to date -  Not Applicable    Mammography up to date -  Not Applicable    Immunizations reviewed and up to date - Yes    Abuse: Current or Past (Physical, Sexual or Emotional) - No    Do you feel safe in your environment - Yes    Do you cope well with stress - Yes    Do you suffer from insomnia - Yes     Last updated by: Tatianna Lacey  5/9/2005              MEDICATIONS:  has a current medication list which includes the following prescription(s): aspirin, atenolol, atorvastatin, blood glucose monitoring, blood glucose monitoring, cetirizine, cyclobenzaprine, epinephrine, ergocalciferol, fluticasone, ibuprofen, infusion set, insulin aspart, insulin cartridge,  "insulin pump, blood glucose monitoring, losartan, magnesium oxide, metformin, montelukast, multiple vitamins-minerals, fish oil, omeprazole, order for dme, and sumatriptan.    ROS     ROS: 10 point ROS neg other than the symptoms noted above in the HPI.    Physical Exam   VS: /74 (BP Location: Right arm, Patient Position: Chair, Cuff Size: Adult Regular)  Pulse 72  Temp 97.6  F (36.4  C) (Oral)  Ht 1.626 m (5' 4\")  Wt 88.4 kg (194 lb 12.8 oz)  SpO2 99%  Breastfeeding? No  BMI 33.44 kg/m2    GENERAL: AXOX3, NAD, well dressed, answering questions appropriately, appears stated age.  HEENT: No exopthalmous, no proptosis, EOMI, no lig lag, no retraction  NECK: Thyroid normal in size, non tender, no nodules were palpated.  CV: RRR  LUNGS: CTAB  ABDOMEN: +BS  NEUROLOGY: CN grossly intact, no tremors  PSYCH: normal affect and mood    LABS:  A1c:   Lab Results   Component Value Date    A1C 8.8 11/05/2018    A1C 7.7 06/15/2018    A1C 8.5 10/24/2017    A1C 7.2 04/17/2017    A1C 7.2 12/27/2016       Basic Metabolic Panel:  Creatinine   Date Value Ref Range Status   07/22/2018 0.92 0.52 - 1.04 mg/dL Final       LFTS/Cholesterol Panel:  Liver Function Studies -   Recent Labs   Lab Test  11/05/18   0750   PROTTOTAL  7.0   ALBUMIN  3.6   BILITOTAL  0.4   ALKPHOS  84   AST  22   ALT  32     Recent Labs   Lab Test  06/15/18   0732  10/24/17   1108   07/01/15   0612  03/27/14   1022   CHOL  184  192   < >  190  146   HDL  35*  47*   < >  42*  32*   LDL  111*  100*   < >  96  77   TRIG  189*  227*   < >  260*  186*   CHOLHDLRATIO   --    --    --   4.5  4.5    < > = values in this interval not displayed.       Thyroid Function:  ENDO THYROID LABS-[facility] Latest Ref Rng & Units 4/16/2017   TSH 0.40 - 4.00 mU/L 2.06     ENDO THYROID LABS-[facility] Latest Ref Rng 9/27/2016 4/2/2014   TSH 0.40 - 4.00 mU/L 2.17 1.09   T4 FREE 0.70 - 1.85 ng/dL  0.75       Urine MicroAlbumin:  Lab Results   Component Value Date    UMALCR 654.32 11/05/2018 "      Vitamin D:  Vitamin D Deficiency Screening Results:  Lab Results   Component Value Date    VITDT 33 11/05/2018    VITDT 39 09/27/2016    VITDT 45 02/23/2016    VITDT 31 03/31/2015    VITDT 29 (L) 03/27/2014         All pertinent notes, labs, and images personally reviewed by me.     A/P  Ms.Erica WILLIAM Vogel is a 37 year old here for the evaluation/management of diabetes:    1. DM2 - Under Fair control.  A1c increased to 8.8%.  H/o hypertropic cardiomyopathy, followed by cardiology.  Blood sugars are variable.  Limited blood sugar data.  Sometimes she is bolusing twice (forgets if she had taken bolus)  Has variable shifts at work.  Will benefit from continued use of Dexcom which she has at home but she does not like to on it.  Discussed [employer] 670 G + CGM in detail-- she will check with insurance.  We will also refer her to diabetic educator to talk about different insulin pumps and see which is a good fit for her.  Continue current pump settings for now.    Continue metformin  1000 mg twice daily  Trial of GLP-1.  She was on Byetta in the past but which was stopped during pregnancy.  Start Trulicity 0.75 mg once a week    Byetta/exenatide (twice a day) , Buydureon/Exenatide (once a week), Ozempic/Semglutide (once a week), Trulicity/Dulaglutide (once a week), Victoza/Liraglutide (once a day)- these are alternatives. Please check cost with insurance.    Possible side effect profile of these class of medication includes: Nausea, diarrhea, gastrointestinal intolerance, abdominal pain, upper respiratory tract infection, headache, increased heart rate, drop in blood sugar, injection site reaction.  Rare side effects include pancreatitis, kidney problems.  It has been associated with thyroid cancer in animal models.  Discussed indications, risks and benefits of all medications prescribed, and answered questions to patient's satisfaction.  The patient indicates understanding of these issues and agrees with the  plan.    Recommend checking blood sugars before meals and at bedtime.    If Blood glucose are low more often-> 2-3 times/week- give us a call.  The patient is advised to Make better food choices: reduce carbs, Reduce portion size, weight loss and exercise 3-4 times a week.  Discussed hypoglycemia signs and symptoms as well as management in detail.    Prevention    Most Recent Immunizations   Administered Date(s) Administered     Influenza (IIV3) PF 09/26/2018     MMR 09/17/2007     TD (ADULT, 7+) 01/01/2002     TDAP Vaccine (Adacel) 04/06/2012     Opthalmology-2017  Dental-Yes  ASA-81 mg   Smoking- No    2. Hypertension - Under Good control.  + microalbuminuria. On losartan. Blood pressure medications include cozaar 75 mg qd. Need aggressive BP/glycmeic control.    3. Hyperlipidemia - Under fair control.TG high with low HDL, LD at 111, HDL 35. Takes Lipitor 20 mg for lipid control.  Continue current meds.  -- consider increasing dose (goal LDL < 70)  Recommend to use it consistently.    4. Microalbuminuria:  Recommend strict BG control.  Continue Cozaar    5. Vit D deficiency:  On high dose vit D replacement- completed.  Recommend OTC vit D 2000 international unit(s)/day    All questions were answered.  The patient indicates understanding of the above issues and agrees with the plan set forth.       More than 50% of face to face time spent with Ms. Dino Vogel on counseling / coordinating her care.  Total appointment times was 30 minutes.      Follow-up:  follow up in 8 weeks    Zita Fuentes MD  Endocrinology   [facility]    CC: Alan Paredes    Addendum to above note and clinic visit:    Labs reviewed.    See result note/telephone encounter.                Again, thank you for allowing me to participate in the care of your patient.        Sincerely,        Zita Fuentes MD

## 2018-11-12 NOTE — NURSING NOTE
"ENDOCRINOLOGY INTAKE FORM    Patient Name:  Marie Vogel  :  1980    Is patient Diabetic?   Yes: type 2  Does patient have non-diabetic or other endocrine issues?  Yes:     Vitals: /74 (BP Location: Right arm, Patient Position: Chair, Cuff Size: Adult Regular)  Pulse 72  Temp 97.6  F (36.4  C) (Oral)  Ht 1.626 m (5' 4\")  Wt 88.4 kg (194 lb 12.8 oz)  SpO2 99%  Breastfeeding? No  BMI 33.44 kg/m2  BMI= Body mass index is 33.44 kg/(m^2).    Flu vaccine:  Yes:   Pneumonia vaccine:  Yes: pneumovax    Smoking and Alcohol use:  Social History   Substance Use Topics     Smoking status: Former Smoker     Types: Cigarettes     Quit date: 10/24/2005     Smokeless tobacco: Former User      Comment: States only smokes when drinks maybe 2x/year     Alcohol use No       Foot Exam: No  Eye Exam:  Yes: 18  Dental Exam:  No  Aspirin Use:  Yes: 81 mg    Lab Results   Component Value Date    A1C 8.8 2018    A1C 7.7 06/15/2018    A1C 8.5 10/24/2017    A1C 7.2 2017    A1C 7.2 2016       Lab Results   Component Value Date    MICROL 1060 2018     No results found for: MICROALBUMIN    Lenore Hurley CMA    "

## 2018-11-12 NOTE — PROGRESS NOTES
Name: Marie Vogel  Seen for f/u of DM  HPI:  Marie Vogel is a 38 year old female who presents for the evaluation/management of DM.  Had CGM in 1/2017.  On pump  Medtronic Minimed Paradigm+ CGM (dexcom) though not using sensors currently.    Here for f/u. Previously has seen endo at [place] ( Dr Aguilera and Dr Zhou and ). 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 Concerned about weight gain.   H/o cardiomyopathy. Followed by cardiology.    Not checking BG everyday. Does bolus based on carbs. Sometimes does not take any bolus if busy at work.    1. Type 2 DM:  Orginally diagnosed at the age of: 21 with GDM during her first pregnancy. Diagnosed with DM 2 in 1/2002, diet controlled for 3 years, then started on metformin. In 2010 during her second pregnancy, started on insulin and then insulin pump therapy in 2012.   Current Regimen: Insulin pump therapy ( Medtronic Minimed Paradigm), metformin XR 1000 mg bid  BS checks: 4-5 times per day  Average Meter Download:     Current Regimen:   Insulin pump -   Time Rate (U/hr)   0000-10.30 1.65   9180-4435 1.40   5060-3487 1.20     Carbohydrate Ratio -    Time Ratio   1989-8036 7   3852-6642 7     Sensitivity   27   Active Insulin Time  3 hours   Basal  61%   Bolus   39 %   Total Carbohydrates/day  113   Total Insulin/day   56.2 + 8.9   Average Blood Sugar 197   BS Checks    Care Link Username-   Password-         Complications:   Diabetes Complications  Description / Detail    Diabetic Retinopathy  No   CAD / PAD  No   Neuropathy  No   Nephropathy / Microalbuminuria  Yes: microalbuminuria. ON cozaar.   Gastroparesis  No   Hypoglycemia [place]  No     2. Hypertension: Blood Pressure today:   BP Readings from Last 3 Encounters:   11/12/18 120/74   11/05/18 124/72   09/17/18 129/89     Takes medications everyday without forgetting a dose.  Denies feeling lightheaded or dizzy.    3. Hyperlipidemia:   Denies  muscle aches of pains.       PMH/PSH:  Past Medical History:   Diagnosis Date     Allergic rhinitis due to pollen      Atypical glandular cells on Pap smear 3/1108     Gastroesophageal reflux disease      PFO (patent foramen ovale)      Type II or unspecified type diabetes mellitus without mention of complication, not stated as uncontrolled     onset was gestational in      Past Surgical History:   Procedure Laterality Date     C INDUCED ABORTN BY D&C           C IUD,MIRENA  8/10/10, 7/28/15     C/SECTION, LOW TRANSVERSE  6/25/10    , Low Transverse     CHOLECYSTECTOMY, LAPOROSCOPIC      Cholecystectomy, Laparoscopic     ESOPHAGOSCOPY, GASTROSCOPY, DUODENOSCOPY (EGD), COMBINED N/A 2016    Procedure: COMBINED ESOPHAGOSCOPY, GASTROSCOPY, DUODENOSCOPY (EGD), BIOPSY SINGLE OR MULTIPLE;  Surgeon: Fadi Hunter MD;  Location:  GI      ESOPHAGEAL MOTILITY STUDY N/A 2016    Procedure: ESOPHAGEAL MOTILITY STUDY;  Surgeon: Fadi Hunter MD;  Location:  GI      REMOVE TONSILS/ADENOIDS,<13 Y/O      T &A     Family Hx:  Family History   Problem Relation Age of Onset     Cardiovascular Mother      hypertrophic cardiomyopathy     Genitourinary Problems Mother      gallbladder disease     Diabetes Mother      drug induced     Other - See Comments Mother      heart transplant 2005     Diabetes Father      type 2     Hypertension Father      Genitourinary Problems Maternal Grandmother      gallbladder disease     Genitourinary Problems Paternal Grandmother      gallbladder disease     Hypertension Paternal Grandfather      high bp     Cerebrovascular Disease Paternal Grandfather      Cardiovascular Brother      hypertrophic cardiomyopathy     Diabetes Brother      type 2     Glaucoma No family hx of      Macular Degeneration No family hx of      Thyroid disease: No         DM2: Yes - father and mother         Autoimmune: DM1, SLE, RA, Vitiligo No    Social  Hx:  Social History     Social History     Marital status:      Spouse name: N/A     Number of children: 2     Years of education: 14     Occupational History     nurse      Social History Main Topics     Smoking status: Former Smoker     Types: Cigarettes     Quit date: 10/24/2005     Smokeless tobacco: Former User      Comment: States only smokes when drinks maybe 2x/year     Alcohol use No     Drug use: No     Sexual activity: Yes     Partners: Male     Birth control/ protection: IUD      Comment: Mirena IUD 7/28/15     Other Topics Concern     Not on file     Social History Narrative    Caffeine intake/servings daily - 6-7    Calcium intake/servings daily - 2-3 yogurt, milk, cheese    Exercise 1 times weekly - describe aerobics    Sunscreen used - Yes    Seatbelts used - Yes    Guns stored in the home - No    Self Breast Exam - Yes    Pap test up to date -  Yes    Eye exam up to date -  Yes    Dental exam up to date -  Yes    DEXA scan up to date -  Not Applicable    Flex Sig/Colonoscopy up to date -  Not Applicable    Mammography up to date -  Not Applicable    Immunizations reviewed and up to date - Yes    Abuse: Current or Past (Physical, Sexual or Emotional) - No    Do you feel safe in your environment - Yes    Do you cope well with stress - Yes    Do you suffer from insomnia - Yes     Last updated by: Tatianna Lacey  5/9/2005              MEDICATIONS:  has a current medication list which includes the following prescription(s): aspirin, atenolol, atorvastatin, blood glucose monitoring, blood glucose monitoring, cetirizine, cyclobenzaprine, epinephrine, ergocalciferol, fluticasone, ibuprofen, infusion set, insulin aspart, insulin cartridge, insulin pump, blood glucose monitoring, losartan, magnesium oxide, metformin, montelukast, multiple vitamins-minerals, fish oil, omeprazole, order for dme, and sumatriptan.    ROS     ROS: 10 point ROS neg other than the symptoms noted above in the HPI.    Physical  "Exam   VS: /74 (BP Location: Right arm, Patient Position: Chair, Cuff Size: Adult Regular)  Pulse 72  Temp 97.6  F (36.4  C) (Oral)  Ht 1.626 m (5' 4\")  Wt 88.4 kg (194 lb 12.8 oz)  SpO2 99%  Breastfeeding? No  BMI 33.44 kg/m2    GENERAL: AXOX3, NAD, well dressed, answering questions appropriately, appears stated age.  HEENT: No exopthalmous, no proptosis, EOMI, no lig lag, no retraction  NECK: Thyroid normal in size, non tender, no nodules were palpated.  CV: RRR  LUNGS: CTAB  ABDOMEN: +BS  NEUROLOGY: CN grossly intact, no tremors  PSYCH: normal affect and mood    LABS:  A1c:   Lab Results   Component Value Date    A1C 8.8 11/05/2018    A1C 7.7 06/15/2018    A1C 8.5 10/24/2017    A1C 7.2 04/17/2017    A1C 7.2 12/27/2016       Basic Metabolic Panel:  Creatinine   Date Value Ref Range Status   07/22/2018 0.92 0.52 - 1.04 mg/dL Final       LFTS/Cholesterol Panel:  Liver Function Studies -   Recent Labs   Lab Test  11/05/18   0750   PROTTOTAL  7.0   ALBUMIN  3.6   BILITOTAL  0.4   ALKPHOS  84   AST  22   ALT  32     Recent Labs   Lab Test  06/15/18   0732  10/24/17   1108   07/01/15   0612  03/27/14   1022   CHOL  184  192   < >  190  146   HDL  35*  47*   < >  42*  32*   LDL  111*  100*   < >  96  77   TRIG  189*  227*   < >  260*  186*   CHOLHDLRATIO   --    --    --   4.5  4.5    < > = values in this interval not displayed.       Thyroid Function:  ENDO THYROID LABS-[facility] Latest Ref Rng & Units 4/16/2017   TSH 0.40 - 4.00 mU/L 2.06     ENDO THYROID LABS-[facility] Latest Ref Rng 9/27/2016 4/2/2014   TSH 0.40 - 4.00 mU/L 2.17 1.09   T4 FREE 0.70 - 1.85 ng/dL  0.75       Urine MicroAlbumin:  Lab Results   Component Value Date    UMALCR 654.32 11/05/2018      Vitamin D:  Vitamin D Deficiency Screening Results:  Lab Results   Component Value Date    VITDT 33 11/05/2018    VITDT 39 09/27/2016    VITDT 45 02/23/2016    VITDT 31 03/31/2015    VITDT 29 (L) 03/27/2014         All pertinent notes, labs, and images " personally reviewed by me.     A/P  Ms.Marie Vogel is a 37 year old here for the evaluation/management of diabetes:    1. DM2 - Under Fair control.  A1c increased to 8.8%.  H/o hypertropic cardiomyopathy, followed by cardiology.  Blood sugars are variable.  Limited blood sugar data.  Sometimes she is bolusing twice (forgets if she had taken bolus)  Has variable shifts at work.  Will benefit from continued use of Dexcom which she has at home but she does not like to on it.  Discussed [employer] 670 G + CGM in detail-- she will check with insurance.  We will also refer her to diabetic educator to talk about different insulin pumps and see which is a good fit for her.  Continue current pump settings for now.    Continue metformin  1000 mg twice daily  Trial of GLP-1.  She was on Byetta in the past but which was stopped during pregnancy.  Start Trulicity 0.75 mg once a week    Byetta/exenatide (twice a day) , Buydureon/Exenatide (once a week), Ozempic/Semglutide (once a week), Trulicity/Dulaglutide (once a week), Victoza/Liraglutide (once a day)- these are alternatives. Please check cost with insurance.    Possible side effect profile of these class of medication includes: Nausea, diarrhea, gastrointestinal intolerance, abdominal pain, upper respiratory tract infection, headache, increased heart rate, drop in blood sugar, injection site reaction.  Rare side effects include pancreatitis, kidney problems.  It has been associated with thyroid cancer in animal models.  Discussed indications, risks and benefits of all medications prescribed, and answered questions to patient's satisfaction.  The patient indicates understanding of these issues and agrees with the plan.    Recommend checking blood sugars before meals and at bedtime.    If Blood glucose are low more often-> 2-3 times/week- give us a call.  The patient is advised to Make better food choices: reduce carbs, Reduce portion size, weight loss and exercise  3-4 times a week.  Discussed hypoglycemia signs and symptoms as well as management in detail.    Prevention    Most Recent Immunizations   Administered Date(s) Administered     Influenza (IIV3) PF 09/26/2018     MMR 09/17/2007     TD (ADULT, 7+) 01/01/2002     TDAP Vaccine (Adacel) 04/06/2012     Opthalmology-2017  Dental-Yes  ASA-81 mg   Smoking- No    2. Hypertension - Under Good control.  + microalbuminuria. On losartan. Blood pressure medications include cozaar 75 mg qd. Need aggressive BP/glycmeic control.    3. Hyperlipidemia - Under fair control.TG high with low HDL, LD at 111, HDL 35. Takes Lipitor 20 mg for lipid control.  Continue current meds.  -- consider increasing dose (goal LDL < 70)  Recommend to use it consistently.    4. Microalbuminuria:  Recommend strict BG control.  Continue Cozaar    5. Vit D deficiency:  On high dose vit D replacement- completed.  Recommend OTC vit D 2000 international unit(s)/day    All questions were answered.  The patient indicates understanding of the above issues and agrees with the plan set forth.       More than 50% of face to face time spent with Ms. Dino Vogel on counseling / coordinating her care.  Total appointment times was 30 minutes.      Follow-up:  follow up in 8 weeks    Zita Fuentes MD  Endocrinology   [facility]    CC: Alan Paredes    Addendum to above note and clinic visit:    Labs reviewed.    See result note/telephone encounter.

## 2018-11-16 ENCOUNTER — OFFICE VISIT (OUTPATIENT)
Dept: OPTOMETRY | Facility: CLINIC | Age: 38
End: 2018-11-16
Payer: COMMERCIAL

## 2018-11-16 DIAGNOSIS — H52.13 MYOPIA WITH ASTIGMATISM, BILATERAL: Primary | ICD-10-CM

## 2018-11-16 DIAGNOSIS — H52.203 MYOPIA WITH ASTIGMATISM, BILATERAL: Primary | ICD-10-CM

## 2018-11-16 PROCEDURE — 99207 ZZC NO BILLABLE SERVICE THIS VISIT: CPT | Performed by: OPTOMETRIST

## 2018-11-16 ASSESSMENT — REFRACTION_CURRENTRX
OS_BRAND: AIR OPTIX FOR ASTIGMATISM
OD_BRAND: AIR OPTIX FOR ASTIGMATISM
OS_BASECURVE: 8.7
OD_AXIS: 020
OD_BASECURVE: 8.7
OD_SPHERE: -2.00
OD_CYLINDER: -2.25
OS_AXIS: 140
OS_DIAMETER: 14.5
OS_SPHERE: -1.75
OS_CYLINDER: -2.25
OD_DIAMETER: 14.5

## 2018-11-16 ASSESSMENT — VISUAL ACUITY: CORRECTION_TYPE: CONTACTS

## 2018-11-16 NOTE — LETTER
11/16/2018         RE: Marie Vogel  [address]  [place] [address]        Dear Colleague,    Thank you for referring your patient, Marie Vogel, to the [facility]. Please see a copy of my visit note below.    Chief Complaint   Patient presents with     Contact Lens Check     Dispense appt for new CL trials     Satisfied with contacts:  Yes    Good comfort:  Yes- right eye feels a little scratchy  Clear vision:     Yes    Orly Vuong, Optometric Assistant          Medical, surgical and family histories reviewed and updated 11/16/2018.       OBJECTIVE: See Ophthalmology exam    ASSESSMENT:    ICD-10-CM    1. Myopia with astigmatism, bilateral H52.13 CONTACT LENS CHECK    H52.203       PLAN:  Good fit/ visual acuity with current lenses  Discussed proper orientation of lens for better visual acuity initially      Gabrieal Bceker OD                   Again, thank you for allowing me to participate in the care of your patient.        Sincerely,        Gabriela Becker, OD

## 2018-11-16 NOTE — MR AVS SNAPSHOT
After Visit Summary   11/16/2018    Marie Vogel    MRN: [id number]           Patient Information     Date Of Birth          1980        Visit Information        Provider Department      11/16/2018 12:20 PM Gabriela Becker OD [facility]        Today's Diagnoses     Myopia with astigmatism, bilateral    -  1      Care Instructions    Once your contact lens prescription is finalized and you are not having any problems with the trials or current lenses you can order your supply of lenses.   You can order your contact lenses online at [email or web address].  Click on services at the top of the page, then eye care and you will see the link to order contact lenses.  You can also order contact lenses at [phone]. There is no shipping fee if you order an annual supply otherwise  be sure to let them know which office you would like to  the lenses, [facility] [phone].             Follow-ups after your visit        Follow-up notes from your care team     Return if symptoms worsen or fail to improve.      Your next 10 appointments already scheduled     Nov 27, 2018  1:45 PM CST   MyChart OB-GYN Annual Physical with Maldonado Bruner MD   [facility]    [address]  [address]  [place] [place] [facility] [address] [phone]            Dec 21, 2018  2:30 PM CST   Diabetes Education with Piedad Garcia RD   [facility] ([facility])    [address] [address] [address]   [phone]            Jan 14, 2019  3:30 PM CST   Return Visit with Zita Fuentes MD   [facility] ([facility])    [address] [place] [address]  [address]  [facility] [id number] [phone]              Who to contact     If you have questions or need follow up information about today's clinic visit or your schedule please contact [facility] directly at  [phone].  Normal or non-critical lab and imaging results will be communicated to you by MyChart, letter or phone within 4 business days after the clinic has received the results. If you do not hear from us within 7 days, please contact the clinic through [employer] or phone. If you have a critical or abnormal lab result, we will notify you by phone as soon as possible.  Submit refill requests through [employer] or call your pharmacy and they will forward the refill request to us. Please allow 3 business days for your refill to be completed.          Additional Information About Your Visit        [employer] Information     [employer] gives you secure access to your electronic health record. If you see a primary care provider, you can also send messages to your care team and make appointments. If you have questions, please call your primary care clinic.  If you do not have a primary care provider, please call [phone] and they will assist you.        Care EveryWhere ID     This is your Care EveryWhere ID. This could be used by other organizations to access your [place] medical records  [id number]         Blood Pressure from Last 3 Encounters:   11/12/18 120/74   11/05/18 124/72   09/17/18 129/89    Weight from Last 3 Encounters:   11/12/18 88.4 kg (194 lb 12.8 oz)   11/05/18 86.4 kg (190 lb 6.4 oz)   09/17/18 86.4 kg (190 lb 6.4 oz)              We Performed the Following     CONTACT LENS CHECK        Primary Care Provider Office Phone # Fax #    Alan Paredes [phone] [address] [place] [address]        Equal Access to Services     : Hadii aad ku hadasho Soomaali, waaxda luqadaha, qaybta kaalmada galen, jr pickard. So [facility] [phone].    ATENCIÓN: Si habla español, tiene a woodruff disposición servicios gratuitos de asistencia lingüística. Llame al [phone].    We comply with applicable federal civil rights laws and  [place] laws. We do not discriminate on the basis of race, color, national origin, age, disability, sex, sexual orientation, or gender identity.            Thank you!     Thank you for choosing [facility]  for your care. Our goal is always to provide you with excellent care. Hearing back from our patients is one way we can continue to improve our services. Please take a few minutes to complete the written survey that you may receive in the mail after your visit with us. Thank you!             Your Updated Medication List - Protect others around you: Learn how to safely use, store and throw away your medicines at www.disposemymeds.org.          This list is accurate as of 11/16/18 12:44 PM.  Always use your most recent med list.                   Brand Name Dispense Instructions for use Diagnosis    ADVIL 200 MG tablet   Generic drug:  ibuprofen      Take 200 mg by mouth every 4 hours as needed.        aspirin 81 MG tablet     90 tablet    Take 1 tablet (81 mg) by mouth daily    PFO (patent foramen ovale)       atenolol 25 MG tablet    TENORMIN    90 tablet    Take 0.5 tablets (12.5 mg) by mouth daily        atorvastatin 20 MG tablet    LIPITOR    90 tablet    Take 1 tablet (20 mg) by mouth daily        blood glucose monitoring lancets     100 each    1 Device See Admin Instructions. Use up to 5 times daily for blood sugar checks.    Type 2 diabetes, HbA1c goal < 7% (H)       blood glucose monitoring meter device kit    no brand specified    1 kit    Use to test blood sugar 6 times daily and as needed.    Type 2 diabetes mellitus with diabetic nephropathy (H)       blood glucose monitoring test strip    PEPE CONTOUR NEXT    120 each    Check 4 times/day    Type 2 diabetes mellitus with diabetic nephropathy, with long-term current use of insulin (H)       cetirizine 10 MG tablet    zyrTEC    90 tablet    Take 10 mg by mouth 2 times daily        cyclobenzaprine 10 MG tablet    FLEXERIL    20 tablet     Take 1 tablet (10 mg) by mouth nightly as needed for muscle spasms    Low back pain without sciatica, unspecified back pain laterality, unspecified chronicity       dulaglutide 0.75 MG/0.5ML pen    TRULICITY    9 mL    Inject 0.75 mg Subcutaneous every 7 days    Type 2 diabetes mellitus with diabetic nephropathy, with long-term current use of insulin (H)       EPINEPHrine 0.3 MG/0.3ML injection 2-pack    EPIPEN/ADRENACLICK/or ANY BX GENERIC EQUIV    0.3 mL    Inject 0.3 mLs (0.3 mg) into the muscle once as needed for anaphylaxis    Nut allergy       ergocalciferol 74933 units capsule    ERGOCALCIFEROL    4 capsule    Take 1 capsule (50,000 Units) by mouth once a week    Vitamin D deficiency, Microalbuminuria       Fish Oil 500 MG Caps      Take 1 capsule by mouth        fluticasone 50 MCG/ACT spray    FLONASE    1 Bottle    Spray 1-2 sprays into both nostrils daily    Acute sinusitis with symptoms > 10 days       insulin aspart 100 UNITS/ML injection    NovoLOG VIAL    90 mL    With insulin pump. Uses about 80 units/day.    Type 2 diabetes mellitus with diabetic nephropathy, with long-term current use of insulin (H)       * insulin cartridge misc pump supply     30 each    CHANGE EVERY 3 DAYS AS DIRECTED    Type 2 diabetes mellitus with diabetic nephropathy, with long-term current use of insulin (H)       * infusion set misc pump supply     30 each    CHANGE EVERY 3 DAYS AS DIRECTED    Type 2 diabetes mellitus with diabetic nephropathy, with long-term current use of insulin (H)       insulin pump infusion      Updated 1/27/17: [employer]: Model 723 BASAL: 00:00: 1.0 units/hr 10:30: 0.95 14:00: 1.05 CARB RATIO: 00:00 1:7 1600  1:8 C. Factor: 27 mg/dL BLOOD GLUCOSE TARGET and times: 12   AM (midnight):  Active Insulin Time:  3 hours Basal to Bolus Ratio:  Sensor:  No Carelink / Diasend username:  [email or web address] Carelink / Diasend Password:  [email or web address]    Type 2 diabetes mellitus with diabetic nephropathy, with  long-term current use of insulin (H)       losartan 50 MG tablet    COZAAR    135 tablet    TAKE ONE TABLET BY MOUTH EVERY MORNING AND TAKE ONE-HALF TABLET BY MOUTH EVERY EVENING    Microalbuminuria       MAGNESIUM OXIDE PO      Take 400 mg by mouth daily        metFORMIN 500 MG 24 hr tablet    GLUCOPHAGE-XR    360 tablet    Take 2 tablets (1,000 mg) by mouth 2 times daily (with meals)    Type 2 diabetes mellitus with diabetic nephropathy, with long-term current use of insulin (H)       montelukast 10 MG tablet    SINGULAIR    90 tablet    Take 1 tablet (10 mg) by mouth At Bedtime    Allergic rhinitis       MULTI VITAMIN/MINERALS PO      Take 1 each by mouth daily.        omeprazole 40 MG capsule    priLOSEC    90 capsule    TAKE ONE CAPSULE BY MOUTH EVERY DAY    Gastroesophageal reflux disease without esophagitis, Hiatal hernia       order for DME     100 each    skin prep wipes for insulin pump    Type 2 diabetes mellitus without complication, with long-term current use of insulin (H)       SUMAtriptan 25 MG tablet    IMITREX    8 tablet    Take 25-100mg one time. May repeat 25mg every two hours up to a maximum of 200mg in 24 hours.    Other migraine without status migrainosus, not intractable       * Notice:  This list has 2 medication(s) that are the same as other medications prescribed for you. Read the directions carefully, and ask your doctor or other care provider to review them with you.

## 2018-11-16 NOTE — PATIENT INSTRUCTIONS
Once your contact lens prescription is finalized and you are not having any problems with the trials or current lenses you can order your supply of lenses.   You can order your contact lenses online at [email or web address].  Click on services at the top of the page, then eye care and you will see the link to order contact lenses.  You can also order contact lenses at [phone]. There is no shipping fee if you order an annual supply otherwise  be sure to let them know which office you would like to  the lenses, [facility] [phone].

## 2018-11-16 NOTE — PROGRESS NOTES
Chief Complaint   Patient presents with     Contact Lens Check     Dispense appt for new CL trials     Satisfied with contacts:  Yes    Good comfort:  Yes- right eye feels a little scratchy  Clear vision:     Yes    Orly Vuong, Optometric Assistant          Medical, surgical and family histories reviewed and updated 11/16/2018.       OBJECTIVE: See Ophthalmology exam    ASSESSMENT:    ICD-10-CM    1. Myopia with astigmatism, bilateral H52.13 CONTACT LENS CHECK    H52.203       PLAN:  Good fit/ visual acuity with current lenses  Discussed proper orientation of lens for better visual acuity initially      Gabriela Becker OD

## 2018-11-27 ENCOUNTER — OFFICE VISIT (OUTPATIENT)
Dept: OBGYN | Facility: CLINIC | Age: 38
End: 2018-11-27
Payer: COMMERCIAL

## 2018-11-27 VITALS
DIASTOLIC BLOOD PRESSURE: 76 MMHG | WEIGHT: 192 LBS | HEART RATE: 72 BPM | BODY MASS INDEX: 32.96 KG/M2 | SYSTOLIC BLOOD PRESSURE: 120 MMHG

## 2018-11-27 DIAGNOSIS — Z01.419 ENCOUNTER FOR GYNECOLOGICAL EXAMINATION WITHOUT ABNORMAL FINDING: Primary | ICD-10-CM

## 2018-11-27 PROCEDURE — 99396 PREV VISIT EST AGE 40-64: CPT | Performed by: OBSTETRICS & GYNECOLOGY

## 2018-11-27 PROCEDURE — 87624 HPV HI-RISK TYP POOLED RSLT: CPT | Performed by: OBSTETRICS & GYNECOLOGY

## 2018-11-27 PROCEDURE — G0145 SCR C/V CYTO,THINLAYER,RESCR: HCPCS | Performed by: OBSTETRICS & GYNECOLOGY

## 2018-11-27 NOTE — PROGRESS NOTES
"  SUBJECTIVE:  Marie Vogel is an 38 year old  P2 woman who presents for gyn exam        Past Medical History:   Diagnosis Date     Allergic rhinitis due to pollen      Atypical glandular cells on Pap smear 3/1108     Gastroesophageal reflux disease      PFO (patent foramen ovale)      Type II or unspecified type diabetes mellitus without mention of complication, not stated as uncontrolled     onset was gestational in      Past Surgical History:   Procedure Laterality Date     C INDUCED ABORTN BY D&C           C IUD,MIRENA  8/10/10, 7/28/15     C/SECTION, LOW TRANSVERSE  6/25/10    , Low Transverse     CHOLECYSTECTOMY, LAPOROSCOPIC      Cholecystectomy, Laparoscopic     ESOPHAGOSCOPY, GASTROSCOPY, DUODENOSCOPY (EGD), COMBINED N/A 2016    Procedure: COMBINED ESOPHAGOSCOPY, GASTROSCOPY, DUODENOSCOPY (EGD), BIOPSY SINGLE OR MULTIPLE;  Surgeon: Fadi Hunter MD;  Location:  GI      ESOPHAGEAL MOTILITY STUDY N/A 2016    Procedure: ESOPHAGEAL MOTILITY STUDY;  Surgeon: Fadi Hunter MD;  Location:  GI      REMOVE TONSILS/ADENOIDS,<13 Y/O      T &A     Current Outpatient Prescriptions   Medication     aspirin 81 MG tablet     atenolol (TENORMIN) 25 MG tablet     atorvastatin (LIPITOR) 20 MG tablet     cetirizine (ZYRTEC) 10 MG tablet     dulaglutide (TRULICITY) 0.75 MG/0.5ML pen     EPINEPHrine 0.3 MG/0.3ML injection     fluticasone (FLONASE) 50 MCG/ACT spray     infusion set (PARADIGM QUICK-SET 23\" 9MM) misc pump supply     insulin aspart (NOVOLOG VIAL) 100 UNITS/ML injection     insulin cartridge (PARADIGM 3ML) misc pump supply     INSULIN PUMP - OUTPATIENT     losartan (COZAAR) 50 MG tablet     MAGNESIUM OXIDE PO     metFORMIN (GLUCOPHAGE-XR) 500 MG 24 hr tablet     montelukast (SINGULAIR) 10 MG tablet     Multiple Vitamins-Minerals (MULTI VITAMIN/MINERALS PO)     Omega-3 Fatty Acids (FISH OIL) 500 MG CAPS     omeprazole (PRILOSEC) 40 MG " capsule     SUMAtriptan (IMITREX) 25 MG tablet     blood glucose monitoring (PEPE CONTOUR NEXT) test strip     blood glucose monitoring (NO BRAND SPECIFIED) meter device kit     cyclobenzaprine (FLEXERIL) 10 MG tablet     ibuprofen (ADVIL) 200 MG tablet     Lancets (MICROLET) MISC     order for DME     No current facility-administered medications for this visit.      Allergies   Allergen Reactions     Ivp Dye [Contrast Dye] Itching     Pt reports that throat itches     Nuts Anaphylaxis     [place] nuts only     Bactrim Swelling     Pt reaction was swelling in mouth and tongue and itchy mouth.  Resolved with benadryl and prednisone     Penicillins Hives     Cephalosporins Itching     Seasonal Allergies Other (See Comments)     Molds, trees, grass, dust..  Upper congestion     Atorvastatin      myalgias     Shellfish Allergy Rash     Clams only     Social History   Substance Use Topics     Smoking status: Former Smoker     Types: Cigarettes     Quit date: 10/24/2005     Smokeless tobacco: Former User      Comment: States only smokes when drinks maybe 2x/year     Alcohol use No       Review of Systems  CONSTITUTIONAL:NEGATIVE  EYES: NEGATIVE  ENT/MOUTH: NEGATIVE  RESP: NEGATIVE  CV: NEGATIVE  GI: NEGATIVE  : NEGATIVE  MUSCULOSKELATAL: NEGATIVE  INTEGUMENTARY/SKIN: NEGATIVE  BREAST: NEGATIVE  NEURO: NEGATIVE  ENDOCRINE: NEGATIVE  HEME/ALLERGY/IMMUNE: NEGATIVE  PSYCHIATRIC: NEGATIVE    OBJECTIVE:  /76 (BP Location: Right arm, Patient Position: Chair, Cuff Size: Adult Regular)  Pulse 72  Wt 192 lb (87.1 kg)  BMI 32.96 kg/m2   EXAM:  GENERAL APPEARANCE: healthy, alert and no distress  BREAST: normal without masses, tenderness or nipple discharge and no palpable axillary masses or adenopathy  ABDOMEN: soft, nontender, without hepatosplenomegaly or masses    Pelvic Exam:  Urethra; negative  Vulva: No external lesions, normal hair distribution, no adenopathy  Vagina: Moist, pink, no abnormal discharge, well rugated,  no lesions  Cervix: Pap smear is taken, parous, smooth, pink, no visible lesions     IUD string per os  Uterus: Normal size, anteverted, non-tender, mobile  Ovaries: No mass, non-tender, mobile      ASSESSMENT:  Satisfactory annual gyn exam    PLAN:  Gyn exam and pap smear    PE: reviewed health maintenance including diet, regular exercise and periodic exams.  Health Maintenance   Topic Date Due     MIGRAINE ACTION PLAN  06/27/1996     PAP Q3 YR  07/28/2018     LIPID MONITORING Q6 MO  12/15/2018     A1C Q3 MO  02/05/2019     PHQ-2 Q1 YR  03/06/2019     TSH W/ FREE T4 REFLEX Q2 YEAR  04/16/2019     ALT Q6 MOS  05/05/2019     CREATININE Q1 YEAR  07/22/2019     FOOT EXAM Q1 YEAR  11/05/2019     EYE EXAM Q1 YEAR  11/05/2019     MICROALBUMIN Q1 YEAR  11/05/2019     TETANUS Q10 YR  04/06/2022     PNEUMOVAX 1X HI RISK PATIENT < 65 (NO IB MSG)  Addressed     HIV SCREEN (SYSTEM ASSIGNED)  Completed     INFLUENZA VACCINE  Completed

## 2018-11-27 NOTE — MR AVS SNAPSHOT
After Visit Summary   11/27/2018    Marie Vogel    MRN: [id number]           Patient Information     Date Of Birth          1980        Visit Information        Provider Department      11/27/2018 1:45 PM Maldonado Bruner MD [facility]        Today's Diagnoses     Encounter for gynecological examination without abnormal finding    -  1       Follow-ups after your visit        Your next 10 appointments already scheduled     Dec 21, 2018  2:30 PM CST   Diabetes Education with Piedad Garcia RD   [facility] ([facility]    [address]  [facility] [address]   [phone]            Jan 14, 2019  3:30 PM CST   Return Visit with Zita Fuentes MD   [facility] ([facility]    [address]  [address]  [place] [facility] [id number] [phone]              Future tests that were ordered for you today     Open Future Orders        Priority Expected Expires Ordered    *MA Screening Digital Bilateral Routine  11/27/2019 11/27/2018            Who to contact     If you have questions or need follow up information about today's clinic visit or your schedule please contact [facility] directly at [phone].  Normal or non-critical lab and imaging results will be communicated to you by MyChart, letter or phone within 4 business days after the clinic has received the results. If you do not hear from us within 7 days, please contact the clinic through MyChart or phone. If you have a critical or abnormal lab result, we will notify you by phone as soon as possible.  Submit refill requests through [employer] or call your pharmacy and they will forward the refill request to us. Please allow 3 business days for your refill to be completed.          Additional Information About Your Visit        [employer] Information     [employer] gives you secure access to your electronic health record.  If you see a primary care provider, you can also send messages to your care team and make appointments. If you have questions, please call your primary care clinic.  If you do not have a primary care provider, please call [phone] and they will assist you.        Care EveryWhere ID     This is your Care EveryWhere ID. This could be used by other organizations to access your [place] medical records  [id number]        Your Vitals Were     Pulse BMI (Body Mass Index)                72 32.96 kg/m2           Blood Pressure from Last 3 Encounters:   11/27/18 120/76   11/12/18 120/74   11/05/18 124/72    Weight from Last 3 Encounters:   11/27/18 192 lb (87.1 kg)   11/12/18 194 lb 12.8 oz (88.4 kg)   11/05/18 190 lb 6.4 oz (86.4 kg)               Primary Care Provider Office Phone # Fax #    Alan Paredes [phone] [address] [place] [address]        Equal Access to Services     [facility]: Hadii aad ku hadasho Soomaali, waaxda luqadaha, qaybta kaalmada adeegyada, waxay idiin hayivetten britta peralta . So [facility] [phone].    ATENCIÓN: Si habla español, tiene a woodruff disposición servicios gratuitos de asistencia lingüística. Llame al [phone].    We comply with applicable federal civil rights laws and [place] laws. We do not discriminate on the basis of race, color, national origin, age, disability, sex, sexual orientation, or gender identity.            Thank you!     Thank you for choosing [facility]  for your care. Our goal is always to provide you with excellent care. Hearing back from our patients is one way we can continue to improve our services. Please take a few minutes to complete the written survey that you may receive in the mail after your visit with us. Thank you!             Your Updated Medication List - Protect others around you: Learn how to safely use, store and throw away your medicines at www.disposemymeds.org.          This  list is accurate as of 11/27/18  2:09 PM.  Always use your most recent med list.                   Brand Name Dispense Instructions for use Diagnosis    ADVIL 200 MG tablet   Generic drug:  ibuprofen      Take 200 mg by mouth every 4 hours as needed.        aspirin 81 MG tablet    ASA    90 tablet    Take 1 tablet (81 mg) by mouth daily    PFO (patent foramen ovale)       atenolol 25 MG tablet    TENORMIN    90 tablet    Take 0.5 tablets (12.5 mg) by mouth daily        atorvastatin 20 MG tablet    LIPITOR    90 tablet    Take 1 tablet (20 mg) by mouth daily        blood glucose monitoring lancets     100 each    1 Device See Admin Instructions. Use up to 5 times daily for blood sugar checks.    Type 2 diabetes, HbA1c goal < 7% (H)       blood glucose monitoring meter device kit    NO BRAND SPECIFIED    1 kit    Use to test blood sugar 6 times daily and as needed.    Type 2 diabetes mellitus with diabetic nephropathy (H)       blood glucose monitoring test strip    PEPE CONTOUR NEXT    120 each    Check 4 times/day    Type 2 diabetes mellitus with diabetic nephropathy, with long-term current use of insulin (H)       cetirizine 10 MG tablet    zyrTEC    90 tablet    Take 10 mg by mouth 2 times daily        cyclobenzaprine 10 MG tablet    FLEXERIL    20 tablet    Take 1 tablet (10 mg) by mouth nightly as needed for muscle spasms    Low back pain without sciatica, unspecified back pain laterality, unspecified chronicity       dulaglutide 0.75 MG/0.5ML pen    TRULICITY    9 mL    Inject 0.75 mg Subcutaneous every 7 days    Type 2 diabetes mellitus with diabetic nephropathy, with long-term current use of insulin (H)       EPINEPHrine 0.3 MG/0.3ML injection 2-pack    EPIPEN/ADRENACLICK/or ANY BX GENERIC EQUIV    0.3 mL    Inject 0.3 mLs (0.3 mg) into the muscle once as needed for anaphylaxis    Nut allergy       Fish Oil 500 MG Caps      Take 1 capsule by mouth        fluticasone 50 MCG/ACT nasal spray    FLONASE    1  Bottle    Spray 1-2 sprays into both nostrils daily    Acute sinusitis with symptoms > 10 days       insulin aspart 100 UNITS/ML vial    NovoLOG VIAL    90 mL    With insulin pump. Uses about 80 units/day.    Type 2 diabetes mellitus with diabetic nephropathy, with long-term current use of insulin (H)       * insulin cartridge misc pump supply     30 each    CHANGE EVERY 3 DAYS AS DIRECTED    Type 2 diabetes mellitus with diabetic nephropathy, with long-term current use of insulin (H)       * infusion set misc pump supply     30 each    CHANGE EVERY 3 DAYS AS DIRECTED    Type 2 diabetes mellitus with diabetic nephropathy, with long-term current use of insulin (H)       insulin pump infusion      Updated 1/27/17: [employer]: Model 723 BASAL: 00:00: 1.0 units/hr 10:30: 0.95 14:00: 1.05 CARB RATIO: 00:00 1:7 1600  1:8 C. Factor: 27 mg/dL BLOOD GLUCOSE TARGET and times: 12   AM (midnight):  Active Insulin Time:  3 hours Basal to Bolus Ratio:  Sensor:  No Carelink / Diasend username:  [email or web address] Carelink / Diasend Password:  [email or web address]    Type 2 diabetes mellitus with diabetic nephropathy, with long-term current use of insulin (H)       losartan 50 MG tablet    COZAAR    135 tablet    TAKE ONE TABLET BY MOUTH EVERY MORNING AND TAKE ONE-HALF TABLET BY MOUTH EVERY EVENING    Microalbuminuria       MAGNESIUM OXIDE PO      Take 400 mg by mouth daily        metFORMIN 500 MG 24 hr tablet    GLUCOPHAGE-XR    360 tablet    Take 2 tablets (1,000 mg) by mouth 2 times daily (with meals)    Type 2 diabetes mellitus with diabetic nephropathy, with long-term current use of insulin (H)       montelukast 10 MG tablet    SINGULAIR    90 tablet    Take 1 tablet (10 mg) by mouth At Bedtime    Allergic rhinitis       MULTI VITAMIN/MINERALS PO      Take 1 each by mouth daily.        omeprazole 40 MG DR capsule    priLOSEC    90 capsule    TAKE ONE CAPSULE BY MOUTH EVERY DAY    Gastroesophageal reflux disease without esophagitis,  Hiatal hernia       order for DME     100 each    skin prep wipes for insulin pump    Type 2 diabetes mellitus without complication, with long-term current use of insulin (H)       SUMAtriptan 25 MG tablet    IMITREX    8 tablet    Take 25-100mg one time. May repeat 25mg every two hours up to a maximum of 200mg in 24 hours.    Other migraine without status migrainosus, not intractable       * Notice:  This list has 2 medication(s) that are the same as other medications prescribed for you. Read the directions carefully, and ask your doctor or other care provider to review them with you.

## 2018-11-27 NOTE — NURSING NOTE
"Chief Complaint   Patient presents with     Physical     last pap 07/28/15 NIL - HPV negative - no concerns - last mammogram        Initial /76 (BP Location: Right arm, Patient Position: Chair, Cuff Size: Adult Regular)  Pulse 72  Wt 192 lb (87.1 kg)  BMI 32.96 kg/m2 Estimated body mass index is 32.96 kg/(m^2) as calculated from the following:    Height as of 18: 5' 4\" (1.626 m).    Weight as of this encounter: 192 lb (87.1 kg).  BP completed using cuff size: regular    Questioned patient about current smoking habits.  Pt. quit smoking some time ago.          The following HM Due: pap smear    Nurse assisted visit.  Laina Bah MA.               "

## 2018-11-29 LAB
COPATH REPORT: NORMAL
PAP: NORMAL

## 2018-11-30 LAB
FINAL DIAGNOSIS: ABNORMAL
HPV HR 12 DNA CVX QL NAA+PROBE: POSITIVE
HPV16 DNA SPEC QL NAA+PROBE: NEGATIVE
HPV18 DNA SPEC QL NAA+PROBE: NEGATIVE
SPECIMEN DESCRIPTION: ABNORMAL
SPECIMEN SOURCE CVX/VAG CYTO: ABNORMAL

## 2019-01-02 ENCOUNTER — OFFICE VISIT (OUTPATIENT)
Dept: URGENT CARE | Facility: URGENT CARE | Age: 39
End: 2019-01-02
Payer: COMMERCIAL

## 2019-01-02 VITALS
BODY MASS INDEX: 32.96 KG/M2 | HEART RATE: 89 BPM | OXYGEN SATURATION: 97 % | TEMPERATURE: 97.6 F | WEIGHT: 192 LBS | DIASTOLIC BLOOD PRESSURE: 62 MMHG | SYSTOLIC BLOOD PRESSURE: 118 MMHG

## 2019-01-02 DIAGNOSIS — R82.90 NONSPECIFIC FINDING ON EXAMINATION OF URINE: ICD-10-CM

## 2019-01-02 DIAGNOSIS — R30.0 DYSURIA: Primary | ICD-10-CM

## 2019-01-02 LAB
BACTERIA #/AREA URNS HPF: ABNORMAL /HPF
NON-SQ EPI CELLS #/AREA URNS LPF: ABNORMAL /LPF
RBC #/AREA URNS AUTO: ABNORMAL /HPF
URNS CMNT MICRO: ABNORMAL
WBC #/AREA URNS AUTO: ABNORMAL /HPF

## 2019-01-02 PROCEDURE — 87186 SC STD MICRODIL/AGAR DIL: CPT | Performed by: FAMILY MEDICINE

## 2019-01-02 PROCEDURE — 87086 URINE CULTURE/COLONY COUNT: CPT | Performed by: FAMILY MEDICINE

## 2019-01-02 PROCEDURE — 99213 OFFICE O/P EST LOW 20 MIN: CPT | Performed by: PHYSICIAN ASSISTANT

## 2019-01-02 PROCEDURE — 81015 MICROSCOPIC EXAM OF URINE: CPT | Performed by: FAMILY MEDICINE

## 2019-01-02 PROCEDURE — 87088 URINE BACTERIA CULTURE: CPT | Performed by: FAMILY MEDICINE

## 2019-01-02 RX ORDER — CIPROFLOXACIN 500 MG/1
500 TABLET, FILM COATED ORAL 2 TIMES DAILY
Qty: 14 TABLET | Refills: 0 | Status: SHIPPED | OUTPATIENT
Start: 2019-01-02 | End: 2019-03-11

## 2019-01-02 NOTE — PATIENT INSTRUCTIONS
"  Patient Education     Bladder Infection, Female (Adult)    Urine is normally doesn't have any bacteria in it. But bacteria can get into the urinary tract from the skin around the rectum. Or they can travel in the blood from elsewhere in the body. Once they are in your urinary tract, they can cause infection in the urethra (urethritis), the bladder (cystitis), or the kidneys (pyelonephritis).  The most common place for an infection is in the bladder. This is called a bladder infection. This is one of the most common infections in women. Most bladder infections are easily treated. They are not serious unless the infection spreads to the kidney.  The phrases \"bladder infection,\" \"UTI,\" and \"cystitis\" are often used to describe the same thing. But they are not always the same. Cystitis is an inflammation of the bladder. The most common cause of cystitis is an infection.  Symptoms  The infection causes inflammation in the urethra and bladder. This causes many of the symptoms. The most common symptoms of a bladder infection are:    Pain or burning when urinating    Having to urinate more often than usual    Urgent need to urinate    Only a small amount of urine comes out    Blood in urine    Abdominal discomfort. This is usually in the lower abdomen above the pubic bone.    Cloudy urine    Strong- or bad-smelling urine    Unable to urinate (urinary retention)    Unable to hold urine in (urinary incontinence)    Fever    Loss of appetite    Confusion (in older adults)  Causes  Bladder infections are not contagious. You can't get one from someone else, from a toilet seat, or from sharing a bath.  The most common cause of bladder infections is bacteria from the bowels. The bacteria get onto the skin around the opening of the urethra. From there, they can get into the urine and travel up to the bladder, causing inflammation and infection. This usually happens because of:    Wiping improperly after urinating. Always wipe " from front to back.    Bowel incontinence    Pregnancy    Procedures such as having a catheter inserted    Older age    Not emptying your bladder. This can allow bacteria a chance to grow in your urine.    Dehydration    Constipation    Sex    Use of a diaphragm for birth control   Treatment  Bladder infections are diagnosed by a urine test. They are treated with antibiotics and usually clear up quickly without complications. Treatment helps prevent a more serious kidney infection.  Medicines  Medicines can help in the treatment of a bladder infection:    Take antibiotics until they are used up, even if you feel better. It is important to finish them to make sure the infection has cleared.    You can use acetaminophen or ibuprofen for pain, fever, or discomfort, unless another medicine was prescribed. If you have chronic liver or kidney disease, talk with your healthcare provider before using these medicines. Also talk with your provider if you've ever had a stomach ulcer or gastrointestinal bleeding, or are taking blood-thinner medicines.    If you are given phenazopydridine to reduce burning with urination, it will cause your urine to become a bright orange color. This can stain clothing.  Care and prevention  These self-care steps can help prevent future infections:    Drink plenty of fluids to prevent dehydration and flush out your bladder. Do this unless you must restrict fluids for other health reasons, or your doctor told you not to.    Proper cleaning after going to the bathroom is important. Wipe from front to back after using the toilet to prevent the spread of bacteria.    Urinate more often. Don't try to hold urine in for a long time.    Wear loose-fitting clothes and cotton underwear. Avoid tight-fitting pants.    Improve your diet and prevent constipation. Eat more fresh fruit and vegetables, and fiber, and less junk and fatty foods.    Avoid sex until your symptoms are gone.    Avoid caffeine,  alcohol, and spicy foods. These can irritate your bladder.    Urinate right after intercourse to flush out your bladder.    If you use birth control pills and have frequent bladder infections, discuss it with your doctor.  Follow-up care  Call your healthcare provider if all symptoms are not gone after 3 days of treatment. This is especially important if you have repeat infections.  If a culture was done, you will be told if your treatment needs to be changed. If directed, you can call to find out the results.  If X-rays were done, you will be told if the results will affect your treatment.  Call 911  Call 911 if any of the following occur:    Trouble breathing    Hard to wake up or confusion    Fainting or loss of consciousness    Rapid heart rate  When to seek medical advice  Call your healthcare provider right away if any of these occur:    Fever of 100.4 F (38.0 C) or higher, or as directed by your healthcare provider    Symptoms are not better by the third day of treatment    Back or belly (abdominal) pain that gets worse    Repeated vomiting, or unable to keep medicine down    Weakness or dizziness    Vaginal discharge    Pain, redness, or swelling in the outer vaginal area (labia)  Date Last Reviewed: 10/1/2016    9272-7906 [employer]. [address] [place] [address], [place] [address]. All rights reserved. This information is not intended as a substitute for professional medical care. Always follow your healthcare professional's instructions.

## 2019-01-02 NOTE — PROGRESS NOTES
SUBJECTIVE:   Marie Vogel is a 38 year old female who  presents today for a possible UTI. Symptoms of dysuria, frequency and burning have been going on for 1day(s).  Hematuria no.  sudden onsetand moderate.  There is no history of fever, chills, nausea or vomiting.  No history of vaginal or penile discharge. This patient does have a history of urinary tract infections. Patient denies long duration, rigors, flank pain, temperature > 101 degrees F., Vomiting, significant nausea or diarrhea, taking Coumadin and GFR less than 30 within the last year or vaginal discharge, vaginal odor, vaginal itching and dyspareunia (pain in labia/pelvis)     Past Medical History:   Diagnosis Date     Allergic rhinitis due to pollen      Atypical glandular cells on Pap smear 3/1108     Cervical high risk HPV (human papillomavirus) test positive 11/27/2018    See problem list     Gastroesophageal reflux disease      PFO (patent foramen ovale)      Type II or unspecified type diabetes mellitus without mention of complication, not stated as uncontrolled 2002    onset was gestational in 2001     Current Outpatient Medications   Medication Sig Dispense Refill     aspirin 81 MG tablet Take 1 tablet (81 mg) by mouth daily 90 tablet 3     atenolol (TENORMIN) 25 MG tablet Take 0.5 tablets (12.5 mg) by mouth daily 90 tablet 3     atorvastatin (LIPITOR) 20 MG tablet Take 1 tablet (20 mg) by mouth daily 90 tablet 3     blood glucose monitoring (PEPE CONTOUR NEXT) test strip Check 4 times/day 120 each 11     blood glucose monitoring (NO BRAND SPECIFIED) meter device kit Use to test blood sugar 6 times daily and as needed. 1 kit 0     cetirizine (ZYRTEC) 10 MG tablet Take 10 mg by mouth 2 times daily  90 tablet 3     dulaglutide (TRULICITY) 0.75 MG/0.5ML pen Inject 0.75 mg Subcutaneous every 7 days 9 mL 1     EPINEPHrine 0.3 MG/0.3ML injection Inject 0.3 mLs (0.3 mg) into the muscle once as needed for anaphylaxis 0.3 mL 11     fluticasone  "(FLONASE) 50 MCG/ACT spray Spray 1-2 sprays into both nostrils daily 1 Bottle 0     infusion set (PARADIGM QUICK-SET 23\" 9MM) misc pump supply CHANGE EVERY 3 DAYS AS DIRECTED 30 each 0     insulin aspart (NOVOLOG VIAL) 100 UNITS/ML injection With insulin pump. Uses about 80 units/day. 90 mL 3     insulin cartridge (PARADIGM 3ML) misc pump supply CHANGE EVERY 3 DAYS AS DIRECTED 30 each 0     INSULIN PUMP - OUTPATIENT Updated 1/27/17:  Medtronic Minimed: Model 723  BASAL:  00:00: 1.0 units/hr  10:30: 0.95  14:00: 1.05  CARB RATIO:  00:00 1:7  1600  1:8  C. Factor:  27 mg/dL  BLOOD GLUCOSE TARGET and times:  12   AM (midnight):   Active Insulin Time:  3 hours  Basal to Bolus Ratio:   Sensor:  No  Carelink / Diasend username:  [email or web address]  Carelink / Diasend Password:  [email or web address]       Lancets (MICROLET) MISC 1 Device See Admin Instructions. Use up to 5 times daily for blood sugar checks. 100 each prn     losartan (COZAAR) 50 MG tablet TAKE ONE TABLET BY MOUTH EVERY MORNING AND TAKE ONE-HALF TABLET BY MOUTH EVERY EVENING 135 tablet 0     MAGNESIUM OXIDE PO Take 400 mg by mouth daily       metFORMIN (GLUCOPHAGE-XR) 500 MG 24 hr tablet Take 2 tablets (1,000 mg) by mouth 2 times daily (with meals) 360 tablet 3     montelukast (SINGULAIR) 10 MG tablet Take 1 tablet (10 mg) by mouth At Bedtime 90 tablet 3     Multiple Vitamins-Minerals (MULTI VITAMIN/MINERALS PO) Take 1 each by mouth daily.       Omega-3 Fatty Acids (FISH OIL) 500 MG CAPS Take 1 capsule by mouth       omeprazole (PRILOSEC) 40 MG capsule TAKE ONE CAPSULE BY MOUTH EVERY DAY 90 capsule 2     order for DME skin prep wipes for insulin pump 100 each 0     SUMAtriptan (IMITREX) 25 MG tablet Take 25-100mg one time. May repeat 25mg every two hours up to a maximum of 200mg in 24 hours. 8 tablet 6     cyclobenzaprine (FLEXERIL) 10 MG tablet Take 1 tablet (10 mg) by mouth nightly as needed for muscle spasms (Patient not taking: Reported on 1/2/2019) 20 tablet 0     " ibuprofen (ADVIL) 200 MG tablet Take 200 mg by mouth every 4 hours as needed.       Social History     Tobacco Use     Smoking status: Former Smoker     Types: Cigarettes     Last attempt to quit: 10/24/2005     Years since quittin.2     Smokeless tobacco: Former User     Tobacco comment: States only smokes when drinks maybe 2x/year   Substance Use Topics     Alcohol use: No     Alcohol/week: 0.0 oz       ROS:   10 point ROS negative except as listed above      OBJECTIVE:  /62 (Cuff Size: Adult Regular)   Pulse 89   Temp 97.6  F (36.4  C) (Oral)   Wt 87.1 kg (192 lb)   SpO2 97%   BMI 32.96 kg/m    GENERAL APPEARANCE: healthy, alert and no distress  RESP: lungs clear to auscultation - no rales, rhonchi or wheezes  CV: regular rates and rhythm, normal S1 S2, no murmur noted  ABDOMEN:  soft, nontender, no HSM or masses and bowel sounds normal  BACK: No CVA tenderness  SKIN: no suspicious lesions or rashes    Results for orders placed or performed in visit on 19   Urine Microscopic   Result Value Ref Range    WBC Urine  (A) OTO5^0 - 5 /HPF    RBC Urine 5-10 (A) OTO2^O - 2 /HPF    Squamous Epithelial /LPF Urine Many (A) FEW^Few /LPF    Bacteria Urine Many (A) NEG^Negative /HPF    Comment Urine Interfering substances, dipstick not done    Urine Culture Aerobic Bacterial   Result Value Ref Range    Specimen Description Midstream Urine     Culture Micro >100,000 colonies/mL  Escherichia coli   (A)        Susceptibility    Escherichia coli - JUAN C     AMPICILLIN <=2 Sensitive ug/mL     CEFAZOLIN* <=4 Sensitive ug/mL      * Cefazolin JUAN C breakpoints are for the treatment of uncomplicated urinary tract infections.  For the treatment of systemic infections, please contact the laboratory for additional testing.     CEFOXITIN <=4 Sensitive ug/mL     CEFTAZIDIME <=1 Sensitive ug/mL     CEFTRIAXONE <=1 Sensitive ug/mL     CIPROFLOXACIN <=0.25 Sensitive ug/mL     GENTAMICIN <=1 Sensitive ug/mL      "LEVOFLOXACIN <=0.12 Sensitive ug/mL     NITROFURANTOIN <=16 Sensitive ug/mL     TOBRAMYCIN <=1 Sensitive ug/mL     Trimethoprim/Sulfa <=1/19 Sensitive ug/mL     AMPICILLIN/SULBACTAM <=2 Sensitive ug/mL     Piperacillin/Tazo <=4 Sensitive ug/mL     CEFEPIME <=1 Sensitive ug/mL         ASSESSMENT:   (R30.0) Dysuria  (primary encounter diagnosis)  Plan: Urine Microscopic, ciprofloxacin (CIPRO) 500 MG        Tablet per patient request, Adverse effects discussed and understood by patient, CANCELED: UA reflex to Microscopic and         Culture, CANCELED: *UA reflex to Microscopic         and Culture ([facility] [facility] (except        [facility] and [place])      (R82.90) Nonspecific finding on examination of urine  Plan: Urine Culture Aerobic Bacterial       Patient Instructions     Patient Education     Bladder Infection, Female (Adult)    Urine is normally doesn't have any bacteria in it. But bacteria can get into the urinary tract from the skin around the rectum. Or they can travel in the blood from elsewhere in the body. Once they are in your urinary tract, they can cause infection in the urethra (urethritis), the bladder (cystitis), or the kidneys (pyelonephritis).  The most common place for an infection is in the bladder. This is called a bladder infection. This is one of the most common infections in women. Most bladder infections are easily treated. They are not serious unless the infection spreads to the kidney.  The phrases \"bladder infection,\" \"UTI,\" and \"cystitis\" are often used to describe the same thing. But they are not always the same. Cystitis is an inflammation of the bladder. The most common cause of cystitis is an infection.  Symptoms  The infection causes inflammation in the urethra and bladder. This causes many of the symptoms. The most common symptoms of a bladder infection are:    Pain or burning when urinating    Having to urinate more often than usual    Urgent need to urinate    Only a " small amount of urine comes out    Blood in urine    Abdominal discomfort. This is usually in the lower abdomen above the pubic bone.    Cloudy urine    Strong- or bad-smelling urine    Unable to urinate (urinary retention)    Unable to hold urine in (urinary incontinence)    Fever    Loss of appetite    Confusion (in older adults)  Causes  Bladder infections are not contagious. You can't get one from someone else, from a toilet seat, or from sharing a bath.  The most common cause of bladder infections is bacteria from the bowels. The bacteria get onto the skin around the opening of the urethra. From there, they can get into the urine and travel up to the bladder, causing inflammation and infection. This usually happens because of:    Wiping improperly after urinating. Always wipe from front to back.    Bowel incontinence    Pregnancy    Procedures such as having a catheter inserted    Older age    Not emptying your bladder. This can allow bacteria a chance to grow in your urine.    Dehydration    Constipation    Sex    Use of a diaphragm for birth control   Treatment  Bladder infections are diagnosed by a urine test. They are treated with antibiotics and usually clear up quickly without complications. Treatment helps prevent a more serious kidney infection.  Medicines  Medicines can help in the treatment of a bladder infection:    Take antibiotics until they are used up, even if you feel better. It is important to finish them to make sure the infection has cleared.    You can use acetaminophen or ibuprofen for pain, fever, or discomfort, unless another medicine was prescribed. If you have chronic liver or kidney disease, talk with your healthcare provider before using these medicines. Also talk with your provider if you've ever had a stomach ulcer or gastrointestinal bleeding, or are taking blood-thinner medicines.    If you are given phenazopydridine to reduce burning with urination, it will cause your urine to  become a bright orange color. This can stain clothing.  Care and prevention  These self-care steps can help prevent future infections:    Drink plenty of fluids to prevent dehydration and flush out your bladder. Do this unless you must restrict fluids for other health reasons, or your doctor told you not to.    Proper cleaning after going to the bathroom is important. Wipe from front to back after using the toilet to prevent the spread of bacteria.    Urinate more often. Don't try to hold urine in for a long time.    Wear loose-fitting clothes and cotton underwear. Avoid tight-fitting pants.    Improve your diet and prevent constipation. Eat more fresh fruit and vegetables, and fiber, and less junk and fatty foods.    Avoid sex until your symptoms are gone.    Avoid caffeine, alcohol, and spicy foods. These can irritate your bladder.    Urinate right after intercourse to flush out your bladder.    If you use birth control pills and have frequent bladder infections, discuss it with your doctor.  Follow-up care  Call your healthcare provider if all symptoms are not gone after 3 days of treatment. This is especially important if you have repeat infections.  If a culture was done, you will be told if your treatment needs to be changed. If directed, you can call to find out the results.  If X-rays were done, you will be told if the results will affect your treatment.  Call 911  Call 911 if any of the following occur:    Trouble breathing    Hard to wake up or confusion    Fainting or loss of consciousness    Rapid heart rate  When to seek medical advice  Call your healthcare provider right away if any of these occur:    Fever of 100.4 F (38.0 C) or higher, or as directed by your healthcare provider    Symptoms are not better by the third day of treatment    Back or belly (abdominal) pain that gets worse    Repeated vomiting, or unable to keep medicine down    Weakness or dizziness    Vaginal discharge    Pain, redness,  or swelling in the outer vaginal area (labia)  Date Last Reviewed: 10/1/2016    [id number] [employer]. [address] [place] [address], [place] [address]. All rights reserved. This information is not intended as a substitute for professional medical care. Always follow your healthcare professional's instructions.

## 2019-01-03 LAB
BACTERIA SPEC CULT: ABNORMAL
SPECIMEN SOURCE: ABNORMAL

## 2019-01-13 ENCOUNTER — HOSPITAL ENCOUNTER (EMERGENCY)
Facility: CLINIC | Age: 39
Discharge: HOME | End: 2019-01-13
Attending: EMERGENCY MEDICINE | Admitting: EMERGENCY MEDICINE
Payer: COMMERCIAL

## 2019-01-13 VITALS
HEART RATE: 103 BPM | SYSTOLIC BLOOD PRESSURE: 153 MMHG | WEIGHT: 193 LBS | HEIGHT: 64 IN | OXYGEN SATURATION: 98 % | TEMPERATURE: 97 F | RESPIRATION RATE: 16 BRPM | DIASTOLIC BLOOD PRESSURE: 101 MMHG | BODY MASS INDEX: 32.95 KG/M2

## 2019-01-13 DIAGNOSIS — G43.909 MIGRAINE WITHOUT STATUS MIGRAINOSUS, NOT INTRACTABLE, UNSPECIFIED MIGRAINE TYPE: ICD-10-CM

## 2019-01-13 PROCEDURE — 25000128 H RX IP 250 OP 636: Performed by: EMERGENCY MEDICINE

## 2019-01-13 PROCEDURE — 96374 THER/PROPH/DIAG INJ IV PUSH: CPT | Performed by: EMERGENCY MEDICINE

## 2019-01-13 PROCEDURE — 99284 EMERGENCY DEPT VISIT MOD MDM: CPT | Mod: 25 | Performed by: EMERGENCY MEDICINE

## 2019-01-13 PROCEDURE — 99284 EMERGENCY DEPT VISIT MOD MDM: CPT | Mod: Z6 | Performed by: EMERGENCY MEDICINE

## 2019-01-13 PROCEDURE — 96375 TX/PRO/DX INJ NEW DRUG ADDON: CPT | Performed by: EMERGENCY MEDICINE

## 2019-01-13 RX ORDER — METOCLOPRAMIDE HYDROCHLORIDE 5 MG/ML
10 INJECTION INTRAMUSCULAR; INTRAVENOUS ONCE
Status: COMPLETED | OUTPATIENT
Start: 2019-01-13 | End: 2019-01-13

## 2019-01-13 RX ORDER — DIPHENHYDRAMINE HCL 25 MG
25-50 CAPSULE ORAL EVERY 6 HOURS PRN
Qty: 28 CAPSULE | Refills: 0 | Status: SHIPPED | OUTPATIENT
Start: 2019-01-13 | End: 2019-03-11

## 2019-01-13 RX ORDER — DIPHENHYDRAMINE HYDROCHLORIDE 50 MG/ML
50 INJECTION INTRAMUSCULAR; INTRAVENOUS ONCE
Status: COMPLETED | OUTPATIENT
Start: 2019-01-13 | End: 2019-01-13

## 2019-01-13 RX ADMIN — DIPHENHYDRAMINE HYDROCHLORIDE 50 MG: 50 INJECTION, SOLUTION INTRAMUSCULAR; INTRAVENOUS at 17:16

## 2019-01-13 RX ADMIN — METOCLOPRAMIDE HYDROCHLORIDE 10 MG: 5 INJECTION INTRAMUSCULAR; INTRAVENOUS at 17:06

## 2019-01-13 ASSESSMENT — ENCOUNTER SYMPTOMS
PHOTOPHOBIA: 1
NAUSEA: 1
VOMITING: 1
NUMBNESS: 0
WEAKNESS: 0
HEADACHES: 1
NECK PAIN: 1
EYE PAIN: 1

## 2019-01-13 ASSESSMENT — MIFFLIN-ST. JEOR: SCORE: 1530.44

## 2019-01-13 NOTE — ED TRIAGE NOTES
Patient reports neck pain starting Thursday however this morning reports migraine symptoms, nausea and light sensitivity.

## 2019-01-13 NOTE — ED PROVIDER NOTES
History     Chief Complaint   Patient presents with     Headache     HPI  Marie Vogel is a 38 year old female with history of migraines, DM II, GERD, and PFO who presents with complaint of headache. She reports waking up Thursday with neck pain. This morning at 6 am, she started to feel a headache in the back of her head. She had some coffee with no relief. She took Imitrex at 830 am with no relief, and took another Imitrex at noon, stating she vomited prior to that dose. Ice or heating was also ineffective.  She has intermittent nausea. She reports pain at the base of her neck, photophobia and eye pain. She also reports bilateral ear pain onset 2 pm. This is classic to her normal migraines, however, they don't often last this long. She denies numbness or tingling. She denies any extremity weakness.     I have reviewed the Medications, Allergies, Past Medical and Surgical History, and Social History in the [employer] system.  Past Medical History:   Diagnosis Date     Allergic rhinitis due to pollen      Atypical glandular cells on Pap smear 3/1108     Cervical high risk HPV (human papillomavirus) test positive 2018    See problem list     Gastroesophageal reflux disease      PFO (patent foramen ovale)      Type II or unspecified type diabetes mellitus without mention of complication, not stated as uncontrolled     onset was gestational in        Past Surgical History:   Procedure Laterality Date     C INDUCED ABORTN BY D&C           C IUD,MIRENA  8/10/10, 7/28/15     C/SECTION, LOW TRANSVERSE  6/25/10    , Low Transverse     CHOLECYSTECTOMY, LAPOROSCOPIC      Cholecystectomy, Laparoscopic     ESOPHAGOSCOPY, GASTROSCOPY, DUODENOSCOPY (EGD), COMBINED N/A 2016    Procedure: COMBINED ESOPHAGOSCOPY, GASTROSCOPY, DUODENOSCOPY (EGD), BIOPSY SINGLE OR MULTIPLE;  Surgeon: Fadi Hunter MD;  Location: [facility] ESOPHAGEAL MOTILITY STUDY N/A 2016    Procedure:  "ESOPHAGEAL MOTILITY STUDY;  Surgeon: Fadi Hunter MD;  Location: [facility]     HC REMOVE TONSILS/ADENOIDS,<11 Y/O      T &A       Family History   Problem Relation Age of Onset     Cardiovascular Mother         hypertrophic cardiomyopathy     Genitourinary Problems Mother         gallbladder disease     Diabetes Mother         drug induced     Other - See Comments Mother         heart transplant 2005     Diabetes Father         type 2     Hypertension Father      Genitourinary Problems Maternal Grandmother         gallbladder disease     Genitourinary Problems Paternal Grandmother         gallbladder disease     Hypertension Paternal Grandfather         high bp     Cerebrovascular Disease Paternal Grandfather      Cardiovascular Brother         hypertrophic cardiomyopathy     Diabetes Brother         type 2     Glaucoma No family hx of      Macular Degeneration No family hx of        Social History     Tobacco Use     Smoking status: Former Smoker     Types: Cigarettes     Last attempt to quit: 10/24/2005     Years since quittin.2     Smokeless tobacco: Former User     Tobacco comment: States only smokes when drinks maybe 2x/year   Substance Use Topics     Alcohol use: No     Alcohol/week: 0.0 oz       No current facility-administered medications for this encounter.      Current Outpatient Medications   Medication     aspirin 81 MG tablet     atenolol (TENORMIN) 25 MG tablet     atorvastatin (LIPITOR) 20 MG tablet     blood glucose monitoring (PEPE CONTOUR NEXT) test strip     blood glucose monitoring (NO BRAND SPECIFIED) meter device kit     cetirizine (ZYRTEC) 10 MG tablet     dulaglutide (TRULICITY) 0.75 MG/0.5ML pen     EPINEPHrine 0.3 MG/0.3ML injection     fluticasone (FLONASE) 50 MCG/ACT spray     ibuprofen (ADVIL) 200 MG tablet     infusion set (PARADIGM QUICK-SET 23\" 9MM) [facility] pump supply     insulin aspart (NOVOLOG VIAL) 100 UNITS/ML injection     insulin cartridge (PARADIGM 3ML) misc " "pump supply     INSULIN PUMP - OUTPATIENT     Lancets (MICROLET) MISC     losartan (COZAAR) 50 MG tablet     MAGNESIUM OXIDE PO     metFORMIN (GLUCOPHAGE-XR) 500 MG 24 hr tablet     montelukast (SINGULAIR) 10 MG tablet     Multiple Vitamins-Minerals (MULTI VITAMIN/MINERALS PO)     Omega-3 Fatty Acids (FISH OIL) 500 MG CAPS     omeprazole (PRILOSEC) 40 MG capsule     order for DME     SUMAtriptan (IMITREX) 25 MG tablet        Allergies   Allergen Reactions     Ivp Dye [Contrast Dye] Itching     Pt reports that throat itches     Nuts Anaphylaxis     [place] nuts only     Bactrim Swelling     Pt reaction was swelling in mouth and tongue and itchy mouth.  Resolved with benadryl and prednisone     Penicillins Hives     Cephalosporins Itching     Seasonal Allergies Other (See Comments)     Molds, trees, grass, dust..  Upper congestion     Atorvastatin      myalgias     Shellfish Allergy Rash     Clams only       Review of Systems   HENT: Positive for ear pain (bilateral).    Eyes: Positive for photophobia and pain.   Gastrointestinal: Positive for nausea and vomiting.   Musculoskeletal: Positive for neck pain.   Neurological: Positive for headaches. Negative for weakness and numbness.   All other systems reviewed and are negative.      Physical Exam   BP: (!) 153/101  Pulse: 103  Temp: 97  F (36.1  C)  Resp: 16  Height: 162.6 cm (5' 4\")  Weight: 87.5 kg (193 lb)  SpO2: 98 %      Physical Exam   Constitutional: She is oriented to person, place, and time. She appears well-developed and well-nourished.   Appreciably photophobic    HENT:   Head: Normocephalic and atraumatic.   Eyes: EOM are normal.   Neck: Neck supple.   Cardiovascular: Normal rate and regular rhythm.   Pulmonary/Chest: Effort normal.   Neurological: She is alert and oriented to person, place, and time. No cranial nerve deficit. Coordination normal.   Skin: Skin is warm.   Psychiatric: She has a normal mood and affect.   Nursing note and vitals " reviewed.      ED Course        Procedures             Critical Care time:  none             Labs Ordered and Resulted from Time of ED Arrival Up to the Time of Departure from the ED - No data to display         Assessments & Plan (with Medical Decision Making)     Marie Vogel is a 38 year old female with history of migraines, DM II, GERD, and PFO who presents with complaint of headache, characteristic of her prior migraines.  Vital signs remarkable for hypertension and tachycardia in triage however she is afebrile.  Given prior history of migraines, meningitis or subarachnoid hemorrhage unlikely in this setting.  IV established, patient written for Reglan 10 mg IV piggyback, however this was inadvertently administered as IV push.  Soon after medication administration patient began demonstrating symptoms of restlessness consistent with akathisia.  Patient was administered Benadryl 50 mg IV, with near immediate resolution of her dystonic reaction.  Upon repeat assessment patient's headache had also subsequently resolved.  Patient was advised regarding her akathisia side effect, and need to continue to take Benadryl p.o. at home for 3 days.  Medication administration problems also explained.  Patient expressed verbal regarding this and anticipatory guidance around return precautions to the emergency department.  For period of observation in the emergency department, patient remained symptom-free and deemed ready for discharge home.  Plan to follow-up with primary care provider within 3-5 days, with guidance around appropriate administration for Reglan in the future if ever needed.    I have reviewed the nursing notes.    I have reviewed the findings, diagnosis, plan and need for follow up with the patient.       Medication List      There are no discharge medications for this visit.         Final diagnoses:   Migraine without status migrainosus, not intractable, unspecified migraine type     I, Bernie  Donavan, am serving as a trained medical scribe to document services personally performed by Rudy Morrow MD, based on the provider's statements to me.   I, Rudy Morrow MD, was physically present and have reviewed and verified the accuracy of this note documented by Bernie Go.    1/13/2019   [facility], [place], EMERGENCY DEPARTMENT     Rudy Morrow MD  01/26/19 1103

## 2019-01-13 NOTE — ED AVS SNAPSHOT
[facility], [place], Emergency Department  [address] [place] [id number]  Phone:  [phone]                                    Marie Vogel   MRN: [id number]    Department:  [facility], Emergency Department   Date of Visit:  1/13/2019           After Visit Summary Signature Page    I have received my discharge instructions, and my questions have been answered. I have discussed any challenges I see with this plan with the nurse or doctor.    ..........................................................................................................................................  Patient/Patient Representative Signature      ..........................................................................................................................................  Patient Representative Print Name and Relationship to Patient    ..................................................               ................................................  Date                                   Time    ..........................................................................................................................................  Reviewed by Signature/Title    ...................................................              ..............................................  Date                                               Time          22EPIC Rev 08/18

## 2019-01-17 ENCOUNTER — TRANSFERRED RECORDS (OUTPATIENT)
Dept: HEALTH INFORMATION MANAGEMENT | Facility: CLINIC | Age: 39
End: 2019-01-17

## 2019-01-28 ENCOUNTER — TELEPHONE (OUTPATIENT)
Dept: ENDOCRINOLOGY | Facility: CLINIC | Age: 39
End: 2019-01-28

## 2019-01-28 NOTE — TELEPHONE ENCOUNTER
Panel Management Review      Patient has the following on her problem list:     Diabetes    ASA: Passed    Last A1C  Lab Results   Component Value Date    A1C 8.8 11/05/2018    A1C 7.7 06/15/2018    A1C 8.5 10/24/2017    A1C 7.2 04/17/2017    A1C 7.2 12/27/2016     A1C tested: FAILED    Last LDL:    Lab Results   Component Value Date    CHOL 184 06/15/2018     Lab Results   Component Value Date    HDL 35 06/15/2018     Lab Results   Component Value Date     06/15/2018     Lab Results   Component Value Date    TRIG 189 06/15/2018     Lab Results   Component Value Date    CHOLHDLRATIO 4.5 07/01/2015     Lab Results   Component Value Date    NHDL 149 06/15/2018       Is the patient on a Statin? YES             Is the patient on Aspirin? YES    Medications     HMG CoA Reductase Inhibitors    atorvastatin (LIPITOR) 20 MG tablet    Salicylates    aspirin 81 MG tablet          Last three blood pressure readings:  BP Readings from Last 3 Encounters:   01/13/19 (!) 153/101   01/02/19 118/62   11/27/18 120/76       Date of last diabetes office visit: 11/12/18     Tobacco History:     History   Smoking Status     Former Smoker     Types: Cigarettes     Quit date: 10/24/2005   Smokeless Tobacco     Former User     Comment: States only smokes when drinks maybe 2x/year           Composite cancer screening  Chart review shows that this patient is due/due soon for the following None  Summary:    Patient is due/failing the following:   FOLLOW UP    Action needed:   Patient needs office visit for endo.    Type of outreach:    none, patient has future appt [facility]    Questions for provider review:    None                                                                                                                                    Lenore Hurley CMA       Chart routed to no one .

## 2019-01-30 DIAGNOSIS — Z79.4 TYPE 2 DIABETES MELLITUS WITH DIABETIC NEPHROPATHY, WITH LONG-TERM CURRENT USE OF INSULIN (H): ICD-10-CM

## 2019-01-30 DIAGNOSIS — E11.21 TYPE 2 DIABETES MELLITUS WITH DIABETIC NEPHROPATHY, WITH LONG-TERM CURRENT USE OF INSULIN (H): ICD-10-CM

## 2019-01-30 DIAGNOSIS — R80.9 MICROALBUMINURIA: ICD-10-CM

## 2019-01-30 LAB
CHOLEST SERPL-MCNC: 206 MG/DL
HBA1C MFR BLD: 7.3 % (ref 0–5.6)
HDLC SERPL-MCNC: 33 MG/DL
LDLC SERPL CALC-MCNC: 140 MG/DL
NONHDLC SERPL-MCNC: 173 MG/DL
TRIGL SERPL-MCNC: 165 MG/DL

## 2019-01-30 PROCEDURE — 36415 COLL VENOUS BLD VENIPUNCTURE: CPT | Performed by: INTERNAL MEDICINE

## 2019-01-30 PROCEDURE — 80061 LIPID PANEL: CPT | Performed by: INTERNAL MEDICINE

## 2019-01-30 PROCEDURE — 83036 HEMOGLOBIN GLYCOSYLATED A1C: CPT | Performed by: INTERNAL MEDICINE

## 2019-01-30 NOTE — TELEPHONE ENCOUNTER
"Requested Prescriptions   Pending Prescriptions Disp Refills     losartan (COZAAR) 50 MG tablet [Pharmacy Med Name: LOSARTAN POTASSIUM 50MG TABS]    Last Written Prescription Date:  7/20/2018  Last Fill Quantity: 135,  # refills: 0   Last office visit: 4/16/2018 with prescribing provider:  Alan Paredes     Future Office Visit:     135 tablet 0     Sig: TAKE ONE TABLET BY MOUTH EVERY MORNING AND TAKE ONE-HALF TABLET BY MOUTH EVERY EVENING    Angiotensin-II Receptors Failed - 1/30/2019  6:52 AM       Failed - Blood pressure under 140/90 in past 12 months    BP Readings from Last 3 Encounters:   01/13/19 (!) 153/101   01/02/19 118/62   11/27/18 120/76                Passed - Recent (12 mo) or future (30 days) visit within the authorizing provider's specialty    Patient had office visit in the last 12 months or has a visit in the next 30 days with authorizing provider or within the authorizing provider's specialty.  See \"Patient Info\" tab in inbasket, or \"Choose Columns\" in Meds & Orders section of the refill encounter.             Passed - Medication is active on med list       Passed - Patient is age 18 or older       Passed - No active pregnancy on record       Passed - Normal serum creatinine on file in past 12 months    Recent Labs   Lab Test 07/22/18 2159   CR 0.92            Passed - Normal serum potassium on file in past 12 months    Recent Labs   Lab Test 07/22/18 2159   POTASSIUM 4.3                   Passed - No positive pregnancy test in past 12 months          "

## 2019-01-31 NOTE — TELEPHONE ENCOUNTER
Routing refill request to provider for review/approval because:  Labs out of range:  BP elevated in ED for migraine  Lexi NEGRON RN - Triage  [facility]

## 2019-01-31 NOTE — RESULT ENCOUNTER NOTE
Marie,  Dr. Fuentes is out of the office right now.  I am covering for her while she is away.  Your A1c has improved! Keep up the good work.  Your cholesterol numbers are elevated.   Here is a copy of the results for your records.  Dr. Fuentes will review your results when she is back in the office and let you know if she recommends any changes.  Debra Myles NP  Endocrinology

## 2019-02-02 RX ORDER — LOSARTAN POTASSIUM 50 MG/1
TABLET ORAL
Qty: 135 TABLET | Refills: 3 | Status: SHIPPED | OUTPATIENT
Start: 2019-02-02 | End: 2020-06-11

## 2019-02-05 DIAGNOSIS — E78.5 DYSLIPIDEMIA: Primary | ICD-10-CM

## 2019-02-05 DIAGNOSIS — G43.809 OTHER MIGRAINE WITHOUT STATUS MIGRAINOSUS, NOT INTRACTABLE: ICD-10-CM

## 2019-02-05 RX ORDER — SUMATRIPTAN 25 MG/1
TABLET, FILM COATED ORAL
Qty: 8 TABLET | Refills: 6 | Status: SHIPPED | OUTPATIENT
Start: 2019-02-05 | End: 2021-07-27

## 2019-02-05 NOTE — TELEPHONE ENCOUNTER
"Requested Prescriptions   Pending Prescriptions Disp Refills     SUMAtriptan (IMITREX) 25 MG tablet [Pharmacy Med Name: SUMATRIPTAN SUCCINATE 25MG TABS]    Last Written Prescription Date:  10/24/2017  Last Fill Quantity: 8,  # refills: 6   Last office visit: 11/12/2018 with prescribing provider:  Alan Paredes     Future Office Visit:     8 tablet 6     Sig: TAKE ONE TO FOUR TABLETS BY MOUTH ONE TIME. MAY REPEAT 1 TABLET EVERY TWO HOURS UP TO MAXIMUM OF 200MG IN 24 HOURS    Serotonin Agonists Failed - 2/5/2019  1:04 PM       Failed - Blood pressure under 140/90 in past 12 months    BP Readings from Last 3 Encounters:   01/13/19 (!) 153/101   01/02/19 118/62   11/27/18 120/76                Failed - Serotonin Agonist request needs review.    Please review patient's record. If patient has had 8 or more treatments in the past month, please forward to provider.         Passed - Recent (12 mo) or future (30 days) visit within the authorizing provider's specialty    Patient had office visit in the last 12 months or has a visit in the next 30 days with authorizing provider or within the authorizing provider's specialty.  See \"Patient Info\" tab in inbasket, or \"Choose Columns\" in Meds & Orders section of the refill encounter.             Passed - Medication is active on med list       Passed - Patient is age 18 or older       Passed - No active pregnancy on record       Passed - No positive pregnancy test in past 12 months          "

## 2019-02-05 NOTE — TELEPHONE ENCOUNTER
"Requested Prescriptions   Pending Prescriptions Disp Refills     SUMAtriptan (IMITREX) 25 MG tablet [Pharmacy Med Name: SUMATRIPTAN SUCCINATE 25MG TABS]  Last Written Prescription Date:  10/24/2017  Last Fill Quantity: 8 tablet,  # refills: 6   Last office visit: 11/12/2018 with prescribing provider:  Zita Fuentes MD    Future Office Visit:     8 tablet 6     Sig: TAKE ONE TO FOUR TABLETS BY MOUTH ONE TIME. MAY REPEAT 1 TABLET EVERY TWO HOURS UP TO MAXIMUM OF 200MG IN 24 HOURS    Serotonin Agonists Failed - 2/5/2019  1:04 PM       Failed - Blood pressure under 140/90 in past 12 months    BP Readings from Last 3 Encounters:   01/13/19 (!) 153/101   01/02/19 118/62   11/27/18 120/76                Failed - Serotonin Agonist request needs review.    Please review patient's record. If patient has had 8 or more treatments in the past month, please forward to provider.         Passed - Recent (12 mo) or future (30 days) visit within the authorizing provider's specialty    Patient had office visit in the last 12 months or has a visit in the next 30 days with authorizing provider or within the authorizing provider's specialty.  See \"Patient Info\" tab in inbasket, or \"Choose Columns\" in Meds & Orders section of the refill encounter.             Passed - Medication is active on med list       Passed - Patient is age 18 or older       Passed - No active pregnancy on record       Passed - No positive pregnancy test in past 12 months          "

## 2019-02-06 RX ORDER — ATORVASTATIN CALCIUM 20 MG/1
TABLET, FILM COATED ORAL
Qty: 90 TABLET | Refills: 3 | Status: SHIPPED | OUTPATIENT
Start: 2019-02-06 | End: 2019-09-24 | Stop reason: SINTOL

## 2019-03-08 ENCOUNTER — OFFICE VISIT (OUTPATIENT)
Dept: AUDIOLOGY | Facility: CLINIC | Age: 39
End: 2019-03-08

## 2019-03-08 DIAGNOSIS — H90.3 SENSORY HEARING LOSS, BILATERAL: Primary | ICD-10-CM

## 2019-03-11 ENCOUNTER — OFFICE VISIT (OUTPATIENT)
Dept: ENDOCRINOLOGY | Facility: CLINIC | Age: 39
End: 2019-03-11
Payer: COMMERCIAL

## 2019-03-11 VITALS
TEMPERATURE: 97.5 F | BODY MASS INDEX: 32.1 KG/M2 | HEART RATE: 78 BPM | SYSTOLIC BLOOD PRESSURE: 122 MMHG | OXYGEN SATURATION: 98 % | DIASTOLIC BLOOD PRESSURE: 78 MMHG | WEIGHT: 187 LBS

## 2019-03-11 DIAGNOSIS — E11.21 TYPE 2 DIABETES MELLITUS WITH DIABETIC NEPHROPATHY, WITH LONG-TERM CURRENT USE OF INSULIN (H): ICD-10-CM

## 2019-03-11 DIAGNOSIS — Z79.4 TYPE 2 DIABETES MELLITUS WITH DIABETIC NEPHROPATHY, WITH LONG-TERM CURRENT USE OF INSULIN (H): ICD-10-CM

## 2019-03-11 PROCEDURE — 99214 OFFICE O/P EST MOD 30 MIN: CPT | Performed by: INTERNAL MEDICINE

## 2019-03-11 RX ORDER — INFUSION SET FOR INSULIN PUMP
INFUSION SETS-PARAPHERNALIA MISCELLANEOUS
Qty: 30 EACH | Refills: 3 | Status: SHIPPED | OUTPATIENT
Start: 2019-03-11 | End: 2019-09-24

## 2019-03-11 RX ORDER — INSULIN PUMP SYRINGE, 3 ML
EACH MISCELLANEOUS
Qty: 30 EACH | Refills: 3 | Status: SHIPPED | OUTPATIENT
Start: 2019-03-11 | End: 2019-09-24

## 2019-03-11 RX ORDER — METFORMIN HCL 500 MG
1000 TABLET, EXTENDED RELEASE 24 HR ORAL 2 TIMES DAILY WITH MEALS
Qty: 360 TABLET | Refills: 3 | Status: SHIPPED | OUTPATIENT
Start: 2019-03-11 | End: 2019-09-24

## 2019-03-11 NOTE — PATIENT INSTRUCTIONS
[facility]   Dr Fuentes, Endocrinology Department      [facility]   [address] [address]  Appointment [phone]  Fax: [phone]   Monday and Tuesday         [facility]   [address] [place] [address]  Appointment [phone]  Fax: [phone]  Wednesday and Thursday         Continue current pump settings and medications for diabetes.  Labs in early may  Please make a lab appointment for blood work and follow up clinic appointment in 1 week after that to discuss results.    Check with [employer] if you are eligible for pump upgrade  If yes- let me know and I can put a referral for CDE for pre pump assessment    Recommend checking blood sugars before meals and at bedtime.    If Blood glucose are low more often-> 2-3 times/week- give us a call.  The patient is advised to Make better food choices: reduce carbs, Reduce portion size, weight loss and exercise 3-4 times a week.  Discussed hypoglycemia signs and symptoms as well as management in detail.

## 2019-03-11 NOTE — LETTER
3/11/2019         RE: Marie Vogel  [address]  [place] [address]        Dear Colleague,    Thank you for referring your patient, Marie Vogel, to the [facility]. Please see a copy of my visit note below.    Name: Marie Vogel  Seen for f/u of DM  HPI:  Marie Vogel is a 38 year old female who presents for the evaluation/management of DM.  Had CGM in 1/2017.  On pump  Medtronic Minimed Paradigm+ CGM (dexcom) though not using sensors currently.    Here for f/u. Previously has seen endo at [facility] ( Dr Aguilera and Dr Zhou and ). 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 Busy at work, also variable schedule.. Concerned about weight gain.   H/o cardiomyopathy. Followed by cardiology.    Working nights.    In last clinic visit she was started on Trulicity 0.75 mg/week.  She is tolerating okay.  Reports that she is not feeling hungry and has lost some weight.    1. Type 2 DM:  Orginally diagnosed at the age of: 21 with GDM during her first pregnancy. Diagnosed with DM 2 in 1/2002, diet controlled for 3 years, then started on metformin. In 2010 during her second pregnancy, started on insulin and then insulin pump therapy in 2012.   Current Regimen: Insulin pump therapy ( Medtronic Minimed Paradigm), metformin XR 1000 mg bid, Trulicity 0.75 mg/week  BS checks: 4-5 times per day  Average Meter Download:     Current Regimen:   Insulin pump -   Time Rate (U/hr)   0000-10.30 1.65   1734-8497 1.40   3979-8141 1.20     Carbohydrate Ratio -    Time Ratio   7943-0340 7   1772-4450 7     Sensitivity   27   Active Insulin Time  3 hours   Basal  84%   Bolus   16%   Total Carbohydrates/day  71   Total Insulin/day   36.2 + 14.0   Average Blood Sugar 183   BS Checks    Care Link Username-   Password-         Complications:   Diabetes Complications  Description / Detail    Diabetic Retinopathy  No   CAD / PAD  No   Neuropathy  No   Nephropathy /  Microalbuminuria  Yes: microalbuminuria. ON cozaar.   Gastroparesis  No   Hypoglycemia [place]  No     2. Hypertension: Blood Pressure today:   BP Readings from Last 3 Encounters:   19 122/78   19 (!) 153/101   19 118/62     Takes medications everyday without forgetting a dose.  Denies feeling lightheaded or dizzy.    3. Hyperlipidemia:   Denies muscle aches of pains.       PMH/PSH:  Past Medical History:   Diagnosis Date     Allergic rhinitis due to pollen      Atypical glandular cells on Pap smear 3/1108     Cervical high risk HPV (human papillomavirus) test positive 2018    See problem list     Gastroesophageal reflux disease      PFO (patent foramen ovale)      Type II or unspecified type diabetes mellitus without mention of complication, not stated as uncontrolled     onset was gestational in      Past Surgical History:   Procedure Laterality Date     C INDUCED ABORTN BY D&C           C IUD,MIRENA  8/10/10, 7/28/15     C/SECTION, LOW TRANSVERSE  6/25/10    , Low Transverse     CHOLECYSTECTOMY, LAPOROSCOPIC      Cholecystectomy, Laparoscopic     ESOPHAGOSCOPY, GASTROSCOPY, DUODENOSCOPY (EGD), COMBINED N/A 2016    Procedure: COMBINED ESOPHAGOSCOPY, GASTROSCOPY, DUODENOSCOPY (EGD), BIOPSY SINGLE OR MULTIPLE;  Surgeon: Fadi Hunter MD;  Location: [facility] ESOPHAGEAL MOTILITY STUDY N/A 2016    Procedure: ESOPHAGEAL MOTILITY STUDY;  Surgeon: Fadi Hunter MD;  Location: [facility] REMOVE TONSILS/ADENOIDS,<11 Y/O      T &A     Family Hx:  Family History   Problem Relation Age of Onset     Cardiovascular Mother         hypertrophic cardiomyopathy     Genitourinary Problems Mother         gallbladder disease     Diabetes Mother         drug induced     Other - See Comments Mother         heart transplant 2005     Diabetes Father         type 2     Hypertension Father      Genitourinary Problems Maternal Grandmother          gallbladder disease     Genitourinary Problems Paternal Grandmother         gallbladder disease     Hypertension Paternal Grandfather         high bp     Cerebrovascular Disease Paternal Grandfather      Cardiovascular Brother         hypertrophic cardiomyopathy     Diabetes Brother         type 2     Glaucoma No family hx of      Macular Degeneration No family hx of      Thyroid disease: No         DM2: Yes - father and mother         Autoimmune: DM1, SLE, RA, Vitiligo No    Social Hx:  Social History     Socioeconomic History     Marital status:      Spouse name: Not on file     Number of children: 2     Years of education: 14     Highest education level: Not on file   Occupational History     Occupation: nurse   Social Needs     Financial resource strain: Not on file     Food insecurity:     Worry: Not on file     Inability: Not on file     Transportation needs:     Medical: Not on file     Non-medical: Not on file   Tobacco Use     Smoking status: Former Smoker     Types: Cigarettes     Last attempt to quit: 10/24/2005     Years since quittin.3     Smokeless tobacco: Former User     Tobacco comment: States only smokes when drinks maybe 2x/year   Substance and Sexual Activity     Alcohol use: No     Alcohol/week: 0.0 oz     Drug use: No     Sexual activity: Yes     Partners: Male     Birth control/protection: IUD     Comment: Mirena IUD 7/28/15   Lifestyle     Physical activity:     Days per week: Not on file     Minutes per session: Not on file     Stress: Not on file   Relationships     Social connections:     Talks on phone: Not on file     Gets together: Not on file     Attends Jehovah's witness service: Not on file     Active member of club or organization: Not on file     Attends meetings of clubs or organizations: Not on file     Relationship status: Not on file     Intimate partner violence:     Fear of current or ex partner: Not on file     Emotionally abused: Not on file     Physically abused:  Not on file     Forced sexual activity: Not on file   Other Topics Concern     Parent/sibling w/ CABG, MI or angioplasty before 65F 55M? Not Asked   Social History Narrative    Caffeine intake/servings daily - 6-7    Calcium intake/servings daily - 2-3 yogurt, milk, cheese    Exercise 1 times weekly - describe aerobics    Sunscreen used - Yes    Seatbelts used - Yes    Guns stored in the home - No    Self Breast Exam - Yes    Pap test up to date -  Yes    Eye exam up to date -  Yes    Dental exam up to date -  Yes    DEXA scan up to date -  Not Applicable    Flex Sig/Colonoscopy up to date -  Not Applicable    Mammography up to date -  Not Applicable    Immunizations reviewed and up to date - Yes    Abuse: Current or Past (Physical, Sexual or Emotional) - No    Do you feel safe in your environment - Yes    Do you cope well with stress - Yes    Do you suffer from insomnia - Yes     Last updated by: Tatianna Lacey  5/9/2005          MEDICATIONS:  has a current medication list which includes the following prescription(s): aspirin, atenolol, atorvastatin, blood glucose, blood glucose monitoring, cetirizine, diphenhydramine, dulaglutide, epinephrine, fluticasone, ibuprofen, infusion set, insulin aspart, insulin cartridge, insulin pump, blood glucose monitoring, losartan, magnesium oxide, metformin, montelukast, multiple vitamins-minerals, fish oil, omeprazole, order for dme, and sumatriptan.    ROS     ROS: 10 point ROS neg other than the symptoms noted above in the HPI.    Physical Exam   VS: /78   Pulse 78   Temp 97.5  F (36.4  C) (Tympanic)   Wt 84.8 kg (187 lb)   SpO2 98%   BMI 32.10 kg/m       GENERAL: AXOX3, NAD, well dressed, answering questions appropriately, appears stated age.  HEENT: No exopthalmous, no proptosis, EOMI, no lig lag, no retraction  NECK: Thyroid normal in size, non tender, no nodules were palpated.  CV: RRR  LUNGS: CTAB  ABDOMEN: +BS  NEUROLOGY: CN grossly intact, no tremors  PSYCH:  normal affect and mood    LABS:  A1c:   Lab Results   Component Value Date    A1C 7.3 01/30/2019    A1C 8.8 11/05/2018    A1C 7.7 06/15/2018    A1C 8.5 10/24/2017    A1C 7.2 04/17/2017     Basic Metabolic Panel:  Creatinine   Date Value Ref Range Status   07/22/2018 0.92 0.52 - 1.04 mg/dL Final       LFTS/Cholesterol Panel:  Liver Function Studies -   Recent Labs   Lab Test 01/30/19  0733 06/15/18  0732  07/01/15  0612 03/27/14  1022   CHOL 206* 184   < > 190 146   HDL 33* 35*   < > 42* 32*   * 111*   < > 96 77   TRIG 165* 189*   < > 260* 186*   CHOLHDLRATIO  --   --   --  4.5 4.5    < > = values in this interval not displayed.     Liver Function Studies -   Recent Labs   Lab Test 11/05/18  0750   PROTTOTAL 7.0   ALBUMIN 3.6   BILITOTAL 0.4   ALKPHOS 84   AST 22   ALT 32       Thyroid Function:  ENDO THYROID LABS-P Latest Ref Rng & Units 4/16/2017   TSH 0.40 - 4.00 mU/L 2.06     ENDO THYROID LABS-UMP Latest Ref Rng 9/27/2016 4/2/2014   TSH 0.40 - 4.00 mU/L 2.17 1.09   T4 FREE 0.70 - 1.85 ng/dL  0.75       Urine MicroAlbumin:  Lab Results   Component Value Date    UMALCR 654.32 11/05/2018      Vitamin D:  Vitamin D Deficiency Screening Results:  Lab Results   Component Value Date    VITDT 33 11/05/2018    VITDT 39 09/27/2016    VITDT 45 02/23/2016    VITDT 31 03/31/2015    VITDT 29 (L) 03/27/2014         All pertinent notes, labs, and images personally reviewed by me.     Glucometer/ insulin pump (if applicable)/ CGM data (if applicable) downloaded, Personally reviewed and interpreted.  All Blood sugar data reviewed personally and discussed with pt.  See nursing note from 3/11/2019 for details of BG/CGM log.      A/P  Ms.Erica WILLIAM Vogel is a 38 year old here for the evaluation/management of diabetes:    1. DM2 - Under Fair control.  A1c improved to 7.3%  H/o hypertropic cardiomyopathy, followed by cardiology.  Blood sugars are variable.  Limited blood sugar data.   She is not bolusing with each meal.     Basal > bolus   Has variable shifts at work.    Discussed Medtronic 670 G + CGM in detail-- she will check with insurance.    If she is eligible for pump greater than I can refer her to diabetic educator for prepump assessment and discuss other alternatives.    Continue current pump settings for now.    Continue metformin  1000 mg twice daily  Continue Trulicity 0.75 mg once a week  Labs and follow-up in 2-3 months    Recommend checking blood sugars before meals and at bedtime.    If Blood glucose are low more often-> 2-3 times/week- give us a call.  The patient is advised to Make better food choices: reduce carbs, Reduce portion size, weight loss and exercise 3-4 times a week.  Discussed hypoglycemia signs and symptoms as well as management in detail.    Prevention    Most Recent Immunizations   Administered Date(s) Administered     Influenza (IIV3) PF 09/26/2018     MMR 09/17/2007     TD (ADULT, 7+) 01/01/2002     TDAP Vaccine (Adacel) 04/06/2012     Opthalmology-2018, due  Dental-Yes  ASA-81 mg   Smoking- No    2. Hypertension - Under Good control.  + microalbuminuria. On losartan. Blood pressure medications include cozaar 75 mg qd. Need aggressive BP/glycmeic control.    3. Hyperlipidemia - Under fair control.TG high with low HDL, LD at 140, HDL 33. Takes Lipitor 20 mg for lipid control.  Continue current meds.  Consider increasing dose (goal LDL < 70) in next visit.  Recommend to use it consistently.    4. Microalbuminuria:  Recommend strict BG control.  Continue Cozaar 50 mg/day.    All questions were answered.  The patient indicates understanding of the above issues and agrees with the plan set forth.       More than 50% of face to face time spent with Ms. Dino Vogel on counseling / coordinating her care.  Total appointment times was 30 minutes.      Follow-up:  follow up in 2-3 months.    Zita Fuentes MD  Endocrinology   [facility]    CC: Alan Paredes    Addendum to above  note and clinic visit:    Labs reviewed.    See result note/telephone encounter.                Again, thank you for allowing me to participate in the care of your patient.        Sincerely,        Zita Fuentes MD

## 2019-03-11 NOTE — PROGRESS NOTES
Name: Marie Vogel  Seen for f/u of DM  HPI:  Marie Vogel is a 38 year old female who presents for the evaluation/management of DM.  Had CGM in 1/2017.  On pump  Medtronic Minimed Paradigm+ CGM (dexcom) though not using sensors currently.    Here for f/u. Previously has seen endo at [place] ( Dr Aguilera and Dr Zhou and ). 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 Busy at work, also variable schedule.. Concerned about weight gain.   H/o cardiomyopathy. Followed by cardiology.    Working nights.    In last clinic visit she was started on Trulicity 0.75 mg/week.  She is tolerating okay.  Reports that she is not feeling hungry and has lost some weight.    1. Type 2 DM:  Orginally diagnosed at the age of: 21 with GDM during her first pregnancy. Diagnosed with DM 2 in 1/2002, diet controlled for 3 years, then started on metformin. In 2010 during her second pregnancy, started on insulin and then insulin pump therapy in 2012.   Current Regimen: Insulin pump therapy ( Medtronic Minimed Paradigm), metformin XR 1000 mg bid, Trulicity 0.75 mg/week  BS checks: 4-5 times per day  Average Meter Download:     Current Regimen:   Insulin pump -   Time Rate (U/hr)   0000-10.30 1.65   6266-0095 1.40   3093-3458 1.20     Carbohydrate Ratio -    Time Ratio   1985-1049 7   6004-2645 7     Sensitivity   27   Active Insulin Time  3 hours   Basal  84%   Bolus   16%   Total Carbohydrates/day  71   Total Insulin/day   36.2 + 14.0   Average Blood Sugar 183   BS Checks    Care Link Username-   Password-         Complications:   Diabetes Complications  Description / Detail    Diabetic Retinopathy  No   CAD / PAD  No   Neuropathy  No   Nephropathy / Microalbuminuria  Yes: microalbuminuria. ON cozaar.   Gastroparesis  No   Hypoglycemia [place]  No     2. Hypertension: Blood Pressure today:   BP Readings from Last 3 Encounters:   03/11/19 122/78   01/13/19 (!) 153/101   01/02/19 118/62     Takes medications everyday  without forgetting a dose.  Denies feeling lightheaded or dizzy.    3. Hyperlipidemia:   Denies muscle aches of pains.       PMH/PSH:  Past Medical History:   Diagnosis Date     Allergic rhinitis due to pollen      Atypical glandular cells on Pap smear 3/1108     Cervical high risk HPV (human papillomavirus) test positive 2018    See problem list     Gastroesophageal reflux disease      PFO (patent foramen ovale)      Type II or unspecified type diabetes mellitus without mention of complication, not stated as uncontrolled     onset was gestational in      Past Surgical History:   Procedure Laterality Date     C INDUCED ABORTN BY D&C           C IUD,MIRENA  8/10/10, 7/28/15     C/SECTION, LOW TRANSVERSE  6/25/10    , Low Transverse     CHOLECYSTECTOMY, LAPOROSCOPIC      Cholecystectomy, Laparoscopic     ESOPHAGOSCOPY, GASTROSCOPY, DUODENOSCOPY (EGD), COMBINED N/A 2016    Procedure: COMBINED ESOPHAGOSCOPY, GASTROSCOPY, DUODENOSCOPY (EGD), BIOPSY SINGLE OR MULTIPLE;  Surgeon: Fadi Hunter MD;  Location: [facility] ESOPHAGEAL MOTILITY STUDY N/A 2016    Procedure: ESOPHAGEAL MOTILITY STUDY;  Surgeon: Fadi Hunter MD;  Location: [facility] REMOVE TONSILS/ADENOIDS,<13 Y/O      T &A     Family Hx:  Family History   Problem Relation Age of Onset     Cardiovascular Mother         hypertrophic cardiomyopathy     Genitourinary Problems Mother         gallbladder disease     Diabetes Mother         drug induced     Other - See Comments Mother         heart transplant 2005     Diabetes Father         type 2     Hypertension Father      Genitourinary Problems Maternal Grandmother         gallbladder disease     Genitourinary Problems Paternal Grandmother         gallbladder disease     Hypertension Paternal Grandfather         high bp     Cerebrovascular Disease Paternal Grandfather      Cardiovascular Brother         hypertrophic cardiomyopathy      Diabetes Brother         type 2     Glaucoma No family hx of      Macular Degeneration No family hx of      Thyroid disease: No         DM2: Yes - father and mother         Autoimmune: DM1, SLE, RA, Vitiligo No    Social Hx:  Social History     Socioeconomic History     Marital status:      Spouse name: Not on file     Number of children: 2     Years of education: 14     Highest education level: Not on file   Occupational History     Occupation: nurse   Social Needs     Financial resource strain: Not on file     Food insecurity:     Worry: Not on file     Inability: Not on file     Transportation needs:     Medical: Not on file     Non-medical: Not on file   Tobacco Use     Smoking status: Former Smoker     Types: Cigarettes     Last attempt to quit: 10/24/2005     Years since quittin.3     Smokeless tobacco: Former User     Tobacco comment: States only smokes when drinks maybe 2x/year   Substance and Sexual Activity     Alcohol use: No     Alcohol/week: 0.0 oz     Drug use: No     Sexual activity: Yes     Partners: Male     Birth control/protection: IUD     Comment: Mirena IUD 7/28/15   Lifestyle     Physical activity:     Days per week: Not on file     Minutes per session: Not on file     Stress: Not on file   Relationships     Social connections:     Talks on phone: Not on file     Gets together: Not on file     Attends Tenriism service: Not on file     Active member of club or organization: Not on file     Attends meetings of clubs or organizations: Not on file     Relationship status: Not on file     Intimate partner violence:     Fear of current or ex partner: Not on file     Emotionally abused: Not on file     Physically abused: Not on file     Forced sexual activity: Not on file   Other Topics Concern     Parent/sibling w/ CABG, MI or angioplasty before 65F 55M? Not Asked   Social History Narrative    Caffeine intake/servings daily - 6-7    Calcium intake/servings daily - 2-3 yogurt, milk,  cheese    Exercise 1 times weekly - describe aerobics    Sunscreen used - Yes    Seatbelts used - Yes    Guns stored in the home - No    Self Breast Exam - Yes    Pap test up to date -  Yes    Eye exam up to date -  Yes    Dental exam up to date -  Yes    DEXA scan up to date -  Not Applicable    Flex Sig/Colonoscopy up to date -  Not Applicable    Mammography up to date -  Not Applicable    Immunizations reviewed and up to date - Yes    Abuse: Current or Past (Physical, Sexual or Emotional) - No    Do you feel safe in your environment - Yes    Do you cope well with stress - Yes    Do you suffer from insomnia - Yes     Last updated by: Tatianna Lacey  5/9/2005          MEDICATIONS:  has a current medication list which includes the following prescription(s): aspirin, atenolol, atorvastatin, blood glucose, blood glucose monitoring, cetirizine, diphenhydramine, dulaglutide, epinephrine, fluticasone, ibuprofen, infusion set, insulin aspart, insulin cartridge, insulin pump, blood glucose monitoring, losartan, magnesium oxide, metformin, montelukast, multiple vitamins-minerals, fish oil, omeprazole, order for dme, and sumatriptan.    ROS     ROS: 10 point ROS neg other than the symptoms noted above in the HPI.    Physical Exam   VS: /78   Pulse 78   Temp 97.5  F (36.4  C) (Tympanic)   Wt 84.8 kg (187 lb)   SpO2 98%   BMI 32.10 kg/m      GENERAL: AXOX3, NAD, well dressed, answering questions appropriately, appears stated age.  HEENT: No exopthalmous, no proptosis, EOMI, no lig lag, no retraction  NECK: Thyroid normal in size, non tender, no nodules were palpated.  CV: RRR  LUNGS: CTAB  ABDOMEN: +BS  NEUROLOGY: CN grossly intact, no tremors  PSYCH: normal affect and mood    LABS:  A1c:   Lab Results   Component Value Date    A1C 7.3 01/30/2019    A1C 8.8 11/05/2018    A1C 7.7 06/15/2018    A1C 8.5 10/24/2017    A1C 7.2 04/17/2017     Basic Metabolic Panel:  Creatinine   Date Value Ref Range Status   07/22/2018  0.92 0.52 - 1.04 mg/dL Final       LFTS/Cholesterol Panel:  Liver Function Studies -   Recent Labs   Lab Test 01/30/19  0733 06/15/18  0732  07/01/15  0612 03/27/14  1022   CHOL 206* 184   < > 190 146   HDL 33* 35*   < > 42* 32*   * 111*   < > 96 77   TRIG 165* 189*   < > 260* 186*   CHOLHDLRATIO  --   --   --  4.5 4.5    < > = values in this interval not displayed.     Liver Function Studies -   Recent Labs   Lab Test 11/05/18  0750   PROTTOTAL 7.0   ALBUMIN 3.6   BILITOTAL 0.4   ALKPHOS 84   AST 22   ALT 32       Thyroid Function:  ENDO THYROID LABS[facility] Latest Ref Rng & Units 4/16/2017   TSH 0.40 - 4.00 mU/L 2.06     END[facility] THYROID LAB[facility] Latest Ref Rng 9/27/2016 4/2/2014   TSH 0.40 - 4.00 mU/L 2.17 1.09   T4 FREE 0.70 - 1.85 ng/dL  0.75       Urine MicroAlbumin:  Lab Results   Component Value Date    UMALCR 654.32 11/05/2018      Vitamin D:  Vitamin D Deficiency Screening Results:  Lab Results   Component Value Date    VITDT 33 11/05/2018    VITDT 39 09/27/2016    VITDT 45 02/23/2016    VITDT 31 03/31/2015    VITDT 29 (L) 03/27/2014         All pertinent notes, labs, and images personally reviewed by me.     Glucometer/ insulin pump (if applicable)/ CGM data (if applicable) downloaded, Personally reviewed and interpreted.  All Blood sugar data reviewed personally and discussed with pt.  See nursing note from 3/11/2019 for details of BG/CGM log.      A/P  Ms.Erica WILLIAM Vogel is a 38 year old here for the evaluation/management of diabetes:    1. DM2 - Under Fair control.  A1c improved to 7.3%  H/o hypertropic cardiomyopathy, followed by cardiology.  Blood sugars are variable.  Limited blood sugar data.   She is not bolusing with each meal.    Basal > bolus   Has variable shifts at work.    Discussed [employer] D&Rtronic 670 G + CGM in detail-- she will check with insurance.    If she is eligible for pump greater than I can refer her to diabetic educator for prepump assessment and discuss other alternatives.     Continue current pump settings for now.    Continue metformin  1000 mg twice daily  Continue Trulicity 0.75 mg once a week  Labs and follow-up in 2-3 months    Recommend checking blood sugars before meals and at bedtime.    If Blood glucose are low more often-> 2-3 times/week- give us a call.  The patient is advised to Make better food choices: reduce carbs, Reduce portion size, weight loss and exercise 3-4 times a week.  Discussed hypoglycemia signs and symptoms as well as management in detail.    Prevention    Most Recent Immunizations   Administered Date(s) Administered     Influenza (IIV3) PF 09/26/2018     MMR 09/17/2007     TD (ADULT, 7+) 01/01/2002     TDAP Vaccine (Adacel) 04/06/2012     Opthalmology-2018, due  Dental-Yes  ASA-81 mg   Smoking- No    2. Hypertension - Under Good control.  + microalbuminuria. On losartan. Blood pressure medications include cozaar 75 mg qd. Need aggressive BP/glycmeic control.    3. Hyperlipidemia - Under fair control.TG high with low HDL, LD at 140, HDL 33. Takes Lipitor 20 mg for lipid control.  Continue current meds.  Consider increasing dose (goal LDL < 70) in next visit.  Recommend to use it consistently.    4. Microalbuminuria:  Recommend strict BG control.  Continue Cozaar 50 mg/day.    All questions were answered.  The patient indicates understanding of the above issues and agrees with the plan set forth.       More than 50% of face to face time spent with Ms. Dino Vogel on counseling / coordinating her care.  Total appointment times was 30 minutes.      Follow-up:  follow up in 2-3 months.    Zita Fuentes MD  Endocrinology   [facility]    CC: Alan Paredes    Addendum to above note and clinic visit:    Labs reviewed.    See result note/telephone encounter.

## 2019-04-30 ENCOUNTER — OFFICE VISIT (OUTPATIENT)
Dept: URGENT CARE | Facility: URGENT CARE | Age: 39
End: 2019-04-30
Payer: COMMERCIAL

## 2019-04-30 VITALS
BODY MASS INDEX: 31.76 KG/M2 | HEART RATE: 74 BPM | SYSTOLIC BLOOD PRESSURE: 118 MMHG | DIASTOLIC BLOOD PRESSURE: 70 MMHG | TEMPERATURE: 98.3 F | OXYGEN SATURATION: 98 % | WEIGHT: 185 LBS

## 2019-04-30 DIAGNOSIS — M25.572 PAIN IN JOINT, ANKLE AND FOOT, LEFT: Primary | ICD-10-CM

## 2019-04-30 PROCEDURE — 99213 OFFICE O/P EST LOW 20 MIN: CPT | Performed by: FAMILY MEDICINE

## 2019-05-01 NOTE — PATIENT INSTRUCTIONS
Ace wrap while awake for compression, comfort and support.   Aleve 1 tablet every 12 hours (take with food) for the next 2-3 days, then if/as needed.  Elevate, ice.  Avoid aggravating activity.    Wear good supportive shoes while awake, don't go barefoot, even in the house.   Recheck in about a week with your podiatrist or your primary provider, if the ankle pain hasn't resolved completely.

## 2019-05-01 NOTE — PROGRESS NOTES
"SUBJECTIVE:  Marie Vogel is a 38 year old female who complains of left anterior ankle pain since wearing high (3\") heels 2 weeks ago while attending a .  She hasn't worn high heels in over a year.  After the  she went for gas and had onset of pain to the anterior ankle.   Its felt sore in the same area since and at times at the base of the posterior calf.  No swelling, redness or bruising.  No numbness or tingling.  No fall or twisting of ankle recalled.  She has a h/o flat feet and typically wears orthopedic type shoes.  She has tried ice and ibuprofen intermittently.      OBJECTIVE:  /70 (Cuff Size: Adult Regular)   Pulse 74   Temp 98.3  F (36.8  C) (Oral)   Wt 83.9 kg (185 lb)   SpO2 98%   BMI 31.76 kg/m    GEN:  Awake, alert, in NAD  ANKLE:  There is no swelling, bruising, erythema or gross deformity of the ankle noted.  No tenderness with palpation over the ankle.  The fifth metatarsal is not tender. The ankle joint is intact without excessive opening on stressing.  FROM of the toes, foot and ankle. The rest of the foot, ankle and leg exam is normal.     X-ray:  Not indicated at this time    ASSESSMENT/PLAN:    ICD-10-CM    1. Pain in joint, ankle and foot, left M25.572      Stretching/strain injury to the ankle following standing in high heels.   We discussed the expected course and symptomatic cares in detail.   Advised to return to care if symptoms not improving as expected, do not resolve completely, or if any new or worsening symptoms develop.  ACE wrap applied in the office.    Patient Instructions   Ace wrap while awake for compression, comfort and support.   Aleve 1 tablet every 12 hours (take with food) for the next 2-3 days, then if/as needed.  Elevate, ice.  Avoid aggravating activity.    Wear good supportive shoes while awake, don't go barefoot, even in the house.   Recheck in about a week with your podiatrist or your primary provider, if the ankle pain hasn't " resolved completely.

## 2019-05-08 ENCOUNTER — OFFICE VISIT (OUTPATIENT)
Dept: PODIATRY | Facility: CLINIC | Age: 39
End: 2019-05-08
Payer: COMMERCIAL

## 2019-05-08 VITALS
BODY MASS INDEX: 31.58 KG/M2 | HEIGHT: 64 IN | WEIGHT: 185 LBS | DIASTOLIC BLOOD PRESSURE: 72 MMHG | SYSTOLIC BLOOD PRESSURE: 110 MMHG

## 2019-05-08 DIAGNOSIS — M21.41 FLAT FEET: ICD-10-CM

## 2019-05-08 DIAGNOSIS — M25.572 LEFT ANKLE PAIN, UNSPECIFIED CHRONICITY: Primary | ICD-10-CM

## 2019-05-08 DIAGNOSIS — M21.42 FLAT FEET: ICD-10-CM

## 2019-05-08 DIAGNOSIS — M76.72 TENDINITIS OF LEFT PERONEUS BREVIS TENDON: ICD-10-CM

## 2019-05-08 PROCEDURE — 99213 OFFICE O/P EST LOW 20 MIN: CPT | Performed by: PODIATRIST

## 2019-05-08 ASSESSMENT — MIFFLIN-ST. JEOR: SCORE: 1494.15

## 2019-05-08 NOTE — LETTER
5/8/2019         RE: Marie Vogel  [address]  [place] [address]        Dear Colleague,    Thank you for referring your patient, Marie Vogel, to the [facility]. Please see a copy of my visit note below.    ASSESSMENT/PLAN:    Encounter Diagnoses   Name Primary?     Left ankle pain, unspecified chronicity Yes     Tendinitis of left peroneus brevis tendon      Flat feet      Clinical exam is consistent with peroneal tendon pain.  I also think she might be experiencing some lateral ankle impingement, related to her flat foot structure. I discussed the relevant anatomy and function.    Tendons:  Trilok brace with foot strap lateral; ice; rest; follow up in 1 month; if pain persists, will consider physical therapy.    When she is done using the Trilok brace, her newer custom orthoses should help offload any lateral ankle impingement.    Body mass index is 31.76 kg/m .    Weight management plan: Patient was referred to their PCP to discuss a diet and exercise plan.      Cordell Campos DPM, FACFAS, MS    [place] [facility] of Podiatry/Foot & Ankle Surgery      ____________________________________________________________________    HPI:         Chief Complaint: left ankle and foot pain  Onset of problem: 3 weeks; she associates the pain with wearing high heels.   Pain/ discomfort is described as:  Ache, throbbing  Pain Rating:  3/10 at worst.  Frequency:  daily    The pain is exacerbated by standing and walking.  Previous treatment: ACE wrap, ice, elevation, Aleve, chiropractic adjustment    Patient Active Problem List   Diagnosis     Allergic rhinitis     HYPERLIPIDEMIA LDL GOAL <100     Family history of other cardiovascular diseases     Health Care Home     Microalbuminuria     Insulin pump in place     Vitamin D deficiency     Nut allergy     IUD (intrauterine device) in place     Type 2 diabetes mellitus with diabetic nephropathy     Diabetic gastroparesis (H)      Hypertrophic obstructive cardiomyopathy (H)     PFO (patent foramen ovale)     Other migraine without status migrainosus, not intractable     Scars of posterior pole of chorioretina, bilateral     Cervical high risk HPV (human papillomavirus) test positive     Past Surgical History:   Procedure Laterality Date     C INDUCED ABORTN BY D&C           C IUD,MIRENA  8/10/10, 7/28/15     C/SECTION, LOW TRANSVERSE  6/25/10    , Low Transverse     CHOLECYSTECTOMY, LAPOROSCOPIC      Cholecystectomy, Laparoscopic     ESOPHAGOSCOPY, GASTROSCOPY, DUODENOSCOPY (EGD), COMBINED N/A 2016    Procedure: COMBINED ESOPHAGOSCOPY, GASTROSCOPY, DUODENOSCOPY (EGD), BIOPSY SINGLE OR MULTIPLE;  Surgeon: Fadi Hunter MD;  Location:  [facility]      ESOPHAGEAL MOTILITY STUDY N/A 2016    Procedure: ESOPHAGEAL MOTILITY STUDY;  Surgeon: Fadi Hunter MD;  Location: [facility] REMOVE TONSILS/ADENOIDS,<13 Y/O  1987    T &A     Current Outpatient Medications   Medication Sig Dispense Refill     aspirin 81 MG tablet Take 1 tablet (81 mg) by mouth daily 90 tablet 3     atenolol (TENORMIN) 25 MG tablet Take 0.5 tablets (12.5 mg) by mouth daily 90 tablet 3     atorvastatin (LIPITOR) 20 MG tablet TAKE ONE TABLET BY MOUTH EVERY DAY 90 tablet 3     blood glucose (PEPE CONTOUR NEXT) test strip Check 4 times/day 120 each 11     blood glucose monitoring (NO BRAND SPECIFIED) meter device kit Use to test blood sugar 6 times daily and as needed. 1 kit 0     cetirizine (ZYRTEC) 10 MG tablet Take 10 mg by mouth 2 times daily  90 tablet 3     dulaglutide (TRULICITY) 0.75 MG/0.5ML pen Inject 0.75 mg Subcutaneous every 7 days 9 mL 1     EPINEPHrine 0.3 MG/0.3ML injection Inject 0.3 mLs (0.3 mg) into the muscle once as needed for anaphylaxis 0.3 mL 11     fluticasone (FLONASE) 50 MCG/ACT spray Spray 1-2 sprays into both nostrils daily (Patient not taking: Reported on 2019) 1 Bottle 0     ibuprofen (ADVIL) 200 MG  "tablet Take 200 mg by mouth every 4 hours as needed.       infusion set (PARADIGM QUICK-SET 23\" 9MM) misc pump supply CHANGE EVERY 3 DAYS AS DIRECTED 30 each 3     insulin aspart (NOVOLOG VIAL) 100 UNITS/ML vial With insulin pump. Uses about 80 units/day. 90 mL 3     insulin cartridge (PARADIGM 3ML) misc pump supply CHANGE EVERY 3 DAYS AS DIRECTED 30 each 3     INSULIN PUMP - OUTPATIENT Updated 1/27/17:  Medtronic Minimed: Model 723  BASAL:  00:00: 1.0 units/hr  10:30: 0.95  14:00: 1.05  CARB RATIO:  00:00 1:7  1600  1:8  C. Factor:  27 mg/dL  BLOOD GLUCOSE TARGET and times:  12   AM (midnight):   Active Insulin Time:  3 hours  Basal to Bolus Ratio:   Sensor:  No  Carelink / Diasend username:  [email or web address]  Carelink / Diasend Password:  [email or web address]       Lancets (MICROLET) MISC 1 Device See Admin Instructions. Use up to 5 times daily for blood sugar checks. 100 each prn     losartan (COZAAR) 50 MG tablet TAKE ONE TABLET BY MOUTH EVERY MORNING AND TAKE ONE-HALF TABLET BY MOUTH EVERY EVENING 135 tablet 3     MAGNESIUM OXIDE PO Take 400 mg by mouth daily       metFORMIN (GLUCOPHAGE-XR) 500 MG 24 hr tablet Take 2 tablets (1,000 mg) by mouth 2 times daily (with meals) 360 tablet 3     montelukast (SINGULAIR) 10 MG tablet Take 1 tablet (10 mg) by mouth At Bedtime 90 tablet 3     Multiple Vitamins-Minerals (MULTI VITAMIN/MINERALS PO) Take 1 each by mouth daily.       Omega-3 Fatty Acids (FISH OIL) 500 MG CAPS Take 1 capsule by mouth       omeprazole (PRILOSEC) 40 MG capsule TAKE ONE CAPSULE BY MOUTH EVERY DAY 90 capsule 2     order for DME skin prep wipes for insulin pump 100 each 0     SUMAtriptan (IMITREX) 25 MG tablet TAKE ONE TO FOUR TABLETS BY MOUTH ONE TIME. MAY REPEAT 1 TABLET EVERY TWO HOURS UP TO MAXIMUM OF 200MG IN 24 HOURS 8 tablet 6       ROS:    A 10-point review of systems was performed.  It is positive for that noted in the HPI and as seen below.  All other systems found to be negative.     Numbness in feet? " " no   Calf pain with walking? yes  Recent foot/ankle injury? no  Weight change  over past 12 months? no  Self perception as overweight? yes  Recent flu-like symptoms? no  Joint pain other than feet ? no    EXAM:    Vitals: /72   Ht 1.626 m (5' 4\")   Wt 83.9 kg (185 lb)   BMI 31.76 kg/m     BMI: Body mass index is 31.76 kg/m .  Height: 5' 4\"    Constitutional/ general:  Pt is in no apparent distress, appears well-nourished.  Cooperative with history and physical exam.     Vascular:  Pedal pulses are palpable bilaterally for both the DP and PT arteries.  CFT < 3 sec.  No edema.  Pedal hair growth noted.     Neuro:  Alert and oriented x 3. Coordinated gait.  Light touch sensation is intact to the L4, L5, S1 distributions. No obvious deficits.  No evidence of neurological-based weakness, spasticity, or contracture in the lower extremities.     Derm: Normal texture and turgor.  No erythema, ecchymosis, or cyanosis.  No open lesions.     Musculoskeletal:    Lower extremity muscle strength is normal.  Patient is ambulatory without an assistive device or brace .  Decrease in medial longitudinal arch with weight bearing.   Pain on palpation: over the anterolateral left ankle. Pain with palpation over the peroneal tendons as the wrap around the lateral malleolus.  Pain with inversion of the left foot against resistance and with plantar flexion of the left first metatarsal against resistance.          Cordell Campos DPM, FACFAS, MS    [place] [facility] of Podiatry/Foot & Ankle Surgery              Again, thank you for allowing me to participate in the care of your patient.        Sincerely,        Cordell Campos DPM    "

## 2019-05-08 NOTE — PATIENT INSTRUCTIONS
Thank you for choosing [facility]!    DR. FOX'S CLINIC LOCATIONS     MONDAY - [place] WEDNESDAY - [place]   [address] [place] [address] [address] [address]   [phone] / [phone] / [phone]       THURSDAY - [place] SCHEDULE SURGERY: [phone]   [address] APPOINTMENTS: [phone]   [place] [address] BILLING QUESTIONS: [phone] / [phone]       PERONEAL TENDON INJURIES  A tendon is a band of tissue that connects a muscle to a bone. The two peroneal tendons in the foot run side by side behind the outer ankle bone. One peroneal tendon attaches to the outer part of the midfoot, while the other tendon runs under the foot and attaches near the inside of the arch. The main function of the peroneal tendons is to stabilize the foot and ankle and protect them from sprains.  CAUSES & SYMPTOMS  Peroneal tendon injuries may be acute (occurring suddenly) or chronic (developing over a period of time). They most commonly occur in individuals who participate in sports that involve repetitive ankle motion. In addition, people with higher arches are at risk for developing peroneal tendon injuries. Basic types of peroneal tendon injuries are tendonitis, tears and subluxation.  Tendonitis is an inflammation of one or both tendons. The inflammation is caused by activities involving repetitive use of the tendon, overuse of the tendon or trauma (such as an ankle sprain). Symptoms of tendonitis include:  Pain   Swelling   Warm to the touch  Acute tears are caused by repetitive activity or trauma. Immediate symptoms of acute tears include:  Pain   Swelling   Weakness or instability of the foot and ankle  As time goes on, these tears may lead to a change in the shape of the foot in which the arch may become higher.  Degenerative tears (tendonosis) are usually due to overuse and occur over long periods of  time, often years. In degenerative tears, the tendon is like taffy that has been overstretched until it becomes thin and eventually frays. Having high arches also puts you at risk for developing a degenerative tear. The symptoms of degenerative tears may include:  Sporadic pain (occurring from time to time) on the outside of the ankle   Weakness or instability in the ankle   An increase in the height of the arch  Subluxation means one or both tendons have slipped out of their normal position. In some cases, subluxation is due to a condition in which a person is born with a variation in the shape of the bone or muscle. In other cases, subluxation occurs following trauma, such as an ankle sprain. Damage or injury to the tissues that stabilize the tendons (retinaculum) can lead to chronic tendon subluxation. The symptoms of subluxation may include:  A snapping feeling of the tendon around the ankle bone   Sporadic pain behind the outside ankle bone   Ankle instability or weakness  Early treatment of a subluxation is critical since a tendon that continues to sublux (move out of position) is more likely to tear or rupture. Therefore, if you feel the characteristic snapping, see a foot and ankle surgeon immediately.  DIAGNOSIS  Because peroneal tendon injuries are sometimes misdiagnosed and may worsen without proper treatment, prompt evaluation by a foot and ankle surgeon is advised. To diagnose a peroneal tendon injury, the surgeon will examine the foot and look for pain, instability, swelling, warmth and weakness on the outer side of the ankle. In addition, an x-ray or other advanced imaging studies may be needed to fully evaluate the injury. The foot and ankle surgeon will also look for signs of an ankle sprain and other related injuries that sometimes accompany a peroneal tendon injury. Proper diagnosis is important because prolonged discomfort after a simple sprain may be a sign of additional problems.  NONSURGICAL  TREATMENT  Treatment depends on the type of peroneal tendon injury. Options include:  Immobilization. A cast or splint may be used to keep the foot and ankle from moving and allow the injury to heal.   Medications. Oral or injected anti-inflammatory drugs may help relieve pain and inflammation.   Physical therapy. Ice, heat or ultrasound therapy may be used to reduce swelling and pain. As symptoms improve, exercises can be added to strengthen the muscles and improve range of motion and balance.   Bracing. The surgeon may provide a brace to use for a short while or during activities requiring repetitive ankle motion. Bracing may also be an option when a patient is not a candidate for surgery.  SURGERY  In some cases, surgery may be needed to repair the tendon or tendons and perhaps the supporting structures of the foot. The foot and ankle surgeon will determine the most appropriate procedure for the patient s condition and lifestyle. After surgery, physical therapy is an important part of rehabilitation.      BODY WEIGHT AND YOUR FEET  The following information is included in the after visit summary for all patients. Body weight can be a sensitive issue to discuss in clinic, but we think the following information is very important. Although we focus on the feet and ankles, we do support the overall health of our patients.     Many things can cause foot and ankle problems. Foot structure, activity level, foot mechanics and injuries are common causes of pain. One very important issue that often goes unmentioned, is body weight. Extra weight can cause increased stress on muscles, ligaments, bones and tendons. Sometimes just a few extra pounds is all it takes to put one over her/his threshold. Without reducing that stress, it can be difficult to alleviate pain. As Foot & Ankle specialists, our job is addressing the lower extremity problem and possible causes. Regarding extra body weight, we encourage patients to discuss  diet and weight management plans with their primary care doctors. It is this team approach that gives you the best opportunity for pain relief and getting you back on your feet.      [facility] has a Comprehensive Weight Management Program. This program includes counseling, education, non-surgical and surgical approaches to weight loss. If you are interested in learning more either talk to you primary care provider or call [phone].

## 2019-05-08 NOTE — PROGRESS NOTES
ASSESSMENT/PLAN:    Encounter Diagnoses   Name Primary?     Left ankle pain, unspecified chronicity Yes     Tendinitis of left peroneus brevis tendon      Flat feet      Clinical exam is consistent with peroneal tendon pain.  I also think she might be experiencing some lateral ankle impingement, related to her flat foot structure. I discussed the relevant anatomy and function.    Tendons:  Trilok brace with foot strap lateral; ice; rest; follow up in 1 month; if pain persists, will consider physical therapy.    When she is done using the Trilok brace, her newer custom orthoses should help offload any lateral ankle impingement.    Body mass index is 31.76 kg/m .    Weight management plan: Patient was referred to their PCP to discuss a diet and exercise plan.      Cordell Campos DPM, FACFAS, MS    [facility]le [facility]      ____________________________________________________________________    HPI:         Chief Complaint: left ankle and foot pain  Onset of problem: 3 weeks; she associates the pain with wearing high heels.   Pain/ discomfort is described as:  Ache, throbbing  Pain Rating:  3/10 at worst.  Frequency:  daily    The pain is exacerbated by standing and walking.  Previous treatment: ACE wrap, ice, elevation, Aleve, chiropractic adjustment    Patient Active Problem List   Diagnosis     Allergic rhinitis     HYPERLIPIDEMIA LDL GOAL <100     Family history of other cardiovascular diseases     Health Care Home     Microalbuminuria     Insulin pump in place     Vitamin D deficiency     Nut allergy     IUD (intrauterine device) in place     Type 2 diabetes mellitus with diabetic nephropathy     Diabetic gastroparesis (H)     Hypertrophic obstructive cardiomyopathy (H)     PFO (patent foramen ovale)     Other migraine without status migrainosus, not intractable     Scars of posterior pole of chorioretina, bilateral     Cervical high risk HPV (human papillomavirus) test positive  "    Past Surgical History:   Procedure Laterality Date     C INDUCED ABORTN BY D&C           C IUD,MIRENA  8/10/10, 7/28/15     C/SECTION, LOW TRANSVERSE  6/25/10    , Low Transverse     CHOLECYSTECTOMY, LAPOROSCOPIC      Cholecystectomy, Laparoscopic     ESOPHAGOSCOPY, GASTROSCOPY, DUODENOSCOPY (EGD), COMBINED N/A 2016    Procedure: COMBINED ESOPHAGOSCOPY, GASTROSCOPY, DUODENOSCOPY (EGD), BIOPSY SINGLE OR MULTIPLE;  Surgeon: Fadi Hunter MD;  Location:  GI      ESOPHAGEAL MOTILITY STUDY N/A 2016    Procedure: ESOPHAGEAL MOTILITY STUDY;  Surgeon: Fadi Hunter MD;  Location:  GI      REMOVE TONSILS/ADENOIDS,<11 Y/O      T &A     Current Outpatient Medications   Medication Sig Dispense Refill     aspirin 81 MG tablet Take 1 tablet (81 mg) by mouth daily 90 tablet 3     atenolol (TENORMIN) 25 MG tablet Take 0.5 tablets (12.5 mg) by mouth daily 90 tablet 3     atorvastatin (LIPITOR) 20 MG tablet TAKE ONE TABLET BY MOUTH EVERY DAY 90 tablet 3     blood glucose (PEPE CONTOUR NEXT) test strip Check 4 times/day 120 each 11     blood glucose monitoring (NO BRAND SPECIFIED) meter device kit Use to test blood sugar 6 times daily and as needed. 1 kit 0     cetirizine (ZYRTEC) 10 MG tablet Take 10 mg by mouth 2 times daily  90 tablet 3     dulaglutide (TRULICITY) 0.75 MG/0.5ML pen Inject 0.75 mg Subcutaneous every 7 days 9 mL 1     EPINEPHrine 0.3 MG/0.3ML injection Inject 0.3 mLs (0.3 mg) into the muscle once as needed for anaphylaxis 0.3 mL 11     fluticasone (FLONASE) 50 MCG/ACT spray Spray 1-2 sprays into both nostrils daily (Patient not taking: Reported on 2019) 1 Bottle 0     ibuprofen (ADVIL) 200 MG tablet Take 200 mg by mouth every 4 hours as needed.       infusion set (PARADIGM QUICK-SET 23\" 9MM) misc pump supply CHANGE EVERY 3 DAYS AS DIRECTED 30 each 3     insulin aspart (NOVOLOG VIAL) 100 UNITS/ML vial With insulin pump. Uses about 80 units/day. " "90 mL 3     insulin cartridge (PARADIGM 3ML) misc pump supply CHANGE EVERY 3 DAYS AS DIRECTED 30 each 3     INSULIN PUMP - OUTPATIENT Updated 1/27/17:  Medtronic Minimed: Model 723  BASAL:  00:00: 1.0 units/hr  10:30: 0.95  14:00: 1.05  CARB RATIO:  00:00 1:7  1600  1:8  C. Factor:  27 mg/dL  BLOOD GLUCOSE TARGET and times:  12   AM (midnight):   Active Insulin Time:  3 hours  Basal to Bolus Ratio:   Sensor:  No  Carelink / Diasend username:  [email or web address]  Carelink / Diasend Password:  [email or web address]       Lancets (MICROLET) MISC 1 Device See Admin Instructions. Use up to 5 times daily for blood sugar checks. 100 each prn     losartan (COZAAR) 50 MG tablet TAKE ONE TABLET BY MOUTH EVERY MORNING AND TAKE ONE-HALF TABLET BY MOUTH EVERY EVENING 135 tablet 3     MAGNESIUM OXIDE PO Take 400 mg by mouth daily       metFORMIN (GLUCOPHAGE-XR) 500 MG 24 hr tablet Take 2 tablets (1,000 mg) by mouth 2 times daily (with meals) 360 tablet 3     montelukast (SINGULAIR) 10 MG tablet Take 1 tablet (10 mg) by mouth At Bedtime 90 tablet 3     Multiple Vitamins-Minerals (MULTI VITAMIN/MINERALS PO) Take 1 each by mouth daily.       Omega-3 Fatty Acids (FISH OIL) 500 MG CAPS Take 1 capsule by mouth       omeprazole (PRILOSEC) 40 MG capsule TAKE ONE CAPSULE BY MOUTH EVERY DAY 90 capsule 2     order for DME skin prep wipes for insulin pump 100 each 0     SUMAtriptan (IMITREX) 25 MG tablet TAKE ONE TO FOUR TABLETS BY MOUTH ONE TIME. MAY REPEAT 1 TABLET EVERY TWO HOURS UP TO MAXIMUM OF 200MG IN 24 HOURS 8 tablet 6       ROS:    A 10-point review of systems was performed.  It is positive for that noted in the HPI and as seen below.  All other systems found to be negative.     Numbness in feet?  no   Calf pain with walking? yes  Recent foot/ankle injury? no  Weight change  over past 12 months? no  Self perception as overweight? yes  Recent flu-like symptoms? no  Joint pain other than feet ? no    EXAM:    Vitals: /72   Ht 1.626 m (5' 4\")  " " Wt 83.9 kg (185 lb)   BMI 31.76 kg/m    BMI: Body mass index is 31.76 kg/m .  Height: 5' 4\"    Constitutional/ general:  Pt is in no apparent distress, appears well-nourished.  Cooperative with history and physical exam.     Vascular:  Pedal pulses are palpable bilaterally for both the DP and PT arteries.  CFT < 3 sec.  No edema.  Pedal hair growth noted.     Neuro:  Alert and oriented x 3. Coordinated gait.  Light touch sensation is intact to the L4, L5, S1 distributions. No obvious deficits.  No evidence of neurological-based weakness, spasticity, or contracture in the lower extremities.     Derm: Normal texture and turgor.  No erythema, ecchymosis, or cyanosis.  No open lesions.     Musculoskeletal:    Lower extremity muscle strength is normal.  Patient is ambulatory without an assistive device or brace .  Decrease in medial longitudinal arch with weight bearing.   Pain on palpation: over the anterolateral left ankle. Pain with palpation over the peroneal tendons as the wrap around the lateral malleolus.  Pain with inversion of the left foot against resistance and with plantar flexion of the left first metatarsal against resistance.          Cordell Campos DPM, FACFAS, MS    [facility] & Ankle Surgery            "

## 2019-05-21 DIAGNOSIS — J30.9 ALLERGIC RHINITIS: ICD-10-CM

## 2019-05-21 DIAGNOSIS — Z82.49 FAMILY HISTORY OF HYPERTROPHIC CARDIOMYOPATHY: Primary | ICD-10-CM

## 2019-05-22 RX ORDER — MONTELUKAST SODIUM 10 MG/1
TABLET ORAL
Qty: 90 TABLET | Refills: 3 | Status: SHIPPED | OUTPATIENT
Start: 2019-05-22 | End: 2020-08-20

## 2019-05-22 NOTE — TELEPHONE ENCOUNTER
"Requested Prescriptions   Pending Prescriptions Disp Refills     montelukast (SINGULAIR) 10 MG tablet [Pharmacy Med Name: MONTELUKAST SODIUM 10MG TABS]  Last office visit 3/11/19 90 tablet 3     Sig: TAKE ONE TABLET BY MOUTH AT BEDTIME       Leukotriene Inhibitors Protocol Passed - 5/21/2019  5:43 PM        Passed - Patient is age 12 or older     If patient is under 16, ok to refill using age based dosing.           Passed - Recent (12 mo) or future (30 days) visit within the authorizing provider's specialty     Patient had office visit in the last 12 months or has a visit in the next 30 days with authorizing provider or within the authorizing provider's specialty.  See \"Patient Info\" tab in inbasket, or \"Choose Columns\" in Meds & Orders section of the refill encounter.              Passed - Medication is active on med list          "

## 2019-05-24 RX ORDER — ATENOLOL 25 MG/1
25 TABLET ORAL DAILY
Qty: 90 TABLET | Refills: 3 | Status: SHIPPED | OUTPATIENT
Start: 2019-05-24 | End: 2019-09-24

## 2019-06-17 ENCOUNTER — TELEPHONE (OUTPATIENT)
Dept: ENDOCRINOLOGY | Facility: CLINIC | Age: 39
End: 2019-06-17

## 2019-06-17 NOTE — TELEPHONE ENCOUNTER
Panel Management Review      Patient has the following on her problem list:   Diabetes    ASA: Passed    Last A1C  Lab Results   Component Value Date    A1C 7.3 01/30/2019    A1C 8.8 11/05/2018    A1C 7.7 06/15/2018    A1C 8.5 10/24/2017    A1C 7.2 04/17/2017     A1C tested: FAILED    Last LDL:    Lab Results   Component Value Date    CHOL 206 01/30/2019     Lab Results   Component Value Date    HDL 33 01/30/2019     Lab Results   Component Value Date     01/30/2019     Lab Results   Component Value Date    TRIG 165 01/30/2019     Lab Results   Component Value Date    CHOLHDLRATIO 4.5 07/01/2015     Lab Results   Component Value Date    NHDL 173 01/30/2019       Is the patient on a Statin? YES             Is the patient on Aspirin? YES    Medications     HMG CoA Reductase Inhibitors     atorvastatin (LIPITOR) 20 MG tablet       Salicylates     aspirin 81 MG tablet             Last three blood pressure readings:  BP Readings from Last 3 Encounters:   05/08/19 110/72   04/30/19 118/70   03/11/19 122/78       Date of last diabetes office visit: 03/11/19     Tobacco History:     History   Smoking Status     Former Smoker     Types: Cigarettes     Quit date: 10/24/2005   Smokeless Tobacco     Former User     Comment: States only smokes when drinks maybe 2x/year           Composite cancer screening  Chart review shows that this patient is due/due soon for the following None  Summary:    Patient is due/failing the following:   A1C and FOLLOW UP    Action needed:   Patient needs office visit for endo.    Type of outreach:    Sent [employer] message.    Questions for provider review:    None                                                                                                                                    Lenore Hurley CMA  [facility]  [place]       Chart routed to no one .

## 2019-08-17 ENCOUNTER — OFFICE VISIT (OUTPATIENT)
Dept: URGENT CARE | Facility: URGENT CARE | Age: 39
End: 2019-08-17
Payer: COMMERCIAL

## 2019-08-17 VITALS
DIASTOLIC BLOOD PRESSURE: 80 MMHG | WEIGHT: 186.6 LBS | TEMPERATURE: 97.2 F | SYSTOLIC BLOOD PRESSURE: 120 MMHG | HEART RATE: 78 BPM | BODY MASS INDEX: 32.03 KG/M2 | OXYGEN SATURATION: 100 %

## 2019-08-17 DIAGNOSIS — M25.512 ACUTE PAIN OF LEFT SHOULDER: Primary | ICD-10-CM

## 2019-08-17 DIAGNOSIS — L73.9 FOLLICULITIS: ICD-10-CM

## 2019-08-17 PROCEDURE — 99214 OFFICE O/P EST MOD 30 MIN: CPT | Performed by: PHYSICIAN ASSISTANT

## 2019-08-17 RX ORDER — CYCLOBENZAPRINE HCL 10 MG
10 TABLET ORAL AT BEDTIME
Qty: 30 TABLET | Refills: 0 | Status: SHIPPED | OUTPATIENT
Start: 2019-08-17 | End: 2019-09-24

## 2019-08-17 RX ORDER — IBUPROFEN 800 MG/1
800 TABLET, FILM COATED ORAL EVERY 8 HOURS PRN
Qty: 21 TABLET | Refills: 0 | Status: SHIPPED | OUTPATIENT
Start: 2019-08-17 | End: 2019-09-24

## 2019-08-17 NOTE — PROGRESS NOTES
SUBJECTIVE:  Marie Vogel is a 39 year old female who presents to the clinic today for a rash.  Onset of rash was 1 day(s) ago.   Rash is sudden onset.  Location of the rash: posterior left thigh. Fist noticed when squeezed out a small amount of white material.  Quality/symptoms of rash: red   Symptoms are mild and rash seems to be stable.  Previous history of a similar rash? No  Recent exposure history: none known    Associated symptoms include: nothing.    SUBJECTIVE:  Chief Complaint   Patient presents with     Urgent Care     Shoulder     left shoulder- pt feel like her shoulder is being pulled down started yesterday. And today she feels that there is some pain down her left arm, hands feels tingling.      Derm Problem     locate on the back of her left upper thigh     Marie Vogel is a 39 year old female presents with a chief complaint of left shoulder pain.  The injury occurred 1 day(s) ago.   The injury happened while at home. How: rearranging furniture. The patient complained of moderate pain  and has not had decreased ROM.  Pain exacerbated by use.  Relieved by rest.  She treated it initially with no therapy. This is the first time this type of injury has occurred to this patient.     Past Medical History:   Diagnosis Date     Allergic rhinitis due to pollen      Atypical glandular cells on Pap smear 3/1108     Cervical high risk HPV (human papillomavirus) test positive 11/27/2018    See problem list     Gastroesophageal reflux disease      PFO (patent foramen ovale)      Type II or unspecified type diabetes mellitus without mention of complication, not stated as uncontrolled 2002    onset was gestational in 2001     Current Outpatient Medications   Medication Sig Dispense Refill     aspirin 81 MG tablet Take 1 tablet (81 mg) by mouth daily 90 tablet 3     atenolol (TENORMIN) 25 MG tablet Take 1 tablet (25 mg) by mouth daily 90 tablet 3     atenolol (TENORMIN) 25 MG tablet Take 0.5 tablets  "(12.5 mg) by mouth daily 90 tablet 3     atorvastatin (LIPITOR) 20 MG tablet TAKE ONE TABLET BY MOUTH EVERY DAY 90 tablet 3     blood glucose (PEPE CONTOUR NEXT) test strip Check 4 times/day 120 each 11     blood glucose monitoring (NO BRAND SPECIFIED) meter device kit Use to test blood sugar 6 times daily and as needed. 1 kit 0     cetirizine (ZYRTEC) 10 MG tablet Take 10 mg by mouth 2 times daily  90 tablet 3     dulaglutide (TRULICITY) 0.75 MG/0.5ML pen Inject 0.75 mg Subcutaneous every 7 days 9 mL 1     EPINEPHrine 0.3 MG/0.3ML injection Inject 0.3 mLs (0.3 mg) into the muscle once as needed for anaphylaxis 0.3 mL 11     fluticasone (FLONASE) 50 MCG/ACT spray Spray 1-2 sprays into both nostrils daily 1 Bottle 0     ibuprofen (ADVIL) 200 MG tablet Take 200 mg by mouth every 4 hours as needed.       infusion set (PARADIGM QUICK-SET 23\" 9MM) misc pump supply CHANGE EVERY 3 DAYS AS DIRECTED 30 each 3     insulin aspart (NOVOLOG VIAL) 100 UNITS/ML vial With insulin pump. Uses about 80 units/day. 90 mL 3     insulin cartridge (PARADIGM 3ML) misc pump supply CHANGE EVERY 3 DAYS AS DIRECTED 30 each 3     INSULIN PUMP - OUTPATIENT Updated 1/27/17:  Medtronic Minimed: Model 723  BASAL:  00:00: 1.0 units/hr  10:30: 0.95  14:00: 1.05  CARB RATIO:  00:00 1:7  1600  1:8  C. Factor:  27 mg/dL  BLOOD GLUCOSE TARGET and times:  12   AM (midnight):   Active Insulin Time:  3 hours  Basal to Bolus Ratio:   Sensor:  No  Carelink / Diasend username:  [email or web address]  Carelink / Diasend Password:  [email or web address]       Lancets (MICROLET) MISC 1 Device See Admin Instructions. Use up to 5 times daily for blood sugar checks. 100 each prn     losartan (COZAAR) 50 MG tablet TAKE ONE TABLET BY MOUTH EVERY MORNING AND TAKE ONE-HALF TABLET BY MOUTH EVERY EVENING 135 tablet 3     MAGNESIUM OXIDE PO Take 400 mg by mouth daily       metFORMIN (GLUCOPHAGE-XR) 500 MG 24 hr tablet Take 2 tablets (1,000 mg) by mouth 2 times daily (with meals) 360 tablet 3 "     montelukast (SINGULAIR) 10 MG tablet TAKE ONE TABLET BY MOUTH AT BEDTIME 90 tablet 3     Multiple Vitamins-Minerals (MULTI VITAMIN/MINERALS PO) Take 1 each by mouth daily.       Omega-3 Fatty Acids (FISH OIL) 500 MG CAPS Take 1 capsule by mouth       omeprazole (PRILOSEC) 40 MG DR capsule Take 1 capsule (40 mg) by mouth daily DUE FOR APPOINTMENT MAY 2019. NO FURTHER REFILLS 90 capsule 0     order for DME Equipment being ordered: Trilok ankle brace 1 Device 0     order for DME skin prep wipes for insulin pump 100 each 0     SUMAtriptan (IMITREX) 25 MG tablet TAKE ONE TO FOUR TABLETS BY MOUTH ONE TIME. MAY REPEAT 1 TABLET EVERY TWO HOURS UP TO MAXIMUM OF 200MG IN 24 HOURS 8 tablet 6     Social History     Tobacco Use     Smoking status: Former Smoker     Types: Cigarettes     Last attempt to quit: 10/24/2005     Years since quittin.8     Smokeless tobacco: Former User     Tobacco comment: States only smokes when drinks maybe 2x/year   Substance Use Topics     Alcohol use: No     Alcohol/week: 0.0 oz       ROS:  10 point ROS negative except as listed above      EXAM:   /80 (BP Location: Right arm, Patient Position: Sitting, Cuff Size: Adult Regular)   Pulse 78   Temp 97.2  F (36.2  C) (Tympanic)   Wt 84.6 kg (186 lb 9.6 oz)   SpO2 100%   BMI 32.03 kg/m    Gen: healthy, alert, no distress and healthy,alert,no distress  Extremity: Pain with some movements   GENERAL APPEARANCE: healthy, alert and no distress  CHEST: clear to auscultation  CV: regular rate and rhythm  EXTREMITIES: peripheral pulses normal  MS: no gross deformities noted, no evidence of inflammation in joints, FROM in all extremities.  SKIN: erythema near site, no warmth, mass or discharge  NEURO: Normal strength and tone, sensory exam grossly normal, mentation intact and speech normal      ASSESSMENT:   (M25.512) Acute pain of left shoulder  (primary encounter diagnosis)  Plan: cyclobenzaprine (FLEXERIL) 10 MG tablet,         ibuprofen  (ADVIL/MOTRIN) 800 MG tablet  SLing  RICE  Red flags and emergent follow up discussed, and understood by patient  Follow up with PCP if symptoms worsen or fail to improve      (L73.9) Folliculitis  Comment: folliculitis vs bite.  Unclear eiology however presentation is mild, no infection  Plan: follow up if worsening.    Patient Instructions     Ice, Ibuprofen, Sling, Muscle relaxer at bedtime      Patient Education     Shoulder Pain with Uncertain Cause  Shoulder pain can have many causes. Pain often comes from the structures that surround the shoulder joint. These are the joint capsule, ligaments, tendons, muscles, and bursa. Pain can also come from cartilage in the joint. Cartilage can become worn out or injured. It s important to know what s causing your pain so the healthcare provider can use the correct treatment. But sometimes it s difficult to find the exact cause of shoulder pain. You may need to see a specialist (orthopedist). You may also need special tests such as a CT scan or MRI. The provider may need to use special tools to look inside the joint (arthroscopy).  Shoulder pain can be treated with a sling or a device that keeps your shoulder from moving. You can take an anti-inflammatory medicine such as ibuprofen to ease pain. You may need to do special shoulder exercises. Follow up with a specialist if the pain is severe or doesn t go away after a few weeks.  Home care  Follow these tips when caring for yourself at home:    If a sling was given to you, leave it in place for the time advised by your healthcare provider. If you aren t sure how long to wear it, ask for advice. If the sling becomes loose, adjust it so that your forearm is level with the ground. Your shoulder should feel well supported.    Put an ice pack on the injured area for 20 minutes every 1 to 2 hours the first day. You can make your own ice pack by putting ice cubes in a plastic bag. Wrap the bag in a thin towel. Continue with ice  packs 3 to 4 times a day for the next 2 days. Then use the pack as needed to ease pain and swelling.    You may use acetaminophen or ibuprofen to control pain, unless another pain medicine was prescribed. If you have chronic liver or kidney disease, talk with your healthcare provider before using these medicines. Also talk with your provider if you ve ever had a stomach ulcer or GI bleeding.    Shoulder pain may seem worse at night, when there is less to distract you from the pain. If you sleep on your side, try to keep weight off your painful shoulder. Propping pillows behind you may stop you from rolling over onto that shoulder during sleep.     Shoulder and elbow joints can become stiff if left in a sling for too long. You should start range of motion exercises about 7 to 10 days after the injury. Talk with your provider to find out what type of exercises to do and how soon to start.    You can take the sling off to shower or bathe.  Follow-up care  Follow up with your healthcare provider if you don t start to get better in the next 5 days.  When to seek medical advice  Call your healthcare provider right away if any of these occur:    Pain or swelling gets worse or continues for more than a few days    Your hand or fingers become cold, blue, numb, or tingly    Large amount of bruising on your shoulder or upper arm    Difficulty moving your hand or fingers    Weakness in your hand or fingers    Your shoulder becomes stiff    It feels like your shoulder is popping out    You are less able to do your daily activities  Date Last Reviewed: 10/1/2016    7244-6198 [employer]. [address] [place] [address], [place] [address]. All rights reserved. This information is not intended as a substitute for professional medical care. Always follow your healthcare professional's instructions.           Patient Education     Insect Bite  Insects most often bite to protect themselves or their nests. Certain bugs, like fleas  and mosquitoes, bite to feed. In some cases, the actual bite causes no pain. An itchy red welt or swelling may develop at the site of the bite. Most insect bites do not cause illness. And the itching and swelling most often go away without treatment. However, an infection can develop if the bite is scratched and the skin broken. Rarely, a person may have an allergic reaction to an insect bite.  If a stinger is visible at the bite spot, remove it as quickly as possible, as this can decrease the amount of venom that gets into your body. Scrape it out with a dull edge, such as the edge of a credit card. Try not to squeeze it. Do not try to dig it out, as you may damage the skin and also increase the chance of infection.     To help reduce swelling and itching, apply a cold pack or ice in a zip-top plastic bag wrapped in a thin towel.   Home care    Your healthcare provider may prescribe over-the-counter medicines to help relieve itching and swelling. Use each medicine according to the directions on the package. If the bite becomes infected, you will need an antibiotic. This may be in pill form taken by mouth or as an ointment or cream put directly on the skin. Be sure to use them exactly as prescribed.    Bite symptoms usually go away on their own within a week or two.    To help prevent infection, avoid scratching or picking at the bite.    To help relieve itching and swelling, apply ice in a zip-top plastic bag wrapped in a thin towel to the bites. Do this for up to 10 minutes at a time. Avoid hot showers or baths as these tend to make itching worse.    An over-the-counter anti-itch medicine such as calamine lotion or an antihistamine cream may be helpful.    If you suspect you have insects in your home, talk to a licensed pest-control professional. He or she can inspect your home and tell you how to get rid of bugs safely.  Follow-up care  Follow up with your healthcare provider, or as advised.  Call 911  Call 911  if any of these occur:    Trouble breathing or swallowing    Wheezing    Feeling like your throat is closing up    Fainting, loss of consciousness    Swelling around the face or mouth  When to seek medical advice  Call your healthcare provider right away if any of these occur:    Fever of 100.4 F (38 C) or higher, or as directed by your healthcare provider    Signs of infection, such as increased swelling and pain, warmth, red streaks, or drainage from the skin    Signs of allergic reaction, such as hives, a spreading rash, or throat itching  Date Last Reviewed: 10/1/2016    7528-3330 [employer]. [address] [place] [address]. All rights reserved. This information is not intended as a substitute for professional medical care. Always follow your healthcare professional's instructions.

## 2019-08-17 NOTE — PATIENT INSTRUCTIONS
Ice, Ibuprofen, Sling, Muscle relaxer at bedtime      Patient Education     Shoulder Pain with Uncertain Cause  Shoulder pain can have many causes. Pain often comes from the structures that surround the shoulder joint. These are the joint capsule, ligaments, tendons, muscles, and bursa. Pain can also come from cartilage in the joint. Cartilage can become worn out or injured. It s important to know what s causing your pain so the healthcare provider can use the correct treatment. But sometimes it s difficult to find the exact cause of shoulder pain. You may need to see a specialist (orthopedist). You may also need special tests such as a CT scan or MRI. The provider may need to use special tools to look inside the joint (arthroscopy).  Shoulder pain can be treated with a sling or a device that keeps your shoulder from moving. You can take an anti-inflammatory medicine such as ibuprofen to ease pain. You may need to do special shoulder exercises. Follow up with a specialist if the pain is severe or doesn t go away after a few weeks.  Home care  Follow these tips when caring for yourself at home:    If a sling was given to you, leave it in place for the time advised by your healthcare provider. If you aren t sure how long to wear it, ask for advice. If the sling becomes loose, adjust it so that your forearm is level with the ground. Your shoulder should feel well supported.    Put an ice pack on the injured area for 20 minutes every 1 to 2 hours the first day. You can make your own ice pack by putting ice cubes in a plastic bag. Wrap the bag in a thin towel. Continue with ice packs 3 to 4 times a day for the next 2 days. Then use the pack as needed to ease pain and swelling.    You may use acetaminophen or ibuprofen to control pain, unless another pain medicine was prescribed. If you have chronic liver or kidney disease, talk with your healthcare provider before using these medicines. Also talk with your provider if  you ve ever had a stomach ulcer or GI bleeding.    Shoulder pain may seem worse at night, when there is less to distract you from the pain. If you sleep on your side, try to keep weight off your painful shoulder. Propping pillows behind you may stop you from rolling over onto that shoulder during sleep.     Shoulder and elbow joints can become stiff if left in a sling for too long. You should start range of motion exercises about 7 to 10 days after the injury. Talk with your provider to find out what type of exercises to do and how soon to start.    You can take the sling off to shower or bathe.  Follow-up care  Follow up with your healthcare provider if you don t start to get better in the next 5 days.  When to seek medical advice  Call your healthcare provider right away if any of these occur:    Pain or swelling gets worse or continues for more than a few days    Your hand or fingers become cold, blue, numb, or tingly    Large amount of bruising on your shoulder or upper arm    Difficulty moving your hand or fingers    Weakness in your hand or fingers    Your shoulder becomes stiff    It feels like your shoulder is popping out    You are less able to do your daily activities  Date Last Reviewed: 10/1/2016    2798-7006 [employer]. [address] [place] [address]. All rights reserved. This information is not intended as a substitute for professional medical care. Always follow your healthcare professional's instructions.           Patient Education     Insect Bite  Insects most often bite to protect themselves or their nests. Certain bugs, like fleas and mosquitoes, bite to feed. In some cases, the actual bite causes no pain. An itchy red welt or swelling may develop at the site of the bite. Most insect bites do not cause illness. And the itching and swelling most often go away without treatment. However, an infection can develop if the bite is scratched and the skin broken. Rarely, a  person may have an allergic reaction to an insect bite.  If a stinger is visible at the bite spot, remove it as quickly as possible, as this can decrease the amount of venom that gets into your body. Scrape it out with a dull edge, such as the edge of a credit card. Try not to squeeze it. Do not try to dig it out, as you may damage the skin and also increase the chance of infection.     To help reduce swelling and itching, apply a cold pack or ice in a zip-top plastic bag wrapped in a thin towel.   Home care    Your healthcare provider may prescribe over-the-counter medicines to help relieve itching and swelling. Use each medicine according to the directions on the package. If the bite becomes infected, you will need an antibiotic. This may be in pill form taken by mouth or as an ointment or cream put directly on the skin. Be sure to use them exactly as prescribed.    Bite symptoms usually go away on their own within a week or two.    To help prevent infection, avoid scratching or picking at the bite.    To help relieve itching and swelling, apply ice in a zip-top plastic bag wrapped in a thin towel to the bites. Do this for up to 10 minutes at a time. Avoid hot showers or baths as these tend to make itching worse.    An over-the-counter anti-itch medicine such as calamine lotion or an antihistamine cream may be helpful.    If you suspect you have insects in your home, talk to a licensed pest-control professional. He or she can inspect your home and tell you how to get rid of bugs safely.  Follow-up care  Follow up with your healthcare provider, or as advised.  Call 911  Call 911 if any of these occur:    Trouble breathing or swallowing    Wheezing    Feeling like your throat is closing up    Fainting, loss of consciousness    Swelling around the face or mouth  When to seek medical advice  Call your healthcare provider right away if any of these occur:    Fever of 100.4 F (38 C) or higher, or as directed by your  healthcare provider    Signs of infection, such as increased swelling and pain, warmth, red streaks, or drainage from the skin    Signs of allergic reaction, such as hives, a spreading rash, or throat itching  Date Last Reviewed: 10/1/2016    1315-8230 [employer]. [address] [place] [address]. All rights reserved. This information is not intended as a substitute for professional medical care. Always follow your healthcare professional's instructions.

## 2019-09-12 DIAGNOSIS — K21.9 GASTROESOPHAGEAL REFLUX DISEASE WITHOUT ESOPHAGITIS: ICD-10-CM

## 2019-09-12 DIAGNOSIS — K44.9 HIATAL HERNIA: ICD-10-CM

## 2019-09-12 NOTE — TELEPHONE ENCOUNTER
"Requested Prescriptions   Pending Prescriptions Disp Refills     omeprazole (PRILOSEC) 40 MG DR capsule [Pharmacy Med Name: OMEPRAZOLE 40MG CPDR]  Last Written Prescription Date:  05/22/2018  Last Fill Quantity: 90 capsule,  # refills: 0   Last Office Visit: 03/11/2019 Zita Fuentes MD   Future Office Visit:      90 capsule 0     Sig: TAKE ONE CAPSULE BY MOUTH ONCE DAILY (NEED TO BE SEEN IN CLINIC FOR FURTHER REFILLS)       PPI Protocol Failed - 9/12/2019  4:19 PM        Failed - Recent (12 mo) or future (30 days) visit within the authorizing provider's specialty     Patient had office visit in the last 12 months or has a visit in the next 30 days with authorizing provider or within the authorizing provider's specialty.  See \"Patient Info\" tab in inbasket, or \"Choose Columns\" in Meds & Orders section of the refill encounter.              Passed - Not on Clopidogrel (unless Pantoprazole ordered)        Passed - No diagnosis of osteoporosis on record        Passed - Medication is active on med list        Passed - Patient is age 18 or older        Passed - No active pregnacy on record        Passed - No positive pregnancy test in past 12 months          "

## 2019-09-13 RX ORDER — OMEPRAZOLE 40 MG/1
40 CAPSULE, DELAYED RELEASE ORAL DAILY PRN
Qty: 90 CAPSULE | Refills: 3 | Status: SHIPPED | OUTPATIENT
Start: 2019-09-13 | End: 2020-11-24

## 2019-09-24 ENCOUNTER — ALLIED HEALTH/NURSE VISIT (OUTPATIENT)
Dept: EDUCATION SERVICES | Facility: CLINIC | Age: 39
End: 2019-09-24
Payer: COMMERCIAL

## 2019-09-24 ENCOUNTER — OFFICE VISIT (OUTPATIENT)
Dept: ENDOCRINOLOGY | Facility: CLINIC | Age: 39
End: 2019-09-24
Payer: COMMERCIAL

## 2019-09-24 ENCOUNTER — DOCUMENTATION ONLY (OUTPATIENT)
Dept: CARE COORDINATION | Facility: CLINIC | Age: 39
End: 2019-09-24

## 2019-09-24 VITALS
TEMPERATURE: 97.6 F | HEIGHT: 64 IN | WEIGHT: 183.8 LBS | DIASTOLIC BLOOD PRESSURE: 80 MMHG | HEART RATE: 79 BPM | SYSTOLIC BLOOD PRESSURE: 120 MMHG | BODY MASS INDEX: 31.38 KG/M2 | OXYGEN SATURATION: 97 % | RESPIRATION RATE: 16 BRPM

## 2019-09-24 DIAGNOSIS — Z82.49 FAMILY HISTORY OF HYPERTROPHIC CARDIOMYOPATHY: ICD-10-CM

## 2019-09-24 DIAGNOSIS — Z79.4 TYPE 2 DIABETES MELLITUS WITH DIABETIC NEPHROPATHY, WITH LONG-TERM CURRENT USE OF INSULIN (H): Primary | ICD-10-CM

## 2019-09-24 DIAGNOSIS — E11.21 TYPE 2 DIABETES MELLITUS WITH DIABETIC NEPHROPATHY, WITH LONG-TERM CURRENT USE OF INSULIN (H): ICD-10-CM

## 2019-09-24 DIAGNOSIS — Z79.4 TYPE 2 DIABETES MELLITUS WITHOUT COMPLICATION, WITH LONG-TERM CURRENT USE OF INSULIN (H): ICD-10-CM

## 2019-09-24 DIAGNOSIS — E11.9 TYPE 2 DIABETES MELLITUS WITHOUT COMPLICATION, WITH LONG-TERM CURRENT USE OF INSULIN (H): ICD-10-CM

## 2019-09-24 DIAGNOSIS — E11.21 TYPE 2 DIABETES MELLITUS WITH DIABETIC NEPHROPATHY, WITH LONG-TERM CURRENT USE OF INSULIN (H): Primary | ICD-10-CM

## 2019-09-24 DIAGNOSIS — Z79.4 TYPE 2 DIABETES MELLITUS WITH DIABETIC NEPHROPATHY, WITH LONG-TERM CURRENT USE OF INSULIN (H): ICD-10-CM

## 2019-09-24 LAB
HBA1C MFR BLD: 7.5 % (ref 0–5.6)
TSH SERPL DL<=0.005 MIU/L-ACNC: 1.45 MU/L (ref 0.4–4)

## 2019-09-24 PROCEDURE — 36415 COLL VENOUS BLD VENIPUNCTURE: CPT | Performed by: INTERNAL MEDICINE

## 2019-09-24 PROCEDURE — 84443 ASSAY THYROID STIM HORMONE: CPT | Performed by: INTERNAL MEDICINE

## 2019-09-24 PROCEDURE — 83036 HEMOGLOBIN GLYCOSYLATED A1C: CPT | Performed by: INTERNAL MEDICINE

## 2019-09-24 PROCEDURE — 99214 OFFICE O/P EST MOD 30 MIN: CPT | Performed by: INTERNAL MEDICINE

## 2019-09-24 PROCEDURE — 95250 CONT GLUC MNTR PHYS/QHP EQP: CPT

## 2019-09-24 RX ORDER — OSTOMY SUPPLY
1 BOX MISCELLANEOUS
Qty: 50 EACH | Refills: 3 | Status: SHIPPED | OUTPATIENT
Start: 2019-09-24 | End: 2020-09-25

## 2019-09-24 RX ORDER — INFUSION SET FOR INSULIN PUMP
INFUSION SETS-PARAPHERNALIA MISCELLANEOUS
Qty: 30 EACH | Refills: 3 | Status: SHIPPED | OUTPATIENT
Start: 2019-09-24 | End: 2019-11-29 | Stop reason: ALTCHOICE

## 2019-09-24 RX ORDER — ROSUVASTATIN CALCIUM 5 MG/1
5 TABLET, COATED ORAL DAILY
Qty: 90 TABLET | Refills: 1 | Status: SHIPPED | OUTPATIENT
Start: 2019-09-24 | End: 2020-11-07

## 2019-09-24 RX ORDER — INSULIN PUMP SYRINGE, 3 ML
EACH MISCELLANEOUS
Qty: 30 EACH | Refills: 3 | Status: SHIPPED | OUTPATIENT
Start: 2019-09-24 | End: 2019-11-29

## 2019-09-24 RX ORDER — METFORMIN HCL 500 MG
1000 TABLET, EXTENDED RELEASE 24 HR ORAL 2 TIMES DAILY WITH MEALS
Qty: 360 TABLET | Refills: 3 | Status: SHIPPED | OUTPATIENT
Start: 2019-09-24 | End: 2021-01-05

## 2019-09-24 RX ORDER — ATENOLOL 25 MG/1
12.5 TABLET ORAL DAILY
Qty: 90 TABLET | Refills: 3 | COMMUNITY
End: 2020-10-07

## 2019-09-24 ASSESSMENT — MIFFLIN-ST. JEOR: SCORE: 1483.71

## 2019-09-24 NOTE — LETTER
9/24/2019         RE: Marie Vogel  [address]  [place] [address]        Dear Colleague,    Thank you for referring your patient, Marie Vogel, to the [facility]. Please see a copy of my visit note below.    Name: Marie Vogel  Seen for f/u of DM  HPI:  Marie Vogel is a 38 year old female who presents for the evaluation/management of DM.   has a past medical history of Allergic rhinitis due to pollen, Atypical glandular cells on Pap smear (3/1108), Cervical high risk HPV (human papillomavirus) test positive (11/27/2018), Gastroesophageal reflux disease, PFO (patent foramen ovale), and Type II or unspecified type diabetes mellitus without mention of complication, not stated as uncontrolled (2002).    Had CGM in 1/2017.  On pump  Medtronic Minimed Paradigm+ CGM (dexcom) though not using sensors currently.    Here for f/u. Previously has seen endo at [place] ( Dr Aguilera and Dr Zhou and ). 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 Busy at work, also variable schedule.. Concerned about weight gain.   H/o cardiomyopathy. Followed by cardiology.    Working nights.    In last clinic visit she was started on Trulicity 0.75 mg/week.  She is tolerating okay.  Reports that she is not feeling hungry and has lost some weight.    Not using bolus- not checking BG.    1. Type 2 DM:  Orginally diagnosed at the age of: 21 with GDM during her first pregnancy. Diagnosed with DM 2 in 1/2002, diet controlled for 3 years, then started on metformin. In 2010 during her second pregnancy, started on insulin and then insulin pump therapy in 2012.   Current Regimen: Insulin pump therapy ( Medtronic Minimed Paradigm), metformin XR 1000 mg bid, Trulicity 0.75 mg/week  BS checks: 4-5 times per day  Average Meter Download:     Current Regimen:   Insulin pump -   Time Rate (U/hr)   0000-10.30 1.65   1641-4603 1.40   2249-0925 1.20     Carbohydrate Ratio -    Time Ratio    8168-2875 7   6682-5028 7     Sensitivity   27   Active Insulin Time  3 hours   Basal  94%   Bolus   6%   Total Carbohydrates/day  117   Total Insulin/day   36.2 + 7.0   Average Blood Sugar 229 (limited BG)   BS Checks    Care Link Username-   Password-         Complications:   Diabetes Complications  Description / Detail    Diabetic Retinopathy  No   CAD / PAD  No   Neuropathy  No   Nephropathy / Microalbuminuria  Yes: microalbuminuria. ON cozaar.   Gastroparesis  No   Hypoglycemia [place]  No     2. Hypertension: Blood Pressure today:   BP Readings from Last 3 Encounters:   19 120/80   19 120/80   19 110/72     Takes medications everyday without forgetting a dose.  Denies feeling lightheaded or dizzy.    3. Hyperlipidemia:   Denies muscle aches of pains.       PMH/PSH:  Past Medical History:   Diagnosis Date     Allergic rhinitis due to pollen      Atypical glandular cells on Pap smear 3/1108     Cervical high risk HPV (human papillomavirus) test positive 2018    See problem list     Gastroesophageal reflux disease      PFO (patent foramen ovale)      Type II or unspecified type diabetes mellitus without mention of complication, not stated as uncontrolled     onset was gestational in      Past Surgical History:   Procedure Laterality Date     C INDUCED ABORTN BY D&C           C IUD,MIRENA  8/10/10, 7/28/15     C/SECTION, LOW TRANSVERSE  6/25/10    , Low Transverse     CHOLECYSTECTOMY, LAPOROSCOPIC      Cholecystectomy, Laparoscopic     ESOPHAGOSCOPY, GASTROSCOPY, DUODENOSCOPY (EGD), COMBINED N/A 2016    Procedure: COMBINED ESOPHAGOSCOPY, GASTROSCOPY, DUODENOSCOPY (EGD), BIOPSY SINGLE OR MULTIPLE;  Surgeon: Fadi Hunter MD;  Location:  GI      ESOPHAGEAL MOTILITY STUDY N/A 2016    Procedure: ESOPHAGEAL MOTILITY STUDY;  Surgeon: Fadi Hunter MD;  Location:  GI      REMOVE TONSILS/ADENOIDS,<11 Y/O      T &A      Family Hx:  Family History   Problem Relation Age of Onset     Cardiovascular Mother         hypertrophic cardiomyopathy     Genitourinary Problems Mother         gallbladder disease     Diabetes Mother         drug induced     Other - See Comments Mother         heart transplant 2005     Diabetes Father         type 2     Hypertension Father      Genitourinary Problems Maternal Grandmother         gallbladder disease     Genitourinary Problems Paternal Grandmother         gallbladder disease     Hypertension Paternal Grandfather         high bp     Cerebrovascular Disease Paternal Grandfather      Cardiovascular Brother         hypertrophic cardiomyopathy     Diabetes Brother         type 2     Glaucoma No family hx of      Macular Degeneration No family hx of      Thyroid disease: No         DM2: Yes - father and mother         Autoimmune: DM1, SLE, RA, Vitiligo No    Social Hx:  Social History     Socioeconomic History     Marital status:      Spouse name: Not on file     Number of children: 2     Years of education: 14     Highest education level: Not on file   Occupational History     Occupation: nurse   Social Needs     Financial resource strain: Not on file     Food insecurity:     Worry: Not on file     Inability: Not on file     Transportation needs:     Medical: Not on file     Non-medical: Not on file   Tobacco Use     Smoking status: Former Smoker     Types: Cigarettes     Last attempt to quit: 10/24/2005     Years since quittin.9     Smokeless tobacco: Former User     Tobacco comment: States only smokes when drinks maybe 2x/year   Substance and Sexual Activity     Alcohol use: No     Alcohol/week: 0.0 standard drinks     Drug use: No     Sexual activity: Yes     Partners: Male     Birth control/protection: I.U.D.     Comment: Mirena IUD 7/28/15   Lifestyle     Physical activity:     Days per week: Not on file     Minutes per session: Not on file     Stress: Not on file    Relationships     Social connections:     Talks on phone: Not on file     Gets together: Not on file     Attends Tenriism service: Not on file     Active member of club or organization: Not on file     Attends meetings of clubs or organizations: Not on file     Relationship status: Not on file     Intimate partner violence:     Fear of current or ex partner: Not on file     Emotionally abused: Not on file     Physically abused: Not on file     Forced sexual activity: Not on file   Other Topics Concern     Parent/sibling w/ CABG, MI or angioplasty before 65F 55M? Not Asked   Social History Narrative    Caffeine intake/servings daily - 6-7    Calcium intake/servings daily - 2-3 yogurt, milk, cheese    Exercise 1 times weekly - describe aerobics    Sunscreen used - Yes    Seatbelts used - Yes    Guns stored in the home - No    Self Breast Exam - Yes    Pap test up to date -  Yes    Eye exam up to date -  Yes    Dental exam up to date -  Yes    DEXA scan up to date -  Not Applicable    Flex Sig/Colonoscopy up to date -  Not Applicable    Mammography up to date -  Not Applicable    Immunizations reviewed and up to date - Yes    Abuse: Current or Past (Physical, Sexual or Emotional) - No    Do you feel safe in your environment - Yes    Do you cope well with stress - Yes    Do you suffer from insomnia - Yes     Last updated by: Tatianna Lacey  5/9/2005          MEDICATIONS:  has a current medication list which includes the following prescription(s): aspirin, atenolol, blood glucose, blood glucose monitoring, cetirizine, dulaglutide, epinephrine, fluticasone, ibuprofen, infusion set, insulin aspart, insulin cartridge, insulin pump, blood glucose monitoring, losartan, magnesium oxide, metformin, montelukast, multiple vitamins-minerals, fish oil, omeprazole, and sumatriptan.    ROS     ROS: 10 point ROS neg other than the symptoms noted above in the HPI.    Physical Exam   VS: /80 (BP Location: Right arm, Patient  "Position: Chair, Cuff Size: Adult Regular)   Pulse 79   Temp 97.6  F (36.4  C) (Oral)   Resp 16   Ht 1.626 m (5' 4\")   Wt 83.4 kg (183 lb 12.8 oz)   LMP 08/25/2019   SpO2 97%   Breastfeeding? No   BMI 31.55 kg/m     GENERAL: AXOX3, NAD, well dressed, answering questions appropriately, appears stated age.  HEENT: No exopthalmous, no proptosis, EOMI, no lig lag, no retraction  NECK: Thyroid normal in size, non tender, no nodules were palpated.  CV: RRR  LUNGS: CTAB  ABDOMEN: +BS  NEUROLOGY: CN grossly intact, no tremors  PSYCH: normal affect and mood    LABS:  A1c:  Lab Results   Component Value Date    A1C 7.5 09/24/2019    A1C 7.3 01/30/2019    A1C 8.8 11/05/2018    A1C 7.7 06/15/2018    A1C 8.5 10/24/2017       Basic Metabolic Panel:  Creatinine   Date Value Ref Range Status   07/22/2018 0.92 0.52 - 1.04 mg/dL Final       LFTS/Cholesterol Panel:  Liver Function Studies -   Recent Labs   Lab Test 01/30/19  0733 06/15/18  0732  07/01/15  0612 03/27/14  1022   CHOL 206* 184   < > 190 146   HDL 33* 35*   < > 42* 32*   * 111*   < > 96 77   TRIG 165* 189*   < > 260* 186*   CHOLHDLRATIO  --   --   --  4.5 4.5    < > = values in this interval not displayed.     Liver Function Studies -   Recent Labs   Lab Test 11/05/18  0750   PROTTOTAL 7.0   ALBUMIN 3.6   BILITOTAL 0.4   ALKPHOS 84   AST 22   ALT 32       Thyroid Function:  ENDO THYROID LABS-[facility] Latest Ref Rng & Units 4/16/2017   TSH 0.40 - 4.00 mU/L 2.06     ENDO THYROID LABS-[facility] Latest Ref Rng 9/27/2016 4/2/2014   TSH 0.40 - 4.00 mU/L 2.17 1.09   T4 FREE 0.70 - 1.85 ng/dL  0.75       Urine MicroAlbumin:  Lab Results   Component Value Date    UMALCR 654.32 11/05/2018      Vitamin D:  Vitamin D Deficiency Screening Results:  Lab Results   Component Value Date    VITDT 33 11/05/2018    VITDT 39 09/27/2016    VITDT 45 02/23/2016    VITDT 31 03/31/2015    VITDT 29 (L) 03/27/2014         All pertinent notes, labs, and images personally reviewed by me. "     Glucometer/ insulin pump (if applicable)/ CGM data (if applicable) downloaded, Personally reviewed and interpreted.  All Blood sugar data reviewed personally and discussed with pt.  See nursing note from 3/11/2019 for details of BG/CGM log.      A/P  Ms.Erica WILLIAM Vogel is a 38 year old here for the evaluation/management of diabetes:    1. DM2 - Under Fair control.  A1c improved to 7.5%  H/o hypertropic cardiomyopathy, followed by cardiology.  Limited blood sugar data.   She is not bolusing with each meal. Not checking BG before meals.   Basal > bolus   Has variable shifts at work.    I also discussed importance of strict blood sugar control to prevent complications associated with uncontrolled diabetes.  Plan:  In the setting of limited blood sugar data recommend diagnostic glucose monitor.  It was placed by diabetic educator today.    Continue current pump settings for now.    Continue metformin  1000 mg twice daily  Continue Trulicity 0.75 mg once a week  Follow-up in 6 weeks.    Recommend checking blood sugars before meals and at bedtime.    If Blood glucose are low more often-> 2-3 times/week- give us a call.  The patient is advised to Make better food choices: reduce carbs, Reduce portion size, weight loss and exercise 3-4 times a week.  Discussed hypoglycemia signs and symptoms as well as management in detail.    Prevention    Most Recent Immunizations   Administered Date(s) Administered     Influenza (IIV3) PF 09/26/2018     MMR 09/17/2007     TD (ADULT, 7+) 01/01/2002     TDAP Vaccine (Adacel) 04/06/2012     Opthalmology-2018, due  Dental-Yes  ASA-81 mg   Smoking- No    2. Hypertension - Under Good control.  + microalbuminuria. On losartan. Blood pressure medications include cozaar 75 mg qd. Need aggressive BP/glycmeic control.    3. Hyperlipidemia - Under fair control.TG high with low HDL, LD at 140, HDL 33.  Had myalgias on lipitor and stopped it.  Start Crestor 5 mg/day.  Labs in 3  months.  Recommend to use it consistently.    4. Microalbuminuria:  Recommend strict BG control.  On Cozaar 50 mg/day.    All questions were answered.  The patient indicates understanding of the above issues and agrees with the plan set forth.       More than 50% of face to face time spent with Ms. Dino Vogel on counseling / coordinating her care.      Follow-up:  follow up in 6 weeks.    Zita Fuentes MD  Endocrinology   [facility]/[place]    CC: Alan Paredes    Addendum to above note and clinic visit:    Labs reviewed.    See result note/telephone encounter.                Again, thank you for allowing me to participate in the care of your patient.        Sincerely,        Zita Fuentes MD

## 2019-09-24 NOTE — PATIENT INSTRUCTIONS
[facility] & [place] locations   Dr Fuentes, Endocrinology Department      [facility]   [address]  [place] [address]  Appointment [phone]  Fax: [phone]  [place]: Monday and Tuesday         [facility]   [address]  [place] [address]  Appointment [phone]  Fax: [phone]  [place]: Wednesday and Thursday            Please check the cost coverage and copay with insurance before recommended tests, services and medications (especially if new medications are prescribed).     If ordered, please get blood work done 1 week prior to your next appointment so they will be available to Dr. Fuentes at your visit.    To provide the best diabetic care, please bring your blood glucose meter to each and every visit with your Endocrinologist.  Your blood glucose meter/insulin pump will be downloaded at every appointment.    Please arrive 15 minutes before your scheduled appointment.  This will allow for your blood glucose meter/insulin pump to be downloaded.  If you are wearing DEXCOM please bring  or sharing code so that it can be downloaded.  If you are using freestyle casey personal sensors please bring the reader.  If you are using TANDEM insulin pump please have your username and password to get info from Tandem website.        Your provider has referred you to Diabetes Education: For all [facility]:  Phone [phone]; Fax [phone]  Please call and make the appointment.-->continous glucose monitor (dexom, ipro) (diagnostic)  Continue current pump settings  Use bolus with each meal  Start Crestor 5 mg/day  Follow up in 6 weeks    Recommend checking blood sugars before meals and at bedtime.    If Blood glucose are low more often-> 2-3 times/week- give us a call.  The patient is advised to Make better food choices: reduce carbs, Reduce portion size, weight loss and  exercise 3-4 times a week.  Discussed hypoglycemia signs and symptoms as well as management in detail.

## 2019-09-24 NOTE — PATIENT INSTRUCTIONS
1. Plan to wear the LibrePro sensor for 14 days. It is okay to shower, bathe, and swim (up to 3 feet deep for 30 minutes)    2. Continue with your usual diabetes care plan - check blood sugars and take medicines, as prescribed.    3. Keep a log of what you eat and drink, when you take your medications and how much you take, and exercise you do while you are wearing the sensor.    3. Do not cover the sensor with extra adhesive (the small hole in the center of the sensor must remain uncovered)    4. Use a little extra care, especially when getting dressed or exercising, to avoid accidentally loosening or removing the sensor.     5. Remove the sensor if you need to have an MRI or CT scan.     Piedad Garcia RD, LD, CDE  Diabetes     [facility] for the [facility]:  For Your Diabetes Education and Nutrition Appointments Call:  [phone]   For Diabetes Education or Nutrition Related Questions:   Phone: [phone]  E-mail: [email or web address]  Fax: [phone]   If you need a medication refill please contact your pharmacy. Please allow 3 business days for your refills to be completed.    Instructions for emailing the Diabetes Educators    If you need to communicate a non-urgent message to a Diabetes Educator via email, please send to [email or web address].    Please follow the following email guidelines:    Subject line: Secure: your clinic name (example: Secure: [facility])  In the email please include: First name, middle initial, last name and date of birth.    We will be in touch with you within one (1) business day.

## 2019-09-24 NOTE — PROGRESS NOTES
Diabetes Self-Management Education & Support    Professional Continuous Glucose Monitor Insertion    SUBJECTIVE/OBJECTIVE:     Marie Vogel is being seen today by Dr. Fuentes, who has requested diagnostic CGM placement.      Patient comments/concerns:  Currently on a Medtronic pump, may be due for a pump upgrade    Lab Results:  Lab Results   Component Value Date    A1C 7.5 09/24/2019      Lab Results   Component Value Date     07/22/2018       Medication:  Diabetes Medication(s)     Biguanides       metFORMIN (GLUCOPHAGE-XR) 500 MG 24 hr tablet    Take 2 tablets (1,000 mg) by mouth 2 times daily (with meals)    Insulin       insulin aspart (NOVOLOG VIAL) 100 UNITS/ML vial    With insulin pump. Uses about 80 units/day.     INSULIN PUMP - OUTPATIENT    Updated 1/27/17:  Medtronic MinimChristophe [employer] Co: Model 723  BASAL:  00:00: 1.0 units/hr  10:30: 0.95  14:00: 1.05  CARB RATIO:  00:00 1:7  1600  1:8  C. Factor:  27 mg/dL  BLOOD GLUCOSE TARGET and times:  12   AM (midnight):   Active Insulin Time:  3 hours  Basal to Bolus Ratio:   Sensor:  No  Carelink / Diasend username:  [email or web address]  Carelink / Diasend Password:  [email or web address]    Incretin Mimetic Agents (GLP-1 Receptor Agonists)       dulaglutide (TRULICITY) 0.75 MG/0.5ML pen    Inject 0.75 mg Subcutaneous every 7 days          ASSESSMENT:    CGM study indicated for: Unexplained fluctuations in glucose values     INTERVENTION:   Sensor started today.     Sensor Type: LibrePro  Lot #: [id number]  Serial #: [id number]  Expiration Date: 12/31/19    Sensor was inserted with no resistance or bleeding at insertion site.      Pt will plan to wear the sensor for 14 days.    WRITTEN AND VERBAL INFORMATION GIVEN TO SUPPORT UNDERSTANDING OF:  LibrePro CGM: Sensor insertion, intention of monitoring for 14 days. Keep records of BG, food intake, exercise, and medication dosing during wear.       Patient verbalizes understanding of how to remove sensor, if needed, and all  instructions provided.     Educational and other materials:  Food/exercise/medication log sheets  Contact information  Insulin pump packets and CGM info provided: Medtronic, OmniPod, T:Slim, Dexcom, Nadia    PLAN:  Pt was given instructions for tracking BG, medications, food intake and activity.  Patient to return all items associated with the professional Continuous Glucose Monitor System.  See Patient Instructions, AVS printed and provided to patient today.    Follow-up:    Follow up on Oct. 9th.    Piedad Garcia RD, CDE  Diabetes     Time Spent: 15 minutes  Encounter Type: Individual

## 2019-09-24 NOTE — NURSING NOTE
ENDOCRINOLOGY INTAKE FORM    Patient Name:  Marie Vogel  :  1980    Is patient Diabetic?   Yes: type 2  Does patient have non-diabetic or other endocrine issues?  Yes: gastroparesis, hyperlipidemia    Vitals: There were no vitals taken for this visit.  BMI= There is no height or weight on file to calculate BMI.    Flu vaccine:  No  Pneumonia vaccine:  Yes: pneumovax    Smoking and Alcohol use:  Social History     Tobacco Use     Smoking status: Former Smoker     Types: Cigarettes     Last attempt to quit: 10/24/2005     Years since quittin.9     Smokeless tobacco: Former User     Tobacco comment: States only smokes when drinks maybe 2x/year   Substance Use Topics     Alcohol use: No     Alcohol/week: 0.0 standard drinks     Drug use: No       Foot Exam: Yes: 18  Eye Exam:  Yes: 18  Dental Exam:  No  Aspirin Use:  Yes: 81 mg    Lab Results   Component Value Date    A1C 7.3 2019    A1C 8.8 2018    A1C 7.7 06/15/2018    A1C 8.5 10/24/2017    A1C 7.2 2017       Lab Results   Component Value Date    MICROL 1,060 2018     No results found for: MICROALBUMIN    TEODORA [facility]  Andrew/Anny

## 2019-09-24 NOTE — PROGRESS NOTES
Name: Marie Vogel  Seen for f/u of DM  HPI:  Marie Vogel is a 38 year old female who presents for the evaluation/management of DM.   has a past medical history of Allergic rhinitis due to pollen, Atypical glandular cells on Pap smear (3/1108), Cervical high risk HPV (human papillomavirus) test positive (11/27/2018), Gastroesophageal reflux disease, PFO (patent foramen ovale), and Type II or unspecified type diabetes mellitus without mention of complication, not stated as uncontrolled (2002).    Had CGM in 1/2017.  On pump  Medtronic Minimed Paradigm+ CGM (dexcom) though not using sensors currently.    Here for f/u. Previously has seen endo at [facility] ( Dr Aguilera and Dr Zhou and ). 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 Busy at work, also variable schedule.. Concerned about weight gain.   H/o cardiomyopathy. Followed by cardiology.    Working nights.    In last clinic visit she was started on Trulicity 0.75 mg/week.  She is tolerating okay.  Reports that she is not feeling hungry and has lost some weight.    Not using bolus- not checking BG.    1. Type 2 DM:  Orginally diagnosed at the age of: 21 with GDM during her first pregnancy. Diagnosed with DM 2 in 1/2002, diet controlled for 3 years, then started on metformin. In 2010 during her second pregnancy, started on insulin and then insulin pump therapy in 2012.   Current Regimen: Insulin pump therapy ( Medtronic Minimed Paradigm), metformin XR 1000 mg bid, Trulicity 0.75 mg/week  BS checks: 4-5 times per day  Average Meter Download:     Current Regimen:   Insulin pump -   Time Rate (U/hr)   0000-10.30 1.65   6098-6221 1.40   1049-2937 1.20     Carbohydrate Ratio -    Time Ratio   6614-1646 7   7296-7498 7     Sensitivity   27   Active Insulin Time  3 hours   Basal  94%   Bolus   6%   Total Carbohydrates/day  117   Total Insulin/day   36.2 + 7.0   Average Blood Sugar 229 (limited BG)   BS Checks    Care Link Username-   Password-          Complications:   Diabetes Complications  Description / Detail    Diabetic Retinopathy  No   CAD / PAD  No   Neuropathy  No   Nephropathy / Microalbuminuria  Yes: microalbuminuria. ON cozaar.   Gastroparesis  No   Hypoglycemia [place]  No     2. Hypertension: Blood Pressure today:   BP Readings from Last 3 Encounters:   19 120/80   19 120/80   19 110/72     Takes medications everyday without forgetting a dose.  Denies feeling lightheaded or dizzy.    3. Hyperlipidemia:   Denies muscle aches of pains.       PMH/PSH:  Past Medical History:   Diagnosis Date     Allergic rhinitis due to pollen      Atypical glandular cells on Pap smear 3/1108     Cervical high risk HPV (human papillomavirus) test positive 2018    See problem list     Gastroesophageal reflux disease      PFO (patent foramen ovale)      Type II or unspecified type diabetes mellitus without mention of complication, not stated as uncontrolled     onset was gestational in      Past Surgical History:   Procedure Laterality Date     C INDUCED ABORTN BY D&C           C IUD,MIRENA  8/10/10, 7/28/15     C/SECTION, LOW TRANSVERSE  6/25/10    , Low Transverse     CHOLECYSTECTOMY, LAPOROSCOPIC      Cholecystectomy, Laparoscopic     ESOPHAGOSCOPY, GASTROSCOPY, DUODENOSCOPY (EGD), COMBINED N/A 2016    Procedure: COMBINED ESOPHAGOSCOPY, GASTROSCOPY, DUODENOSCOPY (EGD), BIOPSY SINGLE OR MULTIPLE;  Surgeon: Fadi Hunter MD;  Location: [facility] ESOPHAGEAL MOTILITY STUDY N/A 2016    Procedure: ESOPHAGEAL MOTILITY STUDY;  Surgeon: Fadi Hunter MD;  Location: [facility] REMOVE TONSILS/ADENOIDS,<13 Y/O      T &A     Family Hx:  Family History   Problem Relation Age of Onset     Cardiovascular Mother         hypertrophic cardiomyopathy     Genitourinary Problems Mother         gallbladder disease     Diabetes Mother         drug induced     Other - See Comments Mother          heart transplant 2005     Diabetes Father         type 2     Hypertension Father      Genitourinary Problems Maternal Grandmother         gallbladder disease     Genitourinary Problems Paternal Grandmother         gallbladder disease     Hypertension Paternal Grandfather         high bp     Cerebrovascular Disease Paternal Grandfather      Cardiovascular Brother         hypertrophic cardiomyopathy     Diabetes Brother         type 2     Glaucoma No family hx of      Macular Degeneration No family hx of      Thyroid disease: No         DM2: Yes - father and mother         Autoimmune: DM1, SLE, RA, Vitiligo No    Social Hx:  Social History     Socioeconomic History     Marital status:      Spouse name: Not on file     Number of children: 2     Years of education: 14     Highest education level: Not on file   Occupational History     Occupation: nurse   Social Needs     Financial resource strain: Not on file     Food insecurity:     Worry: Not on file     Inability: Not on file     Transportation needs:     Medical: Not on file     Non-medical: Not on file   Tobacco Use     Smoking status: Former Smoker     Types: Cigarettes     Last attempt to quit: 10/24/2005     Years since quittin.9     Smokeless tobacco: Former User     Tobacco comment: States only smokes when drinks maybe 2x/year   Substance and Sexual Activity     Alcohol use: No     Alcohol/week: 0.0 standard drinks     Drug use: No     Sexual activity: Yes     Partners: Male     Birth control/protection: I.U.D.     Comment: Mirena IUD 7/28/15   Lifestyle     Physical activity:     Days per week: Not on file     Minutes per session: Not on file     Stress: Not on file   Relationships     Social connections:     Talks on phone: Not on file     Gets together: Not on file     Attends Taoist service: Not on file     Active member of club or organization: Not on file     Attends meetings of clubs or organizations: Not on file     Relationship  "status: Not on file     Intimate partner violence:     Fear of current or ex partner: Not on file     Emotionally abused: Not on file     Physically abused: Not on file     Forced sexual activity: Not on file   Other Topics Concern     Parent/sibling w/ CABG, MI or angioplasty before 65F 55M? Not Asked   Social History Narrative    Caffeine intake/servings daily - 6-7    Calcium intake/servings daily - 2-3 yogurt, milk, cheese    Exercise 1 times weekly - describe aerobics    Sunscreen used - Yes    Seatbelts used - Yes    Guns stored in the home - No    Self Breast Exam - Yes    Pap test up to date -  Yes    Eye exam up to date -  Yes    Dental exam up to date -  Yes    DEXA scan up to date -  Not Applicable    Flex Sig/Colonoscopy up to date -  Not Applicable    Mammography up to date -  Not Applicable    Immunizations reviewed and up to date - Yes    Abuse: Current or Past (Physical, Sexual or Emotional) - No    Do you feel safe in your environment - Yes    Do you cope well with stress - Yes    Do you suffer from insomnia - Yes     Last updated by: Tatianna Lacey  5/9/2005          MEDICATIONS:  has a current medication list which includes the following prescription(s): aspirin, atenolol, blood glucose, blood glucose monitoring, cetirizine, dulaglutide, epinephrine, fluticasone, ibuprofen, infusion set, insulin aspart, insulin cartridge, insulin pump, blood glucose monitoring, losartan, magnesium oxide, metformin, montelukast, multiple vitamins-minerals, fish oil, omeprazole, and sumatriptan.    ROS     ROS: 10 point ROS neg other than the symptoms noted above in the HPI.    Physical Exam   VS: /80 (BP Location: Right arm, Patient Position: Chair, Cuff Size: Adult Regular)   Pulse 79   Temp 97.6  F (36.4  C) (Oral)   Resp 16   Ht 1.626 m (5' 4\")   Wt 83.4 kg (183 lb 12.8 oz)   LMP 08/25/2019   SpO2 97%   Breastfeeding? No   BMI 31.55 kg/m    GENERAL: AXOX3, NAD, well dressed, answering questions " appropriately, appears stated age.  HEENT: No exopthalmous, no proptosis, EOMI, no lig lag, no retraction  NECK: Thyroid normal in size, non tender, no nodules were palpated.  CV: RRR  LUNGS: CTAB  ABDOMEN: +BS  NEUROLOGY: CN grossly intact, no tremors  PSYCH: normal affect and mood    LABS:  A1c:  Lab Results   Component Value Date    A1C 7.5 09/24/2019    A1C 7.3 01/30/2019    A1C 8.8 11/05/2018    A1C 7.7 06/15/2018    A1C 8.5 10/24/2017       Basic Metabolic Panel:  Creatinine   Date Value Ref Range Status   07/22/2018 0.92 0.52 - 1.04 mg/dL Final       LFTS/Cholesterol Panel:  Liver Function Studies -   Recent Labs   Lab Test 01/30/19  0733 06/15/18  0732  07/01/15  0612 03/27/14  1022   CHOL 206* 184   < > 190 146   HDL 33* 35*   < > 42* 32*   * 111*   < > 96 77   TRIG 165* 189*   < > 260* 186*   CHOLHDLRATIO  --   --   --  4.5 4.5    < > = values in this interval not displayed.     Liver Function Studies -   Recent Labs   Lab Test 11/05/18  0750   PROTTOTAL 7.0   ALBUMIN 3.6   BILITOTAL 0.4   ALKPHOS 84   AST 22   ALT 32       Thyroid Function:  ENDO THYROID LABS-P Latest Ref Rng & Units 4/16/2017   TSH 0.40 - 4.00 mU/L 2.06     ENDO THYROID LABS-UMP Latest Ref Rng 9/27/2016 4/2/2014   TSH 0.40 - 4.00 mU/L 2.17 1.09   T4 FREE 0.70 - 1.85 ng/dL  0.75       Urine MicroAlbumin:  Lab Results   Component Value Date    UMALCR 654.32 11/05/2018      Vitamin D:  Vitamin D Deficiency Screening Results:  Lab Results   Component Value Date    VITDT 33 11/05/2018    VITDT 39 09/27/2016    VITDT 45 02/23/2016    VITDT 31 03/31/2015    VITDT 29 (L) 03/27/2014         All pertinent notes, labs, and images personally reviewed by me.     Glucometer/ insulin pump (if applicable)/ CGM data (if applicable) downloaded, Personally reviewed and interpreted.  All Blood sugar data reviewed personally and discussed with pt.  See nursing note from 3/11/2019 for details of BG/CGM log.      A/P  Ms.Marie Vogel is a 38  year old here for the evaluation/management of diabetes:    1. DM2 - Under Fair control.  A1c improved to 7.5%  H/o hypertropic cardiomyopathy, followed by cardiology.  Limited blood sugar data.   She is not bolusing with each meal. Not checking BG before meals.   Basal > bolus   Has variable shifts at work.    I also discussed importance of strict blood sugar control to prevent complications associated with uncontrolled diabetes.  Plan:  In the setting of limited blood sugar data recommend diagnostic glucose monitor.  It was placed by diabetic educator today.    Continue current pump settings for now.    Continue metformin  1000 mg twice daily  Continue Trulicity 0.75 mg once a week  Follow-up in 6 weeks.    Recommend checking blood sugars before meals and at bedtime.    If Blood glucose are low more often-> 2-3 times/week- give us a call.  The patient is advised to Make better food choices: reduce carbs, Reduce portion size, weight loss and exercise 3-4 times a week.  Discussed hypoglycemia signs and symptoms as well as management in detail.    Prevention    Most Recent Immunizations   Administered Date(s) Administered     Influenza (IIV3) PF 09/26/2018     MMR 09/17/2007     TD (ADULT, 7+) 01/01/2002     TDAP Vaccine (Adacel) 04/06/2012     Opthalmology-2018, due  Dental-Yes  ASA-81 mg   Smoking- No    2. Hypertension - Under Good control.  + microalbuminuria. On losartan. Blood pressure medications include cozaar 75 mg qd. Need aggressive BP/glycmeic control.    3. Hyperlipidemia - Under fair control.TG high with low HDL, LD at 140, HDL 33.  Had myalgias on lipitor and stopped it.  Start Crestor 5 mg/day.  Labs in 3 months.  Recommend to use it consistently.    4. Microalbuminuria:  Recommend strict BG control.  On Cozaar 50 mg/day.    All questions were answered.  The patient indicates understanding of the above issues and agrees with the plan set forth.       More than 50% of face to face time spent with Ms.  Dino Vogel on counseling / coordinating her care.      Follow-up:  follow up in 6 weeks.    Zita Fuentes MD  Endocrinology   [facility]    CC: Alan Paredes    Addendum to above note and clinic visit:    Labs reviewed.    See result note/telephone encounter.

## 2019-09-25 ENCOUNTER — MYC MEDICAL ADVICE (OUTPATIENT)
Dept: ENDOCRINOLOGY | Facility: CLINIC | Age: 39
End: 2019-09-25

## 2019-09-26 NOTE — TELEPHONE ENCOUNTER
Endo staff- can you please take a look into this?  Please check schedule to see if we have sooner appointment/cancellations in 6 weeks.  If not please help schedule next available and will will check if anything open up sooner  Another option is to see CDE in 6 weeks if pt is OK.    Let me know if you have any questions.    Thank you.    Zita Fuentes MD

## 2019-09-27 ENCOUNTER — TELEPHONE (OUTPATIENT)
Dept: EDUCATION SERVICES | Facility: CLINIC | Age: 39
End: 2019-09-27

## 2019-09-27 NOTE — TELEPHONE ENCOUNTER
Received message to call patient about LibrePro coming off today. Attempted to reach x2.   No answer- left message.     Piedad Garcia RD, CDE  Diabetes

## 2019-09-27 NOTE — TELEPHONE ENCOUNTER
Pt returned phone call. Scheduled 10/1 at 11:30 for CGM replacement.     Piedad Garcia RD, CDE  Diabetes

## 2019-10-01 ENCOUNTER — ALLIED HEALTH/NURSE VISIT (OUTPATIENT)
Dept: EDUCATION SERVICES | Facility: CLINIC | Age: 39
End: 2019-10-01
Payer: COMMERCIAL

## 2019-10-01 DIAGNOSIS — Z79.4 TYPE 2 DIABETES MELLITUS WITH DIABETIC NEPHROPATHY, WITH LONG-TERM CURRENT USE OF INSULIN (H): ICD-10-CM

## 2019-10-01 DIAGNOSIS — E11.21 TYPE 2 DIABETES MELLITUS WITH DIABETIC NEPHROPATHY, WITH LONG-TERM CURRENT USE OF INSULIN (H): ICD-10-CM

## 2019-10-01 PROCEDURE — G0108 DIAB MANAGE TRN  PER INDIV: HCPCS

## 2019-10-01 NOTE — PATIENT INSTRUCTIONS
Care Plan:    Pump setting changes made today to reduce risk of hypoglycemia:    Basal rate:  12 am: reduced from 1.65 --> to 1.40  10:30 am: reduced from 1.4 --> 1.35  2:30 pm: continue 1.20    Follow up:  Please call, e-mail,  or send Epicrisis message with questions, concerns or if follow-up is needed.    Bring blood glucose meter and/or logbook with you to all doctor and follow-up appointments.     Piedad Garcia RD, LD, CDE  Diabetes     [facility] for the [facility]:    For Your Diabetes or Nutrition Education Appointments Call:  [phone]   For Diabetes or Nutrition Related Questions Call or Email:   [phone]  [email or web address]  Fax: [phone]   If you need a medication refill please contact your pharmacy. Please allow 3 business days for your refills to be completed.

## 2019-10-01 NOTE — PROGRESS NOTES
Diabetes Self-Management Education & Support      Diabetes Education Self-Management & Training:  Marie THOMAS [place] Jaspreet presents today for replacement of Professional CGM device. Pt also brought in previous sensor that fell off after a few days- sensor downloaded for review.       Current Diabetes Management per Patient:  Diabetes Medication(s)     Biguanides       metFORMIN (GLUCOPHAGE-XR) 500 MG 24 hr tablet    Take 2 tablets (1,000 mg) by mouth 2 times daily (with meals)    Insulin       insulin aspart (NOVOLOG VIAL) 100 UNITS/ML vial    With insulin pump. Uses about 80 units/day.     INSULIN PUMP - OUTPATIENT    Updated 1/27/17:  MedPaxVax: Model 723  BASAL:  00:00: 1.0 units/hr  10:30: 0.95  14:00: 1.05  CARB RATIO:  00:00 1:7  1600  1:8  C. Factor:  27 mg/dL  BLOOD GLUCOSE TARGET and times:  12   AM (midnight):   Active Insulin Time:  3 hours  Basal to Bolus Ratio:   Sensor:  No  CareKoolSpan / Admaximsend username:  [email or web address]  Carelink / Admaximsend Password:  [email or web address]    Incretin Mimetic Agents (GLP-1 Receptor Agonists)       dulaglutide (TRULICITY) 0.75 MG/0.5ML pen    Inject 0.75 mg Subcutaneous every 7 days          Current Treatments: Diet, Insulin Pump, Non-insulin Injectables, Oral Agent (monotherapy)    Most Recent A1c Result:    Lab Results   Component Value Date    A1C 7.5 09/24/2019       Continuous Glucose Monitor Interpretation                    Healthy Eating  Cultural/Protestant diet restrictions?: No  Meal planning: Calorie counting  Meals include: Lunch, Dinner  Beverages: Water, Tea, Coffee  Has patient met with a dietitian in the past?: Yes    Being Active  Barrier to exercise: Time    Monitoring  Blood Glucose Meter: Accu-check  Home Glucose (Sugar) Monitoring: 3-4 times per day  Blood glucose trend: Decreasing steadily  Low Glucose Range (mg/dL): <70  High Glucose Range (mg/dL): >200  Overall Range (mg/dL): 110-130    Taking Medications  Current Treatments: Diet, Insulin Pump,  "Non-insulin Injectables, Oral Agent (monotherapy)  Insulin pump type: Medtronic 723                  Problem Solving  Hypoglycemia Frequency: Weekly  Hypoglycemia Treatment: Glucose (tablets or gel), Juice, Candy  Patient carries a carbohydrate source: Yes  Medical alert: No  Severe weather/disaster plan for diabetes management?: Yes  DKA prevention plan?: No  Sick day plan for diabetes management?: (P) Yes    Hypoglycemia symptoms  Confusion: No  Dizziness or Light-Headedness: Yes  Headaches: No  Hunger: No  Mood changes: Yes  Nervousness/Anxiety: No  Sleepiness: No  Speech difficulty: Yes  Sweats: Yes  Tremors: Yes    Hypoglycemia Complications  Blackouts: No  Hospitalization: No  Nocturnal hypoglycemia: Yes  Required assistance: Yes  Required glucagon injection: No  Seizures: No    Reducing Risks  CAD Risks: Diabetes Mellitus, Family history  Has dilated eye exam at least once a year?: Yes  Sees dentist every 6 months?: No  Sees podiatrist (foot doctor)?: Yes    Healthy Coping  Informal Support system:: Children, Friends  Difficulty affording diabetes management supplies?: No  Patient Activation Measure Survey Score:  SADIA Score (Last Two) 10/7/2009   SADIA Raw Score 46   Activation Score 75.3   SADIA Level 4         Assessment:  Noted significant risk of hypoglycemia. 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 Pt recalls this was due to a previous change work schedule. Pt requesting whether the night time basal and daytime basal rates should be \"flipped around\", however hypoglycemia may then worsen during the day. Recommend reduce both the night time and day time basal settings as current insulin use is 81% basal and 29% bolus. Some post meal hyperglycemia if pt forgets to bolus before the meal. Pt will continue to work on this.     Pt also inquiring about different pump options. Current pump is out of warranty, and pt would like " "to consider the \"tubeless pump\" as she has difficulty while at work and during social situations wearing her current pump without dropping the device or pulling the infusion set out. Also discussed the benefits of personal CGM. Pt had previously tried the Dexcom, but is interested in considering the Nadia. Will discuss in more detail at next CDE visit in 2 weeks. Pt will research her options.      LibrePro sensor replaced today: (Lot # [id number], Serial # [id number], Expiration date 2019-12-31) Sensor was inserted with no resistance or bleeding at insertion site.  Pt will plan to wear the sensor for 14 days.    Patient verbalizes understanding of how to remove sensor and all instructions provided.     INTERVENTION:   Diabetes knowledge and skills assessment:     Patient is knowledgeable in diabetes management concepts related to: Healthy Eating, Being Active, Monitoring, Taking Medication, Problem Solving, Reducing Risks and Healthy Coping    Patient needs further education on the following diabetes management concepts: Taking Medication and Problem Solving    Based on learning assessment above, most appropriate setting for further diabetes education would be: Individual setting.    Education provided today on:  AADE Self-Care Behaviors:  Taking Medication: review of pump use  Problem Solving: low blood glucose - causes, signs/symptoms, treatment and prevention, carrying a carbohydrate source at all times, safe travel and when to call health care provider    CGM-specific education:   Dosing insulin based on sensor glucose results     Education specific to insulin pump provided today on:   importance of bolusing before meals and importance of counting carbohydrates accurately, additional pump options (Medtronic 670G upgrade vs OmniPod or T:Slim)    Pt verbalized understanding of concepts discussed and recommendations provided today.       Education Materials Provided:  No new materials provided today    PLAN:  See " Patient Instructions for co-developed, patient-stated behavior change goals.  AVS printed and provided to patient today. See Follow-Up section for recommended follow-up.    Piedad Garcia RD, CDE  Diabetes     Time Spent: 45 minutes  Encounter Type: Individual    Any diabetes medication dose changes were made via the CDE Protocol and Collaborative Practice Agreement with the patient's endocrinology provider. A copy of this encounter was shared with the provider.

## 2019-10-03 ENCOUNTER — HEALTH MAINTENANCE LETTER (OUTPATIENT)
Age: 39
End: 2019-10-03

## 2019-10-07 ENCOUNTER — OFFICE VISIT (OUTPATIENT)
Dept: CARDIOLOGY | Facility: CLINIC | Age: 39
End: 2019-10-07
Attending: INTERNAL MEDICINE
Payer: COMMERCIAL

## 2019-10-07 VITALS
OXYGEN SATURATION: 98 % | DIASTOLIC BLOOD PRESSURE: 83 MMHG | HEART RATE: 77 BPM | WEIGHT: 184.4 LBS | SYSTOLIC BLOOD PRESSURE: 124 MMHG | HEIGHT: 64 IN | BODY MASS INDEX: 31.48 KG/M2

## 2019-10-07 DIAGNOSIS — E78.5 DYSLIPIDEMIA: Primary | ICD-10-CM

## 2019-10-07 DIAGNOSIS — Z79.4 TYPE 2 DIABETES MELLITUS WITH DIABETIC NEPHROPATHY, WITH LONG-TERM CURRENT USE OF INSULIN (H): ICD-10-CM

## 2019-10-07 DIAGNOSIS — E11.21 TYPE 2 DIABETES MELLITUS WITH DIABETIC NEPHROPATHY, WITH LONG-TERM CURRENT USE OF INSULIN (H): ICD-10-CM

## 2019-10-07 DIAGNOSIS — I42.2 HYPERTROPHIC CARDIOMYOPATHY (H): ICD-10-CM

## 2019-10-07 PROCEDURE — 93010 ELECTROCARDIOGRAM REPORT: CPT | Mod: ZP | Performed by: INTERNAL MEDICINE

## 2019-10-07 PROCEDURE — 99214 OFFICE O/P EST MOD 30 MIN: CPT | Mod: ZP | Performed by: INTERNAL MEDICINE

## 2019-10-07 PROCEDURE — G0463 HOSPITAL OUTPT CLINIC VISIT: HCPCS | Mod: 25,ZF

## 2019-10-07 PROCEDURE — 93005 ELECTROCARDIOGRAM TRACING: CPT | Mod: ZF

## 2019-10-07 ASSESSMENT — PAIN SCALES - GENERAL: PAINLEVEL: NO PAIN (0)

## 2019-10-07 ASSESSMENT — MIFFLIN-ST. JEOR: SCORE: 1486.43

## 2019-10-07 NOTE — PATIENT INSTRUCTIONS
Patient Instructions:  It was a pleasure to see you in the cardiology clinic today.      If you have any questions, call  Chari Conrad RN, at [phone].  Press Option #1 for the [facility], and then press Option #4  We are encouraging the use of TASCEThart to communicate with your HealthCare Provider    Note the new medications: None  Stop the following medications: None    The results from today include: None  Please follow up with Dr. Dewey in 1 year, have stress echo and cardiac MRI, we will follow-up with you over the phone.       If you have an urgent need after hours (8:00 am to 4:30 pm) please call [phone] and ask for the cardiology fellow on call.

## 2019-10-07 NOTE — NURSING NOTE
Chief Complaint   Patient presents with     Follow Up     Return, 39 y.o.woman with insulin requiring type 2 diabetes and a family history of hypertrophic cardiomyopathy. She has hypertension and hyperlipidemia.She has had ECHOs and MRI periodically during the last 23 years since her mother was diagnosed with HC.      Vitals were taken and medications were reconciled.     Gertrude Ramey  12:53 PM

## 2019-10-07 NOTE — LETTER
10/7/2019      RE: Marie Vogel  [address]  [place] [address]       Dear Colleague,    Thank you for the opportunity to participate in the care of your patient, Marie Vogel, at the [facility] at [facility]. Please see a copy of my visit note below.    Cardiology clinic follow up    HPI:    Ms. Vogel is a pleasant 38 y.o.woman with insulin requiring type 2 diabetes and a family history of hypertrophic cardiomyopathy. She has hypertension and hyperlipidemia.She has had ECHOs and MRI periodically during the last 23 years since her mother was diagnosed with HC.  Her most recent MRI on 8/8/2016 was suggestive of hypertropic cardiomyopathy, asymmetrical septal hypertrophy measuring 1.5 cm; systolic anterior motion of the mitral valve; late gadolinium enhancement imaging,with mild patchy hyperenhancement in the septal segments to suggest myocardial fibrosis.      She had treadmill stress test and Holter that did not demonstrate VT or hypotension. She was seen by our genetic counselor.     Interval History:  Pt reports palpitations that is frequent and usually correlates with reduced fluid intake.  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 She otherwise has no chest pain, shortness of breath, syncope, weight gain, LE edema, orthopnea, or PND.      ROS:  A complete 10-point ROS was negative except as above.     PAST MEDICAL HISTORY:  Past Medical History:   Diagnosis Date     Allergic rhinitis due to pollen      Atypical glandular cells on Pap smear 3/1108     Cervical high risk HPV (human papillomavirus) test positive 11/27/2018    See problem list     Gastroesophageal reflux disease      PFO (patent foramen ovale)      Type II or unspecified type diabetes mellitus without mention of complication, not stated as uncontrolled 2002    onset was gestational in 2001       CURRENT MEDICATIONS:  Current  "Outpatient Medications   Medication Sig Dispense Refill     aspirin 81 MG tablet Take 1 tablet (81 mg) by mouth daily 90 tablet 3     atenolol (TENORMIN) 25 MG tablet Take 0.5 tablets (12.5 mg) by mouth daily 90 tablet 3     blood glucose (PEPE CONTOUR NEXT) test strip Check 4 times/day 120 each 11     blood glucose monitoring (NO BRAND SPECIFIED) meter device kit Use to test blood sugar 6 times daily and as needed. 1 kit 0     cetirizine (ZYRTEC) 10 MG tablet Take 10 mg by mouth 2 times daily  90 tablet 3     EPINEPHrine 0.3 MG/0.3ML injection Inject 0.3 mLs (0.3 mg) into the muscle once as needed for anaphylaxis 0.3 mL 11     fluticasone (FLONASE) 50 MCG/ACT spray Spray 1-2 sprays into both nostrils daily 1 Bottle 0     ibuprofen (ADVIL) 200 MG tablet Take 200 mg by mouth every 4 hours as needed.       infusion set (PARADIGM QUICK-SET 23\" 9MM) misc pump supply CHANGE EVERY 3 DAYS AS DIRECTED 30 each 3     insulin aspart (NOVOLOG VIAL) 100 UNITS/ML vial With insulin pump. Uses about 80 units/day. 90 mL 3     insulin cartridge (PARADIGM 3ML) misc pump supply CHANGE EVERY 3 DAYS AS DIRECTED 30 each 3     INSULIN PUMP - OUTPATIENT Updated 10/2/19:  Medtronic Minimed: Model 723  BASAL:  00:00: 1.40 units/hr  10:30: 1.35  14:30: 1.20  CARB RATIO:  00:00: 7  Sensitivity:  00:00: 27 mg/dL  BLOOD GLUCOSE TARGET and times:  12   AM (midnight):   Active Insulin Time: 2 hours  Sensor:  No  Carelink / Diasend username:  [email or web address]  Carelink / Diasend Password:  [email or web address]       Lancets (MICROLET) MISC 1 Device See Admin Instructions. Use up to 5 times daily for blood sugar checks. 100 each prn     losartan (COZAAR) 50 MG tablet TAKE ONE TABLET BY MOUTH EVERY MORNING AND TAKE ONE-HALF TABLET BY MOUTH EVERY EVENING 135 tablet 3     MAGNESIUM OXIDE PO Take 400 mg by mouth daily       metFORMIN (GLUCOPHAGE-XR) 500 MG 24 hr tablet Take 2 tablets (1,000 mg) by mouth 2 times daily (with meals) 360 tablet 3     montelukast " (SINGULAIR) 10 MG tablet TAKE ONE TABLET BY MOUTH AT BEDTIME 90 tablet 3     Multiple Vitamins-Minerals (MULTI VITAMIN/MINERALS PO) Take 1 each by mouth daily.       Omega-3 Fatty Acids (FISH OIL) 500 MG CAPS Take 1 capsule by mouth       omeprazole (PRILOSEC) 40 MG DR capsule Take 1 capsule (40 mg) by mouth daily as needed 90 capsule 3     Ostomy Supplies (SKIN PREP WIPES) MISC 1 each every 3 days 50 each 3     rosuvastatin (CRESTOR) 5 MG tablet Take 1 tablet (5 mg) by mouth daily 90 tablet 1     SUMAtriptan (IMITREX) 25 MG tablet TAKE ONE TO FOUR TABLETS BY MOUTH ONE TIME. MAY REPEAT 1 TABLET EVERY TWO HOURS UP TO MAXIMUM OF 200MG IN 24 HOURS 8 tablet 6     dulaglutide (TRULICITY) 0.75 MG/0.5ML pen Inject 0.75 mg Subcutaneous every 7 days 9 mL 1       PAST SURGICAL HISTORY:  Past Surgical History:   Procedure Laterality Date     C INDUCED ABORTN BY D&C           C IUD,MIRENA  8/10/10, 7/28/15     C/SECTION, LOW TRANSVERSE  6/25/10    , Low Transverse     CHOLECYSTECTOMY, LAPOROSCOPIC      Cholecystectomy, Laparoscopic     ESOPHAGOSCOPY, GASTROSCOPY, DUODENOSCOPY (EGD), COMBINED N/A 2016    Procedure: COMBINED ESOPHAGOSCOPY, GASTROSCOPY, DUODENOSCOPY (EGD), BIOPSY SINGLE OR MULTIPLE;  Surgeon: Fadi Hunter MD;  Location: [facility] ESOPHAGEAL MOTILITY STUDY N/A 2016    Procedure: ESOPHAGEAL MOTILITY STUDY;  Surgeon: Fadi Hunter MD;  Location: [facility] REMOVE TONSILS/ADENOIDS,<11 Y/O      T &A       ALLERGIES     Allergies   Allergen Reactions     Ivp Dye [Contrast Dye] Itching     Pt reports that throat itches     Nuts Anaphylaxis     [place] nuts only     Bactrim Swelling     Pt reaction was swelling in mouth and tongue and itchy mouth.  Resolved with benadryl and prednisone     Penicillins Hives     Cephalosporins Itching     Seasonal Allergies Other (See Comments)     Molds, trees, grass, dust..  Upper congestion     Atorvastatin      myalgias      Shellfish Allergy Rash     Clams only       FAMILY HISTORY:  Family History   Problem Relation Age of Onset     Cardiovascular Mother         hypertrophic cardiomyopathy     Genitourinary Problems Mother         gallbladder disease     Diabetes Mother         drug induced     Other - See Comments Mother         heart transplant 11/23/2005     Diabetes Father         type 2     Hypertension Father      Genitourinary Problems Maternal Grandmother         gallbladder disease     Genitourinary Problems Paternal Grandmother         gallbladder disease     Hypertension Paternal Grandfather         high bp     Cerebrovascular Disease Paternal Grandfather      Cardiovascular Brother         hypertrophic cardiomyopathy     Diabetes Brother         type 2     Glaucoma No family hx of      Macular Degeneration No family hx of        SOCIAL HISTORY:  Social History     Social History     Marital status:      Spouse name: N/A     Number of children: 2     Years of education: 14     Occupational History     nurse      Social History Main Topics     Smoking status: Former Smoker     Types: Cigarettes     Quit date: 10/24/2005     Smokeless tobacco: Former User      Comment: States only smokes when drinks maybe 2x/year     Alcohol use No     Drug use: No     Sexual activity: Yes     Partners: Male     Birth control/ protection: IUD      Comment: Mirena IUD 7/28/15     Other Topics Concern     None     Social History Narrative    Caffeine intake/servings daily - 6-7    Calcium intake/servings daily - 2-3 yogurt, milk, cheese    Exercise 1 times weekly - describe aerobics    Sunscreen used - Yes    Seatbelts used - Yes    Guns stored in the home - No    Self Breast Exam - Yes    Pap test up to date -  Yes    Eye exam up to date -  Yes    Dental exam up to date -  Yes    DEXA scan up to date -  Not Applicable    Flex Sig/Colonoscopy up to date -  Not Applicable    Mammography up to date -  Not Applicable    Immunizations  "reviewed and up to date - Yes    Abuse: Current or Past (Physical, Sexual or Emotional) - No    Do you feel safe in your environment - Yes    Do you cope well with stress - Yes    Do you suffer from insomnia - Yes     Last updated by: Tatianna Lacey  5/9/2005           EXAM:  /83 (BP Location: Right arm, Patient Position: Chair, Cuff Size: Adult Regular)   Pulse 77   Ht 1.626 m (5' 4\")   Wt 83.6 kg (184 lb 6.4 oz)   SpO2 98%   BMI 31.65 kg/m     In general, the patient is a pleasant female in no apparent distress.      HEENT: NC/AT.  PERRLA.  EOMI.  Sclerae white, not injected.    Neck: Carotids 2+ bilaterally without bruits.  No jugular venous distension.   Lymph: No cervical adenopathy. No thyromegaly.   Heart: RRR. Normal S1, S2. 1/6 systolic murmur LLSB. No rub, click, or gallop. There is no heave.    Lungs: Clear bilaterally.  No rhonchi, wheezes, rales.   GI: Soft, nontender, nondistended.   Extremities: No edema.  The pulses are 2+at the radial and DP bilaterally.  Neuro: grossly non focal.   Skin: no rashes.  Endocrine: no thyromegaly  Musculoskeletal: no joint swelling or tenderness, gait normal.  Psych: pleasant and conversant      Labs:  LIPID RESULTS:  Lab Results   Component Value Date    CHOL 206 (H) 01/30/2019    HDL 33 (L) 01/30/2019     (H) 01/30/2019    TRIG 165 (H) 01/30/2019    CHOLHDLRATIO 4.5 07/01/2015    NHDL 173 (H) 01/30/2019       LIVER ENZYME RESULTS:  Lab Results   Component Value Date    AST 22 11/05/2018    ALT 32 11/05/2018       CBC RESULTS:  Lab Results   Component Value Date    WBC 12.9 (H) 07/22/2018    RBC 4.13 07/22/2018    HGB 13.1 07/22/2018    HCT 38.7 07/22/2018    MCV 94 07/22/2018    MCH 31.7 07/22/2018    MCHC 33.9 07/22/2018    RDW 12.1 07/22/2018     07/22/2018       BMP RESULTS:  Lab Results   Component Value Date     07/22/2018    POTASSIUM 4.3 07/22/2018    CHLORIDE 106 07/22/2018    CO2 27 07/22/2018    ANIONGAP 6 07/22/2018     " (H) 07/22/2018    BUN 16 07/22/2018    CR 0.92 07/22/2018    GFRESTIMATED 67 07/22/2018    GFRESTBLACK 81 07/22/2018    BILLY 9.0 07/22/2018        A1C RESULTS:  Lab Results   Component Value Date    A1C 7.5 (H) 09/24/2019       INR RESULTS:  Lab Results   Component Value Date    INR 1.07 11/15/2005       Cardiac data:  ECG today: NSR with inferior ST - T changes that are stable          Cardiac telemetry 9/18- no arrhythmias    Echo stress test 10/11/2016  Submaximal stress test, achieved 76% of the age predicted maximal heart rate.  Limiting symptom dyspnea, leg pain.  No angina symptoms with exercise.  Elevated blood pressure response to exercise (207/86 mmHg at peak exercise)  No ECG evidence of ischemia.  No stress induced regional wall motion abnormalities.  Normal resting LV function with EF of approximately 55-60%; with exercise  left ventricular cavity size decreases and LVEF increases to 65-70%.  Average functional capacity for age.  There is no significant LVOT gradient with exercise or at rest. There was no  hypotension with exercise, albeit during this supine bicycle stress test.    CMR 8/18/16:  1.  Normal left ventricular size and systolic function with a calculated ejection fraction of  64 %.  2.  Normal right ventricular size and systolic function with a calculated ejection fraction of 67%.    3.  Asymmetrical septal hypertrophy measuring 1.5 cm.    4.  There is systolic anterior motion of the mitral valve.    5.  On late gadolinium enhancement imaging, there is mild patchy hyperenhancement in the septal segments to suggest myocardial fibrosis.    6.  Overall this findings are suggestive of hypertropic cardiomyopathy    Echo 6-25-15  There is borderline concentric left ventricular hypertrophy. The visual ejection fraction is estimated at 55-60%. Cannot exclude, but doubt small ASD.--Consider bubble study if concerned, but there is no right heart enlargement. The left atrium is borderline dilated.       Assessment and Plan:   Ms. Sun is a 38 y.o.woman with      1. HCM, CMR suggestive with asymmetrical septal hypertrophy of 15 mm  2. Family history of HCM  3. Insulin requiring DM, controlled  4. Obesity  5. Hiatal hernia, gastroparesis  6. Blood pressure controlled  7. Hypertension, treated  8. Hyperlipidemia, treated      Marie returns for a follow-up.  She denies any presyncope syncope chest pain or shortness of breath.  She has had a few episodes of tachycardia primarily picked up by her Fitbit. It is very reassuring that Marie does not have any high risk features for SCD - septum is <30mm, no family history of SCD, personal history of syncope, VT, or hypotension with exercise.    Will plan on treadmill stress echo and cMR.   If abnormal will arrange for follow up sooner otherwise in 1 year.    Melanie Dewey MD, MS  Staff Cardiologist  Pager: [phone]      CC  Patient Care Team:  Janett Estrada MD as PCP - General (Internal Medicine)  Melanie Dewey MD as MD (Cardiology)  Janett Estrada MD as Assigned PCP  Calvin Milton MD as MD (Family Practice)  Piedad Garcia RD as Diabetes Educator (Dietitian, Registered)  JANETT ESTRADA      Answers for HPI/ROS submitted by the patient on 10/3/2019   General Symptoms: No  Skin Symptoms: No  HENT Symptoms: No  EYE SYMPTOMS: No  HEART SYMPTOMS: No  LUNG SYMPTOMS: No  INTESTINAL SYMPTOMS: No  URINARY SYMPTOMS: No  GYNECOLOGIC SYMPTOMS: No  BREAST SYMPTOMS: No  SKELETAL SYMPTOMS: No  BLOOD SYMPTOMS: No  NERVOUS SYSTEM SYMPTOMS: No  MENTAL HEALTH SYMPTOMS: No      Please do not hesitate to contact me if you have any questions/concerns.     Sincerely,     Melanie Dewey MD

## 2019-10-07 NOTE — PROGRESS NOTES
Cardiology clinic follow up    HPI:    Ms. Vogel is a pleasant 38 y.o.woman with insulin requiring type 2 diabetes and a family history of hypertrophic cardiomyopathy. She has hypertension and hyperlipidemia.She has had ECHOs and MRI periodically during the last 23 years since her mother was diagnosed with HC.  Her most recent MRI on 8/8/2016 was suggestive of hypertropic cardiomyopathy, asymmetrical septal hypertrophy measuring 1.5 cm; systolic anterior motion of the mitral valve; late gadolinium enhancement imaging,with mild patchy hyperenhancement in the septal segments to suggest myocardial fibrosis.      She had treadmill stress test and Holter that did not demonstrate VT or hypotension. She was seen by our genetic counselor.     Interval History:  Pt reports palpitations that is frequent and usually correlates with reduced fluid intake.  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 She otherwise has no chest pain, shortness of breath, syncope, weight gain, LE edema, orthopnea, or PND.      ROS:  A complete 10-point ROS was negative except as above.     PAST MEDICAL HISTORY:  Past Medical History:   Diagnosis Date     Allergic rhinitis due to pollen      Atypical glandular cells on Pap smear 3/1108     Cervical high risk HPV (human papillomavirus) test positive 11/27/2018    See problem list     Gastroesophageal reflux disease      PFO (patent foramen ovale)      Type II or unspecified type diabetes mellitus without mention of complication, not stated as uncontrolled 2002    onset was gestational in 2001       CURRENT MEDICATIONS:  Current Outpatient Medications   Medication Sig Dispense Refill     aspirin 81 MG tablet Take 1 tablet (81 mg) by mouth daily 90 tablet 3     atenolol (TENORMIN) 25 MG tablet Take 0.5 tablets (12.5 mg) by mouth daily 90 tablet 3     blood glucose (PEPE CONTOUR NEXT) test strip Check 4 times/day 120 each 11     blood glucose monitoring (NO BRAND  "SPECIFIED) meter device kit Use to test blood sugar 6 times daily and as needed. 1 kit 0     cetirizine (ZYRTEC) 10 MG tablet Take 10 mg by mouth 2 times daily  90 tablet 3     EPINEPHrine 0.3 MG/0.3ML injection Inject 0.3 mLs (0.3 mg) into the muscle once as needed for anaphylaxis 0.3 mL 11     fluticasone (FLONASE) 50 MCG/ACT spray Spray 1-2 sprays into both nostrils daily 1 Bottle 0     ibuprofen (ADVIL) 200 MG tablet Take 200 mg by mouth every 4 hours as needed.       infusion set (PARADIGM QUICK-SET 23\" 9MM) misc pump supply CHANGE EVERY 3 DAYS AS DIRECTED 30 each 3     insulin aspart (NOVOLOG VIAL) 100 UNITS/ML vial With insulin pump. Uses about 80 units/day. 90 mL 3     insulin cartridge (PARADIGM 3ML) misc pump supply CHANGE EVERY 3 DAYS AS DIRECTED 30 each 3     INSULIN PUMP - OUTPATIENT Updated 10/2/19:  SpeakPhone MinimFannect: Model 723  BASAL:  00:00: 1.40 units/hr  10:30: 1.35  14:30: 1.20  CARB RATIO:  00:00: 7  Sensitivity:  00:00: 27 mg/dL  BLOOD GLUCOSE TARGET and times:  12   AM (midnight):   Active Insulin Time: 2 hours  Sensor:  No  Carelink / Diasend username:  [email or web address]  Carelink / Diasend Password:  [email or web address]       Lancets (MICROLET) MISC 1 Device See Admin Instructions. Use up to 5 times daily for blood sugar checks. 100 each prn     losartan (COZAAR) 50 MG tablet TAKE ONE TABLET BY MOUTH EVERY MORNING AND TAKE ONE-HALF TABLET BY MOUTH EVERY EVENING 135 tablet 3     MAGNESIUM OXIDE PO Take 400 mg by mouth daily       metFORMIN (GLUCOPHAGE-XR) 500 MG 24 hr tablet Take 2 tablets (1,000 mg) by mouth 2 times daily (with meals) 360 tablet 3     montelukast (SINGULAIR) 10 MG tablet TAKE ONE TABLET BY MOUTH AT BEDTIME 90 tablet 3     Multiple Vitamins-Minerals (MULTI VITAMIN/MINERALS PO) Take 1 each by mouth daily.       Omega-3 Fatty Acids (FISH OIL) 500 MG CAPS Take 1 capsule by mouth       omeprazole (PRILOSEC) 40 MG DR capsule Take 1 capsule (40 mg) by mouth daily as needed 90 capsule 3     " Ostomy Supplies (SKIN PREP WIPES) MISC 1 each every 3 days 50 each 3     rosuvastatin (CRESTOR) 5 MG tablet Take 1 tablet (5 mg) by mouth daily 90 tablet 1     SUMAtriptan (IMITREX) 25 MG tablet TAKE ONE TO FOUR TABLETS BY MOUTH ONE TIME. MAY REPEAT 1 TABLET EVERY TWO HOURS UP TO MAXIMUM OF 200MG IN 24 HOURS 8 tablet 6     dulaglutide (TRULICITY) 0.75 MG/0.5ML pen Inject 0.75 mg Subcutaneous every 7 days 9 mL 1       PAST SURGICAL HISTORY:  Past Surgical History:   Procedure Laterality Date     C INDUCED ABORTN BY D&C           C IUD,MIRENA  8/10/10, 7/28/15     C/SECTION, LOW TRANSVERSE  6/25/10    , Low Transverse     CHOLECYSTECTOMY, LAPOROSCOPIC      Cholecystectomy, Laparoscopic     ESOPHAGOSCOPY, GASTROSCOPY, DUODENOSCOPY (EGD), COMBINED N/A 2016    Procedure: COMBINED ESOPHAGOSCOPY, GASTROSCOPY, DUODENOSCOPY (EGD), BIOPSY SINGLE OR MULTIPLE;  Surgeon: Fadi Hunter MD;  Location: [facility] ESOPHAGEAL MOTILITY STUDY N/A 2016    Procedure: ESOPHAGEAL MOTILITY STUDY;  Surgeon: Fadi Hunter MD;  Location:  GI      REMOVE TONSILS/ADENOIDS,<11 Y/O      T &A       ALLERGIES     Allergies   Allergen Reactions     Ivp Dye [Contrast Dye] Itching     Pt reports that throat itches     Nuts Anaphylaxis     [place] nuts only     Bactrim Swelling     Pt reaction was swelling in mouth and tongue and itchy mouth.  Resolved with benadryl and prednisone     Penicillins Hives     Cephalosporins Itching     Seasonal Allergies Other (See Comments)     Molds, trees, grass, dust..  Upper congestion     Atorvastatin      myalgias     Shellfish Allergy Rash     Clams only       FAMILY HISTORY:  Family History   Problem Relation Age of Onset     Cardiovascular Mother         hypertrophic cardiomyopathy     Genitourinary Problems Mother         gallbladder disease     Diabetes Mother         drug induced     Other - See Comments Mother         heart transplant 2005      Diabetes Father         type 2     Hypertension Father      Genitourinary Problems Maternal Grandmother         gallbladder disease     Genitourinary Problems Paternal Grandmother         gallbladder disease     Hypertension Paternal Grandfather         high bp     Cerebrovascular Disease Paternal Grandfather      Cardiovascular Brother         hypertrophic cardiomyopathy     Diabetes Brother         type 2     Glaucoma No family hx of      Macular Degeneration No family hx of        SOCIAL HISTORY:  Social History     Social History     Marital status:      Spouse name: N/A     Number of children: 2     Years of education: 14     Occupational History     nurse      Social History Main Topics     Smoking status: Former Smoker     Types: Cigarettes     Quit date: 10/24/2005     Smokeless tobacco: Former User      Comment: States only smokes when drinks maybe 2x/year     Alcohol use No     Drug use: No     Sexual activity: Yes     Partners: Male     Birth control/ protection: IUD      Comment: Mirena IUD 7/28/15     Other Topics Concern     None     Social History Narrative    Caffeine intake/servings daily - 6-7    Calcium intake/servings daily - 2-3 yogurt, milk, cheese    Exercise 1 times weekly - describe aerobics    Sunscreen used - Yes    Seatbelts used - Yes    Guns stored in the home - No    Self Breast Exam - Yes    Pap test up to date -  Yes    Eye exam up to date -  Yes    Dental exam up to date -  Yes    DEXA scan up to date -  Not Applicable    Flex Sig/Colonoscopy up to date -  Not Applicable    Mammography up to date -  Not Applicable    Immunizations reviewed and up to date - Yes    Abuse: Current or Past (Physical, Sexual or Emotional) - No    Do you feel safe in your environment - Yes    Do you cope well with stress - Yes    Do you suffer from insomnia - Yes     Last updated by: Tatianna Lacey  5/9/2005           EXAM:  /83 (BP Location: Right arm, Patient Position: Chair, Cuff Size:  "Adult Regular)   Pulse 77   Ht 1.626 m (5' 4\")   Wt 83.6 kg (184 lb 6.4 oz)   SpO2 98%   BMI 31.65 kg/m    In general, the patient is a pleasant female in no apparent distress.      HEENT: NC/AT.  PERRLA.  EOMI.  Sclerae white, not injected.    Neck: Carotids 2+ bilaterally without bruits.  No jugular venous distension.   Lymph: No cervical adenopathy. No thyromegaly.   Heart: RRR. Normal S1, S2. 1/6 systolic murmur LLSB. No rub, click, or gallop. There is no heave.    Lungs: Clear bilaterally.  No rhonchi, wheezes, rales.   GI: Soft, nontender, nondistended.   Extremities: No edema.  The pulses are 2+at the radial and DP bilaterally.  Neuro: grossly non focal.   Skin: no rashes.  Endocrine: no thyromegaly  Musculoskeletal: no joint swelling or tenderness, gait normal.  Psych: pleasant and conversant      Labs:  LIPID RESULTS:  Lab Results   Component Value Date    CHOL 206 (H) 01/30/2019    HDL 33 (L) 01/30/2019     (H) 01/30/2019    TRIG 165 (H) 01/30/2019    CHOLHDLRATIO 4.5 07/01/2015    NHDL 173 (H) 01/30/2019       LIVER ENZYME RESULTS:  Lab Results   Component Value Date    AST 22 11/05/2018    ALT 32 11/05/2018       CBC RESULTS:  Lab Results   Component Value Date    WBC 12.9 (H) 07/22/2018    RBC 4.13 07/22/2018    HGB 13.1 07/22/2018    HCT 38.7 07/22/2018    MCV 94 07/22/2018    MCH 31.7 07/22/2018    MCHC 33.9 07/22/2018    RDW 12.1 07/22/2018     07/22/2018       BMP RESULTS:  Lab Results   Component Value Date     07/22/2018    POTASSIUM 4.3 07/22/2018    CHLORIDE 106 07/22/2018    CO2 27 07/22/2018    ANIONGAP 6 07/22/2018     (H) 07/22/2018    BUN 16 07/22/2018    CR 0.92 07/22/2018    GFRESTIMATED 67 07/22/2018    GFRESTBLACK 81 07/22/2018    BILLY 9.0 07/22/2018        A1C RESULTS:  Lab Results   Component Value Date    A1C 7.5 (H) 09/24/2019       INR RESULTS:  Lab Results   Component Value Date    INR 1.07 11/15/2005       Cardiac data:  ECG today: NSR with inferior " ST - T changes that are stable          Cardiac telemetry 9/18- no arrhythmias    Echo stress test 10/11/2016  Submaximal stress test, achieved 76% of the age predicted maximal heart rate.  Limiting symptom dyspnea, leg pain.  No angina symptoms with exercise.  Elevated blood pressure response to exercise (207/86 mmHg at peak exercise)  No ECG evidence of ischemia.  No stress induced regional wall motion abnormalities.  Normal resting LV function with EF of approximately 55-60%; with exercise  left ventricular cavity size decreases and LVEF increases to 65-70%.  Average functional capacity for age.  There is no significant LVOT gradient with exercise or at rest. There was no  hypotension with exercise, albeit during this supine bicycle stress test.    CMR 8/18/16:  1.  Normal left ventricular size and systolic function with a calculated ejection fraction of  64 %.  2.  Normal right ventricular size and systolic function with a calculated ejection fraction of 67%.    3.  Asymmetrical septal hypertrophy measuring 1.5 cm.    4.  There is systolic anterior motion of the mitral valve.    5.  On late gadolinium enhancement imaging, there is mild patchy hyperenhancement in the septal segments to suggest myocardial fibrosis.    6.  Overall this findings are suggestive of hypertropic cardiomyopathy    Echo 6-25-15  There is borderline concentric left ventricular hypertrophy. The visual ejection fraction is estimated at 55-60%. Cannot exclude, but doubt small ASD.--Consider bubble study if concerned, but there is no right heart enlargement. The left atrium is borderline dilated.      Assessment and Plan:   Ms. Sun is a 38 y.o.woman with      1. HCM, CMR suggestive with asymmetrical septal hypertrophy of 15 mm  2. Family history of HCM  3. Insulin requiring DM, controlled  4. Obesity  5. Hiatal hernia, gastroparesis  6. Blood pressure controlled  7. Hypertension, treated  8. Hyperlipidemia, treated      Marie returns for a  follow-up.  She denies any presyncope syncope chest pain or shortness of breath.  She has had a few episodes of tachycardia primarily picked up by her Fitbit. It is very reassuring that Marie does not have any high risk features for SCD - septum is <30mm, no family history of SCD, personal history of syncope, VT, or hypotension with exercise.    Will plan on treadmill stress echo and cMR.   If abnormal will arrange for follow up sooner otherwise in 1 year.    Melanie Dewey MD, MS  Staff Cardiologist  Pager: [phone]      CC  Patient Care Team:  Janett Estrada MD as PCP - General (Internal Medicine)  Melanie Dewey MD as MD (Cardiology)  Janett Estrada MD as Assigned PCP  Calvin Milton MD as MD (Family Practice)  Piedad Garcia RD as Diabetes Educator (Dietitian, Registered)  JANETT ESTRADA      Answers for HPI/ROS submitted by the patient on 10/3/2019   General Symptoms: No  Skin Symptoms: No  HENT Symptoms: No  EYE SYMPTOMS: No  HEART SYMPTOMS: No  LUNG SYMPTOMS: No  INTESTINAL SYMPTOMS: No  URINARY SYMPTOMS: No  GYNECOLOGIC SYMPTOMS: No  BREAST SYMPTOMS: No  SKELETAL SYMPTOMS: No  BLOOD SYMPTOMS: No  NERVOUS SYSTEM SYMPTOMS: No  MENTAL HEALTH SYMPTOMS: No

## 2019-10-07 NOTE — LETTER
10/7/2019       RE: Marie Vogel  [address] [facility] [address]  [place] [address]     Dear Colleague,    Thank you for referring your patient, Marie Vogel, to the [facility] at [facility]. Please see a copy of my visit note below.    Cardiology clinic follow up    HPI:    Ms. Vogel is a pleasant 38 y.o.woman with insulin requiring type 2 diabetes and a family history of hypertrophic cardiomyopathy. She has hypertension and hyperlipidemia.She has had ECHOs and MRI periodically during the last 23 years since her mother was diagnosed with HC.  Her most recent MRI on 8/8/2016 was suggestive of hypertropic cardiomyopathy, asymmetrical septal hypertrophy measuring 1.5 cm; systolic anterior motion of the mitral valve; late gadolinium enhancement imaging,with mild patchy hyperenhancement in the septal segments to suggest myocardial fibrosis.      She had treadmill stress test and Holter that did not demonstrate VT or hypotension. She was seen by our genetic counselor.     Interval History:  Pt reports palpitations that is frequent and usually correlates with reduced fluid intake.  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 She otherwise has no chest pain, shortness of breath, syncope, weight gain, LE edema, orthopnea, or PND.      ROS:  A complete 10-point ROS was negative except as above.     PAST MEDICAL HISTORY:  Past Medical History:   Diagnosis Date     Allergic rhinitis due to pollen      Atypical glandular cells on Pap smear 3/1108     Cervical high risk HPV (human papillomavirus) test positive 11/27/2018    See problem list     Gastroesophageal reflux disease      PFO (patent foramen ovale)      Type II or unspecified type diabetes mellitus without mention of complication, not stated as uncontrolled 2002    onset was gestational in 2001       CURRENT MEDICATIONS:  Current Outpatient Medications   Medication Sig  "Dispense Refill     aspirin 81 MG tablet Take 1 tablet (81 mg) by mouth daily 90 tablet 3     atenolol (TENORMIN) 25 MG tablet Take 0.5 tablets (12.5 mg) by mouth daily 90 tablet 3     blood glucose (PEPE CONTOUR NEXT) test strip Check 4 times/day 120 each 11     blood glucose monitoring (NO BRAND SPECIFIED) meter device kit Use to test blood sugar 6 times daily and as needed. 1 kit 0     cetirizine (ZYRTEC) 10 MG tablet Take 10 mg by mouth 2 times daily  90 tablet 3     EPINEPHrine 0.3 MG/0.3ML injection Inject 0.3 mLs (0.3 mg) into the muscle once as needed for anaphylaxis 0.3 mL 11     fluticasone (FLONASE) 50 MCG/ACT spray Spray 1-2 sprays into both nostrils daily 1 Bottle 0     ibuprofen (ADVIL) 200 MG tablet Take 200 mg by mouth every 4 hours as needed.       infusion set (PARADIGM QUICK-SET 23\" 9MM) misc pump supply CHANGE EVERY 3 DAYS AS DIRECTED 30 each 3     insulin aspart (NOVOLOG VIAL) 100 UNITS/ML vial With insulin pump. Uses about 80 units/day. 90 mL 3     insulin cartridge (PARADIGM 3ML) misc pump supply CHANGE EVERY 3 DAYS AS DIRECTED 30 each 3     INSULIN PUMP - OUTPATIENT Updated 10/2/19:  Medtronic Minimed: Model 723  BASAL:  00:00: 1.40 units/hr  10:30: 1.35  14:30: 1.20  CARB RATIO:  00:00: 7  Sensitivity:  00:00: 27 mg/dL  BLOOD GLUCOSE TARGET and times:  12   AM (midnight):   Active Insulin Time: 2 hours  Sensor:  No  Carelink / Diasend username:  [email or web address]  Carelink / Diasend Password:  [email or web address]       Lancets (MICROLET) MISC 1 Device See Admin Instructions. Use up to 5 times daily for blood sugar checks. 100 each prn     losartan (COZAAR) 50 MG tablet TAKE ONE TABLET BY MOUTH EVERY MORNING AND TAKE ONE-HALF TABLET BY MOUTH EVERY EVENING 135 tablet 3     MAGNESIUM OXIDE PO Take 400 mg by mouth daily       metFORMIN (GLUCOPHAGE-XR) 500 MG 24 hr tablet Take 2 tablets (1,000 mg) by mouth 2 times daily (with meals) 360 tablet 3     montelukast (SINGULAIR) 10 MG tablet TAKE ONE TABLET BY " MOUTH AT BEDTIME 90 tablet 3     Multiple Vitamins-Minerals (MULTI VITAMIN/MINERALS PO) Take 1 each by mouth daily.       Omega-3 Fatty Acids (FISH OIL) 500 MG CAPS Take 1 capsule by mouth       omeprazole (PRILOSEC) 40 MG DR capsule Take 1 capsule (40 mg) by mouth daily as needed 90 capsule 3     Ostomy Supplies (SKIN PREP WIPES) MISC 1 each every 3 days 50 each 3     rosuvastatin (CRESTOR) 5 MG tablet Take 1 tablet (5 mg) by mouth daily 90 tablet 1     SUMAtriptan (IMITREX) 25 MG tablet TAKE ONE TO FOUR TABLETS BY MOUTH ONE TIME. MAY REPEAT 1 TABLET EVERY TWO HOURS UP TO MAXIMUM OF 200MG IN 24 HOURS 8 tablet 6     dulaglutide (TRULICITY) 0.75 MG/0.5ML pen Inject 0.75 mg Subcutaneous every 7 days 9 mL 1       PAST SURGICAL HISTORY:  Past Surgical History:   Procedure Laterality Date     C INDUCED ABORTN BY D&C           C IUD,MIRENA  8/10/10, 7/28/15     C/SECTION, LOW TRANSVERSE  6/25/10    , Low Transverse     CHOLECYSTECTOMY, LAPOROSCOPIC      Cholecystectomy, Laparoscopic     ESOPHAGOSCOPY, GASTROSCOPY, DUODENOSCOPY (EGD), COMBINED N/A 2016    Procedure: COMBINED ESOPHAGOSCOPY, GASTROSCOPY, DUODENOSCOPY (EGD), BIOPSY SINGLE OR MULTIPLE;  Surgeon: Fadi Hunter MD;  Location: [facility] ESOPHAGEAL MOTILITY STUDY N/A 2016    Procedure: ESOPHAGEAL MOTILITY STUDY;  Surgeon: Fadi Hutner MD;  Location: [facility] REMOVE TONSILS/ADENOIDS,<13 Y/O  1987    T &A       ALLERGIES     Allergies   Allergen Reactions     Ivp Dye [Contrast Dye] Itching     Pt reports that throat itches     Nuts Anaphylaxis     [place] nuts only     Bactrim Swelling     Pt reaction was swelling in mouth and tongue and itchy mouth.  Resolved with benadryl and prednisone     Penicillins Hives     Cephalosporins Itching     Seasonal Allergies Other (See Comments)     Molds, trees, grass, dust..  Upper congestion     Atorvastatin      myalgias     Shellfish Allergy Rash     Clams only        FAMILY HISTORY:  Family History   Problem Relation Age of Onset     Cardiovascular Mother         hypertrophic cardiomyopathy     Genitourinary Problems Mother         gallbladder disease     Diabetes Mother         drug induced     Other - See Comments Mother         heart transplant 11/23/2005     Diabetes Father         type 2     Hypertension Father      Genitourinary Problems Maternal Grandmother         gallbladder disease     Genitourinary Problems Paternal Grandmother         gallbladder disease     Hypertension Paternal Grandfather         high bp     Cerebrovascular Disease Paternal Grandfather      Cardiovascular Brother         hypertrophic cardiomyopathy     Diabetes Brother         type 2     Glaucoma No family hx of      Macular Degeneration No family hx of        SOCIAL HISTORY:  Social History     Social History     Marital status:      Spouse name: N/A     Number of children: 2     Years of education: 14     Occupational History     nurse      Social History Main Topics     Smoking status: Former Smoker     Types: Cigarettes     Quit date: 10/24/2005     Smokeless tobacco: Former User      Comment: States only smokes when drinks maybe 2x/year     Alcohol use No     Drug use: No     Sexual activity: Yes     Partners: Male     Birth control/ protection: IUD      Comment: Mirena IUD 7/28/15     Other Topics Concern     None     Social History Narrative    Caffeine intake/servings daily - 6-7    Calcium intake/servings daily - 2-3 yogurt, milk, cheese    Exercise 1 times weekly - describe aerobics    Sunscreen used - Yes    Seatbelts used - Yes    Guns stored in the home - No    Self Breast Exam - Yes    Pap test up to date -  Yes    Eye exam up to date -  Yes    Dental exam up to date -  Yes    DEXA scan up to date -  Not Applicable    Flex Sig/Colonoscopy up to date -  Not Applicable    Mammography up to date -  Not Applicable    Immunizations reviewed and up to date - Yes    Abuse:  "Current or Past (Physical, Sexual or Emotional) - No    Do you feel safe in your environment - Yes    Do you cope well with stress - Yes    Do you suffer from insomnia - Yes     Last updated by: Tatianna Lacey  5/9/2005           EXAM:  /83 (BP Location: Right arm, Patient Position: Chair, Cuff Size: Adult Regular)   Pulse 77   Ht 1.626 m (5' 4\")   Wt 83.6 kg (184 lb 6.4 oz)   SpO2 98%   BMI 31.65 kg/m     In general, the patient is a pleasant female in no apparent distress.      HEENT: NC/AT.  PERRLA.  EOMI.  Sclerae white, not injected.    Neck: Carotids 2+ bilaterally without bruits.  No jugular venous distension.   Lymph: No cervical adenopathy. No thyromegaly.   Heart: RRR. Normal S1, S2. 1/6 systolic murmur LLSB. No rub, click, or gallop. There is no heave.    Lungs: Clear bilaterally.  No rhonchi, wheezes, rales.   GI: Soft, nontender, nondistended.   Extremities: No edema.  The pulses are 2+at the radial and DP bilaterally.  Neuro: grossly non focal.   Skin: no rashes.  Endocrine: no thyromegaly  Musculoskeletal: no joint swelling or tenderness, gait normal.  Psych: pleasant and conversant      Labs:  LIPID RESULTS:  Lab Results   Component Value Date    CHOL 206 (H) 01/30/2019    HDL 33 (L) 01/30/2019     (H) 01/30/2019    TRIG 165 (H) 01/30/2019    CHOLHDLRATIO 4.5 07/01/2015    NHDL 173 (H) 01/30/2019       LIVER ENZYME RESULTS:  Lab Results   Component Value Date    AST 22 11/05/2018    ALT 32 11/05/2018       CBC RESULTS:  Lab Results   Component Value Date    WBC 12.9 (H) 07/22/2018    RBC 4.13 07/22/2018    HGB 13.1 07/22/2018    HCT 38.7 07/22/2018    MCV 94 07/22/2018    MCH 31.7 07/22/2018    MCHC 33.9 07/22/2018    RDW 12.1 07/22/2018     07/22/2018       BMP RESULTS:  Lab Results   Component Value Date     07/22/2018    POTASSIUM 4.3 07/22/2018    CHLORIDE 106 07/22/2018    CO2 27 07/22/2018    ANIONGAP 6 07/22/2018     (H) 07/22/2018    BUN 16 07/22/2018    " CR 0.92 07/22/2018    GFRESTIMATED 67 07/22/2018    GFRESTBLACK 81 07/22/2018    BILLY 9.0 07/22/2018        A1C RESULTS:  Lab Results   Component Value Date    A1C 7.5 (H) 09/24/2019       INR RESULTS:  Lab Results   Component Value Date    INR 1.07 11/15/2005       Cardiac data:  ECG today: NSR with inferior ST - T changes that are stable          Cardiac telemetry 9/18- no arrhythmias    Echo stress test 10/11/2016  Submaximal stress test, achieved 76% of the age predicted maximal heart rate.  Limiting symptom dyspnea, leg pain.  No angina symptoms with exercise.  Elevated blood pressure response to exercise (207/86 mmHg at peak exercise)  No ECG evidence of ischemia.  No stress induced regional wall motion abnormalities.  Normal resting LV function with EF of approximately 55-60%; with exercise  left ventricular cavity size decreases and LVEF increases to 65-70%.  Average functional capacity for age.  There is no significant LVOT gradient with exercise or at rest. There was no  hypotension with exercise, albeit during this supine bicycle stress test.    CMR 8/18/16:  1.  Normal left ventricular size and systolic function with a calculated ejection fraction of  64 %.  2.  Normal right ventricular size and systolic function with a calculated ejection fraction of 67%.    3.  Asymmetrical septal hypertrophy measuring 1.5 cm.    4.  There is systolic anterior motion of the mitral valve.    5.  On late gadolinium enhancement imaging, there is mild patchy hyperenhancement in the septal segments to suggest myocardial fibrosis.    6.  Overall this findings are suggestive of hypertropic cardiomyopathy    Echo 6-25-15  There is borderline concentric left ventricular hypertrophy. The visual ejection fraction is estimated at 55-60%. Cannot exclude, but doubt small ASD.--Consider bubble study if concerned, but there is no right heart enlargement. The left atrium is borderline dilated.      Assessment and Plan:   Ms. Sun is a  38 y.o.woman with      1. HCM, CMR suggestive with asymmetrical septal hypertrophy of 15 mm  2. Family history of HCM  3. Insulin requiring DM, controlled  4. Obesity  5. Hiatal hernia, gastroparesis  6. Blood pressure controlled  7. Hypertension, treated  8. Hyperlipidemia, treated      Marie returns for a follow-up.  She denies any presyncope syncope chest pain or shortness of breath.  She has had a few episodes of tachycardia primarily picked up by her Fitbit. It is very reassuring that Marie does not have any high risk features for SCD - septum is <30mm, no family history of SCD, personal history of syncope, VT, or hypotension with exercise.    Will plan on treadmill stress echo and cMR.   If abnormal will arrange for follow up sooner otherwise in 1 year.    Melanie Dewey MD, MS  Staff Cardiologist  Pager: [phone]      CC  Patient Care Team:  Janett Estrada MD as PCP - General (Internal Medicine)  Melanie Dewey MD as MD (Cardiology)  Janett Estrada MD as Assigned PCP  Calvin Milton MD as MD (Family Practice)  Piedad Garcia RD as Diabetes Educator (Dietitian, Registered)  JANETT ESTRADA      Answers for HPI/ROS submitted by the patient on 10/3/2019   General Symptoms: No  Skin Symptoms: No  HENT Symptoms: No  EYE SYMPTOMS: No  HEART SYMPTOMS: No  LUNG SYMPTOMS: No  INTESTINAL SYMPTOMS: No  URINARY SYMPTOMS: No  GYNECOLOGIC SYMPTOMS: No  BREAST SYMPTOMS: No  SKELETAL SYMPTOMS: No  BLOOD SYMPTOMS: No  NERVOUS SYSTEM SYMPTOMS: No  MENTAL HEALTH SYMPTOMS: No      Again, thank you for allowing me to participate in the care of your patient.      Sincerely,    Melanie Dewey MD

## 2019-10-08 LAB — INTERPRETATION ECG - MUSE: NORMAL

## 2019-10-16 ENCOUNTER — ALLIED HEALTH/NURSE VISIT (OUTPATIENT)
Dept: EDUCATION SERVICES | Facility: CLINIC | Age: 39
End: 2019-10-16
Payer: COMMERCIAL

## 2019-10-16 DIAGNOSIS — E11.21 TYPE 2 DIABETES MELLITUS WITH DIABETIC NEPHROPATHY, WITH LONG-TERM CURRENT USE OF INSULIN (H): Primary | ICD-10-CM

## 2019-10-16 DIAGNOSIS — Z79.4 TYPE 2 DIABETES MELLITUS WITH DIABETIC NEPHROPATHY, WITH LONG-TERM CURRENT USE OF INSULIN (H): Primary | ICD-10-CM

## 2019-10-16 PROCEDURE — G0108 DIAB MANAGE TRN  PER INDIV: HCPCS | Performed by: DIETITIAN, REGISTERED

## 2019-10-16 RX ORDER — FLASH GLUCOSE SENSOR
2 KIT MISCELLANEOUS
Qty: 6 EACH | Refills: 3 | Status: SHIPPED | OUTPATIENT
Start: 2019-10-16 | End: 2020-09-23

## 2019-10-16 NOTE — LETTER
10/16/2019         RE: Marie Vogel  [address] [facility] [address]  [place] [address]        Dear Colleague,    Thank you for referring your patient, Marie Vogel, to the [facility]. Please see a copy of my visit note below.    Diabetes Self-Management Education & Support    Patient scheduled for diagnostic CGM download, however pt has lost the sensor.    Focus of visit: personal CGM and insulin pump upgrade    SUBJECTIVE/OBJECTIVE  Presents for: Follow-up  Accompanied by: Daughter  Diabetes education in the past 24mo: Yes  Focus of Visit: CGM, Insulin Pump  Diabetes type: Type 2  Disease course: Stable  How confident are you filling out medical forms by yourself:: Extremely  Transportation concerns: No  Other concerns:: None  Cultural Influences/Ethnic Background:  American    Insulin Pump Information    Medtronic Paradigm                   Healthy Eating  Healthy Eating Assessed Today: Yes  Cultural/Denominational diet restrictions?: No  Patient on a regular basis: Counts carbohydrates  Meal planning: Calorie counting  Meals include: Lunch, Dinner  Beverages: Water, Tea, Coffee  Has patient met with a dietitian in the past?: Yes    Being Active  Barrier to exercise: Time    Monitoring  Monitoring Assessed Today: No  Blood Glucose Meter: Accu-check  Home Glucose (Sugar) Monitoring: 3-4 times per day  Blood glucose trend: Decreasing steadily  Low Glucose Range (mg/dL): <70  High Glucose Range (mg/dL): >200  Overall Range (mg/dL): 110-130      Taking Medications  Diabetes Medication(s)     Biguanides       metFORMIN (GLUCOPHAGE-XR) 500 MG 24 hr tablet    Take 2 tablets (1,000 mg) by mouth 2 times daily (with meals)    Insulin       insulin aspart (NOVOLOG VIAL) 100 UNITS/ML vial    With insulin pump. Uses about 80 units/day.     INSULIN PUMP - OUTPATIENT    Updated 10/2/19:  MedMineWhat: Model 723  BASAL:  00:00: 1.40 units/hr  10:30: 1.35  14:30: 1.20  CARB  RATIO:  00:00: 7  Sensitivity:  00:00: 27 mg/dL  BLOOD GLUCOSE TARGET and times:  12   AM (midnight):   Active Insulin Time: 2 hours  Sensor:  No  Carelink / Diasend username:  [email or web address]  Carelink / Diasend Password:  [email or web address]    Incretin Mimetic Agents (GLP-1 Receptor Agonists)       dulaglutide (TRULICITY) 0.75 MG/0.5ML pen    Inject 0.75 mg Subcutaneous every 7 days          Taking Medication Assessed Today: Yes  Current Treatments: Diet, Insulin Pump, Non-insulin Injectables, Oral Agent (monotherapy)    Problem Solving  Hypoglycemia Frequency: Weekly  Hypoglycemia Treatment: Glucose (tablets or gel), Juice, Candy  Patient carries a carbohydrate source: Yes  Medical alert: No  Severe weather/disaster plan for diabetes management?: Yes  DKA prevention plan?: No    Hypoglycemia symptoms  Confusion: No  Dizziness or Light-Headedness: Yes  Headaches: No  Hunger: No  Mood changes: Yes  Nervousness/Anxiety: No  Sleepiness: No  Speech difficulty: Yes  Sweats: Yes  Tremors: Yes    Hypoglycemia Complications  Blackouts: No  Hospitalization: No  Nocturnal hypoglycemia: Yes  Required assistance: Yes  Required glucagon injection: No  Seizures: No    Reducing Risks  Reducing Risks Assessed Today: No  CAD Risks: Diabetes Mellitus, Family history  Has dilated eye exam at least once a year?: Yes  Sees dentist every 6 months?: No  Sees podiatrist (foot doctor)?: Yes    Healthy Coping  Healthy Coping Assessed Today: No  Informal Support system:: Children, Friends  Difficulty affording diabetes management supplies?: No  Patient Activation Measure Survey Score:  SADIA Score (Last Two) 10/7/2009   SADIA Raw Score 46   Activation Score 75.3   SADIA Level 4         ASSESSMENT  Unable to download diagnostic CGM due to loss of sensor. Advised may be beneficial to acquire personal CGM. Though pt has been hesitant in the past, is now agreeable and sees the value of CGM. Assisted pt in setting up the [employer] mathew for added convenience.  Will need rx for Nadia sensors only.     Pt has also been researching different pump options as she is ready for an upgrade. Pt is not interested in continuing with Medtronic, has had many difficulties with tubing getting caught and pulling out. Pt would like to move forward with obtaining an OmniPod pump due to tubeless feature and ease of wear. Noted per Care[facility] report, pt is going a full week without changing her infusion site (instead of recommended 3 days). Explained with use of pod, can  wear for maximum of 80 hours. Pt is agreeable to this, and realizes with the pod- will also have more availability of infusion sites.    Discussed current basal settings- pt notes still having some lows in spite of basal rate reduction at last visit. Current insulin use is 89% basal vs 11% bolus.  Pt admits sometimes skips a bolus due to fear of hypoglycemia, yet BG values may still trend down. Discussed importance of accurate pump use.    Pt may benefit from reducing basal rate at 12 am to 10:30 am from 1.4 --> to 1.3.     INTERVENTION:   Diabetes knowledge and skills assessment:     Patient is knowledgeable in diabetes management concepts related to: Healthy Eating, Being Active, Monitoring, Taking Medication, Problem Solving, Reducing Risks and Healthy Coping    Patient needs further education on the following diabetes management concepts: Problem Solving    Based on learning assessment above, most appropriate setting for further diabetes education would be: Individual setting.    Education provided today on:  AADE Self-Care Behaviors:  Problem Solving: low blood glucose - causes, signs/symptoms, treatment and prevention, carrying a carbohydrate source at all times, safe travel and when to call health care provider    Insulin Pump Review  Education specific to insulin pump provided today on:   Importance of changing infusion set every 3 days, importance of bolusing before meals, basal rate testing vs bolus use      Opportunities for ongoing education and support in diabetes-self management were discussed.    Pt verbalized understanding of concepts discussed and recommendations provided today.       Education Materials Provided:  Nadia patient brochure    PLAN  See Patient Instructions for co-developed, patient-stated behavior change goals.  AVS printed and provided to patient today. See Follow-Up section for recommended follow-up.    Piedad Garcia RD, CDE  Diabetes     Time Spent: 60 minutes  Encounter Type: Individual    Any diabetes medication dose changes were made via the CDE Protocol and Collaborative Practice Agreement with the patient's endocrinology provider. A copy of this encounter was shared with the provider.

## 2019-10-16 NOTE — PROGRESS NOTES
Diabetes Self-Management Education & Support    Patient scheduled for diagnostic CGM download, however pt has lost the sensor.    Focus of visit: personal CGM and insulin pump upgrade    SUBJECTIVE/OBJECTIVE  Presents for: Follow-up  Accompanied by: Daughter  Diabetes education in the past 24mo: Yes  Focus of Visit: CGM, Insulin Pump  Diabetes type: Type 2  Disease course: Stable  How confident are you filling out medical forms by yourself:: Extremely  Transportation concerns: No  Other concerns:: None  Cultural Influences/Ethnic Background:  American    Insulin Pump Information    Medtronic Paradigm                   Healthy Eating  Healthy Eating Assessed Today: Yes  Cultural/Mosque diet restrictions?: No  Patient on a regular basis: Counts carbohydrates  Meal planning: Calorie counting  Meals include: Lunch, Dinner  Beverages: Water, Tea, Coffee  Has patient met with a dietitian in the past?: Yes    Being Active  Barrier to exercise: Time    Monitoring  Monitoring Assessed Today: No  Blood Glucose Meter: Accu-check  Home Glucose (Sugar) Monitoring: 3-4 times per day  Blood glucose trend: Decreasing steadily  Low Glucose Range (mg/dL): <70  High Glucose Range (mg/dL): >200  Overall Range (mg/dL): 110-130      Taking Medications  Diabetes Medication(s)     Biguanides       metFORMIN (GLUCOPHAGE-XR) 500 MG 24 hr tablet    Take 2 tablets (1,000 mg) by mouth 2 times daily (with meals)    Insulin       insulin aspart (NOVOLOG VIAL) 100 UNITS/ML vial    With insulin pump. Uses about 80 units/day.     INSULIN PUMP - OUTPATIENT    Updated 10/2/19:  Medtronic Minimed: Model 723  BASAL:  00:00: 1.40 units/hr  10:30: 1.35  14:30: 1.20  CARB RATIO:  00:00: 7  Sensitivity:  00:00: 27 mg/dL  BLOOD GLUCOSE TARGET and times:  12   AM (midnight):   Active Insulin Time: 2 hours  Sensor:  No  Carelink / Diasend username:  [email or web address]  Carelink / Diasend Password:  [email or web address]!    Incretin Mimetic Agents (GLP-1 Receptor Agonists)        dulaglutide (TRULICITY) 0.75 MG/0.5ML pen    Inject 0.75 mg Subcutaneous every 7 days          Taking Medication Assessed Today: Yes  Current Treatments: Diet, Insulin Pump, Non-insulin Injectables, Oral Agent (monotherapy)    Problem Solving  Hypoglycemia Frequency: Weekly  Hypoglycemia Treatment: Glucose (tablets or gel), Juice, Candy  Patient carries a carbohydrate source: Yes  Medical alert: No  Severe weather/disaster plan for diabetes management?: Yes  DKA prevention plan?: No    Hypoglycemia symptoms  Confusion: No  Dizziness or Light-Headedness: Yes  Headaches: No  Hunger: No  Mood changes: Yes  Nervousness/Anxiety: No  Sleepiness: No  Speech difficulty: Yes  Sweats: Yes  Tremors: Yes    Hypoglycemia Complications  Blackouts: No  Hospitalization: No  Nocturnal hypoglycemia: Yes  Required assistance: Yes  Required glucagon injection: No  Seizures: No    Reducing Risks  Reducing Risks Assessed Today: No  CAD Risks: Diabetes Mellitus, Family history  Has dilated eye exam at least once a year?: Yes  Sees dentist every 6 months?: No  Sees podiatrist (foot doctor)?: Yes    Healthy Coping  Healthy Coping Assessed Today: No  Informal Support system:: Children, Friends  Difficulty affording diabetes management supplies?: No  Patient Activation Measure Survey Score:  SADIA Score (Last Two) 10/7/2009   SADIA Raw Score 46   Activation Score 75.3   SADIA Level 4         ASSESSMENT  Unable to download diagnostic CGM due to loss of sensor. Advised may be beneficial to acquire personal CGM. Though pt has been hesitant in the past, is now agreeable and sees the value of CGM. Assisted pt in setting up the Tk20 mathew for added convenience. Will need rx for Nadia sensors only.     Pt has also been researching different pump options as she is ready for an upgrade. Pt is not interested in continuing with AfterYes, has had many difficulties with tubing getting caught and pulling out. Pt would like to move forward with obtaining  an OmniPod pump due to tubeless feature and ease of wear. Noted per [facility] report, pt is going a full week without changing her infusion site (instead of recommended 3 days). Explained with use of pod, can  wear for maximum of 80 hours. Pt is agreeable to this, and realizes with the pod- will also have more availability of infusion sites.    Discussed current basal settings- pt notes still having some lows in spite of basal rate reduction at last visit. Current insulin use is 89% basal vs 11% bolus.  Pt admits sometimes skips a bolus due to fear of hypoglycemia, yet BG values may still trend down. Discussed importance of accurate pump use.    Pt may benefit from reducing basal rate at 12 am to 10:30 am from 1.4 --> to 1.3.     INTERVENTION:   Diabetes knowledge and skills assessment:     Patient is knowledgeable in diabetes management concepts related to: Healthy Eating, Being Active, Monitoring, Taking Medication, Problem Solving, Reducing Risks and Healthy Coping    Patient needs further education on the following diabetes management concepts: Problem Solving    Based on learning assessment above, most appropriate setting for further diabetes education would be: Individual setting.    Education provided today on:  AADE Self-Care Behaviors:  Problem Solving: low blood glucose - causes, signs/symptoms, treatment and prevention, carrying a carbohydrate source at all times, safe travel and when to call health care provider    Insulin Pump Review  Education specific to insulin pump provided today on:   Importance of changing infusion set every 3 days, importance of bolusing before meals, basal rate testing vs bolus use     Opportunities for ongoing education and support in diabetes-self management were discussed.    Pt verbalized understanding of concepts discussed and recommendations provided today.       Education Materials Provided:  Nadia patient brochure    PLAN  See Patient Instructions for co-developed,  patient-stated behavior change goals.  AVS printed and provided to patient today. See Follow-Up section for recommended follow-up.    Piedad Garcia RD, CDE  Diabetes     Time Spent: 60 minutes  Encounter Type: Individual    Any diabetes medication dose changes were made via the CDE Protocol and Collaborative Practice Agreement with the patient's endocrinology provider. A copy of this encounter was shared with the provider.

## 2019-10-16 NOTE — PATIENT INSTRUCTIONS
Care Plan:    12 am - 10:30 am basal rate reduced from 1.40 --> to 1.30    Will route a pended prescription for Nadia sensors to Dr. Fuentes for signature.     [facility] for obtaining the OmniPod pump.    Follow up:  Please call, e-mail,  or send Tengion message with questions, concerns or if follow-up is needed.    Bring blood glucose meter and/or logbook with you to all doctor and follow-up appointments.     Piedad Garcia RD, LD, CDE  Diabetes     [facility] the [facility]:    For Your Diabetes or Nutrition Education Appointments Call:  [phone]   For Diabetes or Nutrition Related Questions Call or Email:   [phone]  [email or web address]  Fax: [phone]   If you need a medication refill please contact your pharmacy. Please allow 3 business days for your refills to be completed.

## 2019-10-16 NOTE — TELEPHONE ENCOUNTER
Please refer to DM Education encounter from today for additional details. Pt interested in personal Nadia CGM. Needs sensors only, is planning to use mobile Donna for scanning.     Routing pended rx for Nadia sensors to Dr. Fuentes for signature if agreeable to plan.     Thank you,  Piedad Garcia RD, CDE  Diabetes

## 2019-10-23 DIAGNOSIS — E11.21 TYPE 2 DIABETES MELLITUS WITH DIABETIC NEPHROPATHY, WITH LONG-TERM CURRENT USE OF INSULIN (H): Primary | ICD-10-CM

## 2019-10-23 DIAGNOSIS — Z79.4 TYPE 2 DIABETES MELLITUS WITH DIABETIC NEPHROPATHY, WITH LONG-TERM CURRENT USE OF INSULIN (H): Primary | ICD-10-CM

## 2019-10-23 NOTE — TELEPHONE ENCOUNTER
See DM Ed encounter 10/17 for additional details. Discussed with Dr. Fuentes, pt's Medtronic pump is out of warranty. Pt prefers to change to OmniPod.   Per OmniPod Rep- prescription can be sent to [facility]. Completion of CMN pending.     Routing pended rx for PDM and Dash pods to Dr. Fuentes for signature.     Piedad Garcia RD, CDE  Diabetes

## 2019-10-28 ENCOUNTER — MEDICAL CORRESPONDENCE (OUTPATIENT)
Dept: HEALTH INFORMATION MANAGEMENT | Facility: CLINIC | Age: 39
End: 2019-10-28

## 2019-10-28 ENCOUNTER — TELEPHONE (OUTPATIENT)
Dept: ENDOCRINOLOGY | Facility: CLINIC | Age: 39
End: 2019-10-28

## 2019-10-28 RX ORDER — INSULIN PUMP CONTROLLER
1 EACH MISCELLANEOUS
Qty: 30 EACH | Refills: 3 | Status: SHIPPED | OUTPATIENT
Start: 2019-10-28 | End: 2020-09-23

## 2019-10-28 RX ORDER — INSULIN PUMP CONTROLLER
1 EACH MISCELLANEOUS CONTINUOUS
Qty: 1 KIT | Refills: 0 | Status: SHIPPED | OUTPATIENT
Start: 2019-10-28 | End: 2022-03-30

## 2019-10-28 NOTE — TELEPHONE ENCOUNTER
Form was faxed, copied, sent to scanning.      Lenore Hurley, [facility]  Andrew/Anny

## 2019-10-28 NOTE — TELEPHONE ENCOUNTER
Forms/paperwork reviewed, completed and signed.  Please fax or send the papers as requested, document in chart and close the encounter.    Thank you.    Zita Fuentes MD

## 2019-10-28 NOTE — TELEPHONE ENCOUNTER
CMN for Insulin Pump.    LAST OFFICE/VIRTUAL VISIT:  09/24/19    FUTURE OFFICE/VIRTUAL VISIT:  None    Lab Results   Component Value Date    A1C 7.5 09/24/2019    A1C 7.3 01/30/2019    A1C 8.8 11/05/2018    A1C 7.7 06/15/2018    A1C 8.5 10/24/2017     Ready to sign    Lenore Hurley CMA  [facility]  Andrew/Anny

## 2019-11-07 ENCOUNTER — HOSPITAL ENCOUNTER (OUTPATIENT)
Dept: CARDIOLOGY | Facility: CLINIC | Age: 39
Discharge: HOME | End: 2019-11-07
Attending: INTERNAL MEDICINE | Admitting: INTERNAL MEDICINE
Payer: COMMERCIAL

## 2019-11-07 ENCOUNTER — HOSPITAL ENCOUNTER (OUTPATIENT)
Dept: MRI IMAGING | Facility: CLINIC | Age: 39
End: 2019-11-07
Attending: INTERNAL MEDICINE
Payer: COMMERCIAL

## 2019-11-07 DIAGNOSIS — I42.2 HYPERTROPHIC CARDIOMYOPATHY (H): ICD-10-CM

## 2019-11-07 PROCEDURE — 93016 CV STRESS TEST SUPVJ ONLY: CPT | Performed by: INTERNAL MEDICINE

## 2019-11-07 PROCEDURE — 93350 STRESS TTE ONLY: CPT | Mod: 26 | Performed by: INTERNAL MEDICINE

## 2019-11-07 PROCEDURE — 93350 STRESS TTE ONLY: CPT | Mod: TC

## 2019-11-07 PROCEDURE — 93325 DOPPLER ECHO COLOR FLOW MAPG: CPT | Mod: 26 | Performed by: INTERNAL MEDICINE

## 2019-11-07 PROCEDURE — 93321 DOPPLER ECHO F-UP/LMTD STD: CPT | Mod: 26 | Performed by: INTERNAL MEDICINE

## 2019-11-07 PROCEDURE — 25500064 ZZH RX 255 OP 636: Performed by: INTERNAL MEDICINE

## 2019-11-07 PROCEDURE — 40000141 ZZH STATISTIC PERIPHERAL IV START W/O US GUIDANCE

## 2019-11-07 PROCEDURE — A9585 GADOBUTROL INJECTION: HCPCS | Performed by: INTERNAL MEDICINE

## 2019-11-07 PROCEDURE — 75561 CARDIAC MRI FOR MORPH W/DYE: CPT | Mod: 26 | Performed by: INTERNAL MEDICINE

## 2019-11-07 PROCEDURE — 75561 CARDIAC MRI FOR MORPH W/DYE: CPT

## 2019-11-07 PROCEDURE — 93018 CV STRESS TEST I&R ONLY: CPT | Performed by: INTERNAL MEDICINE

## 2019-11-07 RX ORDER — GADOBUTROL 604.72 MG/ML
10 INJECTION INTRAVENOUS ONCE
Status: COMPLETED | OUTPATIENT
Start: 2019-11-07 | End: 2019-11-07

## 2019-11-07 RX ADMIN — GADOBUTROL 10 ML: 604.72 INJECTION INTRAVENOUS at 08:50

## 2019-11-12 ENCOUNTER — ALLIED HEALTH/NURSE VISIT (OUTPATIENT)
Dept: EDUCATION SERVICES | Facility: CLINIC | Age: 39
End: 2019-11-12
Payer: COMMERCIAL

## 2019-11-12 DIAGNOSIS — Z79.4 TYPE 2 DIABETES MELLITUS WITH DIABETIC NEPHROPATHY, WITH LONG-TERM CURRENT USE OF INSULIN (H): ICD-10-CM

## 2019-11-12 DIAGNOSIS — E11.21 TYPE 2 DIABETES MELLITUS WITH DIABETIC NEPHROPATHY, WITH LONG-TERM CURRENT USE OF INSULIN (H): ICD-10-CM

## 2019-11-12 PROCEDURE — G0108 DIAB MANAGE TRN  PER INDIV: HCPCS

## 2019-11-12 NOTE — PROGRESS NOTES
Diabetes Self-Management Education & Support    Diabetes Self-Management Education & Support - Insulin Pump Upgrade (Medtronic Paradigm --> OmniPod Dash) and Personal CGM start (Nadia)    SUBJECTIVE/OBJECTIVE  Presents for: Follow-up  Diabetes education in the past 24mo: Yes  Focus of Visit: CGM, Insulin Pump  Diabetes type: Type 2  Disease course: Improving  How confident are you filling out medical forms by yourself:: Extremely  Transportation concerns: No  Other concerns:: None  Cultural Influences/Ethnic Background:  American    Healthy Eating  Healthy Eating Assessed Today: Yes  Cultural/Scientology diet restrictions?: No  Meal planning: Carbohydrate counting  Meals include: Lunch, Dinner  Beverages: Water, Tea, Coffee  Has patient met with a dietitian in the past?: Yes    Being Active  Barrier to exercise: Time    Monitoring  Monitoring Assessed Today: Yes  Blood Glucose Meter: Accu-check  Home Glucose (Sugar) Monitoring: 3-4 times per day  Blood glucose trend: Decreasing steadily  Low Glucose Range (mg/dL): <70  High Glucose Range (mg/dL): >200  Overall Range (mg/dL): 110-130    Taking Medications  Diabetes Medication(s)     Biguanides       metFORMIN (GLUCOPHAGE-XR) 500 MG 24 hr tablet    Take 2 tablets (1,000 mg) by mouth 2 times daily (with meals)    Insulin       insulin aspart (NOVOLOG VIAL) 100 UNITS/ML vial    With insulin pump. Uses about 80 units/day.     INSULIN PUMP - OUTPATIENT    Updated 10/16/19:  Medtronic Minimed: Model 723  BASAL:  00:00: 1.30 units/hr  10:30: 1.35  14:30: 1.20  CARB RATIO:  00:00: 7  Sensitivity:  00:00: 27 mg/dL  BLOOD GLUCOSE TARGET and times:  12   AM (midnight):   Active Insulin Time: 2 hours  Sensor:  No  Carelink / Diasend username:  [email or web address]  Carelink / Diasend Password:  [email or web address]!    Incretin Mimetic Agents (GLP-1 Receptor Agonists)       dulaglutide (TRULICITY) 0.75 MG/0.5ML pen    Inject 0.75 mg Subcutaneous every 7 days          Taking Medication Assessed  Today: Yes  Current Treatments: Diet, Insulin Pump, Non-insulin Injectables, Oral Agent (monotherapy)    Problem Solving  Problem Solving Assessed Today: No  Hypoglycemia Frequency: Weekly  Hypoglycemia Treatment: Glucose (tablets or gel), Juice, Candy  Patient carries a carbohydrate source: Yes  Medical alert: No  Severe weather/disaster plan for diabetes management?: Yes  DKA prevention plan?: No    Hypoglycemia symptoms  Confusion: No  Dizziness or Light-Headedness: Yes  Headaches: No  Hunger: No  Mood changes: Yes  Nervousness/Anxiety: No  Sleepiness: No  Speech difficulty: Yes  Sweats: Yes  Tremors: Yes    Hypoglycemia Complications  Blackouts: No  Hospitalization: No  Nocturnal hypoglycemia: Yes  Required assistance: Yes  Required glucagon injection: No  Seizures: No    Reducing Risks  Reducing Risks Assessed Today: No  CAD Risks: Diabetes Mellitus, Family history  Has dilated eye exam at least once a year?: Yes  Sees dentist every 6 months?: No  Sees podiatrist (foot doctor)?: Yes    Healthy Coping  Healthy Coping Assessed Today: No  Informal Support system:: Children, Friends  Difficulty affording diabetes management supplies?: No  Patient Activation Measure Survey Score:  SADIA Score (Last Two) 10/7/2009   SADIA Raw Score 46   Activation Score 75.3   SADIA Level 4       EDUCATION PROVIDED ON:  AADE Self-Care Behaviors  Monitoring and Taking Medication    Freestyle Nadia System:  Nadia sensor, reader with built in glucometer, sensor glucose versus blood glucose, trends and graphs, sensor insertion and when to change, 12 hour warm-up period, need to scan sensor every 8 hours for complete data accessibility, when to do a blood glucose check for result verification, [employer] software and data uploading    Omnipod Insulin Pump Training:  Introduction to PDM and Pod  PDM:  Basic settings - ID, screen,date and time. Battery, button layout and BG meter   Basal settings- max basal, basal rates, temp basal   Bolus  settings- target BG, I:C ratio, correction factor, minimum Bg for bolus calculation, reverse correction, duration of insulin action, extended bolus, max bolus.    POD:  Setting up and changing pod, using room temp insulin, fill syringe- minimum and maximum amounts, DO NOT prefill pods, site selection, rotation and prep, automated cannula insertion - check infusion site/viewing window and pink slide insert, BG reminder in 90 minutes after insertion,and when to change the pod.     Status and Home screen actions:   Status  screen- ID screen, battery, volume remaining, time/date, last BG, last bolus, current basal rate, pod expiration and IOB (insulin on board)   Bolus - bolus calculations, manual bolus, correction bolus, meal bolus, start/cancel bolus   Suspend/resume insulin   My records- insulin/BG/Alarm/carbohydrate, all history, event/day functionality  Settings- edit basal program, edit bolus   Advanced features- extended bolus, temp basal, additional basal programs, presets     Reminders/Alerts: custom reminders, pod expiration, low reservoir, Advisory and hazard alarms: Yes     Troubleshooting: hypoglycemia, sick day management, hyperglycemia and DKA prevention: Yes     Glooko and uploading pump: No (due to time constraints)    Additional topics: 24/7 Customer Care helpline [phone], removal for xray,CT, and MRI, back up plan, when and how to order pump supplies, when to call a healthcare provider: Yes       Education materials provided to patient: Longder resource guide (provided by [employer])    ASSESSMENT:  Setting changes made today:  Active insulin time increased from 2 hrs --> to 4 hrs, BG target changed from  --> to 120-120 mg/dl.   All other Basal and Bolus settings transferred and confirmed (See Med list).      Patient was able to start insulin pump today without difficulty. Yes      PLAN:  Send Peer5hart update in 2 weeks.   Schedule 1 month follow up, sooner if questions or concerns.     Piedad  Jose OGDEN, CDE  Diabetes     Time Spent: 60 minutes  Encounter Type: Individual    Any diabetes medication dose changes were made via the CDE Protocol and Collaborative Practice Agreement with the patient's endocrinology provider. A copy of this encounter was shared with the provider.

## 2019-11-12 NOTE — Clinical Note
Hi Dr. Fuentes,Just fyi- OmniPod DASH training completed, along with Nadia personal CGM start. Will see her back in 1 month.Thanks!Piedad Garcia RD, CDEDiabetes

## 2019-11-21 ENCOUNTER — OFFICE VISIT (OUTPATIENT)
Dept: OPTOMETRY | Facility: CLINIC | Age: 39
End: 2019-11-21
Payer: COMMERCIAL

## 2019-11-21 DIAGNOSIS — H52.4 PRESBYOPIA: ICD-10-CM

## 2019-11-21 DIAGNOSIS — E11.9 TYPE 2 DIABETES MELLITUS WITHOUT RETINOPATHY (H): ICD-10-CM

## 2019-11-21 DIAGNOSIS — H35.053 CHOROIDAL NEOVASCULAR MEMBRANE, BILATERAL: ICD-10-CM

## 2019-11-21 DIAGNOSIS — H52.13 MYOPIA WITH ASTIGMATISM, BILATERAL: Primary | ICD-10-CM

## 2019-11-21 DIAGNOSIS — H52.203 MYOPIA WITH ASTIGMATISM, BILATERAL: Primary | ICD-10-CM

## 2019-11-21 PROCEDURE — 92014 COMPRE OPH EXAM EST PT 1/>: CPT | Performed by: OPTOMETRIST

## 2019-11-21 PROCEDURE — 92015 DETERMINE REFRACTIVE STATE: CPT | Performed by: OPTOMETRIST

## 2019-11-21 ASSESSMENT — CONF VISUAL FIELD
OS_NORMAL: 1
METHOD: COUNTING FINGERS
OD_NORMAL: 1

## 2019-11-21 ASSESSMENT — REFRACTION_MANIFEST
OD_CYLINDER: +2.00
METHOD_AUTOREFRACTION: 1
OS_AXIS: 050
OS_ADD: +1.00
OD_SPHERE: -4.25
OD_CYLINDER: +2.00
OS_SPHERE: -3.75
OD_SPHERE: -4.50
OS_AXIS: 040
OD_AXIS: 096
OS_SPHERE: -4.00
OS_CYLINDER: +2.00
OD_AXIS: 110
OD_ADD: +1.00
OS_CYLINDER: +2.00

## 2019-11-21 ASSESSMENT — REFRACTION_CURRENTRX
OD_CYLINDER: -2.25
OD_SPHERE: -2.00
OD_AXIS: 020
OS_SPHERE: -1.75
OD_DIAMETER: 14.5
OD_BASECURVE: 8.7
OS_AXIS: 140
OS_DIAMETER: 14.5
OS_CYLINDER: -2.25
OS_BASECURVE: 8.7
OS_BRAND: AIR OPTIX FOR ASTIGMATISM
OD_BRAND: AIR OPTIX FOR ASTIGMATISM

## 2019-11-21 ASSESSMENT — SLIT LAMP EXAM - LIDS
COMMENTS: NORMAL
COMMENTS: NORMAL

## 2019-11-21 ASSESSMENT — REFRACTION_WEARINGRX
OD_SPHERE: -4.50
OS_CYLINDER: +2.00
OS_AXIS: 057
OD_SPHERE: -4.75
OS_CYLINDER: +2.25
OS_SPHERE: -4.00
OS_AXIS: 052
OD_AXIS: 106
OS_SPHERE: -4.00
OD_CYLINDER: +2.00
OD_AXIS: 110
SPECS_TYPE: SVL
SPECS_TYPE: SVL
OD_CYLINDER: +2.25

## 2019-11-21 ASSESSMENT — VISUAL ACUITY
CORRECTION_TYPE: GLASSES
METHOD: SNELLEN - LINEAR
OD_CC+: +2
OS_CC: 20/20
OD_SC: 20/150
OS_CC+: -1
OS_SC: 20/150
OD_CC: 20/30-1
OS_CC: 20/30-1
OD_CC: 20/25

## 2019-11-21 ASSESSMENT — CUP TO DISC RATIO
OS_RATIO: 0.45
OD_RATIO: 0.5

## 2019-11-21 ASSESSMENT — TONOMETRY
IOP_METHOD: APPLANATION
OS_IOP_MMHG: 16
OD_IOP_MMHG: 16

## 2019-11-21 ASSESSMENT — EXTERNAL EXAM - RIGHT EYE: OD_EXAM: NORMAL

## 2019-11-21 ASSESSMENT — EXTERNAL EXAM - LEFT EYE: OS_EXAM: NORMAL

## 2019-11-21 NOTE — PATIENT INSTRUCTIONS
Optional bifocal, not needed but clearer at near with less power, you can also take glasses off to read     Will do contact lens fitting next year , can just order annual supply  Patient Education   Diabetes weakens the blood vessels all over the body, including the eyes. Damage to the blood vessels in the eyes can cause swelling or bleeding into part of the eye (called the retina). This is called diabetic retinopathy (VIVEK-tin-AH-puh-thee). If not treated, this disease can cause vision loss or blindness.   Symptoms may include blurred or distorted vision, but many people have no symptoms. It's important to see your eye doctor regularly to check for problems.   Early treatment and good control can help protect your vision. Here are the things you can do to help prevent vision loss:      1. Keep your blood sugar levels under tight control.      2. Bring high blood pressure under control.      3. No smoking.      4. Have yearly dilated eye exams.

## 2019-11-21 NOTE — Clinical Note
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 Prince

## 2019-11-21 NOTE — LETTER
"    11/21/2019         RE: Marie Vogel  [address]  [place] [address]        Dear Colleague,    Thank you for referring your patient, Marie Vogel, to the [facility]. Please see a copy of my visit note below.    Chief Complaint   Patient presents with     Contact Lens Evaluation     Diabetic Eye Exam     no vision concerns, never filled contact lens prescription from last exam    Previous contact lens wearer? Yes - air optix for astigmatism, never ordered, just had the trial pair  Comfort of contact lenses :Good  Satisfied with current lenses: Yes       Lab Results   Component Value Date    A1C 7.5 09/24/2019    A1C 7.3 01/30/2019    A1C 8.8 11/05/2018    A1C 7.7 06/15/2018    A1C 8.5 10/24/2017     Pt presents with glasses that she is unsure of the age. She report she is very tired but vision is stable. Still having migraines, reports allergy headaches.     Last Eye Exam: 2018  Dilated Previously: Yes - pt reports that since she has blue eyes that she stays dilated for 24+ hours, and is requesting the \"blue eye dilation drops\"    What are you currently using to see?  glasses    Distance Vision Acuity: Satisfied with vision    Near Vision Acuity: Satisfied with vision while reading and using computer with glasses    Eye Comfort: good  Do you use eye drops? : No  Occupation or Hobbies: OR nurse : [employer]    Kimberyl Apodaca CPO     Medical, surgical and family histories reviewed and updated 11/21/2019.       OBJECTIVE: See Ophthalmology exam    ASSESSMENT:    ICD-10-CM    1. Myopia with astigmatism, bilateral H52.13 EYE EXAM (SIMPLE-NONBILLABLE)    H52.203 REFRACTION   2. Type 2 diabetes mellitus without retinopathy (H) E11.9 EYE EXAM (SIMPLE-NONBILLABLE)     REFRACTION   3. Presbyopia H52.4    4. 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  No contact lens  Fit today, lenses were fine, and no changes so can just fill order from last year    Gabriela Becker OD     Again, thank you for allowing me to participate in the care of your patient.        Sincerely,        Gabriela Becker, OD

## 2019-11-21 NOTE — PROGRESS NOTES
"Chief Complaint   Patient presents with     Contact Lens Evaluation     Diabetic Eye Exam     no vision concerns, never filled contact lens prescription from last exam    Previous contact lens wearer? Yes - air optix for astigmatism, never ordered, just had the trial pair  Comfort of contact lenses :Good  Satisfied with current lenses: Yes       Lab Results   Component Value Date    A1C 7.5 09/24/2019    A1C 7.3 01/30/2019    A1C 8.8 11/05/2018    A1C 7.7 06/15/2018    A1C 8.5 10/24/2017     Pt presents with glasses that she is unsure of the age. She report she is very tired but vision is stable. Still having migraines, reports allergy headaches.     Last Eye Exam: 2018  Dilated Previously: Yes - pt reports that since she has blue eyes that she stays dilated for 24+ hours, and is requesting the \"blue eye dilation drops\"    What are you currently using to see?  glasses    Distance Vision Acuity: Satisfied with vision    Near Vision Acuity: Satisfied with vision while reading and using computer with glasses    Eye Comfort: good  Do you use eye drops? 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       OBJECTIVE: See Ophthalmology exam    ASSESSMENT:    ICD-10-CM    1. Myopia with astigmatism, bilateral H52.13 EYE EXAM (SIMPLE-NONBILLABLE)    H52.203 REFRACTION   2. Type 2 diabetes mellitus without retinopathy (H) E11.9 EYE EXAM (SIMPLE-NONBILLABLE)     REFRACTION   3. Presbyopia H52.4    4. 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  No contact lens  Fit today, lenses were fine, and no changes so can just fill order from last year    Gabriela Becker OD   "

## 2019-11-25 ENCOUNTER — OFFICE VISIT (OUTPATIENT)
Dept: OPHTHALMOLOGY | Facility: CLINIC | Age: 39
End: 2019-11-25
Attending: OPHTHALMOLOGY
Payer: COMMERCIAL

## 2019-11-25 DIAGNOSIS — H53.413: ICD-10-CM

## 2019-11-25 DIAGNOSIS — H35.53 STARGARDT'S DISEASE: Primary | ICD-10-CM

## 2019-11-25 PROCEDURE — G0463 HOSPITAL OUTPT CLINIC VISIT: HCPCS | Mod: ZF

## 2019-11-25 PROCEDURE — 92250 FUNDUS PHOTOGRAPHY W/I&R: CPT | Mod: ZF | Performed by: OPHTHALMOLOGY

## 2019-11-25 PROCEDURE — 92134 CPTRZ OPH DX IMG PST SGM RTA: CPT | Mod: ZF | Performed by: OPHTHALMOLOGY

## 2019-11-25 PROCEDURE — 92083 EXTENDED VISUAL FIELD XM: CPT | Mod: ZF | Performed by: OPHTHALMOLOGY

## 2019-11-25 ASSESSMENT — REFRACTION_WEARINGRX
OS_CYLINDER: +2.25
OS_SPHERE: -4.00
OS_AXIS: 052
OD_AXIS: 106
OD_CYLINDER: +2.25
OD_SPHERE: -4.50
SPECS_TYPE: SVL

## 2019-11-25 ASSESSMENT — TONOMETRY
OD_IOP_MMHG: 10
OS_IOP_MMHG: 13
IOP_METHOD: TONOPEN

## 2019-11-25 ASSESSMENT — VISUAL ACUITY
OD_CC: 20/20
CORRECTION_TYPE: GLASSES
METHOD: SNELLEN - LINEAR
OS_CC: 20/20

## 2019-11-25 ASSESSMENT — CONF VISUAL FIELD
OS_NORMAL: 1
OD_NORMAL: 1
METHOD: COUNTING FINGERS

## 2019-11-25 ASSESSMENT — SLIT LAMP EXAM - LIDS
COMMENTS: NORMAL
COMMENTS: NORMAL

## 2019-11-25 ASSESSMENT — CUP TO DISC RATIO
OD_RATIO: 0.5
OS_RATIO: 0.5

## 2019-11-25 ASSESSMENT — EXTERNAL EXAM - LEFT EYE: OS_EXAM: NORMAL

## 2019-11-25 ASSESSMENT — EXTERNAL EXAM - RIGHT EYE: OD_EXAM: NORMAL

## 2019-11-25 NOTE — PROGRESS NOTES
CC -   Macular atrophy    INTERVAL HISTORY - Initial visit  Her lat A1C was 7.3 on 1-30-19     HPI -   Marie Vogel is a  39 year old year-old patient with history of DMII here for fundus evaluation.   Saw Dr. Gabriela Becker.  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), no hemeralopia  DM II since age 21, ESPERANZA      PAST OCULAR SURGERY  None    RETINAL IMAGING:  OCT 11/25/19  OD - paracentral GA, mild IRF, tr ERM, PHF attached  OS -  paracentral GA, mild IRF, tr ERM, PHF attached    FAF   11/25/19  OD - central hypoF & hyperF speckled  OS - central hypoF & hyperF speckled    GVF 11/25/19  OD - paracentral scotoma, normal peripheral  OS - paracentral scotoma, normal peripheral    ASSESSMENT & PLAN    1. SMD-FFM OU   - most likely based on FAF and OCT and GVF   - atrophic paracentral   - cannot r/o cone dystrophy but less likely     - will need to check ffERG   - d/w patient avoid vitamin A supplements   - check FA      2. Paracentral visual field defects   - from GA      3. DM II no DR      4. Vitreous syneresis OU          return to clinic: 3 months FA OU Optos transits OD    ATTESTATION     Attending Attestation:     Complete documentation of historical and exam elements from today's encounter can be found in the full encounter summary report (not reduplicated in this progress note).  I personally obtained the chief complaint(s) and history of present illness.  I confirmed and edited as necessary the review of systems, past medical/surgical history, family history, social history, and examination findings as documented by others; and I examined the patient myself.  I personally reviewed the relevant tests, images, and reports as documented above.  I formulated and edited as necessary the assessment and plan and discussed the findings and management plan with the patient and family    Naz Green MD, PhD  , Vitreoretinal  Surgery  Department of Ophthalmology  [facility]

## 2019-11-25 NOTE — NURSING NOTE
Chief Complaints and History of Present Illnesses   Patient presents with     Retinal Evaluation     Chief Complaint(s) and History of Present Illness(es)     Retinal Evaluation     Laterality: both eyes    Onset: years ago    Quality: States va is the same since last visit      Associated symptoms: headache.  Negative for floaters and flashes    Pain scale: 0/10              Comments     States that she has a superficial bleed    Lab Results       Component                Value               Date                       A1C                      7.5                 09/24/2019                 A1C                      7.3                 01/30/2019                 A1C                      8.8                 11/05/2018                 A1C                      7.7                 06/15/2018                 A1C                      8.5                 10/24/2017            Sobia Kohler COT 7:53 AM November 25, 2019

## 2019-11-27 DIAGNOSIS — H35.53 STARGARDT'S DISEASE: Primary | ICD-10-CM

## 2019-11-29 ENCOUNTER — OFFICE VISIT (OUTPATIENT)
Dept: OBGYN | Facility: CLINIC | Age: 39
End: 2019-11-29
Payer: COMMERCIAL

## 2019-11-29 ENCOUNTER — TELEPHONE (OUTPATIENT)
Dept: OPTOMETRY | Facility: CLINIC | Age: 39
End: 2019-11-29

## 2019-11-29 VITALS — WEIGHT: 182.3 LBS | DIASTOLIC BLOOD PRESSURE: 80 MMHG | BODY MASS INDEX: 31.29 KG/M2 | SYSTOLIC BLOOD PRESSURE: 128 MMHG

## 2019-11-29 DIAGNOSIS — Z12.31 VISIT FOR SCREENING MAMMOGRAM: ICD-10-CM

## 2019-11-29 DIAGNOSIS — Z30.433 ENCOUNTER FOR REMOVAL AND REINSERTION OF INTRAUTERINE CONTRACEPTIVE DEVICE: Primary | ICD-10-CM

## 2019-11-29 DIAGNOSIS — Z30.430 ENCOUNTER FOR INSERTION OF MIRENA IUD: ICD-10-CM

## 2019-11-29 DIAGNOSIS — R87.810 CERVICAL HIGH RISK HPV (HUMAN PAPILLOMAVIRUS) TEST POSITIVE: ICD-10-CM

## 2019-11-29 PROCEDURE — 58300 INSERT INTRAUTERINE DEVICE: CPT | Performed by: OBSTETRICS & GYNECOLOGY

## 2019-11-29 PROCEDURE — 87624 HPV HI-RISK TYP POOLED RSLT: CPT | Performed by: OBSTETRICS & GYNECOLOGY

## 2019-11-29 PROCEDURE — 88175 CYTOPATH C/V AUTO FLUID REDO: CPT | Performed by: OBSTETRICS & GYNECOLOGY

## 2019-11-29 PROCEDURE — 77063 BREAST TOMOSYNTHESIS BI: CPT | Mod: TC

## 2019-11-29 PROCEDURE — 58301 REMOVE INTRAUTERINE DEVICE: CPT | Performed by: OBSTETRICS & GYNECOLOGY

## 2019-11-29 PROCEDURE — 77067 SCR MAMMO BI INCL CAD: CPT | Mod: TC

## 2019-11-29 NOTE — NURSING NOTE
"Chief Complaint   Patient presents with     Gyn Exam     IUD   Mirena IUD - due for removal 2020 Would like to have new IUD placed today.    initial /80   Wt 82.7 kg (182 lb 4.8 oz)   LMP 11/23/2019 (Exact Date)   BMI 31.29 kg/m   Estimated body mass index is 31.29 kg/m  as calculated from the following:    Height as of 10/7/19: 1.626 m (5' 4\").    Weight as of this encounter: 82.7 kg (182 lb 4.8 oz).  BP completed using cuff size regular.  Nora Keita CMA    "

## 2019-11-29 NOTE — PROGRESS NOTES
SUBJECTIVE:    Is a pregnancy test required: No.  Was a consent obtained?  Yes    Subjective: Marie Vogel is a 39 year old  presents for IUD and desires Mirena type IUD.  She requests removal of the IUD because the IUD is nearing is expiration and she desires to replace it today.    She is also due for Pap smear which will be obtained today.    Patient has been given the opportunity to ask questions about all forms of birth control, including all options appropriate for Marie Vogel. Discussed that no method of birth control, except abstinence is 100% effective against pregnancy or sexually transmitted infection.     Marie Vogel understands she may have the IUD removed at any time. IUD should be removed by a health care provider and the current IUD will be removed today.    The entire removal and insertion procedure was reviewed with the patient, including care after placement.    Today's PHQ-2 Score:   PHQ-2 (  [employer]) 10/7/2019   Q1: Little interest or pleasure in doing things 0   Q2: Feeling down, depressed or hopeless 0   PHQ-2 Score 0       PROCEDURE:      A speculum exam was performed and the cervix was visualized.  A Pap was obtained w/o difficulty. The IUD string was visualized. Using ring forceps, the string  was grasped and the IUD removed intact.    Under sterile technique, cervix was visualized with speculum and prepped with Betadine solution swab x 3.  The uterus was gently sounded to 8.5 cm. IUD prepared for placement, and IUD inserted according to 's instructions without difficulty or significant ressitance, and deployed at the fundus. The strings were visualized and trimmed to 3.0 cm from the external os. Tenaculum was removed and hemostasis noted. Speculum removed.  Patient tolerated procedure well.    Lot # [id number]  Exp: 3/2022    EBL: minimal    Complications: none      POST PROCEDURE:    Given 's handouts, including when to have  IUD removed, list of danger s/sx, side effects and follow up recommended. Encouraged condom use for prevention of STD. Advised to call for any fever, for prolonged or severe pain or bleeding, abnormal vaginal dischage, or unable to palpate strings. She was advised to use pain medications (ibuprofen) as needed for mild to moderate pain. Advised to follow-up in clinic in 4-6 weeks for IUD string check if unable to find strings or as directed by provider.     Joel Cortes MD

## 2019-11-29 NOTE — TELEPHONE ENCOUNTER
Was referred to retinal and wants to say THANK YOU, no bleed, but discovered [employer]is[employer] (sp?) and is very thankful for the recommendation!!

## 2019-12-03 LAB
COPATH REPORT: NORMAL
PAP: NORMAL

## 2019-12-06 LAB
FINAL DIAGNOSIS: NORMAL
HPV HR 12 DNA CVX QL NAA+PROBE: NEGATIVE
HPV16 DNA SPEC QL NAA+PROBE: NEGATIVE
HPV18 DNA SPEC QL NAA+PROBE: NEGATIVE
SPECIMEN DESCRIPTION: NORMAL
SPECIMEN SOURCE CVX/VAG CYTO: NORMAL

## 2019-12-09 ENCOUNTER — ALLIED HEALTH/NURSE VISIT (OUTPATIENT)
Dept: OPHTHALMOLOGY | Facility: CLINIC | Age: 39
End: 2019-12-09
Attending: OPHTHALMOLOGY
Payer: COMMERCIAL

## 2019-12-09 DIAGNOSIS — H35.53 STARGARDT'S DISEASE: ICD-10-CM

## 2019-12-09 PROCEDURE — 92273 FULL FIELD ERG W/I&R: CPT | Mod: ZF

## 2019-12-09 PROCEDURE — 40000269 ZZH STATISTIC NO CHARGE FACILITY FEE: Mod: ZF

## 2019-12-09 ASSESSMENT — REFRACTION_WEARINGRX
OS_ADD: +1.00
OD_CYLINDER: +2.00
SPECS_TYPE: PAL
OD_ADD: +1.00
OD_SPHERE: -4.50
OD_AXIS: 110
OS_CYLINDER: +2.00
OS_AXIS: 050
OS_SPHERE: -4.00

## 2019-12-09 ASSESSMENT — VISUAL ACUITY
OD_PH_CC: 20/20
OS_PH_CC: 20/25
METHOD: SNELLEN - LINEAR
OS_CC: 20/25
OD_CC+: +
OS_CC+: -
OS_PH_CC+: +
CORRECTION_TYPE: GLASSES
OD_CC: 20/25

## 2019-12-09 NOTE — NURSING NOTE
Returns for ffERG.  JAMILAH lee/Dr. 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 No interval changes.  Scheduled for f/u 01-07; will await results.

## 2019-12-09 NOTE — PROGRESS NOTES
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  A ffERG was performed as part of the workup.        DIAGNOSTIC FINDINGS:    A full field ERG was obtained in both eye using DTL electrodes and ISCEV standards.  Per the technician, the recording was essentially reliable in both eyes.      The results for the two eyes were similar. Under scotopic conditions, both eyes were normal for the dim scotopic flash but markedly delayed for the bright scotopic flash.  In particular, the dim white flash amplitudes were normal and the implicit times were normal.  The response to the bright white flash showed normal A wave amplitudes and implicit times, and the B-wave amplitudes were normal.  However, the B-waves were delayed by more than 7 ms.  Oscillatory potentials were normal.     Under photopic conditions, the recordings had mild abnormalities  In particular, the 30 Hz flicker amplitudes were mildly attenuated and the implicit times were mildly delayed by 0.9 ms.  The single photopic flash morphology was normal.  Numerically, the amplitudes were mildly attenuated and the B-waves were mildly delayed.   The 30Hz flicker is more reliable for numerical measurements of the photopic response.             Visual Acuity Right Eye : 20/25+, PH 20/20      w/MRx 11-21-19 in T.F., -4.50 + 2.00x110    Visual Acuity Left Eye : 20/25-, PH 20/25+      w/Mrx 11-21-19 in T.F., -4.00 + 2.00x050                          ALL AVERAGED  Data for Full-Field ERG Right Eye   Left Eye    Dark-Adapted Patient Normal Patient   0.01 ERG (angeline) amplitude( v) 129 61.3-334.7 171   0.01 ERG (angeline) implicit time(ms) 93.2 75.1-108.0 88.2   MMMMM      3.0 ERG (combined) a-wave amplitude( v) -133 -195.0 to -96.5 -155   3.0 ERG (combined) a-wave  implicit time(ms) 16.1 15.2-18.2 16.6   3.0 ERG (combined) b-wave amplitude( v) 191 115.0-446.3 231   3.0 ERG (combined) b-wave implicit time(ms) 57.1 30.0-50.3 57.7   MMMMM      3.0 Oscillatory Potentials  + Present +    Light-Adapted      3.0 Flicker (30-Hz) amplitude( v) 60 68.5-147.5 68   3.0 Flicker (30-Hz) implicit time(ms) 27.7 21.5-26.8 27.7         3.0 ERG (cone) a-wave amplitude( v) -17 -59.8 to -24.4 -22   3.0 ERG (cone) a-wave implicit time(ms) 15 14.9-18.0 15   3.0 ERG (cone) b-wave amplitude( v) 59 86.1-205.2 82   3.0 ERG (cone) b-wave implicit time(ms) 30.5 25.8-29.6 30.5         * = manipulated cursors        parentheses = cursors at selected peaks        ---- = residual to non-measurable        xxxx = not tested      INTERPRETATION:      1. Abnormal ffERG.  The scotopic responses showed marked delay; it is not clear if this is a reliable result as the patient has no symptoms of angeline dystrophy.  The photopic response shows mild attenuation and delay, which is consistent with the clinical exam and history.  Except for the marked scotopic delay, the results could be consistent with SMD-FFM; otherwise a cone-angeline dystrophy must be considered.   2. Consider repeating the ffERG in the future to see whether the scotopic results are verified.    Sincerely,    Naz Green MD, PhD

## 2019-12-10 ENCOUNTER — TELEPHONE (OUTPATIENT)
Dept: ENDOCRINOLOGY | Facility: CLINIC | Age: 39
End: 2019-12-10

## 2019-12-10 NOTE — TELEPHONE ENCOUNTER
Central Prior Authorization Team   Phone: [phone]      PA Initiation    Medication: contour next-Initiated  Insurance Company: [employer] - Phone [phone] Fax [phone]  Pharmacy Filling the Rx: [facility] - [place], [place] - [address]  Filling Pharmacy Phone: [phone]  Filling Pharmacy Fax:    Start Date: 12/10/2019

## 2019-12-10 NOTE — TELEPHONE ENCOUNTER
Contour next                    [facility]  Phone: [phone]  Fax: [phone]

## 2019-12-12 ENCOUNTER — ALLIED HEALTH/NURSE VISIT (OUTPATIENT)
Dept: EDUCATION SERVICES | Facility: CLINIC | Age: 39
End: 2019-12-12
Payer: COMMERCIAL

## 2019-12-12 DIAGNOSIS — E11.21 TYPE 2 DIABETES MELLITUS WITH DIABETIC NEPHROPATHY, WITH LONG-TERM CURRENT USE OF INSULIN (H): Primary | ICD-10-CM

## 2019-12-12 DIAGNOSIS — Z79.4 TYPE 2 DIABETES MELLITUS WITH DIABETIC NEPHROPATHY, WITH LONG-TERM CURRENT USE OF INSULIN (H): Primary | ICD-10-CM

## 2019-12-12 PROCEDURE — G0108 DIAB MANAGE TRN  PER INDIV: HCPCS

## 2019-12-12 NOTE — PATIENT INSTRUCTIONS
Pump Changes made today:    Basal Rates:  12am 1.3 decrease to 1.15  10:30am 1.350 decrease to 1.2  2:30pm 1.2 decrease 1.1    Carb Ratios:  12am 7 --> 8    ISF:  12am 27 --> 30    Upload Pump and Nadia in about a week.

## 2019-12-13 NOTE — PROGRESS NOTES
Diabetes Self-Management Education & Support    Diabetes Self-Management Education & Support - Insulin Pump/CGM Review    SUBJECTIVE/OBJECTIVE  Presents for: Individual review  Accompanied by: Self  Diabetes education in the past 24mo: Yes  Focus of Visit: Monitoring  Diabetes type: Type 2  How confident are you filling out medical forms by yourself:: Not Assessed  Diabetes management related comments/concerns: OmniPod pump upload since switching from Medtronic a few weeks ago and started the Nadia at that time as well.  Transportation concerns: No  Other concerns:: None  Cultural Influences/Ethnic Background:  American    Patient seen today for Insulin Pump CGM  Review:    Only able to upload the DASH. She is only using her phone for the nadia reader and we could not connect it to [employer].    Reports:                         Healthy Eating  Healthy Eating Assessed Today: No  Has patient met with a dietitian in the past?: Yes    Being Active  Being Active Assessed Today: No    Monitoring  Monitoring Assessed Today: Yes  Did patient bring glucose meter to appointment? : Yes  Blood Glucose Meter: CGM(Freestyle Nadia)  Home Glucose (Sugar) Monitoring: 3-4 times per day  Blood glucose trend: Decreasing steadily  Low Glucose Range (mg/dL): <70  High Glucose Range (mg/dL): >200  Overall Range (mg/dL): 110-130    Taking Medications  Diabetes Medication(s)     Biguanides       metFORMIN (GLUCOPHAGE-XR) 500 MG 24 hr tablet    Take 2 tablets (1,000 mg) by mouth 2 times daily (with meals)    Insulin       insulin aspart (NOVOLOG VIAL) 100 UNITS/ML vial    With insulin pump. Uses about 80 units/day.     INSULIN PUMP - OUTPATIENT    Updated 11/12/19:  OmniPod Dash  BASAL:  00:00: 1.30 units/hr  10:30: 1.35  14:30: 1.20  CARB RATIO:  00:00: 7  Sensitivity:  00:00: 27 mg/dL  BLOOD GLUCOSE TARGET and times:  12   AM (midnight): 120-120  Active Insulin Time: 4 hours  Sensor: Nadia/ Nadia Link Donna    Incretin Mimetic Agents (GLP-1  Receptor Agonists)       dulaglutide (TRULICITY) 0.75 MG/0.5ML pen    Inject 0.75 mg Subcutaneous every 7 days          Taking Medication Assessed Today: Yes  Current Treatments: Diet, Insulin Pump, Non-insulin Injectables, Oral Agent (monotherapy)    Problem Solving  Problem Solving Assessed Today: Yes  Hypoglycemia Frequency: Weekly  Hypoglycemia Treatment: Glucose (tablets or gel), Juice, Candy  Patient carries a carbohydrate source: Yes  Medical alert: No  Severe weather/disaster plan for diabetes management?: Yes  DKA prevention plan?: No    Hypoglycemia symptoms  Confusion: No  Dizziness or Light-Headedness: Yes  Headaches: No  Hunger: No  Mood changes: Yes  Nervousness/Anxiety: No  Sleepiness: No  Speech difficulty: Yes  Sweats: Yes  Tremors: Yes    Hypoglycemia Complications  Blackouts: No  Hospitalization: No  Nocturnal hypoglycemia: Yes  Required assistance: Yes  Required glucagon injection: No  Seizures: No    Reducing Risks  Reducing Risks Assessed Today: Yes  CAD Risks: Diabetes Mellitus, Family history  Has dilated eye exam at least once a year?: Yes  Sees dentist every 6 months?: No  Sees podiatrist (foot doctor)?: Yes    Healthy Coping  Healthy Coping Assessed Today: Yes  Emotional response to diabetes: Ready to learn, Confidence diabetes can be controlled, Concern for health and well-being  Informal Support system:: Children, Friends  Stage of change: ACTION (Actively working towards change)  Difficulty affording diabetes management supplies?: No  Patient Activation Measure Survey Score:  SADIA Score (Last Two) 10/7/2009   SADIA Raw Score 46   Activation Score 75.3   SADIA Level 4         ASSESSMENT  Met with Marie centeno for follow-up after starting her OmniPod DASH pump (previously used Medtronic) and Freestyle Nadia CGM. We were not able to upload her CGM, but I was about to review it on her phone. She will continue to work on getting it set-up so we can remotely view the data. She was able to set-up  her [employer] account with me so we can review that data for her as needed. She has not had a pod on for almost 2 days, but BG's have remained almost all in range. She was previously having some lows. She has continued to take her other medications for diabetes, just not insulin. She ran out of pods and is supposed to get a new shipment today. I did give her one pod that I had in clinic so she could start it right away. We also made some decreases to her basal rates, carb ratios, and ISF. She boluses an average of once per day. Most of her insulin needs are covered by her basals. She is aware we like to see that closer to 50% bolus and 50% basal. She is really liking the Omnipod and the Nadia CGM.    INTERVENTION:   Diabetes knowledge and skills assessment:     Patient is knowledgeable in diabetes management concepts related to: Healthy Eating, Being Active, Monitoring, Taking Medication, Problem Solving, Reducing Risks and Healthy Coping    Patient needs further education on the following diabetes management concepts: Monitoring, Taking Medication, Problem Solving, Reducing Risks and Healthy Coping    Based on learning assessment above, most appropriate setting for further diabetes education would be: Group class or Individual setting.    Education provided today on:  AADE Self-Care Behaviors:  Monitoring: log and interpret results, individual blood glucose targets and frequency of monitoring  Taking Medication: side effects of prescribed medications and when to take medications  Problem Solving: high blood glucose - causes, signs/symptoms, treatment and prevention, low blood glucose - causes, signs/symptoms, treatment and prevention, carrying a carbohydrate source at all times and when to call health care provider    Opportunities for ongoing education and support in diabetes-self management were discussed.    Pt verbalized understanding of concepts discussed and recommendations provided today.       Education  Materials Provided:  No new materials provided today      PLAN  See Patient Instructions for co-developed, patient-stated behavior change goals.  AVS printed and provided to patient today. See Follow-Up section for recommended follow-up.    Ning Ahmadi RN, CDE    Time Spent: 40 minutes  Encounter Type: Individual    Any diabetes medication dose changes were made via the CDE Protocol and Collaborative Practice Agreement with the patient's endocrinology provider. A copy of this encounter was shared with the provider.

## 2019-12-29 ENCOUNTER — OFFICE VISIT (OUTPATIENT)
Dept: URGENT CARE | Facility: URGENT CARE | Age: 39
End: 2019-12-29
Payer: COMMERCIAL

## 2019-12-29 VITALS
BODY MASS INDEX: 31.07 KG/M2 | HEART RATE: 87 BPM | SYSTOLIC BLOOD PRESSURE: 135 MMHG | OXYGEN SATURATION: 95 % | WEIGHT: 181 LBS | DIASTOLIC BLOOD PRESSURE: 84 MMHG | TEMPERATURE: 97.6 F

## 2019-12-29 DIAGNOSIS — N39.0 URINARY TRACT INFECTION WITHOUT HEMATURIA, SITE UNSPECIFIED: Primary | ICD-10-CM

## 2019-12-29 DIAGNOSIS — R30.0 DYSURIA: ICD-10-CM

## 2019-12-29 DIAGNOSIS — R82.90 NONSPECIFIC FINDING ON EXAMINATION OF URINE: ICD-10-CM

## 2019-12-29 LAB
BACTERIA #/AREA URNS HPF: ABNORMAL /HPF
MUCOUS THREADS #/AREA URNS LPF: PRESENT /LPF
NON-SQ EPI CELLS #/AREA URNS LPF: ABNORMAL /LPF
RBC #/AREA URNS AUTO: ABNORMAL /HPF
URNS CMNT MICRO: ABNORMAL
WBC #/AREA URNS AUTO: ABNORMAL /HPF

## 2019-12-29 PROCEDURE — 99213 OFFICE O/P EST LOW 20 MIN: CPT | Performed by: PHYSICIAN ASSISTANT

## 2019-12-29 PROCEDURE — 87186 SC STD MICRODIL/AGAR DIL: CPT | Performed by: PHYSICIAN ASSISTANT

## 2019-12-29 PROCEDURE — 87086 URINE CULTURE/COLONY COUNT: CPT | Performed by: PHYSICIAN ASSISTANT

## 2019-12-29 PROCEDURE — 81015 MICROSCOPIC EXAM OF URINE: CPT | Performed by: PHYSICIAN ASSISTANT

## 2019-12-29 PROCEDURE — 87088 URINE BACTERIA CULTURE: CPT | Performed by: PHYSICIAN ASSISTANT

## 2019-12-29 RX ORDER — CIPROFLOXACIN 500 MG/1
500 TABLET, FILM COATED ORAL 2 TIMES DAILY
Qty: 10 TABLET | Refills: 0 | Status: SHIPPED | OUTPATIENT
Start: 2019-12-29 | End: 2020-01-14

## 2019-12-29 NOTE — PROGRESS NOTES
SUBJECTIVE:   Marie Vogel is a 39 year old female who  presents today for a possible UTI. Symptoms of dysuria, frequency, burning, suprapubic pain and pressure and voiding in small amounts have been going on for 1day(s).  Hematuria no.  sudden onsetand moderate.  There is no history of fever, chills, nausea or vomiting.  No history of vaginal  discharge. This patient does  have a history of urinary tract infections. 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 1 kit 0     cetirizine (ZYRTEC) 10 MG tablet Take 10 mg by mouth 2 times daily  90 tablet 3     Continuous Blood Gluc Sensor (FREESTYLE BESSIE 14 DAY SENSOR) MISC 2 each every 14 days 6 each 3     dulaglutide (TRULICITY) 0.75 MG/0.5ML pen Inject 0.75 mg Subcutaneous every 7 days 9 mL 1     EPINEPHrine 0.3 MG/0.3ML injection Inject 0.3 mLs (0.3 mg) into the muscle once as  needed for anaphylaxis 0.3 mL 11     fluticasone (FLONASE) 50 MCG/ACT spray Spray 1-2 sprays into both nostrils daily 1 Bottle 0     ibuprofen (ADVIL) 200 MG tablet Take 200 mg by mouth every 4 hours as needed.       insulin aspart (NOVOLOG VIAL) 100 UNITS/ML vial With insulin pump. Uses about 80 units/day. 90 mL 3     Insulin Disposable Pump (OMNIPOD DASH 5 PACK) MISC 1 pod every 3 days 30 each 3     Insulin Disposable Pump (OMNIPOD DASH SYSTEM) KIT 1 kit continuous 1 kit 0     INSULIN PUMP - OUTPATIENT Updated 11/12/19:  OmniPod Dash  BASAL:  00:00: 1.150 units/hr  10:30: 1.2  14:30: 1.1  CARB RATIO:  00:00: 8  Sensitivity:  00:00: 30 mg/dL  BLOOD GLUCOSE TARGET and times:  12   AM (midnight): 120-120  Active Insulin Time: 4 hours  Sensor: Nadia/ Nadia Link Donna  [employer]:   Username [email or web address]  Password [email or web address]!       Lancets (MICROLET) MISC 1 Device See Admin Instructions. Use up to 5 times daily for blood sugar checks. 100 each prn     levonorgestrel (MIRENA) 20 MCG/24HR IUD 1 each (20 mcg) by Intrauterine route once       losartan (COZAAR) 50 MG tablet TAKE ONE TABLET BY MOUTH EVERY MORNING AND TAKE ONE-HALF TABLET BY MOUTH EVERY EVENING 135 tablet 3     MAGNESIUM OXIDE PO Take 400 mg by mouth daily       metFORMIN (GLUCOPHAGE-XR) 500 MG 24 hr tablet Take 2 tablets (1,000 mg) by mouth 2 times daily (with meals) 360 tablet 3     montelukast (SINGULAIR) 10 MG tablet TAKE ONE TABLET BY MOUTH AT BEDTIME 90 tablet 3     Multiple Vitamins-Minerals (MULTI VITAMIN/MINERALS PO) Take 1 each by mouth daily.       Omega-3 Fatty Acids (FISH OIL) 500 MG CAPS Take 1 capsule by mouth       omeprazole (PRILOSEC) 40 MG DR capsule Take 1 capsule (40 mg) by mouth daily as needed 90 capsule 3     Ostomy Supplies (SKIN PREP WIPES) MISC 1 each every 3 days 50 each 3     rosuvastatin (CRESTOR) 5 MG tablet Take 1 tablet (5 mg) by mouth daily 90 tablet 1     SUMAtriptan (IMITREX) 25 MG tablet TAKE ONE TO FOUR TABLETS BY MOUTH  ONE TIME. MAY REPEAT 1 TABLET EVERY TWO HOURS UP TO MAXIMUM OF 200MG IN 24 HOURS 8 tablet 6     Social History     Tobacco Use     Smoking status: Former Smoker     Types: Cigarettes     Last attempt to quit: 10/24/2005     Years since quittin.1     Smokeless tobacco: Former User     Tobacco comment: States only smokes when drinks maybe 2x/year   Substance Use Topics     Alcohol use: No     Alcohol/week: 0.0 standard drinks       ROS:   Review of systems negative except as stated above.    OBJECTIVE:  /84   Pulse 87   Temp 97.6  F (36.4  C)   Wt 82.1 kg (181 lb)   SpO2 95%   BMI 31.07 kg/m    GENERAL APPEARANCE: healthy, alert and no distress  RESP: lungs clear to auscultation - no rales, rhonchi or wheezes  CV: regular rates and rhythm, normal S1 S2, no murmur noted  ABDOMEN:  soft, nontender, no HSM or masses and bowel sounds normal  BACK: No CVA tenderness  SKIN: no suspicious lesions or rashes    Results for orders placed or performed in visit on 19   Urine Microscopic     Status: Abnormal   Result Value Ref Range    WBC Urine 25-50 (A) OTO5^0 - 5 /HPF    RBC Urine 5-10 (A) OTO2^O - 2 /HPF    Squamous Epithelial /LPF Urine Moderate (A) FEW^Few /LPF    Bacteria Urine Many (A) NEG^Negative /HPF    Mucous Urine Present (A) NEG^Negative /LPF    Comment Urine Interfering substances, dipstick not done          ASSESSMENT:   Lower, uncomplicated urinary tract infection.    PLAN:  Culture pending and Cipro as directed.    Drink plenty of fluids.  Prevention and treatment of UTI's discussed.Signs and symptoms of pyelonephritis mentioned.  Follow up with primary care physician if not improving

## 2019-12-30 LAB
BACTERIA SPEC CULT: ABNORMAL
BACTERIA SPEC CULT: ABNORMAL
SPECIMEN SOURCE: ABNORMAL

## 2020-01-07 ENCOUNTER — OFFICE VISIT (OUTPATIENT)
Dept: OPHTHALMOLOGY | Facility: CLINIC | Age: 40
End: 2020-01-07
Attending: OPHTHALMOLOGY
Payer: COMMERCIAL

## 2020-01-07 DIAGNOSIS — H35.50 RETINAL DYSTROPHY: Primary | ICD-10-CM

## 2020-01-07 DIAGNOSIS — H35.53 STARGARDT'S DISEASE: ICD-10-CM

## 2020-01-07 PROCEDURE — G0463 HOSPITAL OUTPT CLINIC VISIT: HCPCS | Mod: ZF

## 2020-01-07 PROCEDURE — 92235 FLUORESCEIN ANGRPH MLTIFRAME: CPT | Mod: ZF | Performed by: OPHTHALMOLOGY

## 2020-01-07 ASSESSMENT — VISUAL ACUITY
CORRECTION_TYPE: GLASSES
OS_CC+: +2
OS_CC: 20/25
OD_CC: 20/20
OD_CC+: -1
METHOD: SNELLEN - LINEAR

## 2020-01-07 ASSESSMENT — REFRACTION_WEARINGRX
OD_AXIS: 110
OD_ADD: +1.00
OD_CYLINDER: +2.00
OS_ADD: +1.00
OD_SPHERE: -4.50
OS_SPHERE: -4.00
OS_CYLINDER: +2.00
OS_AXIS: 055

## 2020-01-07 ASSESSMENT — SLIT LAMP EXAM - LIDS
COMMENTS: NORMAL
COMMENTS: NORMAL

## 2020-01-07 ASSESSMENT — CONF VISUAL FIELD
OS_NORMAL: 1
OD_NORMAL: 1
METHOD: COUNTING FINGERS

## 2020-01-07 ASSESSMENT — EXTERNAL EXAM - LEFT EYE: OS_EXAM: NORMAL

## 2020-01-07 ASSESSMENT — TONOMETRY
OD_IOP_MMHG: 19
IOP_METHOD: TONOPEN
OS_IOP_MMHG: 18

## 2020-01-07 ASSESSMENT — CUP TO DISC RATIO
OS_RATIO: 0.5
OD_RATIO: 0.5

## 2020-01-07 ASSESSMENT — EXTERNAL EXAM - RIGHT EYE: OD_EXAM: NORMAL

## 2020-01-07 NOTE — NURSING NOTE
Chief Complaints and History of Present Illnesses   Patient presents with     Retinal Evaluation     Chief Complaint(s) and History of Present Illness(es)     Retinal Evaluation     Laterality: both eyes    Onset: gradual    Onset: months ago    Quality: Doing well with the new gls      Associated symptoms: Negative for floaters and flashes    Pain scale: 0/10              Comments     ERG Results  Stargardt's    currently  Lab Results       Component                Value               Date                       A1C                      7.5                 09/24/2019                 A1C                      7.3                 01/30/2019                 A1C                      8.8                 11/05/2018                 A1C                      7.7                 06/15/2018                 A1C                      8.5                 10/24/2017            Sobia Kohler COT 7:50 AM January 7, 2020

## 2020-01-07 NOTE — PROGRESS NOTES
CC -   Macular atrophy    INTERVAL HISTORY - Initial visit  Her lat A1C was 7.3 on 1-30-19     [facility] -   Marie Vogel is a  39 year old year-old patient with history of DMII here for fundus evaluation.   Saw Dr. Gabriela Becker.  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 Retinal dystrophy OU   - ?cone angeline vs SMD-FFM vs other   - FAF/OCT/GVF suggest advanced atrophic  SMD-FFM, ffERG suggests cone-angeline dystrophy   - normal choroid appearance of FA   - linear pigmentation but not likely angioid streaks     - refer to [facility] for eval and possible genetic testing      2. Paracentral visual field defects   - from GA      3. DM II no DR      4. Vitreous syneresis OU          return to clinic: with [facility]    ATTESTATION     Attending Attestation:     Complete documentation of historical and exam elements from today's encounter can be found in the full encounter summary report (not reduplicated in this progress note).  I personally obtained the chief complaint(s) and history of present illness.  I confirmed and edited as necessary the review of systems, past medical/surgical history, family history, social history, and examination findings as documented by others; and I examined the patient myself.  I personally reviewed the relevant tests, images, and reports as documented above.  I formulated and edited  as necessary the assessment and plan and discussed the findings and management plan with the patient and family    Naz Green MD, PhD  , Vitreoretinal Surgery  Department of Ophthalmology  [facility]

## 2020-01-09 NOTE — TELEPHONE ENCOUNTER
Patient called back and noted that she has not heard or received call back from on call for Endocrinology about updating her pump parameters while she is on steroids. This triage RN repaged via page  Gerald and he noted that typically the page goes out to FP unless Endo is needed, noted Endo is needed for this patient for new pump parameters while on steroids. He notes he needs to call patient and then conference her via cell with Dr. Funetes. He was given patient name and contact number and will facilitate conference call now.     Jen Tineo RN, BSN  [facility]    
Patient uses an insulin pump and is now taking prednisone for an allergic reaction. She has an insulin pump and needs to adjust her basal rate. Currently the rate is 1.1 and 1.5. Her blood sugar is currently 287. Denies having high blood sugar symptoms.   Paged Dr Fuentes, on call for Endocrinology through  [employer] answering service at 8:28AM. Dr ridley call patient at [phone].     Salena Sinha RN/FNA    
swing-through gait

## 2020-01-13 ENCOUNTER — DOCUMENTATION ONLY (OUTPATIENT)
Dept: PEDIATRICS | Facility: CLINIC | Age: 40
End: 2020-01-13

## 2020-01-13 DIAGNOSIS — Z79.4 TYPE 2 DIABETES MELLITUS WITH DIABETIC NEPHROPATHY, WITH LONG-TERM CURRENT USE OF INSULIN (H): Primary | ICD-10-CM

## 2020-01-13 DIAGNOSIS — E11.21 TYPE 2 DIABETES MELLITUS WITH DIABETIC NEPHROPATHY, WITH LONG-TERM CURRENT USE OF INSULIN (H): Primary | ICD-10-CM

## 2020-01-14 ENCOUNTER — OFFICE VISIT (OUTPATIENT)
Dept: ENDOCRINOLOGY | Facility: CLINIC | Age: 40
End: 2020-01-14
Payer: COMMERCIAL

## 2020-01-14 VITALS
WEIGHT: 184.6 LBS | BODY MASS INDEX: 31.51 KG/M2 | HEART RATE: 79 BPM | OXYGEN SATURATION: 96 % | HEIGHT: 64 IN | TEMPERATURE: 97.6 F | DIASTOLIC BLOOD PRESSURE: 90 MMHG | SYSTOLIC BLOOD PRESSURE: 128 MMHG | RESPIRATION RATE: 16 BRPM

## 2020-01-14 DIAGNOSIS — Z79.4 TYPE 2 DIABETES MELLITUS WITH DIABETIC NEPHROPATHY, WITH LONG-TERM CURRENT USE OF INSULIN (H): Primary | ICD-10-CM

## 2020-01-14 DIAGNOSIS — Z79.4 TYPE 2 DIABETES MELLITUS WITH DIABETIC NEPHROPATHY, WITH LONG-TERM CURRENT USE OF INSULIN (H): ICD-10-CM

## 2020-01-14 DIAGNOSIS — E11.21 TYPE 2 DIABETES MELLITUS WITH DIABETIC NEPHROPATHY, WITH LONG-TERM CURRENT USE OF INSULIN (H): ICD-10-CM

## 2020-01-14 DIAGNOSIS — E11.21 TYPE 2 DIABETES MELLITUS WITH DIABETIC NEPHROPATHY, WITH LONG-TERM CURRENT USE OF INSULIN (H): Primary | ICD-10-CM

## 2020-01-14 LAB — HBA1C MFR BLD: 7 % (ref 0–5.6)

## 2020-01-14 PROCEDURE — 83036 HEMOGLOBIN GLYCOSYLATED A1C: CPT | Performed by: INTERNAL MEDICINE

## 2020-01-14 PROCEDURE — 36415 COLL VENOUS BLD VENIPUNCTURE: CPT | Performed by: INTERNAL MEDICINE

## 2020-01-14 PROCEDURE — 95251 CONT GLUC MNTR ANALYSIS I&R: CPT | Performed by: INTERNAL MEDICINE

## 2020-01-14 PROCEDURE — 82043 UR ALBUMIN QUANTITATIVE: CPT | Performed by: INTERNAL MEDICINE

## 2020-01-14 PROCEDURE — 80061 LIPID PANEL: CPT | Performed by: INTERNAL MEDICINE

## 2020-01-14 PROCEDURE — 82565 ASSAY OF CREATININE: CPT | Performed by: INTERNAL MEDICINE

## 2020-01-14 PROCEDURE — 99213 OFFICE O/P EST LOW 20 MIN: CPT | Performed by: INTERNAL MEDICINE

## 2020-01-14 PROCEDURE — 84681 ASSAY OF C-PEPTIDE: CPT | Performed by: INTERNAL MEDICINE

## 2020-01-14 ASSESSMENT — MIFFLIN-ST. JEOR: SCORE: 1487.34

## 2020-01-14 NOTE — NURSING NOTE
ENDOCRINOLOGY INTAKE FORM    Patient Name:  Marie Vogel  :  1980    Is patient Diabetic?   Yes: type 2  Does patient have non-diabetic or other endocrine issues?  Yes: hyperlipidemia    Vitals: There were no vitals taken for this visit.  BMI= There is no height or weight on file to calculate BMI.    Flu vaccine:  Yes: 10/06/19  Pneumonia vaccine:  Yes: pneumovax    Smoking and Alcohol use:  Social History     Tobacco Use     Smoking status: Former Smoker     Types: Cigarettes     Last attempt to quit: 10/24/2005     Years since quittin.2     Smokeless tobacco: Former User     Tobacco comment: States only smokes when drinks maybe 2x/year   Substance Use Topics     Alcohol use: No     Alcohol/week: 0.0 standard drinks     Drug use: No       Foot Exam: No  Eye Exam:  Yes: 19  Dental Exam:  No  Aspirin Use:  Yes: 81 mg    Lab Results   Component Value Date    A1C 7.5 2019    A1C 7.3 2019    A1C 8.8 2018    A1C 7.7 06/15/2018    A1C 8.5 10/24/2017       Lab Results   Component Value Date    MICROL 1,060 2018     No results found for: MICROALBUMIN    Lenore Hurley CMA  [facility]  [facility]

## 2020-01-14 NOTE — PROGRESS NOTES
Name: Marie Vogel  Seen for f/u of DM  HPI:  Marie Vogel is a 39 year old female who presents for the evaluation/management of DM.   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    Here for f/u. Previously has seen endo at [facility] ( Dr Aguilera and Dr Zhou and ). 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 Busy at work, also variable schedule.. Concerned about weight gain.   H/o cardiomyopathy. Followed by cardiology.    Previously  she was started on Trulicity 0.75 mg/week.  She is tolerating okay.  Reports that she is not feeling hungry and has lost some weight.    On Omnipod since 10/2019 and using freestyle casey.    1. Type 2 DM:  Orginally diagnosed at the age of: 21 with GDM during her first pregnancy. Diagnosed with DM 2 in 1/2002, diet controlled for 3 years, then started on metformin. In 2010 during her second pregnancy, started on insulin and then insulin pump therapy in 2012.   Current Regimen: Insulin pump therapy ( Medtronic Minimed Paradigm), metformin XR 1000 mg bid, Trulicity 0.75 mg/week  BS checks: 4-5 times per day  Average Meter Download:     yes:     Diabetes Medication(s)     Biguanides       metFORMIN (GLUCOPHAGE-XR) 500 MG 24 hr tablet    Take 2 tablets (1,000 mg) by mouth 2 times daily (with meals)    Insulin       insulin aspart (NOVOLOG VIAL) 100 UNITS/ML vial    With insulin pump. Uses about 80 units/day.     INSULIN PUMP - OUTPATIENT    Updated 11/12/19:  OmniPod Dash  BASAL:  00:00: 1.150 units/hr  10:30: 1.2  14:30: 1.1  CARB RATIO:  00:00: 8  Sensitivity:  00:00: 30 mg/dL  BLOOD GLUCOSE TARGET and times:  12    AM (midnight): 120-120  Active Insulin Time: 4 hours  Sensor: Nadia/ Nadia Link Donna  Amish:   Username [email or web address]  Password [email or web address]    Incretin Mimetic Agents (GLP-1 Receptor Agonists)       dulaglutide (TRULICITY) 0.75 MG/0.5ML pen    Inject 0.75 mg Subcutaneous every 7 days            Complications:   Diabetes Complications  Description / Detail    Diabetic Retinopathy  No   CAD / PAD  No   Neuropathy  No   Nephropathy / Microalbuminuria  Yes: microalbuminuria. ON cozaar.   Gastroparesis  No   Hypoglycemia [place]  No     2. Hypertension: Blood Pressure today:   BP Readings from Last 3 Encounters:   20 (!) 128/90   19 135/84   19 128/80     Takes medications everyday without forgetting a dose.  Denies feeling lightheaded or dizzy.    3. Hyperlipidemia:   Denies muscle aches of pains.       PMH/PSH:  Past Medical History:   Diagnosis Date     Allergic rhinitis due to pollen      Atypical glandular cells on Pap smear 3/1108     Cervical high risk HPV (human papillomavirus) test positive 2018    See problem list     Gastroesophageal reflux disease      PFO (patent foramen ovale)      Stargardt's disease 2019     Type II or unspecified type diabetes mellitus without mention of complication, not stated as uncontrolled     onset was gestational in      Past Surgical History:   Procedure Laterality Date     C INDUCED ABORTN BY D&C           C IUD,MIRENA  8/10/10, 7/28/15     C/SECTION, LOW TRANSVERSE  6/25/10    , Low Transverse     CHOLECYSTECTOMY, LAPOROSCOPIC      Cholecystectomy, Laparoscopic     ESOPHAGOSCOPY, GASTROSCOPY, DUODENOSCOPY (EGD), COMBINED N/A 2016    Procedure: COMBINED ESOPHAGOSCOPY, GASTROSCOPY, DUODENOSCOPY (EGD), BIOPSY SINGLE OR MULTIPLE;  Surgeon: Fadi Hunter MD;  Location: [facility] ESOPHAGEAL MOTILITY STUDY N/A 2016    Procedure: ESOPHAGEAL MOTILITY STUDY;  Surgeon: Fadi Hunter MD;   Location: [facility]     HC REMOVE TONSILS/ADENOIDS,<11 Y/O      T &A     Family Hx:  Family History   Problem Relation Age of Onset     Cardiovascular Mother         hypertrophic cardiomyopathy     Genitourinary Problems Mother         gallbladder disease     Diabetes Mother         drug induced     Other - See Comments Mother         heart transplant 2005     Diabetes Father         type 2     Hypertension Father      Genitourinary Problems Maternal Grandmother         gallbladder disease     Genitourinary Problems Paternal Grandmother         gallbladder disease     Hypertension Paternal Grandfather         high bp     Cerebrovascular Disease Paternal Grandfather      Cardiovascular Brother         hypertrophic cardiomyopathy     Diabetes Brother         type 2     Glaucoma No family hx of      Macular Degeneration No family hx of      Thyroid disease: No         DM2: Yes - father and mother         Autoimmune: DM1, SLE, RA, Vitiligo No    Social Hx:  Social History     Socioeconomic History     Marital status:      Spouse name: Not on file     Number of children: 2     Years of education: 14     Highest education level: Not on file   Occupational History     Occupation: nurse   Social Needs     Financial resource strain: Not on file     Food insecurity:     Worry: Not on file     Inability: Not on file     Transportation needs:     Medical: Not on file     Non-medical: Not on file   Tobacco Use     Smoking status: Former Smoker     Types: Cigarettes     Last attempt to quit: 10/24/2005     Years since quittin.2     Smokeless tobacco: Former User     Tobacco comment: States only smokes when drinks maybe 2x/year   Substance and Sexual Activity     Alcohol use: No     Alcohol/week: 0.0 standard drinks     Drug use: No     Sexual activity: Yes     Partners: Male     Birth control/protection: I.U.D.     Comment: Mirena IUD 7/28/15   Lifestyle     Physical activity:     Days per week: Not on file      Minutes per session: Not on file     Stress: Not on file   Relationships     Social connections:     Talks on phone: Not on file     Gets together: Not on file     Attends Restorationism service: Not on file     Active member of club or organization: Not on file     Attends meetings of clubs or organizations: Not on file     Relationship status: Not on file     Intimate partner violence:     Fear of current or ex partner: Not on file     Emotionally abused: Not on file     Physically abused: Not on file     Forced sexual activity: Not on file   Other Topics Concern     Parent/sibling w/ CABG, MI or angioplasty before 65F 55M? Not Asked   Social History Narrative    Caffeine intake/servings daily - 6-7    Calcium intake/servings daily - 2-3 yogurt, milk, cheese    Exercise 1 times weekly - describe aerobics    Sunscreen used - Yes    Seatbelts used - Yes    Guns stored in the home - No    Self Breast Exam - Yes    Pap test up to date -  Yes    Eye exam up to date -  Yes    Dental exam up to date -  Yes    DEXA scan up to date -  Not Applicable    Flex Sig/Colonoscopy up to date -  Not Applicable    Mammography up to date -  Not Applicable    Immunizations reviewed and up to date - Yes    Abuse: Current or Past (Physical, Sexual or Emotional) - No    Do you feel safe in your environment - Yes    Do you cope well with stress - Yes    Do you suffer from insomnia - Yes     Last updated by: Tatianna Lacey  5/9/2005          MEDICATIONS:  has a current medication list which includes the following prescription(s): aspirin, atenolol, blood glucose, blood glucose monitoring, cetirizine, freestyle casey 14 day sensor, dulaglutide, epinephrine, fluticasone, ibuprofen, insulin aspart, omnipod dash 5 pack, omnipod dash system, insulin pump, blood glucose monitoring, levonorgestrel, losartan, magnesium oxide, metformin, montelukast, multiple vitamins-minerals, fish oil, omeprazole, skin prep wipes, rosuvastatin, and sumatriptan.    ROS  "    ROS: 10 point ROS neg other than the symptoms noted above in the HPI.    Physical Exam   VS: BP (!) 128/90 (BP Location: Right arm, Patient Position: Chair, Cuff Size: Adult Regular)   Pulse 79   Temp 97.6  F (36.4  C) (Oral)   Resp 16   Ht 1.626 m (5' 4\")   Wt 83.7 kg (184 lb 9.6 oz)   LMP 12/23/2019   SpO2 96%   Breastfeeding No   BMI 31.69 kg/m    GENERAL: AXOX3, NAD, well dressed, answering questions appropriately, appears stated age.  HEENT: No exopthalmous, no proptosis, EOMI, no lig lag, no retraction  NECK: Thyroid normal in size, non tender, no nodules were palpated.  CV: RRR  LUNGS: CTAB  ABDOMEN: +BS  DM FOOT EXAM: normal DP and PT pulses, no trophic changes or ulcerative lesions, normal sensory exam and normal monofilament exam.   NEUROLOGY: CN grossly intact, no tremors  PSYCH: normal affect and mood    LABS:  A1c:  Lab Results   Component Value Date    A1C 7.5 09/24/2019    A1C 7.3 01/30/2019    A1C 8.8 11/05/2018    A1C 7.7 06/15/2018    A1C 8.5 10/24/2017       Basic Metabolic Panel:  Creatinine   Date Value Ref Range Status   07/22/2018 0.92 0.52 - 1.04 mg/dL Final       LFTS/Cholesterol Panel:  Liver Function Studies -   Recent Labs   Lab Test 01/30/19  0733 06/15/18  0732  07/01/15  0612 03/27/14  1022   CHOL 206* 184   < > 190 146   HDL 33* 35*   < > 42* 32*   * 111*   < > 96 77   TRIG 165* 189*   < > 260* 186*   CHOLHDLRATIO  --   --   --  4.5 4.5    < > = values in this interval not displayed.     Liver Function Studies -   Recent Labs   Lab Test 11/05/18  0750   PROTTOTAL 7.0   ALBUMIN 3.6   BILITOTAL 0.4   ALKPHOS 84   AST 22   ALT 32       Thyroid Function:  ENDO THYROID LABS-[facility] Latest Ref Rng & Units 4/16/2017   TSH 0.40 - 4.00 mU/L 2.06     ENDO THYROID LABS-[facility] Latest Ref Rng 9/27/2016 4/2/2014   TSH 0.40 - 4.00 mU/L 2.17 1.09   T4 FREE 0.70 - 1.85 ng/dL  0.75       Urine MicroAlbumin:  Lab Results   Component Value Date    UMALCR 654.32 11/05/2018      Vitamin " D:  Vitamin D Deficiency Screening Results:  Lab Results   Component Value Date    VITDT 33 11/05/2018    VITDT 39 09/27/2016    VITDT 45 02/23/2016    VITDT 31 03/31/2015    VITDT 29 (L) 03/27/2014         All pertinent notes, labs, and images personally reviewed by me.       Glucometer/ insulin pump (if applicable)/ CGM data (if applicable) downloaded, Personally reviewed and interpreted.  All Blood sugar data reviewed personally and discussed with pt.  See nursing note from today's clinic visit for details of BG/CGM log.      A/P  Ms.Erica WILLIAM Vogel is a 39 year old here for the evaluation/management of diabetes:    1. DM2 - Under Fair control.  BG improving.  Using OMNIPOD DASH + Freestyle casey.  H/o hypertropic cardiomyopathy, followed by cardiology.  Limited blood sugar data.   Has variable shifts at work.    I also discussed importance of strict blood sugar control to prevent complications associated with uncontrolled diabetes.  Plan:  Labs today.  Continue metformin  1000 mg twice daily  Continue Trulicity 0.75 mg once a week  Decrease basal overnight ( with low BG episodes)  Follow-up with diabetic educator in 4 weeks  Based on blood sugar data at that time can consider further adjustment in pump settings.  Based on data in few weeks can consider to increase Trulicity to 1.75 mg/week.    Recommend checking blood sugars before meals and at bedtime.    If Blood glucose are low more often-> 2-3 times/week- give us a call.  The patient is advised to Make better food choices: reduce carbs, Reduce portion size, weight loss and exercise 3-4 times a week.  Discussed hypoglycemia signs and symptoms as well as management in detail.    Prevention    Most Recent Immunizations   Administered Date(s) Administered     Influenza (IIV3) PF 09/26/2018     Influenza (intradermal) 10/01/2012     MMR 09/17/2007     Pneumococcal 23 valent 01/22/2003     TD (ADULT, 7+) 01/01/2002     TDAP Vaccine (Adacel) 04/06/2012      Opthalmology-2018, due  Dental-Yes  ASA-81 mg   Smoking- No    2. Hypertension - Under Good control.  + microalbuminuria. On losartan. Blood pressure medications include cozaar 75 mg qd. Need aggressive BP/glycmeic control.    3. Hyperlipidemia - Under fair control.TG high with low HDL, LD at 140, HDL 33.  Had myalgias on lipitor and stopped it.  On Crestor 5 mg/day.    4. Microalbuminuria:  Recommend strict BG control.  On Cozaar 50 mg/day.    All questions were answered.  The patient indicates understanding of the above issues and agrees with the plan set forth.   More than 50% of face to face time spent with Ms. Dino Vogel on counseling / coordinating her care.      Follow-up:  follow up in 3 months.    Zita Fuentes MD  Endocrinology   [facility]    CC: Alan Paredes    Addendum to above note and clinic visit:    Labs reviewed.    See result note/telephone encounter.

## 2020-01-14 NOTE — PATIENT INSTRUCTIONS
[facility] & [place] locations   Dr Fuentes, Endocrinology Department      [facility]   [address]  [place] [address]  Appointment [phone]  Fax: [phone]  [place]: Monday and Tuesday         [facility] [address]   [address]  [place] [address]  Appointment [phone]  Fax: [phone]  [place]: Wednesday and Thursday            Please check the cost coverage and copay with insurance before recommended tests, services and medications (especially if new medications are prescribed).     If ordered, please get blood work done 1 week prior to your next appointment so they will be available to Dr. Fuentes at your visit.    To provide the best diabetic care, please bring your blood glucose meter to each and every visit with your  Endocrinologist. Your blood glucose meter/insulin pump will be downloaded at every appointment.  Please arrive 15 minutes before your scheduled appointment. This will allow for your blood glucose meter/insulin pump  to be downloaded.  If you are wearing DEXCOM please bring  or sharing code from the Dexcom Clarity Appt so that it can be downloaded.  If you are using freestyle casey personal sensors please bring the reader.  If you are using TANDEM insulin pump please have your username and password to get info from Tandem website.    Change in basal.  Labs today.  Send BG data to CDE in 2-4 weeks.  Consider to increase Trulicity in future.    Repeat labs and follow up in 3 months.    Recommend checking blood sugars before meals and at bedtime.    If Blood glucose are low more often-> 2-3 times/week- give us a call.  The patient is advised to Make better food choices: reduce carbs, Reduce portion size, weight loss and exercise 3-4 times a week.  Discussed hypoglycemia signs and symptoms as well as management in detail.

## 2020-01-14 NOTE — LETTER
1/14/2020         RE: Marie Vogel  [address] [facility] [address]  [place] [address]        Dear Colleague,    Thank you for referring your patient, Marie Vogel, to the [facility]. Please see a copy of my visit note below.    Name: Marie Vogel  Seen for f/u of DM  HPI:  Marie Vogel is a 39 year old female who presents for the evaluation/management of DM.   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    Here for f/u. Previously has seen endo at [facility] ( Dr Aguilera and Dr Zhou and ). 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 Busy at work, also variable schedule.. Concerned about weight gain.   H/o cardiomyopathy. Followed by cardiology.    Previously  she was started on Trulicity 0.75 mg/week.  She is tolerating okay.  Reports that she is not feeling hungry and has lost some weight.    On Omnipod since 10/2019 and using freestyle casey.    1. Type 2 DM:  Orginally diagnosed at the age of: 21 with GDM during her first pregnancy. Diagnosed with DM 2 in 1/2002, diet controlled for 3 years, then started on metformin. In 2010 during her second pregnancy, started on insulin and then insulin pump therapy in 2012.   Current Regimen: Insulin pump therapy ( Medtronic Minimed Paradigm), metformin XR 1000 mg bid, Trulicity 0.75 mg/week  BS checks: 4-5 times per day  Average Meter Download:     yes:     Diabetes Medication(s)     Biguanides       metFORMIN (GLUCOPHAGE-XR) 500 MG 24 hr tablet    Take 2 tablets (1,000 mg) by mouth 2 times daily (with meals)    Insulin       insulin aspart (NOVOLOG VIAL) 100 UNITS/ML vial     With insulin pump. Uses about 80 units/day.     INSULIN PUMP - OUTPATIENT    Updated 19:  OmniPod Dash  BASAL:  00:00: 1.150 units/hr  10:30: 1.2  14:30: 1.1  CARB RATIO:  00:00: 8  Sensitivity:  00:00: 30 mg/dL  BLOOD GLUCOSE TARGET and times:  12   AM (midnight): 120-120  Active Insulin Time: 4 hours  Sensor: Nadia/ Nadia Link Donna  Amihs:   [email or web address]  Password [email or web address]    Incretin Mimetic Agents (GLP-1 Receptor Agonists)       dulaglutide (TRULICITY) 0.75 MG/0.5ML pen    Inject 0.75 mg Subcutaneous every 7 days            Complications:   Diabetes Complications  Description / Detail    Diabetic Retinopathy  No   CAD / PAD  No   Neuropathy  No   Nephropathy / Microalbuminuria  Yes: microalbuminuria. ON cozaar.   Gastroparesis  No   Hypoglycemia [place]  No     2. Hypertension: Blood Pressure today:   BP Readings from Last 3 Encounters:   20 (!) 128/90   19 135/84   19 128/80     Takes medications everyday without forgetting a dose.  Denies feeling lightheaded or dizzy.    3. Hyperlipidemia:   Denies muscle aches of pains.       PMH/PSH:  Past Medical History:   Diagnosis Date     Allergic rhinitis due to pollen      Atypical glandular cells on Pap smear 3/1108     Cervical high risk HPV (human papillomavirus) test positive 2018    See problem list     Gastroesophageal reflux disease      PFO (patent foramen ovale)      Stargardt's disease 2019     Type II or unspecified type diabetes mellitus without mention of complication, not stated as uncontrolled     onset was gestational in      Past Surgical History:   Procedure Laterality Date     C INDUCED ABORTN BY D&C           C IUD,MIRENA  8/10/10, 7/28/15     C/SECTION, LOW TRANSVERSE  6/25/10    , Low Transverse     CHOLECYSTECTOMY, LAPOROSCOPIC      Cholecystectomy, Laparoscopic     ESOPHAGOSCOPY, GASTROSCOPY, DUODENOSCOPY (EGD), COMBINED N/A 2016    Procedure:  COMBINED ESOPHAGOSCOPY, GASTROSCOPY, DUODENOSCOPY (EGD), BIOPSY SINGLE OR MULTIPLE;  Surgeon: Fadi Hunter MD;  Location: [facility]     HC ESOPHAGEAL MOTILITY STUDY N/A 2016    Procedure: ESOPHAGEAL MOTILITY STUDY;  Surgeon: Fadi Hunter MD;  Location:  GI     HC REMOVE TONSILS/ADENOIDS,<13 Y/O  1987    T &A     Family Hx:  Family History   Problem Relation Age of Onset     Cardiovascular Mother         hypertrophic cardiomyopathy     Genitourinary Problems Mother         gallbladder disease     Diabetes Mother         drug induced     Other - See Comments Mother         heart transplant 2005     Diabetes Father         type 2     Hypertension Father      Genitourinary Problems Maternal Grandmother         gallbladder disease     Genitourinary Problems Paternal Grandmother         gallbladder disease     Hypertension Paternal Grandfather         high bp     Cerebrovascular Disease Paternal Grandfather      Cardiovascular Brother         hypertrophic cardiomyopathy     Diabetes Brother         type 2     Glaucoma No family hx of      Macular Degeneration No family hx of      Thyroid disease: No         DM2: Yes - father and mother         Autoimmune: DM1, SLE, RA, Vitiligo No    Social Hx:  Social History     Socioeconomic History     Marital status:      Spouse name: Not on file     Number of children: 2     Years of education: 14     Highest education level: Not on file   Occupational History     Occupation: nurse   Social Needs     Financial resource strain: Not on file     Food insecurity:     Worry: Not on file     Inability: Not on file     Transportation needs:     Medical: Not on file     Non-medical: Not on file   Tobacco Use     Smoking status: Former Smoker     Types: Cigarettes     Last attempt to quit: 10/24/2005     Years since quittin.2     Smokeless tobacco: Former User     Tobacco comment: States only smokes when drinks maybe 2x/year   Substance and Sexual  Activity     Alcohol use: No     Alcohol/week: 0.0 standard drinks     Drug use: No     Sexual activity: Yes     Partners: Male     Birth control/protection: I.U.D.     Comment: Mirena IUD 7/28/15   Lifestyle     Physical activity:     Days per week: Not on file     Minutes per session: Not on file     Stress: Not on file   Relationships     Social connections:     Talks on phone: Not on file     Gets together: Not on file     Attends Oriental orthodox service: Not on file     Active member of club or organization: Not on file     Attends meetings of clubs or organizations: Not on file     Relationship status: Not on file     Intimate partner violence:     Fear of current or ex partner: Not on file     Emotionally abused: Not on file     Physically abused: Not on file     Forced sexual activity: Not on file   Other Topics Concern     Parent/sibling w/ CABG, MI or angioplasty before 65F 55M? Not Asked   Social History Narrative    Caffeine intake/servings daily - 6-7    Calcium intake/servings daily - 2-3 yogurt, milk, cheese    Exercise 1 times weekly - describe aerobics    Sunscreen used - Yes    Seatbelts used - Yes    Guns stored in the home - No    Self Breast Exam - Yes    Pap test up to date -  Yes    Eye exam up to date -  Yes    Dental exam up to date -  Yes    DEXA scan up to date -  Not Applicable    Flex Sig/Colonoscopy up to date -  Not Applicable    Mammography up to date -  Not Applicable    Immunizations reviewed and up to date - Yes    Abuse: Current or Past (Physical, Sexual or Emotional) - No    Do you feel safe in your environment - Yes    Do you cope well with stress - Yes    Do you suffer from insomnia - Yes     Last updated by: Tatianna Lacey  5/9/2005          MEDICATIONS:  has a current medication list which includes the following prescription(s): aspirin, atenolol, blood glucose, blood glucose monitoring, cetirizine, freestyle casey 14 day sensor, dulaglutide, epinephrine, fluticasone, ibuprofen,  "insulin aspart, omnipod dash 5 pack, omnipod dash system, insulin pump, blood glucose monitoring, levonorgestrel, losartan, magnesium oxide, metformin, montelukast, multiple vitamins-minerals, fish oil, omeprazole, skin prep wipes, rosuvastatin, and sumatriptan.    ROS     ROS: 10 point ROS neg other than the symptoms noted above in the HPI.    Physical Exam   VS: BP (!) 128/90 (BP Location: Right arm, Patient Position: Chair, Cuff Size: Adult Regular)   Pulse 79   Temp 97.6  F (36.4  C) (Oral)   Resp 16   Ht 1.626 m (5' 4\")   Wt 83.7 kg (184 lb 9.6 oz)   LMP 12/23/2019   SpO2 96%   Breastfeeding No   BMI 31.69 kg/m     GENERAL: AXOX3, NAD, well dressed, answering questions appropriately, appears stated age.  HEENT: No exopthalmous, no proptosis, EOMI, no lig lag, no retraction  NECK: Thyroid normal in size, non tender, no nodules were palpated.  CV: RRR  LUNGS: CTAB  ABDOMEN: +BS  DM FOOT EXAM: normal DP and PT pulses, no trophic changes or ulcerative lesions, normal sensory exam and normal monofilament exam.   NEUROLOGY: CN grossly intact, no tremors  PSYCH: normal affect and mood    LABS:  A1c:  Lab Results   Component Value Date    A1C 7.5 09/24/2019    A1C 7.3 01/30/2019    A1C 8.8 11/05/2018    A1C 7.7 06/15/2018    A1C 8.5 10/24/2017       Basic Metabolic Panel:  Creatinine   Date Value Ref Range Status   07/22/2018 0.92 0.52 - 1.04 mg/dL Final       LFTS/Cholesterol Panel:  Liver Function Studies -   Recent Labs   Lab Test 01/30/19  0733 06/15/18  0732  07/01/15  0612 03/27/14  1022   CHOL 206* 184   < > 190 146   HDL 33* 35*   < > 42* 32*   * 111*   < > 96 77   TRIG 165* 189*   < > 260* 186*   CHOLHDLRATIO  --   --   --  4.5 4.5    < > = values in this interval not displayed.     Liver Function Studies -   Recent Labs   Lab Test 11/05/18  0750   PROTTOTAL 7.0   ALBUMIN 3.6   BILITOTAL 0.4   ALKPHOS 84   AST 22   ALT 32       Thyroid Function:  ENDO THYROID LABS-[facility] Latest Ref Rng & Units " 4/16/2017   TSH 0.40 - 4.00 mU/L 2.06     ENDO THYROID LABS[facility] Latest Ref Rng 9/27/2016 4/2/2014   TSH 0.40 - 4.00 mU/L 2.17 1.09   T4 FREE 0.70 - 1.85 ng/dL  0.75       Urine MicroAlbumin:  Lab Results   Component Value Date    UMALCR 654.32 11/05/2018      Vitamin D:  Vitamin D Deficiency Screening Results:  Lab Results   Component Value Date    VITDT 33 11/05/2018    VITDT 39 09/27/2016    VITDT 45 02/23/2016    VITDT 31 03/31/2015    VITDT 29 (L) 03/27/2014         All pertinent notes, labs, and images personally reviewed by me.       Glucometer/ insulin pump (if applicable)/ CGM data (if applicable) downloaded, Personally reviewed and interpreted.  All Blood sugar data reviewed personally and discussed with pt.  See nursing note from today's clinic visit for details of BG/CGM log.      A/P  Ms.Marie Vogel is a 39 year old here for the evaluation/management of diabetes:    1. DM2 - Under Fair control.  BG improving.  Using OMNIPOD DASH + Freestyle casey.  H/o hypertropic cardiomyopathy, followed by cardiology.  Limited blood sugar data.   Has variable shifts at work.    I also discussed importance of strict blood sugar control to prevent complications associated with uncontrolled diabetes.  Plan:  Labs today.  Continue metformin  1000 mg twice daily  Continue Trulicity 0.75 mg once a week  Decrease basal overnight ( with low BG episodes)  Follow-up with diabetic educator in 4 weeks  Based on blood sugar data at that time can consider further adjustment in pump settings.  Based on data in few weeks can consider to increase Trulicity to 1.75 mg/week.    Recommend checking blood sugars before meals and at bedtime.    If Blood glucose are low more often-> 2-3 times/week- give us a call.  The patient is advised to Make better food choices: reduce carbs, Reduce portion size, weight loss and exercise 3-4 times a week.  Discussed hypoglycemia signs and symptoms as well as management in  detail.    Prevention    Most Recent Immunizations   Administered Date(s) Administered     Influenza (IIV3) PF 09/26/2018     Influenza (intradermal) 10/01/2012     MMR 09/17/2007     Pneumococcal 23 valent 01/22/2003     TD (ADULT, 7+) 01/01/2002     TDAP Vaccine (Adacel) 04/06/2012     Opthalmology-2018, due  Dental-Yes  ASA-81 mg   Smoking- No    2. Hypertension - Under Good control.  + microalbuminuria. On losartan. Blood pressure medications include cozaar 75 mg qd. Need aggressive BP/glycmeic control.    3. Hyperlipidemia - Under fair control.TG high with low HDL, LD at 140, HDL 33.  Had myalgias on lipitor and stopped it.  On Crestor 5 mg/day.    4. Microalbuminuria:  Recommend strict BG control.  On Cozaar 50 mg/day.    All questions were answered.  The patient indicates understanding of the above issues and agrees with the plan set forth.   More than 50% of face to face time spent with Ms. Dino Vogel on counseling / coordinating her care.      Follow-up:  follow up in 3 months.    Zita Fuentes MD  Endocrinology   [facility]    CC: Alan Paredes    Addendum to above note and clinic visit:    Labs reviewed.    See result note/telephone encounter.                Again, thank you for allowing me to participate in the care of your patient.        Sincerely,        Zita Fuentes MD

## 2020-01-15 LAB
C PEPTIDE SERPL-MCNC: 0.8 NG/ML (ref 0.9–6.9)
CHOLEST SERPL-MCNC: 172 MG/DL
CREAT SERPL-MCNC: 0.89 MG/DL (ref 0.52–1.04)
CREAT UR-MCNC: 237 MG/DL
GFR SERPL CREATININE-BSD FRML MDRD: 80 ML/MIN/{1.73_M2}
HDLC SERPL-MCNC: 41 MG/DL
LDLC SERPL CALC-MCNC: 82 MG/DL
MICROALBUMIN UR-MCNC: 1150 MG/L
MICROALBUMIN/CREAT UR: 485.23 MG/G CR (ref 0–25)
NONHDLC SERPL-MCNC: 131 MG/DL
TRIGL SERPL-MCNC: 243 MG/DL

## 2020-01-29 ASSESSMENT — ENCOUNTER SYMPTOMS
BREAST MASS: 0
MYALGIAS: 1
FREQUENCY: 0
HEARTBURN: 0
ABDOMINAL PAIN: 0
PALPITATIONS: 0
NAUSEA: 0
JOINT SWELLING: 0
DIZZINESS: 0
HEADACHES: 1
NERVOUS/ANXIOUS: 0
WEAKNESS: 0
CONSTIPATION: 0
FEVER: 0
COUGH: 0
HEMATURIA: 0
SORE THROAT: 0
HEMATOCHEZIA: 0
EYE PAIN: 0
PARESTHESIAS: 0
ARTHRALGIAS: 0
CHILLS: 0
DYSURIA: 0
SHORTNESS OF BREATH: 0
DIARRHEA: 0

## 2020-02-03 ENCOUNTER — OFFICE VISIT (OUTPATIENT)
Dept: PEDIATRICS | Facility: CLINIC | Age: 40
End: 2020-02-03
Payer: COMMERCIAL

## 2020-02-03 VITALS
WEIGHT: 184.2 LBS | RESPIRATION RATE: 20 BRPM | HEART RATE: 84 BPM | TEMPERATURE: 98 F | SYSTOLIC BLOOD PRESSURE: 108 MMHG | DIASTOLIC BLOOD PRESSURE: 74 MMHG | BODY MASS INDEX: 31.62 KG/M2

## 2020-02-03 DIAGNOSIS — E78.5 HYPERLIPIDEMIA LDL GOAL <100: ICD-10-CM

## 2020-02-03 DIAGNOSIS — E11.21 TYPE 2 DIABETES MELLITUS WITH DIABETIC NEPHROPATHY, WITH LONG-TERM CURRENT USE OF INSULIN (H): ICD-10-CM

## 2020-02-03 DIAGNOSIS — Z79.4 TYPE 2 DIABETES MELLITUS WITH DIABETIC NEPHROPATHY, WITH LONG-TERM CURRENT USE OF INSULIN (H): ICD-10-CM

## 2020-02-03 DIAGNOSIS — G47.00 INSOMNIA, UNSPECIFIED TYPE: ICD-10-CM

## 2020-02-03 DIAGNOSIS — I42.1 HYPERTROPHIC OBSTRUCTIVE CARDIOMYOPATHY (H): ICD-10-CM

## 2020-02-03 DIAGNOSIS — Z00.00 ROUTINE GENERAL MEDICAL EXAMINATION AT A HEALTH CARE FACILITY: Primary | ICD-10-CM

## 2020-02-03 DIAGNOSIS — H35.50 RETINAL DYSTROPHY: Primary | ICD-10-CM

## 2020-02-03 PROCEDURE — 99213 OFFICE O/P EST LOW 20 MIN: CPT | Mod: 25 | Performed by: INTERNAL MEDICINE

## 2020-02-03 PROCEDURE — 80053 COMPREHEN METABOLIC PANEL: CPT | Performed by: INTERNAL MEDICINE

## 2020-02-03 PROCEDURE — 36415 COLL VENOUS BLD VENIPUNCTURE: CPT | Performed by: INTERNAL MEDICINE

## 2020-02-03 PROCEDURE — 99396 PREV VISIT EST AGE 40-64: CPT | Performed by: INTERNAL MEDICINE

## 2020-02-03 RX ORDER — TRAZODONE HYDROCHLORIDE 50 MG/1
25-50 TABLET, FILM COATED ORAL
Qty: 20 TABLET | Refills: 0 | Status: SHIPPED | OUTPATIENT
Start: 2020-02-03 | End: 2020-09-18

## 2020-02-03 ASSESSMENT — ENCOUNTER SYMPTOMS
CONSTIPATION: 0
FEVER: 0
HEADACHES: 1
NERVOUS/ANXIOUS: 0
HEARTBURN: 0
CHILLS: 0
HEMATURIA: 0
JOINT SWELLING: 0
ARTHRALGIAS: 0
SORE THROAT: 0
HEMATOCHEZIA: 0
FREQUENCY: 0
ABDOMINAL PAIN: 0
COUGH: 0
PALPITATIONS: 0
NAUSEA: 0
BREAST MASS: 0
WEAKNESS: 0
EYE PAIN: 0
MYALGIAS: 1
PARESTHESIAS: 0
DIZZINESS: 0
SHORTNESS OF BREATH: 0
DYSURIA: 0
DIARRHEA: 0

## 2020-02-03 NOTE — PROGRESS NOTES
SUBJECTIVE:   CC: Marie Vogel is an 39 year old woman who presents for preventive health visit.     Healthy Habits:     Getting at least 3 servings of Calcium per day:  Yes    Bi-annual eye exam:  Yes    Dental care twice a year:  NO    Sleep apnea or symptoms of sleep apnea:  Daytime drowsiness    Diet:  Regular (no restrictions) and Carbohydrate counting    Frequency of exercise:  1 day/week    Duration of exercise:  15-30 minutes    Taking medications regularly:  Yes    Medication side effects:  Muscle aches    PHQ-2 Total Score: 0    Additional concerns today:  Yes    insomnia      Duration: past few months.  Issues with insomnia for the past few months.  Currently works 3, 12-hour shifts Friday Saturday and Sunday.  Each of those shifts are overnight such that patient has to sleep during the day.  She has children at home and therefore generally daytime sleep is cut short to approximately 5-6 hours.  Between Monday and Thursday she resumes typical daytime pattern while she is off work.  At this point finds that she wakes up for several hours each night Monday through Thursday before starting her overnight shifts.    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 When at work  OTC decongestants: no   Any new medications: no     Therapies tried and outcome: melatonin otc  Was not effective            Today's PHQ-2 Score: 0  PHQ-2 ( 1999 Pf[employer]) 1/29/2020   Q1: Little interest or pleasure in doing things 0   Q2: Feeling down, depressed or hopeless 0   PHQ-2 Score 0   Q1: Little  interest or pleasure in doing things Not at all   Q2: Feeling down, depressed or hopeless Not at all   PHQ-2 Score 0       Abuse: Current or Past(Physical, Sexual or Emotional)- No  Do you feel safe in your environment? Yes        Social History     Tobacco Use     Smoking status: Former Smoker     Types: Cigarettes     Last attempt to quit: 10/24/2005     Years since quittin.2     Smokeless tobacco: Former User     Tobacco comment: States only smokes when drinks maybe 2x/year   Substance Use Topics     Alcohol use: No     Alcohol/week: 0.0 standard drinks     Frequency: Monthly or less     Drinks per session: 1 or 2     Binge frequency: Never     If you drink alcohol do you typically have >3 drinks per day or >7 drinks per week? No    Alcohol Use 2020   Prescreen: >3 drinks/day or >7 drinks/week? No   No flowsheet data found.    Reviewed orders with patient.  Reviewed health maintenance and updated orders accordingly - Yes    Pertinent mammograms are reviewed under the imaging tab.    PAP / HPV Latest Ref Rng & Units 2019   PAP - NIL NIL NIL   HPV 16 DNA NEG:Negative Negative Negative Negative   HPV 18 DNA NEG:Negative Negative Negative Negative   OTHER HR HPV NEG:Negative Negative Positive(A) Negative     Reviewed and updated as needed this visit by clinical staff  Tobacco  Allergies  Meds  Med Hx  Surg Hx  Fam Hx  Soc Hx        Reviewed and updated as needed this visit by Provider          Patient Active Problem List   Diagnosis     Allergic rhinitis     HYPERLIPIDEMIA LDL GOAL <100     Family history of other cardiovascular diseases     Health Care Home     Microalbuminuria     Insulin pump in place     Vitamin D deficiency     Nut allergy     Type 2 diabetes mellitus with diabetic nephropathy     Diabetic gastroparesis (H)     Hypertrophic obstructive cardiomyopathy (H)     PFO (patent foramen ovale)     Other migraine without status migrainosus, not intractable      Scars of posterior pole of chorioretina, bilateral     Cervical high risk HPV (human papillomavirus) test positive     Mirena IUD inserted 19: Due for removal 2024     Past Medical History:   Diagnosis Date     Allergic rhinitis due to pollen      Atypical glandular cells on Pap smear 3/1108     Cervical high risk HPV (human papillomavirus) test positive 2018    See problem list     Gastroesophageal reflux disease      PFO (patent foramen ovale)      Stargardt's disease 2019     Type II or unspecified type diabetes mellitus without mention of complication, not stated as uncontrolled     onset was gestational in        Past Surgical History:   Procedure Laterality Date     C INDUCED ABORTN BY D&C           C IUD,MIRENA  8/10/10, 7/28/15     C/SECTION, LOW TRANSVERSE  6/25/10    , Low Transverse     CHOLECYSTECTOMY, LAPOROSCOPIC      Cholecystectomy, Laparoscopic     ESOPHAGOSCOPY, GASTROSCOPY, DUODENOSCOPY (EGD), COMBINED N/A 2016    Procedure: COMBINED ESOPHAGOSCOPY, GASTROSCOPY, DUODENOSCOPY (EGD), BIOPSY SINGLE OR MULTIPLE;  Surgeon: Fadi Hunter MD;  Location: [facility] ESOPHAGEAL MOTILITY STUDY N/A 2016    Procedure: ESOPHAGEAL MOTILITY STUDY;  Surgeon: Fadi Hunter MD;  Location: [facility] REMOVE TONSILS/ADENOIDS,<13 Y/O  1987    T &A       Family History   Problem Relation Age of Onset     Cardiovascular Mother         hypertrophic cardiomyopathy     Genitourinary Problems Mother         gallbladder disease     Diabetes Mother         drug induced     Other - See Comments Mother         heart transplant 2005     Diabetes Father         type 2     Hypertension Father      Genitourinary Problems Maternal Grandmother         gallbladder disease     Genitourinary Problems Paternal Grandmother         gallbladder disease     Hypertension Paternal Grandfather         high bp     Cerebrovascular Disease Paternal  Grandfather      Cardiovascular Brother         hypertrophic cardiomyopathy     Diabetes Brother         type 2     Glaucoma No family hx of      Macular Degeneration No family hx of        ALLERGIES     Allergies   Allergen Reactions     Ivp Dye [Contrast Dye] Itching     Pt reports that throat itches     Nuts Anaphylaxis     [place] nuts only     Bactrim Swelling     Pt reaction was swelling in mouth and tongue and itchy mouth.  Resolved with benadryl and prednisone     Penicillins Hives     Cephalosporins Itching     Seasonal Allergies Other (See Comments)     Molds, trees, grass, dust..  Upper congestion     Atorvastatin      myalgias     Shellfish Allergy Rash     Clams only     Current Outpatient Medications   Medication Sig Dispense Refill     aspirin 81 MG tablet Take 1 tablet (81 mg) by mouth daily 90 tablet 3     atenolol (TENORMIN) 25 MG tablet Take 0.5 tablets (12.5 mg) by mouth daily 90 tablet 3     blood glucose (PEPE CONTOUR NEXT) test strip Check 4 times/day 120 each 11     blood glucose monitoring (NO BRAND SPECIFIED) meter device kit Use to test blood sugar 6 times daily and as needed. 1 kit 0     cetirizine (ZYRTEC) 10 MG tablet Take 10 mg by mouth 2 times daily  90 tablet 3     Continuous Blood Gluc Sensor (FREESTYLE BESSIE 14 DAY SENSOR) MISC 2 each every 14 days 6 each 3     dulaglutide (TRULICITY) 0.75 MG/0.5ML pen Inject 0.75 mg Subcutaneous every 7 days 9 mL 1     EPINEPHrine 0.3 MG/0.3ML injection Inject 0.3 mLs (0.3 mg) into the muscle once as needed for anaphylaxis 0.3 mL 11     fluticasone (FLONASE) 50 MCG/ACT spray Spray 1-2 sprays into both nostrils daily 1 Bottle 0     ibuprofen (ADVIL) 200 MG tablet Take 200 mg by mouth every 4 hours as needed.       insulin aspart (NOVOLOG VIAL) 100 UNITS/ML vial With insulin pump. Uses about 80 units/day. 90 mL 3     Insulin Disposable Pump (OMNIPOD DASH 5 PACK) MISC 1 pod every 3 days 30 each 3     Insulin Disposable Pump (OMNIPOD DASH SYSTEM) KIT 1  kit continuous 1 kit 0     INSULIN PUMP - OUTPATIENT Updated 11/12/19:  OmniPod Dash  BASAL:  00:00: 1.150 units/hr  10:30: 1.2  14:30: 1.1  CARB RATIO:  00:00: 8  Sensitivity:  00:00: 30 mg/dL  BLOOD GLUCOSE TARGET and times:  12   AM (midnight): 120-120  Active Insulin Time: 4 hours  Sensor: Nadia/ Nadia Link Donna  Amish:   [email or web address]  Password [email or web address]!       Lancets (MICROLET) MISC 1 Device See Admin Instructions. Use up to 5 times daily for blood sugar checks. 100 each prn     levonorgestrel (MIRENA) 20 MCG/24HR IUD 1 each (20 mcg) by Intrauterine route once       losartan (COZAAR) 50 MG tablet TAKE ONE TABLET BY MOUTH EVERY MORNING AND TAKE ONE-HALF TABLET BY MOUTH EVERY EVENING 135 tablet 3     MAGNESIUM OXIDE PO Take 400 mg by mouth daily       metFORMIN (GLUCOPHAGE-XR) 500 MG 24 hr tablet Take 2 tablets (1,000 mg) by mouth 2 times daily (with meals) 360 tablet 3     montelukast (SINGULAIR) 10 MG tablet TAKE ONE TABLET BY MOUTH AT BEDTIME 90 tablet 3     Multiple Vitamins-Minerals (MULTI VITAMIN/MINERALS PO) Take 1 each by mouth daily.       Omega-3 Fatty Acids (FISH OIL) 500 MG CAPS Take 1 capsule by mouth       omeprazole (PRILOSEC) 40 MG DR capsule Take 1 capsule (40 mg) by mouth daily as needed 90 capsule 3     rosuvastatin (CRESTOR) 5 MG tablet Take 1 tablet (5 mg) by mouth daily 90 tablet 1     SUMAtriptan (IMITREX) 25 MG tablet TAKE ONE TO FOUR TABLETS BY MOUTH ONE TIME. MAY REPEAT 1 TABLET EVERY TWO HOURS UP TO MAXIMUM OF 200MG IN 24 HOURS 8 tablet 6       Review of Systems   Constitutional: Negative for chills and fever.   HENT: Positive for hearing loss. Negative for congestion, ear pain and sore throat.    Eyes: Negative for pain and visual disturbance.   Respiratory: Negative for cough and shortness of breath.    Cardiovascular: Negative for chest pain, palpitations and peripheral edema.   Gastrointestinal: Negative for abdominal pain, constipation, diarrhea, heartburn,  hematochezia and nausea.   Breasts:  Negative for tenderness, breast mass and discharge.   Genitourinary: Negative for dysuria, frequency, genital sores, hematuria, pelvic pain, urgency, vaginal bleeding and vaginal discharge.   Musculoskeletal: Positive for myalgias. Negative for arthralgias and joint swelling.   Skin: Negative for rash.   Neurological: Positive for headaches. Negative for dizziness, weakness and paresthesias.   Psychiatric/Behavioral: Negative for mood changes. The patient is not nervous/anxious.         OBJECTIVE:   /74   Pulse 84   Temp 98  F (36.7  C)   Resp 20   Wt 83.6 kg (184 lb 3.2 oz)   BMI 31.62 kg/m    Physical Exam  GENERAL: alert and no distress, appears fatigued  EYES: Eyes grossly normal to inspection, PERRL and conjunctivae and sclerae normal  HENT: ear canals and TM's normal, nose and mouth without ulcers or lesions  NECK: no adenopathy, no asymmetry, masses, or scars and thyroid normal to palpation  RESP: lungs clear to auscultation - no rales, rhonchi or wheezes  CV: regular rate and rhythm, normal S1 S2, no S3 or S4, no murmur, click or rub, no peripheral edema and peripheral pulses strong  ABDOMEN: soft, nontender, no hepatosplenomegaly, no masses and bowel sounds normal  MS: no gross musculoskeletal defects noted, no edema  SKIN: no suspicious lesions or rashes  NEURO: Normal strength and tone, mentation intact and speech normal  PSYCH: mentation appears normal    ASSESSMENT/PLAN:       ICD-10-CM    1. Routine general medical examination at a health care facility Z00.00        2. Type 2 diabetes mellitus with diabetic nephropathy, with long-term current use of insulin (H) E11.21 Comprehensive metabolic panel      Insulin-dependent diabetes managed by endocrinology.  Continue Omni pod pump, metformin, Trulicity    Z79.4    3. Hyperlipidemia LDL goal <100 E78.5 Adequate control.  Continue current regimen without changes.      4. Insomnia, unspecified type G47.00  "traZODone (DESYREL) 50 MG tablet      Insomnia likely secondary to night shift work and inadequate sleep each week.  Discussed sleep hygiene/goal of 7-8 hours of sleep daily and eliminating caffeine.  Patient would benefit from daytime work hours which she will look into.  In the meantime, start trial of low-dose trazodone as needed -side effects reviewed.     5. Hypertrophic obstructive cardiomyopathy (H) I42.1  history of hypertrophic obstructive cardiomyopathy, recent echocardiogram 2019 reviewed.   Continue beta blocker       COUNSELING:  Reviewed preventive health counseling, as reflected in patient instructions       Regular exercise       Healthy diet/nutrition       Osteoporosis Prevention/Bone Health    Estimated body mass index is 31.62 kg/m  as calculated from the following:    Height as of 1/14/20: 1.626 m (5' 4\").    Weight as of this encounter: 83.6 kg (184 lb 3.2 oz).    Weight management plan: Discussed healthy diet and exercise guidelines     reports that she quit smoking about 14 years ago. Her smoking use included cigarettes. She has quit using smokeless tobacco.      Counseling Resources:  ATP IV Guidelines  Pooled Cohorts Equation Calculator  Breast Cancer Risk Calculator  FRAX Risk Assessment  ICSI Preventive Guidelines  Dietary Guidelines for Americans, 2010  USDA's MyPlate  ASA Prophylaxis  Lung CA Screening    Alan Paredes MD, MD  [facility]  "

## 2020-02-03 NOTE — PATIENT INSTRUCTIONS
Preventive Health Recommendations  Female Ages 40 to 49    Yearly exam:     See your health care provider every year in order to  1. Review health changes.   2. Discuss preventive care.    3. Review your medicines if your doctor prescribed any.      Get a Pap test every three years (unless you have an abnormal result and your provider advises testing more often).      If you get Pap tests with HPV test, you only need to test every 5 years, unless you have an abnormal result. You do not need a Pap test if your uterus was removed (hysterectomy) and you have not had cancer.      You should be tested each year for STDs (sexually transmitted diseases), if you're at risk.     Ask your doctor if you should have a mammogram.      Have a colonoscopy (test for colon cancer) if someone in your family has had colon cancer or polyps before age 50.       Have a cholesterol test every 5 years.       Have a diabetes test (fasting glucose) after age 45. If you are at risk for diabetes, you should have this test every 3 years.    Shots: Get a flu shot each year. Get a tetanus shot every 10 years.     Nutrition:     Eat at least 5 servings of fruits and vegetables each day.    Eat whole-grain bread, whole-wheat pasta and brown rice instead of white grains and rice.    Get adequate Calcium and Vitamin D.      Lifestyle    Exercise at least 150 minutes a week (an average of 30 minutes a day, 5 days a week). This will help you control your weight and prevent disease.    Limit alcohol to one drink per day.    No smoking.     Wear sunscreen to prevent skin cancer.    See your dentist every six months for an exam and cleaning.        Patient Education     Insomnia  Insomnia is repeated difficulty going to sleep or staying asleep, or both. Whether you have insomnia is not defined by a specific amount of sleep. Different people need different amounts of sleep, and you may need more or less sleep at different times of your life.  There are 3  major types of insomnia: short-term, chronic, and  other.  Short-term, or acute insomnia lasts less than 3 months. The symptoms are temporary and can be linked directly to a stressor, such as the death of a loved one, financial problems, or a new physical problem. Short-term insomnia stops when the stressor resolves or the person adapts to its presence.  Chronic insomnia occurs at least 3 times a week and lasts longer than 3 months. Chronic insomnia can occur when either the cause of the sleeping problem is not clear, or the insomnia does not get better when the stressor is resolved. A number of other criteria are also used to make the diagnosis of chronic insomnia.    Other insomnia  is the third type of insomnia-related sleep disorders. This description applies to people who have problems getting to sleep or staying asleep, but don't meet all of the factors that describe either short-term or chronic insomnia.    Many things cause insomnia. Different people may have different causes. It can be from an underlying medical or psychological condition, or lifestyle. It can also be primary insomnia, which means no cause can be found.  Causes of insomnia include:    Chronic medical problems- heart disease, gastrointestinal problems, hormonal changes, breathing problems    Anxiety    Stress    Depression    Pain    Work schedule    Sleep apnea    Illegal drugs    Certain medicines  Many different medicines can affect your sleep, such as stimulants, caffeine, alcohol, some decongestants, and diet pills. Other medicines may include some types of blood pressure pills, steroids, asthma medicines, antihistamines, antidepressants, seizure medicines and statins. Not all of these will affect your sleep, and they shouldn t be stopped without talking to your doctor.  Symptoms of insomnia can include:    Lying awake for long periods at night before falling asleep    Waking up several times during the night    Waking up early in the  morning and not being able to get back to sleep    Feeling tired and not refreshed by sleep    Not being able to function properly during the day and finding it hard to concentrate    Irritability    Tiredness and fatigue during the day  Home care    Review your medicines with your doctor or pharmacist to find out if they can cause insomnia. Not all medicines will affect your sleep, but they shouldn't be stopped without reviewing them with your doctor. There may be serious side effects and consequences from suddenly stopping your medicines. Not taking them may cause strokes, heart attacks, and many other problems.    Caffeine, smoking, and alcohol also affect sleep. Limit your daily use and don't use these before bedtime. Alcohol may make you sleepy at first, but as its effects wear off, you may awaken a few hours later and have trouble returning to sleep.    Don't exercise, eat or drink large amounts of liquid within 2 hours of your bedtime.    Improve your sleep habits. Have a fixed bed and wake-up time. Try to keep noise, light and heat in your bedroom at a comfortable level. Try using earplugs or eyeshades if needed.     Don't watch TV in bed.    If you don't fall asleep within 30 minutes, try to relax by reading or listening to soft music.    Limit daytime napping to one 30 minute period, early in the day.    Get regular exercise. Find other ways to lessen your stress level.    If a medicine was prescribed to help reset your sleep patterns, take it as directed. Sleeping pills are intended for short-term use, only. If taken for too long, the effect wears off while the risk of physical addiction and psychological dependence increases.  Sleep diary  If the cause isn t obvious and it is not improving, try keeping a  sleep diary  for a couple of weeks. Include in it:    The time you go to bed    How long it takes to fall asleep    How many times you wake up    What time you wake up    Your meal times and what you  eat    What time you drink alcohol and caffeine    Your exercise habits and times  Follow-up care  Follow up with your healthcare provider, or as advised. If an X-ray or CT scan was done, you will be notified if there is a change in the reading, especially if it affects treatment.  Call 911  Call 911 if any of these occur:    Trouble breathing    Confusion or trouble waking    Fainting or loss of consciousness    Rapid heart rate    New chest, arm, shoulder, neck or upper back pain    Trouble with speech or vision, weakness of an arm or leg    Trouble walking or talking, loss of balance, numbness or weakness in one side of your body, facial droop  When to seek medical advice  Call your healthcare provider right away if any of these occur:    Extreme restlessness or irritability    Confusion or hallucinations (seeing or hearing things that are not there)    Anxiety, depression    Several days without sleeping  Date Last Reviewed: 5/1/2018 2000-2019 [employer]". [address] [place] [id number]. All rights reserved. This information is not intended as a substitute for professional medical care. Always follow your healthcare professional's instructions.

## 2020-02-03 NOTE — PROGRESS NOTES
Subjective     Marie Vogel is a 39 year old female who presents to clinic today for the following health issues:    Healthy Habits:     Getting at least 3 servings of Calcium per day:  Yes    Bi-annual eye exam:  Yes    Dental care twice a year:  NO    Sleep apnea or symptoms of sleep apnea:  Daytime drowsiness    Diet:  Regular (no restrictions) and Carbohydrate counting    Frequency of exercise:  1 day/week    Duration of exercise:  15-30 minutes    Taking medications regularly:  Yes    Medication side effects:  Muscle aches    PHQ-2 Total Score: 0    Additional concerns today:  No     Insomnia      Duration: Six weeks    Description  Frequency of insomnia:  Three to four times per week  Time to fall asleep: over two hours  Middle of night awakening:  several times a week  Early morning awakening:  several times a week    Accompanying signs and symptoms:  fatigue    History  Similar episodes in past:  no   Previous evaluation/sleep study:  no     Precipitating or alleviating factors:  New stressful situation: YES, 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 When at work  OTC decongestants: no   Any new medications: no     Therapies tried and outcome: melatonin otc  Was not effective    {additonal problems for provider to add (Optional):134261}    {HIST REVIEW/ LINKS 2 (Optional):115051}    {Additional problems for the provider to add (optional):593283}  Reviewed and updated as needed this visit by Provider         Review of Systems   Constitutional: Negative for chills and fever.   HENT: Positive for hearing loss. Negative for congestion, ear pain and sore throat.    Eyes: Negative for pain and visual disturbance.   Respiratory: Negative for cough and shortness of breath.    Cardiovascular: Negative for chest pain, palpitations and peripheral edema.   Gastrointestinal: Negative for abdominal pain, constipation, diarrhea, heartburn, hematochezia and nausea.   Breasts:  Negative  "for tenderness, breast mass and discharge.   Genitourinary: Negative for dysuria, frequency, genital sores, hematuria, pelvic pain, urgency, vaginal bleeding and vaginal discharge.   Musculoskeletal: Positive for myalgias. Negative for arthralgias and joint swelling.   Skin: Negative for rash.   Neurological: Positive for headaches. Negative for dizziness, weakness and paresthesias.   Psychiatric/Behavioral: Negative for mood changes. The patient is not nervous/anxious.       {ROS COMP (Optional):414708}      Objective    There were no vitals taken for this visit.  There is no height or weight on file to calculate BMI.  Physical Exam   {Exam List (Optional):635135}    {Diagnostic Test Results (Optional):240063::\"Diagnostic Test Results:\",\"Labs reviewed in Epic\"}        {PROVIDER CHARTING PREFERENCE:875459}        "

## 2020-02-04 LAB
ALBUMIN SERPL-MCNC: 3.6 G/DL (ref 3.4–5)
ALP SERPL-CCNC: 94 U/L (ref 40–150)
ALT SERPL W P-5'-P-CCNC: 29 U/L (ref 0–50)
ANION GAP SERPL CALCULATED.3IONS-SCNC: 6 MMOL/L (ref 3–14)
AST SERPL W P-5'-P-CCNC: 18 U/L (ref 0–45)
BILIRUB SERPL-MCNC: 0.4 MG/DL (ref 0.2–1.3)
BUN SERPL-MCNC: 16 MG/DL (ref 7–30)
CALCIUM SERPL-MCNC: 9.4 MG/DL (ref 8.5–10.1)
CHLORIDE SERPL-SCNC: 106 MMOL/L (ref 94–109)
CO2 SERPL-SCNC: 25 MMOL/L (ref 20–32)
CREAT SERPL-MCNC: 1.01 MG/DL (ref 0.52–1.04)
GFR SERPL CREATININE-BSD FRML MDRD: 69 ML/MIN/{1.73_M2}
GLUCOSE SERPL-MCNC: 151 MG/DL (ref 70–99)
POTASSIUM SERPL-SCNC: 4.3 MMOL/L (ref 3.4–5.3)
PROT SERPL-MCNC: 6.7 G/DL (ref 6.8–8.8)
SODIUM SERPL-SCNC: 137 MMOL/L (ref 133–144)

## 2020-02-06 ENCOUNTER — TRANSFERRED RECORDS (OUTPATIENT)
Dept: HEALTH INFORMATION MANAGEMENT | Facility: CLINIC | Age: 40
End: 2020-02-06

## 2020-02-06 ENCOUNTER — OFFICE VISIT (OUTPATIENT)
Dept: OPHTHALMOLOGY | Facility: CLINIC | Age: 40
End: 2020-02-06
Attending: OPHTHALMOLOGY
Payer: COMMERCIAL

## 2020-02-06 DIAGNOSIS — H35.50 RETINAL DYSTROPHY: ICD-10-CM

## 2020-02-06 PROCEDURE — 92134 CPTRZ OPH DX IMG PST SGM RTA: CPT | Mod: ZF | Performed by: OPHTHALMOLOGY

## 2020-02-06 PROCEDURE — G0463 HOSPITAL OUTPT CLINIC VISIT: HCPCS | Mod: ZF

## 2020-02-06 ASSESSMENT — VISUAL ACUITY
METHOD: SNELLEN - LINEAR
OD_CC+: -1
OD_CC: 20/20
OS_CC+: -2
OS_CC: 20/20

## 2020-02-06 ASSESSMENT — TONOMETRY
OS_IOP_MMHG: 13
IOP_METHOD: TONOPEN
OD_IOP_MMHG: 16

## 2020-02-06 ASSESSMENT — REFRACTION_WEARINGRX
OD_ADD: +1.00
OS_SPHERE: -4.00
OD_AXIS: 110
OD_SPHERE: -4.50
OS_ADD: +1.00
OS_CYLINDER: +2.00
OD_CYLINDER: +2.00
OS_AXIS: 055

## 2020-02-06 ASSESSMENT — SLIT LAMP EXAM - LIDS
COMMENTS: NORMAL
COMMENTS: NORMAL

## 2020-02-06 ASSESSMENT — CONF VISUAL FIELD
OS_NORMAL: 1
METHOD: COUNTING FINGERS
OD_NORMAL: 1

## 2020-02-06 ASSESSMENT — EXTERNAL EXAM - LEFT EYE: OS_EXAM: NORMAL

## 2020-02-06 ASSESSMENT — CUP TO DISC RATIO
OS_RATIO: 0.5
OD_RATIO: 0.5

## 2020-02-06 ASSESSMENT — EXTERNAL EXAM - RIGHT EYE: OD_EXAM: NORMAL

## 2020-02-06 NOTE — LETTER
2/6/2020       RE: Marie Vogel  [address]  [place] [address]     Dear Colleague,    Thank you for referring your patient, Marie Vogel, to the Eye Clinic at [facility]. Please see a copy of my visit note below.       CC: Retinal dystrophy evaluation    HPI: Marie Vogel is a  39 year old year-old patient referred by Dr Green for evaluation of retinal dystrophy. She has a history of T2DM. Patient denies any changes in vision. No nyctalopia, hemeralopia. Denies flashes. Did have floaters a week ago. She saw her outside optometrist and there was concern for leakage and hemorrhage which is why she was sent to [facility].     Ocular History: Refractive error  PMH: Cardiomyopathy diagnosed 2016, T2DM  FH: Paternal gpa eye injections (unclear etiology)  Mother, uncle, aunt, brother, with cardiomyopathy    No nyctalopia   No hemeralopia    Retinal Imaging:  OCT 2-6-20  RE: paracentral GA, mild intraretinal fluid (stable from previous), mild Epiretinal membrane  LE: paracentral GA, mild intraretinal fluid, mild Epiretinal membrane    FAF   11/25/19  OD - central hypoF & hyperF speckled  OS - central hypoF & hyperF speckled     GVF 11/25/19  OD - paracentral scotoma, normal peripheral  OS - paracentral scotoma, normal peripheral  FA 1/7/19  OD - extensive WD, linear hypoF, normal choroidal filling  OS - extensive WD, linear hypF, normal choroidal filling    ffERG 12/9/19  INTERPRETATION:      1. Abnormal ffERG.  The scotopic responses showed delay; it is not clear if this is a reliable result as the patient has no symptoms of angeline dystrophy.  The photopic response shows mild attenuation and delay, which is consistent with the clinical exam and history.  Except for the marked scotopic delay, the results could be consistent with SMD-FFM; otherwise a cone-angeline dystrophy must be considered.   2. Consider repeating the ffERG in the future to see  whether the scotopic results are verified.    Assessment & Plan:    Retinal Dystrophy, both eyes   differential diagnosis: Cone dystrophy CACRD; Stargards  -Asymptomatic, no family history  -Intact central vision, paracentral scotomas on GVF   -ffERG consistent with SMD-FFM or a cone-angeline dystrophy  -[employer] genetic testing today  -Follow-up results  - could consider evaluation of other family members  - consider low vision therapy if patient becomes more symptomatic  - healthy diet and no smoking   - follow up in approximately 4 months to review results and then follow up q1 yr    Roseanne Mcelroy MD  Ophthalmology Resident, PGY-3        Again, thank you for allowing me to participate in the care of your patient.      Sincerely,    Isis Vickers M.D.   of Ophthalmology  Vitreoretinal Surgeon  [facility] Endowed Chair  Department of Ophthalmology & Visual Neurosciences  [facility]  Phone:  [phone]   Fax:  [phone]

## 2020-02-06 NOTE — PROGRESS NOTES
CC: Retinal dystrophy evaluation    HPI: Marie Vogel is a  39 year old year-old patient referred by Dr Green for evaluation of retinal dystrophy. She has a history of T2DM. Patient denies any changes in vision. No nyctalopia, hemeralopia. Denies flashes. Did have floaters a week ago. She saw her outside optometrist and there was concern for leakage and hemorrhage which is why she was sent to [facility].     Ocular History: Refractive error  PMH: Cardiomyopathy diagnosed 2016, T2DM  FH: Paternal gpa eye injections (unclear etiology)  Mother, uncle, aunt, brother, with cardiomyopathy    No nyctalopia   No hemeralopia    Retinal Imaging:  OCT 2-6-20  RE: paracentral GA, mild intraretinal fluid (stable from previous), mild Epiretinal membrane  LE: paracentral GA, mild intraretinal fluid, mild Epiretinal membrane    FAF   11/25/19  OD - central hypoF & hyperF speckled  OS - central hypoF & hyperF speckled     GVF 11/25/19  OD - paracentral scotoma, normal peripheral  OS - paracentral scotoma, normal peripheral     FA 1/7/19  OD - extensive WD, linear hypoF, normal choroidal filling  OS - extensive WD, linear hypF, normal choroidal filling    ffERG 12/9/19  INTERPRETATION:      1. Abnormal ffERG.  The scotopic responses showed delay; it is not clear if this is a reliable result as the patient has no symptoms of angeline dystrophy.  The photopic response shows mild attenuation and delay, which is consistent with the clinical exam and history.  Except for the marked scotopic delay, the results could be consistent with SMD-FFM; otherwise a cone-angeline dystrophy must be considered.   2. Consider repeating the ffERG in the future to see whether the scotopic results are verified.    Assessment & Plan:    Retinal Dystrophy, both eyes   differential diagnosis: Cone dystrophy CACRD; Stargards  -Asymptomatic, no family history  -Intact central vision, paracentral scotomas on GVF   -ffERG consistent with SMD-FFM or a cone-angeline  dystrophy  -[employer] genetic testing today  -Follow-up results  - could consider evaluation of other family members  - consider low vision therapy if patient becomes more symptomatic  - healthy diet and no smoking   - follow up in approximately 4 months to review results and then follow up q1 yr    Roseanne Mcelroy MD  Ophthalmology Resident, PGY-3        ~~~~~~~~~~~~~~~~~~~~~~~~~~~~~~~~~~   Complete documentation of historical and exam elements from today's encounter can be found in the full encounter summary report (not reduplicated in this progress note).  I personally obtained the chief complaint(s) and history of present illness.  I confirmed and edited as necessary the review of systems, past medical/surgical history, family history, social history, and examination findings as documented by others; and I examined the patient myself.  I personally reviewed the relevant tests, images, and reports as documented above.  I personally reviewed the ophthalmic test(s) associated with this encounter, agree with the interpretation(s) as documented by the resident/fellow, and have edited the corresponding report(s) as necessary.   I formulated and edited as necessary the assessment and plan and discussed the findings and management plan with the patient and family    Isis Vickers MD   of Ophthalmology.  Retina Service   Department of Ophthalmology and Visual Neurosciences   [facility]  Phone: [phone]   Fax: [phone]

## 2020-02-06 NOTE — NURSING NOTE
Chief Complaints and History of Present Illnesses   Patient presents with     Retinal Dystrophy Hereditary Follow Up     Chief Complaint(s) and History of Present Illness(es)     Retinal Dystrophy Hereditary Follow Up     Associated symptoms: dryness.  Negative for eye pain    Associated symptoms: floaters ([place]), dryness and itching.  Negative for eye pain    Pain scale: 0/10              Comments     Marie is here for Retinal dystrophy, referred by Dr Green. She states no vision change since last visit.     Pastor Heart COT 8:22 AM February 6, 2020

## 2020-02-27 ENCOUNTER — TELEPHONE (OUTPATIENT)
Dept: CARDIOLOGY | Facility: CLINIC | Age: 40
End: 2020-02-27

## 2020-02-27 NOTE — TELEPHONE ENCOUNTER
[facility] Call Center    Phone Message    May a detailed message be left on voicemail: yes     Reason for Call: Other: pt has a dental appt coming up, and wondering if she needs an antibiotic, if so please send to [facility] - [address] , and call pt to confirm thanks     Action Taken: Message routed to:  [facility] ([facility] heart    Travel Screening: Not Applicable

## 2020-02-28 NOTE — TELEPHONE ENCOUNTER
Spoke w Dr. Dewey, pt not in need of prophylactic abx for dental work. Called to notify. She had no further questions. Chari Conrad, RN CORE Care Coordinator

## 2020-03-05 ENCOUNTER — OFFICE VISIT (OUTPATIENT)
Dept: URGENT CARE | Facility: URGENT CARE | Age: 40
End: 2020-03-05
Payer: COMMERCIAL

## 2020-03-05 VITALS
DIASTOLIC BLOOD PRESSURE: 86 MMHG | TEMPERATURE: 98.4 F | SYSTOLIC BLOOD PRESSURE: 130 MMHG | HEART RATE: 82 BPM | RESPIRATION RATE: 20 BRPM | OXYGEN SATURATION: 98 %

## 2020-03-05 DIAGNOSIS — M62.838 NECK MUSCLE SPASM: Primary | ICD-10-CM

## 2020-03-05 DIAGNOSIS — S09.90XA CLOSED HEAD INJURY, INITIAL ENCOUNTER: ICD-10-CM

## 2020-03-05 PROCEDURE — 99214 OFFICE O/P EST MOD 30 MIN: CPT | Performed by: PHYSICIAN ASSISTANT

## 2020-03-05 RX ORDER — CYCLOBENZAPRINE HCL 10 MG
10 TABLET ORAL AT BEDTIME
Qty: 14 TABLET | Refills: 0 | Status: SHIPPED | OUTPATIENT
Start: 2020-03-05 | End: 2020-07-14

## 2020-03-05 NOTE — LETTER
[facility]  [address]  [address]  [place] [address]  Phone: [phone]  Fax: [phone]    March 5, 2020        Marie Vogel  [address] [place] [address]          To whom it may concern:    RE: Marie Vogel    Patient was seen and treated today at our clinic.  Please excuse absence on 3/6 and 3/7/20.     Please contact me for questions or concerns.      Sincerely,        Orlando Ortiz PA-C

## 2020-03-05 NOTE — PATIENT INSTRUCTIONS
Patient Education     Whiplash    When one car hits another, each person s body is thrown toward the impact, then away from it. This is whiplash. Even at slow speeds, the force puts stress and strain on the spine. This is especially true for the neck. The weight of the head stretches and damages muscles and ligaments. It may pull spinal bones out of line. The bones that protect your spinal cord (vertebrae) can be forced out of position. Disks are the spine's shock absorbers. They can bulge, rupture, or wear down. Nerves can get pinched or inflamed. And muscles and ligaments can be stretched or torn.  Symptoms of whiplash  A wide array of symptoms can follow an auto accident. Symptoms may appear right away. Or they may be delayed for several days. Symptoms may include:    Pain, especially in your neck, shoulder, arm, or lower back    Arm or leg numbness    Stiffness    Headache    Dizziness  Treating whiplash  You may be asked to do one or more of the following:    Ice the injured area for 24 to 48 hours. Do this for 20 minutes. Repeat 5 times a day.    After 48 hours, put moist heat on the injured area for 20 minutes. Repeat 5 times a day.    Wear a cervical collar for as long as recommended.    Take nonsteroidal anti-inflammatory (NSAIDs) medicines or muscle relaxants as directed by your healthcare provider   Date Last Reviewed: 6/1/2018 2000-2019 [employer]. [address] [place] [address]. All rights reserved. This information is not intended as a substitute for professional medical care. Always follow your healthcare professional's instructions.           Patient Education     First Aid: Head Injuries  A strong blow to the head may cause swelling and bleeding inside the skull. The resulting pressure can injure the brain (concussion). If you have any doubts about a concussion, have a healthcare provider check the victim.  When to call 911  Call 911 right away if any of the following  is true:    The victim loses consciousness or is lethargic.    The victim has convulsions or seizures.    The victim has unequal pupil size. The pupil is the black part in the center of the eye.    The victim shows any of the following signs of concussion:  ? Confusion or inability to follow normal conversation  ? Slurred speech  ? Dizziness or vision problems  ? Nausea or vomiting  ? Muscle weakness or loss of mobility  ? Memory loss  ? Sensitivity to noise    A depressed or spongy area in the skull, or visible bone fragments    Clear fluid draining out of  the ears or nose    Bruising behind the ears or around the eyes  While you wait for help:    Reassure the person.    Treat for shock by maintaining body temperature and keeping the victim calm.    Do rescue breathing or CPR, if needed.  If the person has neck or back pain or is unconscious, he or she might have a spine fracture. Move the person only with great caution and only if absolutely needed.   Step 1. Control bleeding    Apply direct pressure to control bleeding. Wear gloves or use other protection to avoid contact with victim's blood.    Wash a minor surface injury with soap and water after the bleeding stops or is reduced.    Cover the wound with a clean dressing and bandage.  Step 2. Ice bumps and bruises    Place a cold pack or ice on the injury to reduce swelling and pain. Placing a cloth between the skin and the ice pack helps prevent tissue damage from severe cold.  Step 3. Observe the victim    Watch for vomiting or changes in mood or alertness. If you notice changes, call for medical help. Signs of concussion may not appear for up to 48 hours.    Tell the person's partner, parent, or roommate about the injury so he or she can continue to observe the victim.  Stitches  If a cut is deep or continues to bleed, or the edges of skin don't stay together evenly, the wound may need to be closed with stitches, tape, staples, or medical glue. Any of these  can help speed healing and reduce the risk for infection and the size of the scar. These may be especially important concerns with large wounds, and wounds on the head or other visible body parts.  If you think a wound may need medical care, see a healthcare provider as soon as possible. If you need stitches, they must be done in the first few hours. A wound that is not properly closed is at risk for serious infection.  Date Last Reviewed: 12/1/2017 2000-2019 The "[employer]. [address] [place] [address], [place] [id number]. All rights reserved. This information is not intended as a substitute for professional medical care. Always follow your healthcare professional's instructions.

## 2020-03-10 NOTE — PROGRESS NOTES
SUBJECTIVE:  Chief Complaint   Patient presents with     Urgent Care     Headache     headache pt fell on Monday      Marie Vogel is a 39 year old female presents with a chief complaint of neck pain following fall 2 days ago.  The injury occurred 2 day(s) ago.   The injury happened while at home. How: slip on ice .  The patient complained of moderate pain  and has not had decreased ROM.  Pain exacerbated by movement.  Relieved by rest and ice.  She treated it initially with ice. This is the first time this type of injury has occurred to this patient. Mild intermittent headache occasionally.  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 1 kit 0     cetirizine (ZYRTEC) 10 MG tablet Take 10 mg by mouth 2 times daily  90 tablet 3     Continuous Blood Gluc Sensor (FREESTYLE BESSIE 14 DAY SENSOR) MISC 2 each every 14 days 6 each 3     cyclobenzaprine (FLEXERIL) 10 MG tablet Take 1 tablet (10 mg) by mouth At Bedtime for 14 days 14 tablet 0     dulaglutide (TRULICITY) 0.75 MG/0.5ML pen Inject 0.75 mg Subcutaneous every 7 days 9 mL 1      EPINEPHrine 0.3 MG/0.3ML injection Inject 0.3 mLs (0.3 mg) into the muscle once as needed for anaphylaxis 0.3 mL 11     fluticasone (FLONASE) 50 MCG/ACT spray Spray 1-2 sprays into both nostrils daily 1 Bottle 0     ibuprofen (ADVIL) 200 MG tablet Take 200 mg by mouth every 4 hours as needed.       insulin aspart (NOVOLOG VIAL) 100 UNITS/ML vial With insulin pump. Uses about 80 units/day. 90 mL 3     Insulin Disposable Pump (OMNIPOD DASH 5 PACK) MISC 1 pod every 3 days 30 each 3     Insulin Disposable Pump (OMNIPOD DASH SYSTEM) KIT 1 kit continuous 1 kit 0     INSULIN PUMP - OUTPATIENT Updated 11/12/19:  OmniPod Dash  BASAL:  00:00: 1.150 units/hr  10:30: 1.2  14:30: 1.1  CARB RATIO:  00:00: 8  Sensitivity:  00:00: 30 mg/dL  BLOOD GLUCOSE TARGET and times:  12   AM (midnight): 120-120  Active Insulin Time: 4 hours  Sensor: Nadia/ Nadia Link Donna  Rafter:   [email or web address]  Password [email or web address]!       Lancets (MICROLET) MISC 1 Device See Admin Instructions. Use up to 5 times daily for blood sugar checks. 100 each prn     levonorgestrel (MIRENA) 20 MCG/24HR IUD 1 each (20 mcg) by Intrauterine route once       losartan (COZAAR) 50 MG tablet TAKE ONE TABLET BY MOUTH EVERY MORNING AND TAKE ONE-HALF TABLET BY MOUTH EVERY EVENING 135 tablet 3     MAGNESIUM OXIDE PO Take 400 mg by mouth daily       metFORMIN (GLUCOPHAGE-XR) 500 MG 24 hr tablet Take 2 tablets (1,000 mg) by mouth 2 times daily (with meals) 360 tablet 3     montelukast (SINGULAIR) 10 MG tablet TAKE ONE TABLET BY MOUTH AT BEDTIME 90 tablet 3     Multiple Vitamins-Minerals (MULTI VITAMIN/MINERALS PO) Take 1 each by mouth daily.       Omega-3 Fatty Acids (FISH OIL) 500 MG CAPS Take 1 capsule by mouth       omeprazole (PRILOSEC) 40 MG DR capsule Take 1 capsule (40 mg) by mouth daily as needed 90 capsule 3     Ostomy Supplies (SKIN PREP WIPES) MISC 1 each every 3 days 50 each 3     rosuvastatin (CRESTOR) 5 MG tablet Take 1 tablet (5 mg) by mouth daily 90  tablet 1     SUMAtriptan (IMITREX) 25 MG tablet TAKE ONE TO FOUR TABLETS BY MOUTH ONE TIME. MAY REPEAT 1 TABLET EVERY TWO HOURS UP TO MAXIMUM OF 200MG IN 24 HOURS 8 tablet 6     traZODone (DESYREL) 50 MG tablet Take 0.5-1 tablets (25-50 mg) by mouth nightly as needed for sleep 20 tablet 0     Social History     Tobacco Use     Smoking status: Former Smoker     Types: Cigarettes     Last attempt to quit: 10/24/2005     Years since quittin.3     Smokeless tobacco: Former User     Tobacco comment: States only smokes when drinks maybe 2x/year   Substance Use Topics     Alcohol use: No     Alcohol/week: 0.0 standard drinks     Frequency: Monthly or less     Drinks per session: 1 or 2     Binge frequency: Never       ROS:  10 point ROS negative except as listed above      EXAM:   /86   Pulse 82   Temp 98.4  F (36.9  C) (Tympanic)   Resp 20   SpO2 98%   Gen: healthy,alert,no distress  Extremity: neck has pain with movoemtn, no midline tenderness.   Skull: no abrasions, lacerations, swelling, brusiing, stepoffs, or crepitus  CHEST: clear to auscultation  CV: regular rate and rhythm  EXTREMITIES: peripheral pulses normal  SKIN: no suspicious lesions or rashes  NEURO: Normal strength and tone, sensory exam grossly normal, mentation intact and speech normal. CN 2-12 intact      ASSESSMENT:   (M62.838) Neck muscle spasm  (primary encounter diagnosis)  Comment: No evidence of fracture or dislocation  Plan: cyclobenzaprine (FLEXERIL) 10 MG tablet    (S09.90XA) Closed head injury, initial encounter  Comment: no concerning symptoms of ICP, TBI  Plan: rest      Red flags and emergent follow up discussed, and understood by patient  Follow up with PCP if symptoms worsen or fail to improve      Patient Instructions       Patient Education     Whiplash    When one car hits another, each person s body is thrown toward the impact, then away from it. This is whiplash. Even at slow speeds, the force puts stress and strain on  the spine. This is especially true for the neck. The weight of the head stretches and damages muscles and ligaments. It may pull spinal bones out of line. The bones that protect your spinal cord (vertebrae) can be forced out of position. Disks are the spine's shock absorbers. They can bulge, rupture, or wear down. Nerves can get pinched or inflamed. And muscles and ligaments can be stretched or torn.  Symptoms of whiplash  A wide array of symptoms can follow an auto accident. Symptoms may appear right away. Or they may be delayed for several days. Symptoms may include:    Pain, especially in your neck, shoulder, arm, or lower back    Arm or leg numbness    Stiffness    Headache    Dizziness  Treating whiplash  You may be asked to do one or more of the following:    Ice the injured area for 24 to 48 hours. Do this for 20 minutes. Repeat 5 times a day.    After 48 hours, put moist heat on the injured area for 20 minutes. Repeat 5 times a day.    Wear a cervical collar for as long as recommended.    Take nonsteroidal anti-inflammatory (NSAIDs) medicines or muscle relaxants as directed by your healthcare provider   Date Last Reviewed: 6/1/2018 2000-2019 "[employer]. [address] [place] [address]. All rights reserved. This information is not intended as a substitute for professional medical care. Always follow your healthcare professional's instructions.           Patient Education     First Aid: Head Injuries  A strong blow to the head may cause swelling and bleeding inside the skull. The resulting pressure can injure the brain (concussion). If you have any doubts about a concussion, have a healthcare provider check the victim.  When to call 911  Call 911 right away if any of the following is true:    The victim loses consciousness or is lethargic.    The victim has convulsions or seizures.    The victim has unequal pupil size. The pupil is the black part in the center of the eye.    The  victim shows any of the following signs of concussion:  ? Confusion or inability to follow normal conversation  ? Slurred speech  ? Dizziness or vision problems  ? Nausea or vomiting  ? Muscle weakness or loss of mobility  ? Memory loss  ? Sensitivity to noise    A depressed or spongy area in the skull, or visible bone fragments    Clear fluid draining out of  the ears or nose    Bruising behind the ears or around the eyes  While you wait for help:    Reassure the person.    Treat for shock by maintaining body temperature and keeping the victim calm.    Do rescue breathing or CPR, if needed.  If the person has neck or back pain or is unconscious, he or she might have a spine fracture. Move the person only with great caution and only if absolutely needed.   Step 1. Control bleeding    Apply direct pressure to control bleeding. Wear gloves or use other protection to avoid contact with victim's blood.    Wash a minor surface injury with soap and water after the bleeding stops or is reduced.    Cover the wound with a clean dressing and bandage.  Step 2. Ice bumps and bruises    Place a cold pack or ice on the injury to reduce swelling and pain. Placing a cloth between the skin and the ice pack helps prevent tissue damage from severe cold.  Step 3. Observe the victim    Watch for vomiting or changes in mood or alertness. If you notice changes, call for medical help. Signs of concussion may not appear for up to 48 hours.    Tell the person's partner, parent, or roommate about the injury so he or she can continue to observe the victim.  Stitches  If a cut is deep or continues to bleed, or the edges of skin don't stay together evenly, the wound may need to be closed with stitches, tape, staples, or medical glue. Any of these can help speed healing and reduce the risk for infection and the size of the scar. These may be especially important concerns with large wounds, and wounds on the head or other visible body parts.  If  you think a wound may need medical care, see a healthcare provider as soon as possible. If you need stitches, they must be done in the first few hours. A wound that is not properly closed is at risk for serious infection.  Date Last Reviewed: 12/1/2017 2000-2019 [employer]. [address] [place] [address]. All rights reserved. This information is not intended as a substitute for professional medical care. Always follow your healthcare professional's instructions.

## 2020-03-12 ENCOUNTER — TELEPHONE (OUTPATIENT)
Dept: ENDOCRINOLOGY | Facility: CLINIC | Age: 40
End: 2020-03-12

## 2020-03-12 NOTE — TELEPHONE ENCOUNTER
Reason for call:  Other   Patient called regarding (reason for call): appointment  Additional comments: Both patient and her daughter have upcoming appointments with Dr. Alford. Patient works at the [employer] and they currently have 2 confirmed patients with COVID-19 hospitalized there. Marie and her daughter currently have no symptoms but she is wondering if they should keep their appointments?     Phone number to reach patient:  Cell number on file:    Telephone Information:   Mobile [phone]       Best Time:  any    Can we leave a detailed message on this number?  YES       Nikki Etienne on 3/12/2020 at 11:30 AM

## 2020-03-13 NOTE — TELEPHONE ENCOUNTER
Please advise on message below.  Thanks    Next 5 appointments (look out 90 days)    Mar 16, 2020 11:30 AM CDT  Return Visit with Zita Fuentes MD  [facility] [address] [place] [address]  [address]  [place] [address] [phone]

## 2020-03-16 NOTE — TELEPHONE ENCOUNTER
Per recent guidelines- if stable and if they can hold off then postpone the visit to a later date.

## 2020-03-17 ENCOUNTER — VIRTUAL VISIT (OUTPATIENT)
Dept: FAMILY MEDICINE | Facility: OTHER | Age: 40
End: 2020-03-17

## 2020-03-18 NOTE — PROGRESS NOTES
"Date: 2020 18:51:39  Clinician: Rohan Jackson  Clinician NPI: [id number]  Patient: MARCELINO KRUGER  Patient : 1980  Patient Address: [address] [place] [id number]  Patient Phone: [phone]  Visit Protocol: URI  Patient Summary:  MARCELINO is a 39 year old ( : 1980 ) female who initiated a Visit for COVID-19 (Coronavirus) evaluation and screening. When asked the question \"Please sign me up to receive news, health information and promotions from [employer].\", MARCELINO responded \"Yes\".    MARCELINO states her symptoms started gradually 3-6 days ago. After her symptoms started, they improved and then got worse again.   Her symptoms consist of a cough, nasal congestion, tooth pain, a headache, rhinitis, and facial pain or pressure.   Symptom details     Nasal secretions: The color of her mucus is clear.    Cough: MARCELINO coughs a few times an hour and her cough is not more bothersome at night. Phlegm does not come into her throat when she coughs. She believes her cough is caused by post-nasal drip.     Facial pain or pressure: The facial pain or pressure feels worse when bending over or leaning forward.     Headache: She states the headache is mild (1-3 on a 10 point pain scale).     Tooth pain: The tooth pain is not caused by a cavity, recent dental work, or other mouth problems.      MARCELINO denies having wheezing, sore throat, fever, chills, ear pain, malaise, enlarged lymph nodes, and myalgias. She also denies taking antibiotic medication for the symptoms and having recent facial or sinus surgery in the past 60 days. She is not experiencing dyspnea.   Precipitating events  She has not recently been exposed to someone with influenza. MARCELINO has been in close contact with the following high risk individuals: adults 65 or older and people with asthma, heart disease or diabetes.   Pertinent COVID-19 (Coronavirus) information  MARCELINO has not traveled internationally or to the areas where COVID-19 " (Coronavirus) is widespread in the last 14 days before the start of her symptoms.   MARCELINO has not had a close contact with a laboratory-confirmed COVID-19 patient within 14 days of symptom onset. She also has not had a close contact with a suspected COVID-19 patient within 14 days of symptom onset.   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   Pertinent medical history  MARCELINO had 1 sinus infection within the past year.   MARCELINO does not get yeast infections when she takes antibiotics.   MARCELINO needs a return to work/school note.   Weight: 183 lbs   MARCELINO does not smoke or use smokeless tobacco.   She denies pregnancy and denies breastfeeding. She has menstruated in the past month.   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 Just making sure I don't need to get tested. I am also allergic to everything outside environmentally and dust and dander .   Weight: 183 lbs    MEDICATIONS: Crestor oral, Singulair oral, omeprazole oral, magnesium oral, Fish Oil oral, FreeStyle Nadia 14 Day Sensor, Omnipod Dash 5 Pack Insulin Pod subcutaneous, Cozaar oral, atenolol oral, Trulicity subcutaneous, metformin oral, Novolog U-100 Insulin aspart subcutaneous, ALLERGIES: Iodinated Contrast Media, Penicillins, Cephalosporins, atorvastatin, Bactrim, clams, hazelnut  Clinician Response:  Dear MARCELINO,   Based on the information you have provided, you do have symptoms that are consistent with Coronavirus (COVID-19).  The coronavirus causes mild to severe respiratory illness with the most common symptoms including fever, cough and difficulty breathing.  Unfortunately, many viruses cause similar symptoms and it can be difficult to distinguish between viruses, especially in mild cases, so we are presuming that anyone with cough or fever has coronavirus at this time.  Coronavirus/COVID-19 has reached the point of community spread in [place], meaning that we are finding the virus in people with no known exposure risk for faisal the virus. Given the increasing commonness of coronavirus in the community we are no longer testing patients who are not critically ill.  For everyone else who has cough or fever, you should assume you are infected with coronavirus. Accordingly, you should self-quarantine for fourteen days from the first day your symptoms started. You should call if you find increasing shortness of breath, wheezing or sustained fever above 101.5. If you are significantly short of breath or experience chest pain you should call 911 or report to the nearest emergency department for urgent evaluation.    Isolate yourself at home.   Do Not allow any visitors  Do Not go to work or school  Do Not go to [place],  centers, shopping, or other public places.  Do Not shake hands.  Avoid close contact with others (hugging, kissing).   Protect Others:    Cover Your Mouth and Nose with a mask, disposable tissue or wash cloth to avoid spreading germs to others.  Wash your hands and face frequently with soap and water.   If you develop significant shortness of breath that prevents you from doing normal activities, please call 911 or proceed to the nearest emergency room and alert them immediately that you have been in self-isolation for possible coronavirus.   For more information about COVID19 and options for caring for yourself at home, please visit the CDC website at https://www.cdc.gov/coronavirus/2019-ncov/about/steps-when-sick.htmlFor more options for care at [facility], please visit our website at [email or web address]      Diagnosis: Cough  Diagnosis ICD: R05

## 2020-04-30 ENCOUNTER — E-VISIT (OUTPATIENT)
Dept: PEDIATRICS | Facility: CLINIC | Age: 40
End: 2020-04-30
Payer: COMMERCIAL

## 2020-04-30 ENCOUNTER — TELEPHONE (OUTPATIENT)
Dept: PEDIATRICS | Facility: CLINIC | Age: 40
End: 2020-04-30

## 2020-04-30 DIAGNOSIS — E78.5 DYSLIPIDEMIA: ICD-10-CM

## 2020-04-30 DIAGNOSIS — J30.2 SEASONAL ALLERGIES: ICD-10-CM

## 2020-04-30 DIAGNOSIS — R05.9 COUGH: Primary | ICD-10-CM

## 2020-04-30 PROCEDURE — 99421 OL DIG E/M SVC 5-10 MIN: CPT | Performed by: INTERNAL MEDICINE

## 2020-04-30 RX ORDER — ALBUTEROL SULFATE 90 UG/1
2 AEROSOL, METERED RESPIRATORY (INHALATION) EVERY 4 HOURS PRN
Qty: 1 INHALER | Refills: 3 | Status: SHIPPED | OUTPATIENT
Start: 2020-04-30 | End: 2021-07-27

## 2020-04-30 NOTE — TELEPHONE ENCOUNTER
Pt calls for albuterol inhaler to manage cough associated with seasonal allergies    Provider E-Visit time total (minutes): 5

## 2020-04-30 NOTE — TELEPHONE ENCOUNTER
Reason for call:  Patient reporting a symptom    Symptom or request: allergies. Pt is calling and has been outside working and in the garage. She wants an albuterol inhaler for just in case when the allergies get bad    Duration (how long have symptoms been present): last night    Have you been treated for this before? No    Additional comments:     Phone Number patient can be reached at:  Home number on file [phone] ( cell)    Best Time:  any  Can we leave a detailed message on this number:  Yes      Call taken on 4/30/2020 at 11:11 AM by Amy Cason

## 2020-05-01 RX ORDER — ATORVASTATIN CALCIUM 20 MG/1
TABLET, FILM COATED ORAL
Qty: 90 TABLET | Refills: 3 | OUTPATIENT
Start: 2020-05-01

## 2020-05-01 NOTE — TELEPHONE ENCOUNTER
atorvastatin (LIPITOR) 20 MG tablet   Last Written Prescription Date:  2/6/19  Last Fill Quantity: 90,   # refills: 3      Routing refill request to provider for review/approval because: med end date 9/24/19  R/t SE

## 2020-06-10 ENCOUNTER — MYC MEDICAL ADVICE (OUTPATIENT)
Dept: ENDOCRINOLOGY | Facility: CLINIC | Age: 40
End: 2020-06-10

## 2020-06-10 NOTE — TELEPHONE ENCOUNTER
Message sent via "[employer]".      Lenore Hurley CMA  [facility]  [facility]

## 2020-06-15 NOTE — TELEPHONE ENCOUNTER
appt cx.    Message sent via [employer].      Lenore Hurley CMA  [facility]  [facility]

## 2020-07-03 DIAGNOSIS — Z79.4 TYPE 2 DIABETES MELLITUS WITH DIABETIC NEPHROPATHY, WITH LONG-TERM CURRENT USE OF INSULIN (H): ICD-10-CM

## 2020-07-03 DIAGNOSIS — E11.21 TYPE 2 DIABETES MELLITUS WITH DIABETIC NEPHROPATHY, WITH LONG-TERM CURRENT USE OF INSULIN (H): ICD-10-CM

## 2020-07-03 NOTE — TELEPHONE ENCOUNTER
Pt requesting 90 ds of alina contour next test strip (300) RX, please.    Thank you,  Abbie Robles  PTSR III   Team

## 2020-07-06 ENCOUNTER — MYC MEDICAL ADVICE (OUTPATIENT)
Dept: INTERNAL MEDICINE | Facility: CLINIC | Age: 40
End: 2020-07-06

## 2020-07-06 NOTE — TELEPHONE ENCOUNTER
Medication is being filled for 1 time refill only due to:  patient is due for an appointment     [employer] message sent informing patient.

## 2020-07-07 ENCOUNTER — MYC MEDICAL ADVICE (OUTPATIENT)
Dept: ENDOCRINOLOGY | Facility: CLINIC | Age: 40
End: 2020-07-07

## 2020-07-07 NOTE — TELEPHONE ENCOUNTER
Please see my chart message and pictures and advise.  Thanks    Form printed off and placed in Dr. Fuentes's basket.

## 2020-07-07 NOTE — TELEPHONE ENCOUNTER
LAST OFFICE/VIRTUAL VISIT:  01/14/20    FUTURE OFFICE/VIRTUAL VISIT:  07/14/20    DMV DM form    Lab Results   Component Value Date    A1C 7.0 01/14/2020    A1C 7.5 09/24/2019    A1C 7.3 01/30/2019    A1C 8.8 11/05/2018    A1C 7.7 06/15/2018         Lenore Hurley CMA  [facility]  [facility]

## 2020-07-14 ENCOUNTER — VIRTUAL VISIT (OUTPATIENT)
Dept: ENDOCRINOLOGY | Facility: CLINIC | Age: 40
End: 2020-07-14
Payer: COMMERCIAL

## 2020-07-14 ENCOUNTER — TELEPHONE (OUTPATIENT)
Dept: ENDOCRINOLOGY | Facility: CLINIC | Age: 40
End: 2020-07-14

## 2020-07-14 DIAGNOSIS — Z79.4 TYPE 2 DIABETES MELLITUS WITH DIABETIC NEPHROPATHY, WITH LONG-TERM CURRENT USE OF INSULIN (H): Primary | ICD-10-CM

## 2020-07-14 DIAGNOSIS — E11.21 TYPE 2 DIABETES MELLITUS WITH DIABETIC NEPHROPATHY, WITH LONG-TERM CURRENT USE OF INSULIN (H): Primary | ICD-10-CM

## 2020-07-14 DIAGNOSIS — Z79.4 TYPE 2 DIABETES MELLITUS WITH DIABETIC NEPHROPATHY, WITH LONG-TERM CURRENT USE OF INSULIN (H): ICD-10-CM

## 2020-07-14 DIAGNOSIS — E11.21 TYPE 2 DIABETES MELLITUS WITH DIABETIC NEPHROPATHY, WITH LONG-TERM CURRENT USE OF INSULIN (H): ICD-10-CM

## 2020-07-14 LAB — HBA1C MFR BLD: 6.7 % (ref 0–5.6)

## 2020-07-14 PROCEDURE — 95251 CONT GLUC MNTR ANALYSIS I&R: CPT | Performed by: INTERNAL MEDICINE

## 2020-07-14 PROCEDURE — 36415 COLL VENOUS BLD VENIPUNCTURE: CPT | Performed by: INTERNAL MEDICINE

## 2020-07-14 PROCEDURE — 83036 HEMOGLOBIN GLYCOSYLATED A1C: CPT | Performed by: INTERNAL MEDICINE

## 2020-07-14 PROCEDURE — 99214 OFFICE O/P EST MOD 30 MIN: CPT | Mod: GT | Performed by: INTERNAL MEDICINE

## 2020-07-14 RX ORDER — DULAGLUTIDE 1.5 MG/.5ML
1.5 INJECTION, SOLUTION SUBCUTANEOUS
Qty: 3 ML | Refills: 3 | Status: SHIPPED | OUTPATIENT
Start: 2020-07-14 | End: 2020-11-07

## 2020-07-14 RX ORDER — IBUPROFEN 600 MG/1
1 TABLET ORAL PRN
Qty: 1 MG | Refills: 0 | Status: SHIPPED | OUTPATIENT
Start: 2020-07-14 | End: 2022-10-12

## 2020-07-14 NOTE — PATIENT INSTRUCTIONS
[facility] & [place] locations   Dr Fuentes, Endocrinology Department      [facility]   [address]  [place] [address]  Appointment [phone]  Fax: [phone]  [place]: Monday and Tuesday         [facility]   [address]  [place] [address]  Appointment [phone]  Fax: [phone]  [place]: Wednesday and Thursday            Please check the cost coverage and copay with insurance before recommended tests, services and medications (especially if new medications are prescribed).     If ordered, please get blood work done 1 week prior to your next appointment so they will be available to Dr. Fuentes at your visit.    To provide the best diabetic care, please bring your blood glucose meter to each and every visit with your  Endocrinologist. Your blood glucose meter/insulin pump will be downloaded at every appointment.  Please arrive 15 minutes before your scheduled appointment. This will allow for your blood glucose meter/insulin pump  to be downloaded.  If you are wearing DEXCOM please bring  or sharing code from the Dexcom Clarity Appt so that it can be downloaded.  If you are using freestyle casey personal sensors please bring the reader.  If you are using TANDEM insulin pump please have your username and password to get info from [employer] website.    Increase TRULICITY to 1.5 mg/week.  Decrease basal rate by 0.2 units/hr as discussed.  Use temp basal in case of active day.  Labs and follow up in 3 months  Continue to use casey.  Rx sent for long acting insulin (in case of pump malfunctio) and glucagon kit (for emergency use for low BG).    Recommend checking blood sugars before meals and at bedtime.    If Blood glucose are low more often-> 2-3 times/week- give us a call.  The patient is advised to Make better food choices: reduce carbs, Reduce portion size, weight loss and  exercise 3-4 times a week.  Discussed hypoglycemia signs and symptoms as well as management in detail.

## 2020-07-14 NOTE — PROGRESS NOTES
"THIS IS A VIDEO VISIT:    Phone call visit/virtual visit encounter:    Name of patient: Marie Vogel    Date of encounter: 7/14/2020    Time of start of video visit: 1:38    Video started: 1: 40    Video ended: 1:56    Time visit video ended: 2:00    Provider location: working from [place]/ [facility]    Patient location: patients home.    Mode of transmission: video/ Doximity    Verbal consent: obtained before starting visit. Pt is agreeable.      The patient has been notified of following:      \"This VIDEO visit will be conducted via a call between you and your physician/provider. We have found that certain health care needs can be provided without the need for a physical exam.  This service lets us provide the care you need with a short phone conversation.  If a prescription is necessary we can send it directly to your pharmacy.  If lab work is needed we can place an order for that and you can then stop by our lab to have the test done at a later time.     With new updates with corona virus patient might be billed as clinic visit.     If during the course of the call the physician/provider feels a telephone visit is not appropriate, you will not be charged for this service.\"      Past medical history, social history, family history, allergy and medications were reviewed and updated as appropriate.  Reviewed pertinent labs, notes, imaging studies personally.    Name: Marie Vogel  Seen for f/u of DM  HPI:  Marie Vogel is a 39 year old female who presents for the evaluation/management of DM.   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    Here for f/u. Previously has seen endo at [place] ( Dr Aguilera and Dr Zhou and ). 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 Busy at work, also variable schedule.. Concerned about weight gain.   H/o cardiomyopathy. Followed by cardiology.    On Omnipod since 10/2019 and using freestyle casey.  By mistake she used her brothers Truclity 1.5 mg/week and noted low BG.    1. Type 2 DM:  Orginally diagnosed at the age of: 21 with GDM during her first pregnancy. Diagnosed with DM 2 in 1/2002, diet controlled for 3 years, then started on metformin. In 2010 during her second pregnancy, started on insulin and then insulin pump therapy in 2012.   Current Regimen: Insulin pump therapy ( Medtronic Minimed Paradigm), metformin XR 1000 mg bid, Trulicity 0.75 mg/week  BS checks: 4-5 times per day  Average Meter Download: Blood glucose data reviewed personally. See nursing note from this encounter for details.    yes:     Diabetes Medication(s)     Biguanides       metFORMIN (GLUCOPHAGE-XR) 500 MG 24 hr tablet    Take 2 tablets (1,000 mg) by mouth 2 times daily (with meals)    Insulin       insulin aspart (NOVOLOG VIAL) 100 UNITS/ML vial    With insulin pump. Uses about 80 units/day.     INSULIN PUMP - OUTPATIENT    Updated 11/12/19:  OmniPod Dash  BASAL:  00:00: 1.150 units/hr  10:30: 1.2  14:30: 1.1  CARB RATIO:  00:00: 8  Sensitivity:  00:00: 30 mg/dL  BLOOD GLUCOSE TARGET and times:  12   AM (midnight): 120-120  Active Insulin Time: 4 hours  Sensor: Casey/ Casey Link Donna  Amish:   Username [email or web address]  Password [email or web address]!    Incretin Mimetic Agents (GLP-1 Receptor Agonists)       dulaglutide (TRULICITY) 0.75 MG/0.5ML pen    Inject 0.75 mg Subcutaneous every 7 days            Complications:   Diabetes Complications  Description / Detail    Diabetic Retinopathy  No   CAD / PAD  No   Neuropathy  No   Nephropathy / Microalbuminuria  Yes: microalbuminuria. ON cozaar.   Gastroparesis  No   Hypoglycemia  [place]  No     2. Hypertension: Blood Pressure today:   BP Readings from Last 3 Encounters:   20 130/86   20 108/74   20 (!) 128/90     Takes medications everyday without forgetting a dose.  Denies feeling lightheaded or dizzy.    3. Hyperlipidemia:   Denies muscle aches of pains.       PMH/PSH:  Past Medical History:   Diagnosis Date     Allergic rhinitis due to pollen      Atypical glandular cells on Pap smear 3/1108     Cervical high risk HPV (human papillomavirus) test positive 2018    See problem list     Gastroesophageal reflux disease      PFO (patent foramen ovale)      Stargardt's disease 2019     Type II or unspecified type diabetes mellitus without mention of complication, not stated as uncontrolled     onset was gestational in      Past Surgical History:   Procedure Laterality Date     C INDUCED ABORTN BY D&C           C IUD,MIRENA  8/10/10, 7/28/15     C/SECTION, LOW TRANSVERSE  6/25/10    , Low Transverse     CHOLECYSTECTOMY, LAPOROSCOPIC      Cholecystectomy, Laparoscopic     ESOPHAGOSCOPY, GASTROSCOPY, DUODENOSCOPY (EGD), COMBINED N/A 2016    Procedure: COMBINED ESOPHAGOSCOPY, GASTROSCOPY, DUODENOSCOPY (EGD), BIOPSY SINGLE OR MULTIPLE;  Surgeon: Fadi Hunter MD;  Location: [facility] ESOPHAGEAL MOTILITY STUDY N/A 2016    Procedure: ESOPHAGEAL MOTILITY STUDY;  Surgeon: Fadi Hunter MD;  Location: [facility] REMOVE TONSILS/ADENOIDS,<11 Y/O      T &A     Family Hx:  Family History   Problem Relation Age of Onset     Cardiovascular Mother         hypertrophic cardiomyopathy     Genitourinary Problems Mother         gallbladder disease     Diabetes Mother         drug induced     Other - See Comments Mother         heart transplant 2005     Diabetes Father         type 2     Hypertension Father      Genitourinary Problems Maternal Grandmother         gallbladder disease     Genitourinary Problems  Paternal Grandmother         gallbladder disease     Hypertension Paternal Grandfather         high bp     Cerebrovascular Disease Paternal Grandfather      Cardiovascular Brother         hypertrophic cardiomyopathy     Diabetes Brother         type 2     Glaucoma No family hx of      Macular Degeneration No family hx of      Thyroid disease: No         DM2: Yes - father and mother         Autoimmune: DM1, SLE, RA, Vitiligo No    Social Hx:  Social History     Socioeconomic History     Marital status:      Spouse name: Not on file     Number of children: 2     Years of education: 14     Highest education level: Associate degree: academic program   Occupational History     Occupation: nurse   Social Needs     Financial resource strain: Not very hard     Food insecurity     Worry: Never true     Inability: Never true     Transportation needs     Medical: No     Non-medical: No   Tobacco Use     Smoking status: Former Smoker     Types: Cigarettes     Last attempt to quit: 10/24/2005     Years since quittin.7     Smokeless tobacco: Former User     Tobacco comment: States only smokes when drinks maybe 2x/year   Substance and Sexual Activity     Alcohol use: No     Alcohol/week: 0.0 standard drinks     Frequency: Monthly or less     Drinks per session: 1 or 2     Binge frequency: Never     Drug use: No     Sexual activity: Yes     Partners: Male     Birth control/protection: I.U.D.     Comment: Mirena IUD 7/28/15   Lifestyle     Physical activity     Days per week: 0 days     Minutes per session: 0 min     Stress: Only a little   Relationships     Social connections     Talks on phone: More than three times a week     Gets together: Once a week     Attends Yazidi service: 1 to 4 times per year     Active member of club or organization: No     Attends meetings of clubs or organizations: Never     Relationship status:      Intimate partner violence     Fear of current or ex partner: Not on file      Emotionally abused: Not on file     Physically abused: Not on file     Forced sexual activity: Not on file   Other Topics Concern     Parent/sibling w/ CABG, MI or angioplasty before 65F 55M? Not Asked   Social History Narrative    Caffeine intake/servings daily - 6-7    Calcium intake/servings daily - 2-3 yogurt, milk, cheese    Exercise 1 times weekly - describe aerobics    Sunscreen used - Yes    Seatbelts used - Yes    Guns stored in the home - No    Self Breast Exam - Yes    Pap test up to date -  Yes    Eye exam up to date -  Yes    Dental exam up to date -  Yes    DEXA scan up to date -  Not Applicable    Flex Sig/Colonoscopy up to date -  Not Applicable    Mammography up to date -  Not Applicable    Immunizations reviewed and up to date - Yes    Abuse: Current or Past (Physical, Sexual or Emotional) - No    Do you feel safe in your environment - Yes    Do you cope well with stress - Yes    Do you suffer from insomnia - Yes     Last updated by: Tatianna Lacey  5/9/2005          MEDICATIONS:  has a current medication list which includes the following prescription(s): albuterol, aspirin, atenolol, blood glucose, blood glucose monitoring, cetirizine, freestyle casey 14 day sensor, cyclobenzaprine, dulaglutide, epinephrine, fluticasone, ibuprofen, insulin aspart, omnipod dash 5 pack pods, omnipod dash system, insulin pump, blood glucose monitoring, levonorgestrel, losartan, magnesium oxide, metformin, montelukast, multiple vitamins-minerals, fish oil, omeprazole, skin prep wipes, rosuvastatin, sumatriptan, and trazodone.    ROS     ROS: 10 point ROS neg other than the symptoms noted above in the HPI.    Physical Exam   VS: There were no vitals taken for this visit.  GENERAL: healthy, alert and no distress  EYES: Eyes grossly normal to inspection, conjunctivae and sclerae normal  RESP: no audible wheeze, cough, or visible cyanosis.  No visible retractions or increased work of breathing.  Able to speak fully in  complete sentences.  NEURO: Cranial nerves grossly intact, mentation intact and speech normal  PSYCH: mentation appears normal, affect normal/bright, judgement and insight intact, normal speech and appearance well-groomed    LABS:  A1c:  Lab Results   Component Value Date    A1C 6.7 07/14/2020    A1C 7.0 01/14/2020    A1C 7.5 09/24/2019    A1C 7.3 01/30/2019    A1C 8.8 11/05/2018       Basic Metabolic Panel:  Creatinine   Date Value Ref Range Status   02/03/2020 1.01 0.52 - 1.04 mg/dL Final       LFTS/Cholesterol Panel:  Recent Labs   Lab Test 01/14/20  1207 01/30/19  0733  07/01/15  0612 03/27/14  1022   CHOL 172 206*   < > 190 146   HDL 41* 33*   < > 42* 32*   LDL 82 140*   < > 96 77   TRIG 243* 165*   < > 260* 186*   CHOLHDLRATIO  --   --   --  4.5 4.5    < > = values in this interval not displayed.     Thyroid Function:  ENDO THYROID LABS-P Latest Ref Rng & Units 9/24/2019   TSH 0.40 - 4.00 mU/L 1.45     ENDO THYROID LABS-UMP Latest Ref Rng & Units 4/16/2017   TSH 0.40 - 4.00 mU/L 2.06     ENDO THYROID LABS-P Latest Ref Rng 9/27/2016 4/2/2014   TSH 0.40 - 4.00 mU/L 2.17 1.09   T4 FREE 0.70 - 1.85 ng/dL  0.75       Urine MicroAlbumin:  Lab Results   Component Value Date    UMALCR 485.23 01/14/2020      Vitamin D:  Vitamin D Deficiency Screening Results:  Lab Results   Component Value Date    VITDT 33 11/05/2018    VITDT 39 09/27/2016    VITDT 45 02/23/2016    VITDT 31 03/31/2015    VITDT 29 (L) 03/27/2014         All pertinent notes, labs, and images personally reviewed by me.     Glucometer/ insulin pump (if applicable)/ CGM data (if applicable) downloaded, Personally reviewed and interpreted.  All Blood sugar data reviewed personally and discussed with pt.  See nursing note from today's clinic visit for details of BG/CGM log.      A/P  Ms.Erica WILLIAM Vogel is a 40 year old here for the evaluation/management of diabetes:    1. DM2 - Under Fair control.  BG improving.  Using OMNIPOD DASH + Freestyle  casey.  H/o hypertropic cardiomyopathy, followed by cardiology.  Limited blood sugar data.   Has variable shifts at work.    I also discussed importance of strict blood sugar control to prevent complications associated with uncontrolled diabetes.  Plan:  Continue metformin  1000 mg twice daily.  Increase TRULICITY to 1.5 mg/week.  Decrease basal rate by 0.2 units/hr as discussed.  Use temp basal in case of active day.  Labs and follow up in 3 months  Continue to use casey.  Rx sent for long acting insulin (in case of pump malfunctio) and glucagon kit (for emergency use for low BG).    Recommend checking blood sugars before meals and at bedtime.    If Blood glucose are low more often-> 2-3 times/week- give us a call.  The patient is advised to Make better food choices: reduce carbs, Reduce portion size, weight loss and exercise 3-4 times a week.  Discussed hypoglycemia signs and symptoms as well as management in detail.    Prevention    Most Recent Immunizations   Administered Date(s) Administered     Influenza (IIV3) PF 09/26/2018     Influenza (intradermal) 10/01/2012     Influenza Vaccine IM > 6 months Valent IIV4 10/06/2019     MMR 09/17/2007     Pneumococcal 23 valent 01/22/2003     TD (ADULT, 7+) 01/01/2002     TDAP Vaccine (Adacel) 04/06/2012     Opthalmology-2018, due  Dental-Yes  ASA-81 mg   Smoking- No    2. Hypertension - + microalbuminuria. On losartan. Blood pressure medications include cozaar 75 mg qd. Need aggressive BP/glycmeic control.    3. Hyperlipidemia - Under fair control.TG high with low HDL, LD at 140, HDL 33.  Had myalgias on lipitor and stopped it.  On Crestor 5 mg/day.    4. Microalbuminuria:  Recommend strict BG control.  On Cozaar 50 mg/day.    All questions were answered.  The patient indicates understanding of the above issues and agrees with the plan set forth.   More than 50% of face to face time spent with Ms. Dino Vogel on counseling / coordinating her care.       Follow-up:  follow up in 3 months.    Zita Fuentes MD  Endocrinology   [facility]    CC: Alan Paredes    Addendum to above note and clinic visit:    Labs reviewed.    See result note/telephone encounter.

## 2020-07-14 NOTE — LETTER
"    7/14/2020         RE: Marie Vogel  [address]  [place] [address]        Dear Colleague,    Thank you for referring your patient, Marie Vogel, to the [facility] (formerly [facility]3]. Please see a copy of my visit note below.    THIS IS A VIDEO VISIT:    Phone call visit/virtual visit encounter:    Name of patient: Marie Vogel    Date of encounter: 7/14/2020    Time of start of video visit: 1:38    Video started: 1: 40    Video ended: 1:56    Time visit video ended: 2:00    Provider location: working from home/ [facility]    Patient location: patients home.    Mode of transmission: video/ Doximity    Verbal consent: obtained before starting visit. Pt is agreeable.      The patient has been notified of following:      \"This VIDEO visit will be conducted via a call between you and your physician/provider. We have found that certain health care needs can be provided without the need for a physical exam.  This service lets us provide the care you need with a short phone conversation.  If a prescription is necessary we can send it directly to your pharmacy.  If lab work is needed we can place an order for that and you can then stop by our lab to have the test done at a later time.     With new updates with corona virus patient might be billed as clinic visit.     If during the course of the call the physician/provider feels a telephone visit is not appropriate, you will not be charged for this service.\"      Past medical history, social history, family history, allergy and medications were reviewed and updated as appropriate.  Reviewed pertinent labs, notes, imaging studies personally.    Name: Marie Vogel  Seen for f/u of DM  HPI:  Marie Vogel is a 39 year old female who presents for the evaluation/management of DM.   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    Here for f/u. Previously has seen endo at [place] ( Dr Aguilera and Dr Zhou and ). 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 Busy at work, also variable schedule.. Concerned about weight gain.   H/o cardiomyopathy. Followed by cardiology.    On Omnipod since 10/2019 and using freestyle casey.  By mistake she used her brothers Truclity 1.5 mg/week and noted low BG.    1. Type 2 DM:  Orginally diagnosed at the age of: 21 with GDM during her first pregnancy. Diagnosed with DM 2 in 1/2002, diet controlled for 3 years, then started on metformin. In 2010 during her second pregnancy, started on insulin and then insulin pump therapy in 2012.   Current Regimen: Insulin pump therapy ( Medtronic Minimed Paradigm), metformin XR 1000 mg bid, Trulicity 0.75 mg/week  BS checks: 4-5 times per day  Average Meter Download: Blood glucose data reviewed personally. See nursing note from this encounter for details.    yes:     Diabetes Medication(s)     Biguanides       metFORMIN (GLUCOPHAGE-XR) 500 MG 24 hr tablet    Take 2 tablets (1,000 mg) by mouth 2 times daily (with meals)    Insulin       insulin aspart (NOVOLOG VIAL) 100 UNITS/ML vial    With insulin pump. Uses about 80 units/day.     INSULIN PUMP - OUTPATIENT    Updated 11/12/19:  OmniPod Dash  BASAL:  00:00: 1.150 units/hr  10:30: 1.2  14:30: 1.1  CARB RATIO:  00:00: 8  Sensitivity:  00:00: 30 mg/dL  BLOOD GLUCOSE TARGET and times:  12   AM (midnight): 120-120  Active Insulin Time: 4 hours  Sensor: Casey/ Casey Link Donna  Amish:   Username [email or web address]  Password [email or web address]    Incretin Mimetic Agents (GLP-1 Receptor Agonists)       dulaglutide (TRULICITY) 0.75 MG/0.5ML pen    Inject 0.75 mg Subcutaneous every  7 days            Complications:   Diabetes Complications  Description / Detail    Diabetic Retinopathy  No   CAD / PAD  No   Neuropathy  No   Nephropathy / Microalbuminuria  Yes: microalbuminuria. ON cozaar.   Gastroparesis  No   Hypoglycemia [place]arness  No     2. Hypertension: Blood Pressure today:   BP Readings from Last 3 Encounters:   20 130/86   20 108/74   20 (!) 128/90     Takes medications everyday without forgetting a dose.  Denies feeling lightheaded or dizzy.    3. Hyperlipidemia:   Denies muscle aches of pains.       PMH/PSH:  Past Medical History:   Diagnosis Date     Allergic rhinitis due to pollen      Atypical glandular cells on Pap smear 3/1108     Cervical high risk HPV (human papillomavirus) test positive 2018    See problem list     Gastroesophageal reflux disease      PFO (patent foramen ovale)      Stargardt's disease 2019     Type II or unspecified type diabetes mellitus without mention of complication, not stated as uncontrolled     onset was gestational in      Past Surgical History:   Procedure Laterality Date     C INDUCED ABORTN BY D&C           C IUD,MIRENA  8/10/10, 7/28/15     C/SECTION, LOW TRANSVERSE  6/25/10    , Low Transverse     CHOLECYSTECTOMY, LAPOROSCOPIC      Cholecystectomy, Laparoscopic     ESOPHAGOSCOPY, GASTROSCOPY, DUODENOSCOPY (EGD), COMBINED N/A 2016    Procedure: COMBINED ESOPHAGOSCOPY, GASTROSCOPY, DUODENOSCOPY (EGD), BIOPSY SINGLE OR MULTIPLE;  Surgeon: Fadi Hunter MD;  Location: [facility] ESOPHAGEAL MOTILITY STUDY N/A 2016    Procedure: ESOPHAGEAL MOTILITY STUDY;  Surgeon: Fadi Hunter MD;  Location: [facility] REMOVE TONSILS/ADENOIDS,<13 Y/O      T &A     Family Hx:  Family History   Problem Relation Age of Onset     Cardiovascular Mother         hypertrophic cardiomyopathy     Genitourinary Problems Mother         gallbladder disease     Diabetes Mother          drug induced     Other - See Comments Mother         heart transplant 2005     Diabetes Father         type 2     Hypertension Father      Genitourinary Problems Maternal Grandmother         gallbladder disease     Genitourinary Problems Paternal Grandmother         gallbladder disease     Hypertension Paternal Grandfather         high bp     Cerebrovascular Disease Paternal Grandfather      Cardiovascular Brother         hypertrophic cardiomyopathy     Diabetes Brother         type 2     Glaucoma No family hx of      Macular Degeneration No family hx of      Thyroid disease: No         DM2: Yes - father and mother         Autoimmune: DM1, SLE, RA, Vitiligo No    Social Hx:  Social History     Socioeconomic History     Marital status:      Spouse name: Not on file     Number of children: 2     Years of education: 14     Highest education level: Associate degree: academic program   Occupational History     Occupation: nurse   Social Needs     Financial resource strain: Not very hard     Food insecurity     Worry: Never true     Inability: Never true     Transportation needs     Medical: No     Non-medical: No   Tobacco Use     Smoking status: Former Smoker     Types: Cigarettes     Last attempt to quit: 10/24/2005     Years since quittin.7     Smokeless tobacco: Former User     Tobacco comment: States only smokes when drinks maybe 2x/year   Substance and Sexual Activity     Alcohol use: No     Alcohol/week: 0.0 standard drinks     Frequency: Monthly or less     Drinks per session: 1 or 2     Binge frequency: Never     Drug use: No     Sexual activity: Yes     Partners: Male     Birth control/protection: I.U.D.     Comment: Mirena IUD 7/28/15   Lifestyle     Physical activity     Days per week: 0 days     Minutes per session: 0 min     Stress: Only a little   Relationships     Social connections     Talks on phone: More than three times a week     Gets together: Once a week     Attends Mormonism  service: 1 to 4 times per year     Active member of club or organization: No     Attends meetings of clubs or organizations: Never     Relationship status:      Intimate partner violence     Fear of current or ex partner: Not on file     Emotionally abused: Not on file     Physically abused: Not on file     Forced sexual activity: Not on file   Other Topics Concern     Parent/sibling w/ CABG, MI or angioplasty before 65F 55M? Not Asked   Social History Narrative    Caffeine intake/servings daily - 6-7    Calcium intake/servings daily - 2-3 yogurt, milk, cheese    Exercise 1 times weekly - describe aerobics    Sunscreen used - Yes    Seatbelts used - Yes    Guns stored in the home - No    Self Breast Exam - Yes    Pap test up to date -  Yes    Eye exam up to date -  Yes    Dental exam up to date -  Yes    DEXA scan up to date -  Not Applicable    Flex Sig/Colonoscopy up to date -  Not Applicable    Mammography up to date -  Not Applicable    Immunizations reviewed and up to date - Yes    Abuse: Current or Past (Physical, Sexual or Emotional) - No    Do you feel safe in your environment - Yes    Do you cope well with stress - Yes    Do you suffer from insomnia - Yes     Last updated by: Tatianna Lacey  5/9/2005          MEDICATIONS:  has a current medication list which includes the following prescription(s): albuterol, aspirin, atenolol, blood glucose, blood glucose monitoring, cetirizine, freestyle casey 14 day sensor, cyclobenzaprine, dulaglutide, epinephrine, fluticasone, ibuprofen, insulin aspart, omnipod dash 5 pack pods, omnipod dash system, insulin pump, blood glucose monitoring, levonorgestrel, losartan, magnesium oxide, metformin, montelukast, multiple vitamins-minerals, fish oil, omeprazole, skin prep wipes, rosuvastatin, sumatriptan, and trazodone.    ROS     ROS: 10 point ROS neg other than the symptoms noted above in the HPI.    Physical Exam   VS: There were no vitals taken for this  visit.  GENERAL: healthy, alert and no distress  EYES: Eyes grossly normal to inspection, conjunctivae and sclerae normal  RESP: no audible wheeze, cough, or visible cyanosis.  No visible retractions or increased work of breathing.  Able to speak fully in complete sentences.  NEURO: Cranial nerves grossly intact, mentation intact and speech normal  PSYCH: mentation appears normal, affect normal/bright, judgement and insight intact, normal speech and appearance well-groomed    LABS:  A1c:  Lab Results   Component Value Date    A1C 6.7 07/14/2020    A1C 7.0 01/14/2020    A1C 7.5 09/24/2019    A1C 7.3 01/30/2019    A1C 8.8 11/05/2018       Basic Metabolic Panel:  Creatinine   Date Value Ref Range Status   02/03/2020 1.01 0.52 - 1.04 mg/dL Final       LFTS/Cholesterol Panel:  Recent Labs   Lab Test 01/14/20  1207 01/30/19  0733  07/01/15  0612 03/27/14  1022   CHOL 172 206*   < > 190 146   HDL 41* 33*   < > 42* 32*   LDL 82 140*   < > 96 77   TRIG 243* 165*   < > 260* 186*   CHOLHDLRATIO  --   --   --  4.5 4.5    < > = values in this interval not displayed.     Thyroid Function:  ENDO THYROID LABS-[facility] Latest Ref Rng & Units 9/24/2019   TSH 0.40 - 4.00 mU/L 1.45     ENDO THYROID LABS[facility] Latest Ref Rng & Units 4/16/2017   TSH 0.40 - 4.00 mU/L 2.06     ENDO THYROID LABS[facility] Latest Ref Rng 9/27/2016 4/2/2014   TSH 0.40 - 4.00 mU/L 2.17 1.09   T4 FREE 0.70 - 1.85 ng/dL  0.75       Urine MicroAlbumin:  Lab Results   Component Value Date    UMALCR 485.23 01/14/2020      Vitamin D:  Vitamin D Deficiency Screening Results:  Lab Results   Component Value Date    VITDT 33 11/05/2018    VITDT 39 09/27/2016    VITDT 45 02/23/2016    VITDT 31 03/31/2015    VITDT 29 (L) 03/27/2014         All pertinent notes, labs, and images personally reviewed by me.     Glucometer/ insulin pump (if applicable)/ CGM data (if applicable) downloaded, Personally reviewed and interpreted.  All Blood sugar data reviewed personally and discussed with  pt.  See nursing note from today's clinic visit for details of BG/CGM log.      A/P  Ms.Marie Vogel is a 40 year old here for the evaluation/management of diabetes:    1. DM2 - Under Fair control.  BG improving.  Using OMNIPOD DASH + Freestyle casey.  H/o hypertropic cardiomyopathy, followed by cardiology.  Limited blood sugar data.   Has variable shifts at work.    I also discussed importance of strict blood sugar control to prevent complications associated with uncontrolled diabetes.  Plan:  Continue metformin  1000 mg twice daily.  Increase TRULICITY to 1.5 mg/week.  Decrease basal rate by 0.2 units/hr as discussed.  Use temp basal in case of active day.  Labs and follow up in 3 months  Continue to use casey.  Rx sent for long acting insulin (in case of pump malfunctio) and glucagon kit (for emergency use for low BG).    Recommend checking blood sugars before meals and at bedtime.    If Blood glucose are low more often-> 2-3 times/week- give us a call.  The patient is advised to Make better food choices: reduce carbs, Reduce portion size, weight loss and exercise 3-4 times a week.  Discussed hypoglycemia signs and symptoms as well as management in detail.    Prevention    Most Recent Immunizations   Administered Date(s) Administered     Influenza (IIV3) PF 09/26/2018     Influenza (intradermal) 10/01/2012     Influenza Vaccine IM > 6 months Valent IIV4 10/06/2019     MMR 09/17/2007     Pneumococcal 23 valent 01/22/2003     TD (ADULT, 7+) 01/01/2002     TDAP Vaccine (Adacel) 04/06/2012     Opthalmology-2018, due  Dental-Yes  ASA-81 mg   Smoking- No    2. Hypertension - + microalbuminuria. On losartan. Blood pressure medications include cozaar 75 mg qd. Need aggressive BP/glycmeic control.    3. Hyperlipidemia - Under fair control.TG high with low HDL, LD at 140, HDL 33.  Had myalgias on lipitor and stopped it.  On Crestor 5 mg/day.    4. Microalbuminuria:  Recommend strict BG control.  On Cozaar 50  mg/day.    All questions were answered.  The patient indicates understanding of the above issues and agrees with the plan set forth.   More than 50% of face to face time spent with Ms. Dino Vogel on counseling / coordinating her care.      Follow-up:  follow up in 3 months.    Zita Fuentes MD  Endocrinology   [facility]    CC: Alan Paredes    Addendum to above note and clinic visit:    Labs reviewed.    See result note/telephone encounter.                Again, thank you for allowing me to participate in the care of your patient.        Sincerely,        Zita Fuentes MD

## 2020-07-14 NOTE — TELEPHONE ENCOUNTER
DMV DM form.    LAST OFFICE/VIRTUAL VISIT:  07/14/20    FUTURE OFFICE/VIRTUAL VISIT:  08/03/20    Lab Results   Component Value Date    A1C 6.7 07/14/2020    A1C 7.0 01/14/2020    A1C 7.5 09/24/2019    A1C 7.3 01/30/2019    A1C 8.8 11/05/2018         Lenore Hurley CMA  [facility]  Andrew/[place]ny

## 2020-07-16 NOTE — TELEPHONE ENCOUNTER
Form was faxed and sent to scanning.      Lenore Hurley, [facility]  Andrew/[place]ny

## 2020-07-24 ENCOUNTER — DOCUMENTATION ONLY (OUTPATIENT)
Dept: PEDIATRICS | Facility: CLINIC | Age: 40
End: 2020-07-24

## 2020-07-24 NOTE — PROGRESS NOTES
Marie has a lab only appt Monday morning at 8 am and no open orders. Please place any future orders needed.    Thanks lab

## 2020-07-25 NOTE — TELEPHONE ENCOUNTER
DM labs up to date.  No Dm labs are needed at this time.    Lab Results   Component Value Date    A1C 6.7 07/14/2020    A1C 7.0 01/14/2020    A1C 7.5 09/24/2019    A1C 7.3 01/30/2019    A1C 8.8 11/05/2018

## 2020-07-30 ENCOUNTER — TELEPHONE (OUTPATIENT)
Dept: AUDIOLOGY | Facility: CLINIC | Age: 40
End: 2020-07-30

## 2020-07-30 NOTE — TELEPHONE ENCOUNTER
Called patient to schedule  Audiology [facility] with [facility], per karson from [facility].    FRANCISCOM with scheduling instructions and the Audiology call center number.

## 2020-08-20 ENCOUNTER — TELEPHONE (OUTPATIENT)
Dept: PEDIATRICS | Facility: CLINIC | Age: 40
End: 2020-08-20

## 2020-08-20 DIAGNOSIS — J30.9 ALLERGIC RHINITIS: ICD-10-CM

## 2020-08-20 RX ORDER — MONTELUKAST SODIUM 10 MG/1
1 TABLET ORAL AT BEDTIME
Qty: 90 TABLET | Refills: 1 | Status: SHIPPED | OUTPATIENT
Start: 2020-08-20 | End: 2021-02-18

## 2020-08-20 NOTE — TELEPHONE ENCOUNTER
Symptoms    Describe your symptoms: cough/allergies/tickle in throat  Pt works for [employer].  No fever    Any pain: No    How long have you been having symptoms: today     Have you been seen for this:  No    Appointment offered?: No    Triage offered?: Yes  Pt asked to talk to nurse triage with Dr Paredes; pt also notifying [facility]    Home remedies tried: n/a    Requested Pharmacy: [facility] mail service    Okay to leave a detailed message? No at Cell number on file:    Telephone Information:   Mobile [phone]       Please advise.  Thank you.  josué

## 2020-08-20 NOTE — TELEPHONE ENCOUNTER
Called patient.     She is requesting a refill of Singulair. Ran out and hasn't asked for a refill.   Prescription approved per [facility] Refill Protocol.     Patient also wants to update Dr. Paredes on symptoms:    Patient notes she has contacted [facility], reported symptoms and [facility] did not advise on COVID PCR test.     Patient has history of seasonal allergies.     Current symptoms are mild, non-productive cough, scratchy throat, itchy/irritated eyes.     Has been using saline nasal spray & flonase.     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 She is dressed is full PPE with N95 mask, so exposure risk is low.     PLAN: patient will monitor symptoms, update us and [facility] if symptoms change or worsen.

## 2020-09-09 ENCOUNTER — OFFICE VISIT (OUTPATIENT)
Dept: AUDIOLOGY | Facility: CLINIC | Age: 40
End: 2020-09-09
Payer: COMMERCIAL

## 2020-09-09 DIAGNOSIS — H90.3 SENSORY HEARING LOSS, BILATERAL: Primary | ICD-10-CM

## 2020-09-09 NOTE — PROGRESS NOTES
AUDIOLOGY REPORT    SUBJECTIVE:  Marie Vogel is a 40 year old female who was seen in the Audiology Clinic at the was seen in Audiology at the [facility]    for audiologic evaluation, referred by Alan Paredes M.D..The patient has been seen previously in this clinic on 07/13/2017 for assessment and results indicated a mild to moderate sensorineural hearing loss in the right ear, and a mild to moderately severe sensorineural hearing loss in the left ear.  The patient reports that she thinks her left ear hearing is decreased. She also reports her right hearing aid will not connect to her phone. . The patient denies  bilateral tinnitus, pain an bilateral drainage.  She has occasional dizziness when her allergies are active. She wears Widex Beyond 330 Fusion behind-the-ear hearing aids.    OBJECTIVE:    Abuse Screening:  Do you feel unsafe at home or work/school? No  Do you feel threatened by someone? No  Does anyone try to keep you from having contact with others, or doing things outside of your home? No  Physical signs of abuse present? No    Otoscopic exam indicates foreign body  in the left ear  And ears clear of cerumen right ear. A hearing aid dome is present in the left ear. This is removed without incident with an alligator tool.     Pure Tone Thresholds assessed using conventional audiometry with good  reliability from 250-8000 Hz bilaterally using insert earphones and circumaural headphones     RIGHT:  mild sloping to moderate sensorineural hearing loss     LEFT:    moderate sloping to moderately-severe sensorineural hearing loss       Tympanogram:    RIGHT: hyper complaint eardrum mobility    LEFT:   normal eardrum mobility    Reflexes (reported by stimulus ear):  RIGHT: Ipsilateral is elevated  RIGHT: Contralateral is absent at frequencies tested  LEFT:   Ipsilateral is absent at frequencies tested  LEFT:   Contralateral is absent at frequencies  tested      Speech Reception Threshold:    RIGHT: 45 dB HL    LEFT:   55 dB HL  Word Recognition Score:     RIGHT: 88% at 85 dB HL using NU-6 recorded word list.    LEFT:   84% at 90 dB HL using NU-6 recorded word list.      Hearing aid check is then completed. Hearing aids are cleaned. Wax filters and domes are replaced. Electroacoustic analysis indicates good function bilaterally. Reviewed use, care and cleaning of hearing aids. Hearing aids are then attempted to pair with phone. Only the left one will pair. Called [employer] support. They had us try several different things including reinstalling firmware, however hearing aid bonilla till not pair with phone, therefore they recommend sending it in for repair. She is three weeks outside of her warranty however they have approved a courtesy repair at no charge.     ASSESSMENT:   Compared to patient's previous audiogram dated 07/13/17, hearing has  Right ear has decreased 10 dB at 250 & 4000 Hz otherwise stable re: 7/13/2017, and the left ear has decreased 15 dB at 4000 Hz and 10 dB at 8000 Hz otherwise stable. Hearing aid check completed.Today s results were discussed with the patient in detail.     SUMMARY AND RECOMMENDATIONS:  Hearing evaluation completed. Hearing aid check completed. Right hearing aid will be sent for repair.We will call patient when hearing aid is in from repair.  Please call this clinic with questions regarding these results or recommendations.          Jamil Kwan., [facility]  Licensed Audiologist  [place] #[id number]

## 2020-09-18 ENCOUNTER — OFFICE VISIT (OUTPATIENT)
Dept: URGENT CARE | Facility: URGENT CARE | Age: 40
End: 2020-09-18
Payer: COMMERCIAL

## 2020-09-18 ENCOUNTER — ANCILLARY PROCEDURE (OUTPATIENT)
Dept: GENERAL RADIOLOGY | Facility: CLINIC | Age: 40
End: 2020-09-18
Attending: PHYSICIAN ASSISTANT
Payer: COMMERCIAL

## 2020-09-18 VITALS
TEMPERATURE: 98.1 F | HEART RATE: 87 BPM | OXYGEN SATURATION: 98 % | WEIGHT: 178 LBS | BODY MASS INDEX: 30.55 KG/M2 | DIASTOLIC BLOOD PRESSURE: 84 MMHG | SYSTOLIC BLOOD PRESSURE: 120 MMHG
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
## 2020-09-18 DIAGNOSIS — S80.12XA CONTUSION OF LEFT LOWER EXTREMITY, INITIAL ENCOUNTER: ICD-10-CM

## 2020-09-18 PROCEDURE — 99213 OFFICE O/P EST LOW 20 MIN: CPT | Performed by: PHYSICIAN ASSISTANT

## 2020-09-18 PROCEDURE — 73590 X-RAY EXAM OF LOWER LEG: CPT | Mod: LT

## 2020-09-18 NOTE — PATIENT INSTRUCTIONS
Patient Education     Lower Extremity Contusion  You have a contusion (bruise) of a lower extremity (leg, knee, ankle, foot, or toe). Symptoms include pain, swelling, and skin discoloration. No bones are broken. This injury may take from a few days to a few weeks to heal.  During that time, the bruise may change from reddish in color, to purple-blue, to green-yellow, to yellow-brown.  Home care    Unless another medicine was prescribed, you can take acetaminophen, ibuprofen, or naproxen to control pain. (If you have chronic liver or kidney disease or ever had a stomach ulcer or gastrointestinal bleeding, talk with your doctor before using these medicines.)    Elevate the injured area to reduce pain and swelling. As much as possible, sit or lie down with the injured area raised about the level of your heart. This is especially important during the first 48 hours.    Ice the injured area to help reduce pain and swelling. Wrap a cold source (ice pack or ice cubes in a plastic bag) in a thin towel. Apply to the bruised area for 20 minutes every 1 to 2 hours the first day. Continue this 3 to 4 times a day until the pain and swelling goes away.    If crutches have been advised, do not bear full weight on the injured leg until you can do so without pain. You may return to sports when you are able to put full weight and impact on the injured leg without pain.    Follow up  Follow up with your healthcare provider or our staff as advised. Call if you are not improving within the next 1 to 2 weeks.  When to seek medical advice   Call your healthcare provider right away if any of these occur:    Increased pain or swelling    Foot or toes become cold, blue, numb or tingly    Signs of infection: Warmth, drainage, or increased redness or pain around the injury    Inability to move the injured area , or any joints below the injured area.    Frequent bruising for unknown reasons  Date Last Reviewed: 2/1/2017 2000-2019 The  [employer]. [address] [place] [address], [place] [address]. All rights reserved. This information is not intended as a substitute for professional medical care. Always follow your healthcare professional's instructions.

## 2020-09-22 ENCOUNTER — TRANSFERRED RECORDS (OUTPATIENT)
Dept: EDUCATION SERVICES | Facility: CLINIC | Age: 40
End: 2020-09-22

## 2020-09-22 DIAGNOSIS — Z79.4 TYPE 2 DIABETES MELLITUS WITH DIABETIC NEPHROPATHY, WITH LONG-TERM CURRENT USE OF INSULIN (H): ICD-10-CM

## 2020-09-22 DIAGNOSIS — E11.21 TYPE 2 DIABETES MELLITUS WITH DIABETIC NEPHROPATHY, WITH LONG-TERM CURRENT USE OF INSULIN (H): ICD-10-CM

## 2020-09-23 RX ORDER — INSULIN PUMP CONTROLLER
EACH MISCELLANEOUS
Qty: 30 EACH | Refills: 3 | Status: SHIPPED | OUTPATIENT
Start: 2020-09-23 | End: 2021-10-01

## 2020-09-23 RX ORDER — FLASH GLUCOSE SENSOR
KIT MISCELLANEOUS
Qty: 6 EACH | Refills: 3 | Status: SHIPPED | OUTPATIENT
Start: 2020-09-23 | End: 2021-08-02

## 2020-09-25 DIAGNOSIS — E11.21 TYPE 2 DIABETES MELLITUS WITH DIABETIC NEPHROPATHY, WITH LONG-TERM CURRENT USE OF INSULIN (H): ICD-10-CM

## 2020-09-25 DIAGNOSIS — Z79.4 TYPE 2 DIABETES MELLITUS WITH DIABETIC NEPHROPATHY, WITH LONG-TERM CURRENT USE OF INSULIN (H): ICD-10-CM

## 2020-09-25 RX ORDER — OSTOMY SUPPLY
BOX MISCELLANEOUS
Qty: 50 EACH | Refills: 3 | Status: SHIPPED | OUTPATIENT
Start: 2020-09-25 | End: 2021-02-22

## 2020-10-03 DIAGNOSIS — Z82.49 FAMILY HISTORY OF HYPERTROPHIC CARDIOMYOPATHY: ICD-10-CM

## 2020-10-06 ENCOUNTER — TELEPHONE (OUTPATIENT)
Dept: PEDIATRICS | Facility: CLINIC | Age: 40
End: 2020-10-06

## 2020-10-06 ENCOUNTER — HOSPITAL ENCOUNTER (OUTPATIENT)
Dept: BEHAVIORAL HEALTH | Facility: CLINIC | Age: 40
End: 2020-10-06
Attending: PSYCHIATRY & NEUROLOGY
Payer: COMMERCIAL

## 2020-10-06 DIAGNOSIS — Z82.49 FAMILY HISTORY OF HYPERTROPHIC CARDIOMYOPATHY: ICD-10-CM

## 2020-10-06 PROCEDURE — 999N000216 HC STATISTIC ADULT CD FACE TO FACE-NO CHRG: Mod: GT | Performed by: COUNSELOR

## 2020-10-06 RX ORDER — ATENOLOL 25 MG/1
12.5 TABLET ORAL DAILY
Qty: 90 TABLET | Refills: 3 | Status: CANCELLED | OUTPATIENT
Start: 2020-10-06

## 2020-10-06 ASSESSMENT — PATIENT HEALTH QUESTIONNAIRE - PHQ9
SUM OF ALL RESPONSES TO PHQ QUESTIONS 1-9: 8
10. IF YOU CHECKED OFF ANY PROBLEMS, HOW DIFFICULT HAVE THESE PROBLEMS MADE IT FOR YOU TO DO YOUR WORK, TAKE CARE OF THINGS AT HOME, OR GET ALONG WITH OTHER PEOPLE: SOMEWHAT DIFFICULT
SUM OF ALL RESPONSES TO PHQ QUESTIONS 1-9: 8

## 2020-10-06 NOTE — TELEPHONE ENCOUNTER
Patient calling, has been having depression symptoms.   Feels that she never leaves her bed.   She works overnight 12 hour shifts.   This affects her sleep and mood.   Patient has     Patient would like an expedited appointment with Dr. Paredes.     Patient would also like a YARA from work from 10/2-11/6.     Scheduled patient 10/7 with Dr. Paredes.   [facility]     Patient is working with employee health and therapists.

## 2020-10-06 NOTE — PROGRESS NOTES
"At 8:05am, I attempted three times to connect via Process[employer] video with patient. We were both in the waiting room, but not connected. I called her at [phone] and we talked over the phone.    She has been stressed, having difficulty getting out of bed, and September is a hard month. Her daughter's birthday is 09/14 and the anniversary of her mother s death on 09/17/2016. In the past few months, her household size doubled. She went from living only with her 19-year old and 10-year old daughters, and then her brother, brother's daughter who has autism, and patient's daughter's boyfriend moved in. Recently, she had someone help clean her house, but it's messy again and she is having difficulty getting back to normal. Her brother is helping her fix up the house, but not paying rent. Her daughter is doing hybrid learning. 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      Patient met with an [facility] therapist last Friday and also made this [facility] appointment thinking it was for medications. She knows that getting in with [facility] psychiatry is \"impossible.\" She was unsure if she needed medications and wanted to discuss medication options at this session. Then depending on how this session went, she would decide if she would go back to [facility]. I sincerely apologized that this [facility] scheduled-session is not designed for medications, and I explained options of outpatient programming, individual therapy, and resources. Patient noted that my role is just like [facility] - to help give resources. She doesn't want individual therapy at [employer]. I offered to call Intake to try to use [facility] for a [facility] psychiatrist and therapist. She was unsure which providers would work with [employer]'s insurance. I suggested patient check [facility] website for In-Network providers. I asked about meeting with her PCP Dr. Paredes to discuss medications, as a " jose while getting scheduled with psychiatrist. While on the phone with me, patient was able to schedule a MyChart appointment with her PCP, but not until 10/19/20. I suggested also sending him a Symptifyhart message to explain.     During the session, I actively listened, empathized, validated her feelings of sadness and depression and also feelings of frustration about the MyChart scheduling. At the end, I asked how she was feeling and she said it was very helpful to talk. She was glad she didn't cry during this session. She asked if I'm licensed and if I could be her therapist; I explained my role as an . I asked that she make a Patient Relations complaint because the confusion with MyChart scheduling has been problematic for many patients. She said she would make a complaint. We decided on a plan: she will meet with PCP Dr. Paredes on 10/19 to discuss medications, she will use her remaining 5 EAP sessions, and she will check [facility] website for in-network psychiatry and therapy. I gave her Intake's phone number and my phone number to call if she needs more assistance with resources. We ended the phone call at 9:05am.

## 2020-10-07 ENCOUNTER — OFFICE VISIT (OUTPATIENT)
Dept: PEDIATRICS | Facility: CLINIC | Age: 40
End: 2020-10-07
Payer: COMMERCIAL

## 2020-10-07 VITALS
DIASTOLIC BLOOD PRESSURE: 84 MMHG | HEART RATE: 85 BPM | TEMPERATURE: 98.4 F | BODY MASS INDEX: 30.19 KG/M2 | WEIGHT: 175.9 LBS | SYSTOLIC BLOOD PRESSURE: 128 MMHG | RESPIRATION RATE: 18 BRPM | OXYGEN SATURATION: 96 %

## 2020-10-07 DIAGNOSIS — Z82.49 FAMILY HISTORY OF HYPERTROPHIC CARDIOMYOPATHY: ICD-10-CM

## 2020-10-07 DIAGNOSIS — F33.1 MODERATE EPISODE OF RECURRENT MAJOR DEPRESSIVE DISORDER (H): Primary | ICD-10-CM

## 2020-10-07 PROCEDURE — 99214 OFFICE O/P EST MOD 30 MIN: CPT | Performed by: INTERNAL MEDICINE

## 2020-10-07 RX ORDER — ATENOLOL 25 MG/1
TABLET ORAL
Qty: 90 TABLET | Refills: 3 | OUTPATIENT
Start: 2020-10-07

## 2020-10-07 RX ORDER — ATENOLOL 25 MG/1
12.5 TABLET ORAL DAILY
Qty: 90 TABLET | Refills: 3 | Status: SHIPPED | OUTPATIENT
Start: 2020-10-07 | End: 2021-07-27

## 2020-10-07 RX ORDER — SERTRALINE HYDROCHLORIDE 25 MG/1
25 TABLET, FILM COATED ORAL DAILY
Qty: 30 TABLET | Refills: 1 | Status: SHIPPED | OUTPATIENT
Start: 2020-10-07 | End: 2020-11-02

## 2020-10-07 ASSESSMENT — PATIENT HEALTH QUESTIONNAIRE - PHQ9: SUM OF ALL RESPONSES TO PHQ QUESTIONS 1-9: 8

## 2020-10-07 NOTE — PROGRESS NOTES
Subjective  Answers for HPI/ROS submitted by the patient on 10/6/2020   Chronic problems general questions HPI Form  If you checked off any problems, how difficult have these problems made it for you to do your work, take care of things at home, or get along with other people?: Somewhat difficult  PHQ9 TOTAL SCORE: 8      Marie Vogel is a 40 year old female who presents to clinic today for the following health issues:    Abnormal Mood Symptoms  Onset: 2 months    Description:   Depression: YES  Anxiety: mild    Accompanying Signs & Symptoms:  Still participating in activities that you used to enjoy: no  Fatigue: YES  Irritability: YES  Difficulty concentrating: YES  Changes in appetite: YES  Problems with sleep: YES  Heart racing/beating fast : no   Thoughts of hurting yourself or others: none    History:   Recent stress: works as charge nurse, single parent, brother recently moved in, daughter lives with her boyfriend in basement-multiple stressors  Prior depression hospitalization: None  Family history of depression: yes  Family history of anxiety: yes    Precipitating factors:   Alcohol/drug use: no    Alleviating factors:  none    Therapies Tried and outcome: none currently     Mental Health Problem    History of Present Illness       She eats 0-1 servings of fruits and vegetables daily.She consumes 0 sweetened beverage(s) daily.She exercises with enough effort to increase her heart rate 10 to 19 minutes per day.  She exercises with enough effort to increase her heart rate 3 or less days per week. She is missing 3 dose(s) of medications per week.  She is not taking prescribed medications regularly due to other.              Patient has concerns about work schedule and kids in school.     Patient Active Problem List   Diagnosis     Allergic rhinitis     HYPERLIPIDEMIA LDL GOAL <100     Family history of other cardiovascular diseases     Health Care Home     Microalbuminuria     Insulin pump in place      Vitamin D deficiency     Nut allergy     Type 2 diabetes mellitus with diabetic nephropathy     Diabetic gastroparesis (H)     Hypertrophic obstructive cardiomyopathy (H)     PFO (patent foramen ovale)     Other migraine without status migrainosus, not intractable     Scars of posterior pole of chorioretina, bilateral     Cervical high risk HPV (human papillomavirus) test positive     Mirena IUD inserted 11/29/19: Due for removal 11/29/2024     Current Outpatient Medications   Medication Sig Dispense Refill     albuterol (PROAIR HFA/PROVENTIL HFA/VENTOLIN HFA) 108 (90 Base) MCG/ACT inhaler Inhale 2 puffs into the lungs every 4 hours as needed for shortness of breath / dyspnea or wheezing 1 Inhaler 3     aspirin 81 MG tablet Take 1 tablet (81 mg) by mouth daily 90 tablet 3     atenolol (TENORMIN) 25 MG tablet Take 0.5 tablets (12.5 mg) by mouth daily 90 tablet 3     blood glucose (PEPE CONTOUR NEXT) test strip Check 4 times/day 300 each 0     blood glucose monitoring (NO BRAND SPECIFIED) meter device kit Use to test blood sugar 6 times daily and as needed. 1 kit 0     cetirizine (ZYRTEC) 10 MG tablet Take 10 mg by mouth 2 times daily  90 tablet 3     Continuous Blood Gluc Sensor (FREESTYLE BESSIE 14 DAY SENSOR) MISC CHANGE EVERY 14 DAYS 6 each 3     dulaglutide (TRULICITY) 1.5 MG/0.5ML pen Inject 1.5 mg Subcutaneous every 7 days 3 mL 3     EPINEPHrine 0.3 MG/0.3ML injection Inject 0.3 mLs (0.3 mg) into the muscle once as needed for anaphylaxis 0.3 mL 11     fluticasone (FLONASE) 50 MCG/ACT spray Spray 1-2 sprays into both nostrils daily 1 Bottle 0     glucagon (GLUCAGON EMERGENCY) 1 MG kit Inject 1 mg into the muscle as needed for low blood sugar 1 mg 0     ibuprofen (ADVIL) 200 MG tablet Take 200 mg by mouth every 4 hours as needed.       insulin aspart (NOVOLOG VIAL) 100 UNITS/ML vial USE WITH INSULIN PUMP AS DIRECTED, USES ABOUT 80 UNITS PER DAY 90 mL 1     Insulin Disposable Pump (OMNIPOD DASH 5 PACK PODS) MISC  CHANGE EVERY 3 DAYS 30 each 3     Insulin Disposable Pump (OMNIPOD DASH SYSTEM) KIT 1 kit continuous 1 kit 0     insulin glargine (LANTUS PEN) 100 UNIT/ML pen Take 20 units in case of pump malfunction only. 15 mL 0     INSULIN PUMP - OUTPATIENT Updated 11/12/19:  OmniPod Dash  BASAL:  00:00: 1.150 units/hr  10:30: 1.2  14:30: 1.1  CARB RATIO:  00:00: 8  Sensitivity:  00:00: 30 mg/dL  BLOOD GLUCOSE TARGET and times:  12   AM (midnight): 120-120  Active Insulin Time: 4 hours  Sensor: Nadia/ Nadia Link Donna  Amish:   Username [email or web address]  Password [email or web address]       Lancets (MICROLET) MISC 1 Device See Admin Instructions. Use up to 5 times daily for blood sugar checks. 100 each prn     levonorgestrel (MIRENA) 20 MCG/24HR IUD 1 each (20 mcg) by Intrauterine route once       losartan (COZAAR) 50 MG tablet TAKE ONE TABLET BY MOUTH EVERY MORNING AND TAKE ONE-HALF TABLET BY MOUTH EVERY EVENING 135 tablet 3     MAGNESIUM OXIDE PO Take 400 mg by mouth daily       metFORMIN (GLUCOPHAGE-XR) 500 MG 24 hr tablet Take 2 tablets (1,000 mg) by mouth 2 times daily (with meals) 360 tablet 3     montelukast (SINGULAIR) 10 MG tablet Take 1 tablet (10 mg) by mouth At Bedtime 90 tablet 1     Multiple Vitamins-Minerals (MULTI VITAMIN/MINERALS PO) Take 1 each by mouth daily.       Omega-3 Fatty Acids (FISH OIL) 500 MG CAPS Take 1 capsule by mouth       omeprazole (PRILOSEC) 40 MG DR capsule Take 1 capsule (40 mg) by mouth daily as needed 90 capsule 3     Ostomy Supplies (SKIN PREP WIPES) MISC USE ONE EVERY 3 DAYS 50 each 3     rosuvastatin (CRESTOR) 5 MG tablet Take 1 tablet (5 mg) by mouth daily 90 tablet 1     sertraline (ZOLOFT) 25 MG tablet Take 1 tablet (25 mg) by mouth daily 30 tablet 1     SUMAtriptan (IMITREX) 25 MG tablet TAKE ONE TO FOUR TABLETS BY MOUTH ONE TIME. MAY REPEAT 1 TABLET EVERY TWO HOURS UP TO MAXIMUM OF 200MG IN 24 HOURS 8 tablet 6        Review of Systems   Constitutional, HEENT, cardiovascular, pulmonary, gi  and gu systems are negative, except as otherwise noted.      Objective    /84 (BP Location: Right arm, Patient Position: Sitting, Cuff Size: Adult Regular)   Pulse 85   Temp 98.4  F (36.9  C) (Tympanic)   Resp 18   Wt 79.8 kg (175 lb 14.4 oz)   SpO2 96%   BMI 30.19 kg/m    Body mass index is 30.19 kg/m .  Physical Exam   GENERAL: alert and no distress  PSYCH: mentation appears normal, tearful at times during the visit        ASSESSMENT:      ICD-10-CM    1. Moderate episode of recurrent major depressive disorder (H)  F33.1 sertraline (ZOLOFT) 25 MG tablet     MENTAL HEALTH REFERRAL  - Adult; Outpatient Treatment; Individual/Couples/Family/Group Therapy/Health Psychology; Other: [facility] [phone]; We will contact you to schedule the appointment or please call with any questions     Recurrent major depression worse over the past 2 months.  Patient is very tearful today during the visit and feels overwhelmed states that she does not feel she can return to work for the next month.  Requesting FMLA.       FMLA forms completed today for time off work until 11/6.  Patient was encouraged to start meeting with a counselor-plans to start counseling at [facility] and [place].  Start sertraline 25 mg-side effects reviewed.  Plan for depression follow-up visit in 4 weeks.     2. Family history of hypertrophic cardiomyopathy  Z82.49 atenolol (TENORMIN) 25 MG tablet     refill     Alan Paredes MD

## 2020-11-02 ENCOUNTER — VIRTUAL VISIT (OUTPATIENT)
Dept: PEDIATRICS | Facility: CLINIC | Age: 40
End: 2020-11-02
Payer: COMMERCIAL

## 2020-11-02 DIAGNOSIS — G47.00 INSOMNIA, UNSPECIFIED TYPE: ICD-10-CM

## 2020-11-02 DIAGNOSIS — F33.1 MODERATE EPISODE OF RECURRENT MAJOR DEPRESSIVE DISORDER (H): Primary | ICD-10-CM

## 2020-11-02 PROCEDURE — 99214 OFFICE O/P EST MOD 30 MIN: CPT | Mod: TEL | Performed by: INTERNAL MEDICINE

## 2020-11-02 PROCEDURE — 96127 BRIEF EMOTIONAL/BEHAV ASSMT: CPT | Performed by: INTERNAL MEDICINE

## 2020-11-02 RX ORDER — SERTRALINE HYDROCHLORIDE 25 MG/1
25 TABLET, FILM COATED ORAL DAILY
Qty: 90 TABLET | Refills: 3 | Status: SHIPPED | OUTPATIENT
Start: 2020-11-02 | End: 2021-11-08

## 2020-11-02 RX ORDER — TRAZODONE HYDROCHLORIDE 50 MG/1
25-50 TABLET, FILM COATED ORAL
Qty: 60 TABLET | Refills: 1 | Status: SHIPPED | OUTPATIENT
Start: 2020-11-02 | End: 2021-02-18

## 2020-11-02 ASSESSMENT — ANXIETY QUESTIONNAIRES
6. BECOMING EASILY ANNOYED OR IRRITABLE: NOT AT ALL
GAD7 TOTAL SCORE: 2
1. FEELING NERVOUS, ANXIOUS, OR ON EDGE: SEVERAL DAYS
2. NOT BEING ABLE TO STOP OR CONTROL WORRYING: NOT AT ALL
7. FEELING AFRAID AS IF SOMETHING AWFUL MIGHT HAPPEN: NOT AT ALL
3. WORRYING TOO MUCH ABOUT DIFFERENT THINGS: NOT AT ALL
5. BEING SO RESTLESS THAT IT IS HARD TO SIT STILL: NOT AT ALL
IF YOU CHECKED OFF ANY PROBLEMS ON THIS QUESTIONNAIRE, HOW DIFFICULT HAVE THESE PROBLEMS MADE IT FOR YOU TO DO YOUR WORK, TAKE CARE OF THINGS AT HOME, OR GET ALONG WITH OTHER PEOPLE: NOT DIFFICULT AT ALL

## 2020-11-02 ASSESSMENT — PATIENT HEALTH QUESTIONNAIRE - PHQ9
5. POOR APPETITE OR OVEREATING: SEVERAL DAYS
SUM OF ALL RESPONSES TO PHQ QUESTIONS 1-9: 6

## 2020-11-02 NOTE — PROGRESS NOTES
"Marie Vogel is a 40 year old female who is being evaluated via a billable telephone visit.      The patient has been notified of following:     \"This telephone visit will be conducted via a call between you and your physician/provider. We have found that certain health care needs can be provided without the need for a physical exam.  This service lets us provide the care you need with a short phone conversation.  If a prescription is necessary we can send it directly to your pharmacy.  If lab work is needed we can place an order for that and you can then stop by our lab to have the test done at a later time.    Telephone visits are billed at different rates depending on your insurance coverage. During this emergency period, for some insurers they may be billed the same as an in-person visit.  Please reach out to your insurance provider with any questions.    If during the course of the call the physician/provider feels a telephone visit is not appropriate, you will not be charged for this service.\"    Patient has given verbal consent for Telephone visit?  Yes    What phone number would you like to be contacted at? [phone]    How would you like to obtain your AVS? Michellehart    Subjective     Marie Vogel is a 40 year old female who presents via phone visit today for the following health issues:    HPI     Depression and Anxiety Follow-Up    How are you doing with your depression since your last visit? Improved    How are you doing with your anxiety since your last visit?  Improved- still having anxiety regarding starting work again.     Are you having other symptoms that might be associated with depression or anxiety? No    Have you had a significant life event? No     Do you have any concerns with your use of alcohol or other drugs? No    -Started therapy   -Went to [place]. Therapist stated that it would be a good idea.  Some mild anxiety associated with the flight but overall feels that time spent " in [place] was helpful.  Does feel that she has a good fit with her current counselor.  Still having some lingering issues with sleep/insomnia.  Current habits: Caffeine-1 cup in the morning, 1 cup midday and then diet soda in the evening  -Sertraline- Good. Patient stated that she is no longer gets tired after taking it.     Social History     Tobacco Use     Smoking status: Former Smoker     Packs/day: 0.00     Years: 0.00     Pack years: 0.00     Types: Cigarettes     Quit date: 10/24/2005     Years since quitting: 15.0     Smokeless tobacco: Never Used     Tobacco comment: States only smokes when drinks maybe 2x/year   Substance Use Topics     Alcohol use: No     Alcohol/week: 0.0 standard drinks     Frequency: Monthly or less     Drinks per session: 1 or 2     Binge frequency: Never     Drug use: No     PHQ 10/6/2020 11/2/2020   PHQ-9 Total Score 8 6   Q9: Thoughts of better off dead/self-harm past 2 weeks Not at all Not at all   Some encounter information is confidential and restricted. Go to Review WatchGuard activity to see all data.     ALEXSANDRA-7 SCORE 10/5/2020 11/2/2020   Total Score 5 (mild anxiety) -   Total Score - 2   Some encounter information is confidential and restricted. Go to Review WatchGuard activity to see all data.     Last PHQ-9 11/2/2020   1.  Little interest or pleasure in doing things 1   2.  Feeling down, depressed, or hopeless 0   3.  Trouble falling or staying asleep, or sleeping too much 3   4.  Feeling tired or having little energy 1   5.  Poor appetite or overeating 1   6.  Feeling bad about yourself 0   7.  Trouble concentrating 0   8.  Moving slowly or restless 0   Q9: Thoughts of better off dead/self-harm past 2 weeks 0   PHQ-9 Total Score 6   Difficulty at work, home, or with people Not difficult at all   Some encounter information is confidential and restricted. Go to Review WatchGuard activity to see all data.     ALEXSANDRA-7  11/2/2020   1. Feeling nervous, anxious, or on edge 1   2.  Not being able to stop or control worrying 0   3. Worrying too much about different things 0   4. Trouble relaxing 1   5. Being so restless that it is hard to sit still 0   6. Becoming easily annoyed or irritable 0   7. Feeling afraid, as if something awful might happen 0   ALEXSANDRA-7 Total Score 2   If you checked any problems, how difficult have they made it for you to do your work, take care of things at home, or get along with other people? Not difficult at all   Some encounter information is confidential and restricted. Go to Review Flowsheets activity to see all data.       Suicide Assessment Five-step Evaluation and Treatment (SAFE-T)      How many servings of fruits and vegetables do you eat daily?  2-3    On average, how many sweetened beverages do you drink each day (Examples: soda, juice, sweet tea, etc.  Do NOT count diet or artificially sweetened beverages)?   2    How many days per week do you exercise enough to make your heart beat faster? 3 or less    How many minutes a day do you exercise enough to make your heart beat faster? 9 or less  How many days per week do you miss taking your medication? 1    What makes it hard for you to take your medications?  remembering to take    Patient Active Problem List   Diagnosis     Allergic rhinitis     HYPERLIPIDEMIA LDL GOAL <100     Family history of other cardiovascular diseases     Health Care Home     Microalbuminuria     Insulin pump in place     Vitamin D deficiency     Nut allergy     Type 2 diabetes mellitus with diabetic nephropathy     Diabetic gastroparesis (H)     Hypertrophic obstructive cardiomyopathy (H)     PFO (patent foramen ovale)     Other migraine without status migrainosus, not intractable     Scars of posterior pole of chorioretina, bilateral     Cervical high risk HPV (human papillomavirus) test positive     Mirena IUD inserted 11/29/19: Due for removal 11/29/2024     Current Outpatient Medications   Medication Sig Dispense Refill      sertraline (ZOLOFT) 25 MG tablet Take 1 tablet (25 mg) by mouth daily 90 tablet 3     traZODone (DESYREL) 50 MG tablet Take 0.5-1 tablets (25-50 mg) by mouth nightly as needed for sleep 60 tablet 1     albuterol (PROAIR HFA/PROVENTIL HFA/VENTOLIN HFA) 108 (90 Base) MCG/ACT inhaler Inhale 2 puffs into the lungs every 4 hours as needed for shortness of breath / dyspnea or wheezing 1 Inhaler 3     aspirin 81 MG tablet Take 1 tablet (81 mg) by mouth daily 90 tablet 3     atenolol (TENORMIN) 25 MG tablet Take 0.5 tablets (12.5 mg) by mouth daily 90 tablet 3     blood glucose (PEPE CONTOUR NEXT) test strip Check 4 times/day 300 each 0     blood glucose monitoring (NO BRAND SPECIFIED) meter device kit Use to test blood sugar 6 times daily and as needed. 1 kit 0     cetirizine (ZYRTEC) 10 MG tablet Take 10 mg by mouth 2 times daily  90 tablet 3     Continuous Blood Gluc Sensor (FREESTYLE BESSIE 14 DAY SENSOR) MISC CHANGE EVERY 14 DAYS 6 each 3     dulaglutide (TRULICITY) 1.5 MG/0.5ML pen Inject 1.5 mg Subcutaneous every 7 days 3 mL 3     EPINEPHrine 0.3 MG/0.3ML injection Inject 0.3 mLs (0.3 mg) into the muscle once as needed for anaphylaxis 0.3 mL 11     fluticasone (FLONASE) 50 MCG/ACT spray Spray 1-2 sprays into both nostrils daily 1 Bottle 0     glucagon (GLUCAGON EMERGENCY) 1 MG kit Inject 1 mg into the muscle as needed for low blood sugar 1 mg 0     ibuprofen (ADVIL) 200 MG tablet Take 200 mg by mouth every 4 hours as needed.       insulin aspart (NOVOLOG VIAL) 100 UNITS/ML vial USE WITH INSULIN PUMP AS DIRECTED, USES ABOUT 80 UNITS PER DAY 90 mL 1     Insulin Disposable Pump (OMNIPOD DASH 5 PACK PODS) MISC CHANGE EVERY 3 DAYS 30 each 3     Insulin Disposable Pump (OMNIPOD DASH SYSTEM) KIT 1 kit continuous 1 kit 0     insulin glargine (LANTUS PEN) 100 UNIT/ML pen Take 20 units in case of pump malfunction only. 15 mL 0     INSULIN PUMP - OUTPATIENT Updated 11/12/19:  OmniPod Dash  BASAL:  00:00: 1.150 units/hr  10:30:  1.2  14:30: 1.1  CARB RATIO:  00:00: 8  Sensitivity:  00:00: 30 mg/dL  BLOOD GLUCOSE TARGET and times:  12   AM (midnight): 120-120  Active Insulin Time: 4 hours  Sensor: Nadia/ Ndaia Link Donna  Amish:   [email or web address]  Password [email or web address]       Lancets (MICROLET) MISC 1 Device See Admin Instructions. Use up to 5 times daily for blood sugar checks. 100 each prn     levonorgestrel (MIRENA) 20 MCG/24HR IUD 1 each (20 mcg) by Intrauterine route once       losartan (COZAAR) 50 MG tablet TAKE ONE TABLET BY MOUTH EVERY MORNING AND TAKE ONE-HALF TABLET BY MOUTH EVERY EVENING 135 tablet 3     MAGNESIUM OXIDE PO Take 400 mg by mouth daily       metFORMIN (GLUCOPHAGE-XR) 500 MG 24 hr tablet Take 2 tablets (1,000 mg) by mouth 2 times daily (with meals) 360 tablet 3     montelukast (SINGULAIR) 10 MG tablet Take 1 tablet (10 mg) by mouth At Bedtime 90 tablet 1     Multiple Vitamins-Minerals (MULTI VITAMIN/MINERALS PO) Take 1 each by mouth daily.       Omega-3 Fatty Acids (FISH OIL) 500 MG CAPS Take 1 capsule by mouth       omeprazole (PRILOSEC) 40 MG DR capsule Take 1 capsule (40 mg) by mouth daily as needed 90 capsule 3     Ostomy Supplies (SKIN PREP WIPES) MISC USE ONE EVERY 3 DAYS 50 each 3     rosuvastatin (CRESTOR) 5 MG tablet Take 1 tablet (5 mg) by mouth daily 90 tablet 1     SUMAtriptan (IMITREX) 25 MG tablet TAKE ONE TO FOUR TABLETS BY MOUTH ONE TIME. MAY REPEAT 1 TABLET EVERY TWO HOURS UP TO MAXIMUM OF 200MG IN 24 HOURS 8 tablet 6        Review of Systems   Constitutional, HEENT, cardiovascular, pulmonary, gi and gu systems are negative, except as otherwise noted.       Objective          Vitals:  No vitals were obtained today due to virtual visit.  PSYCH: Alert and oriented times 3; coherent speech, normal   rate and volume, able to articulate logical thoughts, able   to abstract reason, no tangential thoughts, no hallucinations   or delusions  Her affect is normal  RESP: No cough, no audible wheezing,  able to talk in full sentences  Remainder of exam unable to be completed due to telephone visits    Assessment/Plan:      ICD-10-CM    1. Moderate episode of recurrent major depressive disorder (H)  F33.1 sertraline (ZOLOFT) 25 MG tablet       Improving.  Continue sertraline at 25 mg dose-discussed increasing dose in 2 months if needed.  Continue counseling.  Plans to return to work early next week.  Has some anxiety associated with returning to work/did discuss work schedule modification if needed     2. Insomnia, unspecified type  G47.00 traZODone (DESYREL) 50 MG tablet     Discussed sleep hygiene-recommended eliminating caffeine after 12 PM.  Patient requests prescription for trazodone which has helped in the past.     Alan Paredes MD   Phone call duration:  9 minutes

## 2020-11-03 ASSESSMENT — ANXIETY QUESTIONNAIRES: GAD7 TOTAL SCORE: 2

## 2020-11-07 ENCOUNTER — OFFICE VISIT (OUTPATIENT)
Dept: URGENT CARE | Facility: URGENT CARE | Age: 40
End: 2020-11-07
Payer: COMMERCIAL

## 2020-11-07 ENCOUNTER — HEALTH MAINTENANCE LETTER (OUTPATIENT)
Age: 40
End: 2020-11-07

## 2020-11-07 VITALS
BODY MASS INDEX: 30.04 KG/M2 | OXYGEN SATURATION: 98 % | SYSTOLIC BLOOD PRESSURE: 121 MMHG | DIASTOLIC BLOOD PRESSURE: 78 MMHG | HEART RATE: 85 BPM | TEMPERATURE: 98.5 F | WEIGHT: 175 LBS

## 2020-11-07 DIAGNOSIS — Z96.41 INSULIN PUMP IN PLACE: ICD-10-CM

## 2020-11-07 DIAGNOSIS — J01.90 ACUTE SINUSITIS WITH SYMPTOMS > 10 DAYS: Primary | ICD-10-CM

## 2020-11-07 DIAGNOSIS — E11.21 TYPE 2 DIABETES MELLITUS WITH DIABETIC NEPHROPATHY, WITH LONG-TERM CURRENT USE OF INSULIN (H): ICD-10-CM

## 2020-11-07 DIAGNOSIS — Z79.4 TYPE 2 DIABETES MELLITUS WITH DIABETIC NEPHROPATHY, WITH LONG-TERM CURRENT USE OF INSULIN (H): ICD-10-CM

## 2020-11-07 DIAGNOSIS — Z91.018 NUT ALLERGY: ICD-10-CM

## 2020-11-07 PROCEDURE — 99214 OFFICE O/P EST MOD 30 MIN: CPT | Performed by: PHYSICIAN ASSISTANT

## 2020-11-07 RX ORDER — EPINEPHRINE 0.3 MG/.3ML
0.3 INJECTION SUBCUTANEOUS
Qty: 0.3 ML | Refills: 11 | Status: SHIPPED | OUTPATIENT
Start: 2020-11-07 | End: 2024-03-11

## 2020-11-07 RX ORDER — DOXYCYCLINE 100 MG/1
100 CAPSULE ORAL 2 TIMES DAILY
Qty: 20 CAPSULE | Refills: 0 | Status: SHIPPED | OUTPATIENT
Start: 2020-11-07 | End: 2020-11-17

## 2020-11-07 RX ORDER — ROSUVASTATIN CALCIUM 5 MG/1
5 TABLET, COATED ORAL DAILY
Qty: 90 TABLET | Refills: 1 | Status: CANCELLED | OUTPATIENT
Start: 2020-11-07

## 2020-11-07 RX ORDER — EPINEPHRINE 0.3 MG/.3ML
0.3 INJECTION SUBCUTANEOUS
Qty: 0.3 ML | Refills: 11 | Status: CANCELLED | OUTPATIENT
Start: 2020-11-07

## 2020-11-07 RX ORDER — DULAGLUTIDE 1.5 MG/.5ML
1.5 INJECTION, SOLUTION SUBCUTANEOUS
Qty: 3 ML | Refills: 3 | Status: SHIPPED | OUTPATIENT
Start: 2020-11-07 | End: 2021-02-17

## 2020-11-07 RX ORDER — ROSUVASTATIN CALCIUM 5 MG/1
5 TABLET, COATED ORAL DAILY
Qty: 90 TABLET | Refills: 3 | Status: SHIPPED | OUTPATIENT
Start: 2020-11-07 | End: 2022-06-02

## 2020-11-07 RX ORDER — DULAGLUTIDE 1.5 MG/.5ML
1.5 INJECTION, SOLUTION SUBCUTANEOUS
Qty: 3 ML | Refills: 3 | Status: CANCELLED | OUTPATIENT
Start: 2020-11-07

## 2020-11-07 NOTE — TELEPHONE ENCOUNTER
Pt was seen here in [place] for ear infection. She is requesting refill for her medication and hasn't been able to get a hold of her PCP.     NOE Servin on 11/7/2020 at 2:28 PM

## 2020-11-07 NOTE — PROGRESS NOTES
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      Illness Contact:  No known close contact or other known Covid Exposure.     ROS:       CONSTITUTIONAL: Denies and fever (no temperature 100.4 or higher). No acute onset chills, acute weakness or acute onset fatigue   HEENT: Positive nasal and sinus congestion as per above (right  Sided)  RESP: Positive occasional  Mild cough that patient relates to post-nasal drip. No associated productive cough, chest pain or shortness of breath.  GI: Denies any N/V/D. No abdominal pain. Normal BM's  SKIN: Denies rash  NEURO: Denies any sudden onset severe HA, neck stiffness, mental status changes or lethargy     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     Type II or unspecified type diabetes mellitus without mention of complication, not stated as uncontrolled 2002    onset was gestational in 2001       Current Outpatient Medications   Medication     albuterol (PROAIR HFA/PROVENTIL HFA/VENTOLIN HFA) 108 (90 Base) MCG/ACT inhaler     aspirin 81 MG tablet     atenolol (TENORMIN) 25 MG tablet     blood glucose (PEPE CONTOUR NEXT) test strip     blood glucose monitoring (NO BRAND SPECIFIED) meter device kit     cetirizine (ZYRTEC) 10 MG tablet     Continuous Blood Gluc Sensor (FREESTYLE BESSIE 14 DAY SENSOR) MISC     doxycycline monohydrate (MONODOX) 100 MG capsule     dulaglutide  (TRULICITY) 1.5 MG/0.5ML pen     fluticasone (FLONASE) 50 MCG/ACT spray     glucagon (GLUCAGON EMERGENCY) 1 MG kit     ibuprofen (ADVIL) 200 MG tablet     insulin aspart (NOVOLOG VIAL) 100 UNITS/ML vial     Insulin Disposable Pump (OMNIPOD DASH 5 PACK PODS) MISC     Insulin Disposable Pump (OMNIPOD DASH SYSTEM) KIT     insulin glargine (LANTUS PEN) 100 UNIT/ML pen     INSULIN PUMP - OUTPATIENT     Lancets (MICROLET) MISC     levonorgestrel (MIRENA) 20 MCG/24HR IUD     losartan (COZAAR) 50 MG tablet     MAGNESIUM OXIDE PO     metFORMIN (GLUCOPHAGE-XR) 500 MG 24 hr tablet     montelukast (SINGULAIR) 10 MG tablet     Multiple Vitamins-Minerals (MULTI VITAMIN/MINERALS PO)     Omega-3 Fatty Acids (FISH OIL) 500 MG CAPS     omeprazole (PRILOSEC) 40 MG DR capsule     Ostomy Supplies (SKIN PREP WIPES) MISC     rosuvastatin (CRESTOR) 5 MG tablet     sertraline (ZOLOFT) 25 MG tablet     traZODone (DESYREL) 50 MG tablet     EPINEPHrine 0.3 MG/0.3ML injection     SUMAtriptan (IMITREX) 25 MG tablet     No current facility-administered medications for this visit.        Allergies   Allergen Reactions     Ivp Dye [Contrast Dye] Itching     Pt reports that throat itches     Nuts Anaphylaxis     [place]ariola nuts only     Bactrim Swelling     Pt reaction was swelling in mouth and tongue and itchy mouth.  Resolved with benadryl and prednisone     Penicillins Hives     Cephalosporins Itching     Seasonal Allergies Other (See Comments)     Molds, trees, grass, dust..  Upper congestion     Atorvastatin      myalgias     Shellfish Allergy Rash     Clams only       OBJECTIVE:   /78   Pulse 85   Temp 98.5  F (36.9  C)   Wt 79.4 kg (175 lb)   SpO2 98%   BMI 30.04 kg/m          General appearance: alert and no apparent distress   Skin color is pink and without rash.   HEENT:   Conjunctiva not injected. Sclera clear.   Left TM is normal: no effusions, no erythema, and normal landmarks.   Right TM is normal: no effusions, no erythema, and  "normal landmarks.   Nasal mucosa is congested. Maxillary sininuses are mildly  Tender to percussion bilaterally (R>L).   Oropharyngeal exam is normal other than evidence of PND: no lesions, erythema, adenopathy or exudate. Neck is supple, FROM with no adenopathy   CARDIAC:NORMAL - regular rate and rhythm without murmur.   RESP: Normal - CTA without rales, rhonchi, or wheezing.   NEURO: Alert and oriented.  Normal speech and mentation.  CN II/XII grossly intact.  Gait within normal limits.      ASSESSMENT/PLAN:     (J01.90) Acute sinusitis with symptoms > 10 days  (primary encounter diagnosis)    MDM:  2 week history of right sided maxillary sinusitis symptoms, with progressive worsening, in a patient with past history that includes sinusitis,  insulin dependent diabetes and multiple antibiotic allergies. Plan as per below. We also discussed keeping close eye on blood sugars during infection (she has insulin pump).     Plan: doxycycline monohydrate (MONODOX) 100 MG         capsule      November 7, 2020 [facility] Plan:     1. Doxycycline antibiotic as per above     2. Follow-up with PCP if sxs change, worsen or fail to fully resolve with above tx.    3. Immediate follow-up if any of the below:       Facial pain or headache that gets worse    Stiff neck    Unusual drowsiness or confusion    Swelling of your forehead or eyelids    Symptoms don't go away in 10 days    Vision problems, such as blurred or double vision    Fever of 100.4 F (38 C) or higher, or as directed by your healthcare provider  Call 911  Call 911 if any of these occur:     Seizure    Trouble breathing    Feeling dizzy or faint    Fingernails, skin or lips look blue, purple , or gray     In addition to the above, Sinusitis \"red flag\" signs and sxs are reviewed with patinet both verbally and by way of printed educational material for home review.        (E11.21,  Z79.4) Type 2 diabetes mellitus with diabetic nephropathy, with long-term current " use of insulin (H)    (Z96.41) Insulin pump in place

## 2020-11-24 DIAGNOSIS — K44.9 HIATAL HERNIA: ICD-10-CM

## 2020-11-24 DIAGNOSIS — K21.9 GASTROESOPHAGEAL REFLUX DISEASE WITHOUT ESOPHAGITIS: ICD-10-CM

## 2020-11-24 RX ORDER — OMEPRAZOLE 40 MG/1
CAPSULE, DELAYED RELEASE ORAL
Qty: 90 CAPSULE | Refills: 3 | Status: SHIPPED | OUTPATIENT
Start: 2020-11-24 | End: 2022-06-02

## 2020-11-24 NOTE — TELEPHONE ENCOUNTER
Prescription approved per [facility] Refill Protocol.  Pari Wang RN, BSN

## 2020-12-31 ENCOUNTER — TELEPHONE (OUTPATIENT)
Dept: ENDOCRINOLOGY | Facility: CLINIC | Age: 40
End: 2020-12-31

## 2020-12-31 DIAGNOSIS — Z79.4 TYPE 2 DIABETES MELLITUS WITH DIABETIC NEPHROPATHY, WITH LONG-TERM CURRENT USE OF INSULIN (H): ICD-10-CM

## 2020-12-31 DIAGNOSIS — E11.21 TYPE 2 DIABETES MELLITUS WITH DIABETIC NEPHROPATHY, WITH LONG-TERM CURRENT USE OF INSULIN (H): ICD-10-CM

## 2020-12-31 NOTE — TELEPHONE ENCOUNTER
Central Prior Authorization Team   Phone: [phone]      PA Initiation    Medication: Trulicity 1.5mg  Insurance Company: [place] Medicaid ([facility]) - Phone [phone] Fax [phone]  Pharmacy Filling the Rx: [facility] - [address]   Filling Pharmacy Phone: [phone]  Filling Pharmacy Fax:    Start Date: 12/31/2020

## 2020-12-31 NOTE — TELEPHONE ENCOUNTER
Prior Authorization Retail Medication Request    Medication/Dose: Trulicity 1.5mg  ICD code (if different than what is on RX):     Previously Tried and Failed:     Rationale:       Insurance Name:  [place] Medicaid  Insurance ID:  [id number]      Pharmacy Information (if different than what is on RX)  Name:  [facility]  Phone:  [phone]      Kaykay Kinney CPhT  [facility]   Phone: [phone]  Fax: [phone]

## 2021-01-04 NOTE — TELEPHONE ENCOUNTER
PRIOR AUTHORIZATION DENIED    Medication: Trulicity 1.5mg    Denial Date: 1/4/2021    Denial Rational:          Appeal Information:    If you would like to appeal, please supply P/A team with a letter of medical necessity with clinical reason.

## 2021-01-05 RX ORDER — METFORMIN HCL 500 MG
TABLET, EXTENDED RELEASE 24 HR ORAL
Qty: 360 TABLET | Refills: 3 | Status: SHIPPED | OUTPATIENT
Start: 2021-01-05 | End: 2021-02-17

## 2021-01-05 NOTE — TELEPHONE ENCOUNTER
Pending Prescriptions:                       Disp   Refills    metFORMIN (GLUCOPHAGE-XR) 500 MG 24 hr ta*360 ta*3            Sig: TAKE TWO TABLETS BY MOUTH TWICE A DAY WITH MEALS    Routing refill request to provider for review/approval because:  Patient is due for an appointment, please advise

## 2021-01-06 NOTE — TELEPHONE ENCOUNTER
MEDICATION APPEAL DENIED    Medication: Trulicity 1.5mg    Denial Date: 1/6/2021    Denial Rational:            Second Level Appeal Information:      Second level appeals will be managed by the clinic staff and provider. Please contact the [employer] Prior Authorization Team if additional information about the denial is needed.

## 2021-01-14 ENCOUNTER — OFFICE VISIT (OUTPATIENT)
Dept: FAMILY MEDICINE | Facility: CLINIC | Age: 41
End: 2021-01-14
Payer: MEDICAID

## 2021-01-14 VITALS
TEMPERATURE: 97.7 F | HEART RATE: 80 BPM | BODY MASS INDEX: 30.22 KG/M2 | RESPIRATION RATE: 20 BRPM | DIASTOLIC BLOOD PRESSURE: 71 MMHG | WEIGHT: 177 LBS | OXYGEN SATURATION: 97 % | SYSTOLIC BLOOD PRESSURE: 108 MMHG | HEIGHT: 64 IN

## 2021-01-14 DIAGNOSIS — Z00.00 ROUTINE GENERAL MEDICAL EXAMINATION AT A HEALTH CARE FACILITY: Primary | ICD-10-CM

## 2021-01-14 DIAGNOSIS — Z12.31 VISIT FOR SCREENING MAMMOGRAM: ICD-10-CM

## 2021-01-14 DIAGNOSIS — Z23 NEED FOR PROPHYLACTIC VACCINATION AND INOCULATION AGAINST INFLUENZA: ICD-10-CM

## 2021-01-14 PROBLEM — F32.A DEPRESSION: Status: ACTIVE | Noted: 2021-01-14

## 2021-01-14 PROBLEM — G47.09 OTHER INSOMNIA: Status: ACTIVE | Noted: 2021-01-14

## 2021-01-14 LAB
ERYTHROCYTE [DISTWIDTH] IN BLOOD BY AUTOMATED COUNT: 12.1 % (ref 10–15)
HBA1C MFR BLD: 7.6 % (ref 0–5.6)
HCT VFR BLD AUTO: 40.6 % (ref 35–47)
HGB BLD-MCNC: 13.3 G/DL (ref 11.7–15.7)
MCH RBC QN AUTO: 31.3 PG (ref 26.5–33)
MCHC RBC AUTO-ENTMCNC: 32.8 G/DL (ref 31.5–36.5)
MCV RBC AUTO: 96 FL (ref 78–100)
PLATELET # BLD AUTO: 339 10E9/L (ref 150–450)
RBC # BLD AUTO: 4.25 10E12/L (ref 3.8–5.2)
WBC # BLD AUTO: 8.6 10E9/L (ref 4–11)

## 2021-01-14 PROCEDURE — 77063 BREAST TOMOSYNTHESIS BI: CPT | Mod: TC | Performed by: RADIOLOGY

## 2021-01-14 PROCEDURE — 99396 PREV VISIT EST AGE 40-64: CPT | Mod: 25 | Performed by: FAMILY MEDICINE

## 2021-01-14 PROCEDURE — 36415 COLL VENOUS BLD VENIPUNCTURE: CPT | Performed by: FAMILY MEDICINE

## 2021-01-14 PROCEDURE — 83036 HEMOGLOBIN GLYCOSYLATED A1C: CPT | Performed by: FAMILY MEDICINE

## 2021-01-14 PROCEDURE — 77067 SCR MAMMO BI INCL CAD: CPT | Mod: TC | Performed by: RADIOLOGY

## 2021-01-14 PROCEDURE — 80053 COMPREHEN METABOLIC PANEL: CPT | Performed by: FAMILY MEDICINE

## 2021-01-14 PROCEDURE — 90686 IIV4 VACC NO PRSV 0.5 ML IM: CPT | Performed by: FAMILY MEDICINE

## 2021-01-14 PROCEDURE — 90471 IMMUNIZATION ADMIN: CPT | Performed by: FAMILY MEDICINE

## 2021-01-14 PROCEDURE — 82043 UR ALBUMIN QUANTITATIVE: CPT | Performed by: FAMILY MEDICINE

## 2021-01-14 PROCEDURE — 85027 COMPLETE CBC AUTOMATED: CPT | Performed by: FAMILY MEDICINE

## 2021-01-14 RX ORDER — CHOLECALCIFEROL (VITAMIN D3) 50 MCG
1 TABLET ORAL DAILY
COMMUNITY
End: 2022-05-09

## 2021-01-14 ASSESSMENT — ENCOUNTER SYMPTOMS
WEAKNESS: 0
BREAST MASS: 0
ARTHRALGIAS: 0
HEMATURIA: 0
HEMATOCHEZIA: 0
DYSURIA: 0
FEVER: 0
HEARTBURN: 0
COUGH: 0
JOINT SWELLING: 0
SHORTNESS OF BREATH: 0
NAUSEA: 0
CHILLS: 0
DIZZINESS: 0
NERVOUS/ANXIOUS: 0
FREQUENCY: 0
PARESTHESIAS: 0
DIARRHEA: 0
CONSTIPATION: 0
PALPITATIONS: 0
MYALGIAS: 0
HEADACHES: 0
EYE PAIN: 0
ABDOMINAL PAIN: 0
SORE THROAT: 0

## 2021-01-14 ASSESSMENT — MIFFLIN-ST. JEOR: SCORE: 1447.87

## 2021-01-14 NOTE — PROGRESS NOTES
"   SUBJECTIVE:   CC: Marie Vogel is an 40 year old woman who presents for preventive health visit.     Travel nursing, needs a physical.  \"Travel Nurse Across [place]\" form to be completed.    Patient has been advised of split billing requirements and indicates understanding: Yes  Healthy Habits:     Getting at least 3 servings of Calcium per day:  Yes    Bi-annual eye exam:  Yes    Dental care twice a year:  Yes    Sleep apnea or symptoms of sleep apnea:  Daytime drowsiness    Diet:  Diabetic    Frequency of exercise:  None    Taking medications regularly:  Yes    Medication side effects:  Muscle aches    PHQ-2 Total Score: 0    Additional concerns today:  No      Today's PHQ-2 Score:   PHQ-2 ( 1999 Pfizer) 1/14/2021   Q1: Little interest or pleasure in doing things 0   Q2: Feeling down, depressed or hopeless 0   PHQ-2 Score 0   Q1: Little interest or pleasure in doing things Not at all   Q2: Feeling down, depressed or hopeless Not at all   PHQ-2 Score 0       Abuse: Current or Past (Physical, Sexual or Emotional) - No  Do you feel safe in your environment? Yes        Social History     Tobacco Use     Smoking status: Former Smoker     Packs/day: 0.00     Years: 0.00     Pack years: 0.00     Types: Cigarettes     Quit date: 10/24/2005     Years since quitting: 15.2     Smokeless tobacco: Never Used     Tobacco comment: States only smokes when drinks maybe 2x/year   Substance Use Topics     Alcohol use: No     Alcohol/week: 0.0 standard drinks     Frequency: Monthly or less     Drinks per session: 1 or 2     Binge frequency: Never     If you drink alcohol do you typically have >3 drinks per day or >7 drinks per week? No    Alcohol Use 1/14/2021   Prescreen: >3 drinks/day or >7 drinks/week? Not Applicable   Prescreen: >3 drinks/day or >7 drinks/week? -   No flowsheet data found.    Reviewed orders with patient.  Reviewed health maintenance and updated orders accordingly - Yes  Lab work is in process  Labs " reviewed in EPIC  BP Readings from Last 3 Encounters:   21 108/71   20 121/78   10/07/20 128/84    Wt Readings from Last 3 Encounters:   21 80.3 kg (177 lb)   20 79.4 kg (175 lb)   10/07/20 79.8 kg (175 lb 14.4 oz)                  Patient Active Problem List   Diagnosis     Allergic rhinitis     HYPERLIPIDEMIA LDL GOAL <100     Family history of other cardiovascular diseases     Health Care Home     Microalbuminuria     Insulin pump in place     Vitamin D deficiency     Nut allergy     Type 2 diabetes mellitus with diabetic nephropathy     Diabetic gastroparesis (H)     Hypertrophic obstructive cardiomyopathy (H)     PFO (patent foramen ovale)     Other migraine without status migrainosus, not intractable     Scars of posterior pole of chorioretina, bilateral     Cervical high risk HPV (human papillomavirus) test positive     Mirena IUD inserted 19: Due for removal 2024     Depression     Other insomnia     Past Surgical History:   Procedure Laterality Date     ABDOMEN SURGERY       C INDUCED ABORTN BY D&C           C IUD,MIRENA  8/10/10, 7/28/15     C/SECTION, LOW TRANSVERSE  6/25/10    , Low Transverse     CHOLECYSTECTOMY, LAPOROSCOPIC      Cholecystectomy, Laparoscopic     ESOPHAGOSCOPY, GASTROSCOPY, DUODENOSCOPY (EGD), COMBINED N/A 2016    Procedure: COMBINED ESOPHAGOSCOPY, GASTROSCOPY, DUODENOSCOPY (EGD), BIOPSY SINGLE OR MULTIPLE;  Surgeon: Fadi Hunter MD;  Location: [facility] ESOPHAGEAL MOTILITY STUDY N/A 2016    Procedure: ESOPHAGEAL MOTILITY STUDY;  Surgeon: Fadi Hunter MD;  Location: [facility] REMOVE TONSILS/ADENOIDS,<11 Y/O      T &A       Social History     Tobacco Use     Smoking status: Former Smoker     Packs/day: 0.00     Years: 0.00     Pack years: 0.00     Types: Cigarettes     Quit date: 10/24/2005     Years since quitting: 15.2     Smokeless tobacco: Never Used     Tobacco comment: States only  smokes when drinks maybe 2x/year   Substance Use Topics     Alcohol use: No     Alcohol/week: 0.0 standard drinks     Frequency: Monthly or less     Drinks per session: 1 or 2     Binge frequency: Never     Family History   Problem Relation Age of Onset     Cardiovascular Mother         hypertrophic cardiomyopathy     Genitourinary Problems Mother         gallbladder disease     Diabetes Mother         Transplnant induced/      Other - See Comments Mother         heart transplant 2005     Depression Mother      Diabetes Father         type 2     Hypertension Father      Hyperlipidemia Father      Genitourinary Problems Maternal Grandmother         gallbladder disease     Genitourinary Problems Paternal Grandmother         gallbladder disease     Hypertension Paternal Grandfather         high bp     Cerebrovascular Disease Paternal Grandfather              Cardiovascular Brother         hypertrophic cardiomyopathy     Diabetes Brother      Diabetes Brother         type 2     Diabetes Other         Type 2     Glaucoma No family hx of      Macular Degeneration No family hx of          Current Outpatient Medications   Medication Sig Dispense Refill     albuterol (PROAIR HFA/PROVENTIL HFA/VENTOLIN HFA) 108 (90 Base) MCG/ACT inhaler Inhale 2 puffs into the lungs every 4 hours as needed for shortness of breath / dyspnea or wheezing 1 Inhaler 3     aspirin 81 MG tablet Take 1 tablet (81 mg) by mouth daily 90 tablet 3     atenolol (TENORMIN) 25 MG tablet Take 0.5 tablets (12.5 mg) by mouth daily 90 tablet 3     blood glucose (PEPE CONTOUR NEXT) test strip Check 4 times/day 300 each 0     blood glucose monitoring (NO BRAND SPECIFIED) meter device kit Use to test blood sugar 6 times daily and as needed. 1 kit 0     cetirizine (ZYRTEC) 10 MG tablet Take 10 mg by mouth 2 times daily  90 tablet 3     Continuous Blood Gluc Sensor (FREESTYLE BESSIE 14 DAY SENSOR) MISC CHANGE EVERY 14 DAYS 6 each 3      EPINEPHrine (ANY BX GENERIC EQUIV) 0.3 MG/0.3ML injection 2-pack Inject 0.3 mLs (0.3 mg) into the muscle once as needed for anaphylaxis 0.3 mL 11     fluticasone (FLONASE) 50 MCG/ACT spray Spray 1-2 sprays into both nostrils daily 1 Bottle 0     glucagon (GLUCAGON EMERGENCY) 1 MG kit Inject 1 mg into the muscle as needed for low blood sugar 1 mg 0     ibuprofen (ADVIL) 200 MG tablet Take 200 mg by mouth every 4 hours as needed.       insulin aspart (NOVOLOG VIAL) 100 UNITS/ML vial USE WITH INSULIN PUMP AS DIRECTED, USES ABOUT 80 UNITS PER DAY 90 mL 1     Insulin Disposable Pump (OMNIPOD DASH 5 PACK PODS) MISC CHANGE EVERY 3 DAYS 30 each 3     Insulin Disposable Pump (OMNIPOD DASH SYSTEM) KIT 1 kit continuous 1 kit 0     insulin glargine (LANTUS PEN) 100 UNIT/ML pen Take 20 units in case of pump malfunction only. 15 mL 0     INSULIN PUMP - OUTPATIENT Updated 11/12/19:  OmniPod Dash  BASAL:  00:00: 1.150 units/hr  10:30: 1.2  14:30: 1.1  CARB RATIO:  00:00: 8  Sensitivity:  00:00: 30 mg/dL  BLOOD GLUCOSE TARGET and times:  12   AM (midnight): 120-120  Active Insulin Time: 4 hours  Sensor: Nadia/ Nadia Link Donna  Historic Futures:   [email or web address]  Password [email or web address]       Lancets (MICROLET) MISC 1 Device See Admin Instructions. Use up to 5 times daily for blood sugar checks. 100 each prn     levonorgestrel (MIRENA) 20 MCG/24HR IUD 1 each (20 mcg) by Intrauterine route once       losartan (COZAAR) 50 MG tablet TAKE ONE TABLET BY MOUTH EVERY MORNING AND TAKE ONE-HALF TABLET BY MOUTH EVERY EVENING 135 tablet 3     MAGNESIUM OXIDE PO Take 400 mg by mouth daily       metFORMIN (GLUCOPHAGE-XR) 500 MG 24 hr tablet TAKE TWO TABLETS BY MOUTH TWICE A DAY WITH MEALS 360 tablet 3     montelukast (SINGULAIR) 10 MG tablet Take 1 tablet (10 mg) by mouth At Bedtime 90 tablet 1     Multiple Vitamins-Minerals (MULTI VITAMIN/MINERALS PO) Take 1 each by mouth daily.       Omega-3 Fatty Acids (FISH OIL) 500 MG CAPS Take 1 capsule by  mouth       omeprazole (PRILOSEC) 40 MG DR capsule TAKE ONE CAPSULE BY MOUTH ONCE DAILY AS NEEDED 90 capsule 3     Ostomy Supplies (SKIN PREP WIPES) MISC USE ONE EVERY 3 DAYS 50 each 3     rosuvastatin (CRESTOR) 5 MG tablet Take 1 tablet (5 mg) by mouth daily 90 tablet 3     sertraline (ZOLOFT) 25 MG tablet Take 1 tablet (25 mg) by mouth daily 90 tablet 3     SUMAtriptan (IMITREX) 25 MG tablet TAKE ONE TO FOUR TABLETS BY MOUTH ONE TIME. MAY REPEAT 1 TABLET EVERY TWO HOURS UP TO MAXIMUM OF 200MG IN 24 HOURS 8 tablet 6     traZODone (DESYREL) 50 MG tablet Take 0.5-1 tablets (25-50 mg) by mouth nightly as needed for sleep 60 tablet 1     vitamin D3 (CHOLECALCIFEROL) 50 mcg (2000 units) tablet Take 1 tablet by mouth daily       dulaglutide (TRULICITY) 1.5 MG/0.5ML pen Inject 1.5 mg Subcutaneous every 7 days 3 mL 3     Allergies   Allergen Reactions     Ivp Dye [Contrast Dye] Itching     Pt reports that throat itches     Nuts Anaphylaxis     [place] nuts only     Bactrim Swelling     Pt reaction was swelling in mouth and tongue and itchy mouth.  Resolved with benadryl and prednisone     Penicillins Hives     Cephalosporins Itching     Seasonal Allergies Other (See Comments)     Molds, trees, grass, dust..  Upper congestion     Atorvastatin      myalgias     Shellfish Allergy Rash     Clams only     Recent Labs   Lab Test 01/14/21  1509 07/14/20  0925 02/03/20  1612 01/14/20  1413 01/14/20  1207 09/24/19  0938 01/30/19  0733 11/05/18  0750 07/22/18  2159 07/22/18  2159 06/15/18  0732 10/24/17  1108 04/16/17 2014 04/16/17 2014   A1C 7.6* 6.7*  --  7.0*  --  7.5* 7.3*  --    < >  --  7.7* 8.5*   < >  --    LDL  --   --   --   --  82  --  140*  --   --   --  111* 100*  --   --    HDL  --   --   --   --  41*  --  33*  --   --   --  35* 47*  --   --    TRIG  --   --   --   --  243*  --  165*  --   --   --  189* 227*  --   --    ALT  --   --  29  --   --   --   --  32  --   --   --  42  --  25   CR  --   --  1.01 0.89  --   --    --   --   --  0.92  --  0.81   < > 0.86   GFRESTIMATED  --   --  69 80  --   --   --   --   --  67  --  78   < > 73   GFRESTBLACK  --   --  80 >90  --   --   --   --   --  81  --  >90   < > 89   POTASSIUM  --   --  4.3  --   --   --   --   --   --  4.3  --  4.5   < > 4.0   TSH  --   --   --   --   --  1.45  --   --   --   --   --   --   --  2.06    < > = values in this interval not displayed.        Mammogram Screening: Patient under age 50, mutual decision reflected in health maintenance.      Pertinent mammograms are reviewed under the imaging tab.  History of abnormal Pap smear: YES - updated in Problem List and Health Maintenance accordingly  PAP / HPV Latest Ref Rng & Units 2019   PAP - NIL NIL NIL   HPV 16 DNA NEG:Negative Negative Negative Negative   HPV 18 DNA NEG:Negative Negative Negative Negative   OTHER HR HPV NEG:Negative Negative Positive(A) Negative     Reviewed and updated as needed this visit by clinical staff  Tobacco  Allergies  Meds  Problems  Med Hx  Surg Hx  Fam Hx  Soc Hx          Reviewed and updated as needed this visit by Provider  Tobacco  Allergies  Meds  Problems  Med Hx  Surg Hx  Fam Hx         Past Medical History:   Diagnosis Date     Allergic rhinitis due to pollen      Atypical glandular cells on Pap smear 3/1108     Cervical high risk HPV (human papillomavirus) test positive 2018    See problem list     Gastroesophageal reflux disease      PFO (patent foramen ovale)      Stargardt's disease 2019     Type II or unspecified type diabetes mellitus without mention of complication, not stated as uncontrolled     onset was gestational in       Past Surgical History:   Procedure Laterality Date     ABDOMEN SURGERY       C INDUCED ABORTN BY D&C           C IUD,MIRENA  8/10/10, 7/28/15     C/SECTION, LOW TRANSVERSE  6/25/10    , Low Transverse     CHOLECYSTECTOMY, LAPOROSCOPIC      Cholecystectomy,  "Laparoscopic     ESOPHAGOSCOPY, GASTROSCOPY, DUODENOSCOPY (EGD), COMBINED N/A 5/18/2016    Procedure: COMBINED ESOPHAGOSCOPY, GASTROSCOPY, DUODENOSCOPY (EGD), BIOPSY SINGLE OR MULTIPLE;  Surgeon: Fadi Hunter MD;  Location: [facility] ESOPHAGEAL MOTILITY STUDY N/A 9/14/2016    Procedure: ESOPHAGEAL MOTILITY STUDY;  Surgeon: Fadi Hunter MD;  Location: [facility] REMOVE TONSILS/ADENOIDS,<11 Y/O  1987    T &A       Review of Systems   Constitutional: Negative for chills and fever.   HENT: Positive for hearing loss. Negative for congestion, ear pain and sore throat.    Eyes: Negative for pain and visual disturbance.   Respiratory: Negative for cough and shortness of breath.    Cardiovascular: Negative for chest pain, palpitations and peripheral edema.   Gastrointestinal: Negative for abdominal pain, constipation, diarrhea, heartburn, hematochezia and nausea.   Breasts:  Negative for tenderness, breast mass and discharge.   Genitourinary: Negative for dysuria, frequency, genital sores, hematuria, pelvic pain, urgency, vaginal bleeding and vaginal discharge.   Musculoskeletal: Negative for arthralgias, joint swelling and myalgias.   Skin: Negative for rash.   Neurological: Negative for dizziness, weakness, headaches and paresthesias.   Psychiatric/Behavioral: Negative for mood changes. The patient is not nervous/anxious.      Has hearing aids     OBJECTIVE:   /71 (BP Location: Right arm, Patient Position: Chair, Cuff Size: Adult Regular)   Pulse 80   Temp 97.7  F (36.5  C) (Oral)   Resp 20   Ht 1.626 m (5' 4\")   Wt 80.3 kg (177 lb)   LMP 12/10/2020   SpO2 97%   Breastfeeding No   BMI 30.38 kg/m    Physical Exam  GENERAL: healthy, alert and no distress  EYES: Eyes grossly normal to inspection, PERRL and conjunctivae and sclerae normal  HENT: ear canals and TM's normal, nose and mouth without ulcers or lesions.  Bilateral hearing aids.  NECK: no adenopathy, no asymmetry, masses, or " "scars and thyroid normal to palpation  RESP: lungs clear to auscultation - no rales, rhonchi or wheezes  CV: regular rate and rhythm, normal S1 S2, no S3 or S4, no murmur, click or rub, no peripheral edema and peripheral pulses strong  ABDOMEN: soft, nontender, no hepatosplenomegaly, no masses and bowel sounds normal  MS: no gross musculoskeletal defects noted, no edema  SKIN: no suspicious lesions or rashes  NEURO: Normal strength and tone, mentation intact and speech normal  PSYCH: mentation appears normal, affect normal/bright    Diagnostic Test Results:  Labs reviewed in Epic    ASSESSMENT/PLAN:   1. Routine general medical examination at a health care facility  Exam completed today, routine health maintenance items updated as able.  Screening labs ordered.  Follow up one year or sooner as needed.  - HEMOGLOBIN A1C  - Albumin Random Urine Quantitative with Creat Ratio  - DEPRESSION ACTION PLAN (DAP)  - MIGRAINE ACTION PLAN  - CBC with platelets  - Comprehensive metabolic panel (BMP + Alb, Alk Phos, ALT, AST, Total. Bili, TP)  - Lipid panel reflex to direct LDL Fasting; Future    2. Need for prophylactic vaccination and inoculation against influenza  - INFLUENZA VACCINE IM > 6 MONTHS VALENT IIV4 [62674]    Patient has been advised of split billing requirements and indicates understanding: Yes  COUNSELING:  Reviewed preventive health counseling, as reflected in patient instructions    Estimated body mass index is 30.38 kg/m  as calculated from the following:    Height as of this encounter: 1.626 m (5' 4\").    Weight as of this encounter: 80.3 kg (177 lb).    Weight management plan: Discussed healthy diet and exercise guidelines    She reports that she quit smoking about 15 years ago. Her smoking use included cigarettes. She smoked 0.00 packs per day for 0.00 years. She has never used smokeless tobacco.      Counseling Resources:  ATP IV Guidelines  Pooled Cohorts Equation Calculator  Breast Cancer Risk " Calculator  BRCA-Related Cancer Risk Assessment: FHS-7 Tool  FRAX Risk Assessment  ICSI Preventive Guidelines  Dietary Guidelines for Americans, 2010  USDA's MyPlate  ASA Prophylaxis  Lung CA Screening    April J. Omega Trinidad MD  [facility]

## 2021-01-14 NOTE — LETTER
My Depression Action Plan  Name: Marie Vogel   Date of Birth 1980  Date: 1/14/2021    My doctor: Alan Paredes   My clinic: [address] [address] [phone]          GREEN    ZONE   Good Control    What it looks like:     Things are going generally well. You have normal ups and downs. You may even feel depressed from time to time, but bad moods usually last less than a day.   What you need to do:  1. Continue to care for yourself (see self care plan)  2. Check your depression survival kit and update it as needed  3. Follow your physician s recommendations including any medication.  4. Do not stop taking medication unless you consult with your physician first.           YELLOW         ZONE Getting Worse    What it looks like:     Depression is starting to interfere with your life.     It may be hard to get out of bed; you may be starting to isolate yourself from others.    Symptoms of depression are starting to last most all day and this has happened for several days.     You may have suicidal thoughts but they are not constant.   What you need to do:     1. Call your care team. Your response to treatment will improve if you keep your care team informed of your progress. Yellow periods are signs an adjustment may need to be made.     2. Continue your self-care.  Just get dressed and ready for the day.  Don't give yourself time to talk yourself out of it.    3. Talk to someone in your support network.    4. Open up your Depression Self-Care Plan/Wellness Kit.           RED    ZONE Medical Alert - Get Help    What it looks like:     Depression is seriously interfering with your life.     You may experience these or other symptoms: You can t get out of bed most days, can t work or engage in other necessary activities, you have trouble taking care of basic hygiene, or basic responsibilities, thoughts of suicide or  death that will not go away, self-injurious behavior.     What you need to do:  1. Call your care team and request a same-day appointment. If they are not available (weekends or after hours) call your local crisis line, emergency room or 911.          Depression Self-Care Plan / Wellness Kit    Many people find that medication and therapy are helpful treatments for managing depression. In addition, making small changes to your everyday life can help to boost your mood and improve your wellbeing. Below are some tips for you to consider. Be sure to talk with your medical provider and/or behavioral health consultant if your symptoms are worsening or not improving.     Sleep   Sleep hygiene  means all of the habits that support good, restful sleep. It includes maintaining a consistent bedtime and wake time, using your bedroom only for sleeping or sex, and keeping the bedroom dark and free of distractions like a computer, smartphone, or television.     Develop a Healthy Routine  Maintain good hygiene. Get out of bed in the morning, make your bed, brush your teeth, take a shower, and get dressed. Don t spend too much time viewing media that makes you feel stressed. Find time to relax each day.    Exercise  Get some form of exercise every day. This will help reduce pain and release endorphins, the  feel good  chemicals in your brain. It can be as simple as just going for a walk or doing some gardening, anything that will get you moving.      Diet  Strive to eat healthy foods, including fruits and vegetables. Drink plenty of water. Avoid excessive sugar, caffeine, alcohol, and other mood-altering substances.     Stay Connected with Others  Stay in touch with friends and family members.    Manage Your Mood  Try deep breathing, massage therapy, biofeedback, or meditation. Take part in fun activities when you can. Try to find something to smile about each day.     Psychotherapy  Be open to working with a therapist if your  provider recommends it.     Medication  Be sure to take your medication as prescribed. Most anti-depressants need to be taken every day. It usually takes several weeks for medications to work. Not all medicines work for all people. It is important to follow-up with your provider to make sure you have a treatment plan that is working for you. Do not stop your medication abruptly without first discussing it with your provider.    Crisis Resources   These hotlines are for both adults and children. They and are open 24 hours a day, 7 days a week unless noted otherwise.      National Suicide Prevention Lifeline   5-334-398-BTAF (5835)      Crisis Text Line    www.crisistextline.org  Text HOME to 726925 from anywhere in the [place], anytime, about any type of crisis. A live, trained crisis counselor will receive the text and respond quickly.      [facility] Lifeline for LGBTQ Youth  A national crisis intervention and suicide lifeline for LGBTQ youth under 25. Provides a safe place to talk without judgement. Call 1-906.302.1033; text START to 506346 or visit www.thetrevorproject.org to talk to a trained counselor.      For [facility] crisis numbers, visit the [facility] website at:  https://[place].gov/dhs/people-we-serve/adults/health-care/mental-health/resources/crisis-contacts.jsp

## 2021-01-14 NOTE — NURSING NOTE
No future appointments.  Appointment Notes for this encounter:   Physical for Travel Nursing    Health Maintenance Due   Topic Date Due     ANNUAL REVIEW OF HM ORDERS  1980     DEPRESSION ACTION PLAN  1980     MIGRAINE ACTION PLAN  1980     HEPATITIS C SCREENING  06/27/1996     HEPATITIS B IMMUNIZATION (1 of 3 - Risk 3-dose series) 06/27/1997     DIABETIC FOOT EXAM  11/05/2019     LIPID  07/14/2020     ALT  08/03/2020     INFLUENZA VACCINE (1) 09/01/2020     A1C  10/14/2020     MICROALBUMIN  01/14/2021     PREVENTIVE CARE VISIT  02/03/2021     BMP  02/03/2021     EYE EXAM  02/06/2021     PAP FOLLOW-UP  11/29/2022     HPV FOLLOW-UP  11/29/2022     Health Maintenance addressed:  Eye Exam, Immunizations and Flu Vaccine    Eye Exam Pt agrees to schedule appt today and Immunizations Pt will update today    MyChart Status:  Active and Using     Shari Turner/TEODORA  [place]---[facility]

## 2021-01-14 NOTE — LETTER
My Migraine Action Plan      Date: 1/14/2021     My Name: Marie Vogel   YOB: 1980  My Pharmacy:    [facility]  [facility] [address]  [id number]  [facility] [place], [place] - [address] [facility] - [place], [place] - [address]  [facility] - [place], [place] - [address]     My Preventive Medicine(s):  None  My Rescue Medicine(s):  Sumatriptan (Imitrex) As needed My Doctor: Alan Paredes     My Clinic: [address] [address] [phone]        GREEN ZONE = Good Control    My headache plan is working.   I can do what I need to do.         I WILL:     ? Keep managing my triggers.  ? Write in my migraine diary each time I have a headache.  ? Keep taking my preventive medicine daily.  ? Take my rescue medicine as needed.             YELLOW ZONE = Not Enough Control    My headache plan isn t always working.   My headaches keep me from doing   some of the things I need to do.   I WILL:     ? Set goals to control my triggers and act on them.  ? Write in my migraine diary each time I have a headache and review it for                     patterns or new triggers.  ? Keep taking my preventive medicine daily.  ? Take my rescue medicine as needed.  l Make sure I stay hydrated.  ? Call my provider or clinic if my rescue medication did not help after taking it on             at least two separate headache days  ? Call my provider or clinic if I need to use my rescue medicine more than an                  average of 2 times per week over the course of one month.               RED ZONE = Poor or No Control    My headache plan has  failed. I can t do anything  when I have one. My  medicines aren t working.   I WILL:   ? Keep taking my preventive medicine daily.  ? Make sure I stay hydrated.  ? Call my  provider or clinic if my medicines are not helping or if I am not able to              keep fluids down because of nausea.  ? Let my provider or clinic know within 2 weeks if I have gone to an urgent care or          emergency department.          Provider specific instructions:      Do not take over the counter pain relievers more than 14 days per month. This includes NSAIDS (Ibuprofen, Motrin, Advil, Naproxen, Aleve, etc), acetaminophen (Tylenol), aspirin, and Excedrin.     If you use a triptan medicine (sumatriptan, rizatriptan, frovatriptan, zolmitriptan, eletriptan etc) take it as soon as you notice the migraine starting. You can take a second dose 2 hours after the first dose if you still have any migraine symptoms.    It is fine to take 600 mg of ibuprofen (Advil) or 440 mg of naproxen (Aleve) with the triptan medicine.  Try not to use triptan medicines more than 2 days a week.    If you use your rescue medicine more than 2 times per week over the course of 1 month, set up an appointment with your provider to talk about starting a medication to prevent migraines.               Other Things I Can Do to Help My Migraines   Track Migraines and Triggers     Keep a headache journal of your symptoms. Try to track how often you have a headache, how long it lasts and what medicines you used. You can also track severity of headache.    You can use a smart phone mathew: My Migraine Ryan. The information that you track in the mathew can be uploaded for your provider through [employer].     You can also track your migraines on paper. The clinic can print a copy of the Migraine Diary for you. Link: [email or web address]      Lifestyle Changes 1. Try to drink enough water each day (48-70 fluid ounces a day)  2. Limit caffeine intact-one caffeinated beverage a day  3. Eat regular meals  4. Try to get cardiovascular exercise (walking, swimming, biking) 4-5 times a week  5. Try to have a routine sleep schedule going to bed at  the same time each night and getting up the same time each morning with enough time to sleep in between  6. Sometimes foods can trigger migraines you can try to eliminate them if you think they make symptoms worse: dairy, gluten, nuts (cashews, almonds), artificial sweeteners, artificial colors, high sugar content foods, certain alcohol, chocolate, and preservatives like MSG and nitrates.      Other Therapies  Talk to your doctor about adding other therapies to your headache treatment plan including:   - Cognitive behavioral therapy  - Biofeedback  - Practice therapeutic techniques at home such as Progressive Relaxation and Deep Breathing    Research suggests that combining one or more of these therapies with medication can be helpful to reduce the number and severity of migraines.     Learn More To learn more about headaches you can go to the following websites:  https://americanmigrainefoundation.org/   http://www.headaches.org/

## 2021-01-15 LAB
ALBUMIN SERPL-MCNC: 3.6 G/DL (ref 3.4–5)
ALP SERPL-CCNC: 103 U/L (ref 40–150)
ALT SERPL W P-5'-P-CCNC: 34 U/L (ref 0–50)
ANION GAP SERPL CALCULATED.3IONS-SCNC: 3 MMOL/L (ref 3–14)
AST SERPL W P-5'-P-CCNC: 25 U/L (ref 0–45)
BILIRUB SERPL-MCNC: 0.6 MG/DL (ref 0.2–1.3)
BUN SERPL-MCNC: 27 MG/DL (ref 7–30)
CALCIUM SERPL-MCNC: 8.9 MG/DL (ref 8.5–10.1)
CHLORIDE SERPL-SCNC: 109 MMOL/L (ref 94–109)
CO2 SERPL-SCNC: 25 MMOL/L (ref 20–32)
CREAT SERPL-MCNC: 1.05 MG/DL (ref 0.52–1.04)
CREAT UR-MCNC: 196 MG/DL
GFR SERPL CREATININE-BSD FRML MDRD: 65 ML/MIN/{1.73_M2}
GLUCOSE SERPL-MCNC: 160 MG/DL (ref 70–99)
MICROALBUMIN UR-MCNC: 507 MG/L
MICROALBUMIN/CREAT UR: 258.67 MG/G CR (ref 0–25)
POTASSIUM SERPL-SCNC: 5 MMOL/L (ref 3.4–5.3)
PROT SERPL-MCNC: 7.2 G/DL (ref 6.8–8.8)
SODIUM SERPL-SCNC: 137 MMOL/L (ref 133–144)

## 2021-01-18 DIAGNOSIS — H35.50 RETINAL DYSTROPHY: Primary | ICD-10-CM

## 2021-01-20 DIAGNOSIS — Z00.00 ROUTINE GENERAL MEDICAL EXAMINATION AT A HEALTH CARE FACILITY: ICD-10-CM

## 2021-01-20 PROCEDURE — 80061 LIPID PANEL: CPT | Performed by: FAMILY MEDICINE

## 2021-01-20 PROCEDURE — 36415 COLL VENOUS BLD VENIPUNCTURE: CPT | Performed by: FAMILY MEDICINE

## 2021-01-21 ENCOUNTER — OFFICE VISIT (OUTPATIENT)
Dept: OPHTHALMOLOGY | Facility: CLINIC | Age: 41
End: 2021-01-21
Attending: OPHTHALMOLOGY
Payer: MEDICAID

## 2021-01-21 ENCOUNTER — TELEPHONE (OUTPATIENT)
Dept: CARDIOLOGY | Facility: CLINIC | Age: 41
End: 2021-01-21

## 2021-01-21 DIAGNOSIS — H35.50 RETINAL DYSTROPHY: ICD-10-CM

## 2021-01-21 LAB
CHOLEST SERPL-MCNC: 131 MG/DL
HDLC SERPL-MCNC: 35 MG/DL
LDLC SERPL CALC-MCNC: 73 MG/DL
NONHDLC SERPL-MCNC: 96 MG/DL
TRIGL SERPL-MCNC: 117 MG/DL

## 2021-01-21 PROCEDURE — G0463 HOSPITAL OUTPT CLINIC VISIT: HCPCS

## 2021-01-21 PROCEDURE — 92083 EXTENDED VISUAL FIELD XM: CPT | Performed by: OPHTHALMOLOGY

## 2021-01-21 PROCEDURE — 92015 DETERMINE REFRACTIVE STATE: CPT

## 2021-01-21 PROCEDURE — 92014 COMPRE OPH EXAM EST PT 1/>: CPT | Performed by: OPHTHALMOLOGY

## 2021-01-21 PROCEDURE — 92250 FUNDUS PHOTOGRAPHY W/I&R: CPT | Performed by: OPHTHALMOLOGY

## 2021-01-21 PROCEDURE — 99207 FUNDUS AUTOFLUORESCENCE IMAGE (FAF) OU (BOTH EYES): CPT | Mod: 26 | Performed by: OPHTHALMOLOGY

## 2021-01-21 PROCEDURE — 92134 CPTRZ OPH DX IMG PST SGM RTA: CPT | Performed by: OPHTHALMOLOGY

## 2021-01-21 ASSESSMENT — REFRACTION_WEARINGRX
OD_ADD: +1.00
OD_AXIS: 110
OS_CYLINDER: +2.00
OS_ADD: +1.00
OD_SPHERE: -4.50
OD_CYLINDER: +2.00
OS_SPHERE: -4.00
OS_AXIS: 055

## 2021-01-21 ASSESSMENT — REFRACTION_MANIFEST
OD_CYLINDER: +2.00
OD_ADD: +1.25
OS_SPHERE: -4.50
OS_ADD: +1.25
OS_AXIS: 055
OS_CYLINDER: +2.25
OD_AXIS: 105
OD_SPHERE: -4.75

## 2021-01-21 ASSESSMENT — SLIT LAMP EXAM - LIDS
COMMENTS: NORMAL
COMMENTS: NORMAL

## 2021-01-21 ASSESSMENT — TONOMETRY
IOP_METHOD: TONOPEN
OD_IOP_MMHG: 20
OS_IOP_MMHG: 17

## 2021-01-21 ASSESSMENT — EXTERNAL EXAM - RIGHT EYE: OD_EXAM: NORMAL

## 2021-01-21 ASSESSMENT — CUP TO DISC RATIO
OS_RATIO: 0.5
OD_RATIO: 0.5

## 2021-01-21 ASSESSMENT — VISUAL ACUITY
METHOD: SNELLEN - LINEAR
CORRECTION_TYPE: GLASSES
OS_CC+: +2
OD_CC+: -2
OD_CC: 20/20
OS_CC: 20/25

## 2021-01-21 ASSESSMENT — EXTERNAL EXAM - LEFT EYE: OS_EXAM: NORMAL

## 2021-01-21 ASSESSMENT — CONF VISUAL FIELD
METHOD: COUNTING FINGERS
OD_NORMAL: 1
OS_NORMAL: 1

## 2021-01-21 NOTE — NURSING NOTE
Chief Complaints and History of Present Illnesses   Patient presents with     Retinal Dystrophy Hereditary Follow Up     11 month follow up in both eyes.       Chief Complaint(s) and History of Present Illness(es)     Retinal Dystrophy Hereditary Follow Up     Comments: 11 month follow up in both eyes.                Comments     Pt states that she had one episode of vision going completely Grey in both eyes, lasted about 15 seconds before returning.   Has not happed since last summer.   No eye pain today. No flashes or floaters.  No redness or dryness.  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    DM2 BS: 130 currently.  Lab Results       Component                Value               Date                       A1C                      7.6                 01/14/2021                 A1C                      6.7                 07/14/2020                 A1C                      7.0                 01/14/2020                 A1C                      7.5                 09/24/2019                 A1C                      7.3                 01/30/2019              RAJESH Trujillo January 21, 2021 8:54 AM

## 2021-01-21 NOTE — TELEPHONE ENCOUNTER
[facility] Call Center    Phone Message    May a detailed message be left on voicemail: yes     Reason for Call: Patient called and is wondering if any tests are due this year. Patient stated that she generally has an MRI done every other year and hasn't seen anything in [facility], so wanted to check with clinic. 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 Please call or send a message through [employer] of anything that may be needed.     Action Taken: Message routed to:  [facility] ([facility] Cardiology    Travel Screening: Not Applicable

## 2021-01-21 NOTE — PROGRESS NOTES
CC: Retinal dystrophy evaluation    HPI: Marie WILLIAM Dino Vogel is a  40 year old year-old patient referred by Dr Green for evaluation of retinal dystrophy. She has a history of T2DM. Patient denies any changes in vision. No nyctalopia, hemeralopia. Denies flashes. Did have floaters a week ago. She saw her outside optometrist and there was concern for leakage and hemorrhage which is why she was sent to [facility].     She reports that Last summer- 2020 she was mowing the lawn and her vision went gray for 15 secs and then returned back to normal; no flashes and floaters     Ocular History: Refractive error  PMH: Cardiomyopathy diagnosed 2016, T2DM  FH: Paternal gpa eye injections (unclear etiology)  Mother, uncle, aunt, brother, with cardiomyopathy    No nyctalopia   No hemeralopia    Retinal Imaging:  OCT 1-21-21  RE: paracentral GA, mild intraretinal fluid (stable from previous), mild Epiretinal membrane  LE: paracentral GA, mild intraretinal fluid, mild Epiretinal membrane    FAF   1-21-21  OD - central hypoF & hyperF speckled  OS - central hypoF & hyperF speckled     GVF 1-21-21  OD - paracentral scotoma, normal peripheral  OS - paracentral scotoma, normal peripheral     FA 1/7/19  OD - extensive WD, linear hypoF, normal choroidal filling  OS - extensive WD, linear hypF, normal choroidal filling    ffERG 12/9/19  INTERPRETATION:      1. Abnormal ffERG.  The scotopic responses showed delay.  The photopic response shows mild attenuation and delay, which is consistent with the clinical exam and history.  Except for the marked scotopic delay, the results could be consistent with SMD-FFM; otherwise a cone-angeline dystrophy could be considered.     Assessment & Plan:    Retinal Dystrophy, both eyes   differential diagnosis: Cone dystrophy CACRD; [place]  -Asymptomatic, no family history  - eye exam stable; no progression on Autofluorescence; fundus exam or VA   -Intact central vision, paracentral scotomas on GVF    -ffERG consistent with SMD-FFM or a cone-angeline dystrophy  -[employer] genetic testing  Showed VUS   - consider low vision therapy if patient becomes more symptomatic  - healthy diet and no smoking     Plan  -  Consider repeating the ffERG in the future to assess for progression  --Plan for vitamin A levels. If normal; recommend follow up in one yr; sooner as needed   - patient moving out of the state; will need follow up with retina in approximately 1 yr; sooner as needed         ~~~~~~~~~~~~~~~~~~~~~~~~~~~~~~~~~~   Complete documentation of historical and exam elements from today's encounter can be found in the full encounter summary report (not reduplicated in this progress note).  I personally obtained the chief complaint(s) and history of present illness.  I confirmed and edited as necessary the review of systems, past medical/surgical history, family history, social history, and examination findings as documented by others; and I examined the patient myself.  I personally reviewed the relevant tests, images, and reports as documented above.  I formulated and edited as necessary the assessment and plan and discussed the findings and management plan with the patient and family    Isis Vickers MD   of Ophthalmology.  Retina Service   Department of Ophthalmology and Visual Neurosciences   [facility]  Phone: [phone]   Fax: [phone]

## 2021-01-22 DIAGNOSIS — H35.50 RETINAL DYSTROPHY: ICD-10-CM

## 2021-01-22 PROCEDURE — 36415 COLL VENOUS BLD VENIPUNCTURE: CPT | Performed by: OPHTHALMOLOGY

## 2021-01-22 PROCEDURE — 84590 ASSAY OF VITAMIN A: CPT | Mod: 90 | Performed by: OPHTHALMOLOGY

## 2021-01-22 PROCEDURE — 99000 SPECIMEN HANDLING OFFICE-LAB: CPT | Performed by: OPHTHALMOLOGY

## 2021-01-25 LAB
ANNOTATION COMMENT IMP: NORMAL
RETINYL PALMITATE SERPL-MCNC: 0.03 MG/L (ref 0–0.1)
VIT A SERPL-MCNC: 0.85 MG/L (ref 0.3–1.2)

## 2021-02-16 ENCOUNTER — MYC MEDICAL ADVICE (OUTPATIENT)
Dept: ENDOCRINOLOGY | Facility: CLINIC | Age: 41
End: 2021-02-16

## 2021-02-16 ENCOUNTER — MYC MEDICAL ADVICE (OUTPATIENT)
Dept: PEDIATRICS | Facility: CLINIC | Age: 41
End: 2021-02-16

## 2021-02-16 DIAGNOSIS — Z11.1 SCREENING EXAMINATION FOR PULMONARY TUBERCULOSIS: Primary | ICD-10-CM

## 2021-02-16 DIAGNOSIS — E11.21 TYPE 2 DIABETES MELLITUS WITH DIABETIC NEPHROPATHY, WITH LONG-TERM CURRENT USE OF INSULIN (H): ICD-10-CM

## 2021-02-16 DIAGNOSIS — Z79.4 TYPE 2 DIABETES MELLITUS WITH DIABETIC NEPHROPATHY, WITH LONG-TERM CURRENT USE OF INSULIN (H): ICD-10-CM

## 2021-02-16 NOTE — TELEPHONE ENCOUNTER
Please see [employer] message. Looking for TB gold test order. Loida Galicia RN on 2/16/2021 at 2:14 PM

## 2021-02-17 DIAGNOSIS — Z79.4 TYPE 2 DIABETES MELLITUS WITH DIABETIC NEPHROPATHY, WITH LONG-TERM CURRENT USE OF INSULIN (H): ICD-10-CM

## 2021-02-17 DIAGNOSIS — G47.00 INSOMNIA, UNSPECIFIED TYPE: ICD-10-CM

## 2021-02-17 DIAGNOSIS — E11.21 TYPE 2 DIABETES MELLITUS WITH DIABETIC NEPHROPATHY, WITH LONG-TERM CURRENT USE OF INSULIN (H): ICD-10-CM

## 2021-02-17 DIAGNOSIS — J30.9 ALLERGIC RHINITIS, UNSPECIFIED SEASONALITY, UNSPECIFIED TRIGGER: ICD-10-CM

## 2021-02-17 NOTE — TELEPHONE ENCOUNTER
Called and informed the patient of the order being placed.  She stated that she will send a mychart refill request for all of the medications that she will be needed a 3 month supply of.     Elaina Blanco

## 2021-02-17 NOTE — TELEPHONE ENCOUNTER
Please see patient's [employer] message, patient requesting 13-weeks worth of pended medications for business trip. Please advise and sign as appropriate,     Thank you.

## 2021-02-17 NOTE — TELEPHONE ENCOUNTER
Please pend the orders with correct quantity for 13 weeks as requested by pt ( check with pahramcy or CDE if needed), select pharmacy and then send for signature. Also associate with correct diagnosis.    Thank you.    Zita Fuentes MD

## 2021-02-17 NOTE — TELEPHONE ENCOUNTER
Mtm:  Can you help with the dispense quantity for 3 mos of the injectable meds?    Lenore Hurley CMA  [facility]  Andrew/Anny

## 2021-02-18 ENCOUNTER — TELEPHONE (OUTPATIENT)
Dept: ENDOCRINOLOGY | Facility: CLINIC | Age: 41
End: 2021-02-18

## 2021-02-18 RX ORDER — DULAGLUTIDE 1.5 MG/.5ML
1.5 INJECTION, SOLUTION SUBCUTANEOUS
Qty: 6.5 ML | Refills: 3 | Status: SHIPPED | OUTPATIENT
Start: 2021-02-18 | End: 2021-02-22

## 2021-02-18 RX ORDER — TRAZODONE HYDROCHLORIDE 50 MG/1
TABLET, FILM COATED ORAL
Qty: 60 TABLET | Refills: 3 | Status: SHIPPED | OUTPATIENT
Start: 2021-02-18 | End: 2024-03-11

## 2021-02-18 RX ORDER — MONTELUKAST SODIUM 10 MG/1
TABLET ORAL
Qty: 90 TABLET | Refills: 3 | Status: SHIPPED | OUTPATIENT
Start: 2021-02-18 | End: 2023-01-03

## 2021-02-18 RX ORDER — METFORMIN HCL 500 MG
TABLET, EXTENDED RELEASE 24 HR ORAL
Qty: 360 TABLET | Refills: 3 | Status: SHIPPED | OUTPATIENT
Start: 2021-02-18 | End: 2022-08-15

## 2021-02-18 NOTE — TELEPHONE ENCOUNTER
Prior Authorization Retail Medication Request    Medication/Dose: Trulicity 1.5mg/0.5ml  ICD code (if different than what is on RX):    Previously Tried and Failed:    Rationale:      Insurance Name:  [facility]ell [facility]  Insurance ID:  [id number]      Pharmacy Information (if different than what is on RX)  Name:  [facility]  Phone:  [phone]    Closed form. Product non-formulary. Call [phone].    Thank you,  Agnieszka Flores CPhT  [facility]

## 2021-02-18 NOTE — TELEPHONE ENCOUNTER
Routing refill request to provider for review/approval because:  Pt had physical with another provider, ok to extend refills?  Piedad Bass RN, BSN  Message handled by CLINIC NURSE.

## 2021-02-19 NOTE — TELEPHONE ENCOUNTER
Patient is requesting refill from 2/19/21 be sent to another pharmacy    Prescription approved per [facility] Refill Protocol.

## 2021-02-21 ENCOUNTER — TELEPHONE (OUTPATIENT)
Dept: ENDOCRINOLOGY | Facility: CLINIC | Age: 41
End: 2021-02-21

## 2021-02-21 ENCOUNTER — NURSE TRIAGE (OUTPATIENT)
Dept: NURSING | Facility: CLINIC | Age: 41
End: 2021-02-21

## 2021-02-21 DIAGNOSIS — E11.21 TYPE 2 DIABETES MELLITUS WITH DIABETIC NEPHROPATHY, WITH LONG-TERM CURRENT USE OF INSULIN (H): Primary | ICD-10-CM

## 2021-02-21 DIAGNOSIS — Z79.4 TYPE 2 DIABETES MELLITUS WITH DIABETIC NEPHROPATHY, WITH LONG-TERM CURRENT USE OF INSULIN (H): Primary | ICD-10-CM

## 2021-02-21 RX ORDER — SYRINGE-NEEDLE,INSULIN,0.5 ML 27GX1/2"
SYRINGE, EMPTY DISPOSABLE MISCELLANEOUS
Qty: 100 EACH | Refills: 1 | Status: CANCELLED | OUTPATIENT
Start: 2021-02-21

## 2021-02-21 RX ORDER — IBUPROFEN 200 MG
TABLET ORAL
Qty: 100 EACH | Refills: 1 | Status: SHIPPED | OUTPATIENT
Start: 2021-02-21 | End: 2021-07-06

## 2021-02-21 NOTE — TELEPHONE ENCOUNTER
Marie requests for insulin syringes. She needs it to draw insulin from her Novolog vial to her Omnipod Dash.    She said her blood sugar at this time is 300.   She ran out of the insulin syringes.    States she'll be leaving on Saturday for an out of town work trip and will be gone for 13 weeks. 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    Pharmacy: [facility] [address]    RN paged on call provider Piedad Swann MD via smart web at 4:15 PM.     Dr. Swann agrees to send new Rx for insulin syringes.    [facility] sent in new Rx for insulin syringes.    Taryn Gill RN/[place] Nurse Advisor

## 2021-02-21 NOTE — TELEPHONE ENCOUNTER
"Marie requests for insulin syringes. She needs it to draw insulin from her Novolog vial to her Omnipod Dash.    She said her blood sugar at this time is 300.   She ran out of the insulin syringes.    States she'll be leaving on Saturday for an out of town work trip and will be gone for 13 weeks. 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    Pharmacy: [facility] [address]    RN paged on call provider Piedad Swann MD via smart web at 4:15 PM.     Dr. Swann agrees to send new Rx for insulin syringes.    [facility] sent in new Rx for insulin syringes.    Taryn Gill RN/[place] Nurse Advisor            Reason for Disposition    [1] Request for URGENT new prescription or refill of \"essential\" medication (i.e., likelihood of harm to patient if not taken) AND [2] triager unable to fill per unit policy    Protocols used: MEDICATION QUESTION CALL-A-AH      "

## 2021-02-22 ENCOUNTER — VIRTUAL VISIT (OUTPATIENT)
Dept: CARDIOLOGY | Facility: CLINIC | Age: 41
End: 2021-02-22
Attending: INTERNAL MEDICINE
Payer: COMMERCIAL

## 2021-02-22 DIAGNOSIS — E11.21 TYPE 2 DIABETES MELLITUS WITH DIABETIC NEPHROPATHY, WITH LONG-TERM CURRENT USE OF INSULIN (H): ICD-10-CM

## 2021-02-22 DIAGNOSIS — E78.5 DYSLIPIDEMIA: ICD-10-CM

## 2021-02-22 DIAGNOSIS — I42.2 HYPERTROPHIC CARDIOMYOPATHY (H): Primary | ICD-10-CM

## 2021-02-22 DIAGNOSIS — Z79.4 TYPE 2 DIABETES MELLITUS WITH DIABETIC NEPHROPATHY, WITH LONG-TERM CURRENT USE OF INSULIN (H): ICD-10-CM

## 2021-02-22 PROCEDURE — 99214 OFFICE O/P EST MOD 30 MIN: CPT | Mod: 95 | Performed by: INTERNAL MEDICINE

## 2021-02-22 NOTE — PROGRESS NOTES
"Marie is a 40 year old who is being evaluated via a billable video visit.      How would you like to obtain your AVS? MyChart  If the video visit is dropped, the invitation should be resent by: Send to e-mail at: [email or web address]  Will anyone else be joining your video visit? No    Vitals - Patient Reported  Weight (Patient Reported): 83 kg (183 lb)  Height (Patient Reported): 162.6 cm (5' 4\")  BMI (Based on Pt Reported Ht/Wt): 31.41  Pain Score: No Pain (0)  Pain Loc: Chest    History:    Virtual visit.  Follow up visit.     Marie Vogel is a 40 year old female with    Ms. Jaspreet is a pleasant 40 y.o.woman with insulin requiring type 2 diabetes and a family history of hypertrophic cardiomyopathy. She has hypertension and hyperlipidemia.She has had ECHOs and MRI periodically during the last 23 years since her mother was diagnosed with HCM.  Her most recent MRI in 2019 showed stable cardiac function with mild asymmetrical septal hypertrophy measuring 1.5 cm and no significant myocardial fibrosis. She had treadmill stress test that did not demonstrate VT or hypotension and exercise time was 10 mn.     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 She otherwise has no chest pain, syncope, LE edema, orthopnea, or PND. She has stable exertional dyspnea. She has not received [facility] from [facility] yet, A1c is higher when recently checked. Lipids and BP have been good.      ROS:  A complete 10-point ROS was negative except as above.      Current Outpatient Medications   Medication Sig Dispense Refill     albuterol (PROAIR HFA/PROVENTIL HFA/VENTOLIN HFA) 108 (90 Base) MCG/ACT inhaler Inhale 2 puffs into the lungs every 4 hours as needed for shortness of breath / dyspnea or wheezing 1 Inhaler 3     aspirin 81 MG tablet Take 1 tablet (81 mg) by mouth daily 90 tablet 3     atenolol (TENORMIN) 25 MG tablet Take 0.5 tablets (12.5 mg) by " "mouth daily 90 tablet 3     blood glucose (PEPE CONTOUR NEXT) test strip Check 4 times/day 300 each 0     blood glucose monitoring (NO BRAND SPECIFIED) meter device kit Use to test blood sugar 6 times daily and as needed. 1 kit 0     cetirizine (ZYRTEC) 10 MG tablet Take 10 mg by mouth 2 times daily  90 tablet 3     Continuous Blood Gluc Sensor (FREESTYLE NADIA 14 DAY SENSOR) MISC CHANGE EVERY 14 DAYS 6 each 3     EPINEPHrine (ANY BX GENERIC EQUIV) 0.3 MG/0.3ML injection 2-pack Inject 0.3 mLs (0.3 mg) into the muscle once as needed for anaphylaxis 0.3 mL 11     fluticasone (FLONASE) 50 MCG/ACT spray Spray 1-2 sprays into both nostrils daily 1 Bottle 0     glucagon (GLUCAGON EMERGENCY) 1 MG kit Inject 1 mg into the muscle as needed for low blood sugar 1 mg 0     ibuprofen (ADVIL) 200 MG tablet Take 200 mg by mouth every 4 hours as needed.       insulin aspart (NOVOLOG VIAL) 100 UNITS/ML vial USE WITH INSULIN PUMP AS DIRECTED, USES ABOUT 80 UNITS PER DAY 80 mL 3     insulin aspart (NOVOLOG VIAL) 100 UNITS/ML vial USE WITH INSULIN PUMP AS DIRECTED, USES ABOUT 80 UNITS PER DAY 80 mL 3     Insulin Disposable Pump (OMNIPOD DASH 5 PACK PODS) MISC CHANGE EVERY 3 DAYS 30 each 3     Insulin Disposable Pump (OMNIPOD DASH SYSTEM) KIT 1 kit continuous 1 kit 0     insulin glargine (LANTUS PEN) 100 UNIT/ML pen Take 20 units in case of pump malfunction only. 15 mL 0     INSULIN PUMP - OUTPATIENT Updated 11/12/19:  OmniPod Dash  BASAL:  00:00: 1.150 units/hr  10:30: 1.2  14:30: 1.1  CARB RATIO:  00:00: 8  Sensitivity:  00:00: 30 mg/dL  BLOOD GLUCOSE TARGET and times:  12   AM (midnight): 120-120  Active Insulin Time: 4 hours  Sensor: Nadia/ Nadia Link Donna  Amish:   Username [email or web address]  Password [email or web address]       insulin syringe-needle U-100 (29G X 1/2\" 1 ML) 29G X 1/2\" 1 ML miscellaneous Use 1 syringe once daily or as needed 100 each 1     Lancets (MICROLET) MISC 1 Device See Admin Instructions. Use up to 5 times daily for " blood sugar checks. 100 each prn     levonorgestrel (MIRENA) 20 MCG/24HR IUD 1 each (20 mcg) by Intrauterine route once       losartan (COZAAR) 50 MG tablet TAKE ONE TABLET BY MOUTH EVERY MORNING AND TAKE ONE-HALF TABLET BY MOUTH EVERY EVENING 135 tablet 3     MAGNESIUM OXIDE PO Take 400 mg by mouth daily       metFORMIN (GLUCOPHAGE-XR) 500 MG 24 hr tablet TAKE TWO TABLETS BY MOUTH TWICE A DAY WITH MEALS 360 tablet 3     montelukast (SINGULAIR) 10 MG tablet TAKE ONE TABLET BY MOUTH EVERY NIGHT AT BEDTIME 90 tablet 3     Multiple Vitamins-Minerals (MULTI VITAMIN/MINERALS PO) Take 1 each by mouth daily.       Omega-3 Fatty Acids (FISH OIL) 500 MG CAPS Take 1 capsule by mouth       omeprazole (PRILOSEC) 40 MG DR capsule TAKE ONE CAPSULE BY MOUTH ONCE DAILY AS NEEDED (Patient taking differently: daily ) 90 capsule 3     rosuvastatin (CRESTOR) 5 MG tablet Take 1 tablet (5 mg) by mouth daily 90 tablet 3     sertraline (ZOLOFT) 25 MG tablet Take 1 tablet (25 mg) by mouth daily 90 tablet 3     SUMAtriptan (IMITREX) 25 MG tablet TAKE ONE TO FOUR TABLETS BY MOUTH ONE TIME. MAY REPEAT 1 TABLET EVERY TWO HOURS UP TO MAXIMUM OF 200MG IN 24 HOURS 8 tablet 6     traZODone (DESYREL) 50 MG tablet TAKE ONE-HALF TO ONE TABLET BY MOUTH NIGHTLY AS NEEDED FOR SLEEP 60 tablet 3     vitamin D3 (CHOLECALCIFEROL) 50 mcg (2000 units) tablet Take 1 tablet by mouth daily       dulaglutide (TRULICITY) 1.5 MG/0.5ML pen Inject 1.5 mg Subcutaneous every 7 days (Patient not taking: Reported on 2/22/2021) 6.5 mL 3     Ostomy Supplies (SKIN PREP WIPES) MISC USE ONE EVERY 3 DAYS (Patient not taking: Reported on 2/22/2021) 50 each 3       Allergies - reviewed     Allergies   Allergen Reactions     Ivp Dye [Contrast Dye] Itching     Pt reports that throat itches     Nuts Anaphylaxis     [place] nuts only     Bactrim Swelling     Pt reaction was swelling in mouth and tongue and itchy mouth.  Resolved with benadryl and prednisone     Penicillins Hives      Cephalosporins Itching     Seasonal Allergies Other (See Comments)     Molds, trees, grass, dust..  Upper congestion     Atorvastatin      myalgias     Shellfish Allergy Rash     Clams only       Past history -reviewed  Active Ambulatory Problems     Diagnosis Date Noted     Allergic rhinitis 2006     HYPERLIPIDEMIA LDL GOAL <100 02/10/2010     Family history of other cardiovascular diseases 2010     Health Care Home 2011     Microalbuminuria 2014     Insulin pump in place 2014     Vitamin D deficiency 2015     Nut allergy 2015     Type 2 diabetes mellitus with diabetic nephropathy 10/20/2015     Diabetic gastroparesis (H) 2016     Hypertrophic obstructive cardiomyopathy (H) 2016     PFO (patent foramen ovale) 2016     Other migraine without status migrainosus, not intractable 2017     Scars of posterior pole of chorioretina, bilateral 2017     Cervical high risk HPV (human papillomavirus) test positive 2018     Mirena IUD inserted 19: Due for removal 2024     Depression 2021     Other insomnia 2021     Resolved Ambulatory Problems     Diagnosis Date Noted     Diabetes mellitus, type 2 (H) 2005     Mixed hyperlipidemia 2005     Adjustment disorder with depressed mood 2008     Atypical glandular cells on Pap smear 2008     Diabetes mellitus during pregnancy, antepartum 2009     High-risk pregnancy supervision 2009     High-risk pregnancy, young multigravida 2010     History of  delivery, currently pregnant 2010     Type 2 diabetes, HbA1c goal < 7% (H) 2010     Sudden hearing loss 2012     Anaphylactic reaction due to food - Avocado 2012     Anaphylactic reaction 2012     Diabetic nephropathy (H) 2013     Migraine without aura 2013     Type 2 diabetes mellitus, uncontrolled, with renal complications (H) 2014      IUD (intrauterine device) in place 07/28/2015     Cervical pain 01/06/2016     Left-sided thoracic back pain 01/06/2016     Back pain 03/15/2018     Past Medical History:   Diagnosis Date     Allergic rhinitis due to pollen      Gastroesophageal reflux disease      Stargardt's disease 11/29/2019     Type II or unspecified type diabetes mellitus without mention of complication, not stated as uncontrolled 2002        Social history - reviewed  Social History     Socioeconomic History     Marital status:      Spouse name: Not on file     Number of children: 2     Years of education: 14     Highest education level: Associate degree: academic program   Occupational History     Occupation: nurse     Occupation: RN   Social Needs     Financial resource strain: Not very hard     Food insecurity     Worry: Never true     Inability: Never true     Transportation needs     Medical: No     Non-medical: No   Tobacco Use     Smoking status: Former Smoker     Packs/day: 0.00     Years: 0.00     Pack years: 0.00     Types: Cigarettes     Quit date: 10/24/2005     Years since quitting: 15.3     Smokeless tobacco: Never Used     Tobacco comment: States only smokes when drinks maybe 2x/year   Substance and Sexual Activity     Alcohol use: No     Alcohol/week: 0.0 standard drinks     Frequency: Monthly or less     Drinks per session: 1 or 2     Binge frequency: Never     Drug use: No     Sexual activity: Yes     Partners: Male     Birth control/protection: I.U.D., Condom     Comment: Mirena IUD 7/28/15   Lifestyle     Physical activity     Days per week: 0 days     Minutes per session: 0 min     Stress: Only a little   Relationships     Social connections     Talks on phone: More than three times a week     Gets together: Once a week     Attends [employer] service: 1 to 4 times per year     Active member of club or organization: No     Attends meetings of clubs or organizations: Never     Relationship status:       Intimate partner violence     Fear of current or ex partner: Not on file     Emotionally abused: Not on file     Physically abused: Not on file     Forced sexual activity: Not on file   Other Topics Concern     Parent/sibling w/ CABG, MI or angioplasty before 65F 55M? No   Social History Narrative    Caffeine intake/servings daily - 6-7    Calcium intake/servings daily - 2-3 yogurt, milk, cheese    Exercise 1 times weekly - describe aerobics    Sunscreen used - Yes    Seatbelts used - Yes    Guns stored in the home - No    Self Breast Exam - Yes    Pap test up to date -  Yes    Eye exam up to date -  Yes    Dental exam up to date -  Yes    DEXA scan up to date -  Not Applicable    Flex Sig/Colonoscopy up to date -  Not Applicable    Mammography up to date -  Not Applicable    Immunizations reviewed and up to date - Yes    Abuse: Current or Past (Physical, Sexual or Emotional) - No    Do you feel safe in your environment - Yes    Do you cope well with stress - Yes    Do you suffer from insomnia - Yes     Last updated by: Tatianna Lacey  2005       Family history -reviewed  Family History   Problem Relation Age of Onset     Cardiovascular Mother         hypertrophic cardiomyopathy     Genitourinary Problems Mother         gallbladder disease     Diabetes Mother         Transplnant induced/      Other - See Comments Mother         heart transplant 2005     Depression Mother      Diabetes Father         type 2     Hypertension Father      Hyperlipidemia Father      Genitourinary Problems Maternal Grandmother         gallbladder disease     Genitourinary Problems Paternal Grandmother         gallbladder disease     Hypertension Paternal Grandfather         high bp     Cerebrovascular Disease Paternal Grandfather              Cardiovascular Brother         hypertrophic cardiomyopathy     Diabetes Brother      Diabetes Brother         type 2     Diabetes Other         Type 2     Glaucoma No family  hx of      Macular Degeneration No family hx of        ROS: non contributory on the 10-point review of system    Exam:   In general, the patient is in no apparent distress.    There were no vitals taken for this visit.  Breathing is unlabored.   HEENT: NC/AT.  PERRLA.  EOMI.  Sclerae white, not injected.    Neck: No jugular venous distension.    Extremities: No edema.   Neurologic: Alert and oriented to person/place/time, normal speech and affect  Skin: No rash.    Data:    All relevant labs were personally reviewed and discussed with the patient.   Chemistry panel:   Recent Labs   Lab Test 01/14/21  1509 02/03/20  1612 05/31/17  0915 05/31/17  0915 04/16/17 2014    137   < > 138 143   POTASSIUM 5.0 4.3   < > 3.9 4.0   CHLORIDE 109 106   < > 104 110*   CO2 25 25   < > 26 23   ANIONGAP 3 6   < > 7 10   * 151*   < > 214* 147*   BUN 27 16   < > 15 18   CR 1.05* 1.01   < > 0.72 0.86   BILLY 8.9 9.4   < > 9.2 8.2*   MAG  --   --   --  2.1 1.5*   GFRESTIMATED 65 69   < > >90  Non  GFR Calc   73   AST 25 18   < >  --  14   ALT 34 29   < >  --  25    < > = values in this interval not displayed.       CBC:   Recent Labs   Lab Test 01/14/21  1509 07/22/18  2159   WBC 8.6 12.9*   RBC 4.25 4.13   HGB 13.3 13.1   HCT 40.6 38.7   MCV 96 94   MCH 31.3 31.7   MCHC 32.8 33.9   RDW 12.1 12.1    288       Lipid Panel:  Recent Labs   Lab Test 01/20/21  1102 01/14/20  1207 07/01/15  0612 07/01/15  0612 03/27/14  1022   CHOL 131 172   < > 190 146   HDL 35* 41*   < > 42* 32*   LDL 73 82   < > 96 77   TRIG 117 243*   < > 260* 186*   CHOLHDLRATIO  --   --   --  4.5 4.5    < > = values in this interval not displayed.       Thyroid:   TSH   Date Value Ref Range Status   09/24/2019 1.45 0.40 - 4.00 mU/L Final     T4 Free   Date Value Ref Range Status   04/02/2014 0.75 0.70 - 1.85 ng/dL Final     Hemoglobin A1C   Date Value Ref Range Status   01/14/2021 7.6 (H) 0 - 5.6 % Final     Comment:     Normal <5.7%  Prediabetes 5.7-6.4%  Diabetes 6.5% or higher - adopted from ADA   consensus guidelines.       INR   Date Value Ref Range Status   11/15/2005 1.07 0.86 - 1.14 Final       Patient's all available and relevant cardiac investigations were personally reviewed and discussed with the patient today.    Echo:  Echo: 11.7.19   No significant LVOT gradient at rest or with exercise.   Exercise stress echocardiogram negative for ischemia at a diagnostic level of   peak stress (86% MPHR).   Patient developed no chest pain during exercise.   Test terminated due to fatigue.   Exercise capacity average for age (10.9 METS).   Blunted BP response to exercise.   Baseline echocardiogram with hyperkinetic LVEF of 65-70%. No regional wall   motion abnormalities.   At peak stress, all walls recruit normally. LV augments appropriately with   near obliteration of cavity: LVEF >75%.   Peak LVOT gradient at baseline, Valsalva and with peak exercise was 11 mmHg,   20 mmHg and 23 mmHg, respectively.   EKG at baseline and with stress demonstrated no ischemic changes or   arrhythmias.   Screening 2D echocardiogram demonstrated no significant valve disease. Normal   sized aortic root.     ECG: Oct 2019  Sinus rhythm    CMR: Nov 2019  Hypertrophic cardiomyopathy with asymmetric septal hypertrophy phenotype. There is normal biventricular function, LVEF 58% and RVEF 66%. The maximal wall thickness of 1.7 cm, there is mitral valve  ZIA with mild LVOT acceleration at rest, and there is no fibrosis on LGE imaging.     Compared to the prior examination dated 08/08/2016, there has not been any change.        Assessment and Plan:  40 year old female with    1. HCM, with asymmetrical septal hypertrophy of 15-17 mm, no LVOT obstruction  2. Family history of HCM  3. Insulin requiring DM, controlled  4. Obesity  5. Hiatal hernia, gastroparesis  6. Hypertension, treated  7. Hyperlipidemia, treated      Marie denies any presyncope, syncope or chest pain and has  stable shortness of breath. I have personally reviewed all relevant cardiac investigations and discussed my interpretation with the patient. ECG shows sinus rhythm. CMR confirms HCM of asymmetrical septal hypertrophy phenotype with normal biventricular function. Echo shows  with no significant LVOT obstruction.   It is very reassuring that Marie does not have any high risk features for SCD - septum is <30mm, no family history of SCD, personal history of syncope, VT, or hypotension with exercise.     She appears euvolemic on exam and reports normal range blood pressure and heart rate.    I do not recommend any changes to the cardiac medical regimen at this point. The patient states understanding and is agreeable with the plan.    Recommendations:   1. Continue current cardiac medications.  2. Healthy lifestyle encouraged with regular exercise (moderate intensity,150 minutes/week) and diet low in carbohydrates and saturated fat  3. Follow up with me in 1 year      Video Visit Details:    Type of service:  Video Visit    Start: 02/22/2021 08:34 am   Stop: 02/22/2021 08:47 am    Originating Location (pt. Location): Home    Distant Location (provider location):  [facility]     Platform used for Video Visit: [employer]      In addition to visit time documented above, I spent an additional 20 minutes on data review and documentation.      Melanie Dewey MD, MS  Professor of Medicine  Cardiovascular division

## 2021-02-22 NOTE — LETTER
"2/22/2021      RE: Marie Vogel  [address]  [place] [address]       Dear Colleague,    Thank you for the opportunity to participate in the care of your patient, Marie Vogel, at the [facility] [place] [facility]. Please see a copy of my visit note below.    Marie is a 40 year old who is being evaluated via a billable video visit.      How would you like to obtain your AVS? MyChart  If the video visit is dropped, the invitation should be resent by: Send to e-mail at: [email or web address]  Will anyone else be joining your video visit? No    Vitals - Patient Reported  Weight (Patient Reported): 83 kg (183 lb)  Height (Patient Reported): 162.6 cm (5' 4\")  BMI (Based on Pt Reported Ht/Wt): 31.41  Pain Score: No Pain (0)  Pain Loc: Chest    History:    Virtual visit.  Follow up visit.     Marie Vogel is a 40 year old female with    Ms. Jaspreet is a pleasant 40 y.o.woman with insulin requiring type 2 diabetes and a family history of hypertrophic cardiomyopathy. She has hypertension and hyperlipidemia.She has had ECHOs and MRI periodically during the last 23 years since her mother was diagnosed with HCM.  Her most recent MRI in 2019 showed stable cardiac function with mild asymmetrical septal hypertrophy measuring 1.5 cm and no significant myocardial fibrosis. She had treadmill stress test that did not demonstrate VT or hypotension and exercise time was 10 mn.     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 She otherwise has no chest pain, syncope, LE edema, orthopnea, or PND. She has stable exertional dyspnea. She has not received [facility] from [facility] yet, A1c is higher when recently checked. Lipids and BP have been good.      ROS:  A complete 10-point ROS was negative except as above.      Current Outpatient Medications   Medication " Sig Dispense Refill     albuterol (PROAIR HFA/PROVENTIL HFA/VENTOLIN HFA) 108 (90 Base) MCG/ACT inhaler Inhale 2 puffs into the lungs every 4 hours as needed for shortness of breath / dyspnea or wheezing 1 Inhaler 3     aspirin 81 MG tablet Take 1 tablet (81 mg) by mouth daily 90 tablet 3     atenolol (TENORMIN) 25 MG tablet Take 0.5 tablets (12.5 mg) by mouth daily 90 tablet 3     blood glucose (PEPE CONTOUR NEXT) test strip Check 4 times/day 300 each 0     blood glucose monitoring (NO BRAND SPECIFIED) meter device kit Use to test blood sugar 6 times daily and as needed. 1 kit 0     cetirizine (ZYRTEC) 10 MG tablet Take 10 mg by mouth 2 times daily  90 tablet 3     Continuous Blood Gluc Sensor (Next GamesYLE BESSIE 14 DAY SENSOR) MISC CHANGE EVERY 14 DAYS 6 each 3     EPINEPHrine (ANY BX GENERIC EQUIV) 0.3 MG/0.3ML injection 2-pack Inject 0.3 mLs (0.3 mg) into the muscle once as needed for anaphylaxis 0.3 mL 11     fluticasone (FLONASE) 50 MCG/ACT spray Spray 1-2 sprays into both nostrils daily 1 Bottle 0     glucagon (GLUCAGON EMERGENCY) 1 MG kit Inject 1 mg into the muscle as needed for low blood sugar 1 mg 0     ibuprofen (ADVIL) 200 MG tablet Take 200 mg by mouth every 4 hours as needed.       insulin aspart (NOVOLOG VIAL) 100 UNITS/ML vial USE WITH INSULIN PUMP AS DIRECTED, USES ABOUT 80 UNITS PER DAY 80 mL 3     insulin aspart (NOVOLOG VIAL) 100 UNITS/ML vial USE WITH INSULIN PUMP AS DIRECTED, USES ABOUT 80 UNITS PER DAY 80 mL 3     Insulin Disposable Pump (OMNIPOD DASH 5 PACK PODS) MISC CHANGE EVERY 3 DAYS 30 each 3     Insulin Disposable Pump (OMNIPOD DASH SYSTEM) KIT 1 kit continuous 1 kit 0     insulin glargine (LANTUS PEN) 100 UNIT/ML pen Take 20 units in case of pump malfunction only. 15 mL 0     INSULIN PUMP - OUTPATIENT Updated 11/12/19:  OmniPod Dash  BASAL:  00:00: 1.150 units/hr  10:30: 1.2  14:30: 1.1  CARB RATIO:  00:00: 8  Sensitivity:  00:00: 30 mg/dL  BLOOD GLUCOSE TARGET and times:  12   AM  "(midnight): 120-120  Active Insulin Time: 4 hours  Sensor: Nadia/ Nadia Link Donna  Amish:   [email or web address]  Password [email or web address]       insulin syringe-needle U-100 (29G X 1/2\" 1 ML) 29G X 1/2\" 1 ML miscellaneous Use 1 syringe once daily or as needed 100 each 1     Lancets (MICROLET) MISC 1 Device See Admin Instructions. Use up to 5 times daily for blood sugar checks. 100 each prn     levonorgestrel (MIRENA) 20 MCG/24HR IUD 1 each (20 mcg) by Intrauterine route once       losartan (COZAAR) 50 MG tablet TAKE ONE TABLET BY MOUTH EVERY MORNING AND TAKE ONE-HALF TABLET BY MOUTH EVERY EVENING 135 tablet 3     MAGNESIUM OXIDE PO Take 400 mg by mouth daily       metFORMIN (GLUCOPHAGE-XR) 500 MG 24 hr tablet TAKE TWO TABLETS BY MOUTH TWICE A DAY WITH MEALS 360 tablet 3     montelukast (SINGULAIR) 10 MG tablet TAKE ONE TABLET BY MOUTH EVERY NIGHT AT BEDTIME 90 tablet 3     Multiple Vitamins-Minerals (MULTI VITAMIN/MINERALS PO) Take 1 each by mouth daily.       Omega-3 Fatty Acids (FISH OIL) 500 MG CAPS Take 1 capsule by mouth       omeprazole (PRILOSEC) 40 MG DR capsule TAKE ONE CAPSULE BY MOUTH ONCE DAILY AS NEEDED (Patient taking differently: daily ) 90 capsule 3     rosuvastatin (CRESTOR) 5 MG tablet Take 1 tablet (5 mg) by mouth daily 90 tablet 3     sertraline (ZOLOFT) 25 MG tablet Take 1 tablet (25 mg) by mouth daily 90 tablet 3     SUMAtriptan (IMITREX) 25 MG tablet TAKE ONE TO FOUR TABLETS BY MOUTH ONE TIME. MAY REPEAT 1 TABLET EVERY TWO HOURS UP TO MAXIMUM OF 200MG IN 24 HOURS 8 tablet 6     traZODone (DESYREL) 50 MG tablet TAKE ONE-HALF TO ONE TABLET BY MOUTH NIGHTLY AS NEEDED FOR SLEEP 60 tablet 3     vitamin D3 (CHOLECALCIFEROL) 50 mcg (2000 units) tablet Take 1 tablet by mouth daily       dulaglutide (TRULICITY) 1.5 MG/0.5ML pen Inject 1.5 mg Subcutaneous every 7 days (Patient not taking: Reported on 2/22/2021) 6.5 mL 3     Ostomy Supplies (SKIN PREP WIPES) MISC USE ONE EVERY 3 DAYS (Patient not " taking: Reported on 2021) 50 each 3       Allergies - reviewed     Allergies   Allergen Reactions     Ivp Dye [Contrast Dye] Itching     Pt reports that throat itches     Nuts Anaphylaxis     Mariola nuts only     Bactrim Swelling     Pt reaction was swelling in mouth and tongue and itchy mouth.  Resolved with benadryl and prednisone     Penicillins Hives     Cephalosporins Itching     Seasonal Allergies Other (See Comments)     Molds, trees, grass, dust..  Upper congestion     Atorvastatin      myalgias     Shellfish Allergy Rash     Clams only       Past history -reviewed  Active Ambulatory Problems     Diagnosis Date Noted     Allergic rhinitis 2006     HYPERLIPIDEMIA LDL GOAL <100 02/10/2010     Family history of other cardiovascular diseases 2010     Health Care Home 2011     Microalbuminuria 2014     Insulin pump in place 2014     Vitamin D deficiency 2015     Nut allergy 2015     Type 2 diabetes mellitus with diabetic nephropathy 10/20/2015     Diabetic gastroparesis (H) 2016     Hypertrophic obstructive cardiomyopathy (H) 2016     PFO (patent foramen ovale) 2016     Other migraine without status migrainosus, not intractable 2017     Scars of posterior pole of chorioretina, bilateral 2017     Cervical high risk HPV (human papillomavirus) test positive 2018     Mirena IUD inserted 19: Due for removal 2024     Depression 2021     Other insomnia 2021     Resolved Ambulatory Problems     Diagnosis Date Noted     Diabetes mellitus, type 2 (H) 2005     Mixed hyperlipidemia 2005     Adjustment disorder with depressed mood 2008     Atypical glandular cells on Pap smear 2008     Diabetes mellitus during pregnancy, antepartum 2009     High-risk pregnancy supervision 2009     High-risk pregnancy, young multigravida 2010     History of  delivery, currently  pregnant 01/13/2010     Type 2 diabetes, HbA1c goal < 7% (H) 01/21/2010     Sudden hearing loss 03/08/2012     Anaphylactic reaction due to food - Avocado 06/25/2012     Anaphylactic reaction 06/25/2012     Diabetic nephropathy (H) 02/14/2013     Migraine without aura 05/22/2013     Type 2 diabetes mellitus, uncontrolled, with renal complications (H) 04/02/2014     IUD (intrauterine device) in place 07/28/2015     Cervical pain 01/06/2016     Left-sided thoracic back pain 01/06/2016     Back pain 03/15/2018     Past Medical History:   Diagnosis Date     Allergic rhinitis due to pollen      Gastroesophageal reflux disease      Stargardt's disease 11/29/2019     Type II or unspecified type diabetes mellitus without mention of complication, not stated as uncontrolled 2002        Social history - reviewed  Social History     Socioeconomic History     Marital status:      Spouse name: Not on file     Number of children: 2     Years of education: 14     Highest education level: Associate degree: academic program   Occupational History     Occupation: nurse     Occupation: RN   Social Needs     Financial resource strain: Not very hard     Food insecurity     Worry: Never true     Inability: Never true     Transportation needs     Medical: No     Non-medical: No   Tobacco Use     Smoking status: Former Smoker     Packs/day: 0.00     Years: 0.00     Pack years: 0.00     Types: Cigarettes     Quit date: 10/24/2005     Years since quitting: 15.3     Smokeless tobacco: Never Used     Tobacco comment: States only smokes when drinks maybe 2x/year   Substance and Sexual Activity     Alcohol use: No     Alcohol/week: 0.0 standard drinks     Frequency: Monthly or less     Drinks per session: 1 or 2     Binge frequency: Never     Drug use: No     Sexual activity: Yes     Partners: Male     Birth control/protection: I.U.D., Condom     Comment: Mirena IUD 7/28/15   Lifestyle     Physical activity     Days per week: 0 days      Minutes per session: 0 min     Stress: Only a little   Relationships     Social connections     Talks on phone: More than three times a week     Gets together: Once a week     Attends Judaism service: 1 to 4 times per year     Active member of club or organization: No     Attends meetings of clubs or organizations: Never     Relationship status:      Intimate partner violence     Fear of current or ex partner: Not on file     Emotionally abused: Not on file     Physically abused: Not on file     Forced sexual activity: Not on file   Other Topics Concern     Parent/sibling w/ CABG, MI or angioplasty before 65F 55M? No   Social History Narrative    Caffeine intake/servings daily - 6-7    Calcium intake/servings daily - 2-3 yogurt, milk, cheese    Exercise 1 times weekly - describe aerobics    Sunscreen used - Yes    Seatbelts used - Yes    Guns stored in the home - No    Self Breast Exam - Yes    Pap test up to date -  Yes    Eye exam up to date -  Yes    Dental exam up to date -  Yes    DEXA scan up to date -  Not Applicable    Flex Sig/Colonoscopy up to date -  Not Applicable    Mammography up to date -  Not Applicable    Immunizations reviewed and up to date - Yes    Abuse: Current or Past (Physical, Sexual or Emotional) - No    Do you feel safe in your environment - Yes    Do you cope well with stress - Yes    Do you suffer from insomnia - Yes     Last updated by: Tatianna Lacey  2005       Family history -reviewed  Family History   Problem Relation Age of Onset     Cardiovascular Mother         hypertrophic cardiomyopathy     Genitourinary Problems Mother         gallbladder disease     Diabetes Mother         Transplnant induced/      Other - See Comments Mother         heart transplant 2005     Depression Mother      Diabetes Father         type 2     Hypertension Father      Hyperlipidemia Father      Genitourinary Problems Maternal Grandmother         gallbladder disease      Genitourinary Problems Paternal Grandmother         gallbladder disease     Hypertension Paternal Grandfather         high bp     Cerebrovascular Disease Paternal Grandfather              Cardiovascular Brother         hypertrophic cardiomyopathy     Diabetes Brother      Diabetes Brother         type 2     Diabetes Other         Type 2     Glaucoma No family hx of      Macular Degeneration No family hx of        ROS: non contributory on the 10-point review of system    Exam:   In general, the patient is in no apparent distress.    There were no vitals taken for this visit.  Breathing is unlabored.   HEENT: NC/AT.  PERRLA.  EOMI.  Sclerae white, not injected.    Neck: No jugular venous distension.    Extremities: No edema.   Neurologic: Alert and oriented to person/place/time, normal speech and affect  Skin: No rash.    Data:    All relevant labs were personally reviewed and discussed with the patient.   Chemistry panel:   Recent Labs   Lab Test 21  1509 20  1612 17  0915 17  0915 17    137   < > 138 143   POTASSIUM 5.0 4.3   < > 3.9 4.0   CHLORIDE 109 106   < > 104 110*   CO2 25 25   < > 26 23   ANIONGAP 3 6   < > 7 10   * 151*   < > 214* 147*   BUN 27 16   < > 15 18   CR 1.05* 1.01   < > 0.72 0.86   BILLY 8.9 9.4   < > 9.2 8.2*   MAG  --   --   --  2.1 1.5*   GFRESTIMATED 65 69   < > >90  Non  GFR Calc   73   AST 25 18   < >  --  14   ALT 34 29   < >  --  25    < > = values in this interval not displayed.       CBC:   Recent Labs   Lab Test 21  1509 18  2159   WBC 8.6 12.9*   RBC 4.25 4.13   HGB 13.3 13.1   HCT 40.6 38.7   MCV 96 94   MCH 31.3 31.7   MCHC 32.8 33.9   RDW 12.1 12.1    288       Lipid Panel:  Recent Labs   Lab Test 21  1102 20  1207 07/01/15  0612 07/01/15  0612 14  1022   CHOL 131 172   < > 190 146   HDL 35* 41*   < > 42* 32*   LDL 73 82   < > 96 77   TRIG 117 243*   < > 260* 186*    CHOLHDLRATIO  --   --   --  4.5 4.5    < > = values in this interval not displayed.       Thyroid:   TSH   Date Value Ref Range Status   09/24/2019 1.45 0.40 - 4.00 mU/L Final     T4 Free   Date Value Ref Range Status   04/02/2014 0.75 0.70 - 1.85 ng/dL Final     Hemoglobin A1C   Date Value Ref Range Status   01/14/2021 7.6 (H) 0 - 5.6 % Final     Comment:     Normal <5.7% Prediabetes 5.7-6.4%  Diabetes 6.5% or higher - adopted from ADA   consensus guidelines.       INR   Date Value Ref Range Status   11/15/2005 1.07 0.86 - 1.14 Final       Patient's all available and relevant cardiac investigations were personally reviewed and discussed with the patient today.    Echo:  Echo: 11.7.19   No significant LVOT gradient at rest or with exercise.   Exercise stress echocardiogram negative for ischemia at a diagnostic level of   peak stress (86% MPHR).   Patient developed no chest pain during exercise.   Test terminated due to fatigue.   Exercise capacity average for age (10.9 METS).   Blunted BP response to exercise.   Baseline echocardiogram with hyperkinetic LVEF of 65-70%. No regional wall   motion abnormalities.   At peak stress, all walls recruit normally. LV augments appropriately with   near obliteration of cavity: LVEF >75%.   Peak LVOT gradient at baseline, Valsalva and with peak exercise was 11 mmHg,   20 mmHg and 23 mmHg, respectively.   EKG at baseline and with stress demonstrated no ischemic changes or   arrhythmias.   Screening 2D echocardiogram demonstrated no significant valve disease. Normal   sized aortic root.     ECG: Oct 2019  Sinus rhythm    CMR: Nov 2019  Hypertrophic cardiomyopathy with asymmetric septal hypertrophy phenotype. There is normal biventricular function, LVEF 58% and RVEF 66%. The maximal wall thickness of 1.7 cm, there is mitral valve  ZIA with mild LVOT acceleration at rest, and there is no fibrosis on LGE imaging.     Compared to the prior examination dated 08/08/2016, there has not  been any change.        Assessment and Plan:  40 year old female with    1. HCM, with asymmetrical septal hypertrophy of 15-17 mm, no LVOT obstruction  2. Family history of HCM  3. Insulin requiring DM, controlled  4. Obesity  5. Hiatal hernia, gastroparesis  6. Hypertension, treated  7. Hyperlipidemia, treated      Marie denies any presyncope, syncope or chest pain and has stable shortness of breath. I have personally reviewed all relevant cardiac investigations and discussed my interpretation with the patient. ECG shows sinus rhythm. CMR confirms HCM of asymmetrical septal hypertrophy phenotype with normal biventricular function. Echo shows  with no significant LVOT obstruction.   It is very reassuring that Marie does not have any high risk features for SCD - septum is <30mm, no family history of SCD, personal history of syncope, VT, or hypotension with exercise.     She appears euvolemic on exam and reports normal range blood pressure and heart rate.    I do not recommend any changes to the cardiac medical regimen at this point. The patient states understanding and is agreeable with the plan.    Recommendations:   1. Continue current cardiac medications.  2. Healthy lifestyle encouraged with regular exercise (moderate intensity,150 minutes/week) and diet low in carbohydrates and saturated fat  3. Follow up with me in 1 year      Video Visit Details:    Type of service:  Video Visit    Start: 02/22/2021 08:34 am   Stop: 02/22/2021 08:47 am    Originating Location (pt. Location): Home    Distant Location (provider location):  [facility]     Platform used for Video Visit: [employer] / [employer]      In addition to visit time documented above, I spent an additional 20 minutes on data review and documentation.      Melanie Dewey MD, MS  Professor of Medicine  Cardiovascular division        Please do not hesitate to contact me if you have any questions/concerns.     Sincerely,     Melanie Dewey MD

## 2021-02-22 NOTE — TELEPHONE ENCOUNTER
Central Prior Authorization Team   Phone: [phone]      PA Initiation    Medication: Trulicity 1.5mg/0.5ml  Insurance Company: [employer] - Phone [phone] Fax [phone]  Pharmacy Filling the Rx: [facility] - [address] [facility]   Filling Pharmacy Phone: [phone]  Filling Pharmacy Fax:    Start Date: 2/22/2021

## 2021-02-22 NOTE — PATIENT INSTRUCTIONS
Patient Instructions:  It was a pleasure to see you in the cardiology clinic today.      If you have any questions, call  Chari Conrad RN, at [phone].  Press Option #1 for the [facility], and then press Option #4  We are encouraging the use of [email or web address] to communicate with your HealthCare Provider    Please follow up with Dr. Dewey in 1 year    No med changes.       If you have an urgent need after hours (8:00 am to 4:30 pm) please call [phone] and ask for the cardiology fellow on call.

## 2021-02-24 NOTE — TELEPHONE ENCOUNTER
Prior Authorization Approval    Authorization Effective Date: 1/23/2021  Authorization Expiration Date: 2/23/2022  Medication: Trulicity 1.5mg/0.5ml  Approved Dose/Quantity:    Reference #:     Insurance Company: [employer] - Phone [phone] Fax [phone]  Expected CoPay:       CoPay Card Available:      Foundation Assistance Needed:    Which Pharmacy is filling the prescription (Not needed for infusion/clinic administered): [facility] - [address] [facility]   Pharmacy Notified: Yes  Patient Notified: Yes  **Instructed pharmacy to notify patient when script is ready to /ship.**

## 2021-02-25 ENCOUNTER — OFFICE VISIT (OUTPATIENT)
Dept: URGENT CARE | Facility: URGENT CARE | Age: 41
End: 2021-02-25
Payer: COMMERCIAL

## 2021-02-25 VITALS
SYSTOLIC BLOOD PRESSURE: 128 MMHG | DIASTOLIC BLOOD PRESSURE: 80 MMHG | BODY MASS INDEX: 30.9 KG/M2 | TEMPERATURE: 97.9 F | HEART RATE: 82 BPM | WEIGHT: 180 LBS | OXYGEN SATURATION: 96 %

## 2021-02-25 DIAGNOSIS — J01.90 ACUTE SINUSITIS WITH COEXISTING CONDITION REQUIRING PROPHYLACTIC TREATMENT: Primary | ICD-10-CM

## 2021-02-25 PROCEDURE — 99213 OFFICE O/P EST LOW 20 MIN: CPT | Performed by: FAMILY MEDICINE

## 2021-02-25 RX ORDER — DOXYCYCLINE 100 MG/1
100 CAPSULE ORAL 2 TIMES DAILY
Qty: 20 CAPSULE | Refills: 0 | Status: SHIPPED | OUTPATIENT
Start: 2021-02-25 | End: 2021-03-07

## 2021-02-25 NOTE — PROGRESS NOTES
SUBJECTIVE:   Marie Vogel is a 40 year old female presenting with a chief complaint of cough, congestion, sinus headache.  No fever.  Discolored nasal secretions.  Hurts when brushes teeth  Onset of symptoms was 5 day(s) ago.  Course of illness is worsening.    Severity moderate  Current and Associated symptoms: sinus pressure  Predisposing factors include seasonal allergies and environmental allergies, DM - insulin.    Has multiple allergies, was exposed to floral scent and made sinus symptoms triggere.  Endorsed frontal and maxillary pain and worsened.       Diabetes has not been well controlled, medication changes - off insulin pump, trulicity due to no coverage    No close positive COVID exposure, declined screen.      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     Type II or unspecified type diabetes mellitus without mention of complication, not stated as uncontrolled 2002    onset was gestational in 2001     Current Outpatient Medications   Medication Sig Dispense Refill     albuterol (PROAIR HFA/PROVENTIL HFA/VENTOLIN HFA) 108 (90 Base) MCG/ACT inhaler Inhale 2 puffs into the lungs every 4 hours as needed for shortness of breath / dyspnea or wheezing 1 Inhaler 3     aspirin 81 MG tablet Take 1 tablet (81 mg) by mouth daily 90 tablet 3     atenolol (TENORMIN) 25 MG tablet Take 0.5 tablets (12.5 mg) by mouth daily 90 tablet 3     blood glucose (PEPE CONTOUR NEXT) test strip Check 4 times/day 300 each 0     blood glucose monitoring (NO BRAND SPECIFIED) meter device kit Use to test blood sugar 6 times daily and as needed. 1 kit 0     cetirizine (ZYRTEC) 10 MG tablet Take 10 mg by mouth 2 times daily  90 tablet 3     Continuous  "Blood Gluc Sensor (FREESTYLE NADIA 14 DAY SENSOR) MISC CHANGE EVERY 14 DAYS 6 each 3     EPINEPHrine (ANY BX GENERIC EQUIV) 0.3 MG/0.3ML injection 2-pack Inject 0.3 mLs (0.3 mg) into the muscle once as needed for anaphylaxis 0.3 mL 11     fluticasone (FLONASE) 50 MCG/ACT spray Spray 1-2 sprays into both nostrils daily 1 Bottle 0     glucagon (GLUCAGON EMERGENCY) 1 MG kit Inject 1 mg into the muscle as needed for low blood sugar 1 mg 0     ibuprofen (ADVIL) 200 MG tablet Take 200 mg by mouth every 4 hours as needed.       insulin aspart (NOVOLOG VIAL) 100 UNITS/ML vial USE WITH INSULIN PUMP AS DIRECTED, USES ABOUT 80 UNITS PER DAY 80 mL 3     insulin aspart (NOVOLOG VIAL) 100 UNITS/ML vial USE WITH INSULIN PUMP AS DIRECTED, USES ABOUT 80 UNITS PER DAY 80 mL 3     Insulin Disposable Pump (OMNIPOD DASH 5 PACK PODS) MISC CHANGE EVERY 3 DAYS 30 each 3     Insulin Disposable Pump (OMNIPOD DASH SYSTEM) KIT 1 kit continuous 1 kit 0     insulin glargine (LANTUS PEN) 100 UNIT/ML pen Take 20 units in case of pump malfunction only. 15 mL 0     INSULIN PUMP - OUTPATIENT Updated 11/12/19:  OmniPod Dash  BASAL:  00:00: 1.150 units/hr  10:30: 1.2  14:30: 1.1  CARB RATIO:  00:00: 8  Sensitivity:  00:00: 30 mg/dL  BLOOD GLUCOSE TARGET and times:  12   AM (midnight): 120-120  Active Insulin Time: 4 hours  Sensor: Nadia/ Nadia Link Donna  Amish:   [email or web address]  Password [email or web address]       insulin syringe-needle U-100 (29G X 1/2\" 1 ML) 29G X 1/2\" 1 ML miscellaneous Use 1 syringe once daily or as needed 100 each 1     Lancets (MICROLET) MISC 1 Device See Admin Instructions. Use up to 5 times daily for blood sugar checks. 100 each prn     levonorgestrel (MIRENA) 20 MCG/24HR IUD 1 each (20 mcg) by Intrauterine route once       losartan (COZAAR) 50 MG tablet TAKE ONE TABLET BY MOUTH EVERY MORNING AND TAKE ONE-HALF TABLET BY MOUTH EVERY EVENING 135 tablet 3     MAGNESIUM OXIDE PO Take 400 mg by mouth daily       metFORMIN " (GLUCOPHAGE-XR) 500 MG 24 hr tablet TAKE TWO TABLETS BY MOUTH TWICE A DAY WITH MEALS 360 tablet 3     montelukast (SINGULAIR) 10 MG tablet TAKE ONE TABLET BY MOUTH EVERY NIGHT AT BEDTIME 90 tablet 3     Multiple Vitamins-Minerals (MULTI VITAMIN/MINERALS PO) Take 1 each by mouth daily.       Omega-3 Fatty Acids (FISH OIL) 500 MG CAPS Take 1 capsule by mouth       omeprazole (PRILOSEC) 40 MG DR capsule TAKE ONE CAPSULE BY MOUTH ONCE DAILY AS NEEDED (Patient taking differently: daily ) 90 capsule 3     rosuvastatin (CRESTOR) 5 MG tablet Take 1 tablet (5 mg) by mouth daily 90 tablet 3     sertraline (ZOLOFT) 25 MG tablet Take 1 tablet (25 mg) by mouth daily 90 tablet 3     SUMAtriptan (IMITREX) 25 MG tablet TAKE ONE TO FOUR TABLETS BY MOUTH ONE TIME. MAY REPEAT 1 TABLET EVERY TWO HOURS UP TO MAXIMUM OF 200MG IN 24 HOURS 8 tablet 6     traZODone (DESYREL) 50 MG tablet TAKE ONE-HALF TO ONE TABLET BY MOUTH NIGHTLY AS NEEDED FOR SLEEP 60 tablet 3     vitamin D3 (CHOLECALCIFEROL) 50 mcg (2000 units) tablet Take 1 tablet by mouth daily       Social History     Tobacco Use     Smoking status: Former Smoker     Packs/day: 0.00     Years: 0.00     Pack years: 0.00     Types: Cigarettes     Quit date: 10/24/2005     Years since quitting: 15.3     Smokeless tobacco: Never Used     Tobacco comment: States only smokes when drinks maybe 2x/year   Substance Use Topics     Alcohol use: No     Alcohol/week: 0.0 standard drinks     Frequency: Monthly or less     Drinks per session: 1 or 2     Binge frequency: Never       ROS:  Review of systems negative except as stated above.    OBJECTIVE:  /80   Pulse 82   Temp 97.9  F (36.6  C) (Tympanic)   Wt 81.6 kg (180 lb)   SpO2 96%   BMI 30.90 kg/m    GENERAL APPEARANCE: healthy, alert and no distress  EYES: EOMI,  PERRL, conjunctiva clear  HENT: ear canals and TM's normal.  RESP: lungs clear to auscultation - no rales, rhonchi or wheezes  PSYCH: mentation appears normal and affect  normal/bright    ASSESSMENT/PLAN:  (J01.90) Acute sinusitis with coexisting condition requiring prophylactic treatment  (primary encounter diagnosis)  Plan: doxycycline hyclate (VIBRAMYCIN) 100 MG capsule            Patient with underlying co-morbid medical diagnosis with worsening sinus symptoms.  RX Doxycycline given for treatment.  Okay for tylenol and ibuprofen for discomfort.  Continue with flonase to help with symptoms.    Follow up with primary provider if no improvement of symptoms in 1 week    Kennedy Puri MD  February 25, 2021 1:26 PM

## 2021-02-25 NOTE — PATIENT INSTRUCTIONS
Take full course of antibiotic - doxycycline for sinus infection    Continue with flonase  Okay for tylenol and ibuprofen for discomfort      Patient Education     Sinusitis (Antibiotic Treatment)    The sinuses are air-filled spaces within the bones of the face. They connect to the inside of the nose. Sinusitis is an inflammation of the tissue that lines the sinuses. Sinusitis can occur during a cold. It can also happen due to allergies to pollens and other particles in the air. Sinusitis can cause symptoms of sinus congestion and a feeling of fullness. A sinus infection causes fever, headache, and facial pain. There is often green or yellow fluid draining from the nose or into the back of the throat (post-nasal drip). You have been given antibiotics to treat this condition.   Home care    Take the full course of antibiotics as instructed. Don't stop taking them, even when you feel better.    Drink plenty of water, hot tea, and other liquids as directed by the healthcare provider. This may help thin nasal mucus. It also may help your sinuses drain fluids.    Heat may help soothe painful areas of your face. Use a towel soaked in hot water. Or,  the shower and direct the warm spray onto your face. Using a vaporizer along with a menthol rub at night may also help soothe symptoms.     An expectorant with guaifenesin may help thin nasal mucus and help your sinuses drain fluids. Talk with your provider or pharmacists before taking an over-the-counter (OTC) medicine if you have any questions about it or its side effects..    You can use an OTC decongestant, unless a similar medicine was prescribed to you. Nasal sprays work the fastest. Use one that contains phenylephrine or oxymetazoline. First blow your nose gently. Then use the spray. Don't use these medicines more often than directed on the label. If you do, your symptoms may get worse. You may also take pills that contain pseudoephedrine. Don t use products  that combine multiple medicines. This is because side effects may be increased. Read labels. You can also ask the pharmacist for help. (People with high blood pressure should not use decongestants. They can raise blood pressure.) Talk with your provider or pharmacist if you have any questions about the medicine..    OTC antihistamines may help if allergies contributed to your sinusitis. Talk with your provider or pharmacist if you have any questions about the medicine..    Don't use nasal rinses or irrigation during an acute sinus infection, unless your healthcare provider tells you to. Rinsing may spread the infection to other areas in your sinuses.    Use acetaminophen or ibuprofen to control pain, unless another pain medicine was prescribed to you. If you have chronic liver or kidney disease or ever had a stomach ulcer, talk with your healthcare provider before using these medicines. Never give aspirin to anyone under age 18 who is ill with a fever. It may cause severe liver damage.    Don't smoke. This can make symptoms worse.    Follow-up care  Follow up with your healthcare provider, or as advised.   When to seek medical advice  Call your healthcare provider if any of these occur:     Facial pain or headache that gets worse    Stiff neck    Unusual drowsiness or confusion    Swelling of your forehead or eyelids    Symptoms don't go away in 10 days    Vision problems, such as blurred or double vision    Fever of 100.4 F (38 C) or higher, or as directed by your healthcare provider  Call 911  Call 911 if any of these occur:     Seizure    Trouble breathing    Feeling dizzy or faint    Fingernails, skin or lips look blue, purple , or gray  Prevention  Here are steps you can take to help prevent an infection:     Keep good hand washing habits.    Don t have close contact with people who have sore throats, colds, or other upper respiratory infections.    Don t smoke, and stay away from secondhand smoke.    Stay up  to date with of your vaccines.  Harika last reviewed this educational content on 12/1/2019 2000-2020 [employer]. [address] [place] [address], [place] [address]. All rights reserved. This information is not intended as a substitute for professional medical care. Always follow your healthcare professional's instructions.

## 2021-03-22 ENCOUNTER — TELEPHONE (OUTPATIENT)
Dept: ENDOCRINOLOGY | Facility: CLINIC | Age: 41
End: 2021-03-22

## 2021-03-22 NOTE — TELEPHONE ENCOUNTER
Prior Authorization Approval    Authorization Effective Date: 3/22/2021  Authorization Expiration Date: 3/22/2022  Medication: Insulin Disposable Pump (OMNIPOD DASH 5 PACK PODS) MISC--APPROVED  Approved Dose/Quantity:   Reference #:     Insurance Company: [employer] - Phone [phone] Fax [phone]  Expected CoPay:       CoPay Card Available:      Foundation Assistance Needed:    Which Pharmacy is filling the prescription (Not needed for infusion/clinic administered): [facility] - [place] [address]  Pharmacy Notified: Yes  Patient Notified: Yes **Instructed pharmacy to notify patient when script is ready to /ship.**

## 2021-03-22 NOTE — TELEPHONE ENCOUNTER
Please route determinations to the Pharm Diabetes pool ([id number]).      Thank you,  [facility]

## 2021-03-22 NOTE — TELEPHONE ENCOUNTER
PA Initiation    Medication: Insulin Disposable Pump (OMNIPOD DASH 5 PACK PODS) MISC   Insurance Company: [employer] - Phone [phone] Fax [phone]  Pharmacy Filling the Rx: [facility] - [place], [place] - [facility] [address]  Filling Pharmacy Phone: [phone]  Filling Pharmacy Fax: [phone]  Start Date: 3/22/2021

## 2021-03-31 ENCOUNTER — TELEPHONE (OUTPATIENT)
Dept: OBGYN | Facility: CLINIC | Age: 41
End: 2021-03-31

## 2021-03-31 NOTE — TELEPHONE ENCOUNTER
Patient calling:  Had Mirena IUD replaced last year.  Still has occasional periods on this. Last period was in Dec.  Period started 3/20, which was light at first, then was bright red for a day. Passed a clot size of a ping pong ball x 1.  Now bleeding is light again.  Recommend:  Continue to monitor cycles, if heavy bleeding or clotting resume, advised to call back.  Pt is currently in [place] on a work assignment for the next 13 weeks.  Patient advised to send my chart message with cycle information if continues.  Charo Ramirez RN

## 2021-05-16 ENCOUNTER — HEALTH MAINTENANCE LETTER (OUTPATIENT)
Age: 41
End: 2021-05-16

## 2021-06-04 ENCOUNTER — OFFICE VISIT (OUTPATIENT)
Dept: AUDIOLOGY | Facility: CLINIC | Age: 41
End: 2021-06-04
Payer: COMMERCIAL

## 2021-06-04 DIAGNOSIS — H90.3 SENSORY HEARING LOSS, BILATERAL: Primary | ICD-10-CM

## 2021-06-04 NOTE — PROGRESS NOTES
AUDIOLOGY REPORT    SUBJECTIVE:Marie Vogel is a 40 year old female who was seen in the Audiology Clinic at the [facility] on 6/4/2021  for a follow-up check of their hearing aids. The patient has been seen previously in this clinic on 09/09/2020 for assessment and results indicated a mild to moderate sensorineural hearing loss in the right ear, and a mild to moderately severe sensorineural hearing loss in the left ear. The patient has been seen previously in this clinic and was fit with Widex Beyond 330 Fusion behind-the-ear hearing aids on 08/16/17. Marie reports feedback worse in the right hearing aid, specially if she try's turning them up. She feels she is not hearing as well and would like to be able to turn them up. She also reports the right hearing aid will not stream on the phone.     OBJECTIVE:   Based on patient report, the following changes were made;cleaned both hearing aids and replaced domes. Repaired hearing aids to phone. Both hearing aids show to be paired and connect to mathew. However on her phone menu it will only allow streaming to one aid.Counseled this seems to be a phone issue vs a hearing aid issue, recommend updating phone or going into the [employer] store to see if they can help. Also it is time for custom earmold's so that we can turn her hearing aids up and reduce feedback.     Bilateral earmold impressions taken without incident today.    ASSESSMENT: A follow-up appointment for hearing aid's was completed today. Ear impressions taken.  PLAN:Marie will return for follow-up in 3-4 weeks to be fit with new custom earmold's. Please call this clinic with any questions regarding today s appointment.      Clovis Kwan, [facility]  Licensed Audiologist  [place] #[id number]

## 2021-06-15 ENCOUNTER — TELEPHONE (OUTPATIENT)
Dept: ENDOCRINOLOGY | Facility: CLINIC | Age: 41
End: 2021-06-15

## 2021-06-15 DIAGNOSIS — E11.21 TYPE 2 DIABETES MELLITUS WITH DIABETIC NEPHROPATHY, WITH LONG-TERM CURRENT USE OF INSULIN (H): ICD-10-CM

## 2021-06-15 DIAGNOSIS — Z79.4 TYPE 2 DIABETES MELLITUS WITH DIABETIC NEPHROPATHY, WITH LONG-TERM CURRENT USE OF INSULIN (H): ICD-10-CM

## 2021-06-15 NOTE — TELEPHONE ENCOUNTER
Prior Authorization Retail Medication Request    Medication/Dose: Omnipod DASH pods  ICD code (if different than what is on RX):    Previously Tried and Failed:    Rationale:      Insurance Name:    Insurance ID:  [id number]       Pharmacy Information (if different than what is on RX)  Name:  [facility]  Phone:

## 2021-06-16 NOTE — TELEPHONE ENCOUNTER
Prior Authorization Approval    Authorization Effective Date: 5/17/2021  Authorization Expiration Date: 6/15/2024  Medication: Omnipod DASH pods  Approved Dose/Quantity:    Reference #:     Insurance Company: [employer] - Phone [phone] Fax [phone]  Expected CoPay:       CoPay Card Available:      Foundation Assistance Needed:    Which Pharmacy is filling the prescription (Not needed for infusion/clinic administered):    Pharmacy Notified: Yes  Patient Notified: Yes  **Instructed pharmacy to notify patient when script is ready to /ship.**           Post-Care Instructions: I reviewed with the patient in detail post-care instructions. Patient is not to engage in any heavy lifting, exercise, or swimming for the next 14 days. Should the patient develop any fevers, chills, bleeding, severe pain patient will contact the office immediately.

## 2021-06-28 NOTE — PROGRESS NOTES
Ordered. Please let lab know.   PATIENT IS NEEDING KNOW IF SHE IS ABLE TO VISIT HER UNCLE IN A NURSING HOME , IT HAD BEEN CLOSED DUE TO COVID BUT NOW IS OPEN SHE WANTS TO KNOW IF IT IS A GOOD IDEA OR NOT     PLEASE ADVISE.

## 2021-07-01 NOTE — TELEPHONE ENCOUNTER
CHIEF COMPLAINT:  Chief Complaint   Patient presents with   • Endocrine Problem       HISTORY OF PRESENT ILLNESS:  Kim Herrera who is seen in consultation at the request of Clark Lopez MD  for evaluation of hypercalcemia, hyperparathyroidism.  This was first identified in about 3 years ago- elevated calcium, elevated PTH. Patient was under care of endocrinology [facility] , also for adrenal l nodule, care everywhere reviewed. Pertinent medical history is significant for adenocarcinoma of colon, stage IIIA, CKD  Stage 3,  Diabetes type 2., renal calculi.  She does  take supplemental vitamin D.There is a history of cancer, is not a history of granulomatous/inflammatory illnesses, is not a history of thiazide use, is not a history of lithium use., no vitamin d deficiency She does  have a personal history of malignancy and has had head or neck radiation.    The patient has not had prior intervention for this condition.    Her current symptoms include fatigue In addition, she denies paresthesias., constipation, mood changes, fracturesThe patient's calcium has ranged from 10.2-11.1mg/dL since diagnosis.  Last PTH is normal    Patient has a history of bilateral adrenal nodules notes on CT abdomen pelvis and PET scan consistent with myelolipomas. She had work up at [facility] last year, including DHEAS elevated 352,  Renin activity 13.6, aldosterone 3.3, cortisol blood am 9.2, ACTH 124.1      The nodules  left measures 4.8 cm and the right 3.9cm  in size with a density of  Hounsfield units - not available .    She denies a history of adrenal disease prior to this time.  In terms of symptoms, she denies cushing's symptoms, pheochromocytoma symptoms, hirsutism   In addition, she reports a history of hypertension and denies a history of hypokalemia.      She denies a family history of adrenal disease, pituitary disease, thyroid cancer or calcium and parathyroid disorders.         HISTORIES:  Patient Active Problem List   Diagnosis  Urine culture shows that infection is resistant to ciprofloxacin that was prescribed.  I would recommend changing to nitrofurantoin 100 mg BID x 7 days.  This Rx can be called to the pharmacy of the patient's choice with no refills.    --EJP--     • Anxiety disorder   • Essential hypertension, benign   • Other and unspecified hyperlipidemia   • Type II or unspecified type diabetes mellitus without mention of complication, not stated as uncontrolled   • Pseudophakia of right eye   • BDR (background diabetic retinopathy) (CMS/HCC)   • After-cataract obscuring vision   • Diabetes mellitus type 2 without retinopathy (CMS/HCC)   • Status post left cataract extraction   • Hyperparathyroidism, primary (CMS/HCC)   • Symptomatic cholelithiasis   • Villoglandular colonic polyp   • Malignant neoplasm of ascending colon (CMS/HCC)   • Encounter for education         EXTRACAPSULAR CATARACT REMOVAL W INSERT IO CHIDI* 12/07/2012      Comment: Right    CATARACT EXTRACTION W/  INTRAOCULAR LENS IMPLA*               ESOPHAGOGASTRODUODENOSCOPY TRANSORAL FLEX DIAG  7/31/2019       Comment: Upper GI endoscopy with biopsies and                polypectomies performed by José Manuel Moore MD at [facility]    COLON SURGERY                                   08/27/2019    REMOVAL GALLBLADDER                             08/27/2019    COLONOSCOPY                                     07/31/2019      Comment: repeat in one year after 8/27/2020 due to high                risk adenomatous colon polyps    COLONOSCOPY                                     09/01/2020      Comment: 2 years due to hx of cancerous polyp     Family History   Problem Relation Age of Onset   • Cataracts Sister    • Cancer, Breast Sister    • Stroke Sister    • Diabetes Mother    • Heart disease Mother    • High blood pressure Mother    • Stroke Brother    • Cancer Brother         Social History     Tobacco Use   • Smoking status: Never Smoker   • Smokeless tobacco: Never Used   Vaping Use   • Vaping Use: never used   Substance Use Topics   • Alcohol use: No   • Drug use: No        EMPLOYER:      Allergies as of 07/01/2021   • (No Known Allergies)        MEDICATIONS PRIOR TO THIS VISIT:  Current  Outpatient Medications   Medication Sig Dispense Refill   • pantoprazole (PROTONIX) 20 MG tablet Take 1 tablet by mouth once daily 90 tablet 1   • hydrOXYzine (ATARAX) 25 MG tablet TAKE 1 TABLET BY MOUTH EVERY 8 HOURS AS NEEDED FOR ANXIETY (RESTLESSNESS) 90 tablet 0   • lisinopril (ZESTRIL) 5 MG tablet Take 1 tablet by mouth daily. 90 tablet 3   • metoPROLOL tartrate (LOPRESSOR) 100 MG tablet Take 1/2 tab in AM; 1 tab in evening 135 tablet 3   • pramipexole (MIRAPEX) 0.125 MG tablet Take 1 tablet by mouth nightly as needed (restless legs). 90 tablet 3   • pravastatin (PRAVACHOL) 40 MG tablet Take 1 tablet by mouth daily. 90 tablet 3   • omega-3 acid ethyl esters (LOVAZA) 1 g capsule Take 2 capsules by mouth 2 times daily. 360 capsule 3   • insulin detemir (LEVEMIR) 100 UNIT/ML injectable solution Inject 22 Units into the skin nightly. 15 mL 5   • Insulin Pen Needle (BD Pen Needle Tiki 2nd Gen) 32G X 4 MM Misc Use to inject insulin one time daily. Remove needle cover(s) to expose needle before injecting. 100 each 3   • ALPRAZolam (XANAX) 0.25 MG tablet Take 1 tablet by mouth twice daily as needed for anxiety 45 tablet 0   • famotidine (PEPCID) 20 MG tablet Take 1 tablet by mouth twice daily 180 tablet 0   • blood glucose (Accu-Chek Edmund Plus) test strip Test blood sugar 1 times daily as directed. Diagnosis: Type 2 diabetes. Meter: Accucheck edmund plus 100 strip 2   • Lancets (freestyle) Misc USE 1  TO CHECK GLUCOSE ONCE DAILY 100 each 3   • Multiple Vitamin (MULTI-VITAMIN PO) Take 1 tablet by mouth daily.     • cholecalciferol (VITAMIN D3) 1000 units tablet Take 2,000 Units by mouth daily.        No current facility-administered medications for this visit.        MEDICATIONS AFTER THIS VISIT:  Current Outpatient Medications   Medication Sig Dispense Refill   • pantoprazole (PROTONIX) 20 MG tablet Take 1 tablet by mouth once daily 90 tablet 1   • hydrOXYzine (ATARAX) 25 MG tablet TAKE 1 TABLET BY MOUTH EVERY 8 HOURS  AS NEEDED FOR ANXIETY (RESTLESSNESS) 90 tablet 0   • lisinopril (ZESTRIL) 5 MG tablet Take 1 tablet by mouth daily. 90 tablet 3   • metoPROLOL tartrate (LOPRESSOR) 100 MG tablet Take 1/2 tab in AM; 1 tab in evening 135 tablet 3   • pramipexole (MIRAPEX) 0.125 MG tablet Take 1 tablet by mouth nightly as needed (restless legs). 90 tablet 3   • pravastatin (PRAVACHOL) 40 MG tablet Take 1 tablet by mouth daily. 90 tablet 3   • omega-3 acid ethyl esters (LOVAZA) 1 g capsule Take 2 capsules by mouth 2 times daily. 360 capsule 3   • insulin detemir (LEVEMIR) 100 UNIT/ML injectable solution Inject 22 Units into the skin nightly. 15 mL 5   • Insulin Pen Needle (BD Pen Needle Tiki 2nd Gen) 32G X 4 MM Misc Use to inject insulin one time daily. Remove needle cover(s) to expose needle before injecting. 100 each 3   • ALPRAZolam (XANAX) 0.25 MG tablet Take 1 tablet by mouth twice daily as needed for anxiety 45 tablet 0   • famotidine (PEPCID) 20 MG tablet Take 1 tablet by mouth twice daily 180 tablet 0   • blood glucose (Accu-Chek Edmund Plus) test strip Test blood sugar 1 times daily as directed. Diagnosis: Type 2 diabetes. Meter: Accucheck edmund plus 100 strip 2   • Lancets (freestyle) Misc USE 1  TO CHECK GLUCOSE ONCE DAILY 100 each 3   • Multiple Vitamin (MULTI-VITAMIN PO) Take 1 tablet by mouth daily.     • cholecalciferol (VITAMIN D3) 1000 units tablet Take 2,000 Units by mouth daily.        No current facility-administered medications for this visit.        REVIEW OF SYSTEMS:   Reviewed in nursing notes, and I agree.      OBJECTIVE:  VITAL SIGNS:    Visit Vitals  Ht 4' 9\" (1.448 m)   BMI 31.49 kg/m²      GENERAL:  This is a(n) Obese White female in no distress, who is alert, oriented to person with normal eye contact, normal affect, normal thought processes and clear speech. She ambulates normally without assistance.  Non-labored respirations.  HEENT:  Pupils are equal and reactive to light and accommodation. There is no  lid lag, no stare or proptosis.  NECK:  Supple, thyroid is palpable, non-nodular, nontender and mobile. There is no cervical or supraclavicular lymphadenopathy.  Trachea is midline.  No JVD.  No neck masses are palpable.  LUNGS:  Bilaterally clear to auscultation.  Unlabored breathing.  HEART:  Regular rate and rhythm.  There is no S3 or murmur.  Bilateral peripheral pulses are palpable.    ABDOMEN: Obese, with normal bowel sounds.  Soft, nontender, no hepatosplenomegaly or masses.   EXTREMITIES:  Bilateral peripheral pulses palpable.  No pedal edema.  SKIN:  No rash.  Normal texture.  NEUROLOGIC:  DTR's 2+, with a normal  return phase, symmetric upper and lower extremities.  MUSCULOSKELETAL:  Ambulates without assistance. No clubbing or cyanosis.            IMAGING:      Impression:   1. Suspected wall thickening of the proximal colon, potentially local   recurrence. Colonoscopy could better evaluate.   2. Nephrolithiasis.   3. Colonic diverticulosis.   4. Severe coronary calcification.   5. Stable basilar cylindrical bronchiectasis and tiny bilateral lung nodules.   6. Stable bilateral adrenal myelolipomas     Masoud Cornejo M.D.   [facility] [id number]     Reading Radiologist - Masoud Cornejo   Releasing Radiologist - Masoud Cornejo   Dictation Date Time - 05/18/2021 10:16 CDT   Signed Date Time - 05/18/2021 10:48 CDT    - NA  Narrative    CT CHEST ABDOMEN PELVIS WO CONT, 5/18/2021 10:10 AM CDT, [facility]   WILL [place]     INDICATION:   C18.2 Malignant neoplasm of ascending colon     ADDITIONAL CLINICAL INFORMATION:   Ordering Provider Reason For Exam:   Technologist Note:     Comparison:5/12/2020     Indication: C18.2 Malignant neoplasm of ascending colon     Technique: CT chest, abdomen and pelvis performed without contrast. Iterative   reconstruction technique was used.     Findings:   CT Chest:   Heart: Severe coronary calcification   Mediastinum: 3.9  x 1.9 cm lobular soft tissue mass in the mediastinum, stable   from 3.9   Lungs: Cylindrical bronchiectasis in the medial basal lungs, stable. Tiny   bilateral lung nodules are stable as well. No new or enlarging pulmonary   nodules.   Airways: Negative   Pleura: Negative   Esophagus: Negative   Thyroid gland: Negative   Bones: Negative     CT Abdomen/Pelvis:   Liver: Negative   Pancreas: Negative   Gallbladder: Negative   Spleen: Negative   Kidneys: Stable small nonobstructing calculi lower left kidney.   Adrenal glands: Bilateral adrenal myelolipoma is are unchanged.   Bowels: Questionable wall thickening of the proximal colon at the site of the   ileocolic anastomosis best seen on series 2 image 138. Colonic diverticulosis.   Reproductive tract: Negative   Bladder: Negative   Peritoneum and mesentery: Negative   Lymphatics: Negative   Vasculature: Negative   Bones: Severe diffuse degenerative changes of the lower thoracic and upper   lumbar spine     Other: No significant findings  Other Result Text    Interface [facility] I Rad Results [id number] - 05/18/2021 10:51 AM CDT   Formatting of this note might be different from the original.   CT CHEST ABDOMEN PELVIS WO CONT, 5/18/2021 10:10 AM CDT, [facility]   W[place]     INDICATION:   C18.2 Malignant neoplasm of ascending colon     ADDITIONAL CLINICAL INFORMATION:   Ordering Provider Reason For Exam:   Technologist Note:     Comparison:5/12/2020     Indication: C18.2 Malignant neoplasm of ascending colon     Technique: CT chest, abdomen and pelvis performed without contrast. Iterative   reconstruction technique was used.     Findings:   CT Chest:   Heart: Severe coronary calcification   Mediastinum: 3.9 x 1.9 cm lobular soft tissue mass in the mediastinum, stable   from 3.9   Lungs: Cylindrical bronchiectasis in the medial basal lungs, stable. Tiny   bilateral lung nodules are stable as well. No new or enlarging pulmonary   nodules.   Airways: Negative   Pleura:  Negative   Esophagus: Negative   Thyroid gland: Negative   Bones: Negative     CT Abdomen/Pelvis:   Liver: Negative   Pancreas: Negative   Gallbladder: Negative   Spleen: Negative   Kidneys: Stable small nonobstructing calculi lower left kidney.   Adrenal glands: Bilateral adrenal myelolipoma is are unchanged.   Bowels: Questionable wall thickening of the proximal colon at the site of the   ileocolic anastomosis best seen on series 2 image 138. Colonic diverticulosis.   Reproductive tract: Negative   Bladder: Negative   Peritoneum and mesentery: Negative   Lymphatics: Negative   Vasculature: Negative   Bones: Severe diffuse degenerative changes of the lower thoracic and upper   lumbar spine     Other: No significant findings       Impression:   1. Suspected wall thickening of the proximal colon, potentially local   recurrence. Colonoscopy could better evaluate.   2. Nephrolithiasis.   3. Colonic diverticulosis.   4. Severe coronary calcification.   5. Stable basilar cylindrical bronchiectasis and tiny bilateral lung nodules.   6. Stable bilateral adrenal myelolipomas     Masoud Cornejo M.D.   [facility] [id number]     Reading Radiologist - Masoud Cornejo   Releasing Radiologist - Masoud Cornejo   Dictation Date Time - 05/18/2021 10:16 CDT   Signed Date Time - 05/18/2021 10:48 CDT    - NA     Date: 05/18/21   Received From: [facility]          End Exam    Date Time   Sep 26, 2019  1:28 PM   Reason For Exam    colon cancer with mediastinal adenopathy and small liver lesion; Mediastinal mass   Associated Diagnoses    Malignant neoplasm of ascending colon (CMS/HCC)       Sedation Narrator    Link to Narrator   IR Timeline    IR Event Log   PACS Images     Show images for PET CT FDG SKULL BASE TO MID-THIGHS   Result Information    Date and Time: Exam End: 9/26/2019 13:28 Status: Final result Provider Status: Reviewed   Priority: Routine          Study  Result    Narrative & Impression   EXAM: 18F-FDG PET-CT      DATE OF SERVICE: 09/26/19     HISTORY: Initial staging for colonic adenocarcinoma. Status post colectomy.  Negative margins. Some positive lymph nodes.     COMPARISON: CT chest abdomen pelvis 09/26/19     RADIOPHARMACEUTICAL:  11.8 mCi of D-24-Lumemh-0-Qpwqi-L-Glucose (FDG) IV.     PROCEDURE: Immediately prior to radionuclide injection, blood glucose was  measured by fingerstick and was 99 mg/dL (Normal range =  mg/dL).      60 minutes after radionuclide administration, PET and helical CT images of  the body from the skull base to the mid thighs were obtained and  reconstructed in the axial, coronal, and sagittal views.  Fusion images and  a maximum intensity projection (MIP) image were also generated.     FINDINGS:    Changes from right hemicolectomy. FDG uptake involving ileocolonic  anastomosis, could be physiologic. Mild FDG uptake within the adjacent  mesentery is likely postsurgical. No CT correlate. Cholecystectomy clips  with postoperative uptake. Postoperative uptake within the ventral  abdominal wall.     No FDG avid locoregional nodes.     No distant FDG avid metastasis. Specifically, no uptake associated  low-attenuation anterior mediastinal lesion lesions which are relatively  photopenic, could represent thymic cysts.     FDG uptake within the distal rectum could be physiologic. FDG uptake  associated bilateral adrenal gland myelolipomas soft tissue component, more  intense on the right. The right myelolipoma measures 3.9 x 4.1 cm with SUV  max of 6.6.     Additional low-dose CT findings: Small sliding hiatal hernia. Coronary  artery atherosclerosis. Trace physiologic pericardial fluid. Dependent  atelectasis/scarring. Small right hepatic lobe cyst probably. This is  relatively photopenic on PET. Cholecystectomy. Intrarenal calcifications  versus nonobstructing calculi. Scattered colonic diverticula.  Degenerative  changes.           IMPRESSION:      1. Changes from right hemicolectomy with postoperative uptake. Uptake  associated with the ileocolonic anastomosis is probably physiologic.  2. Negative for FDG avid metastasis. Favor anterior mediastinal thymic  cysts.  3. FDG uptake associated with bilateral adrenal gland myelolipomas. Given  the associated FDG uptake, consider continued surveillance.  4. Probable right hepatic lobe cyst.              Date Time   Sep 16, 2019 10:28 AM   Reason For Exam    Colon cancer, monitor, stage II/III   Associated Diagnoses    Malignant neoplasm of ascending colon (CMS/HCC)        Sedation Narrator    Link to Narrator   IR Timeline    IR Event Log   PACS Images     Show images for CT CHEST ABDOMEN PELVIS W WO CONTRAST   Result Information    Date and Time: Exam End: 9/16/2019 10:28 Status: Edited Result - FINAL Provider Status: Reviewed   Priority: Routine             Addendum             Impression:     TECHNIQUE:  100 cc Isovue-300 contrast was used intravenously and oral Omnipaque 350  contrast utilized prior to the exam. Coronal and sagittal reformatted  images were developed for review. MIP reformatted images were developed  radiology protocol.   Addended by Judy Ordaz MD on 10/1/2019  1:31 PM      Study Result    Addenda            Impression:     TECHNIQUE:  100 cc Isovue-300 contrast was used intravenously and oral Omnipaque 350  contrast utilized prior to the exam. Coronal and sagittal reformatted  images were developed for review. MIP reformatted images were developed  radiology protocol.   Signed by Judy Ordaz MD on 10/1/2019  1:31 PM   Narrative & Impression   EXAM:  CT CHEST ABDOMEN PELVIS W WO CONTRAST     HISTORY:  Colon cancer, monitor, stage II/III patient is status post right  hemicolectomy, appendectomy and cholecystectomy and lymph node sampling  dated 8/27/2019.      COMPARISON:  None.     FINDINGS:      The lungs demonstrate a small  amount of motion artifact. No suspicious  pulmonary masses or nodules are identified. Bilateral lower lobe mild  bronchiectasis is present. There is no pleural effusion or pneumothorax.  The heart is normal in size. There is no pericardial effusion.  Atherosclerotic calcifications of the coronary arteries, thoracic and  abdominal aorta are noted.      There is no axillary lymphadenopathy. There are enlarged morphologically  abnormal prevascular lymph nodes. These are best seen on series 2 image 49  and series 6 image 34. Index lymph node measures 1.9 x 1.1 cm.     There are nonenlarged precarinal, hilar and pretracheal lymph nodes.     Findings of right hemicolectomy, knee and cholecystectomy are noted.     There is subtle nodularity of the left hepatic lobe. There is a  nonenhancing hypodense subcentimeter mass in the right hepatic lobe. This  is too small to adequately characterize. There are no dilated intra or  extrahepatic bile ducts. The pancreas and spleen are normal.     Bilateral macroscopic fat-containing adrenal masses are present. Scattered  peripheral thin curvilinear calcification is noted. Findings are most  consistent with myelolipomas. The left measures 4.8 cm and the right 3.9  cm.     Both kidneys are normal in size and shape. There is no suspicious renal  mass. There is no hydronephrosis or hydroureter. There is no intrarenal or  ureteral obstructing calculus. The bladder is fluid-filled and demonstrates  a smooth contour.     The uterus appears atrophic. There is no suspicious adnexal mass.     Oral contrast is noted to the distal small bowel. There is no bowel  obstruction, free intraperitoneal air or ascites. There is no abdominal or  pelvic lymphadenopathy.     There are no osseous destructive lesions. Degenerative changes of the  thoracolumbar spine are present.        IMPRESSION:  1. Enlarged morphologically abnormal prevascular lymph nodes. These are  best seen on series 2 image 49 and  series 6 image 34. Index lymph node  measures 1.9 x 1.1 cm. No additional enlarged axillary hilar or mediastinal  lymph nodes. This finding is nonspecific.        2. Subcentimeter nonenhancing mass in the right hepatic lobe. This is too  small to adequately characterize but demonstrates characteristics  suggestive of a cyst.        3. Bilateral adrenal macroscopic fat-containing masses consistent with  myelolipomas, as detailed above.     4. Findings of right hemicolectomy, appendectomy and cholecystectomy. No  evidence of enlarged lymph nodes in the abdomen or pelvis.     5. No evidence of osseous metastatic disease.         Imaging    CT CHEST ABDOMEN PELVIS W WO CONTRAST (Order: [id number]) - 9/16/2019  Result History    CT CHEST ABDOMEN PELVIS W WO CONTRAST (Order #[id number]) on 10/1/2019 - Order Result History Report - Result Edited   Signed by    Signed Date Time   DEANAHARPREET 9/17/2019 10:51 AM   External Report Format    Open Hard Copy Result Report (Order #[id number] - CT CHEST ABDOMEN PELVIS W WO CONTRAST)   Reviewed by List    José Manuel Moore MD on 12/22/2019 22:07   José Manuel Moore MD on 12/22/2019 22:06   José Manuel Moore MD on 9/21/2019 21:45   José Manuel Moore MD on 9/21/2019 21:44   Patient Portal Status    This result is not viewable because no one can access it in Patient Portal.          Patient Portal - Result Released By    Who Released Type Action Instant    Time Based [3] Release [1]    Result Information    Status: Edited Result - FINAL (Resulted: 10/1/2019 13:28) Provider Status: Reviewed           Lab Review:  ALK PHOSPHATASE (Units/L)   Date Value   11/12/2019 72     Alkaline Phosphatase (Units/L)   Date Value   12/06/2020 59   06/10/2020 63     Calcium (mg/dL)   Date Value   06/01/2021 10.9 (H)   03/17/2021 11.0 (H)   12/06/2020 10.2     No results found for: PTH  Phosphorus (mg/dL)   Date Value   06/28/2021 3.3   06/01/2021 3.3   03/17/2021 4.2     VITAMIND, 25 HYDROXY  (ng/mL)   Date Value   12/01/2016 35.9   12/01/2015 30.6     Vitamin D, 25-Hydroxy (ng/mL)   Date Value   06/28/2021 43.8     No results found for: MG  INTACT PTH (pg/mL)   Date Value   07/21/2016 89 (H)     PTH, Intact (pg/mL)   Date Value   06/28/2021 84   06/08/2021 41     Results for CHELO JUAN (MRN [id number]) as of 7/1/2021 14:59   Ref. Range 12/6/2020 08:12 3/17/2021 08:32 6/1/2021 11:11 6/8/2021 16:22 6/28/2021 10:38   Fasting Status Latest Units: Hours  15 4     Glucose Latest Ref Range: 65 - 99 mg/dL 114 (H) 132 (H) 175 (H)     GLYCOHEMOGLOBIN Unknown Rpt (A) Rpt (A)      GLYCOHEMOGLOBIN A1C Latest Ref Range: 4.5 - 5.6 % 6.4 (H) 6.4 (H)      THYROID STIMULATING HORMONE REFLEX Unknown Rpt       PARATHYROID HORMONE Latest Ref Range: 19 - 88 pg/mL    41 84   TRIIODOTHYRONINE, FREE Latest Ref Range: 2.2 - 4.0 pg/mL     2.6   T4, FREE Latest Ref Range: 0.8 - 1.5 ng/dL     1.0   TSH Latest Ref Range: 0.350 - 5.000 mcUnits/mL 2.677    1.623   DHEAS Latest Ref Range: 7.0 - 177.0 mcg/dL     301.6 (H)   Vitamin D 1 25 Dihydroxy Latest Ref Range: 19.9 - 79.3 pg/mL     37.9   VITAMIND, 25 HYDROXY Latest Ref Range: 30.0 - 100.0 ng/mL     43.8         No results found for: 17OH7,   Sodium (mmol/L)   Date Value   06/01/2021 139     Potassium (mmol/L)   Date Value   06/01/2021 5.0     Chloride (mmol/L)   Date Value   06/01/2021 109 (H)     Carbon Dioxide (mmol/L)   Date Value   06/01/2021 26     Anion Gap (mmol/L)   Date Value   06/01/2021 9 (L)     Glucose (mg/dL)   Date Value   06/01/2021 175 (H)     BUN (mg/dL)   Date Value   06/01/2021 31 (H)     Creatinine (mg/dL)   Date Value   06/01/2021 1.68 (H)     GFR Estimate,  (no units)   Date Value   11/12/2019 48     GFR Estimate, Non  (no units)   Date Value   11/12/2019 41     BUN/ Creatinine Ratio (no units)   Date Value   06/01/2021 18     Calcium (mg/dL)   Date Value   06/01/2021 10.9 (H)     Fasting Status (Hours)   Date Value    06/01/2021 4   , No results found for: CORAM, No results found for: CORTDX, No results found for: CORSAR,   TRIIODOTHYRONINE, FREE (pg/mL)   Date Value   07/17/2012 3.0     T3, Free (pg/mL)   Date Value   06/28/2021 2.6   ,   T4, FREE (ng/dL)   Date Value   12/01/2016 1.0   07/17/2012 1.1     T4, Free (ng/dL)   Date Value   06/28/2021 1.0   ,   HGB (g/dL)   Date Value   11/12/2019 11.6 (L)   10/22/2019 11.6 (L)   09/17/2019 11.4 (L)     HCT (%)   Date Value   11/12/2019 33.9 (L)   10/22/2019 33.9 (L)   09/17/2019 34.3 (L)   ,   HDL (mg/dL)   Date Value   12/06/2020 33 (L)   12/11/2019 34 (L)   06/11/2019 36 (L)     CALCULATED NON HDL (mg/dL)   Date Value   12/11/2019 82   06/11/2019 86     Non-HDL Cholesterol (mg/dL)   Date Value   12/06/2020 88     CHOL/HDL (no units)   Date Value   12/11/2019 3.4   06/11/2019 3.4     Cholesterol/ HDL Ratio (no units)   Date Value   12/06/2020 3.7     FASTING STATUS (hrs)   Date Value   12/11/2019 14   06/11/2019 12   06/12/2018 UNKNOWN   , No results found for: PMEFR, No results found for: PROLACTIN, Results for orders placed in visit on 06/04/14    BD DEXA AXIAL SKELETON    Narrative  BD DEXA AXIAL SKELETON    HISTORY: 65-year-old postmenopausal female here for followup of bone  mineral density.    TECHNIQUE: Lunar Prodigy Advance DXA system was utilized.    COMPARISON:  November 2, 2011    RESULTS:  Bone mineral density (g/cm2) and associated T-scores are as follows:    Lumbar spine (L1-L4)   1.916  (g/cm2)    6.1    Right femoral neck:     1.179  (g/cm2)    1.0    Total proximal right femur   1.281  (g/cm2)    2.2    Left femoral neck:     1.142  (g/cm2)    0.7    Total proximal left femur:   1.249  (g/cm2)    1.9      ASSESSMENT:    The bone mineral density measured at the right and left femoral neck is  considered normal according to both organization criteria. The fracture  risk is low    World Health Organization (W HO) criteria for postmenopausal,    women:  Normal: T-Score at or above -1 SD  Osteopenia: T-Score between -1 and -2.5 SD  Osteoporosis: T-Score at or below -2.5 SD    RECOMMENDATIONS:  NOF guidelines recommend treatment for patients with a T-Score of -1.5 and  below with risk factors or -2.0 and below without risk factors. Effective  therapies are available in the form of biphosphonates (Fosamax and  Actonel), Miacalcin, Evista, and Forteo.  All patient should ensure an adequate intake of dietary calcium (1200  mg/d) and vitamin D (400-800 IU daily).    FOLLOWUP:    People with diagnosed cases of osteoporosis or at high risk for fracture  should have regular bone mineral density tests. For patients eligible for  Medicare, routine testing is allowed once every 2 years. The testing  frequency can be increased to one year for patient's who have rapidly  progressing disease, those who are receiving or discontinuing medical  therapy to restore bone mass, or have additional risk factors. and   TSH (mcUnits/mL)   Date Value   06/28/2021 1.623   12/06/2020 2.677   12/01/2016 3.784         ASSESSMENT AND PLAN:  1. Hypercalcemia. The patient has hypercalcemia associated with normal   ( fluctuating between normal and elevated parathyroid hormone levels., underlying CKD 3 and colon cancer.  Primary hyperparathyroidism is possible  likely due to an adenoma although multiple gland disease is possible.  Parathyroid cancer is rare.  Familial hypocalciuric hypercalcemia (FHH) is   not likely. There is is not a suggestive family history which raises the possibility of familial hyperparathyroidism. The pathophysiology of primary hyperparathyroidism was discussed with the patient. Calcium is      1. Additional lab: will monitor calcium, PTH, vit d.   24 hours urine/ creatinine was obtained last year [facility] - results were reviewed. NM scan of parathyroid was ordered by nephrology. If mild hypercalcemia, will observe.   cinacalcet was considered.   2. Avoid  dehydration.  4. The patient knows to call if hypercalcemic symptoms occur.  These symptoms were reviewed.  If these symptoms occur, the serum calcium should be checked.    2. ADRENAL NODULE:  The patient has bilateral adrenal nodules suggesting myelolipomas with evidence of hypertension. [facility] lab from last year was reviewed.  She does not  have clinical and/or biochemical evidence of Cushing's syndrome, does not  have clinical and/or biochemical evidence of hyperaldosteronism, does not  have clinical and/or biochemical evidence of pheochromocytoma.  The nodules have an appearance of  Myelolipomas on CT abdomen/ pelvis and PET scan. Will not order adrenal CT due to renal functionThis diagnosis was discussed with the patient and her questions were answered.  Further biochemical evaluation is needed and further imaging evaluation is not needed at this time.     . Will obtain DHEAS< 17 hydroxyprogesterone, renin, aldosterone, cortisol am and salivary, ACTH, plasma metanephrines.    More than 55 minutes was spent in this encounter.   This case and plan was discussed with Dr. Ricky MD   Return to clinic in 6 months and as needed.      A copy of this note was sent to:  Clark Lopez MD

## 2021-07-02 DIAGNOSIS — Z79.4 TYPE 2 DIABETES MELLITUS WITH DIABETIC NEPHROPATHY, WITH LONG-TERM CURRENT USE OF INSULIN (H): Primary | ICD-10-CM

## 2021-07-02 DIAGNOSIS — E11.21 TYPE 2 DIABETES MELLITUS WITH DIABETIC NEPHROPATHY, WITH LONG-TERM CURRENT USE OF INSULIN (H): Primary | ICD-10-CM

## 2021-07-05 DIAGNOSIS — E11.21 TYPE 2 DIABETES MELLITUS WITH DIABETIC NEPHROPATHY, WITH LONG-TERM CURRENT USE OF INSULIN (H): ICD-10-CM

## 2021-07-05 DIAGNOSIS — Z79.4 TYPE 2 DIABETES MELLITUS WITH DIABETIC NEPHROPATHY, WITH LONG-TERM CURRENT USE OF INSULIN (H): ICD-10-CM

## 2021-07-05 LAB
ALBUMIN SERPL-MCNC: 3.2 G/DL (ref 3.4–5)
ALP SERPL-CCNC: 78 U/L (ref 40–150)
ALT SERPL W P-5'-P-CCNC: 25 U/L (ref 0–50)
ANION GAP SERPL CALCULATED.3IONS-SCNC: 5 MMOL/L (ref 3–14)
AST SERPL W P-5'-P-CCNC: 14 U/L (ref 0–45)
BILIRUB SERPL-MCNC: 0.5 MG/DL (ref 0.2–1.3)
BUN SERPL-MCNC: 22 MG/DL (ref 7–30)
CALCIUM SERPL-MCNC: 8.9 MG/DL (ref 8.5–10.1)
CHLORIDE SERPL-SCNC: 107 MMOL/L (ref 94–109)
CHOLEST SERPL-MCNC: 165 MG/DL
CO2 SERPL-SCNC: 27 MMOL/L (ref 20–32)
CREAT SERPL-MCNC: 1.2 MG/DL (ref 0.52–1.04)
CREAT UR-MCNC: 147 MG/DL
GFR SERPL CREATININE-BSD FRML MDRD: 55 ML/MIN/{1.73_M2}
GLUCOSE SERPL-MCNC: 178 MG/DL (ref 70–99)
HBA1C MFR BLD: 6.5 % (ref 0–5.6)
HDLC SERPL-MCNC: 39 MG/DL
LDLC SERPL CALC-MCNC: 91 MG/DL
MICROALBUMIN UR-MCNC: 591 MG/L
MICROALBUMIN/CREAT UR: 402.04 MG/G CR (ref 0–25)
NONHDLC SERPL-MCNC: 126 MG/DL
POTASSIUM SERPL-SCNC: 4.5 MMOL/L (ref 3.4–5.3)
PROT SERPL-MCNC: 6.4 G/DL (ref 6.8–8.8)
SODIUM SERPL-SCNC: 139 MMOL/L (ref 133–144)
TRIGL SERPL-MCNC: 176 MG/DL

## 2021-07-05 PROCEDURE — 82043 UR ALBUMIN QUANTITATIVE: CPT | Performed by: NURSE PRACTITIONER

## 2021-07-05 PROCEDURE — 80053 COMPREHEN METABOLIC PANEL: CPT | Performed by: NURSE PRACTITIONER

## 2021-07-05 PROCEDURE — 80061 LIPID PANEL: CPT | Performed by: NURSE PRACTITIONER

## 2021-07-05 PROCEDURE — 36415 COLL VENOUS BLD VENIPUNCTURE: CPT | Performed by: NURSE PRACTITIONER

## 2021-07-05 PROCEDURE — 83036 HEMOGLOBIN GLYCOSYLATED A1C: CPT | Performed by: NURSE PRACTITIONER

## 2021-07-06 ENCOUNTER — VIRTUAL VISIT (OUTPATIENT)
Dept: ENDOCRINOLOGY | Facility: CLINIC | Age: 41
End: 2021-07-06
Payer: COMMERCIAL

## 2021-07-06 DIAGNOSIS — E11.21 TYPE 2 DIABETES MELLITUS WITH DIABETIC NEPHROPATHY, WITH LONG-TERM CURRENT USE OF INSULIN (H): Primary | ICD-10-CM

## 2021-07-06 DIAGNOSIS — Z79.4 TYPE 2 DIABETES MELLITUS WITH DIABETIC NEPHROPATHY, WITH LONG-TERM CURRENT USE OF INSULIN (H): Primary | ICD-10-CM

## 2021-07-06 PROCEDURE — 95251 CONT GLUC MNTR ANALYSIS I&R: CPT | Performed by: INTERNAL MEDICINE

## 2021-07-06 PROCEDURE — 99214 OFFICE O/P EST MOD 30 MIN: CPT | Mod: 95 | Performed by: INTERNAL MEDICINE

## 2021-07-06 RX ORDER — DULAGLUTIDE 1.5 MG/.5ML
1.5 INJECTION, SOLUTION SUBCUTANEOUS
Qty: 9 ML | Refills: 0 | Status: SHIPPED | OUTPATIENT
Start: 2021-07-06 | End: 2021-10-11

## 2021-07-06 RX ORDER — IBUPROFEN 200 MG
TABLET ORAL
Qty: 100 EACH | Refills: 1 | Status: SHIPPED | OUTPATIENT
Start: 2021-07-06

## 2021-07-06 NOTE — PATIENT INSTRUCTIONS
[facility]  Dr Fuentes, Endocrinology Department    [facility]   [address] [facility]  [place] [address]  Appointment [phone]  Fax: [phone]  [place]: Monday - Thursday      Please check the cost coverage and copay with insurance before recommended tests, services and medications (especially if new medications are prescribed).     If ordered, please get blood work done 1 week prior to your next appointment so they will be available to Dr. Fuentes at your visit.    To provide the best diabetic care, please bring your blood glucose meter to each and every visit with your  Endocrinologist. Your blood glucose meter/insulin pump will be downloaded at every appointment.  Please arrive 15 minutes before your scheduled appointment. This will allow for your blood glucose meter/insulin pump  to be downloaded.  If you are wearing DEXCOM please bring  or sharing code from the Dexcom Clarity Appt so that it can be downloaded.  If you are using freestyle casey personal sensors please bring the reader.  If you are using TANDEM insulin pump please have your username and password to get info from Tandem website.    Decrease basal rate as discussed.     Time Rate (U/hr)   0000-  1.15--> 0.9   1030 AM  1.2--> 0.9   0230 PM  1.2-->0.9           Continue Metformin and Trulicity at current dose.  Labs in 3 months.  Please make a lab appointment for blood work and follow up clinic appointment in 1 week after that to discuss results.  Follow up with CDE in 2-3 weeks for BG review and titration.  Continue to use casey. Scan often.  Your provider has referred you to Diabetes Education: For all [facility]:  Phone [phone]; Fax [phone]  Please call and make the appointment.    Recommend checking blood sugars before meals and at bedtime.    If Blood glucose are low more often-> 2-3 times/week- give us a call.  The patient is advised to Make better food  choices: reduce carbs, Reduce portion size, weight loss and exercise 3-4 times a week.  Discussed hypoglycemia signs and symptoms as well as management in detail.

## 2021-07-06 NOTE — PROGRESS NOTES
"THIS IS A VIDEO VISIT:    Phone call visit/virtual visit encounter:    Name of patient: Marie Vogel    Date of encounter: 7/6/2021    Time of start of video visit: 3:00    Video started: 3:15    Video ended: 3:33    Provider location: working from home/ [facility]    Patient location: patients home.    Mode of transmission: video/ Doximity    Verbal consent: obtained before starting visit. Pt is agreeable.      The patient has been notified of following:      \"This VIDEO visit will be conducted via a call between you and your physician/provider. We have found that certain health care needs can be provided without the need for a physical exam.  This service lets us provide the care you need with a short phone conversation.  If a prescription is necessary we can send it directly to your pharmacy.  If lab work is needed we can place an order for that and you can then stop by our lab to have the test done at a later time.     With new updates with corona virus patient might be billed as clinic visit.     If during the course of the call the physician/provider feels a telephone visit is not appropriate, you will not be charged for this service.\"      Past medical history, social history, family history, allergy and medications were reviewed and updated as appropriate.  Reviewed pertinent labs, notes, imaging studies personally.    Name: Marie Vogel  Seen for f/u of DM  HPI:  Marie Vogel is a 41 year old female who presents for the evaluation/management of DM.   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    Here for f/u. Previously has " seen endo at [place] ( Dr Aguilera and Dr Zhou and ). 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 Busy at work, also variable schedule.. Concerned about weight gain.   H/o cardiomyopathy. Followed by cardiology.    On Omnipod since 10/2019 and using freestyle casey.    Lost 20 lbs in last 3 years.    Reports that he has long acting insulin in case of pump malfunction at home. Marie also has glucagon kit at home for emergency.    1. Type 2 DM:  Orginally diagnosed at the age of: 21 with GDM during her first pregnancy. Diagnosed with DM 2 in 1/2002, diet controlled for 3 years, then started on metformin. In 2010 during her second pregnancy, started on insulin and then insulin pump therapy in 2012.   Current Regimen: Insulin pump therapy ( Medtronic Minimed Paradigm), metformin XR 1000 mg bid, Trulicity 1.5 mg/week  BS checks: Using freestyle casey  Average Meter Download: Blood glucose data reviewed personally. See nursing note from this encounter for details.  Noted multiple episodes of hypoglycemia which are present throughout the day.  Noted low blood sugar episode about 25% of time on casey.  She is able to feel symptoms of hypoglycemia.    yes:     Diabetes Medication(s)     Biguanides       metFORMIN (GLUCOPHAGE-XR) 500 MG 24 hr tablet    TAKE TWO TABLETS BY MOUTH TWICE A DAY WITH MEALS    Diabetic Other       glucagon (GLUCAGON EMERGENCY) 1 MG kit    Inject 1 mg into the muscle as needed for low blood sugar    Insulin       insulin aspart (NOVOLOG VIAL) 100 UNITS/ML vial    USE WITH INSULIN PUMP AS DIRECTED, USES ABOUT 80 UNITS PER DAY     insulin aspart (NOVOLOG VIAL) 100 UNITS/ML vial    USE WITH INSULIN PUMP AS DIRECTED, USES ABOUT 80 UNITS PER DAY     insulin glargine (LANTUS PEN) 100 UNIT/ML pen    Take 20 units in case of pump malfunction only.        Current Regimen:   Insulin pump -   Time Rate (U/hr)   0000-  1.15   1030 AM  1.2   0230 PM  1.2         Carbohydrate Ratio -    Time Ratio   0000-           Sensitivity     Active Insulin Time   hours   Basal      Bolus      Total Carbohydrates/day    Total Insulin/day     Average Blood Sugar                    Complications:   Diabetes Complications  Description / Detail    Diabetic Retinopathy  No   CAD / PAD  No   Neuropathy  No   Nephropathy / Microalbuminuria  Yes: microalbuminuria. ON cozaar.   Gastroparesis  No   Hypoglycemia [place]  No     2. Hypertension: Blood Pressure today:   BP Readings from Last 3 Encounters:   21 128/80   21 108/71   20 121/78     Takes medications everyday without forgetting a dose.  Denies feeling lightheaded or dizzy.    3. Hyperlipidemia:   Denies muscle aches of pains.       PMH/PSH:  Past Medical History:   Diagnosis Date     Allergic rhinitis due to pollen      Atypical glandular cells on Pap smear 3/1108     Cervical high risk HPV (human papillomavirus) test positive 2018    See problem list     Gastroesophageal reflux disease      PFO (patent foramen ovale)      Stargardt's disease 2019     Type II or unspecified type diabetes mellitus without mention of complication, not stated as uncontrolled     onset was gestational in      Past Surgical History:   Procedure Laterality Date     ABDOMEN SURGERY       C INDUCED ABORTN BY D&C           C IUD,MIRENA  8/10/10, 7/28/15     C/SECTION, LOW TRANSVERSE  6/25/10    , Low Transverse     CHOLECYSTECTOMY, LAPOROSCOPIC      Cholecystectomy, Laparoscopic     ESOPHAGOSCOPY, GASTROSCOPY, DUODENOSCOPY (EGD), COMBINED N/A 2016    Procedure: COMBINED ESOPHAGOSCOPY, GASTROSCOPY, DUODENOSCOPY (EGD), BIOPSY SINGLE OR MULTIPLE;  Surgeon: Fadi Hunter MD;  Location: [facility] ESOPHAGEAL MOTILITY STUDY N/A 2016    Procedure: ESOPHAGEAL MOTILITY STUDY;  Surgeon: Fadi Hunter MD;  Location:  GI      REMOVE TONSILS/ADENOIDS,<13 Y/O      T &A     Family Hx:  Family History   Problem Relation  Age of Onset     Cardiovascular Mother         hypertrophic cardiomyopathy     Genitourinary Problems Mother         gallbladder disease     Diabetes Mother         Transplnant induced/      Other - See Comments Mother         heart transplant 2005     Depression Mother      Diabetes Father         type 2     Hypertension Father      Hyperlipidemia Father      Genitourinary Problems Maternal Grandmother         gallbladder disease     Genitourinary Problems Paternal Grandmother         gallbladder disease     Hypertension Paternal Grandfather         high bp     Cerebrovascular Disease Paternal Grandfather              Cardiovascular Brother         hypertrophic cardiomyopathy     Diabetes Brother      Diabetes Brother         type 2     Diabetes Other         Type 2     Glaucoma No family hx of      Macular Degeneration No family hx of      Thyroid disease: No         DM2: Yes - father and mother         Autoimmune: DM1, SLE, RA, Vitiligo No    Social Hx:  Social History     Socioeconomic History     Marital status: Patient Declined     Spouse name: Not on file     Number of children: 2     Years of education: 14     Highest education level: Associate degree: academic program   Occupational History     Occupation: nurse     Occupation: RN   Social Needs     Financial resource strain: Not very hard     Food insecurity     Worry: Never true     Inability: Never true     Transportation needs     Medical: No     Non-medical: No   Tobacco Use     Smoking status: Former Smoker     Packs/day: 0.00     Years: 0.00     Pack years: 0.00     Types: Cigarettes     Quit date: 10/24/2005     Years since quitting: 15.7     Smokeless tobacco: Never Used     Tobacco comment: States only smokes when drinks maybe 2x/year   Substance and Sexual Activity     Alcohol use: No     Alcohol/week: 0.0 standard drinks     Frequency: Monthly or less     Drinks per session: 1 or 2     Binge frequency: Never     Drug use:  No     Sexual activity: Yes     Partners: Male     Birth control/protection: I.U.D., Condom     Comment: Mirena IUD 7/28/15   Lifestyle     Physical activity     Days per week: 0 days     Minutes per session: 0 min     Stress: Only a little   Relationships     Social connections     Talks on phone: More than three times a week     Gets together: Once a week     Attends Jehovah's witness service: 1 to 4 times per year     Active member of club or organization: No     Attends meetings of clubs or organizations: Never     Relationship status:      Intimate partner violence     Fear of current or ex partner: Not on file     Emotionally abused: Not on file     Physically abused: Not on file     Forced sexual activity: Not on file   Other Topics Concern     Parent/sibling w/ CABG, MI or angioplasty before 65F 55M? No   Social History Narrative    Caffeine intake/servings daily - 6-7    Calcium intake/servings daily - 2-3 yogurt, milk, cheese    Exercise 1 times weekly - describe aerobics    Sunscreen used - Yes    Seatbelts used - Yes    Guns stored in the home - No    Self Breast Exam - Yes    Pap test up to date -  Yes    Eye exam up to date -  Yes    Dental exam up to date -  Yes    DEXA scan up to date -  Not Applicable    Flex Sig/Colonoscopy up to date -  Not Applicable    Mammography up to date -  Not Applicable    Immunizations reviewed and up to date - Yes    Abuse: Current or Past (Physical, Sexual or Emotional) - No    Do you feel safe in your environment - Yes    Do you cope well with stress - Yes    Do you suffer from insomnia - Yes     Last updated by: Tatianna Lacey  5/9/2005          MEDICATIONS:  has a current medication list which includes the following prescription(s): albuterol, aspirin, atenolol, blood glucose, blood glucose monitoring, cetirizine, freestyle casey 14 day sensor, epinephrine, fluticasone, glucagon emergency, ibuprofen, insulin aspart, insulin aspart, omnipod dash 5 pack pods, omnipod  dash system, insulin glargine, insulin pump, insulin syringe-needle u-100, blood glucose monitoring, levonorgestrel, losartan, magnesium oxide, metformin, montelukast, multiple vitamins-minerals, fish oil, omeprazole, rosuvastatin, sertraline, sumatriptan, trazodone, and vitamin d3.    ROS     ROS: 10 point ROS neg other than the symptoms noted above in the HPI.    Physical Exam   VS: There were no vitals taken for this visit.  GENERAL: healthy, alert and no distress  EYES: Eyes grossly normal to inspection, conjunctivae and sclerae normal  ENT: no nose swelling, nasal discharge.  Thyroid: no apparent thyroid nodules  RESP: no audible wheeze, cough, or visible cyanosis.  No visible retractions or increased work of breathing.  Able to speak fully in complete sentences.  ABDO: not evaluated.  EXTREMITIES: no hand tremors.  NEURO: Cranial nerves grossly intact, mentation intact and speech normal  SKIN: No apparent skin lesions, rash or edema seen   PSYCH: mentation appears normal, affect normal/bright, judgement and insight intact, normal speech and appearance well-groomed    LABS:  A1c:  Lab Results   Component Value Date    A1C 6.5 07/05/2021    A1C 7.6 01/14/2021    A1C 6.7 07/14/2020    A1C 7.0 01/14/2020    A1C 7.5 09/24/2019       Basic Metabolic Panel:  Creatinine   Date Value Ref Range Status   07/05/2021 1.20 (H) 0.52 - 1.04 mg/dL Final     Lab Results   Component Value Date    UMALCR 402.04 07/05/2021          LFTS/Cholesterol Panel:  Recent Labs   Lab Test 07/05/21  0722 01/20/21  1102 07/01/15  0612 07/01/15  0612 03/27/14  1022   CHOL 165 131   < > 190 146   HDL 39* 35*   < > 42* 32*   LDL 91 73   < > 96 77   TRIG 176* 117   < > 260* 186*   CHOLHDLRATIO  --   --   --  4.5 4.5    < > = values in this interval not displayed.       Thyroid Function:  ENDO THYROID LABS-[facility] Latest Ref Rng & Units 9/24/2019   TSH 0.40 - 4.00 mU/L 1.45     ENDO THYROID LABS-[facility] Latest Ref Rng & Units 4/16/2017   TSH 0.40 - 4.00  mU/L 2.06     ENDO THYROID LABS-[facility] Latest Ref Rng 9/27/2016 4/2/2014   TSH 0.40 - 4.00 mU/L 2.17 1.09   T4 FREE 0.70 - 1.85 ng/dL  0.75       Urine MicroAlbumin:  Lab Results   Component Value Date    UMALCR 485.23 01/14/2020      Vitamin D:  Vitamin D Deficiency Screening Results:  Lab Results   Component Value Date    VITDT 33 11/05/2018    VITDT 39 09/27/2016    VITDT 45 02/23/2016    VITDT 31 03/31/2015    VITDT 29 (L) 03/27/2014         All pertinent notes, labs, and images personally reviewed by me.     Glucometer/ insulin pump (if applicable)/ CGM data (if applicable) downloaded, Personally reviewed and interpreted.  All Blood sugar data reviewed personally and discussed with pt.  See nursing note from today's clinic visit for details of BG/CGM log.      A/P  Ms.Erica WILLIAM Vogel is a 40 year old here for the evaluation/management of diabetes:    1. DM2 - Under Fair control.  A1c 6.5%.  Diabetes complicated by microalbuminuria.  Noted to have multiple episodes of hypoglycemia on freestyle casey.  Using OMNIPOD DASH + Freestyle casey.  H/o hypertropic cardiomyopathy, followed by cardiology.  Plan:  Discussed diagnosis, pathophysiology, management and treatment options of condition with pt.  Continue metformin  1000 mg twice daily.  Continue TRULICITY  1.5 mg/week.  Decrease basal rate as noted below in the setting of multiple episodes of hypoglycemia  Follow-up with diabetic educator in 2-3 weeks for blood sugar review and titration  Labs and follow up in 3 months  Continue to use casey.    Time Rate (U/hr)   0000-  1.15--> 0.9   1030 AM  1.2--> 0.9   0230 PM  1.2-->0.9         Prevention    Most Recent Immunizations   Administered Date(s) Administered     Influenza (IIV3) PF 09/26/2018     Influenza (intradermal) 10/01/2012     Influenza Vaccine IM > 6 months Valent IIV4 01/14/2021     MMR 09/17/2007     Pneumococcal 23 valent 01/22/2003     TD (ADULT, 7+) 01/01/2002     TDAP Vaccine (Adacel) 04/06/2012      Opthalmology-2018, due  Dental-Yes  ASA-81 mg   Smoking- No    2. Hypertension - + microalbuminuria. On losartan. Blood pressure medications include cozaar 75 mg qd. Need aggressive BP/glycmeic control.    3. Hyperlipidemia - Under fair control.TG high with low HDL, LD at 140, HDL 33.  Had myalgias on lipitor and stopped it.  On Crestor 5 mg/day.    4. Microalbuminuria:  Recommend strict BG control.  On Cozaar 50 mg/day.    Recommend checking blood sugars before meals and at bedtime.    If Blood glucose are low more often-> 2-3 times/week- give us a call.  The patient is advised to Make better food choices: reduce carbs, Reduce portion size, weight loss and exercise 3-4 times a week.  Discussed hypoglycemia signs and symptoms as well as management in detail.    All questions were answered.  The patient indicates understanding of the above issues and agrees with the plan set forth.   More than 50% of face to face time spent with Ms. Dino Vogel on counseling / coordinating her care.      Follow-up:  follow up in 3 months.    Zita Fuentes MD  Endocrinology   [facility]    CC: Alan Paredes    Addendum to above note and clinic visit:    Labs reviewed.    See result note/telephone encounter.

## 2021-07-06 NOTE — LETTER
"    7/6/2021         RE: Marie Vogel  [address]  [place] [address]        Dear Colleague,    Thank you for referring your patient, Marie Vogel, to the [facility]. Please see a copy of my visit note below.    THIS IS A VIDEO VISIT:    Phone call visit/virtual visit encounter:    Name of patient: Marie Vogel    Date of encounter: 7/6/2021    Time of start of video visit: 3:00    Video started: 3:15    Video ended: 3:33    Provider location: working from home/ [facility]    Patient location: patients home.    Mode of transmission: video/ Doximity    Verbal consent: obtained before starting visit. Pt is agreeable.      The patient has been notified of following:      \"This VIDEO visit will be conducted via a call between you and your physician/provider. We have found that certain health care needs can be provided without the need for a physical exam.  This service lets us provide the care you need with a short phone conversation.  If a prescription is necessary we can send it directly to your pharmacy.  If lab work is needed we can place an order for that and you can then stop by our lab to have the test done at a later time.     With new updates with corona virus patient might be billed as clinic visit.     If during the course of the call the physician/provider feels a telephone visit is not appropriate, you will not be charged for this service.\"      Past medical history, social history, family history, allergy and medications were reviewed and updated as appropriate.  Reviewed pertinent labs, notes, imaging studies personally.    Name: Marie Vogel  Seen for f/u of DM  HPI:  Marie Vogel is a 41 year old female who presents for the evaluation/management of DM.   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    Here for f/u. [facility] ( Dr Aguilera and Dr Zhou and ). 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 Busy at work, also variable schedule.. Concerned about weight gain.   H/o cardiomyopathy. Followed by cardiology.    On Omnipod since 10/2019 and using freestyle casey.    Lost 20 lbs in last 3 years.    Reports that he has long acting insulin in case of pump malfunction at home. Marie also has glucagon kit at home for emergency.    1. Type 2 DM:  Orginally diagnosed at the age of: 21 with GDM during her first pregnancy. Diagnosed with DM 2 in 1/2002, diet controlled for 3 years, then started on metformin. In 2010 during her second pregnancy, started on insulin and then insulin pump therapy in 2012.   Current Regimen: Insulin pump therapy ( Medtronic Minimed Paradigm), metformin XR 1000 mg bid, Trulicity 1.5 mg/week  BS checks: Using freestyle casey  Average Meter Download: Blood glucose data reviewed personally. See nursing note from this encounter for details.  Noted multiple episodes of hypoglycemia which are present throughout the day.  Noted low blood sugar episode about 25% of time on casey.  She is able to feel symptoms of hypoglycemia.    yes:     Diabetes Medication(s)     Biguanides       metFORMIN (GLUCOPHAGE-XR) 500 MG 24 hr tablet    TAKE TWO TABLETS BY MOUTH TWICE A DAY WITH MEALS    Diabetic Other       glucagon (GLUCAGON EMERGENCY) 1 MG kit    Inject 1 mg into the muscle as needed for low blood sugar    Insulin       insulin aspart (NOVOLOG VIAL) 100 UNITS/ML vial    USE WITH INSULIN PUMP AS DIRECTED, USES ABOUT 80 UNITS PER DAY     insulin aspart (NOVOLOG VIAL) 100 UNITS/ML vial    USE WITH INSULIN PUMP AS DIRECTED, USES ABOUT 80  UNITS PER DAY     insulin glargine (LANTUS PEN) 100 UNIT/ML pen    Take 20 units in case of pump malfunction only.        Current Regimen:   Insulin pump -   Time Rate (U/hr)   0000-  1.15   1030 AM  1.2   0230 PM  1.2         Carbohydrate Ratio -    Time Ratio   0000-          Sensitivity     Active Insulin Time   hours   Basal      Bolus      Total Carbohydrates/day    Total Insulin/day     Average Blood Sugar                    Complications:   Diabetes Complications  Description / Detail    Diabetic Retinopathy  No   CAD / PAD  No   Neuropathy  No   Nephropathy / Microalbuminuria  Yes: microalbuminuria. ON cozaar.   Gastroparesis  No   Hypoglycemia [place]awarness  No     2. Hypertension: Blood Pressure today:   BP Readings from Last 3 Encounters:   21 128/80   21 108/71   20 121/78     Takes medications everyday without forgetting a dose.  Denies feeling lightheaded or dizzy.    3. Hyperlipidemia:   Denies muscle aches of pains.       PMH/PSH:  Past Medical History:   Diagnosis Date     Allergic rhinitis due to pollen      Atypical glandular cells on Pap smear 3/1108     Cervical high risk HPV (human papillomavirus) test positive 2018    See problem list     Gastroesophageal reflux disease      PFO (patent foramen ovale)      Stargardt's disease 2019     Type II or unspecified type diabetes mellitus without mention of complication, not stated as uncontrolled     onset was gestational in      Past Surgical History:   Procedure Laterality Date     ABDOMEN SURGERY       C INDUCED ABORTN BY D&C           C IUD,MIRENA  8/10/10, 7/28/15     C/SECTION, LOW TRANSVERSE  6/25/10    , Low Transverse     CHOLECYSTECTOMY, LAPOROSCOPIC      Cholecystectomy, Laparoscopic     ESOPHAGOSCOPY, GASTROSCOPY, DUODENOSCOPY (EGD), COMBINED N/A 2016    Procedure: COMBINED ESOPHAGOSCOPY, GASTROSCOPY, DUODENOSCOPY (EGD), BIOPSY SINGLE OR MULTIPLE;  Surgeon: Fadi Hunter  MD WILIAM;  Location: [facility] ESOPHAGEAL MOTILITY STUDY N/A 2016    Procedure: ESOPHAGEAL MOTILITY STUDY;  Surgeon: Fadi Hunter MD;  Location: [facility] REMOVE TONSILS/ADENOIDS,<13 Y/O  1987    T &A     Family Hx:  Family History   Problem Relation Age of Onset     Cardiovascular Mother         hypertrophic cardiomyopathy     Genitourinary Problems Mother         gallbladder disease     Diabetes Mother         Transplnant induced/      Other - See Comments Mother         heart transplant 2005     Depression Mother      Diabetes Father         type 2     Hypertension Father      Hyperlipidemia Father      Genitourinary Problems Maternal Grandmother         gallbladder disease     Genitourinary Problems Paternal Grandmother         gallbladder disease     Hypertension Paternal Grandfather         high bp     Cerebrovascular Disease Paternal Grandfather              Cardiovascular Brother         hypertrophic cardiomyopathy     Diabetes Brother      Diabetes Brother         type 2     Diabetes Other         Type 2     Glaucoma No family hx of      Macular Degeneration No family hx of      Thyroid disease: No         DM2: Yes - father and mother         Autoimmune: DM1, SLE, RA, Vitiligo No    Social Hx:  Social History     Socioeconomic History     Marital status: Patient Declined     Spouse name: Not on file     Number of children: 2     Years of education: 14     Highest education level: Associate degree: academic program   Occupational History     Occupation: nurse     Occupation: RN   Social Needs     Financial resource strain: Not very hard     Food insecurity     Worry: Never true     Inability: Never true     Transportation needs     Medical: No     Non-medical: No   Tobacco Use     Smoking status: Former Smoker     Packs/day: 0.00     Years: 0.00     Pack years: 0.00     Types: Cigarettes     Quit date: 10/24/2005     Years since quitting: 15.7     Smokeless tobacco:  Never Used     Tobacco comment: States only smokes when drinks maybe 2x/year   Substance and Sexual Activity     Alcohol use: No     Alcohol/week: 0.0 standard drinks     Frequency: Monthly or less     Drinks per session: 1 or 2     Binge frequency: Never     Drug use: No     Sexual activity: Yes     Partners: Male     Birth control/protection: I.U.D., Condom     Comment: Mirena IUD 7/28/15   Lifestyle     Physical activity     Days per week: 0 days     Minutes per session: 0 min     Stress: Only a little   Relationships     Social connections     Talks on phone: More than three times a week     Gets together: Once a week     Attends Church service: 1 to 4 times per year     Active member of club or organization: No     Attends meetings of clubs or organizations: Never     Relationship status:      Intimate partner violence     Fear of current or ex partner: Not on file     Emotionally abused: Not on file     Physically abused: Not on file     Forced sexual activity: Not on file   Other Topics Concern     Parent/sibling w/ CABG, MI or angioplasty before 65F 55M? No   Social History Narrative    Caffeine intake/servings daily - 6-7    Calcium intake/servings daily - 2-3 yogurt, milk, cheese    Exercise 1 times weekly - describe aerobics    Sunscreen used - Yes    Seatbelts used - Yes    Guns stored in the home - No    Self Breast Exam - Yes    Pap test up to date -  Yes    Eye exam up to date -  Yes    Dental exam up to date -  Yes    DEXA scan up to date -  Not Applicable    Flex Sig/Colonoscopy up to date -  Not Applicable    Mammography up to date -  Not Applicable    Immunizations reviewed and up to date - Yes    Abuse: Current or Past (Physical, Sexual or Emotional) - No    Do you feel safe in your environment - Yes    Do you cope well with stress - Yes    Do you suffer from insomnia - Yes     Last updated by: Tatianna Lacey  5/9/2005          MEDICATIONS:  has a current medication list which  includes the following prescription(s): albuterol, aspirin, atenolol, blood glucose, blood glucose monitoring, cetirizine, freestyle casey 14 day sensor, epinephrine, fluticasone, glucagon emergency, ibuprofen, insulin aspart, insulin aspart, omnipod dash 5 pack pods, omnipod dash system, insulin glargine, insulin pump, insulin syringe-needle u-100, blood glucose monitoring, levonorgestrel, losartan, magnesium oxide, metformin, montelukast, multiple vitamins-minerals, fish oil, omeprazole, rosuvastatin, sertraline, sumatriptan, trazodone, and vitamin d3.    ROS     ROS: 10 point ROS neg other than the symptoms noted above in the HPI.    Physical Exam   VS: There were no vitals taken for this visit.  GENERAL: healthy, alert and no distress  EYES: Eyes grossly normal to inspection, conjunctivae and sclerae normal  ENT: no nose swelling, nasal discharge.  Thyroid: no apparent thyroid nodules  RESP: no audible wheeze, cough, or visible cyanosis.  No visible retractions or increased work of breathing.  Able to speak fully in complete sentences.  ABDO: not evaluated.  EXTREMITIES: no hand tremors.  NEURO: Cranial nerves grossly intact, mentation intact and speech normal  SKIN: No apparent skin lesions, rash or edema seen   PSYCH: mentation appears normal, affect normal/bright, judgement and insight intact, normal speech and appearance well-groomed    LABS:  A1c:  Lab Results   Component Value Date    A1C 6.5 07/05/2021    A1C 7.6 01/14/2021    A1C 6.7 07/14/2020    A1C 7.0 01/14/2020    A1C 7.5 09/24/2019       Basic Metabolic Panel:  Creatinine   Date Value Ref Range Status   07/05/2021 1.20 (H) 0.52 - 1.04 mg/dL Final     Lab Results   Component Value Date    UMALCR 402.04 07/05/2021          LFTS/Cholesterol Panel:  Recent Labs   Lab Test 07/05/21  0722 01/20/21  1102 07/01/15  0612 07/01/15  0612 03/27/14  1022   CHOL 165 131   < > 190 146   HDL 39* 35*   < > 42* 32*   LDL 91 73   < > 96 77   TRIG 176* 117   < > 260*  186*   CHOLHDLRATIO  --   --   --  4.5 4.5    < > = values in this interval not displayed.       Thyroid Function:  ENDO THYROID LABS-[facility] Latest Ref Rng & Units 9/24/2019   TSH 0.40 - 4.00 mU/L 1.45     ENDO THYROID LABS-[facility] Latest Ref Rng & Units 4/16/2017   TSH 0.40 - 4.00 mU/L 2.06     ENDO THYROID LABS-[facility] Latest Ref Rng 9/27/2016 4/2/2014   TSH 0.40 - 4.00 mU/L 2.17 1.09   T4 FREE 0.70 - 1.85 ng/dL  0.75       Urine MicroAlbumin:  Lab Results   Component Value Date    UMALCR 485.23 01/14/2020      Vitamin D:  Vitamin D Deficiency Screening Results:  Lab Results   Component Value Date    VITDT 33 11/05/2018    VITDT 39 09/27/2016    VITDT 45 02/23/2016    VITDT 31 03/31/2015    VITDT 29 (L) 03/27/2014         All pertinent notes, labs, and images personally reviewed by me.     Glucometer/ insulin pump (if applicable)/ CGM data (if applicable) downloaded, Personally reviewed and interpreted.  All Blood sugar data reviewed personally and discussed with pt.  See nursing note from today's clinic visit for details of BG/CGM log.      A/P  Ms.Erica WILLIAM Vogel is a 40 year old here for the evaluation/management of diabetes:    1. DM2 - Under Fair control.  A1c 6.5%.  Diabetes complicated by microalbuminuria.  Noted to have multiple episodes of hypoglycemia on freestyle casey.  Using OMNIPOD DASH + Freestyle casey.  H/o hypertropic cardiomyopathy, followed by cardiology.  Plan:  Discussed diagnosis, pathophysiology, management and treatment options of condition with pt.  Continue metformin  1000 mg twice daily.  Continue TRULICITY  1.5 mg/week.  Decrease basal rate as noted below in the setting of multiple episodes of hypoglycemia  Follow-up with diabetic educator in 2-3 weeks for blood sugar review and titration  Labs and follow up in 3 months  Continue to use casey.    Time Rate (U/hr)   0000-  1.15--> 0.9   1030 AM  1.2--> 0.9   0230 PM  1.2-->0.9         Prevention    Most Recent Immunizations   Administered  Date(s) Administered     Influenza (IIV3) PF 09/26/2018     Influenza (intradermal) 10/01/2012     Influenza Vaccine IM > 6 months Valent IIV4 01/14/2021     MMR 09/17/2007     Pneumococcal 23 valent 01/22/2003     TD (ADULT, 7+) 01/01/2002     TDAP Vaccine (Adacel) 04/06/2012     Opthalmology-2018, due  Dental-Yes  ASA-81 mg   Smoking- No    2. Hypertension - + microalbuminuria. On losartan. Blood pressure medications include cozaar 75 mg qd. Need aggressive BP/glycmeic control.    3. Hyperlipidemia - Under fair control.TG high with low HDL, LD at 140, HDL 33.  Had myalgias on lipitor and stopped it.  On Crestor 5 mg/day.    4. Microalbuminuria:  Recommend strict BG control.  On Cozaar 50 mg/day.    Recommend checking blood sugars before meals and at bedtime.    If Blood glucose are low more often-> 2-3 times/week- give us a call.  The patient is advised to Make better food choices: reduce carbs, Reduce portion size, weight loss and exercise 3-4 times a week.  Discussed hypoglycemia signs and symptoms as well as management in detail.    All questions were answered.  The patient indicates understanding of the above issues and agrees with the plan set forth.   More than 50% of face to face time spent with Ms. Dino Vogel on counseling / coordinating her care.      Follow-up:  follow up in 3 months.    Zita Fuentes MD  Endocrinology   [facility]    CC: Alan Paredes    Addendum to above note and clinic visit:    Labs reviewed.    See result note/telephone encounter.                  Again, thank you for allowing me to participate in the care of your patient.        Sincerely,        Zita Fuentes MD

## 2021-07-08 ENCOUNTER — OFFICE VISIT (OUTPATIENT)
Dept: AUDIOLOGY | Facility: CLINIC | Age: 41
End: 2021-07-08
Payer: COMMERCIAL

## 2021-07-08 DIAGNOSIS — H90.3 SENSORY HEARING LOSS, BILATERAL: Primary | ICD-10-CM

## 2021-07-08 PROCEDURE — V5264 EAR MOLD/INSERT: HCPCS | Mod: NU | Performed by: AUDIOLOGIST

## 2021-07-08 NOTE — PROGRESS NOTES
AUDIOLOGY REPORT    SUBJECTIVE:Marie Vogel is a 40 year old female who was seen in the Audiology Clinic at the [facility] on 7/8/2021 for a fitting of custom ear pieces. The patient has been seen previously in this clinic on 09/09/2020 for assessment and results indicated a mild to moderate sensorineural hearing loss in the right ear, and a mild to moderately severe sensorineural hearing loss in the left ear. The patient has been seen previously in this clinic and was fit with [employer] Beyond 330 Fusion behind-the-ear hearing aids on 08/16/17. She is hear today to  custom fit earmolds as her domes work there way out of her ear.    OBJECTIVE:   Bilateral earmold's are placed on hearing aids and have a good fit in patients ears. Reviewed how to change filters with new earmold. Extra filters are given to patient.    ASSESSMENT: A follow-up appointment for hearing aid's was completed today. Ear molds fit today,  PLAN:Marie will return for follow-up in one years time or sooner if needed. Please call this clinic with any questions regarding today s appointment.      Clovis Kwan, [facility]  Licensed Audiologist  [place] #[id number]

## 2021-07-23 ASSESSMENT — ENCOUNTER SYMPTOMS
DYSURIA: 0
BREAST MASS: 0
SHORTNESS OF BREATH: 0
CHILLS: 0
PARESTHESIAS: 0
FEVER: 0
PALPITATIONS: 0
HEARTBURN: 0
NAUSEA: 0
CONSTIPATION: 0
JOINT SWELLING: 0
HEMATOCHEZIA: 0
HEMATURIA: 0
HEADACHES: 0
ABDOMINAL PAIN: 0
WEAKNESS: 0
MYALGIAS: 0
DIARRHEA: 0
FREQUENCY: 0
NERVOUS/ANXIOUS: 0
COUGH: 0
ARTHRALGIAS: 0
SORE THROAT: 0
EYE PAIN: 0

## 2021-07-27 ENCOUNTER — OFFICE VISIT (OUTPATIENT)
Dept: PEDIATRICS | Facility: CLINIC | Age: 41
End: 2021-07-27
Payer: COMMERCIAL

## 2021-07-27 VITALS
BODY MASS INDEX: 29.37 KG/M2 | OXYGEN SATURATION: 99 % | DIASTOLIC BLOOD PRESSURE: 60 MMHG | HEIGHT: 64 IN | TEMPERATURE: 98 F | WEIGHT: 172 LBS | HEART RATE: 85 BPM | SYSTOLIC BLOOD PRESSURE: 108 MMHG

## 2021-07-27 DIAGNOSIS — Z82.49 FAMILY HISTORY OF HYPERTROPHIC CARDIOMYOPATHY: ICD-10-CM

## 2021-07-27 DIAGNOSIS — Z11.59 SCREENING FOR VIRAL DISEASE: ICD-10-CM

## 2021-07-27 DIAGNOSIS — Z11.1 SCREENING EXAMINATION FOR PULMONARY TUBERCULOSIS: ICD-10-CM

## 2021-07-27 DIAGNOSIS — F33.1 MODERATE EPISODE OF RECURRENT MAJOR DEPRESSIVE DISORDER (H): ICD-10-CM

## 2021-07-27 DIAGNOSIS — B36.0 TINEA VERSICOLOR: Primary | ICD-10-CM

## 2021-07-27 DIAGNOSIS — R80.9 MICROALBUMINURIA: ICD-10-CM

## 2021-07-27 DIAGNOSIS — R05.9 COUGH: ICD-10-CM

## 2021-07-27 DIAGNOSIS — G43.809 OTHER MIGRAINE WITHOUT STATUS MIGRAINOSUS, NOT INTRACTABLE: ICD-10-CM

## 2021-07-27 PROCEDURE — 86762 RUBELLA ANTIBODY: CPT | Performed by: INTERNAL MEDICINE

## 2021-07-27 PROCEDURE — 86765 RUBEOLA ANTIBODY: CPT | Performed by: INTERNAL MEDICINE

## 2021-07-27 PROCEDURE — 36415 COLL VENOUS BLD VENIPUNCTURE: CPT | Performed by: INTERNAL MEDICINE

## 2021-07-27 PROCEDURE — 99214 OFFICE O/P EST MOD 30 MIN: CPT | Performed by: INTERNAL MEDICINE

## 2021-07-27 PROCEDURE — 86735 MUMPS ANTIBODY: CPT | Performed by: INTERNAL MEDICINE

## 2021-07-27 PROCEDURE — 86481 TB AG RESPONSE T-CELL SUSP: CPT | Performed by: INTERNAL MEDICINE

## 2021-07-27 RX ORDER — ATENOLOL 25 MG/1
12.5 TABLET ORAL DAILY
Qty: 90 TABLET | Refills: 3 | Status: SHIPPED | OUTPATIENT
Start: 2021-07-27 | End: 2022-12-26

## 2021-07-27 RX ORDER — LOSARTAN POTASSIUM 50 MG/1
TABLET ORAL
Qty: 135 TABLET | Refills: 3 | Status: SHIPPED | OUTPATIENT
Start: 2021-07-27 | End: 2022-08-07

## 2021-07-27 RX ORDER — CLOTRIMAZOLE 1 %
CREAM (GRAM) TOPICAL 2 TIMES DAILY
Qty: 60 G | Refills: 3 | Status: SHIPPED | OUTPATIENT
Start: 2021-07-27

## 2021-07-27 RX ORDER — ALBUTEROL SULFATE 90 UG/1
2 AEROSOL, METERED RESPIRATORY (INHALATION) EVERY 4 HOURS PRN
Qty: 18 G | Refills: 3 | Status: SHIPPED | OUTPATIENT
Start: 2021-07-27 | End: 2023-10-25

## 2021-07-27 RX ORDER — SUMATRIPTAN 25 MG/1
TABLET, FILM COATED ORAL
Qty: 8 TABLET | Refills: 11 | Status: SHIPPED | OUTPATIENT
Start: 2021-07-27 | End: 2024-03-11

## 2021-07-27 ASSESSMENT — MIFFLIN-ST. JEOR: SCORE: 1420.19

## 2021-07-27 ASSESSMENT — PATIENT HEALTH QUESTIONNAIRE - PHQ9: SUM OF ALL RESPONSES TO PHQ QUESTIONS 1-9: 2

## 2021-07-27 NOTE — PROGRESS NOTES
Assessment & Plan     (B36.0) Tinea versicolor  (primary encounter diagnosis)  Comment:   Plan: clotrimazole (LOTRIMIN) 1 % external cream        Exam consistent with tinea versicolor.  Recommended clotrimazole and once daily selenium sulfide shampoo treatments    (F33.1) Moderate episode of recurrent major depressive disorder (H)  Comment:   Plan: Improved.  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 -- very upbeat and positive about this change.  In addition has addressed some stressors at home and is now working to get her diabetes under better control.  Continue sertraline    (Z11.1) Screening examination for pulmonary tuberculosis  Comment: Required lab work for new position  Plan: Quantiferon TB Gold Plus          (Z11.59) Screening for viral disease  Comment: Required lab work for new position, patient is also given a copy of her immunizations today  Plan: Rubeola Antibody IgG, Rubella Antibody IgG,         Mumps Immune Status, IgG            Return in about 6 months (around 1/27/2022).    Alan Paredes MD  [facility] CATRINA Salazar is a 41 year old who presents for the following health issues     Healthy Habits:     Getting at least 3 servings of Calcium per day:  Yes    Bi-annual eye exam:  Yes    Dental care twice a year:  NO    Sleep apnea or symptoms of sleep apnea:  Daytime drowsiness    Diet:  Diabetic    Frequency of exercise:  2-3 days/week    Duration of exercise:  15-30 minutes    Taking medications regularly:  No    Barriers to taking medications:  Problems remembering to take them    Medication side effects:  None    PHQ-2 Total Score: 0    Additional concerns today:  No     41-year-old with a history of insulin-dependent diabetes presents today for lab work/titers prior to 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  In addition has concerns about a persistent rash across her back and chest present for several months  at this point.  Not pruritic-noticed the persistent hyperpigmented patches earlier this year, which have persisted.    Patient Active Problem List   Diagnosis     Allergic rhinitis     HYPERLIPIDEMIA LDL GOAL <100     Family history of other cardiovascular diseases     Health Care Home     Microalbuminuria     Insulin pump in place     Vitamin D deficiency     Nut allergy     Type 2 diabetes mellitus with diabetic nephropathy     Diabetic gastroparesis (H)     Hypertrophic obstructive cardiomyopathy (H)     PFO (patent foramen ovale)     Other migraine without status migrainosus, not intractable     Scars of posterior pole of chorioretina, bilateral     Cervical high risk HPV (human papillomavirus) test positive     Mirena IUD inserted 11/29/19: Due for removal 11/29/2024     Depression     Other insomnia     Moderate episode of recurrent major depressive disorder (H)     Current Outpatient Medications   Medication Sig Dispense Refill     albuterol (PROAIR HFA/PROVENTIL HFA/VENTOLIN HFA) 108 (90 Base) MCG/ACT inhaler Inhale 2 puffs into the lungs every 4 hours as needed for shortness of breath / dyspnea or wheezing 18 g 3     atenolol (TENORMIN) 25 MG tablet Take 0.5 tablets (12.5 mg) by mouth daily 90 tablet 3     clotrimazole (LOTRIMIN) 1 % external cream Apply topically 2 times daily 60 g 3     losartan (COZAAR) 50 MG tablet 1 tab each morning and one half tab each evening 135 tablet 3     SUMAtriptan (IMITREX) 25 MG tablet TAKE ONE TO FOUR TABLETS BY MOUTH ONE TIME. MAY REPEAT 1 TABLET EVERY TWO HOURS UP TO MAXIMUM OF 200MG IN 24 HOURS 8 tablet 11     aspirin 81 MG tablet Take 1 tablet (81 mg) by mouth daily 90 tablet 3     blood glucose (PEPE CONTOUR NEXT) test strip Check 4 times/day 300 each 0     blood glucose monitoring (NO BRAND SPECIFIED) meter device kit Use to test blood sugar 6 times daily and as needed. 1 kit 0     cetirizine (ZYRTEC) 10 MG tablet Take 10 mg by mouth 2 times daily  90 tablet 3      "Continuous Blood Gluc Sensor (FREESTYLE NADIA 14 DAY SENSOR) MISC CHANGE EVERY 14 DAYS 6 each 3     dulaglutide (TRULICITY) 1.5 MG/0.5ML pen Inject 1.5 mg Subcutaneous every 7 days 9 mL 0     EPINEPHrine (ANY BX GENERIC EQUIV) 0.3 MG/0.3ML injection 2-pack Inject 0.3 mLs (0.3 mg) into the muscle once as needed for anaphylaxis 0.3 mL 11     fluticasone (FLONASE) 50 MCG/ACT spray Spray 1-2 sprays into both nostrils daily 1 Bottle 0     glucagon (GLUCAGON EMERGENCY) 1 MG kit Inject 1 mg into the muscle as needed for low blood sugar 1 mg 0     ibuprofen (ADVIL) 200 MG tablet Take 200 mg by mouth every 4 hours as needed.       insulin aspart (NOVOLOG VIAL) 100 UNITS/ML vial USE WITH INSULIN PUMP AS DIRECTED, USES ABOUT 80 UNITS PER DAY 80 mL 3     insulin aspart (NOVOLOG VIAL) 100 UNITS/ML vial USE WITH INSULIN PUMP AS DIRECTED, USES ABOUT 80 UNITS PER DAY 80 mL 3     Insulin Disposable Pump (OMNIPOD DASH 5 PACK PODS) MISC CHANGE EVERY 3 DAYS 30 each 3     Insulin Disposable Pump (OMNIPOD DASH SYSTEM) KIT 1 kit continuous 1 kit 0     insulin glargine (LANTUS PEN) 100 UNIT/ML pen Take 20 units in case of pump malfunction only. 15 mL 0     INSULIN PUMP - OUTPATIENT Updated 11/12/19:  OmniPod Dash  BASAL:  00:00: 1.150 units/hr  10:30: 1.2  14:30: 1.1  CARB RATIO:  00:00: 8  Sensitivity:  00:00: 30 mg/dL  BLOOD GLUCOSE TARGET and times:  12   AM (midnight): 120-120  Active Insulin Time: 4 hours  Sensor: Nadia/ Nadia Link Donna  Alyxo:   [email or web address]  Password [email or web address]       insulin syringe-needle U-100 (29G X 1/2\" 1 ML) 29G X 1/2\" 1 ML miscellaneous Use 1 syringe once daily or as needed 100 each 1     Lancets (MICROLET) MISC 1 Device See Admin Instructions. Use up to 5 times daily for blood sugar checks. 100 each prn     levonorgestrel (MIRENA) 20 MCG/24HR IUD 1 each (20 mcg) by Intrauterine route once       MAGNESIUM OXIDE PO Take 400 mg by mouth daily       metFORMIN (GLUCOPHAGE-XR) 500 MG 24 hr tablet " "TAKE TWO TABLETS BY MOUTH TWICE A DAY WITH MEALS 360 tablet 3     montelukast (SINGULAIR) 10 MG tablet TAKE ONE TABLET BY MOUTH EVERY NIGHT AT BEDTIME 90 tablet 3     Multiple Vitamins-Minerals (MULTI VITAMIN/MINERALS PO) Take 1 each by mouth daily.       Omega-3 Fatty Acids (FISH OIL) 500 MG CAPS Take 1 capsule by mouth       omeprazole (PRILOSEC) 40 MG DR capsule TAKE ONE CAPSULE BY MOUTH ONCE DAILY AS NEEDED (Patient taking differently: daily ) 90 capsule 3     rosuvastatin (CRESTOR) 5 MG tablet Take 1 tablet (5 mg) by mouth daily 90 tablet 3     sertraline (ZOLOFT) 25 MG tablet Take 1 tablet (25 mg) by mouth daily 90 tablet 3     traZODone (DESYREL) 50 MG tablet TAKE ONE-HALF TO ONE TABLET BY MOUTH NIGHTLY AS NEEDED FOR SLEEP 60 tablet 3     vitamin D3 (CHOLECALCIFEROL) 50 mcg (2000 units) tablet Take 1 tablet by mouth daily              Objective    /60 (Cuff Size: Adult Regular)   Pulse 85   Temp 98  F (36.7  C) (Tympanic)   Ht 1.626 m (5' 4\")   Wt 78 kg (172 lb)   SpO2 99%   BMI 29.52 kg/m    Body mass index is 29.52 kg/m .  Physical Exam   GENERAL: healthy, alert and no distress  EYES: Eyes grossly normal to inspection, PERRL and conjunctivae and sclerae normal  Dermatologic: Hyperpigmented well-demarcated macular eruption across the upper back and chest without erythema, fine superficial scale present  PSYCH: mentation appears normal, affect normal/bright    "

## 2021-07-27 NOTE — PATIENT INSTRUCTIONS
Rash:Tinea versicolor  --apply Selsun Blue shampoo to rash and leave on for 10min then rinse,  continue daily for 1 week  --Apply clotrimazole 1-2 times daily for the next 2 weeks or until rash resolves    Patient Education     Tinea Versicolor  Tinea versicolor is a rash caused by a fungus in the top layers of the skin. This fungus is normally present in the pores of the skin and causes no symptoms. But when the fungus overgrows, it causes a rash. The fungus grows more easily in hot climates, and on oily or sweaty skin. Health experts don t know why some people get this rash and others don t. Experts also don t know why the rash will suddenly appear in someone who has never had it before.   The rash is made up of irregular pale or tan spots and patches. The rash is usually on the neck, upper back, chest, and shoulders. You may have mild itching, especially if you become overheated. But it doesn't cause other symptoms. Because these spots don't change color with sun exposure like normal skin, the rash may be lighter or darker than your normal skin.   This rash is harmless and usually causes no symptoms. The only reason for treatment is to improve appearance. Follow the suggestions below to clear the rash. It might take several months for normal skin color to return.   Home care    Use a special medicated shampoo over your whole body while in the shower. Don t use soap. Let the shampoo stay on for about 10 minutes before rinsing off. Do this every day for one week.    As a different treatment, you may buy an antifungal cream (miconazole or clotrimazole, both available without a prescription). Use this daily for 2 weeks. .     This rash is not contagious to others. It can t be spread if someone touches it. So you don t have to worry about exposing others at school, , or work.    Your healthcare provider may also prescribe oral antifungal medicines to help stop the rash.  Prevention  This fungus can come back  again (recur) after treatment. To prevent return of the rash, use medicated dandruff shampoo over your whole body when in the shower. Do this once a month for the next year. This is very important to do in the summertime. That is when the rash is most likely to recur.   Other prevention tips include:    Don't use oily skin products    Wear loose clothing. Try to let your skin stay cool and breathe.    Use sunscreen and protect yourself from sunlight    Don't use tanning beds  Follow-up care  Follow up with your healthcare provider, or as advised. Call your provider if the rash doesn t get better with the above treatment, or if new symptoms appear.   When to seek medical advice  Call your healthcare provider right away if any of these occur:    Increasing redness of the rash    Change in appearance of the rash    Fever of 100.4 F (38 C) or higher, or as directed by your provider  Harika last reviewed this educational content on 8/1/2019 2000-2021 [employer]. All rights reserved. This information is not intended as a substitute for professional medical care. Always follow your healthcare professional's instructions.

## 2021-07-28 LAB
MEV IGG SER IA-ACNC: 48.5 AU/ML
MEV IGG SER IA-ACNC: POSITIVE
MUMPS ANTIBODY IGG INSTRUMENT VALUE: 72.8 AU/ML
MUV IGG SER QL IA: POSITIVE
RUBV IGG SERPL QL IA: 3.23 INDEX
RUBV IGG SERPL QL IA: POSITIVE

## 2021-07-29 DIAGNOSIS — Z79.4 TYPE 2 DIABETES MELLITUS WITH DIABETIC NEPHROPATHY, WITH LONG-TERM CURRENT USE OF INSULIN (H): ICD-10-CM

## 2021-07-29 DIAGNOSIS — E11.21 TYPE 2 DIABETES MELLITUS WITH DIABETIC NEPHROPATHY, WITH LONG-TERM CURRENT USE OF INSULIN (H): ICD-10-CM

## 2021-07-29 LAB
QUANTIFERON MITOGEN: 10 IU/ML
QUANTIFERON NIL TUBE: 0 IU/ML
QUANTIFERON TB1 TUBE: 0.26 IU/ML
QUANTIFERON TB2 TUBE: 0.24

## 2021-07-29 NOTE — TELEPHONE ENCOUNTER
Nadia hopkins      Last Written Prescription Date:  9/23/20  Last Fill Quantity: 6,   # refills: 3  Last Office Visit: 7/6/21  Future Office visit:       Routing refill request to provider for review/approval because:  Drug not on the [facility], [facility] or [facility] refill protocol or controlled substance

## 2021-07-30 LAB
GAMMA INTERFERON BACKGROUND BLD IA-ACNC: 0 IU/ML
M TB IFN-G BLD-IMP: NEGATIVE
M TB IFN-G CD4+ BCKGRND COR BLD-ACNC: 10 IU/ML
MITOGEN IGNF BCKGRD COR BLD-ACNC: 0.24 IU/ML
MITOGEN IGNF BCKGRD COR BLD-ACNC: 0.26 IU/ML

## 2021-08-02 RX ORDER — FLASH GLUCOSE SENSOR
KIT MISCELLANEOUS
Qty: 6 EACH | Refills: 3 | Status: SHIPPED | OUTPATIENT
Start: 2021-08-02 | End: 2022-04-25 | Stop reason: CLARIF

## 2021-08-04 ENCOUNTER — ALLIED HEALTH/NURSE VISIT (OUTPATIENT)
Dept: EDUCATION SERVICES | Facility: CLINIC | Age: 41
End: 2021-08-04
Payer: COMMERCIAL

## 2021-08-04 DIAGNOSIS — E11.21 TYPE 2 DIABETES MELLITUS WITH DIABETIC NEPHROPATHY, WITH LONG-TERM CURRENT USE OF INSULIN (H): ICD-10-CM

## 2021-08-04 DIAGNOSIS — Z79.4 TYPE 2 DIABETES MELLITUS WITH DIABETIC NEPHROPATHY, WITH LONG-TERM CURRENT USE OF INSULIN (H): ICD-10-CM

## 2021-08-04 PROCEDURE — G0108 DIAB MANAGE TRN  PER INDIV: HCPCS | Mod: AE

## 2021-08-04 NOTE — PROGRESS NOTES
"Diabetes Self-Management Education & Support    Presents for: Individual review    SUBJECTIVE/OBJECTIVE:  Presents for: Individual review  Accompanied by: Self  Diabetes education in the past 24mo: No  Focus of Visit: Monitoring  Diabetes type: Type 2  How confident are you filling out medical forms by yourself:: Not Assessed  Diabetes management related comments/concerns: OmniPod pump upload since switching from Medtr[employer] a few weeks ago and started the Nadia at that time as well.  Transportation concerns: No  Other concerns:: None  Cultural Influences/Ethnic Background:  Choose not to answer    Diabetes Symptoms & Complications:    Patient Problem List and Family Medical History reviewed for relevant medical history, current medical status, and diabetes risk factors.    Vitals:  There were no vitals taken for this visit.  Estimated body mass index is 29.52 kg/m  as calculated from the following:    Height as of 7/27/21: 1.626 m (5' 4\").    Weight as of 7/27/21: 78 kg (172 lb).   Last 3 BP:   BP Readings from Last 3 Encounters:   07/27/21 108/60   02/25/21 128/80   01/14/21 108/71       History   Smoking Status     Former Smoker     Packs/day: 0.00     Years: 0.00     Types: Cigarettes     Quit date: 10/24/2005   Smokeless Tobacco     Never Used     Comment: States only smokes when drinks maybe 2x/year       Labs:  Lab Results   Component Value Date    A1C 6.5 07/05/2021     Lab Results   Component Value Date     07/05/2021     Lab Results   Component Value Date    LDL 91 07/05/2021     HDL Cholesterol   Date Value Ref Range Status   07/05/2021 39 (L) >49 mg/dL Final   ]  GFR Estimate   Date Value Ref Range Status   07/05/2021 55 (L) >60 mL/min/[1.73_m2] Final     Comment:     Non  GFR Calc  Starting 12/18/2018, serum creatinine based estimated GFR (eGFR) will be   calculated using the Chronic Kidney Disease Epidemiology Collaboration   (CKD-EPI) equation.       GFR Estimate If Black   Date " Value Ref Range Status   07/05/2021 64 >60 mL/min/[1.73_m2] Final     Comment:      GFR Calc  Starting 12/18/2018, serum creatinine based estimated GFR (eGFR) will be   calculated using the Chronic Kidney Disease Epidemiology Collaboration   (CKD-EPI) equation.       Lab Results   Component Value Date    CR 1.20 07/05/2021     No results found for: MICROALBUMIN    Healthy Eating:  Healthy Eating Assessed Today: No  Cultural/Gnosticist diet restrictions?: No  Meals include: Lunch, Dinner  Beverages: Water, Tea, Coffee  Has patient met with a dietitian in the past?: Yes    Being Active:  Being Active Assessed Today: No  Barrier to exercise: Time    Monitoring:  Monitoring Assessed Today: Yes  Did patient bring glucose meter to appointment? : Yes  Blood Glucose Meter: CGM  Times checking blood sugar at home (number): 3  Times checking blood sugar at home (per): Day  Blood glucose trend: Fluctuating    Taking Medications:  Diabetes Medication(s)     Biguanides       metFORMIN (GLUCOPHAGE-XR) 500 MG 24 hr tablet    TAKE TWO TABLETS BY MOUTH TWICE A DAY WITH MEALS    Diabetic Other       glucagon (GLUCAGON EMERGENCY) 1 MG kit    Inject 1 mg into the muscle as needed for low blood sugar    Insulin       insulin aspart (NOVOLOG VIAL) 100 UNITS/ML vial    USE WITH INSULIN PUMP AS DIRECTED, USES ABOUT 80 UNITS PER DAY     insulin aspart (NOVOLOG VIAL) 100 UNITS/ML vial    USE WITH INSULIN PUMP AS DIRECTED, USES ABOUT 80 UNITS PER DAY     insulin glargine (LANTUS PEN) 100 UNIT/ML pen    Take 20 units in case of pump malfunction only.    Incretin Mimetic Agents (GLP-1 Receptor Agonists)       dulaglutide (TRULICITY) 1.5 MG/0.5ML pen    Inject 1.5 mg Subcutaneous every 7 days          Taking Medication Assessed Today: Yes  Current Treatments: Non-insulin Injectables, Insulin Pump  Problems taking diabetes medications regularly?: No  Diabetes medication side effects?: No    Problem Solving:  Problem Solving  Assessed Today: No  Hypoglycemia Frequency: Weekly  Hypoglycemia Treatment: Glucose (tablets or gel), Juice, Candy  Patient carries a carbohydrate source: Yes  Medical ID: No  Is the patient at risk for DKA?: Yes  Does patient have ketone test strips?: Yes  Does patient have DKA prevention plan?: Yes  Does patient have severe weather/disaster plan for diabetes management?: Yes  Does patient have sick day plan for diabetes management?: Yes    Hypoglycemia symptoms  Confusion: No  Dizziness or Light-Headedness: Yes  Headaches: No  Hunger: No  Mood changes: Yes  Nervousness/Anxiety: No  Sleepiness: No  Speech difficulty: Yes  Sweats: Yes  Feeling shaky: Yes    Hypoglycemia Complications  Blackouts: No  Hospitalization: No  Nocturnal hypoglycemia: Yes  Required assistance: Yes  Required glucagon injection: No  Seizures: No    Reducing Risks:  Reducing Risks Assessed Today: No  CAD Risks: Diabetes Mellitus, Family history  Has dilated eye exam at least once a year?: Yes  Sees dentist every 6 months?: No  Feet checked by healthcare provider in the last year?: Yes    Healthy Coping:  Healthy Coping Assessed Today: Yes  Emotional response to diabetes: Ready to learn  Informal Support system:: Children, Friends  Stage of change: ACTION (Actively working towards change)  Patient Activation Measure Survey Score:  SADIA Score (Last Two) 10/7/2009   SADIA Raw Score 46   Activation Score 75.3   SADIA Level 4       Diabetes knowledge and skills assessment:   Patient is knowledgeable in diabetes management concepts related to: Taking Medication, Problem Solving and Insulin Pump Concepts Carbohydrate counting  Calculating boluses  Problem solving with insulin pump therapy (BG monitoring; hypoglycemia signs/symptoms, treatment (glucagon) and prevention; hyperglycemia signs/symptoms, treatment and prevention; ketones, DKA signs/symptoms and prevention)  Hands on practice with basic pump button use    Patient needs further education on the  following diabetes management concepts: Monitoring, Taking Medication and Insulin Pump Concepts Balancing glucose and insulin    Based on learning assessment above, most appropriate setting for further diabetes education would be: Group class or Individual setting.      INTERVENTIONS:  Reports:                      Insulin Pump Information  Insulin Pump Brand: OmniPod    Education provided today on:  AADE Self-Care Behaviors:  Monitoring: log and interpret results, individual blood glucose targets and frequency of monitoring  Taking Medication: action of prescribed medication and when to take medications    Education specific to insulin pump provided today on:   how to use a temporary basal rate, importance of bolusing before meals, steps to take when blood glucose is above, multiple daily injection back-up plan in case of pump failure and entering all carbohydrates into the pump and correcting high glucose    Opportunities for ongoing education and support in diabetes-self management were discussed.    Pt verbalized understanding of concepts discussed and recommendations provided today.       Education Materials Provided:  No new materials provided today    ASSESSMENT  Review of CGM report. Glucose overall average 126mg/dl,  in target 77%, above target 16% and below target 7% of the time.     Patient experiencing frequent hypoglycemia, she rarely boluses for meals and snacks and does not correct high BG.   Hyperglycemia from carbohydrate intake and suspended pump.     Patient would benefit from decrease in basal rate by 20%, increase in correction/sensitivity, increase in carbohydrate ratio, bolusing before meals and snacks.    Changes made to pump settings:  basal rate: 12am-12am: 0.90 to --> 0.75  carb ratio: 12am-12am: 8 to --> 10  correction/sensitivity: 12am-12am: 30 to --> 40    PLAN  See Patient Instructions for co-developed, patient-stated behavior change goals.  AVS printed and provided to patient today.  See Follow-Up section for recommended follow-up.    Jess Jackman RN, [facility]    Time Spent: 60 minutes  Encounter Type: Individual    Any diabetes medication dose changes were made via the CDE Protocol and Collaborative Practice Agreement with the patient's referring provider. A copy of this encounter was shared with the provider.

## 2021-08-04 NOTE — PATIENT INSTRUCTIONS
Make sure to scan your sensor at least every 8 hours. Think about scanning when you get up and before bed then a few times during the day.    Bolus for all carbohydrates and correct blood sugar when your blood sugar is high.     Changes made to your pump today to give you less basal insulin, less for your carbohydrates and less for a correction dose.     Contact [employer] to set up your automatic download of your pump.    Let us know if you decide you are interested in the Dexcom sensor for automatic readings and alerts for high and low glucose readings.     Send a [employer] message in 1-2 weeks with how you are doing.

## 2021-08-04 NOTE — LETTER
"    8/4/2021         RE: Marie Vogel  [address]  [place] [address]        Dear Colleague,    Thank you for referring your patient, Marie Vogel, to the [facility]. Please see a copy of my visit note below.    Diabetes Self-Management Education & Support    Presents for: Individual review    SUBJECTIVE/OBJECTIVE:  Presents for: Individual review  Accompanied by: Self  Diabetes education in the past 24mo: No  Focus of Visit: Monitoring  Diabetes type: Type 2  How confident are you filling out medical forms by yourself:: Not Assessed  Diabetes management related comments/concerns: OmniPod pump upload since switching from [employer] a few weeks ago and started the Nadia at that time as well.  Transportation concerns: No  Other concerns:: None  Cultural Influences/Ethnic Background:  Choose not to answer    Diabetes Symptoms & Complications:    Patient Problem List and Family Medical History reviewed for relevant medical history, current medical status, and diabetes risk factors.    Vitals:  There were no vitals taken for this visit.  Estimated body mass index is 29.52 kg/m  as calculated from the following:    Height as of 7/27/21: 1.626 m (5' 4\").    Weight as of 7/27/21: 78 kg (172 lb).   Last 3 BP:   BP Readings from Last 3 Encounters:   07/27/21 108/60   02/25/21 128/80   01/14/21 108/71       History   Smoking Status     Former Smoker     Packs/day: 0.00     Years: 0.00     Types: Cigarettes     Quit date: 10/24/2005   Smokeless Tobacco     Never Used     Comment: States only smokes when drinks maybe 2x/year       Labs:  Lab Results   Component Value Date    A1C 6.5 07/05/2021     Lab Results   Component Value Date     07/05/2021     Lab Results   Component Value Date    LDL 91 07/05/2021     HDL Cholesterol   Date Value Ref Range Status   07/05/2021 39 (L) >49 mg/dL Final   ]  GFR Estimate   Date Value Ref Range Status   07/05/2021 55 (L) >60 " mL/min/[1.73_m2] Final     Comment:     Non  GFR Calc  Starting 12/18/2018, serum creatinine based estimated GFR (eGFR) will be   calculated using the Chronic Kidney Disease Epidemiology Collaboration   (CKD-EPI) equation.       GFR Estimate If Black   Date Value Ref Range Status   07/05/2021 64 >60 mL/min/[1.73_m2] Final     Comment:      GFR Calc  Starting 12/18/2018, serum creatinine based estimated GFR (eGFR) will be   calculated using the Chronic Kidney Disease Epidemiology Collaboration   (CKD-EPI) equation.       Lab Results   Component Value Date    CR 1.20 07/05/2021     No results found for: MICROALBUMIN    Healthy Eating:  Healthy Eating Assessed Today: No  Cultural/Amish diet restrictions?: No  Meals include: Lunch, Dinner  Beverages: Water, Tea, Coffee  Has patient met with a dietitian in the past?: Yes    Being Active:  Being Active Assessed Today: No  Barrier to exercise: Time    Monitoring:  Monitoring Assessed Today: Yes  Did patient bring glucose meter to appointment? : Yes  Blood Glucose Meter: CGM  Times checking blood sugar at home (number): 3  Times checking blood sugar at home (per): Day  Blood glucose trend: Fluctuating    Taking Medications:  Diabetes Medication(s)     Biguanides       metFORMIN (GLUCOPHAGE-XR) 500 MG 24 hr tablet    TAKE TWO TABLETS BY MOUTH TWICE A DAY WITH MEALS    Diabetic Other       glucagon (GLUCAGON EMERGENCY) 1 MG kit    Inject 1 mg into the muscle as needed for low blood sugar    Insulin       insulin aspart (NOVOLOG VIAL) 100 UNITS/ML vial    USE WITH INSULIN PUMP AS DIRECTED, USES ABOUT 80 UNITS PER DAY     insulin aspart (NOVOLOG VIAL) 100 UNITS/ML vial    USE WITH INSULIN PUMP AS DIRECTED, USES ABOUT 80 UNITS PER DAY     insulin glargine (LANTUS PEN) 100 UNIT/ML pen    Take 20 units in case of pump malfunction only.    Incretin Mimetic Agents (GLP-1 Receptor Agonists)       dulaglutide (TRULICITY) 1.5 MG/0.5ML pen    Inject  1.5 mg Subcutaneous every 7 days          Taking Medication Assessed Today: Yes  Current Treatments: Non-insulin Injectables, Insulin Pump  Problems taking diabetes medications regularly?: No  Diabetes medication side effects?: No    Problem Solving:  Problem Solving Assessed Today: No  Hypoglycemia Frequency: Weekly  Hypoglycemia Treatment: Glucose (tablets or gel), Juice, Candy  Patient carries a carbohydrate source: Yes  Medical ID: No  Is the patient at risk for DKA?: Yes  Does patient have ketone test strips?: Yes  Does patient have DKA prevention plan?: Yes  Does patient have severe weather/disaster plan for diabetes management?: Yes  Does patient have sick day plan for diabetes management?: Yes    Hypoglycemia symptoms  Confusion: No  Dizziness or Light-Headedness: Yes  Headaches: No  Hunger: No  Mood changes: Yes  Nervousness/Anxiety: No  Sleepiness: No  Speech difficulty: Yes  Sweats: Yes  Feeling shaky: Yes    Hypoglycemia Complications  Blackouts: No  Hospitalization: No  Nocturnal hypoglycemia: Yes  Required assistance: Yes  Required glucagon injection: No  Seizures: No    Reducing Risks:  Reducing Risks Assessed Today: No  CAD Risks: Diabetes Mellitus, Family history  Has dilated eye exam at least once a year?: Yes  Sees dentist every 6 months?: No  Feet checked by healthcare provider in the last year?: Yes    Healthy Coping:  Healthy Coping Assessed Today: Yes  Emotional response to diabetes: Ready to learn  Informal Support system:: Children, Friends  Stage of change: ACTION (Actively working towards change)  Patient Activation Measure Survey Score:  SADIA Score (Last Two) 10/7/2009   SADIA Raw Score 46   Activation Score 75.3   SADIA Level 4       Diabetes knowledge and skills assessment:   Patient is knowledgeable in diabetes management concepts related to: Taking Medication, Problem Solving and Insulin Pump Concepts Carbohydrate counting  Calculating boluses  Problem solving with insulin pump therapy (BG  monitoring; hypoglycemia signs/symptoms, treatment (glucagon) and prevention; hyperglycemia signs/symptoms, treatment and prevention; ketones, DKA signs/symptoms and prevention)  Hands on practice with basic pump button use    Patient needs further education on the following diabetes management concepts: Monitoring, Taking Medication and Insulin Pump Concepts Balancing glucose and insulin    Based on learning assessment above, most appropriate setting for further diabetes education would be: Group class or Individual setting.      INTERVENTIONS:  Reports:                      Insulin Pump Information  Insulin Pump Brand: OmniPod    Education provided today on:  AADE Self-Care Behaviors:  Monitoring: log and interpret results, individual blood glucose targets and frequency of monitoring  Taking Medication: action of prescribed medication and when to take medications    Education specific to insulin pump provided today on:   how to use a temporary basal rate, importance of bolusing before meals, steps to take when blood glucose is above, multiple daily injection back-up plan in case of pump failure and entering all carbohydrates into the pump and correcting high glucose    Opportunities for ongoing education and support in diabetes-self management were discussed.    Pt verbalized understanding of concepts discussed and recommendations provided today.       Education Materials Provided:  No new materials provided today    ASSESSMENT  Review of CGM report. Glucose overall average 126mg/dl,  in target 77%, above target 16% and below target 7% of the time.     Patient experiencing frequent hypoglycemia, she rarely boluses for meals and snacks and does not correct high BG.   Hyperglycemia from carbohydrate intake and suspended pump.     Patient would benefit from decrease in basal rate by 20%, increase in correction/sensitivity, increase in carbohydrate ratio, bolusing before meals and snacks.    Changes made to pump  settings:  basal rate: 12am-12am: 0.90 to --> 0.75  carb ratio: 12am-12am: 8 to --> 10  correction/sensitivity: 12am-12am: 30 to --> 40    PLAN  See Patient Instructions for co-developed, patient-stated behavior change goals.  AVS printed and provided to patient today. See Follow-Up section for recommended follow-up.    Jess Jackman RN, [facility]    Time Spent: 60 minutes  Encounter Type: Individual    Any diabetes medication dose changes were made via the CDE Protocol and Collaborative Practice Agreement with the patient's referring provider. A copy of this encounter was shared with the provider.

## 2021-08-09 ENCOUNTER — TELEPHONE (OUTPATIENT)
Dept: PEDIATRICS | Facility: CLINIC | Age: 41
End: 2021-08-09

## 2021-08-09 NOTE — TELEPHONE ENCOUNTER
Forms/Letter Request    Name of form/letter: [place] [employer]    Have you been seen for this request: Yes     Do we have the form/letter: Yes:     When is form/letter needed by: asap    How would you like the form/letter returned: Fax to [phone]    Patient Notified form requests are processed in 3-5 business days:Yes    Okay to leave a detailed message? Yes Cell number on file:    Telephone Information:   Mobile [phone]       Zev Zarate on 8/9/2021 at 4:08 PM

## 2021-08-23 ENCOUNTER — TELEPHONE (OUTPATIENT)
Dept: AUDIOLOGY | Facility: CLINIC | Age: 41
End: 2021-08-23
Payer: COMMERCIAL

## 2021-08-23 ENCOUNTER — MYC MEDICAL ADVICE (OUTPATIENT)
Dept: EDUCATION SERVICES | Facility: CLINIC | Age: 41
End: 2021-08-23

## 2021-08-23 NOTE — TELEPHONE ENCOUNTER
[facility] Call Center    Phone Message    May a detailed message be left on voicemail: yes     Reason for Call: Other: Pt calling to reach Dr. Davis regarding previous App[employer] message about chip from hearing aid dome. Pt is very concerned as she starts a new job 8/30, and is worried this will affect her hearing. Please call Pt asap to discuss her options for repair or replacement.      Action Taken: Message routed to:  [facility] ([facility]): Audiology    Travel Screening: Not Applicable

## 2021-08-24 NOTE — TELEPHONE ENCOUNTER
Mycharted patient and have a temporary dome her for her to  today,        Clovis Kwan, CCC-A  Licensed Audiologist  [place] #[id number]

## 2021-09-05 ENCOUNTER — HEALTH MAINTENANCE LETTER (OUTPATIENT)
Age: 41
End: 2021-09-05

## 2021-09-10 ENCOUNTER — OFFICE VISIT (OUTPATIENT)
Dept: OBGYN | Facility: CLINIC | Age: 41
End: 2021-09-10
Payer: COMMERCIAL

## 2021-09-10 VITALS — BODY MASS INDEX: 29.68 KG/M2 | SYSTOLIC BLOOD PRESSURE: 110 MMHG | DIASTOLIC BLOOD PRESSURE: 66 MMHG | WEIGHT: 172.9 LBS

## 2021-09-10 DIAGNOSIS — N90.89 VULVAR LESION: Primary | ICD-10-CM

## 2021-09-10 PROCEDURE — 99213 OFFICE O/P EST LOW 20 MIN: CPT | Performed by: OBSTETRICS & GYNECOLOGY

## 2021-09-10 NOTE — NURSING NOTE
"Chief Complaint   Patient presents with     Vaginal Problem   Onset 5/2021 with vular bumps     initial /66   Wt 78.4 kg (172 lb 14.4 oz)   BMI 29.68 kg/m   Estimated body mass index is 29.68 kg/m  as calculated from the following:    Height as of 7/27/21: 1.626 m (5' 4\").    Weight as of this encounter: 78.4 kg (172 lb 14.4 oz).  BP completed using cuff size regular luz maria  .  Nora Keita CMA    "

## 2021-09-10 NOTE — PROGRESS NOTES
"Chief Complaint   Patient presents with     Vaginal Problem       Subjective:  Patient had vulvar depilatory procedure (\"Brazilian wax\") in [place] in 5/2021.  Noticed several small bumps on labia afterward which have persisted.  No drainage.  No fevers.  No real progression      REVIEW OF SYSTEMS:  Neg    Health Maintenance   Topic Date Due     ADVANCE CARE PLANNING  Never done     DIABETIC FOOT EXAM  11/05/2019     HEPATITIS B IMMUNIZATION (2 of 2 - CpG risk 2-dose series) 02/11/2021     INFLUENZA VACCINE (1) 09/01/2021     A1C  10/05/2021     ALT  01/05/2022     LIPID  01/05/2022     PREVENTIVE CARE VISIT  01/14/2022     EYE EXAM  01/21/2022     PHQ-9  01/27/2022     DTAP/TDAP/TD IMMUNIZATION (3 - Td or Tdap) 04/06/2022     BMP  07/05/2022     MICROALBUMIN  07/05/2022     PAP FOLLOW-UP  11/29/2022     HPV FOLLOW-UP  11/29/2022     Pneumococcal Vaccine: Pediatrics (0 to 5 Years) and At-Risk Patients (6 to 64 Years) (2 of 2 - PPSV23) 06/27/2043     HEPATITIS C SCREENING  Completed     HIV SCREENING  Completed     DEPRESSION ACTION PLAN  Completed     MIGRAINE ACTION PLAN  Completed     COVID-19 Vaccine  Completed     IPV IMMUNIZATION  Aged Out     MENINGITIS IMMUNIZATION  Aged Out       Allergies   Allergen Reactions     Ivp Dye [Contrast Dye] Itching     Pt reports that throat itches     Nuts Anaphylaxis     Mariola nuts only     Bactrim Swelling     Pt reaction was swelling in mouth and tongue and itchy mouth.  Resolved with benadryl and prednisone     Pcn [Penicillins] Hives     Cephalosporins Itching     Seasonal Allergies Other (See Comments)     Molds, trees, grass, dust..  Upper congestion     Atorvastatin      myalgias     Shellfish Allergy Rash     Clams only       Objective:  Vitals: /66   Wt 78.4 kg (172 lb 14.4 oz)   BMI 29.68 kg/m    BMI= Body mass index is 29.68 kg/m .    EG primarily devoid of hair.  Several small bumps which all appear to have a hair follicle at the base.  No erythema.  No " induration.        Follow up prn      Assessment/Plan:  1. Vulvar lesion    These appear c/w comedomes.  Advised warm soaks.  Reassured that they do not have an infectious appearance.    Follow up prn        Yan Cortes MD

## 2021-09-13 ENCOUNTER — TELEPHONE (OUTPATIENT)
Dept: AUDIOLOGY | Facility: CLINIC | Age: 41
End: 2021-09-13

## 2021-09-14 NOTE — TELEPHONE ENCOUNTER
Walk-in hearing aid services on 9/13/21: The patient picked up her new right Widex earmold today.  It was attached to her hearing aid without issue and Marie reported a comfortable fit with the new earmold.

## 2021-10-21 ENCOUNTER — MYC MEDICAL ADVICE (OUTPATIENT)
Dept: PEDIATRICS | Facility: CLINIC | Age: 41
End: 2021-10-21

## 2021-10-21 DIAGNOSIS — H91.90 DECREASED HEARING, UNSPECIFIED LATERALITY: Primary | ICD-10-CM

## 2021-10-25 ENCOUNTER — TELEPHONE (OUTPATIENT)
Dept: OTOLARYNGOLOGY | Facility: CLINIC | Age: 41
End: 2021-10-25
Payer: COMMERCIAL

## 2021-10-25 NOTE — TELEPHONE ENCOUNTER
Health Call Center    Phone Message    May a detailed message be left on voicemail: no     Reason for Call: Appointment Intake    Referring Provider Name: Alan Paredes MD in [facility]  Diagnosis and/or Symptoms: Decreased hearing, unspecified laterality    Patient mentioned Cochlear Implants. She would like to coordinate a HEV and ENT appointment same day.    Has Audiology referral as well      Action Taken: Message routed to:  [facility] ([facility]): CLINIC COORDINATORS-EYE-DENTAL-ENT- [[id number]]  CLINIC COORDINATORS-4D&T- [[id number]]    Travel Screening: Not Applicable

## 2021-10-25 NOTE — TELEPHONE ENCOUNTER
Called patient to schedule from Audiology referral  She stated she also needs to see ENT with Dr. Foss or Dr. Davidson because, she has Cochlear Implants and would like to coordinate appointments on the same day.  Does require a referral for this as well.    Please enter ENT referral too if possible.      Routing to; MILAN SB2/3/4 TEAM B (ILIANA) [[id number]]  CC'd: CLINIC [facility]ORDINATORS-4D&T- [[id number]]

## 2021-10-25 NOTE — TELEPHONE ENCOUNTER
Please call pt with ENT referral information:    [facility]   [address]   [address]   [place] [address]   Phone: [phone]   Fax: [phone]

## 2021-10-27 ENCOUNTER — TELEPHONE (OUTPATIENT)
Dept: AUDIOLOGY | Facility: CLINIC | Age: 41
End: 2021-10-27

## 2021-10-27 NOTE — TELEPHONE ENCOUNTER
[facility] Call [facility]    Phone Message    May a detailed message be left on voicemail: yes     Reason for Call: Other: Pt requests call back to schedule her two Appts on same date - ENT for Cochlear consult and Audiology of Audiogram - Referral in [facility] for both ENT and Audiology - she definitely wants to discuss seeing is she qualifies for Cochlear implant - said her hearing is affecting her job - please call her back to schedule - protocols say we cannot schedule ENT for Cochlear - Thanks     Action Taken: Message routed to:  [facility] ([facility]): ENT    Travel Screening: Not Applicable

## 2021-10-30 ENCOUNTER — MYC MEDICAL ADVICE (OUTPATIENT)
Dept: ENDOCRINOLOGY | Facility: CLINIC | Age: 41
End: 2021-10-30

## 2021-10-30 DIAGNOSIS — E11.21 TYPE 2 DIABETES MELLITUS WITH DIABETIC NEPHROPATHY, WITH LONG-TERM CURRENT USE OF INSULIN (H): ICD-10-CM

## 2021-10-30 DIAGNOSIS — Z79.4 TYPE 2 DIABETES MELLITUS WITH DIABETIC NEPHROPATHY, WITH LONG-TERM CURRENT USE OF INSULIN (H): ICD-10-CM

## 2021-10-31 ENCOUNTER — HEALTH MAINTENANCE LETTER (OUTPATIENT)
Age: 41
End: 2021-10-31

## 2021-11-01 ENCOUNTER — MYC MEDICAL ADVICE (OUTPATIENT)
Dept: EDUCATION SERVICES | Facility: CLINIC | Age: 41
End: 2021-11-01

## 2021-11-01 DIAGNOSIS — E11.21 TYPE 2 DIABETES MELLITUS WITH DIABETIC NEPHROPATHY, WITH LONG-TERM CURRENT USE OF INSULIN (H): Primary | ICD-10-CM

## 2021-11-01 DIAGNOSIS — Z79.4 TYPE 2 DIABETES MELLITUS WITH DIABETIC NEPHROPATHY, WITH LONG-TERM CURRENT USE OF INSULIN (H): Primary | ICD-10-CM

## 2021-11-01 NOTE — TELEPHONE ENCOUNTER
Medication is being filled for 1 time refill only due to:  Pt will be due for appointment in January 2022

## 2021-11-02 RX ORDER — PROCHLORPERAZINE 25 MG/1
1 SUPPOSITORY RECTAL
Qty: 9 EACH | Refills: 2 | Status: SHIPPED | OUTPATIENT
Start: 2021-11-02 | End: 2022-07-05

## 2021-11-02 RX ORDER — PROCHLORPERAZINE 25 MG/1
1 SUPPOSITORY RECTAL ONCE
Qty: 1 EACH | Refills: 0 | Status: SHIPPED | OUTPATIENT
Start: 2021-11-02 | End: 2021-11-02

## 2021-11-02 RX ORDER — PROCHLORPERAZINE 25 MG/1
1 SUPPOSITORY RECTAL
Qty: 1 EACH | Refills: 1 | Status: SHIPPED | OUTPATIENT
Start: 2021-11-02 | End: 2022-04-25

## 2021-11-02 NOTE — TELEPHONE ENCOUNTER
Patient is interested in a Dexcom G6 for continuous glucose montoring. Please sign pended orders if you are in agreement.    Thanks!  MELVI Wilson [facility]

## 2021-11-08 ENCOUNTER — MYC MEDICAL ADVICE (OUTPATIENT)
Dept: ENDOCRINOLOGY | Facility: CLINIC | Age: 41
End: 2021-11-08
Payer: COMMERCIAL

## 2021-11-08 DIAGNOSIS — E11.21 TYPE 2 DIABETES MELLITUS WITH DIABETIC NEPHROPATHY, WITH LONG-TERM CURRENT USE OF INSULIN (H): ICD-10-CM

## 2021-11-08 DIAGNOSIS — F33.1 MODERATE EPISODE OF RECURRENT MAJOR DEPRESSIVE DISORDER (H): ICD-10-CM

## 2021-11-08 DIAGNOSIS — Z79.4 TYPE 2 DIABETES MELLITUS WITH DIABETIC NEPHROPATHY, WITH LONG-TERM CURRENT USE OF INSULIN (H): ICD-10-CM

## 2021-11-08 RX ORDER — SERTRALINE HYDROCHLORIDE 25 MG/1
25 TABLET, FILM COATED ORAL DAILY
Qty: 90 TABLET | Refills: 3 | Status: SHIPPED | OUTPATIENT
Start: 2021-11-08 | End: 2022-11-16

## 2021-11-08 NOTE — TELEPHONE ENCOUNTER
Patient sent CRM request on MyChart for refill on sertraline, routing to PCP.     Araceli Clemens, CMA

## 2021-11-10 NOTE — TELEPHONE ENCOUNTER
Endo staff- can you please take a look into this?  Can you please check with CDE about exact wordings to put in rx signature?  Please pend the orders with correct quantity, select pharmacy and then send for signature. Also associate with correct diagnosis.    Thank you.    Ztia Fuentes MD

## 2021-11-11 RX ORDER — INSULIN PUMP CONTROLLER
1 EACH MISCELLANEOUS
Qty: 45 EACH | Refills: 3 | Status: SHIPPED | OUTPATIENT
Start: 2021-11-11 | End: 2023-01-03

## 2021-11-17 ENCOUNTER — OFFICE VISIT (OUTPATIENT)
Dept: AUDIOLOGY | Facility: CLINIC | Age: 41
End: 2021-11-17
Payer: COMMERCIAL

## 2021-11-17 DIAGNOSIS — H90.3 SENSORY HEARING LOSS, BILATERAL: Primary | ICD-10-CM

## 2021-11-17 DIAGNOSIS — H90.3 BILATERAL SENSORINEURAL HEARING LOSS: Primary | ICD-10-CM

## 2021-11-17 DIAGNOSIS — H91.90 DECREASED HEARING, UNSPECIFIED LATERALITY: ICD-10-CM

## 2021-11-17 PROCEDURE — 92550 TYMPANOMETRY & REFLEX THRESH: CPT | Performed by: AUDIOLOGIST

## 2021-11-17 PROCEDURE — 92557 COMPREHENSIVE HEARING TEST: CPT | Performed by: AUDIOLOGIST

## 2021-11-17 PROCEDURE — 92593 PR HEARING AID CHECK, BINAURAL: CPT | Performed by: AUDIOLOGIST

## 2021-11-17 NOTE — PROGRESS NOTES
AUDIOLOGY REPORT    SUBJECTIVE:  Marie Vogel is a 37 year old female who was seen in the Audiology Clinic at the [facility] and [facility] for audiologic evaluation and hearing aid consultation, referred by self. She has previously used bilateral Unitron Moxi 12 behind-the-ear (BTE) hearing aids. One is currently lost. The patient has been seen previously in this clinic on 5/15/2017 for assessment and inconsistent results were found, so she returns for evaluation today. The patient reports aural fullness and that her allergies are active today. The patient denies otalgia, drainage, dizziness, or tinnitus.  The patient notes difficulty with communication in a variety of listening situations.    OBJECTIVE:  Otoscopic exam indicates ears are clear of cerumen bilaterally     Pure Tone Thresholds assessed using conventional audiometry with fair to good reliability from 250-8000 Hz bilaterally using insert earphones and circumaural headphones     RIGHT:  Normal hearing sensitivity sloping to moderate sensorineural hearing loss    LEFT:    Mild sloping to moderately-severe sensorineural hearing loss    Tympanogram:    RIGHT: Hypermobile eardrum    LEFT:   Normal eardrum mobility    Reflexes (reported by stimulus ear):  RIGHT: Ipsilateral is present at normal levels  RIGHT: Contralateral is absent at frequencies tested  LEFT:   Ipsilateral is absent at frequencies tested  LEFT:   Contralateral is present at elevated levels      Speech Reception Threshold:    RIGHT: 35 dB HL    LEFT:   55 dB HL  Word Recognition Score:     RIGHT: 96% at 75 dB HL using NU-6 recorded word list.    LEFT:   92% at 90 dB HL using NU-6 recorded word list.    Patient is a hearing aid candidate.  Patient would like to move forward with a hearing aid evaluation today. Therefore, the patient was presented with different options for amplification to help aid in communication. Discussed styles, levels of  technology and monaural vs. binaural fitting.     The hearing aid(s) mutually chosen were:  Binaural Widex Beyond 330 Fusion BTEs  COLOR: Silver  BATTERY SIZE: 312  EARMOLD/TIPS: RITE Size 1 with small tulip domes    ASSESSMENT:  Today's results indicate normal hearing sensitivity sloping to moderate sensorineural hearing loss in the right ear, and mild sloping to moderately-severe sensorineural hearing loss in the left ear. Compared to previous audiogram from 1/23/2013, results show a decrease in hearing in the right ear from 4881-9566 Hz, and a decrease in hearing in the left ear at 250, 1000, 2000, 3000, and 6000 Hz. It is noted that hearing is similar to results obtained on 5/15/2017.     Reviewed hearing aid purchase information and warranty information with patient. The 45 day trial period was explained to patient.The patient was given a copy of the [place] [employer] consumer brochure on purchasing hearing instruments. Hearing aids ordered. Hearing aid evaluation completed. Today s results were discussed with the patient in detail.     PLAN:  Patient is scheduled to return in 2-3 weeks for a hearing aid fitting and programming. Purchase agreement will be completed on that date. Please contact this clinic with any questions or concerns.    CHRISSY Larry.  Audiology Doctoral Extern, #[id number]    I was present with the patient for the entire Audiology appointment including all procedures/testing performed by the AuD student, and agree with the student s assessment and plan as documented.      Clovis Kwan, [facility]  Licensed Audiologist  [place] #[id number]     Detail Level: Simple Additional Notes: Patient consent was obtained to proceed with the visit and recommended plan of care after discussion of all risks and benefits, including the risks of COVID-19 exposure.

## 2021-11-17 NOTE — PROGRESS NOTES
AUDIOLOGY REPORT    SUMMARY: Audiology visit completed. See audiogram for results.      RECOMMENDATIONS: Follow-up with ENT.    Dominique Muhammad M.A.   Audiology Doctoral Student    I was present with the patient for the entire Audiology appointment including all reporting/procedures/testing performed by the AuD student, and agree with the assessment and plan as documented.    Jamil Steiner. CCC-A  Licensed Audiologist   [place] #[id number]

## 2021-11-17 NOTE — PROGRESS NOTES
AUDIOLOGY REPORT    SUBJECTIVE:Marie Vogel is a 40 year old female who was seen in the Audiology Clinic at the [facility] on 11/17/2021  for a follow-up check of their hearing aids. The patient has been seen previously in this clinic today 11/17/2021 for assessment and results indicated a mild to moderate sensorineural hearing loss in the right ear, and a mild to moderately severe sensorineural hearing loss in the left ear.Hearing has decreased since her last test. The patient has been seen previously in this clinic and was fit with Widex Beyond 330 Fusion behind-the-ear hearing aids on 08/16/17. Marie reports that she cannot hear well out of her hearing aids. In particular the left one sounds plugged or weak. She is turning the volume up on both of them consistently.    OBJECTIVE:   ANSI was run on both hearing aids.  Both hearing aids were working correctly and were not weak.  As patient had a significant decline in hearing overall gain was raised for DB in both hearing aids.  Gain was also raised in the low tones at 4 dB.  Patient then walked around the lobby and felt that she heard better but that the left one still sounded muffled.  Listening check indicates that both hearing aids sound crisp and clear to me.  Watch patient put in hearing aid and noticed that she was twisting the earmold slightly in her ear which may be causing her to hit the ear canal.  Reviewed proper insertion.  Patient still feels like she cannot hear voices as well in the left ear.  Raised gain at 1500 to 6000 dB at medium inputs only  in the left ear.  Patient felt this was much better.  Walked around the lobby again and patient felt that she was hearing evenly from both ears and hearing better than when she came in    ASSESSMENT: A follow-up appointment for hearing aid's was completed today.  PLAN:Marie will return as needed.Please call this clinic with any questions regarding  today s appointment.      Clovis Kwan, [facility]  Licensed Audiologist  [place] #[id number]

## 2021-12-16 ENCOUNTER — OFFICE VISIT (OUTPATIENT)
Dept: AUDIOLOGY | Facility: CLINIC | Age: 41
End: 2021-12-16
Payer: COMMERCIAL

## 2021-12-16 DIAGNOSIS — H90.3 BILATERAL SENSORINEURAL HEARING LOSS: Primary | ICD-10-CM

## 2021-12-16 PROCEDURE — 99207 PR ASSESSMENT FOR HEARING AID: CPT | Performed by: AUDIOLOGIST

## 2021-12-16 NOTE — PROGRESS NOTES
AUDIOLOGY REPORT    SUBJECTIVE:Marie Vogel is a 40 year old female who was seen in the Audiology Clinic at the [facility] on 12/16/2021  for a follow-up check of their hearing aids. The patient has been seen previously in this clinic on 11/17/2021 for assessment and results indicated a mild to moderate sensorineural hearing loss in the right ear, and a mild to moderately severe sensorineural hearing loss in the left ear. The patient has been seen previously in this clinic and was fit with Widex Beyond 330 Fusion behind-the-ear hearing aids on 08/16/17. At the last appointment both hearing aids were working correctly and were not weak, however Marie reported  that she couldn't hear well out of her hearing aids. In particular the left one sounded plugged.  Today she reports the right ear has changed volume on its own a few times, and the left one is intermittent. She is also getting a sore ear from her left earmold.    OBJECTIVE:   At this point I do feel we should send the left one out for repair with continued concerns.  Patient reports she cannot function without a left hearing aid therefore a loaner Widex Unique 440 is fit today. Encouraged her to use a Dri-aid kit to see if it helps with the right concerns. If not we may need to send it in.     ASSESSMENT: A follow-up appointment for hearing aid's was completed today. Left hearing aid will be sent for repair. Loaner fit in left ear. Also requested a remake of the left earmold.  No charge visit today (in warranty hearing aid check).    PLAN:Marie will be called as soon as the left hearing aid is returned from repair.  She will  at her convenience and let me know if there is any continued concerns.  Please call this clinic with any questions regarding today s appointment.      Clovis Kwan, [facility]-A  Licensed Audiologist  [place] #[id number]

## 2021-12-28 ENCOUNTER — IMMUNIZATION (OUTPATIENT)
Dept: NURSING | Facility: CLINIC | Age: 41
End: 2021-12-28
Payer: COMMERCIAL

## 2021-12-28 PROCEDURE — 91300 PR COVID VAC PFIZER DIL RECON 30 MCG/0.3 ML IM: CPT

## 2021-12-28 PROCEDURE — 0004A PR COVID VAC PFIZER DIL RECON 30 MCG/0.3 ML IM: CPT

## 2022-01-05 ENCOUNTER — E-VISIT (OUTPATIENT)
Dept: PEDIATRICS | Facility: CLINIC | Age: 42
End: 2022-01-05
Payer: COMMERCIAL

## 2022-01-05 DIAGNOSIS — Z20.822 SUSPECTED COVID-19 VIRUS INFECTION: Primary | ICD-10-CM

## 2022-01-05 PROCEDURE — 99421 OL DIG E/M SVC 5-10 MIN: CPT | Performed by: INTERNAL MEDICINE

## 2022-01-05 NOTE — PATIENT INSTRUCTIONS
Marie,      Based on your responses, you may have coronavirus (COVID-19). This illness can cause fever, cough and trouble breathing. Many people get a mild case and get better on their own. Some people can get very sick.    Will I be tested for COVID-19?  We would like to test you for COVID-19 virus. I have placed orders for this test.     To schedule: go to your [employer] home page and scroll down to the section that says  You have an appointment that needs to be scheduled  and click the large green button that says  Schedule Now  and follow the steps to find the next available openings.    If you are unable to complete these [employer] scheduling steps, please call [phone] to schedule your testing.     Return to work/school/ guidance:  Please let your workplace manager and staffing office know when your isolation ends.       If you receive a positive COVID-19 test result, follow the guidance of the those who are giving you the results. Usually the return to work is 10 days from symptom onset or positive test date, (or in some cases 20 days if you are immunocompromised). If your symptoms started after your positive test, the 10 days should start when your symptoms started.   o If you work at [employer], you must also be cleared by Employee Occupational Health and Safety to return to work.      If you receive a negative COVID-19 test result and did not have a high risk exposure to someone with a known positive COVID-19 test, you can return to work once you're free of fever for 24 hours without fever-reducing medication and your symptoms are improving or resolved.    If you receive a negative COVID-19 test and had a high-risk exposure to someone who has tested positive for COVID-19 then you can return to work 14 days after your last contact with the positive individual. Follow quarantine guidance given by your doctor or public health officials.     Sign up for [employer]:  We know it's scary to hear  that you might have COVID-19. We want to track your symptoms to make sure you're okay over the next 2 weeks. Please look for an email from [employer]--this is a free, online program that we'll use to keep in touch. To sign up, follow the link in the email you will receive. Learn more at [email or web address]    How can I take care of myself?  Over the counter medications may help with your symptoms like congestion, cough, chills, or fever     There are not many effective prescription treatments for early COVID-19. Hydroxychloroquine, ivermectin, and azithromycin are not effective or recommended for COVID-19.    If your symptoms started in the last 10 days, you may be able to receive a treatment with monoclonal antibodies. This treatment can lower your risk of severe illness and going to the hospital. It is given through an IV or under your skin (subcutaneous) and must be given at an infusion center. You must be 12 or older, weight at least 88 pounds, and have a positive COVID-19 test.     If you would like to sign up to be considered to receive the monoclonal antibody medicine, please complete a participation form through the [facility] here: MNRAP ([email or web address]). You may also call the [facility] COVID-19 Public Hotline at 1-782.507.5187 (open Mon-Fri: 9am-7pm and Sat: 10am-6pm).     Not all people who are eligible will receive the medicine, since supply is limited. You will be contacted in the next 1 to 2 business days only if you are selected. If you do not receive a call, you have not been selected to receive the medicine. If you have any questions about this medication, please contact your primary care provider. For more information, see [email or web address]      Get lots of rest. Drink extra fluids (unless a doctor has told you not to)    Take Tylenol (acetaminophen) or ibuprofen for fever or  pain. If you have liver or kidney problems, ask your family doctor if it's okay to take Tylenol o ibuprofen    Take over the counter medications for your symptoms, as directed by your doctor. You may also talk to your pharmacist.      If you have other health problems (like cancer, heart failure, an organ transplant or severe kidney disease): Call your specialty clinic if you don't feel better in the next 2 days.    Know when to call 911. Emergency warning signs include:  o Trouble breathing or shortness of breath  o Pain or pressure in the chest that doesn't go away  o Feeling confused like you haven't felt before, or not being able to wake up  o Bluish-colored lips or face    Where can I get more information?     [employer] [facility] - About COVID-19: [email or web address]     CDC - What to Do If You're Sick:     www.cdc.gov/coronavirus/2019-ncov/about/steps-when-sick.html    CDC - Ending Home Isolation:  https://www.cdc.gov/coronavirus/2019-ncov/your-health/quarantine-isolation.html    CDC - Caring for Someone:  www.cdc.gov/coronavirus/2019-ncov/if-you-are-sick/care-for-someone.html    [facility] clinical trials (COVID-19 research studies): clinicalaffairs.[facility]-clinical-trials    Below are the COVID-19 hotlines at the [place] Department of Health ([facility]). Interpreters are available.  o For health questions: Call 917-104-8031 or 1-663.225.8162 (7 a.m. to 7 p.m.)  o For questions about schools and childcare: Call 496-074-5201 or 1-543.411.2407 (7 a.m. to 7 p.m.)  Marie,    Your symptoms show that you may have coronavirus (COVID-19). This illness can cause fever, cough and trouble breathing. Many people get a mild case and get better on their own. Some people can get very sick.    Because you reported additional symptoms, I would like to also test you for flu.    What should I do?  We would like to test you for COVID-19 virus and flu. I have placed orders for these tests. To schedule: go  to your [employer] home page and scroll down to the section that says  You have an appointment that needs to be scheduled  and click the large green button that says  Schedule Now  and follow the steps to find the next available openings. It is important that when you are asked what the reason for your appointment is that you mention you need BOTH COVID and flu tests.    If you are unable to complete these [employer] scheduling steps, please call [phone] to schedule your testing.     Return to work/school/ guidance:   Please let your workplace manager and staffing office know when your isolation ends.     If you receive a positive COVID-19 test result, follow the guidance of the those who are giving you the results. Usually the return to work is 10 days from symptom onset or positive test date, whichever comes first (or in some cases 20 days if you are immunocompromised). If your symptoms started after your positive test, the 10 days should start when your symptoms started.   If you work at [employer], you must also be cleared by Employee Occupational Health and Safety to return to work.    If you receive a negative COVID-19 test result and did not have a high risk exposure to someone with a known positive COVID-19 test, you can return to work once you're free of fever for 24 hours without fever-reducing medication and your symptoms are improving or resolved.  If you receive a negative COVID-19 test and if you had a high risk exposure to someone who has tested positive for COVID-19 then you can return to work 14 days after your last contact with the positive individual. Follow quarantine guidance given by your doctor or public health officials.    Sign up for [employer].   We know it's scary to hear that you might have COVID-19. We want to track your symptoms to make sure you're okay over the next 2 weeks. Please look for an email from [employer]--this is a free, online program that we'll use to keep  in touch. To sign up, follow the link in the email you will receive. Learn more at [email or web address]    How can I take care of myself?  Over the counter medications may help with your symptoms like congestion, cough, chills, or fever.    There are not many effective prescription treatments for early COVID-19. Hydroxychloroquine, ivermectin, and azithromycin are not effective or recommended for COVID-19.    If your symptoms started in the last 10 days, you may be able to receive a treatment with monoclonal antibodies. This treatment can lower your risk of severe illness and going to the hospital. It is given through an IV or under your skin (subcutaneous) and must be given at an infusion center. You must be 12 or older, weight at least 88 pounds, and have a positive COVID-19 test.      If you would like to sign up to be considered to receive the monoclonal antibody medicine, please complete a participation form through the [place] Department of Health here:  MNRAP ([email or web address]://[email or web address]./diseases/[email or web address]). You may also call the [facility] COVID-19 Public Hotline at 1-550.271.6295 (open Mon-Fri: 9am-7pm and Sat: 10am-6pm).     Not all people who are eligible will receive the medicine, since supply is limited. You will be contacted in the next 1 to 2 business days only if you are selected. If you do not receive a call, you have not been selected to receive the medicine. If you have any questions about this medication, please contact your primary care provider. For more information, see [email or web address]/diseases/[email or web address]/[email or web address]    Get lots of rest. Drink extra fluids (unless a doctor has told you not to)  Take Tylenol (acetaminophen) or ibuprofen for fever or pain. If you have liver or kidney problems, ask your family doctor if it's okay to take Tylenol or ibuprofen  Take over the counter medications for your symptoms, as directed by your doctor. You may also talk to  your pharmacist.    If you have other health problems (like cancer, heart failure, an organ transplant or severe kidney disease): Call your specialty clinic if you don't feel better in the next 2 days.  Know when to call 911. Emergency warning signs include:  Trouble breathing or shortness of breath  Pain or pressure in the chest that doesn't go away  Feeling confused like you haven't felt before, or not being able to wake up  Bluish-colored lips or face    Where can I get more information?  [facility] - About COVID-19: [email or web address]   CDC - What to Do If You're Sick:   www.cdc.gov/coronavirus/2019-ncov/about/steps-when-sick.html  CDC - Ending Home Isolation:  https://www.cdc.gov/coronavirus/2019-ncov/your-health/quarantine-isolation.html  CDC - Caring for Someone:  www.cdc.gov/coronavirus/2019-ncov/if-you-are-sick/care-for-someone.html  [facility] clinical trials (COVID-19 research studies): clinicalaffairs.[facility]-clinical-trials  Below are the COVID-19 hotlines at the [facility] ([facility]). Interpreters are available.  For health questions: Call 722-882-4185 or 1-399.379.8493 (7 a.m. to 7 p.m.)  For questions about schools and childcare: Call 396-103-6743 or 1-341.287.4929 (7 a.m. to 7 p.m.)

## 2022-01-07 ENCOUNTER — LAB (OUTPATIENT)
Dept: URGENT CARE | Facility: URGENT CARE | Age: 42
End: 2022-01-07
Attending: INTERNAL MEDICINE
Payer: COMMERCIAL

## 2022-01-07 DIAGNOSIS — Z20.822 SUSPECTED COVID-19 VIRUS INFECTION: ICD-10-CM

## 2022-01-07 LAB
FLUAV AG SPEC QL IA: NEGATIVE
FLUBV AG SPEC QL IA: NEGATIVE
SARS-COV-2 RNA RESP QL NAA+PROBE: POSITIVE

## 2022-01-07 PROCEDURE — U0005 INFEC AGEN DETEC AMPLI PROBE: HCPCS

## 2022-01-07 PROCEDURE — U0003 INFECTIOUS AGENT DETECTION BY NUCLEIC ACID (DNA OR RNA); SEVERE ACUTE RESPIRATORY SYNDROME CORONAVIRUS 2 (SARS-COV-2) (CORONAVIRUS DISEASE [COVID-19]), AMPLIFIED PROBE TECHNIQUE, MAKING USE OF HIGH THROUGHPUT TECHNOLOGIES AS DESCRIBED BY CMS-2020-01-R: HCPCS

## 2022-01-07 PROCEDURE — 87804 INFLUENZA ASSAY W/OPTIC: CPT

## 2022-01-13 ENCOUNTER — E-VISIT (OUTPATIENT)
Dept: PEDIATRICS | Facility: CLINIC | Age: 42
End: 2022-01-13
Payer: COMMERCIAL

## 2022-01-13 DIAGNOSIS — J01.90 ACUTE SINUSITIS, RECURRENCE NOT SPECIFIED, UNSPECIFIED LOCATION: Primary | ICD-10-CM

## 2022-01-13 PROCEDURE — 99421 OL DIG E/M SVC 5-10 MIN: CPT | Performed by: INTERNAL MEDICINE

## 2022-01-13 RX ORDER — DOXYCYCLINE HYCLATE 100 MG
100 TABLET ORAL 2 TIMES DAILY
Qty: 14 TABLET | Refills: 0 | Status: SHIPPED | OUTPATIENT
Start: 2022-01-13 | End: 2022-01-20

## 2022-01-13 NOTE — PATIENT INSTRUCTIONS
When to Use Antibiotics    Antibiotics are medicines used to treat infections caused by bacteria. They don t work for an illness caused by a virus. And they don't work for an allergic reaction. In fact, taking antibiotics for reasons other than an infection by bacteria can cause problems. You may have side effects from the medicine. And if you need an antibiotic in the future, it may not work well. This is because the bacteria can become immune to the medicine. You can also get a type of diarrhea that's hard to treat. This diarrhea is called C. diff.   When antibiotics likely won t help  Your healthcare provider won t usually give you antibiotics for the conditions listed below. You can help by not asking for them if you have:     A cold. This type of illness is caused by a virus. It can cause a runny nose, stuffed-up nose, sneezing, coughing, and headache. You may also have mild body aches and low fever. A cold gets better on its own in a few days to a week.    The flu (influenza). This is a respiratory illness caused by a virus. The flu usually goes away on its own in a week or so. It can cause fever, body aches, sore throat, and tiredness.    Bronchitis. This is an infection in the lungs. It is most often caused by a virus. You may have coughing, phlegm, body aches, and a low fever. A common type of bronchitis is known as a chest cold. This is called acute bronchitis. This often happens after you have a respiratory infection like a cold. Bronchitis can take weeks to go away. Antibiotics often don t help.    Most sore throats. Sore throats are most often caused by viruses. Your throat may feel scratchy or achy. It may hurt to swallow. You may also have a low fever and body aches. A sore throat usually gets better in a few days.    Most outer ear infections. An ear infection may be caused by a virus or bacteria. It causes pain in the ear. Antibiotics by mouth usually don t help. Low-dose antibiotic ear drops  work much better.    Some inner ear infections. An inner ear infection (otitis media) can be caused by a virus in the ear. It can also cause pain and a high fever. Most older children with low-grade fever don't need to be treated with antibiotics.    Most sinus infections. This is also known as sinusitis. This kind of infection causes sinus pain and swelling, and a runny nose. In most cases, it goes away on its own. Antibiotics don t make recovery quicker.    Allergic rhinitis. This is a set of symptoms caused by an allergic reaction. You may have sneezing, a runny nose, itchy or watery eyes, or a sore throat. Allergies are not treated with antibiotics.    Low fever. A mild fever that s less than 100.4 F (38 C) most likely doesn t need to be treated with antibiotics.   When antibiotics can help  Antibiotics can be used to treat:                                                       Strep throat. This is a throat infection caused by a certain type of bacteria. Symptoms of strep throat include a sore throat, white patches on the tonsils, red spots on the roof of the mouth, fever, body aches, and nausea and vomiting. Strep throat almost never causes a cough.    Urinary tract infection (UTI). This is an infection of the bladder and the tube that takes urine out of the body. It is caused by bacteria. It can cause burning pain and urine that s cloudy or tinted with blood. UTIs are very common. Antibiotics usually help treat them.    Some outer ear infections. In some cases, a healthcare provider may prescribe antibiotics by mouth for an ear infection. You may need a test to show the cause of the ear infection.    Some sinus infections. In some cases, your healthcare provider may give you antibiotics. He or she may first need to make sure your symptoms aren t caused by something else. This may be a virus, fungus, allergies, or air pollutants such as smoke.   Your healthcare provider may give you antibiotics if you have a  condition that can affect your immune system. This includes diabetes or cancer.  Self-care at home  If your infection can t be treated with antibiotics, you can take other steps to feel better. Try the remedies below. In general:     Rest and sleep as much as needed.    Drink water and other clear fluids.    Don t smoke. Stay away from smoke from other people.    Use over-the-counter medicine such as acetaminophen or ibuprofen to ease pain or fever, as directed by your healthcare provider.  To treat sinus pain or nasal stuffiness:    Put a warm, moist cloth on your face where you feel sinus pain or pressure.    Try a nasal spray with medicine or saline. Use as directed by your healthcare provider.    Breathe in steam from a hot shower.    Use a humidifier or cool mist vaporizer.   To quiet a cough:     Use a humidifier or cool mist vaporizer.    Breathe in steam from a hot shower.    Suck on cough lozenges.   To sooth a sore throat:     Suck on ice chips, frozen ice pops, or lozenges.    Use a sore throat spray.    Use a humidifier or cool mist vaporizer.    Gargle with saltwater.    Drink warm liquids.    Take ibuprofen to reduce swelling and pain.  To ease ear pain:     Hold a warm, moist washcloth on the ear for 10 minutes at a time.  [employer] last reviewed this educational content on 12/1/2019 2000-2021 [employer]. All rights reserved. This information is not intended as a substitute for professional medical care. Always follow your healthcare professional's instructions.          Sinusitis (Antibiotic Treatment)    The sinuses are air-filled spaces within the bones of the face. They connect to the inside of the nose. Sinusitis is an inflammation of the tissue that lines the sinuses. Sinusitis can occur during a cold. It can also happen due to allergies to pollens and other particles in the air. Sinusitis can cause symptoms of sinus congestion and a feeling of fullness. A sinus infection causes  fever, headache, and facial pain. There is often green or yellow fluid draining from the nose or into the back of the throat (post-nasal drip). You have been given antibiotics to treat this condition.   Home care    Take the full course of antibiotics as instructed. Don't stop taking them, even when you feel better.    Drink plenty of water, hot tea, and other liquids as directed by the healthcare provider. This may help thin nasal mucus. It also may help your sinuses drain fluids.    Heat may help soothe painful areas of your face. Use a towel soaked in hot water. Or,  the shower and direct the warm spray onto your face. Using a vaporizer along with a menthol rub at night may also help soothe symptoms.     An expectorant with guaifenesin may help thin nasal mucus and help your sinuses drain fluids. Talk with your provider or pharmacists before taking an over-the-counter (OTC) medicine if you have any questions about it or its side effects..    You can use an OTC decongestant, unless a similar medicine was prescribed to you. Nasal sprays work the fastest. Use one that contains phenylephrine or oxymetazoline. First blow your nose gently. Then use the spray. Don't use these medicines more often than directed on the label. If you do, your symptoms may get worse. You may also take pills that contain pseudoephedrine. Don t use products that combine multiple medicines. This is because side effects may be increased. Read labels. You can also ask the pharmacist for help. (People with high blood pressure should not use decongestants. They can raise blood pressure.) Talk with your provider or pharmacist if you have any questions about the medicine..    OTC antihistamines may help if allergies contributed to your sinusitis. Talk with your provider or pharmacist if you have any questions about the medicine..    Don't use nasal rinses or irrigation during an acute sinus infection, unless your healthcare provider tells  you to. Rinsing may spread the infection to other areas in your sinuses.    Use acetaminophen or ibuprofen to control pain, unless another pain medicine was prescribed to you. If you have chronic liver or kidney disease or ever had a stomach ulcer, talk with your healthcare provider before using these medicines. Never give aspirin to anyone under age 18 who is ill with a fever. It may cause severe liver damage.    Don't smoke. This can make symptoms worse.    Follow-up care  Follow up with your healthcare provider, or as advised.   When to seek medical advice  Call your healthcare provider if any of these occur:     Facial pain or headache that gets worse    Stiff neck    Unusual drowsiness or confusion    Swelling of your forehead or eyelids    Symptoms don't go away in 10 days    Vision problems, such as blurred or double vision    Fever of 100.4 F (38 C) or higher, or as directed by your healthcare provider  Call 911  Call 911 if any of these occur:     Seizure    Trouble breathing    Feeling dizzy or faint    Fingernails, skin or lips look blue, purple , or gray  Prevention  Here are steps you can take to help prevent an infection:     Keep good hand washing habits.    Don t have close contact with people who have sore throats, colds, or other upper respiratory infections.    Don t smoke, and stay away from secondhand smoke.    Stay up to date with of your vaccines.  [employer] last reviewed this educational content on 12/1/2019 2000-2021 [employer]. All rights reserved. This information is not intended as a substitute for professional medical care. Always follow your healthcare professional's instructions.        Dear Marie Vogel    After reviewing your responses, I've been able to diagnose you with?sinusitis.?   2  Based on your responses and diagnosis, I have prescribed doxycycline to treat your symptoms. I have sent this to your pharmacy.?     It is also important to stay well  hydrated, get lots of rest and take over-the-counter decongestants,?tylenol?or ibuprofen if you?are able to?take those medications per your primary care provider to help relieve discomfort.?     It is important that you take?all of?your prescribed medication even if your symptoms are improving after a few doses.? Taking?all of?your medicine helps prevent the symptoms from returning.?     If your symptoms worsen, you develop severe headache, vomiting, high fever (>102), or are not improving in 7 days, please contact your primary care provider for an appointment or visit any of our convenient Walk-in Care or Urgent Care Centers to be seen which can be found on our website?here.?     Thanks again for choosing?us?as your health care partner,?   ?  Alan Paredes MD?

## 2022-01-20 ASSESSMENT — ENCOUNTER SYMPTOMS
DECREASED CONCENTRATION: 0
FATIGUE: 1
TASTE DISTURBANCE: 0
FEVER: 0
ARTHRALGIAS: 1
POLYPHAGIA: 0
JOINT SWELLING: 0
DEPRESSION: 1
LEG PAIN: 0
MUSCLE CRAMPS: 1
INSOMNIA: 0
LIGHT-HEADEDNESS: 0
NECK PAIN: 0
SLEEP DISTURBANCES DUE TO BREATHING: 0
HYPOTENSION: 0
HOARSE VOICE: 0
NECK MASS: 0
SORE THROAT: 0
NERVOUS/ANXIOUS: 0
MUSCLE WEAKNESS: 0
SMELL DISTURBANCE: 0
MYALGIAS: 1
CHILLS: 0
SINUS CONGESTION: 1
POLYDIPSIA: 1
INCREASED ENERGY: 1
SINUS PAIN: 1
ORTHOPNEA: 0
BACK PAIN: 0
HYPERTENSION: 0
WEIGHT GAIN: 0
PALPITATIONS: 0
STIFFNESS: 0
SYNCOPE: 0
DECREASED APPETITE: 0
TROUBLE SWALLOWING: 0
ALTERED TEMPERATURE REGULATION: 1
NIGHT SWEATS: 0
PANIC: 0
WEIGHT LOSS: 0
HALLUCINATIONS: 0
EXERCISE INTOLERANCE: 1

## 2022-01-21 ENCOUNTER — HOSPITAL ENCOUNTER (OUTPATIENT)
Dept: CARDIOLOGY | Facility: CLINIC | Age: 42
Discharge: HOME | End: 2022-01-21
Attending: INTERNAL MEDICINE | Admitting: INTERNAL MEDICINE
Payer: COMMERCIAL

## 2022-01-21 DIAGNOSIS — I42.2 HYPERTROPHIC CARDIOMYOPATHY (H): ICD-10-CM

## 2022-01-21 LAB — LVEF ECHO: NORMAL

## 2022-01-21 PROCEDURE — 93306 TTE W/DOPPLER COMPLETE: CPT | Mod: 26 | Performed by: INTERNAL MEDICINE

## 2022-01-21 PROCEDURE — 93306 TTE W/DOPPLER COMPLETE: CPT

## 2022-01-21 NOTE — PROGRESS NOTES
History:    Ms. Vogel is a very pleasant 41 year old woman with insulin requiring type 2 diabetes and a family history of hypertrophic cardiomyopathy. She has hypertension and hyperlipidemia. She has hypertrophic cardiomyopathy with septal thickness of 16 - 17 mm and no significant myocardial fibrosis. She had treadmill stress test that did not demonstrate VT or hypotension and exercise time was 10 mn.      Interval History:  She had COVID infection earlier this year. She works at [employer]. She otherwise has no chest pain, syncope, LE edema, orthopnea, or PND. She has stable exertional dyspnea.      ROS:  A complete 10-point ROS was negative except as above.      Current Outpatient Medications   Medication Sig Dispense Refill     albuterol (PROAIR HFA/PROVENTIL HFA/VENTOLIN HFA) 108 (90 Base) MCG/ACT inhaler Inhale 2 puffs into the lungs every 4 hours as needed for shortness of breath / dyspnea or wheezing 18 g 3     atenolol (TENORMIN) 25 MG tablet Take 0.5 tablets (12.5 mg) by mouth daily 90 tablet 3     blood glucose (PEPE CONTOUR NEXT) test strip Check 4 times/day 300 each 0     blood glucose monitoring (NO BRAND SPECIFIED) meter device kit Use to test blood sugar 6 times daily and as needed. 1 kit 0     cetirizine (ZYRTEC) 10 MG tablet Take 10 mg by mouth 2 times daily  90 tablet 3     Continuous Blood Gluc Sensor (DEXCOM G6 SENSOR) MISC 1 each every 10 days Change every 10 days. 9 each 2     Continuous Blood Gluc Sensor (FREESTYLE BESSIE 14 DAY SENSOR) MISC CHANGE EVERY 14 DAYS 6 each 3     Continuous Blood Gluc Transmit (DEXCOM G6 TRANSMITTER) MISC 1 each every 3 months Change every 3 months. 1 each 1     EPINEPHrine (ANY BX GENERIC EQUIV) 0.3 MG/0.3ML injection 2-pack Inject 0.3 mLs (0.3 mg) into the muscle once as needed for anaphylaxis 0.3 mL 11     fluticasone (FLONASE) 50 MCG/ACT spray Spray 1-2 sprays into both nostrils daily 1 Bottle 0     glucagon (GLUCAGON EMERGENCY) 1 MG kit Inject 1 mg  "into the muscle as needed for low blood sugar 1 mg 0     ibuprofen (ADVIL) 200 MG tablet Take 200 mg by mouth every 4 hours as needed        insulin aspart (NOVOLOG VIAL) 100 UNITS/ML vial USE WITH INSULIN PUMP AS DIRECTED, USES ABOUT 80 UNITS PER DAY 80 mL 0     Insulin Disposable Pump (OMNIPOD DASH 5 PACK PODS) MISC Inject 1 each Subcutaneous every 48 hours 90 day RX 45 each 3     Insulin Disposable Pump (OMNIPOD DASH SYSTEM) KIT 1 kit continuous 1 kit 0     insulin glargine (LANTUS PEN) 100 UNIT/ML pen Take 20 units in case of pump malfunction only. 15 mL 0     INSULIN PUMP - OUTPATIENT Updated 8/3/21:  OmniPod Dash  BASAL:  00:00: 0.75 units/hr  CARB RATIO:  00:00: 10  Sensitivity:  00:00: 40 mg/dL  BLOOD GLUCOSE TARGET and times:  12   AM (midnight): 120-120  Active Insulin Time: 4 hours  Sensor: Nadia/ Nadia Link Donna  [email or web address]):   Username [email or web address]  Password [email or web address]       insulin syringe-needle U-100 (29G X 1/2\" 1 ML) 29G X 1/2\" 1 ML miscellaneous Use 1 syringe once daily or as needed 100 each 1     Lancets (MICROLET) MISC 1 Device See Admin Instructions. Use up to 5 times daily for blood sugar checks. 100 each prn     levonorgestrel (MIRENA) 20 MCG/24HR IUD 1 each (20 mcg) by Intrauterine route once       losartan (COZAAR) 50 MG tablet 1 tab each morning and one half tab each evening 135 tablet 3     MAGNESIUM OXIDE PO Take 400 mg by mouth daily       metFORMIN (GLUCOPHAGE-XR) 500 MG 24 hr tablet TAKE TWO TABLETS BY MOUTH TWICE A DAY WITH MEALS 360 tablet 3     montelukast (SINGULAIR) 10 MG tablet TAKE ONE TABLET BY MOUTH EVERY NIGHT AT BEDTIME 90 tablet 3     Multiple Vitamins-Minerals (MULTI VITAMIN/MINERALS PO) Take 1 each by mouth daily.       Omega-3 Fatty Acids (FISH OIL) 500 MG CAPS Take 1 capsule by mouth       omeprazole (PRILOSEC) 40 MG DR capsule TAKE ONE CAPSULE BY MOUTH ONCE DAILY AS NEEDED (Patient taking differently: daily ) 90 capsule 3     sertraline (ZOLOFT) 25 MG tablet Take 1 " tablet (25 mg) by mouth daily 90 tablet 3     SUMAtriptan (IMITREX) 25 MG tablet TAKE ONE TO FOUR TABLETS BY MOUTH ONE TIME. MAY REPEAT 1 TABLET EVERY TWO HOURS UP TO MAXIMUM OF 200MG IN 24 HOURS 8 tablet 11     traZODone (DESYREL) 50 MG tablet TAKE ONE-HALF TO ONE TABLET BY MOUTH NIGHTLY AS NEEDED FOR SLEEP 60 tablet 3     TRULICITY 1.5 MG/0.5ML pen INJECT 1.5 MG SUBCUTANEOUS EVERY 7 DAYS 8 mL 3     Urine Glucose-Ketones Test STRP Daily as needed 25 strip 1     vitamin D3 (CHOLECALCIFEROL) 50 mcg (2000 units) tablet Take 1 tablet by mouth daily       aspirin 81 MG tablet Take 1 tablet (81 mg) by mouth daily (Patient not taking: Reported on 1/24/2022) 90 tablet 3     clotrimazole (LOTRIMIN) 1 % external cream Apply topically 2 times daily (Patient not taking: Reported on 9/10/2021) 60 g 3     rosuvastatin (CRESTOR) 5 MG tablet Take 1 tablet (5 mg) by mouth daily (Patient not taking: Reported on 1/24/2022) 90 tablet 3       Allergies - reviewed     Allergies   Allergen Reactions     Ivp Dye [Contrast Dye] Itching     Pt reports that throat itches     Nuts Anaphylaxis     [place] nuts only     Bactrim Swelling     Pt reaction was swelling in mouth and tongue and itchy mouth.  Resolved with benadryl and prednisone     Pcn [Penicillins] Hives     Cephalosporins Itching     Seasonal Allergies Other (See Comments)     Molds, trees, grass, dust..  Upper congestion     Atorvastatin      myalgias     Shellfish Allergy Rash     Clams only       Past history -reviewed  Active Ambulatory Problems     Diagnosis Date Noted     Allergic rhinitis 11/20/2006     HYPERLIPIDEMIA LDL GOAL <100 02/10/2010     Family history of other cardiovascular diseases 02/22/2010     Health Care Home 07/13/2011     Microalbuminuria 04/02/2014     Insulin pump in place 04/02/2014     Vitamin D deficiency 04/02/2015     Nut allergy 06/14/2015     Type 2 diabetes mellitus with diabetic nephropathy 10/20/2015     Diabetic gastroparesis (H) 05/17/2016      Hypertrophic obstructive cardiomyopathy (H) 2016     PFO (patent foramen ovale) 2016     Other migraine without status migrainosus, not intractable 2017     Scars of posterior pole of chorioretina, bilateral 2017     Cervical high risk HPV (human papillomavirus) test positive 2018     Mirena IUD inserted 19: Due for removal 2024     Depression 2021     Other insomnia 2021     Moderate episode of recurrent major depressive disorder (H) 2021     Resolved Ambulatory Problems     Diagnosis Date Noted     Diabetes mellitus, type 2 (H) 2005     Mixed hyperlipidemia 2005     Adjustment disorder with depressed mood 2008     Atypical glandular cells on Pap smear 2008     Diabetes mellitus during pregnancy, antepartum 2009     High-risk pregnancy supervision 2009     High-risk pregnancy, young multigravida 2010     History of  delivery, currently pregnant 2010     Type 2 diabetes, HbA1c goal < 7% (H) 2010     Sudden hearing loss 2012     Anaphylactic reaction due to food - Avocado 2012     Anaphylactic reaction 2012     Diabetic nephropathy (H) 2013     Migraine without aura 2013     Type 2 diabetes mellitus, uncontrolled, with renal complications (H) 2014     IUD (intrauterine device) in place 2015     Cervical pain 2016     Left-sided thoracic back pain 2016     Back pain 03/15/2018     Past Medical History:   Diagnosis Date     Allergic rhinitis due to pollen      Gastroesophageal reflux disease      Stargardt's disease 2019     Type II or unspecified type diabetes mellitus without mention of complication, not stated as uncontrolled         Social history - reviewed  Social History     Socioeconomic History     Marital status: Patient Declined     Spouse name: Not on file     Number of children: 2     Years of education: 14      Highest education level: Associate degree: academic program   Occupational History     Occupation: nurse     Occupation: RN   Tobacco Use     Smoking status: Former Smoker     Packs/day: 0.00     Years: 0.00     Pack years: 0.00     Types: Cigarettes     Quit date: 10/24/2005     Years since quittin.2     Smokeless tobacco: Never Used     Tobacco comment: States only smokes when drinks maybe 2x/year   Substance and Sexual Activity     Alcohol use: No     Alcohol/week: 0.0 standard drinks     Drug use: No     Sexual activity: Yes     Partners: Male     Birth control/protection: I.U.D., Condom     Comment: Mirena IUD 7/28/15   Other Topics Concern     Parent/sibling w/ CABG, MI or angioplasty before 65F 55M? No   Social History Narrative    Caffeine intake/servings daily - 6-7    Calcium intake/servings daily - 2-3 yogurt, milk, cheese    Exercise 1 times weekly - describe aerobics    Sunscreen used - Yes    Seatbelts used - Yes    Guns stored in the home - No    Self Breast Exam - Yes    Pap test up to date -  Yes    Eye exam up to date -  Yes    Dental exam up to date -  Yes    DEXA scan up to date -  Not Applicable    Flex Sig/Colonoscopy up to date -  Not Applicable    Mammography up to date -  Not Applicable    Immunizations reviewed and up to date - Yes    Abuse: Current or Past (Physical, Sexual or Emotional) - No    Do you feel safe in your environment - Yes    Do you cope well with stress - Yes    Do you suffer from insomnia - Yes     Last updated by: Tatianna Lacey  2005     Social Determinants of Health     Financial Resource Strain: Not on file   Food Insecurity: Not on file   Transportation Needs: Not on file   Physical Activity: Not on file   Stress: Not on file   Social Connections: Not on file   Intimate Partner Violence: Not on file   Housing Stability: Not on file       Family history -reviewed  Family History   Problem Relation Age of Onset     Cardiovascular Mother         hypertrophic  cardiomyopathy     Genitourinary Problems Mother         gallbladder disease     Diabetes Mother         Transplnant induced/      Other - See Comments Mother         heart transplant 2005     Depression Mother      Diabetes Father         type 2     Hypertension Father      Hyperlipidemia Father      Genitourinary Problems Maternal Grandmother         gallbladder disease     Genitourinary Problems Paternal Grandmother         gallbladder disease     Hypertension Paternal Grandfather         high bp     Cerebrovascular Disease Paternal Grandfather              Cardiovascular Brother         hypertrophic cardiomyopathy     Diabetes Brother      Diabetes Brother         type 2     Diabetes Other         Type 2     Glaucoma No family hx of      Macular Degeneration No family hx of        ROS: non contributory on the 10-point review of system    Exam:   BP (!) 130/90 (BP Location: Right arm, Patient Position: Chair, Cuff Size: Adult Regular)   Pulse 95   Wt 78 kg (172 lb)   SpO2 97%   BMI 29.52 kg/m      In general, the patient is in no apparent distress.      HEENT: Sclerae white, not injected.    Neck: No JVD. No thyromegaly  Heart: RRR. Normal S1, S2. No murmur, rub, click, or gallop.    Lungs: Clear bilaterally.  No rhonchi, wheezes, rales.   GI: Soft, nontender, nondistended.   Extremities: No edema.  The pulses are 2+at the radial bilaterally.  Neuro: grossly non focal.   Psych: pleasant and conversant      Data:    All relevant labs were personally reviewed and discussed with the patient.   Chemistry panel:   Recent Labs   Lab Test 21  1509 20  1612 17  0915 17  0915 17    137   < > 138 143   POTASSIUM 5.0 4.3   < > 3.9 4.0   CHLORIDE 109 106   < > 104 110*   CO2 25 25   < > 26 23   ANIONGAP 3 6   < > 7 10   * 151*   < > 214* 147*   BUN 27 16   < > 15 18   CR 1.05* 1.01   < > 0.72 0.86   BILLY 8.9 9.4   < > 9.2 8.2*   MAG  --   --   --  2.1  1.5*   GFRESTIMATED 65 69   < > >90  Non  GFR Calc   73   AST 25 18   < >  --  14   ALT 34 29   < >  --  25    < > = values in this interval not displayed.       CBC:   Recent Labs   Lab Test 01/14/21  1509 07/22/18  2159   WBC 8.6 12.9*   RBC 4.25 4.13   HGB 13.3 13.1   HCT 40.6 38.7   MCV 96 94   MCH 31.3 31.7   MCHC 32.8 33.9   RDW 12.1 12.1    288       Lipid Panel:  Recent Labs   Lab Test 01/20/21  1102 01/14/20  1207 07/01/15  0612 07/01/15  0612 03/27/14  1022   CHOL 131 172   < > 190 146   HDL 35* 41*   < > 42* 32*   LDL 73 82   < > 96 77   TRIG 117 243*   < > 260* 186*   CHOLHDLRATIO  --   --   --  4.5 4.5    < > = values in this interval not displayed.       Thyroid:   TSH   Date Value Ref Range Status   09/24/2019 1.45 0.40 - 4.00 mU/L Final     T4 Free   Date Value Ref Range Status   04/02/2014 0.75 0.70 - 1.85 ng/dL Final     Hemoglobin A1C POCT   Date Value Ref Range Status   07/05/2021 6.5 (H) 0 - 5.6 % Final     Comment:     Normal <5.7% Prediabetes 5.7-6.4%  Diabetes 6.5% or higher - adopted from ADA   consensus guidelines.       INR   Date Value Ref Range Status   11/15/2005 1.07 0.86 - 1.14 Final       Patient's all available and relevant cardiac investigations were personally reviewed and discussed with the patient today.    Echo: 1.21.22  Left ventricular systolic function is normal.The visual ejection fraction is  60-65%.Left ventricular hypertrophy: asymmetric with no LVOT obstruction. Interventricular septum 1.6 cm.  The right ventricular systolic function is normal.  There is mild mitral regurgitation.  The inferior vena cava was normal in size with preserved respiratory variability.     Compared to echo dated 09/17/2018 no significant changes.    Echo: 11.7.19   No significant LVOT gradient at rest or with exercise.   Exercise stress echocardiogram negative for ischemia at a diagnostic level of   peak stress (86% MPHR).   Patient developed no chest pain during  exercise.   Test terminated due to fatigue.   Exercise capacity average for age (10.9 METS).   Blunted BP response to exercise.   Baseline echocardiogram with hyperkinetic LVEF of 65-70%. No regional wall motion abnormalities.   At peak stress, all walls recruit normally. LV augments appropriately with near obliteration of cavity: LVEF >75%.   Peak LVOT gradient at baseline, Valsalva and with peak exercise was 11 mmHg, 20 mmHg and 23 mmHg, respectively.   EKG at baseline and with stress demonstrated no ischemic changes or arrhythmias.     Screening 2D echocardiogram demonstrated no significant valve disease. Normal sized aortic root.     ECG: Oct 2019  Sinus rhythm    CMR: Nov 2019  Hypertrophic cardiomyopathy with asymmetric septal hypertrophy phenotype. There is normal biventricular function, LVEF 58% and RVEF 66%. The maximal wall thickness of 1.7 cm, there is mitral valve ZIA with mild LVOT acceleration at rest, and there is no fibrosis on LGE imaging.     Compared to the prior examination dated 08/08/2016, there has not been any change.        Assessment and Plan:    41 year old female with    1. HCM, with asymmetrical septal hypertrophy of 16-17 mm, no significant LVOT obstruction  2. Family history of HCM  3. Insulin requiring DM, controlled  4. Obesity  5. Hiatal hernia, gastroparesis  6. Hypertension, treated  7. Hyperlipidemia, treated      Marie denies any presyncope, syncope or chest pain and has stable shortness of breath. I have personally reviewed all relevant cardiac investigations and discussed my interpretation with the patient. ECG shows sinus rhythm. CMR confirms HCM of asymmetrical septal hypertrophy phenotype with thickening of the basal to mid septal segments and normal biventricular function. Echo done recently does not show any significant LVOT obstruction.   It is very reassuring that Marie does not have any high risk features for SCD (septum is <30mm, no family history of SCD, personal  history of syncope, VT, or hypotension with exercise).     She appears euvolemic on exam. Her blood pressure is slightly elevated today from not taking her regular medications. I do not recommend any changes to the cardiac medical regimen at this point. She is on rosuvastatin for primary prevention given that she is a diabetic. She does not takeit everyday due to muscle pain and wishes to take it every other day.      Recommendations:   1. Continue current cardiac medications. Ok to take Rosuvastatin every other day to help minimize leg cramp/side effects.   2. Healthy lifestyle encouraged with regular exercise (moderate intensity,150 minutes/week) and diet low in carbohydrates and saturated fat  3. Follow up with me in 1 year      The 10-year ASCVD risk score (Mamadoucatia OLIVER Jr., et al., 2013) is: 1.8%    Values used to calculate the score:      Age: 41 years      Sex: Female      Is Non- : No      Diabetic: Yes      Tobacco smoker: No      Systolic Blood Pressure: 130 mmHg      Is BP treated: No      HDL Cholesterol: 39 mg/dL      Total Cholesterol: 165 mg/dL      ATTENDING ATTESTATION:  This patient has been seen and examined by me January 24, 2022 with Dr. Serrato, cardiology fellow. I have reviewed the vitals, laboratory and imaging data relevant to this patient's care. I have edited this note to reflect our joint assessment and plan, and discussed the plan with the patient.    Melanie Dewey MD, MS  Professor of Medicine  Cardiovascular division      Melanie Dewey MD, MS  Professor of Medicine  Cardiovascular division

## 2022-01-24 ENCOUNTER — OFFICE VISIT (OUTPATIENT)
Dept: CARDIOLOGY | Facility: CLINIC | Age: 42
End: 2022-01-24
Attending: INTERNAL MEDICINE
Payer: COMMERCIAL

## 2022-01-24 VITALS
WEIGHT: 172 LBS | OXYGEN SATURATION: 97 % | SYSTOLIC BLOOD PRESSURE: 130 MMHG | DIASTOLIC BLOOD PRESSURE: 90 MMHG | HEART RATE: 95 BPM | BODY MASS INDEX: 29.52 KG/M2

## 2022-01-24 DIAGNOSIS — I42.2 HYPERTROPHIC CARDIOMYOPATHY (H): ICD-10-CM

## 2022-01-24 DIAGNOSIS — E11.21 TYPE 2 DIABETES MELLITUS WITH DIABETIC NEPHROPATHY, WITH LONG-TERM CURRENT USE OF INSULIN (H): ICD-10-CM

## 2022-01-24 DIAGNOSIS — I10 ESSENTIAL HYPERTENSION: Primary | ICD-10-CM

## 2022-01-24 DIAGNOSIS — Z79.4 TYPE 2 DIABETES MELLITUS WITH DIABETIC NEPHROPATHY, WITH LONG-TERM CURRENT USE OF INSULIN (H): ICD-10-CM

## 2022-01-24 DIAGNOSIS — E78.5 DYSLIPIDEMIA: ICD-10-CM

## 2022-01-24 PROCEDURE — G0463 HOSPITAL OUTPT CLINIC VISIT: HCPCS

## 2022-01-24 PROCEDURE — 99214 OFFICE O/P EST MOD 30 MIN: CPT | Mod: GC | Performed by: INTERNAL MEDICINE

## 2022-01-24 ASSESSMENT — PAIN SCALES - GENERAL: PAINLEVEL: NO PAIN (0)

## 2022-01-24 NOTE — LETTER
1/24/2022      RE: Marie Vogel  [address]  [place] [address]       Dear Colleague,    Thank you for the opportunity to participate in the care of your patient, Marie Vogel, at the [facility] [place] [facility]. Please see a copy of my visit note below.      History:    Ms. Vogel is a very pleasant 41 year old woman with insulin requiring type 2 diabetes and a family history of hypertrophic cardiomyopathy. She has hypertension and hyperlipidemia. She has hypertrophic cardiomyopathy with septal thickness of 16 - 17 mm and no significant myocardial fibrosis. She had treadmill stress test that did not demonstrate VT or hypotension and exercise time was 10 mn.      Interval History:  She had COVID infection earlier this year. She works at [employer]. She otherwise has no chest pain, syncope, LE edema, orthopnea, or PND. She has stable exertional dyspnea.      ROS:  A complete 10-point ROS was negative except as above.      Current Outpatient Medications   Medication Sig Dispense Refill     albuterol (PROAIR HFA/PROVENTIL HFA/VENTOLIN HFA) 108 (90 Base) MCG/ACT inhaler Inhale 2 puffs into the lungs every 4 hours as needed for shortness of breath / dyspnea or wheezing 18 g 3     atenolol (TENORMIN) 25 MG tablet Take 0.5 tablets (12.5 mg) by mouth daily 90 tablet 3     blood glucose (PEPE CONTOUR NEXT) test strip Check 4 times/day 300 each 0     blood glucose monitoring (NO BRAND SPECIFIED) meter device kit Use to test blood sugar 6 times daily and as needed. 1 kit 0     cetirizine (ZYRTEC) 10 MG tablet Take 10 mg by mouth 2 times daily  90 tablet 3     Continuous Blood Gluc Sensor (DEXCOM G6 SENSOR) MISC 1 each every 10 days Change every 10 days. 9 each 2     Continuous Blood Gluc Sensor (FREESTYLE BESSIE 14 DAY SENSOR) MISC CHANGE EVERY 14 DAYS 6 each 3     Continuous Blood Gluc Transmit (DEXCOM G6 TRANSMITTER)  "MISC 1 each every 3 months Change every 3 months. 1 each 1     EPINEPHrine (ANY BX GENERIC EQUIV) 0.3 MG/0.3ML injection 2-pack Inject 0.3 mLs (0.3 mg) into the muscle once as needed for anaphylaxis 0.3 mL 11     fluticasone (FLONASE) 50 MCG/ACT spray Spray 1-2 sprays into both nostrils daily 1 Bottle 0     glucagon (GLUCAGON EMERGENCY) 1 MG kit Inject 1 mg into the muscle as needed for low blood sugar 1 mg 0     ibuprofen (ADVIL) 200 MG tablet Take 200 mg by mouth every 4 hours as needed        insulin aspart (NOVOLOG VIAL) 100 UNITS/ML vial USE WITH INSULIN PUMP AS DIRECTED, USES ABOUT 80 UNITS PER DAY 80 mL 0     Insulin Disposable Pump (OMNIPOD DASH 5 PACK PODS) MISC Inject 1 each Subcutaneous every 48 hours 90 day RX 45 each 3     Insulin Disposable Pump (OMNIPOD DASH SYSTEM) KIT 1 kit continuous 1 kit 0     insulin glargine (LANTUS PEN) 100 UNIT/ML pen Take 20 units in case of pump malfunction only. 15 mL 0     INSULIN PUMP - OUTPATIENT Updated 8/3/21:  OmniPod Dash  BASAL:  00:00: 0.75 units/hr  CARB RATIO:  00:00: 10  Sensitivity:  00:00: 40 mg/dL  BLOOD GLUCOSE TARGET and times:  12   AM (midnight): 120-120  Active Insulin Time: 4 hours  Sensor: Nadia/ Nadia Link Donna  Amish:   [email or web address]  Password [email or web address]       insulin syringe-needle U-100 (29G X 1/2\" 1 ML) 29G X 1/2\" 1 ML miscellaneous Use 1 syringe once daily or as needed 100 each 1     Lancets (MICROLET) MISC 1 Device See Admin Instructions. Use up to 5 times daily for blood sugar checks. 100 each prn     levonorgestrel (MIRENA) 20 MCG/24HR IUD 1 each (20 mcg) by Intrauterine route once       losartan (COZAAR) 50 MG tablet 1 tab each morning and one half tab each evening 135 tablet 3     MAGNESIUM OXIDE PO Take 400 mg by mouth daily       metFORMIN (GLUCOPHAGE-XR) 500 MG 24 hr tablet TAKE TWO TABLETS BY MOUTH TWICE A DAY WITH MEALS 360 tablet 3     montelukast (SINGULAIR) 10 MG tablet TAKE ONE TABLET BY MOUTH EVERY NIGHT AT BEDTIME " 90 tablet 3     Multiple Vitamins-Minerals (MULTI VITAMIN/MINERALS PO) Take 1 each by mouth daily.       Omega-3 Fatty Acids (FISH OIL) 500 MG CAPS Take 1 capsule by mouth       omeprazole (PRILOSEC) 40 MG DR capsule TAKE ONE CAPSULE BY MOUTH ONCE DAILY AS NEEDED (Patient taking differently: daily ) 90 capsule 3     sertraline (ZOLOFT) 25 MG tablet Take 1 tablet (25 mg) by mouth daily 90 tablet 3     SUMAtriptan (IMITREX) 25 MG tablet TAKE ONE TO FOUR TABLETS BY MOUTH ONE TIME. MAY REPEAT 1 TABLET EVERY TWO HOURS UP TO MAXIMUM OF 200MG IN 24 HOURS 8 tablet 11     traZODone (DESYREL) 50 MG tablet TAKE ONE-HALF TO ONE TABLET BY MOUTH NIGHTLY AS NEEDED FOR SLEEP 60 tablet 3     TRULICITY 1.5 MG/0.5ML pen INJECT 1.5 MG SUBCUTANEOUS EVERY 7 DAYS 8 mL 3     Urine Glucose-Ketones Test STRP Daily as needed 25 strip 1     vitamin D3 (CHOLECALCIFEROL) 50 mcg (2000 units) tablet Take 1 tablet by mouth daily       aspirin 81 MG tablet Take 1 tablet (81 mg) by mouth daily (Patient not taking: Reported on 1/24/2022) 90 tablet 3     clotrimazole (LOTRIMIN) 1 % external cream Apply topically 2 times daily (Patient not taking: Reported on 9/10/2021) 60 g 3     rosuvastatin (CRESTOR) 5 MG tablet Take 1 tablet (5 mg) by mouth daily (Patient not taking: Reported on 1/24/2022) 90 tablet 3       Allergies - reviewed     Allergies   Allergen Reactions     Ivp Dye [Contrast Dye] Itching     Pt reports that throat itches     Nuts Anaphylaxis     [place] nuts only     Bactrim Swelling     Pt reaction was swelling in mouth and tongue and itchy mouth.  Resolved with benadryl and prednisone     Pcn [Penicillins] Hives     Cephalosporins Itching     Seasonal Allergies Other (See Comments)     Molds, trees, grass, dust..  Upper congestion     Atorvastatin      myalgias     Shellfish Allergy Rash     Clams only       Past history -reviewed  Active Ambulatory Problems     Diagnosis Date Noted     Allergic rhinitis 11/20/2006     HYPERLIPIDEMIA LDL  GOAL <100 02/10/2010     Family history of other cardiovascular diseases 2010     Health Care Home 2011     Microalbuminuria 2014     Insulin pump in place 2014     Vitamin D deficiency 2015     Nut allergy 2015     Type 2 diabetes mellitus with diabetic nephropathy 10/20/2015     Diabetic gastroparesis (H) 2016     Hypertrophic obstructive cardiomyopathy (H) 2016     PFO (patent foramen ovale) 2016     Other migraine without status migrainosus, not intractable 2017     Scars of posterior pole of chorioretina, bilateral 2017     Cervical high risk HPV (human papillomavirus) test positive 2018     Mirena IUD inserted 19: Due for removal 2024     Depression 2021     Other insomnia 2021     Moderate episode of recurrent major depressive disorder (H) 2021     Resolved Ambulatory Problems     Diagnosis Date Noted     Diabetes mellitus, type 2 (H) 2005     Mixed hyperlipidemia 2005     Adjustment disorder with depressed mood 2008     Atypical glandular cells on Pap smear 2008     Diabetes mellitus during pregnancy, antepartum 2009     High-risk pregnancy supervision 2009     High-risk pregnancy, young multigravida 2010     History of  delivery, currently pregnant 2010     Type 2 diabetes, HbA1c goal < 7% (H) 2010     Sudden hearing loss 2012     Anaphylactic reaction due to food - Avocado 2012     Anaphylactic reaction 2012     Diabetic nephropathy (H) 2013     Migraine without aura 2013     Type 2 diabetes mellitus, uncontrolled, with renal complications (H) 2014     IUD (intrauterine device) in place 2015     Cervical pain 2016     Left-sided thoracic back pain 2016     Back pain 03/15/2018     Past Medical History:   Diagnosis Date     Allergic rhinitis due to pollen      Gastroesophageal reflux  disease      Stargardt's disease 2019     Type II or unspecified type diabetes mellitus without mention of complication, not stated as uncontrolled         Social history - reviewed  Social History     Socioeconomic History     Marital status: Patient Declined     Spouse name: Not on file     Number of children: 2     Years of education: 14     Highest education level: Associate degree: academic program   Occupational History     Occupation: nurse     Occupation: RN   Tobacco Use     Smoking status: Former Smoker     Packs/day: 0.00     Years: 0.00     Pack years: 0.00     Types: Cigarettes     Quit date: 10/24/2005     Years since quittin.2     Smokeless tobacco: Never Used     Tobacco comment: States only smokes when drinks maybe 2x/year   Substance and Sexual Activity     Alcohol use: No     Alcohol/week: 0.0 standard drinks     Drug use: No     Sexual activity: Yes     Partners: Male     Birth control/protection: I.U.D., Condom     Comment: Mirena IUD 7/28/15   Other Topics Concern     Parent/sibling w/ CABG, MI or angioplasty before 65F 55M? No   Social History Narrative    Caffeine intake/servings daily - 6-7    Calcium intake/servings daily - 2-3 yogurt, milk, cheese    Exercise 1 times weekly - describe aerobics    Sunscreen used - Yes    Seatbelts used - Yes    Guns stored in the home - No    Self Breast Exam - Yes    Pap test up to date -  Yes    Eye exam up to date -  Yes    Dental exam up to date -  Yes    DEXA scan up to date -  Not Applicable    Flex Sig/Colonoscopy up to date -  Not Applicable    Mammography up to date -  Not Applicable    Immunizations reviewed and up to date - Yes    Abuse: Current or Past (Physical, Sexual or Emotional) - No    Do you feel safe in your environment - Yes    Do you cope well with stress - Yes    Do you suffer from insomnia - Yes     Last updated by: Tatianna Lacey  2005     Social Determinants of Health     Financial Resource Strain: Not on file    Food Insecurity: Not on file   Transportation Needs: Not on file   Physical Activity: Not on file   Stress: Not on file   Social Connections: Not on file   Intimate Partner Violence: Not on file   Housing Stability: Not on file       Family history -reviewed  Family History   Problem Relation Age of Onset     Cardiovascular Mother         hypertrophic cardiomyopathy     Genitourinary Problems Mother         gallbladder disease     Diabetes Mother         Transplnant induced/      Other - See Comments Mother         heart transplant 2005     Depression Mother      Diabetes Father         type 2     Hypertension Father      Hyperlipidemia Father      Genitourinary Problems Maternal Grandmother         gallbladder disease     Genitourinary Problems Paternal Grandmother         gallbladder disease     Hypertension Paternal Grandfather         high bp     Cerebrovascular Disease Paternal Grandfather              Cardiovascular Brother         hypertrophic cardiomyopathy     Diabetes Brother      Diabetes Brother         type 2     Diabetes Other         Type 2     Glaucoma No family hx of      Macular Degeneration No family hx of        ROS: non contributory on the 10-point review of system    Exam:   BP (!) 130/90 (BP Location: Right arm, Patient Position: Chair, Cuff Size: Adult Regular)   Pulse 95   Wt 78 kg (172 lb)   SpO2 97%   BMI 29.52 kg/m      In general, the patient is in no apparent distress.      HEENT: Sclerae white, not injected.    Neck: No JVD. No thyromegaly  Heart: RRR. Normal S1, S2. No murmur, rub, click, or gallop.    Lungs: Clear bilaterally.  No rhonchi, wheezes, rales.   GI: Soft, nontender, nondistended.   Extremities: No edema.  The pulses are 2+at the radial bilaterally.  Neuro: grossly non focal.   Psych: pleasant and conversant      Data:    All relevant labs were personally reviewed and discussed with the patient.   Chemistry panel:   Recent Labs   Lab Test  01/14/21  1509 02/03/20  1612 05/31/17  0915 05/31/17  0915 04/16/17 2014    137   < > 138 143   POTASSIUM 5.0 4.3   < > 3.9 4.0   CHLORIDE 109 106   < > 104 110*   CO2 25 25   < > 26 23   ANIONGAP 3 6   < > 7 10   * 151*   < > 214* 147*   BUN 27 16   < > 15 18   CR 1.05* 1.01   < > 0.72 0.86   BILLY 8.9 9.4   < > 9.2 8.2*   MAG  --   --   --  2.1 1.5*   GFRESTIMATED 65 69   < > >90  Non  GFR Calc   73   AST 25 18   < >  --  14   ALT 34 29   < >  --  25    < > = values in this interval not displayed.       CBC:   Recent Labs   Lab Test 01/14/21  1509 07/22/18  2159   WBC 8.6 12.9*   RBC 4.25 4.13   HGB 13.3 13.1   HCT 40.6 38.7   MCV 96 94   MCH 31.3 31.7   MCHC 32.8 33.9   RDW 12.1 12.1    288       Lipid Panel:  Recent Labs   Lab Test 01/20/21  1102 01/14/20  1207 07/01/15  0612 07/01/15  0612 03/27/14  1022   CHOL 131 172   < > 190 146   HDL 35* 41*   < > 42* 32*   LDL 73 82   < > 96 77   TRIG 117 243*   < > 260* 186*   CHOLHDLRATIO  --   --   --  4.5 4.5    < > = values in this interval not displayed.       Thyroid:   TSH   Date Value Ref Range Status   09/24/2019 1.45 0.40 - 4.00 mU/L Final     T4 Free   Date Value Ref Range Status   04/02/2014 0.75 0.70 - 1.85 ng/dL Final     Hemoglobin A1C POCT   Date Value Ref Range Status   07/05/2021 6.5 (H) 0 - 5.6 % Final     Comment:     Normal <5.7% Prediabetes 5.7-6.4%  Diabetes 6.5% or higher - adopted from ADA   consensus guidelines.       INR   Date Value Ref Range Status   11/15/2005 1.07 0.86 - 1.14 Final       Patient's all available and relevant cardiac investigations were personally reviewed and discussed with the patient today.    Echo: 1.21.22  Left ventricular systolic function is normal.The visual ejection fraction is  60-65%.Left ventricular hypertrophy: asymmetric with no LVOT obstruction. Interventricular septum 1.6 cm.  The right ventricular systolic function is normal.  There is mild mitral regurgitation.  The  inferior vena cava was normal in size with preserved respiratory variability.     Compared to echo dated 09/17/2018 no significant changes.    Echo: 11.7.19   No significant LVOT gradient at rest or with exercise.   Exercise stress echocardiogram negative for ischemia at a diagnostic level of   peak stress (86% MPHR).   Patient developed no chest pain during exercise.   Test terminated due to fatigue.   Exercise capacity average for age (10.9 METS).   Blunted BP response to exercise.   Baseline echocardiogram with hyperkinetic LVEF of 65-70%. No regional wall motion abnormalities.   At peak stress, all walls recruit normally. LV augments appropriately with near obliteration of cavity: LVEF >75%.   Peak LVOT gradient at baseline, Valsalva and with peak exercise was 11 mmHg, 20 mmHg and 23 mmHg, respectively.   EKG at baseline and with stress demonstrated no ischemic changes or arrhythmias.     Screening 2D echocardiogram demonstrated no significant valve disease. Normal sized aortic root.     ECG: Oct 2019  Sinus rhythm    CMR: Nov 2019  Hypertrophic cardiomyopathy with asymmetric septal hypertrophy phenotype. There is normal biventricular function, LVEF 58% and RVEF 66%. The maximal wall thickness of 1.7 cm, there is mitral valve ZIA with mild LVOT acceleration at rest, and there is no fibrosis on LGE imaging.     Compared to the prior examination dated 08/08/2016, there has not been any change.        Assessment and Plan:    41 year old female with    1. HCM, with asymmetrical septal hypertrophy of 16-17 mm, no significant LVOT obstruction  2. Family history of HCM  3. Insulin requiring DM, controlled  4. Obesity  5. Hiatal hernia, gastroparesis  6. Hypertension, treated  7. Hyperlipidemia, treated      Marie denies any presyncope, syncope or chest pain and has stable shortness of breath. I have personally reviewed all relevant cardiac investigations and discussed my interpretation with the patient. ECG shows sinus  rhythm. CMR confirms HCM of asymmetrical septal hypertrophy phenotype with thickening of the basal to mid septal segments and normal biventricular function. Echo done recently does not show any significant LVOT obstruction.   It is very reassuring that Marie does not have any high risk features for SCD (septum is <30mm, no family history of SCD, personal history of syncope, VT, or hypotension with exercise).     She appears euvolemic on exam. Her blood pressure is slightly elevated today from not taking her regular medications. I do not recommend any changes to the cardiac medical regimen at this point. She is on rosuvastatin for primary prevention given that she is a diabetic. She does not takeit everyday due to muscle pain and wishes to take it every other day.      Recommendations:   1. Continue current cardiac medications. Ok to take Rosuvastatin every other day to help minimize leg cramp/side effects.   2. Healthy lifestyle encouraged with regular exercise (moderate intensity,150 minutes/week) and diet low in carbohydrates and saturated fat  3. Follow up with me in 1 year      The 10-year ASCVD risk score (Mamadou MELODY Jr., et al., 2013) is: 1.8%    Values used to calculate the score:      Age: 41 years      Sex: Female      Is Non- : No      Diabetic: Yes      Tobacco smoker: No      Systolic Blood Pressure: 130 mmHg      Is BP treated: No      HDL Cholesterol: 39 mg/dL      Total Cholesterol: 165 mg/dL      ATTENDING ATTESTATION:  This patient has been seen and examined by me January 24, 2022 with Dr. Serrato, cardiology fellow. I have reviewed the vitals, laboratory and imaging data relevant to this patient's care. I have edited this note to reflect our joint assessment and plan, and discussed the plan with the patient.    Melanie Dewey MD, MS  Professor of Medicine  Cardiovascular division

## 2022-01-24 NOTE — PATIENT INSTRUCTIONS
Cardiology Providers you saw during your visit:  Dr. Dewey    Medication changes: None    Follow up: with Dr. Dewey in 1 year with labs prior.       Follow the American Heart Association Diet and Lifestyle recommendations:  Limit saturated fat, trans fat, sodium, red meat, sweets and sugar-sweetened beverages. If you choose to eat red meat, compare labels and select the leanest cuts available.  Aim for at least 150 minutes of moderate physical activity or 75 minutes of vigorous physical activity - or an equal combination of both - each week.      If you have any questions, call  Santy Olvera RN, at [phone].  Press Option #1 for the [facility], and then press Option #4  We are encouraging the use of Better[employer] to communicate with your HealthCare Provider      After hours, weekends or holidays: On Call Cardiologist- [phone] option #4 and ask to speak to the on-call Cardiologist.

## 2022-01-24 NOTE — NURSING NOTE
Chief Complaint   Patient presents with     Follow Up     cardiomyopathy f/u     Vitals were taken and medications reconciled.    Rock Barrios, EMT  2:37 PM

## 2022-01-24 NOTE — NURSING NOTE
No changes made at this appt. Follow up with Dr. Dewey in 1 year with rex guevara.     Santy Olvera, RN   Cardiology Nurse Coordinator

## 2022-01-26 ENCOUNTER — TELEPHONE (OUTPATIENT)
Dept: ENDOCRINOLOGY | Facility: CLINIC | Age: 42
End: 2022-01-26
Payer: COMMERCIAL

## 2022-01-26 DIAGNOSIS — E11.21 TYPE 2 DIABETES MELLITUS WITH DIABETIC NEPHROPATHY, WITH LONG-TERM CURRENT USE OF INSULIN (H): Primary | ICD-10-CM

## 2022-01-26 DIAGNOSIS — Z79.4 TYPE 2 DIABETES MELLITUS WITH DIABETIC NEPHROPATHY, WITH LONG-TERM CURRENT USE OF INSULIN (H): Primary | ICD-10-CM

## 2022-01-26 NOTE — TELEPHONE ENCOUNTER
Endo staff- can you please take a look into this?  Ok to change to humalog- same dose as novolog.  Using pump.  Thank you.

## 2022-01-26 NOTE — TELEPHONE ENCOUNTER
Pharmacy is calling and said novolog is no longer covered. Humalog vial is covered and needs a 90 day .

## 2022-01-26 NOTE — TELEPHONE ENCOUNTER
Please call patient and set up follow up. Pt was informed in October she would be due for follow up in January. Once appointment made will forward to provider for approval to bridge to appointment only.

## 2022-01-27 NOTE — TELEPHONE ENCOUNTER
Next available with Dr. Muhammad isn't until after April 2022. Okay to wait and will bridge until appointment date?

## 2022-01-31 ENCOUNTER — TELEPHONE (OUTPATIENT)
Dept: ENDOCRINOLOGY | Facility: CLINIC | Age: 42
End: 2022-01-31

## 2022-01-31 NOTE — TELEPHONE ENCOUNTER
Health Call Center    Phone Message    May a detailed message be left on voicemail: yes     Reason for Call: Order(s): Other:     Reason for requested: Per Patient is wanting lab orders put in that Dr. Fuentes is wanting patient to have done. Patient is wanting to have the orders put in prior to appt on 02/10/2022. Patient is wanting to get a call back when this has been done to be aware. Please advise.     Date needed: asap    Provider name: Alfredo      Action Taken: Message routed to:  [facility] Endo    Travel Screening: Not Applicable

## 2022-02-10 ENCOUNTER — LAB (OUTPATIENT)
Dept: LAB | Facility: CLINIC | Age: 42
End: 2022-02-10
Payer: COMMERCIAL

## 2022-02-10 DIAGNOSIS — Z11.1 SCREENING EXAMINATION FOR PULMONARY TUBERCULOSIS: ICD-10-CM

## 2022-02-10 DIAGNOSIS — E11.21 TYPE 2 DIABETES MELLITUS WITH DIABETIC NEPHROPATHY, WITH LONG-TERM CURRENT USE OF INSULIN (H): ICD-10-CM

## 2022-02-10 DIAGNOSIS — Z79.4 TYPE 2 DIABETES MELLITUS WITH DIABETIC NEPHROPATHY, WITH LONG-TERM CURRENT USE OF INSULIN (H): ICD-10-CM

## 2022-02-10 DIAGNOSIS — E78.5 DYSLIPIDEMIA: ICD-10-CM

## 2022-02-10 LAB — HBA1C MFR BLD: 6.4 % (ref 0–5.6)

## 2022-02-10 PROCEDURE — 83036 HEMOGLOBIN GLYCOSYLATED A1C: CPT

## 2022-02-10 PROCEDURE — 80061 LIPID PANEL: CPT

## 2022-02-10 PROCEDURE — 82565 ASSAY OF CREATININE: CPT

## 2022-02-10 PROCEDURE — 36415 COLL VENOUS BLD VENIPUNCTURE: CPT

## 2022-02-10 PROCEDURE — 82043 UR ALBUMIN QUANTITATIVE: CPT

## 2022-02-10 PROCEDURE — 86481 TB AG RESPONSE T-CELL SUSP: CPT

## 2022-02-11 LAB
CHOLEST SERPL-MCNC: 205 MG/DL
CREAT SERPL-MCNC: 0.9 MG/DL (ref 0.52–1.04)
CREAT UR-MCNC: 131 MG/DL
FASTING STATUS PATIENT QL REPORTED: YES
GFR SERPL CREATININE-BSD FRML MDRD: 81 ML/MIN/1.73M2
HDLC SERPL-MCNC: 48 MG/DL
LDLC SERPL CALC-MCNC: 132 MG/DL
MICROALBUMIN UR-MCNC: 775 MG/L
MICROALBUMIN/CREAT UR: 591.6 MG/G CR (ref 0–25)
NONHDLC SERPL-MCNC: 157 MG/DL
TRIGL SERPL-MCNC: 123 MG/DL

## 2022-02-12 LAB
GAMMA INTERFERON BACKGROUND BLD IA-ACNC: 0.05 IU/ML
M TB IFN-G BLD-IMP: NEGATIVE
M TB IFN-G CD4+ BCKGRND COR BLD-ACNC: 9.95 IU/ML
MITOGEN IGNF BCKGRD COR BLD-ACNC: 0.09 IU/ML
MITOGEN IGNF BCKGRD COR BLD-ACNC: 0.1 IU/ML
QUANTIFERON MITOGEN: 10 IU/ML
QUANTIFERON NIL TUBE: 0.05 IU/ML
QUANTIFERON TB1 TUBE: 0.14 IU/ML
QUANTIFERON TB2 TUBE: 0.15

## 2022-02-14 NOTE — RESULT ENCOUNTER NOTE
Labs results/imaging studies results noted. Will discuss in next clinic visit 4/25/22.  Lab Results       Component                Value               Date                       A1C                      6.4                 02/10/2022                 A1C                      6.5                 07/05/2021              Your microalbumen is elevated. This means you have some protein in the urine. It indicates that your kidneys are being affected by diabetes. Keeping blood pressure and blood sugar optimal is the best treatment in order to keep this  Stable. Anybody that has this should be on lisinopril/losartan-as you already are-since this is protective for the kidney's.    Here is a copy for your records.  Follow-up in endocrinology Clinic as scheduled/discussed. We will review these studies/results in further detail at that visit, but feel free to contact us with any other questions in the interim.    Please call endocrinology clinic (nurse line: [phone]) if questions.    Zita Fuentes MD

## 2022-02-17 NOTE — PROGRESS NOTES
"AUDIOLOGY REPORT    SUBJECTIVE:Marie Vogel is a 40 year old female who was seen in the Audiology Clinic at the [facility] on 2/18/22  for a follow-up check of their hearing aids. The patient has been seen previously in this clinic on 11/17/2021 for assessment and results indicated a mild to moderate sensorineural hearing loss in the right ear, and a mild to moderately severe sensorineural hearing loss in the left ear. The patient has been seen previously in this clinic and was fit with Widex Beyond 330 Fusion behind-the-ear hearing aids on 08/16/17.  Left hearing aid was sent for repair at last appointment, and a remade left earmold was also ordered.She was fit with a loaner at last appointment.    ASSESSMENT: A follow-up appointment for hearing aid's was completed today. A remake of the left earmold is placed on hearing aid. Repaired left hearing aid is given to patient. It is toed that her right device was saying \"left ready\". Hooked up both devices to the computer and reprogrammed based on a previous date. Patient now reports the right and left ear correct and both hearing aids sound good. The left earmold is comfortable so far.Hearing aids are reconnected to phone.   No charge visit today (in warranty hearing aid check).    PLAN: Left hearing aid is given back to patient. New earmold is fit.  Please call this clinic with any questions regarding today s appointment.      Clovis Kwan, [facility]  Licensed Audiologist  [place] #[id number]        "

## 2022-02-18 ENCOUNTER — OFFICE VISIT (OUTPATIENT)
Dept: AUDIOLOGY | Facility: CLINIC | Age: 42
End: 2022-02-18
Payer: COMMERCIAL

## 2022-02-18 DIAGNOSIS — H90.3 BILATERAL SENSORINEURAL HEARING LOSS: Primary | ICD-10-CM

## 2022-02-18 PROCEDURE — 99207 PR ASSESSMENT FOR HEARING AID: CPT | Performed by: AUDIOLOGIST

## 2022-02-20 ENCOUNTER — HEALTH MAINTENANCE LETTER (OUTPATIENT)
Age: 42
End: 2022-02-20

## 2022-03-20 ENCOUNTER — MYC MEDICAL ADVICE (OUTPATIENT)
Dept: AUDIOLOGY | Facility: CLINIC | Age: 42
End: 2022-03-20
Payer: COMMERCIAL

## 2022-03-21 ENCOUNTER — TELEPHONE (OUTPATIENT)
Dept: AUDIOLOGY | Facility: CLINIC | Age: 42
End: 2022-03-21
Payer: COMMERCIAL

## 2022-03-21 NOTE — TELEPHONE ENCOUNTER
Walk-in hearing aid services on 3/21/22: The patient reported static/echo noise from her right hearing aid.  The hearing aid was deep cleaned today and a listening and electroacoustic check showed normal function.  Afterwards Marie reported good sound quality.  She asked to have a double dome placed on her hearing aid as she felt the custom acrylic earmold was not comfortable.  A small double dome was placed on both hearing aids today and Marie reported a comfortable fit and good sound quality.  She was advised we would likely need to send her hearing aid to the  for repair if the noise issues persist.  She understood and will return as needed.

## 2022-03-30 ENCOUNTER — MYC REFILL (OUTPATIENT)
Dept: EDUCATION SERVICES | Facility: CLINIC | Age: 42
End: 2022-03-30
Payer: COMMERCIAL

## 2022-03-30 DIAGNOSIS — Z79.4 TYPE 2 DIABETES MELLITUS WITH DIABETIC NEPHROPATHY, WITH LONG-TERM CURRENT USE OF INSULIN (H): ICD-10-CM

## 2022-03-30 DIAGNOSIS — E11.21 TYPE 2 DIABETES MELLITUS WITH DIABETIC NEPHROPATHY, WITH LONG-TERM CURRENT USE OF INSULIN (H): ICD-10-CM

## 2022-03-30 RX ORDER — INSULIN PUMP CONTROLLER
1 EACH MISCELLANEOUS CONTINUOUS
Qty: 1 KIT | Refills: 0 | Status: SHIPPED | OUTPATIENT
Start: 2022-03-30 | End: 2023-01-28

## 2022-04-18 NOTE — PROGRESS NOTES
Outcome for 04/18/22 4:27 PM: [employer] message sent  Kelly Minaya, VF   Outcome for 04/20/22 4:35 PM: [employer] emailed to provider  Kelly Minaya, VF  Outcome for 04/21/22 8:39 AM: Pt up to date on glooko for Dexcom. Will upload Omnipod Friday 4/22.- per patient   Taylor Myhre, VF   Outcome for 04/22/22 12:10 PM: Per patient, will upload device before appointment - waiting for omnipod upload.   Elaina Shipley, VF  Outcome for 04/25/22 8:40 AM: [employer] emailed to provider  Marni Santos, ZEV Sun Jaspreet  is being evaluated via a billable video visit.      How would you like to obtain your AVS? [employer]  For the video visit, send the invitation by: Text to cell phone: [phone]  Will anyone else be joining your video visit? No

## 2022-04-25 ENCOUNTER — VIRTUAL VISIT (OUTPATIENT)
Dept: ENDOCRINOLOGY | Facility: CLINIC | Age: 42
End: 2022-04-25
Payer: COMMERCIAL

## 2022-04-25 DIAGNOSIS — E11.43 DIABETIC GASTROPARESIS (H): ICD-10-CM

## 2022-04-25 DIAGNOSIS — K31.84 DIABETIC GASTROPARESIS (H): ICD-10-CM

## 2022-04-25 DIAGNOSIS — Z79.4 TYPE 2 DIABETES MELLITUS WITH DIABETIC NEPHROPATHY, WITH LONG-TERM CURRENT USE OF INSULIN (H): ICD-10-CM

## 2022-04-25 DIAGNOSIS — E78.5 HYPERLIPIDEMIA LDL GOAL <100: ICD-10-CM

## 2022-04-25 DIAGNOSIS — R80.9 MICROALBUMINURIA: ICD-10-CM

## 2022-04-25 DIAGNOSIS — E11.21 TYPE 2 DIABETES MELLITUS WITH DIABETIC NEPHROPATHY, WITH LONG-TERM CURRENT USE OF INSULIN (H): ICD-10-CM

## 2022-04-25 DIAGNOSIS — E11.21 TYPE 2 DIABETES MELLITUS WITH DIABETIC NEPHROPATHY, WITH LONG-TERM CURRENT USE OF INSULIN (H): Primary | ICD-10-CM

## 2022-04-25 DIAGNOSIS — Z96.41 INSULIN PUMP IN PLACE: ICD-10-CM

## 2022-04-25 DIAGNOSIS — Z79.4 TYPE 2 DIABETES MELLITUS WITH DIABETIC NEPHROPATHY, WITH LONG-TERM CURRENT USE OF INSULIN (H): Primary | ICD-10-CM

## 2022-04-25 PROCEDURE — 99214 OFFICE O/P EST MOD 30 MIN: CPT | Mod: 95 | Performed by: INTERNAL MEDICINE

## 2022-04-25 PROCEDURE — 95251 CONT GLUC MNTR ANALYSIS I&R: CPT | Performed by: INTERNAL MEDICINE

## 2022-04-25 RX ORDER — IBUPROFEN 600 MG/1
TABLET ORAL
Qty: 1 KIT | Refills: 0 | Status: SHIPPED | OUTPATIENT
Start: 2022-04-25

## 2022-04-25 RX ORDER — DULAGLUTIDE 1.5 MG/.5ML
1.5 INJECTION, SOLUTION SUBCUTANEOUS
Qty: 6 ML | Refills: 3 | Status: SHIPPED | OUTPATIENT
Start: 2022-04-25 | End: 2022-10-12

## 2022-04-25 RX ORDER — PROCHLORPERAZINE 25 MG/1
1 SUPPOSITORY RECTAL
Qty: 1 EACH | Refills: 3 | Status: SHIPPED | OUTPATIENT
Start: 2022-04-25 | End: 2023-04-24

## 2022-04-25 RX ORDER — PROCHLORPERAZINE 25 MG/1
SUPPOSITORY RECTAL
Qty: 1 EACH | Refills: 1 | OUTPATIENT
Start: 2022-04-25

## 2022-04-25 NOTE — PROGRESS NOTES
"THIS IS A VIDEO VISIT:    Phone call visit/virtual visit encounter:    Name of patient: Marie Vogel    Date of encounter: 4/25/2022    Time of start of video visit: 9:31    Video started: 10:41    Video ended: 10:01    Provider location: working from home/ [facility]    Patient location: patients home.    Mode of transmission: video/ Doximity    Verbal consent: obtained before starting visit. Pt is agreeable.      The patient has been notified of following:      \"This VIDEO visit will be conducted via a call between you and your physician/provider. We have found that certain health care needs can be provided without the need for a physical exam.  This service lets us provide the care you need with a short phone conversation.  If a prescription is necessary we can send it directly to your pharmacy.  If lab work is needed we can place an order for that and you can then stop by our lab to have the test done at a later time.     With new updates with corona virus patient might be billed as clinic visit.     If during the course of the call the physician/provider feels a telephone visit is not appropriate, you will not be charged for this service.\"      Past medical history, social history, family history, allergy and medications were reviewed and updated as appropriate.  Reviewed pertinent labs, notes, imaging studies personally.    Name: Marie Vogel  Seen for f/u of DM  HPI:  Marie Vogel is a 41 year old female who presents for the evaluation/management of DM.   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    Here for f/u. Previously has " seen endo at [facility] ( Dr Aguilera and Dr Zhou and ). 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 Busy at work, also variable schedule.. Concerned about weight gain.   H/o cardiomyopathy. Followed by cardiology.    On Omnipod since 10/2019 and using freestyle casey.  Using humalog in pump.  Using DEXCOM CGM    Lost 20 lbs in last 3 years.  Weight is now stable.    She is working at [employer].  Works as OR Nurse.  Work hours are 11:00 AM-7:00 PM.    She is using hearing Aids.  Has questions about metformin related ototoxicity?    Reports that he has long acting insulin in case of pump malfunction at home. Marie also has glucagon kit at home for emergency.    1. Type 2 DM:  Josephinally diagnosed at the age of: 21 with GDM during her first pregnancy. Diagnosed with DM 2 in 1/2002, diet controlled for 3 years, then started on metformin. In 2010 during her second pregnancy, started on insulin and then insulin pump therapy in 2012.   Current Regimen: Insulin pump therapy ( Medtronic Minimed Paradigm), metformin XR 1000 mg bid, Trulicity 1.5 mg/week.  BS checks: Using Dexcom.  Average Meter Download: Blood glucose data reviewed personally. See nursing note from this encounter for details.  She is able to feel symptoms of hypoglycemia.    yes:     Diabetes Medication(s)     Biguanides       metFORMIN (GLUCOPHAGE-XR) 500 MG 24 hr tablet    TAKE TWO TABLETS BY MOUTH TWICE A DAY WITH MEALS    Diabetic Other       glucagon (GLUCAGON EMERGENCY) 1 MG kit    Inject 1 mg into the muscle as needed for low blood sugar    Insulin       insulin glargine (LANTUS PEN) 100 UNIT/ML pen    Take 20 units in case of pump malfunction only.     insulin lispro (HUMALOG) 100 UNIT/ML vial    USE WITH INSULIN PUMP AS DIRECTED, USES ABOUT 80 UNITS PER DAY     insulin aspart (NOVOLOG VIAL) 100 UNITS/ML vial    USE WITH INSULIN PUMP AS DIRECTED, USES ABOUT 80 UNITS PER DAY    Incretin Mimetic Agents (GLP-1 Receptor Agonists)       TRULICITY  1.5 MG/0.5ML pen    INJECT 1.5 MG SUBCUTANEOUS EVERY 7 DAYS        Current Regimen:   Insulin pump -   Time Rate (U/hr)   0000-  0.75                   Carbohydrate Ratio -    Time Ratio   0000-  10         Sensitivity   40   Active Insulin Time   hours   Basal      Bolus      Total Carbohydrates/day    Total Insulin/day     Average Blood Sugar                    Complications:   Diabetes Complications  Description / Detail    Diabetic Retinopathy  No   CAD / PAD  No   Neuropathy  No   Nephropathy / Microalbuminuria  Yes: microalbuminuria. ON cozaar.   Gastroparesis  No   Hypoglycemia [place]  No     2. Hypertension: Blood Pressure today:   BP Readings from Last 3 Encounters:   22 (!) 130/90   09/10/21 110/66   21 108/60     Takes medications everyday without forgetting a dose.  Denies feeling lightheaded or dizzy.    3. Hyperlipidemia:   Denies muscle aches of pains.       PMH/PSH:  Past Medical History:   Diagnosis Date     Allergic rhinitis due to pollen      Atypical glandular cells on Pap smear 3/1108     Cervical high risk HPV (human papillomavirus) test positive 2018    See problem list     Gastroesophageal reflux disease      PFO (patent foramen ovale)      Stargardt's disease 2019     Type II or unspecified type diabetes mellitus without mention of complication, not stated as uncontrolled     onset was gestational in      Past Surgical History:   Procedure Laterality Date     ABDOMEN SURGERY       C IUD,MIRENA  8/10/10, 7/28/15     C/SECTION, LOW TRANSVERSE  6/25/10    , Low Transverse     CHOLECYSTECTOMY, LAPOROSCOPIC      Cholecystectomy, Laparoscopic     ESOPHAGOSCOPY, GASTROSCOPY, DUODENOSCOPY (EGD), COMBINED N/A 2016    Procedure: COMBINED ESOPHAGOSCOPY, GASTROSCOPY, DUODENOSCOPY (EGD), BIOPSY SINGLE OR MULTIPLE;  Surgeon: Fadi Hunter MD;  Location:  GI      ESOPHAGEAL MOTILITY STUDY N/A 2016    Procedure: ESOPHAGEAL MOTILITY  STUDY;  Surgeon: Fadi Hunter MD;  Location: [facility]     HC REMOVE TONSILS/ADENOIDS,<11 Y/O      T &A     ZZC INDUCED ABORTN BY D&C           Family Hx:  Family History   Problem Relation Age of Onset     Cardiovascular Mother         hypertrophic cardiomyopathy     Genitourinary Problems Mother         gallbladder disease     Diabetes Mother         Transplnant induced/      Other - See Comments Mother         heart transplant 2005     Depression Mother      Diabetes Father         type 2     Hypertension Father      Hyperlipidemia Father      Genitourinary Problems Maternal Grandmother         gallbladder disease     Genitourinary Problems Paternal Grandmother         gallbladder disease     Hypertension Paternal Grandfather         high bp     Cerebrovascular Disease Paternal Grandfather              Cardiovascular Brother         hypertrophic cardiomyopathy     Diabetes Brother      Diabetes Brother         type 2     Diabetes Other         Type 2     Glaucoma No family hx of      Macular Degeneration No family hx of      Thyroid disease: No         DM2: Yes - father and mother         Autoimmune: DM1, SLE, RA, Vitiligo No    Social Hx:  Social History     Socioeconomic History     Marital status:      Spouse name: Not on file     Number of children: 2     Years of education: 14     Highest education level: Associate degree: academic program   Occupational History     Occupation: nurse     Occupation: RN   Tobacco Use     Smoking status: Former Smoker     Packs/day: 0.00     Years: 0.00     Pack years: 0.00     Types: Cigarettes     Quit date: 10/24/2005     Years since quittin.5     Smokeless tobacco: Never Used     Tobacco comment: States only smokes when drinks maybe 2x/year   Substance and Sexual Activity     Alcohol use: No     Alcohol/week: 0.0 standard drinks     Drug use: No     Sexual activity: Yes     Partners: Male     Birth control/protection:  I.U.D., Condom     Comment: Mirena IUD 7/28/15   Other Topics Concern     Parent/sibling w/ CABG, MI or angioplasty before 65F 55M? No   Social History Narrative    Caffeine intake/servings daily - 6-7    Calcium intake/servings daily - 2-3 yogurt, milk, cheese    Exercise 1 times weekly - describe aerobics    Sunscreen used - Yes    Seatbelts used - Yes    Guns stored in the home - No    Self Breast Exam - Yes    Pap test up to date -  Yes    Eye exam up to date -  Yes    Dental exam up to date -  Yes    DEXA scan up to date -  Not Applicable    Flex Sig/Colonoscopy up to date -  Not Applicable    Mammography up to date -  Not Applicable    Immunizations reviewed and up to date - Yes    Abuse: Current or Past (Physical, Sexual or Emotional) - No    Do you feel safe in your environment - Yes    Do you cope well with stress - Yes    Do you suffer from insomnia - Yes     Last updated by: Tatianna Lacey  5/9/2005     Social Determinants of Health     Financial Resource Strain: Not on file   Food Insecurity: Not on file   Transportation Needs: Not on file   Physical Activity: Not on file   Stress: Not on file   Social Connections: Not on file   Intimate Partner Violence: Not on file   Housing Stability: Not on file          MEDICATIONS:  has a current medication list which includes the following prescription(s): albuterol, atenolol, blood glucose, blood glucose monitoring, cetirizine, dexcom g6 sensor, dexcom g6 transmitter, epinephrine, fluticasone, glucagon emergency, ibuprofen, omnipod dash pods (gen 4), omnipod dash pdm (gen 4), insulin glargine, insulin lispro, insulin pump, insulin syringe-needle u-100, blood glucose monitoring, levonorgestrel, losartan, magnesium oxide, metformin, montelukast, multiple vitamins-minerals, fish oil-omega-3 fatty acids, omeprazole, rosuvastatin, sertraline, sumatriptan, trazodone, trulicity, urine glucose-ketones test, vitamin d3, aspirin, clotrimazole, freestyle casey 14 day  sensor, and insulin aspart.    ROS     ROS: 10 point ROS neg other than the symptoms noted above in the HPI.    Physical Exam   VS: There were no vitals taken for this visit.  GENERAL: healthy, alert and no distress  EYES: Eyes grossly normal to inspection, conjunctivae and sclerae normal  ENT: no nose swelling, nasal discharge.  Thyroid: no apparent thyroid nodules  RESP: no audible wheeze, cough, or visible cyanosis.  No visible retractions or increased work of breathing.  Able to speak fully in complete sentences.  ABDO: not evaluated.  EXTREMITIES: no hand tremors.  NEURO: Cranial nerves grossly intact, mentation intact and speech normal  SKIN: No apparent skin lesions, rash or edema seen   PSYCH: mentation appears normal, affect normal/bright, judgement and insight intact, normal speech and appearance well-groomed    LABS:  A1c:  Lab Results   Component Value Date    A1C 6.4 02/10/2022    A1C 6.5 07/05/2021    A1C 7.6 01/14/2021    A1C 6.7 07/14/2020    A1C 7.0 01/14/2020    A1C 7.5 09/24/2019     Basic Metabolic Panel:  Creatinine   Date Value Ref Range Status   02/10/2022 0.90 0.52 - 1.04 mg/dL Final   07/05/2021 1.20 (H) 0.52 - 1.04 mg/dL Final     Urinary microalbumin:  Lab Results   Component Value Date    UMALCR 402.04 07/05/2021        LFTS/Cholesterol Panel:  Recent Labs   Lab Test 07/05/21  0722 01/20/21  1102 07/01/15  0612 07/01/15  0612 03/27/14  1022   CHOL 165 131   < > 190 146   HDL 39* 35*   < > 42* 32*   LDL 91 73   < > 96 77   TRIG 176* 117   < > 260* 186*   CHOLHDLRATIO  --   --   --  4.5 4.5    < > = values in this interval not displayed.       Thyroid Function:  [facility] Latest Ref Rng & Units 9/24/2019   TSH 0.40 - 4.00 mU/L 1.45     END[facility]YROID [facility]S-[facility] Latest Ref Rng & Units 4/16/2017   TSH 0.40 - 4.00 mU/L 2.06     END[facility]YROID [facility] Latest Ref Rng 9/27/2016 4/2/2014   TSH 0.40 - 4.00 mU/L 2.17 1.09   T4 FREE 0.70 - 1.85 ng/dL  0.75        Vitamin D:  Vitamin D  Deficiency Screening Results:  Lab Results   Component Value Date    VITDT 33 11/05/2018    VITDT 39 09/27/2016    VITDT 45 02/23/2016    VITDT 31 03/31/2015    VITDT 29 (L) 03/27/2014         All pertinent notes, labs, and images personally reviewed by me.     Glucometer/ insulin pump (if applicable)/ CGM data (if applicable) downloaded, Personally reviewed and interpreted.  All Blood sugar data reviewed personally and discussed with pt.  See nursing note from today's clinic visit for details of BG/CGM log.      A/P  Ms.Marie Vogel is a 41 year old here for the evaluation/management of diabetes:    1. DM2 - Under Fair control.  A1c 6.5%.  Diabetes complicated by microalbuminuria.  Using OMNIPOD DASH + Dexcom.  H/o hypertropic cardiomyopathy, followed by cardiology.  In general BG appear in range.  Plan:  Discussed diagnosis, pathophysiology, management and treatment options of condition with pt.  Continue metformin  1000 mg twice daily.  Continue TRULICITY  1.5 mg/week.  Continue current pump settings.  Labs in 6 weeks.  Follow up in 3-4 months.  Continue to use Dexcom..    2. Hypertension - + microalbuminuria. On losartan. Blood pressure medications include cozaar 75 mg qd. Need aggressive BP/glycmeic control.    3. Hyperlipidemia - Under fair control.TG high with LDL 91.  Had myalgias on lipitor and stopped it.  On Crestor 5 mg/day.    4. Microalbuminuria:  Recommend strict BG control.  On Cozaar 50 mg/day.    DM Prevention:    Opthalmology- recommend annually.  Dental-Yes.  ASA-81 mg.  Smoking- No.    Most Recent Immunizations   Administered Date(s) Administered     COVID-19,PF,Pfizer (12+ Yrs) 12/28/2021     Influenza (IIV3) PF 09/26/2018     Influenza (intradermal) 10/01/2012     Influenza Vaccine IM > 6 months Valent IIV4 (Alfuria,Fluzone) 01/14/2021     MMR 09/17/2007     Pneumococcal 23 valent 01/22/2003     TD (ADULT, 7+) 01/01/2002     TDAP Vaccine (Adacel) 04/06/2012     Recommend checking  blood sugars before meals and at bedtime.    If Blood glucose are low more often-> 2-3 times/week- give us a call.  The patient is advised to Make better food choices: reduce carbs, Reduce portion size, weight loss and exercise 3-4 times a week.  Discussed hypoglycemia signs and symptoms as well as management in detail.    All questions were answered.  The patient indicates understanding of the above issues and agrees with the plan set forth.   More than 50% of face to face time spent with Ms. Dino Vogel on counseling / coordinating her care.      Follow-up:  follow up in 3 months.    Zita Fuentes MD  Endocrinology   [facility]    CC: Alan Paredes    Addendum to above note and clinic visit:    Labs reviewed.    See result note/telephone encounter.

## 2022-04-25 NOTE — LETTER
"    4/25/2022         RE: Marie Vogel  [address]  [place] [address]        Dear Colleague,    Thank you for referring your patient, Marie Vogel, to the [facility]. Please see a copy of my visit note below.    Outcome for 04/18/22 4:27 PM: [employer] message sent  Kelly Minaya, VF   Outcome for 04/20/22 4:35 PM: [employer] emailed to provider  Kelly Minaya, VF  Outcome for 04/21/22 8:39 AM: Pt up to date on glooko for Dexcom. Will upload Omnipod Friday 4/22.- per patient   Taylor Myhre, VF   Outcome for 04/22/22 12:10 PM: Per patient, will upload device before appointment - waiting for omnipod upload.   Elaina Shipley, VF  Outcome for 04/25/22 8:40 AM: [employer] emailed to provider  ZEV Garcia    Marie Vogel  is being evaluated via a billable video visit.      How would you like to obtain your AVS? [employer]  For the video visit, send the invitation by: Text to cell phone: [phone]  Will anyone else be joining your video visit? No  {If patient encounters technical issues they should call [phone]}      ***Needs refill of Glucagon, Omeprazole, Dexcom supplies, Omnipod supplies    ZEV Garcia                  THIS IS A VIDEO VISIT:    Phone call visit/virtual visit encounter:    Name of patient: Marie Vogel    Date of encounter: 4/25/2022    Time of start of video visit: 9:31    Video started: 10:41    Video ended: 10:01    Provider location: working from home/ [facility]    Patient location: patients home.    Mode of transmission: video/ Doximity    Verbal consent: obtained before starting visit. Pt is agreeable.      The patient has been notified of following:      \"This VIDEO visit will be conducted via a call between you and your physician/provider. We have found that certain health care needs can be provided without the need for a physical exam.  This service lets us provide the care you need with a " "short phone conversation.  If a prescription is necessary we can send it directly to your pharmacy.  If lab work is needed we can place an order for that and you can then stop by our lab to have the test done at a later time.     With new updates with corona virus patient might be billed as clinic visit.     If during the course of the call the physician/provider feels a telephone visit is not appropriate, you will not be charged for this service.\"      Past medical history, social history, family history, allergy and medications were reviewed and updated as appropriate.  Reviewed pertinent labs, notes, imaging studies personally.    Name: Marie Vogel  Seen for f/u of DM  HPI:  Marie Vogel is a 41 year old female who presents for the evaluation/management of DM.   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    Here for f/u. Previously has seen endo at [place] ( Dr Aguilera and Dr Zhou and ). 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 Busy at work, also variable schedule.. Concerned about weight gain.   H/o cardiomyopathy. Followed by cardiology.    On Omnipod since 10/2019 and using freestyle casey.  Using humalog in pump.  Using DEXCOM CGM    Lost 20 lbs in last 3 years.  Weight is now stable.    She is working at [employer].  Works as OR Nurse.  Work hours are 11:00 AM-7:00 PM.    She is using hearing Aids.  Has questions about metformin related ototoxicity?    Reports that he has long acting insulin in case of pump malfunction at home. Marie also has glucagon kit at home for emergency.    1. Type 2 DM:  Orginally diagnosed at the age " of: 21 with GDM during her first pregnancy. Diagnosed with DM 2 in 1/2002, diet controlled for 3 years, then started on metformin. In 2010 during her second pregnancy, started on insulin and then insulin pump therapy in 2012.   Current Regimen: Insulin pump therapy ( Medtronic Minimed Paradigm), metformin XR 1000 mg bid, Trulicity 1.5 mg/week.  BS checks: Using Dexcom.  Average Meter Download: Blood glucose data reviewed personally. See nursing note from this encounter for details.  She is able to feel symptoms of hypoglycemia.    yes:     Diabetes Medication(s)     Biguanides       metFORMIN (GLUCOPHAGE-XR) 500 MG 24 hr tablet    TAKE TWO TABLETS BY MOUTH TWICE A DAY WITH MEALS    Diabetic Other       glucagon (GLUCAGON EMERGENCY) 1 MG kit    Inject 1 mg into the muscle as needed for low blood sugar    Insulin       insulin glargine (LANTUS PEN) 100 UNIT/ML pen    Take 20 units in case of pump malfunction only.     insulin lispro (HUMALOG) 100 UNIT/ML vial    USE WITH INSULIN PUMP AS DIRECTED, USES ABOUT 80 UNITS PER DAY     insulin aspart (NOVOLOG VIAL) 100 UNITS/ML vial    USE WITH INSULIN PUMP AS DIRECTED, USES ABOUT 80 UNITS PER DAY    Incretin Mimetic Agents (GLP-1 Receptor Agonists)       TRULICITY 1.5 MG/0.5ML pen    INJECT 1.5 MG SUBCUTANEOUS EVERY 7 DAYS        Current Regimen:   Insulin pump -   Time Rate (U/hr)   0000-  0.75                   Carbohydrate Ratio -    Time Ratio   0000-  10         Sensitivity   40   Active Insulin Time   hours   Basal      Bolus      Total Carbohydrates/day    Total Insulin/day     Average Blood Sugar                    Complications:   Diabetes Complications  Description / Detail    Diabetic Retinopathy  No   CAD / PAD  No   Neuropathy  No   Nephropathy / Microalbuminuria  Yes: microalbuminuria. ON cozaar.   Gastroparesis  No   Hypoglycemia [place]  No     2. Hypertension: Blood Pressure today:   BP Readings from Last 3 Encounters:   01/24/22 (!) 130/90   09/10/21  110/66   21 108/60     Takes medications everyday without forgetting a dose.  Denies feeling lightheaded or dizzy.    3. Hyperlipidemia:   Denies muscle aches of pains.       PMH/PSH:  Past Medical History:   Diagnosis Date     Allergic rhinitis due to pollen      Atypical glandular cells on Pap smear 3/1108     Cervical high risk HPV (human papillomavirus) test positive 2018    See problem list     Gastroesophageal reflux disease      PFO (patent foramen ovale)      Stargardt's disease 2019     Type II or unspecified type diabetes mellitus without mention of complication, not stated as uncontrolled     onset was gestational in      Past Surgical History:   Procedure Laterality Date     ABDOMEN SURGERY       C IUD,MIRENA  8/10/10, 7/28/15     C/SECTION, LOW TRANSVERSE  6/25/10    , Low Transverse     CHOLECYSTECTOMY, LAPOROSCOPIC      Cholecystectomy, Laparoscopic     ESOPHAGOSCOPY, GASTROSCOPY, DUODENOSCOPY (EGD), COMBINED N/A 2016    Procedure: COMBINED ESOPHAGOSCOPY, GASTROSCOPY, DUODENOSCOPY (EGD), BIOPSY SINGLE OR MULTIPLE;  Surgeon: Fadi Hunter MD;  Location: [facility] ESOPHAGEAL MOTILITY STUDY N/A 2016    Procedure: ESOPHAGEAL MOTILITY STUDY;  Surgeon: Fadi Hunter MD;  Location: [facility] REMOVE TONSILS/ADENOIDS,<13 Y/O      T &A     ZZC INDUCED ABORTN BY D&C           Family Hx:  Family History   Problem Relation Age of Onset     Cardiovascular Mother         hypertrophic cardiomyopathy     Genitourinary Problems Mother         gallbladder disease     Diabetes Mother         Transplnant induced/      Other - See Comments Mother         heart transplant 2005     Depression Mother      Diabetes Father         type 2     Hypertension Father      Hyperlipidemia Father      Genitourinary Problems Maternal Grandmother         gallbladder disease     Genitourinary Problems Paternal Grandmother          gallbladder disease     Hypertension Paternal Grandfather         high bp     Cerebrovascular Disease Paternal Grandfather              Cardiovascular Brother         hypertrophic cardiomyopathy     Diabetes Brother      Diabetes Brother         type 2     Diabetes Other         Type 2     Glaucoma No family hx of      Macular Degeneration No family hx of      Thyroid disease: No         DM2: Yes - father and mother         Autoimmune: DM1, SLE, RA, Vitiligo No    Social Hx:  Social History     Socioeconomic History     Marital status:      Spouse name: Not on file     Number of children: 2     Years of education: 14     Highest education level: Associate degree: academic program   Occupational History     Occupation: nurse     Occupation: RN   Tobacco Use     Smoking status: Former Smoker     Packs/day: 0.00     Years: 0.00     Pack years: 0.00     Types: Cigarettes     Quit date: 10/24/2005     Years since quittin.5     Smokeless tobacco: Never Used     Tobacco comment: States only smokes when drinks maybe 2x/year   Substance and Sexual Activity     Alcohol use: No     Alcohol/week: 0.0 standard drinks     Drug use: No     Sexual activity: Yes     Partners: Male     Birth control/protection: I.U.D., Condom     Comment: Mirena IUD 7/28/15   Other Topics Concern     Parent/sibling w/ CABG, MI or angioplasty before 65F 55M? No   Social History Narrative    Caffeine intake/servings daily - 6-7    Calcium intake/servings daily - 2-3 yogurt, milk, cheese    Exercise 1 times weekly - describe aerobics    Sunscreen used - Yes    Seatbelts used - Yes    Guns stored in the home - No    Self Breast Exam - Yes    Pap test up to date -  Yes    Eye exam up to date -  Yes    Dental exam up to date -  Yes    DEXA scan up to date -  Not Applicable    Flex Sig/Colonoscopy up to date -  Not Applicable    Mammography up to date -  Not Applicable    Immunizations reviewed and up to date - Yes    Abuse: Current or  Past (Physical, Sexual or Emotional) - No    Do you feel safe in your environment - Yes    Do you cope well with stress - Yes    Do you suffer from insomnia - Yes     Last updated by: Tatianna Lacey  5/9/2005     Social Determinants of Health     Financial Resource Strain: Not on file   Food Insecurity: Not on file   Transportation Needs: Not on file   Physical Activity: Not on file   Stress: Not on file   Social Connections: Not on file   Intimate Partner Violence: Not on file   Housing Stability: Not on file          MEDICATIONS:  has a current medication list which includes the following prescription(s): albuterol, atenolol, blood glucose, blood glucose monitoring, cetirizine, dexcom g6 sensor, dexcom g6 transmitter, epinephrine, fluticasone, glucagon emergency, ibuprofen, omnipod dash pods (gen 4), omnipod dash pdm (gen 4), insulin glargine, insulin lispro, insulin pump, insulin syringe-needle u-100, blood glucose monitoring, levonorgestrel, losartan, magnesium oxide, metformin, montelukast, multiple vitamins-minerals, fish oil-omega-3 fatty acids, omeprazole, rosuvastatin, sertraline, sumatriptan, trazodone, trulicity, urine glucose-ketones test, vitamin d3, aspirin, clotrimazole, freestyle casey 14 day sensor, and insulin aspart.    ROS     ROS: 10 point ROS neg other than the symptoms noted above in the HPI.    Physical Exam   VS: There were no vitals taken for this visit.  GENERAL: healthy, alert and no distress  EYES: Eyes grossly normal to inspection, conjunctivae and sclerae normal  ENT: no nose swelling, nasal discharge.  Thyroid: no apparent thyroid nodules  RESP: no audible wheeze, cough, or visible cyanosis.  No visible retractions or increased work of breathing.  Able to speak fully in complete sentences.  ABDO: not evaluated.  EXTREMITIES: no hand tremors.  NEURO: Cranial nerves grossly intact, mentation intact and speech normal  SKIN: No apparent skin lesions, rash or edema seen   PSYCH: mentation  appears normal, affect normal/bright, judgement and insight intact, normal speech and appearance well-groomed    LABS:  A1c:  Lab Results   Component Value Date    A1C 6.4 02/10/2022    A1C 6.5 07/05/2021    A1C 7.6 01/14/2021    A1C 6.7 07/14/2020    A1C 7.0 01/14/2020    A1C 7.5 09/24/2019     Basic Metabolic Panel:  Creatinine   Date Value Ref Range Status   02/10/2022 0.90 0.52 - 1.04 mg/dL Final   07/05/2021 1.20 (H) 0.52 - 1.04 mg/dL Final     Urinary microalbumin:  Lab Results   Component Value Date    UMALCR 402.04 07/05/2021        LFTS/Cholesterol Panel:  Recent Labs   Lab Test 07/05/21  0722 01/20/21  1102 07/01/15  0612 07/01/15  0612 03/27/14  1022   CHOL 165 131   < > 190 146   HDL 39* 35*   < > 42* 32*   LDL 91 73   < > 96 77   TRIG 176* 117   < > 260* 186*   CHOLHDLRATIO  --   --   --  4.5 4.5    < > = values in this interval not displayed.       Thyroid Function:  ENDO THYROID LABS-P Latest Ref Rng & Units 9/24/2019   TSH 0.40 - 4.00 mU/L 1.45     ENDO THYROID LABS-P Latest Ref Rng & Units 4/16/2017   TSH 0.40 - 4.00 mU/L 2.06     ENDO THYROID LABS-P Latest Ref Rng 9/27/2016 4/2/2014   TSH 0.40 - 4.00 mU/L 2.17 1.09   T4 FREE 0.70 - 1.85 ng/dL  0.75        Vitamin D:  Vitamin D Deficiency Screening Results:  Lab Results   Component Value Date    VITDT 33 11/05/2018    VITDT 39 09/27/2016    VITDT 45 02/23/2016    VITDT 31 03/31/2015    VITDT 29 (L) 03/27/2014         All pertinent notes, labs, and images personally reviewed by me.     Glucometer/ insulin pump (if applicable)/ CGM data (if applicable) downloaded, Personally reviewed and interpreted.  All Blood sugar data reviewed personally and discussed with pt.  See nursing note from today's clinic visit for details of BG/CGM log.      A/P  Ms.Marie Vogel is a 41 year old here for the evaluation/management of diabetes:    1. DM2 - Under Fair control.  A1c 6.5%.  Diabetes complicated by microalbuminuria.  Using OMNIPOD DASH +  Dexcom.  H/o hypertropic cardiomyopathy, followed by cardiology.  In general BG appear in range.  Plan:  Discussed diagnosis, pathophysiology, management and treatment options of condition with pt.  Continue metformin  1000 mg twice daily.  Continue TRULICITY  1.5 mg/week.  Continue current pump settings.  Labs in 6 weeks.  Follow up in 3-4 months.  Continue to use Dexcom..    2. Hypertension - + microalbuminuria. On losartan. Blood pressure medications include cozaar 75 mg qd. Need aggressive BP/glycmeic control.    3. Hyperlipidemia - Under fair control.TG high with LDL 91.  Had myalgias on lipitor and stopped it.  On Crestor 5 mg/day.    4. Microalbuminuria:  Recommend strict BG control.  On Cozaar 50 mg/day.    DM Prevention:    Opthalmology- recommend annually.  Dental-Yes.  ASA-81 mg.  Smoking- No.    Most Recent Immunizations   Administered Date(s) Administered     COVID-19,PF,Pfizer (12+ Yrs) 12/28/2021     Influenza (IIV3) PF 09/26/2018     Influenza (intradermal) 10/01/2012     Influenza Vaccine IM > 6 months Valent IIV4 (Alfuria,Fluzone) 01/14/2021     MMR 09/17/2007     Pneumococcal 23 valent 01/22/2003     TD (ADULT, 7+) 01/01/2002     TDAP Vaccine (Adacel) 04/06/2012     Recommend checking blood sugars before meals and at bedtime.    If Blood glucose are low more often-> 2-3 times/week- give us a call.  The patient is advised to Make better food choices: reduce carbs, Reduce portion size, weight loss and exercise 3-4 times a week.  Discussed hypoglycemia signs and symptoms as well as management in detail.    All questions were answered.  The patient indicates understanding of the above issues and agrees with the plan set forth.   More than 50% of face to face time spent with Ms. Dino Vogel on counseling / coordinating her care.      Follow-up:  follow up in 3 months.    Zita Fuentes MD  Endocrinology   [facility]/[place]    CC: Alan Paredes    Addendum to above note and clinic  visit:    Labs reviewed.    See result note/telephone encounter.                      Again, thank you for allowing me to participate in the care of your patient.        Sincerely,        Zita Fuentes MD

## 2022-04-25 NOTE — PATIENT INSTRUCTIONS
-[facility]  Dr Fuentes, Endocrinology Department    [facility]   [address] [facility]  [place] [address]  Appointment [phone]  Fax: [phone]  [place]: Monday - Thursday      To provide the best diabetic care, please bring your blood glucose meter to each and every visit with your Endocrinologist.  Your blood glucose meter/insulin pump will be downloaded at every appointment.    Please arrive 15 minutes before your scheduled appointment.  This will allow for your blood glucose meter/insulin pump to be downloaded.  If you are wearing DEXCOM please bring  or sharing code so that it can be downloaded.  If you are using freestyle casey personal sensors please bring the reader.  If you are using TANDEM insulin pump please have your username and password to get info from [employer] website.    Continue current pump settings and other DM regimen- metformin XR 1000 mg bid, Trulicity 1.5 mg/week.  Labs in 6 weeks.  Follow up in 3-4 months.  Continue to use Omnipod insulin pump and Dexcom.  Recommend checking blood sugars before meals and at bedtime.    If Blood glucose are low more often-> 2-3 times/week- give us a call.  Make better food choices: reduce carbs, Reduce portion size, weight loss and exercise 3-4 times a week.    What is hypoglycemia:  Hypoglycemia is when blood sugar levels become too low - below 70 m/dl.      What causes hypoglycemia?  - using too much insulin  -taking too many diabetes pills  -not eating enough, or skipping meals or snacks  -not eating enough carbohydrate with meals  -changing your exercise routine  -drinking alcohol in excess    It is also possible to have hypoglycemia even when you are carefully managing your blood sugar levels.    What does it feel like when blood sugars get too low?  You may feel:  - anxious  -confused  -dizzy  -hungry  -light-headed  -nervous  -shaky  -sleepy  -sweaty    You may have  -blurred or  cloudy vision  -heart palpitations (heart skips a beat or races)  -tingling or numbness around the mouth and tongue  -tremors    What to do if you have symptoms of hypoglycmemia:  If you think your blood sugar is too low, check it with a glucose meter.  If its below 70 mg/dl, consume one of the following:  Fruit juice (1/2 cup)  Glucose tablets (15 grams)  Hard candy (5 to 7 pieces)  Honey or sugar (2 teaspoons)  Milk (1/2 cup)  Soft drink (non-diet, 1/2 cup)    Wait 15 minutes and check your blood glucose again.  IF it is still below 70 mg/dl, have another food item listed above. Wait another 15 minutes and repeat the blood glucose test.  Have a small meal or snack that contains some carbohydrate after your blood glucose rises above 70 mg/dl.    If you are at risk of hypoglycemia, always carry with you glucose tablets or one of the foods listed above.      To prevent Hypoglycemia:  Avoid situations that may cause hypoglycemia  Before making any change to your diet or exercise routine, discuss them with your healthcare provider  Keep a record of your blood glucose levels.  Include the time of day, diabetes medications, when you had your last meal or snack, and what you were doing at the time (e.g. Watching TV, gardening, jogging, etc).    Talk to your healthcare provider if your blood glucose levels are often low        Patient guide on hypoglycemia    http://www.hormone.org/Resources/upload/patient-guide-diagnosis-and-management-hypoglycemia-784925.pdf

## 2022-05-02 DIAGNOSIS — Z79.4 TYPE 2 DIABETES MELLITUS WITH DIABETIC NEPHROPATHY, WITH LONG-TERM CURRENT USE OF INSULIN (H): ICD-10-CM

## 2022-05-02 DIAGNOSIS — E11.21 TYPE 2 DIABETES MELLITUS WITH DIABETIC NEPHROPATHY, WITH LONG-TERM CURRENT USE OF INSULIN (H): ICD-10-CM

## 2022-05-02 DIAGNOSIS — K44.9 HIATAL HERNIA: ICD-10-CM

## 2022-05-02 DIAGNOSIS — K21.9 GASTROESOPHAGEAL REFLUX DISEASE WITHOUT ESOPHAGITIS: ICD-10-CM

## 2022-05-02 NOTE — TELEPHONE ENCOUNTER
Received call from pt's pharmacy  They are requesting refills    Refills pended- sending to refill pool    Thank you  Jacklyn Hyman RN on 5/2/2022 at 1:53 PM

## 2022-05-03 DIAGNOSIS — Z79.4 TYPE 2 DIABETES MELLITUS WITH DIABETIC NEPHROPATHY, WITH LONG-TERM CURRENT USE OF INSULIN (H): ICD-10-CM

## 2022-05-03 DIAGNOSIS — E11.21 TYPE 2 DIABETES MELLITUS WITH DIABETIC NEPHROPATHY, WITH LONG-TERM CURRENT USE OF INSULIN (H): ICD-10-CM

## 2022-05-03 DIAGNOSIS — H35.50 RETINAL DYSTROPHY: Primary | ICD-10-CM

## 2022-05-03 RX ORDER — ROSUVASTATIN CALCIUM 5 MG/1
5 TABLET, COATED ORAL DAILY
Qty: 90 TABLET | Refills: 0 | Status: CANCELLED | OUTPATIENT
Start: 2022-05-03

## 2022-05-03 RX ORDER — OMEPRAZOLE 40 MG/1
CAPSULE, DELAYED RELEASE ORAL
Qty: 90 CAPSULE | Refills: 3 | Status: CANCELLED | OUTPATIENT
Start: 2022-05-03

## 2022-05-03 NOTE — TELEPHONE ENCOUNTER
MORGAN listed as PCP for patient. Are you taking over scripts or should this be sent to Dr. Paredes?     Issa CRUZ RN

## 2022-05-04 ENCOUNTER — LAB (OUTPATIENT)
Dept: LAB | Facility: CLINIC | Age: 42
End: 2022-05-04
Payer: COMMERCIAL

## 2022-05-04 DIAGNOSIS — Z79.4 TYPE 2 DIABETES MELLITUS WITH DIABETIC NEPHROPATHY, WITH LONG-TERM CURRENT USE OF INSULIN (H): ICD-10-CM

## 2022-05-04 DIAGNOSIS — E11.21 TYPE 2 DIABETES MELLITUS WITH DIABETIC NEPHROPATHY, WITH LONG-TERM CURRENT USE OF INSULIN (H): ICD-10-CM

## 2022-05-04 LAB
C PEPTIDE SERPL-MCNC: 0.9 NG/ML (ref 0.9–6.9)
HBA1C MFR BLD: 7 % (ref 0–5.6)

## 2022-05-04 PROCEDURE — 84681 ASSAY OF C-PEPTIDE: CPT

## 2022-05-04 PROCEDURE — 83036 HEMOGLOBIN GLYCOSYLATED A1C: CPT

## 2022-05-04 PROCEDURE — 36415 COLL VENOUS BLD VENIPUNCTURE: CPT

## 2022-05-04 PROCEDURE — 99000 SPECIMEN HANDLING OFFICE-LAB: CPT

## 2022-05-04 PROCEDURE — 86341 ISLET CELL ANTIBODY: CPT | Mod: 90

## 2022-05-04 NOTE — RESULT ENCOUNTER NOTE
Marie    Recently done endocrinology lab test/ imaging test showed:  Lab Results       Component                Value               Date                       A1C                      7.0                 05/04/2022                 A1C                      6.4                 02/10/2022              C-peptide in low normal range.  One more lab pending.        Here is a copy for your records.    Please call endocrinology clinic (nurse line: [phone]) if questions.    Zita Fuentes MD  Endocrinology   [facility]/[place]  May 4, 2022

## 2022-05-06 LAB — GAD65 AB SER IA-ACNC: <5 IU/ML

## 2022-05-09 ENCOUNTER — OFFICE VISIT (OUTPATIENT)
Dept: OPHTHALMOLOGY | Facility: CLINIC | Age: 42
End: 2022-05-09
Attending: OPHTHALMOLOGY
Payer: COMMERCIAL

## 2022-05-09 DIAGNOSIS — H35.50 RETINAL DYSTROPHY: ICD-10-CM

## 2022-05-09 DIAGNOSIS — R68.89 SUSPECTED GLAUCOMA OF BOTH EYES: Primary | ICD-10-CM

## 2022-05-09 PROCEDURE — 92250 FUNDUS PHOTOGRAPHY W/I&R: CPT | Mod: 59 | Performed by: OPHTHALMOLOGY

## 2022-05-09 PROCEDURE — 92134 CPTRZ OPH DX IMG PST SGM RTA: CPT | Performed by: OPHTHALMOLOGY

## 2022-05-09 PROCEDURE — 92133 CPTRZD OPH DX IMG PST SGM ON: CPT | Performed by: OPHTHALMOLOGY

## 2022-05-09 PROCEDURE — 99214 OFFICE O/P EST MOD 30 MIN: CPT | Mod: GC | Performed by: OPHTHALMOLOGY

## 2022-05-09 PROCEDURE — 92250 FUNDUS PHOTOGRAPHY W/I&R: CPT | Performed by: OPHTHALMOLOGY

## 2022-05-09 PROCEDURE — G0463 HOSPITAL OUTPT CLINIC VISIT: HCPCS | Mod: 25

## 2022-05-09 PROCEDURE — 99207 FUNDUS AUTOFLUORESCENCE IMAGE (FAF) OU (BOTH EYES): CPT | Mod: GC | Performed by: OPHTHALMOLOGY

## 2022-05-09 ASSESSMENT — EXTERNAL EXAM - LEFT EYE: OS_EXAM: NORMAL

## 2022-05-09 ASSESSMENT — REFRACTION_WEARINGRX
OD_AXIS: 110
OS_CYLINDER: +2.00
OD_ADD: +1.00
OD_SPHERE: -4.50
OS_AXIS: 055
OS_ADD: +1.00
OS_SPHERE: -4.00
OD_CYLINDER: +2.00

## 2022-05-09 ASSESSMENT — VISUAL ACUITY
OS_CC: 20/20
METHOD: SNELLEN - LINEAR
OD_CC: 20/20
OS_CC+: -1
OS_CC: J1+
OD_CC: J1+

## 2022-05-09 ASSESSMENT — SLIT LAMP EXAM - LIDS
COMMENTS: NORMAL
COMMENTS: NORMAL

## 2022-05-09 ASSESSMENT — PATIENT HEALTH QUESTIONNAIRE - PHQ9: SUM OF ALL RESPONSES TO PHQ QUESTIONS 1-9: 1

## 2022-05-09 ASSESSMENT — EXTERNAL EXAM - RIGHT EYE: OD_EXAM: NORMAL

## 2022-05-09 ASSESSMENT — CONF VISUAL FIELD
METHOD: COUNTING FINGERS
OD_NORMAL: 1
OS_SUPERIOR_TEMPORAL_RESTRICTION: 3

## 2022-05-09 ASSESSMENT — TONOMETRY
OD_IOP_MMHG: 18
IOP_METHOD: TONOPEN
OS_IOP_MMHG: 17

## 2022-05-09 ASSESSMENT — CUP TO DISC RATIO
OD_RATIO: 0.55
OS_RATIO: 0.55

## 2022-05-09 NOTE — RESULT ENCOUNTER NOTE
Labs results/imaging studies results noted. Will discuss in next clinic visit 8/2022.  Labs are consistent with type 2 Diabetes.    Here is a copy for your records.  Follow-up in endocrinology Clinic as scheduled/discussed. We will review these studies/results in further detail at that visit, but feel free to contact us with any other questions in the interim.    Please call endocrinology clinic (nurse line: [phone]) if questions.    Zita Fuentes MD

## 2022-05-09 NOTE — PROGRESS NOTES
CC: Retinal dystrophy evaluation    Interval history: Difficulty reading. No new changes. Few episodes of burred vision, which improved with blinking.     HPI: Marie Vogel is a  40 year old year-old patient with history of retinal dystrophy. She has a history of T2DM. Patient denies any changes in vision. No nyctalopia, hemeralopia. Denies flashes. Did have floaters a week ago    She reports that Last summer- 2020 she was mowing the lawn and her vision went gray for 15 secs and then returned back to normal; no flashes and floaters     Ocular History: Refractive error  PMH: Cardiomyopathy diagnosed 2016, T2DM  FH: Paternal gpa eye injections (unclear etiology)  Mother, uncle, aunt, brother, with cardiomyopathy    No nyctalopia   No hemeralopia    Retinal Imaging:  OCT 5-9-22  RE: paracentral GA, mild intraretinal fluid (stable-improved from previous), mild Epiretinal membrane  LE: paracentral GA, mild intraretinal fluid, mild Epiretinal membrane    FAF  5-9-22  OD - central hypoF & hyperF speckled. Mild progression from 2019  OS - central hypoF & hyperF speckled. Mild progression from 2019     GVF 1-21-21  OD - paracentral scotoma, normal peripheral  OS - paracentral scotoma, normal peripheral     FA 1/7/19  OD - extensive WD, linear hypoF, normal choroidal filling  OS - extensive WD, linear hypF, normal choroidal filling    ffERG 12/9/19  INTERPRETATION:      1. Abnormal ffERG.  The scotopic responses showed delay.  The photopic response shows mild attenuation and delay, which is consistent with the clinical exam and history.  Except for the marked scotopic delay, the results could be consistent with SMD-FFM; otherwise a cone-angeline dystrophy could be considered.     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  I personally obtained the chief complaint(s) and history of present illness.  I confirmed and edited as necessary the review of systems, past medical/surgical history, family history, social history, and examination findings as documented by others; and I examined the patient myself.  I personally reviewed the relevant tests, images, and reports as documented above.  I formulated and edited as necessary the assessment and plan and discussed the findings and management plan with the patient and family    Isis Vickers MD   of Ophthalmology.  Retina Service   Department of Ophthalmology and Visual Neurosciences   [facility]  Phone: [phone]   Fax: [phone]

## 2022-05-09 NOTE — PROGRESS NOTES
CC -   Macular atrophy    INTERVAL HISTORY - VA worse  BG good control      PMH -   Marie THOMAS Dino Vogel is a 41 year old  with history of DMII here for fundus evaluation.   Saw Dr. Gabriela Becker.  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), no hemeralopia  DM II since age 21, ESPERANZA    Hypertrophic cardiomyopathy familial      PAST OCULAR SURGERY  None    RETINAL IMAGING:  OCT 05/09/22   OD - paracentral GA, mild IRF, tr ERM, PHF attached  OS -  paracentral GA, mild IRF, tr ERM, PHF attached    FAF   11/25/19  OD - central hypoF & hyperF speckled  OS - central hypoF & hyperF speckled    GVF 11/25/19  OD - paracentral scotoma, normal peripheral  OS - paracentral scotoma, normal peripheral    ASSESSMENT & PLAN    # retinal dystrophy OU   - based on imaging & GVF   - SMD vs cone dystrophy   - genetic testing by  [facility]   - d/w patient avoiding vitamin A supplements if possible but unclear if has SMF-FFM        #. Paracentral visual field defects   - from GA      #. DM II no DR      #. Vitreous syneresis OU   - advised S/Sx RD 5/2022        # OAG suspect d/t CDR   - will need monitoring   - OCT RNFL normal 5/2022          return to clinic: 1 year OCT, FAF with     ATTESTATION     Attending Attestation:     Complete documentation of historical and exam elements from today's encounter can be found in the full encounter summary report (not reduplicated in this progress note).  I personally obtained the chief complaint(s) and history of present illness.  I confirmed and edited as necessary the review of systems, past medical/surgical history, family history, social history, and examination findings as documented by others; and I examined the patient myself.  I personally reviewed the relevant tests, images, and reports as documented above.  I formulated and edited as necessary the assessment and plan and discussed the findings and management plan with the patient and  family    Naz Green MD, PhD  , Vitreoretinal Surgery  Department of Ophthalmology  [facility]

## 2022-05-14 ENCOUNTER — NURSE TRIAGE (OUTPATIENT)
Dept: NURSING | Facility: CLINIC | Age: 42
End: 2022-05-14
Payer: COMMERCIAL

## 2022-05-14 NOTE — TELEPHONE ENCOUNTER
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  Care Advice reviewed    Advised applying ice pack for 20 minutes to top of foot  \"Ryan taping\" 4th & 5th toes & or wrapping foot with ACE wrap    COVID 19 Nurse Triage Plan/Patient Instructions    Please be aware that novel coronavirus (COVID-19) may be circulating in the community. If you develop symptoms such as fever, cough, or SOB or if you have concerns about the presence of another infection including coronavirus (COVID-19), please contact your health care provider or visit [email or web address].     Disposition/Instructions    In-Person Visit with provider recommended. Reference Visit Selection Guide.    Thank you for taking steps to prevent the spread of this virus.  o Limit your contact with others.  o Wear a simple mask to cover your cough.  o Wash your hands well and often.    Resources    [facility]: About COVID-19: [email or web address]    CDC: What to Do If You're Sick: www.cdc.gov/coronavirus/2019-ncov/about/steps-when-sick.html    CDC: Ending Home Isolation: www.cdc.gov/coronavirus/2019-ncov/hcp/disposition-in-home-patients.html     CDC: Caring for Someone: www.cdc.gov/coronavirus/2019-ncov/if-you-are-sick/care-for-someone.html     [facility]: Interim Guidance for Hospital Discharge to Home: www.health.[facility].[place]/diseases/coronavirus/hcp/hospdischarge.pdf    [facility] clinical trials (COVID-19 research studies): [email or web address].[email or web address]/umn-clinical-trials     Below are the COVID-19 hotlines at the [place] " Department of Health ([facility]). Interpreters are available.   o For health questions: Call 687-148-8576 or 1-991.737.6894 (7 a.m. to 7 p.m.)  o For questions about schools and childcare: Call 026-616-1786 or 1-184.768.5801 (7 a.m. to 7 p.m.)     Sobia Quinones RN  [facility]      Reason for Disposition    Looks like a broken bone (e.g., crooked or deformed)    Additional Information    Negative: [1] Major bleeding (e.g., spurting blood) AND [2] can't be stopped    Negative: Foot or ankle injury    Negative: Looks infected    Negative: Amputated toe    Negative: Skin is split open or gaping (or length > 1/2 inch or 12 mm)    Negative: [1] Bleeding AND [2] won't stop after 10 minutes of direct pressure (using correct technique)    Negative: [1] Dirt in the wound AND [2] not removed with 15 minutes of scrubbing    Negative: Sounds like a serious injury to the triager    Negative: [1] SEVERE pain AND [2] not improved 2 hours after pain medicine/ice packs    Negative: [1] MODERATE-SEVERE pain AND [2] blood present under the toenail    Protocols used: TOE INJURY-A-AH

## 2022-05-15 ENCOUNTER — LAB (OUTPATIENT)
Dept: LAB | Facility: CLINIC | Age: 42
End: 2022-05-15
Payer: COMMERCIAL

## 2022-05-15 DIAGNOSIS — Z79.4 TYPE 2 DIABETES MELLITUS WITH DIABETIC NEPHROPATHY, WITH LONG-TERM CURRENT USE OF INSULIN (H): ICD-10-CM

## 2022-05-15 DIAGNOSIS — E11.21 TYPE 2 DIABETES MELLITUS WITH DIABETIC NEPHROPATHY, WITH LONG-TERM CURRENT USE OF INSULIN (H): ICD-10-CM

## 2022-05-15 PROCEDURE — 36415 COLL VENOUS BLD VENIPUNCTURE: CPT

## 2022-05-15 PROCEDURE — 82947 ASSAY GLUCOSE BLOOD QUANT: CPT

## 2022-05-16 LAB
FASTING STATUS PATIENT QL REPORTED: YES
GLUCOSE BLD-MCNC: 93 MG/DL (ref 70–99)

## 2022-05-18 NOTE — PROGRESS NOTES
SUBJECTIVE:  Chief Complaint   Patient presents with     Urgent Care     Trauma     Left leg pain- tripped over a stool last night     Marie Vogel is a 40 year old female who presents with a chief complaint of left leg pain.  Symptoms began last night. She was walking in the dark and tripped over a stool. She did not hit her head or lose consciousness.     She is not have anterior lower extremity pain with some swelling. Pain will shoot up her calf. Has some discomfort in ankle and foot.   Denies having numbness or tingling into toes.     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     Type II or unspecified type diabetes mellitus without mention of complication, not stated as uncontrolled 2002    onset was gestational in 2001     Current Outpatient Medications   Medication Sig Dispense Refill     albuterol (PROAIR HFA/PROVENTIL HFA/VENTOLIN HFA) 108 (90 Base) MCG/ACT inhaler Inhale 2 puffs into the lungs every 4 hours as needed for shortness of breath / dyspnea or wheezing 1 Inhaler 3     aspirin 81 MG tablet Take 1 tablet (81 mg) by mouth daily 90 tablet 3     atenolol (TENORMIN) 25 MG tablet Take 0.5 tablets (12.5 mg) by mouth daily 90 tablet 3     blood glucose (PEPE CONTOUR NEXT) test strip Check 4 times/day 300 each 0     blood glucose monitoring (NO BRAND SPECIFIED) meter device kit Use to test blood sugar 6 times daily and as needed. 1 kit 0     cetirizine (ZYRTEC) 10 MG tablet Take 10 mg by mouth 2 times daily  90 tablet 3     Continuous Blood Gluc Sensor (FREESTYLE BESSIE 14 DAY SENSOR) MISC 2 each every 14 days 6 each 3     dulaglutide (TRULICITY) 1.5 MG/0.5ML pen Inject 1.5 mg Subcutaneous every 7 days 3 mL 3      EPINEPHrine 0.3 MG/0.3ML injection Inject 0.3 mLs (0.3 mg) into the muscle once as needed for anaphylaxis 0.3 mL 11     fluticasone (FLONASE) 50 MCG/ACT spray Spray 1-2 sprays into both nostrils daily 1 Bottle 0     glucagon (GLUCAGON EMERGENCY) 1 MG kit Inject 1 mg into the muscle as needed for low blood sugar 1 mg 0     ibuprofen (ADVIL) 200 MG tablet Take 200 mg by mouth every 4 hours as needed.       insulin aspart (NOVOLOG VIAL) 100 UNITS/ML vial With insulin pump. Uses about 80 units/day. 90 mL 3     Insulin Disposable Pump (OMNIPOD DASH 5 PACK) MISC 1 pod every 3 days 30 each 3     Insulin Disposable Pump (OMNIPOD DASH SYSTEM) KIT 1 kit continuous 1 kit 0     insulin glargine (LANTUS PEN) 100 UNIT/ML pen Take 20 units in case of pump malfunction only. 15 mL 0     INSULIN PUMP - OUTPATIENT Updated 11/12/19:  OmniPod Dash  BASAL:  00:00: 1.150 units/hr  10:30: 1.2  14:30: 1.1  CARB RATIO:  00:00: 8  Sensitivity:  00:00: 30 mg/dL  BLOOD GLUCOSE TARGET and times:  12   AM (midnight): 120-120  Active Insulin Time: 4 hours  Sensor: Nadia/ Nadia Link Donna  [employer]:   Username [email or web address]  Password [email or web address]       Lancets (MICROLET) MISC 1 Device See Admin Instructions. Use up to 5 times daily for blood sugar checks. 100 each prn     levonorgestrel (MIRENA) 20 MCG/24HR IUD 1 each (20 mcg) by Intrauterine route once       losartan (COZAAR) 50 MG tablet TAKE ONE TABLET BY MOUTH EVERY MORNING AND TAKE ONE-HALF TABLET BY MOUTH EVERY EVENING 135 tablet 3     MAGNESIUM OXIDE PO Take 400 mg by mouth daily       metFORMIN (GLUCOPHAGE-XR) 500 MG 24 hr tablet Take 2 tablets (1,000 mg) by mouth 2 times daily (with meals) 360 tablet 3     montelukast (SINGULAIR) 10 MG tablet Take 1 tablet (10 mg) by mouth At Bedtime 90 tablet 1     Multiple Vitamins-Minerals (MULTI VITAMIN/MINERALS PO) Take 1 each by mouth daily.       Omega-3 Fatty Acids (FISH OIL) 500 MG CAPS Take 1 capsule by mouth       omeprazole (PRILOSEC) 40 MG DR  capsule Take 1 capsule (40 mg) by mouth daily as needed 90 capsule 3     Ostomy Supplies (SKIN PREP WIPES) MISC 1 each every 3 days 50 each 3     rosuvastatin (CRESTOR) 5 MG tablet Take 1 tablet (5 mg) by mouth daily 90 tablet 1     SUMAtriptan (IMITREX) 25 MG tablet TAKE ONE TO FOUR TABLETS BY MOUTH ONE TIME. MAY REPEAT 1 TABLET EVERY TWO HOURS UP TO MAXIMUM OF 200MG IN 24 HOURS 8 tablet 6     Social History     Tobacco Use     Smoking status: Former Smoker     Types: Cigarettes     Last attempt to quit: 10/24/2005     Years since quittin.9     Smokeless tobacco: Former User     Tobacco comment: States only smokes when drinks maybe 2x/year   Substance Use Topics     Alcohol use: No     Alcohol/week: 0.0 standard drinks     Frequency: Monthly or less     Drinks per session: 1 or 2     Binge frequency: Never       ROS:  Review of systems negative except as stated above.    EXAM:   /84   Pulse 87   Temp 98.1  F (36.7  C) (Temporal)   Wt 80.7 kg (178 lb)   SpO2 98%   Breastfeeding No   BMI 30.55 kg/m    M/S Exam:Left LE ahs contusion over anterior calf. No step off. No erythema noted.   GENERAL APPEARANCE: healthy, alert and no distress  EXTREMITIES: peripheral pulses normal  SKIN: no suspicious lesions or rashes  NEURO: Normal strength and tone, sensory exam grossly normal, mentation intact and speech normal    X-RAY -- No evidence of fracture    ASSESSMENT / PLAN:  1. Fall, initial encounter  - XR Tibia & Fibula Left 2 Views; Future    2. Contusion of left lower extremity, initial encounter  Exam and my assessment are consistent with contusion. NO evidence for fracture, N/V damage or compartment syndrome.   Encouraged elevation and using an ACE bandage.   OK to take tylenol / ibu for pain.     Diagnosis and treatment plan was reviewed with patient and/or family.   We went over any labs or imaging. Discussed worsening symptoms or little to no relief despite treatment plan to follow-up with PCP or  return to clinic.  Patient verbalizes understanding. All questions were addressed and answered.   Charla Loaiza PA-C       Birth Control Pills Pregnancy And Lactation Text: This medication should be avoided if pregnant and for the first 30 days post-partum.

## 2022-06-02 DIAGNOSIS — E11.21 TYPE 2 DIABETES MELLITUS WITH DIABETIC NEPHROPATHY, WITH LONG-TERM CURRENT USE OF INSULIN (H): ICD-10-CM

## 2022-06-02 DIAGNOSIS — K44.9 HIATAL HERNIA: ICD-10-CM

## 2022-06-02 DIAGNOSIS — Z79.4 TYPE 2 DIABETES MELLITUS WITH DIABETIC NEPHROPATHY, WITH LONG-TERM CURRENT USE OF INSULIN (H): ICD-10-CM

## 2022-06-02 DIAGNOSIS — K21.9 GASTROESOPHAGEAL REFLUX DISEASE WITHOUT ESOPHAGITIS: ICD-10-CM

## 2022-06-02 RX ORDER — ROSUVASTATIN CALCIUM 5 MG/1
5 TABLET, COATED ORAL DAILY
Qty: 90 TABLET | Refills: 3 | Status: SHIPPED | OUTPATIENT
Start: 2022-06-02 | End: 2023-01-03

## 2022-06-02 NOTE — TELEPHONE ENCOUNTER
Dr. Paredes,    Pt needs a refill of her crestor    LOV: virtual 11/2/20    Thank you  Jacklyn Hyman RN on 6/2/2022 at 9:05 AM

## 2022-06-03 ENCOUNTER — MYC MEDICAL ADVICE (OUTPATIENT)
Dept: ENDOCRINOLOGY | Facility: CLINIC | Age: 42
End: 2022-06-03
Payer: COMMERCIAL

## 2022-06-03 RX ORDER — OMEPRAZOLE 40 MG/1
CAPSULE, DELAYED RELEASE ORAL
Qty: 90 CAPSULE | Refills: 3 | Status: SHIPPED | OUTPATIENT
Start: 2022-06-03 | End: 2023-07-21

## 2022-06-03 NOTE — TELEPHONE ENCOUNTER
"Called pt to clarify how pt is taking med as med sig does not match how pt is reportedly taking medication.    Sig: TAKE ONE CAPSULE BY MOUTH ONCE DAILY AS NEEDED   Patient taking differently: Take 40 mg by mouth daily          Pt reports \"I take medication every day not just as needed. If I don't take medication every day, I wake up at night feeling like I can't breathe\".    Routing to provider to update sig on medication.    Stacy Decker RN    "

## 2022-07-05 DIAGNOSIS — Z79.4 TYPE 2 DIABETES MELLITUS WITH DIABETIC NEPHROPATHY, WITH LONG-TERM CURRENT USE OF INSULIN (H): ICD-10-CM

## 2022-07-05 DIAGNOSIS — E11.21 TYPE 2 DIABETES MELLITUS WITH DIABETIC NEPHROPATHY, WITH LONG-TERM CURRENT USE OF INSULIN (H): ICD-10-CM

## 2022-07-05 RX ORDER — PROCHLORPERAZINE 25 MG/1
SUPPOSITORY RECTAL
Qty: 3 EACH | Refills: 2 | Status: SHIPPED | OUTPATIENT
Start: 2022-07-05 | End: 2022-08-03

## 2022-08-03 ENCOUNTER — MYC MEDICAL ADVICE (OUTPATIENT)
Dept: PEDIATRICS | Facility: CLINIC | Age: 42
End: 2022-08-03

## 2022-08-03 ENCOUNTER — MYC MEDICAL ADVICE (OUTPATIENT)
Dept: ENDOCRINOLOGY | Facility: CLINIC | Age: 42
End: 2022-08-03

## 2022-08-03 DIAGNOSIS — Z79.4 TYPE 2 DIABETES MELLITUS WITH DIABETIC NEPHROPATHY, WITH LONG-TERM CURRENT USE OF INSULIN (H): ICD-10-CM

## 2022-08-03 DIAGNOSIS — E11.21 TYPE 2 DIABETES MELLITUS WITH DIABETIC NEPHROPATHY, WITH LONG-TERM CURRENT USE OF INSULIN (H): ICD-10-CM

## 2022-08-03 DIAGNOSIS — R80.9 MICROALBUMINURIA: ICD-10-CM

## 2022-08-03 RX ORDER — PROCHLORPERAZINE 25 MG/1
SUPPOSITORY RECTAL
Qty: 9 EACH | Refills: 0 | Status: SHIPPED | OUTPATIENT
Start: 2022-08-03 | End: 2022-10-12

## 2022-08-05 NOTE — TELEPHONE ENCOUNTER
Routing refill request to provider for review/approval because:     Patient does not have a serum potassium on file in the past 12 months.     Lisa WEEMS RN   Patient Advocate Liaison (PAL)  [facility]

## 2022-08-07 ENCOUNTER — HEALTH MAINTENANCE LETTER (OUTPATIENT)
Age: 42
End: 2022-08-07

## 2022-08-07 RX ORDER — LOSARTAN POTASSIUM 50 MG/1
TABLET ORAL
Qty: 135 TABLET | Refills: 3 | Status: SHIPPED | OUTPATIENT
Start: 2022-08-07 | End: 2023-11-22

## 2022-08-12 DIAGNOSIS — E11.21 TYPE 2 DIABETES MELLITUS WITH DIABETIC NEPHROPATHY, WITH LONG-TERM CURRENT USE OF INSULIN (H): ICD-10-CM

## 2022-08-12 DIAGNOSIS — Z79.4 TYPE 2 DIABETES MELLITUS WITH DIABETIC NEPHROPATHY, WITH LONG-TERM CURRENT USE OF INSULIN (H): ICD-10-CM

## 2022-08-15 RX ORDER — METFORMIN HCL 500 MG
TABLET, EXTENDED RELEASE 24 HR ORAL
Qty: 360 TABLET | Refills: 0 | Status: SHIPPED | OUTPATIENT
Start: 2022-08-15 | End: 2022-11-16

## 2022-08-21 ENCOUNTER — OFFICE VISIT (OUTPATIENT)
Dept: URGENT CARE | Facility: URGENT CARE | Age: 42
End: 2022-08-21
Payer: COMMERCIAL

## 2022-08-21 VITALS
OXYGEN SATURATION: 98 % | SYSTOLIC BLOOD PRESSURE: 116 MMHG | TEMPERATURE: 98.9 F | WEIGHT: 171 LBS | RESPIRATION RATE: 16 BRPM | BODY MASS INDEX: 29.35 KG/M2 | HEART RATE: 80 BPM | DIASTOLIC BLOOD PRESSURE: 68 MMHG

## 2022-08-21 DIAGNOSIS — H92.02 OTALGIA, LEFT: Primary | ICD-10-CM

## 2022-08-21 PROCEDURE — 99213 OFFICE O/P EST LOW 20 MIN: CPT | Performed by: PHYSICIAN ASSISTANT

## 2022-09-02 ENCOUNTER — OFFICE VISIT (OUTPATIENT)
Dept: URGENT CARE | Facility: URGENT CARE | Age: 42
End: 2022-09-02
Payer: COMMERCIAL

## 2022-09-02 VITALS
HEART RATE: 71 BPM | TEMPERATURE: 97.8 F | OXYGEN SATURATION: 99 % | DIASTOLIC BLOOD PRESSURE: 75 MMHG | SYSTOLIC BLOOD PRESSURE: 110 MMHG

## 2022-09-02 DIAGNOSIS — N76.0 BV (BACTERIAL VAGINOSIS): ICD-10-CM

## 2022-09-02 DIAGNOSIS — N10 PYELONEPHRITIS, ACUTE: Primary | ICD-10-CM

## 2022-09-02 DIAGNOSIS — R11.0 NAUSEA: ICD-10-CM

## 2022-09-02 DIAGNOSIS — B96.89 BV (BACTERIAL VAGINOSIS): ICD-10-CM

## 2022-09-02 LAB
ALBUMIN UR-MCNC: >=300 MG/DL
ANION GAP SERPL CALCULATED.3IONS-SCNC: 7 MMOL/L (ref 3–14)
APPEARANCE UR: CLEAR
BACTERIA #/AREA URNS HPF: ABNORMAL /HPF
BILIRUB UR QL STRIP: ABNORMAL
BUN SERPL-MCNC: 18 MG/DL (ref 7–30)
CALCIUM SERPL-MCNC: 8.7 MG/DL (ref 8.5–10.1)
CHLORIDE BLD-SCNC: 104 MMOL/L (ref 94–109)
CLUE CELLS: PRESENT
CO2 SERPL-SCNC: 29 MMOL/L (ref 20–32)
COLOR UR AUTO: YELLOW
CREAT SERPL-MCNC: 1 MG/DL (ref 0.52–1.04)
ERYTHROCYTE [DISTWIDTH] IN BLOOD BY AUTOMATED COUNT: 11.6 % (ref 10–15)
GFR SERPL CREATININE-BSD FRML MDRD: 71 ML/MIN/1.73M2
GLUCOSE BLD-MCNC: 95 MG/DL (ref 70–99)
GLUCOSE UR STRIP-MCNC: NEGATIVE MG/DL
HCT VFR BLD AUTO: 43.1 % (ref 35–47)
HGB BLD-MCNC: 14.1 G/DL (ref 11.7–15.7)
HGB UR QL STRIP: NEGATIVE
HYALINE CASTS #/AREA URNS LPF: ABNORMAL /LPF
KETONES UR STRIP-MCNC: ABNORMAL MG/DL
LEUKOCYTE ESTERASE UR QL STRIP: NEGATIVE
MCH RBC QN AUTO: 31.5 PG (ref 26.5–33)
MCHC RBC AUTO-ENTMCNC: 32.7 G/DL (ref 31.5–36.5)
MCV RBC AUTO: 96 FL (ref 78–100)
MUCOUS THREADS #/AREA URNS LPF: PRESENT /LPF
NITRATE UR QL: NEGATIVE
PH UR STRIP: 5.5 [PH] (ref 5–7)
PLATELET # BLD AUTO: 316 10E3/UL (ref 150–450)
POTASSIUM BLD-SCNC: 4.8 MMOL/L (ref 3.4–5.3)
RBC # BLD AUTO: 4.47 10E6/UL (ref 3.8–5.2)
RBC #/AREA URNS AUTO: ABNORMAL /HPF
SODIUM SERPL-SCNC: 140 MMOL/L (ref 133–144)
SP GR UR STRIP: 1.02 (ref 1–1.03)
SQUAMOUS #/AREA URNS AUTO: ABNORMAL /LPF
TRICHOMONAS, WET PREP: ABNORMAL
UROBILINOGEN UR STRIP-ACNC: 1 E.U./DL
WBC # BLD AUTO: 6.9 10E3/UL (ref 4–11)
WBC #/AREA URNS AUTO: ABNORMAL /HPF
WBC'S/HIGH POWER FIELD, WET PREP: ABNORMAL
YEAST, WET PREP: ABNORMAL

## 2022-09-02 PROCEDURE — 99214 OFFICE O/P EST MOD 30 MIN: CPT | Performed by: PHYSICIAN ASSISTANT

## 2022-09-02 PROCEDURE — 80048 BASIC METABOLIC PNL TOTAL CA: CPT | Performed by: PHYSICIAN ASSISTANT

## 2022-09-02 PROCEDURE — 81001 URINALYSIS AUTO W/SCOPE: CPT

## 2022-09-02 PROCEDURE — 85027 COMPLETE CBC AUTOMATED: CPT | Performed by: PHYSICIAN ASSISTANT

## 2022-09-02 PROCEDURE — 87086 URINE CULTURE/COLONY COUNT: CPT | Performed by: PHYSICIAN ASSISTANT

## 2022-09-02 PROCEDURE — 36415 COLL VENOUS BLD VENIPUNCTURE: CPT | Performed by: PHYSICIAN ASSISTANT

## 2022-09-02 PROCEDURE — 87210 SMEAR WET MOUNT SALINE/INK: CPT

## 2022-09-02 RX ORDER — METRONIDAZOLE 7.5 MG/G
1 GEL VAGINAL AT BEDTIME
Qty: 70 G | Refills: 0 | Status: SHIPPED | OUTPATIENT
Start: 2022-09-02 | End: 2022-09-07

## 2022-09-02 RX ORDER — ONDANSETRON 4 MG/1
4 TABLET, ORALLY DISINTEGRATING ORAL ONCE
Status: COMPLETED | OUTPATIENT
Start: 2022-09-02 | End: 2022-09-02

## 2022-09-02 RX ORDER — ONDANSETRON 4 MG/1
4 TABLET, ORALLY DISINTEGRATING ORAL EVERY 8 HOURS PRN
Qty: 8 TABLET | Refills: 0 | Status: SHIPPED | OUTPATIENT
Start: 2022-09-02 | End: 2024-09-10

## 2022-09-02 RX ORDER — CIPROFLOXACIN 500 MG/1
500 TABLET, FILM COATED ORAL 2 TIMES DAILY
Qty: 14 TABLET | Refills: 0 | Status: SHIPPED | OUTPATIENT
Start: 2022-09-02 | End: 2022-09-09

## 2022-09-02 RX ADMIN — ONDANSETRON 4 MG: 4 TABLET, ORALLY DISINTEGRATING ORAL at 20:09

## 2022-09-02 NOTE — LETTER
[facility] [place]  [address]  [address]  [place] [address]  Phone: [phone]  Fax: [phone]    September 2, 2022        Marie Vogel  [address] [place] [address]          To whom it may concern:    RE: Marie Vogel    Patient was seen and treated today at our clinic. Please excuse from work 9/1/2022 - 9/2/2022.     Please contact me for questions or concerns.      Sincerely,        Charla Loaiza PA-C

## 2022-09-02 NOTE — PATIENT INSTRUCTIONS
Start taking Cipro for presumed kidney infection  Fluids and rest  Tylenol for pain  To ER for:  --vomiting unable to hold down fluids  -- fever, shaking chills  -- Severe abdominal or back pain  -- Profound weakness or dizziness

## 2022-09-02 NOTE — PROGRESS NOTES
Assessment & Plan     1. Pyelonephritis, acute  - ciprofloxacin (CIPRO) 500 MG tablet; Take 1 tablet (500 mg) by mouth 2 times daily for 7 days  Dispense: 14 tablet; Refill: 0    2. Nausea  - CBC with platelets; Future  - Basic metabolic panel  (Ca, Cl, CO2, Creat, Gluc, K, Na, BUN); Future  - ondansetron (ZOFRAN ODT) ODT tab 4 mg  - CBC with platelets  - Basic metabolic panel  (Ca, Cl, CO2, Creat, Gluc, K, Na, BUN)  - ondansetron (ZOFRAN ODT) 4 MG ODT tab; Take 1 tablet (4 mg) by mouth every 8 hours as needed for nausea  Dispense: 8 tablet; Refill: 0    3. BV (bacterial vaginosis)  Treatment as below  - metroNIDAZOLE (METROGEL) 0.75 % vaginal gel; Place 1 applicator (5 g) vaginally At Bedtime for 5 days  Dispense: 70 g; Refill: 0    42-year-old female presents the clinic for evaluation of flank pain and dysuria.  On examination, vital signs are stable.  She is afebrile and normotensive.  Mucous membranes are moist.  Flank pain is present.  She does have some suprapubic discomfort, without rebound, guarding or rigidity.  UA has proteinuria(which is not new for her) and 5-10 white blood cells.  Given her symptoms of dysuria along with flank pain and some bacteria noted in urine, will treat for pyelonephritis with ciprofloxacin.  Patient has allergies to cephalosporins. Urine culture is pending.  No sign of DKA, glucose is normal without increased anion gap.   Encourage fluids.  Zofran can be used for nausea.  Discussed with patient's indications for follow-up in the emergency department including fever, shaking chills, vomiting unable to hold down fluids, severe back or abdominal pain etc.    Return in about 2 days (around 9/4/2022), or if symptoms worsen or fail to improve.    Diagnosis and treatment plan was reviewed with patient and/or family.   We went over any labs or imaging. Discussed worsening symptoms or little to no relief despite treatment plan to follow-up with PCP or return to clinic.  Patient verbalizes  understanding. All questions were addressed and answered.     ELEN Babin [facility] [place]    CHIEF COMPLAINT:   Chief Complaint   Patient presents with     Tailbone Pain     PT think she has a UTI because she has lower back pain.. stomach cramps and nauseated, also a soft stool this morning.      Gabby Salazar is a 42 year old female who presents to clinic today for evaluation.  For the past 1 week, patient has had bilateral flank pain.  Last night, developed dysuria without hematuria.  Currently feeling nauseated and having chills.  No fever noted.  She did not had any episodes of vomiting.  She did have a loose stool this morning and some stomach cramps.  Currently not sexually active, denies pregnancy.      Past Medical History:   Diagnosis Date     Allergic rhinitis due to pollen      Atypical glandular cells on Pap smear 3/1108     Cervical high risk HPV (human papillomavirus) test positive 2018    See problem list     Gastroesophageal reflux disease      PFO (patent foramen ovale)      Stargardt's disease 2019     Type II or unspecified type diabetes mellitus without mention of complication, not stated as uncontrolled     onset was gestational in      Past Surgical History:   Procedure Laterality Date     ABDOMEN SURGERY       C IUD,MIRENA  8/10/10, 7/28/15     C/SECTION, LOW TRANSVERSE  6/25/10    , Low Transverse     CHOLECYSTECTOMY, LAPOROSCOPIC      Cholecystectomy, Laparoscopic     ESOPHAGOSCOPY, GASTROSCOPY, DUODENOSCOPY (EGD), COMBINED N/A 2016    Procedure: COMBINED ESOPHAGOSCOPY, GASTROSCOPY, DUODENOSCOPY (EGD), BIOPSY SINGLE OR MULTIPLE;  Surgeon: Fadi Hunter MD;  Location:  GI     HC ESOPHAGEAL MOTILITY STUDY N/A 2016    Procedure: ESOPHAGEAL MOTILITY STUDY;  Surgeon: Fadi Hunter MD;  Location:  GI      REMOVE TONSILS/ADENOIDS,<11 Y/O      T &A     ZZC INDUCED ABORTN BY D&C       "     Social History     Tobacco Use     Smoking status: Former Smoker     Packs/day: 0.00     Years: 0.00     Pack years: 0.00     Types: Cigarettes     Quit date: 10/24/2005     Years since quittin.8     Smokeless tobacco: Never Used     Tobacco comment: States only smokes when drinks maybe 2x/year   Substance Use Topics     Alcohol use: No     Alcohol/week: 0.0 standard drinks     Current Outpatient Medications   Medication     ciprofloxacin (CIPRO) 500 MG tablet     metroNIDAZOLE (METROGEL) 0.75 % vaginal gel     ondansetron (ZOFRAN ODT) 4 MG ODT tab     albuterol (PROAIR HFA/PROVENTIL HFA/VENTOLIN HFA) 108 (90 Base) MCG/ACT inhaler     atenolol (TENORMIN) 25 MG tablet     blood glucose (PEPE CONTOUR NEXT) test strip     blood glucose monitoring (NO BRAND SPECIFIED) meter device kit     cetirizine (ZYRTEC) 10 MG tablet     clotrimazole (LOTRIMIN) 1 % external cream     Continuous Blood Gluc Sensor (DEXCOM G6 SENSOR) MISC     Continuous Blood Gluc Transmit (DEXCOM G6 TRANSMITTER) MISC     dulaglutide (TRULICITY) 1.5 MG/0.5ML pen     EPINEPHrine (ANY BX GENERIC EQUIV) 0.3 MG/0.3ML injection 2-pack     fluticasone (FLONASE) 50 MCG/ACT spray     glucagon (GLUCAGON EMERGENCY) 1 MG kit     Glucagon, rDNA, (GLUCAGON EMERGENCY) 1 MG KIT     ibuprofen (ADVIL/MOTRIN) 200 MG tablet     Insulin Disposable Pump (OMNIPOD DASH 5 PACK PODS) MISC     Insulin Disposable Pump (OMNIPOD DASH SYSTEM) KIT     insulin glargine (LANTUS PEN) 100 UNIT/ML pen     insulin lispro (HUMALOG) 100 UNIT/ML vial     INSULIN PUMP - OUTPATIENT     insulin syringe-needle U-100 (29G X 1/2\" 1 ML) 29G X 1/2\" 1 ML miscellaneous     Lancets (MICROLET) MISC     levonorgestrel (MIRENA) 20 MCG/24HR IUD     losartan (COZAAR) 50 MG tablet     MAGNESIUM OXIDE PO     metFORMIN (GLUCOPHAGE XR) 500 MG 24 hr tablet     montelukast (SINGULAIR) 10 MG tablet     Multiple Vitamins-Minerals (MULTI VITAMIN/MINERALS PO)     Omega-3 Fatty Acids (FISH OIL) 500 " MG CAPS     omeprazole (PRILOSEC) 40 MG DR capsule     rosuvastatin (CRESTOR) 5 MG tablet     sertraline (ZOLOFT) 25 MG tablet     SUMAtriptan (IMITREX) 25 MG tablet     traZODone (DESYREL) 50 MG tablet     Urine Glucose-Ketones Test STRP     Current Facility-Administered Medications   Medication     ondansetron (ZOFRAN ODT) ODT tab 4 mg     Allergies   Allergen Reactions     Ivp Dye [Contrast Dye] Itching     Pt reports that throat itches     Nuts Anaphylaxis     [place] nuts only     Bactrim Swelling     Pt reaction was swelling in mouth and tongue and itchy mouth.  Resolved with benadryl and prednisone     Pcn [Penicillins] Hives     Cephalosporins Itching     Seasonal Allergies Other (See Comments)     Molds, trees, grass, dust..  Upper congestion     Atorvastatin      myalgias     Shellfish Allergy Rash     Clams only       10 point ROS of systems were all negative except for pertinent positives noted in my HPI.      Exam:   /75   Pulse 71   Temp 97.8  F (36.6  C)   SpO2 99%   Constitutional: healthy, alert and no distress  Head: Normocephalic, atraumatic.  Eyes: conjunctiva clear, no drainage  ENT: TMs clear and shiny almaz, nasal mucosa pink and moist, throat without tonsillar hypertrophy or erythema  Neck: neck is supple, no cervical lymphadenopathy or nuchal rigidity  Cardiovascular: RRR  Respiratory: CTA bilaterally, no rhonchi or rales  Gastrointestinal: soft and nontender  BACK: B/L CVA tenderness  Skin: no rashes  Neurologic: Speech clear, gait normal. Moves all extremities.    Results for orders placed or performed in visit on 09/02/22   UA macro with reflex to Microscopic and Culture - Clinc Collect     Status: Abnormal    Specimen: Urine, Clean Catch   Result Value Ref Range    Color Urine Yellow Colorless, Straw, Light Yellow, Yellow    Appearance Urine Clear Clear    Glucose Urine Negative Negative mg/dL    Bilirubin Urine Small (A) Negative    Ketones Urine Trace (A) Negative mg/dL     Specific Gravity Urine 1.025 1.003 - 1.035    Blood Urine Negative Negative    pH Urine 5.5 5.0 - 7.0    Protein Albumin Urine >=300 (A) Negative mg/dL    Urobilinogen Urine 1.0 0.2, 1.0 E.U./dL    Nitrite Urine Negative Negative    Leukocyte Esterase Urine Negative Negative   Urine Microscopic     Status: Abnormal   Result Value Ref Range    Bacteria Urine Few (A) None Seen /HPF    RBC Urine 0-2 0-2 /HPF /HPF    WBC Urine 5-10 (A) 0-5 /HPF /HPF    Squamous Epithelials Urine Few (A) None Seen /LPF    Mucus Urine Present (A) None Seen /LPF    Hyaline Casts Urine 0-2 (A) None Seen /LPF    Narrative    Urine Culture not indicated   CBC with platelets     Status: Normal   Result Value Ref Range    WBC Count 6.9 4.0 - 11.0 10e3/uL    RBC Count 4.47 3.80 - 5.20 10e6/uL    Hemoglobin 14.1 11.7 - 15.7 g/dL    Hematocrit 43.1 35.0 - 47.0 %    MCV 96 78 - 100 fL    MCH 31.5 26.5 - 33.0 pg    MCHC 32.7 31.5 - 36.5 g/dL    RDW 11.6 10.0 - 15.0 %    Platelet Count 316 150 - 450 10e3/uL   Basic metabolic panel  (Ca, Cl, CO2, Creat, Gluc, K, Na, BUN)     Status: Normal   Result Value Ref Range    Sodium 140 133 - 144 mmol/L    Potassium 4.8 3.4 - 5.3 mmol/L    Chloride 104 94 - 109 mmol/L    Carbon Dioxide (CO2) 29 20 - 32 mmol/L    Anion Gap 7 3 - 14 mmol/L    Urea Nitrogen 18 7 - 30 mg/dL    Creatinine 1.00 0.52 - 1.04 mg/dL    Calcium 8.7 8.5 - 10.1 mg/dL    Glucose 95 70 - 99 mg/dL    GFR Estimate 71 >60 mL/min/1.73m2   Wet prep - Clinic Collect     Status: Abnormal    Specimen: Vagina; Swab   Result Value Ref Range    Trichomonas Absent Absent    Yeast Absent Absent    Clue Cells Present (A) Absent    WBCs/high power field 2+ (A) None

## 2022-09-05 LAB — BACTERIA UR CULT: NORMAL

## 2022-09-28 DIAGNOSIS — E11.21 TYPE 2 DIABETES MELLITUS WITH DIABETIC NEPHROPATHY, WITH LONG-TERM CURRENT USE OF INSULIN (H): Primary | ICD-10-CM

## 2022-09-28 DIAGNOSIS — Z79.4 TYPE 2 DIABETES MELLITUS WITH DIABETIC NEPHROPATHY, WITH LONG-TERM CURRENT USE OF INSULIN (H): Primary | ICD-10-CM

## 2022-09-28 NOTE — PROGRESS NOTES
"SUBJECTIVE:  Marie Vogel is a 42 year old female who comes in with 3-day history of feeling like her left ear is plugged.  She feels like possibly her hearing aid is stuck in her ear.  Does have mild tenderness.  She wants make sure that there is no wax foreign body or infection present.  Has not used any medication for this.  She otherwise has no other symptoms      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     Type II or unspecified type diabetes mellitus without mention of complication, not stated as uncontrolled 2002    onset was gestational in 2001     Current Outpatient Medications   Medication     albuterol (PROAIR HFA/PROVENTIL HFA/VENTOLIN HFA) 108 (90 Base) MCG/ACT inhaler     atenolol (TENORMIN) 25 MG tablet     blood glucose (PEPE CONTOUR NEXT) test strip     blood glucose monitoring (NO BRAND SPECIFIED) meter device kit     cetirizine (ZYRTEC) 10 MG tablet     clotrimazole (LOTRIMIN) 1 % external cream     Continuous Blood Gluc Sensor (DEXCOM G6 SENSOR) MISC     Continuous Blood Gluc Transmit (DEXCOM G6 TRANSMITTER) MISC     dulaglutide (TRULICITY) 1.5 MG/0.5ML pen     EPINEPHrine (ANY BX GENERIC EQUIV) 0.3 MG/0.3ML injection 2-pack     fluticasone (FLONASE) 50 MCG/ACT spray     glucagon (GLUCAGON EMERGENCY) 1 MG kit     ibuprofen (ADVIL/MOTRIN) 200 MG tablet     Insulin Disposable Pump (OMNIPOD DASH 5 PACK PODS) MISC     Insulin Disposable Pump (OMNIPOD DASH SYSTEM) KIT     insulin glargine (LANTUS PEN) 100 UNIT/ML pen     insulin lispro (HUMALOG) 100 UNIT/ML vial     INSULIN PUMP - OUTPATIENT     insulin syringe-needle U-100 (29G X 1/2\" 1 ML) 29G X 1/2\" 1 ML miscellaneous     Lancets (MICROLET) MISC     " levonorgestrel (MIRENA) 20 MCG/24HR IUD     losartan (COZAAR) 50 MG tablet     MAGNESIUM OXIDE PO     metFORMIN (GLUCOPHAGE XR) 500 MG 24 hr tablet     montelukast (SINGULAIR) 10 MG tablet     Multiple Vitamins-Minerals (MULTI VITAMIN/MINERALS PO)     Omega-3 Fatty Acids (FISH OIL) 500 MG CAPS     omeprazole (PRILOSEC) 40 MG DR capsule     rosuvastatin (CRESTOR) 5 MG tablet     sertraline (ZOLOFT) 25 MG tablet     SUMAtriptan (IMITREX) 25 MG tablet     traZODone (DESYREL) 50 MG tablet     Urine Glucose-Ketones Test STRP     Glucagon, rDNA, (GLUCAGON EMERGENCY) 1 MG KIT     ondansetron (ZOFRAN ODT) 4 MG ODT tab     No current facility-administered medications for this visit.     Social History     Socioeconomic History     Marital status:      Spouse name: Not on file     Number of children: 2     Years of education: 14     Highest education level: Associate degree: academic program   Occupational History     Occupation: nurse     Occupation: RN   Tobacco Use     Smoking status: Former Smoker     Packs/day: 0.00     Years: 0.00     Pack years: 0.00     Types: Cigarettes     Quit date: 10/24/2005     Years since quittin.9     Smokeless tobacco: Never Used     Tobacco comment: States only smokes when drinks maybe 2x/year   Substance and Sexual Activity     Alcohol use: No     Alcohol/week: 0.0 standard drinks     Drug use: No     Sexual activity: Yes     Partners: Male     Birth control/protection: I.U.D., Condom     Comment: Mirena IUD 7/28/15   Other Topics Concern     Parent/sibling w/ CABG, MI or angioplasty before 65F 55M? No   Social History Narrative    Caffeine intake/servings daily - 6-7    Calcium intake/servings daily - 2-3 yogurt, milk, cheese    Exercise 1 times weekly - describe aerobics    Sunscreen used - Yes    Seatbelts used - Yes    Guns stored in the home - No    Self Breast Exam - Yes    Pap test up to date -  Yes    Eye exam up to date -  Yes    Dental exam up to date -  Yes    DEXA  scan up to date -  Not Applicable    Flex Sig/Colonoscopy up to date -  Not Applicable    Mammography up to date -  Not Applicable    Immunizations reviewed and up to date - Yes    Abuse: Current or Past (Physical, Sexual or Emotional) - No    Do you feel safe in your environment - Yes    Do you cope well with stress - Yes    Do you suffer from insomnia - Yes     Last updated by: Tatianna Lacey  5/9/2005     Social Determinants of Health     Financial Resource Strain: Not on file   Food Insecurity: Not on file   Transportation Needs: Not on file   Physical Activity: Not on file   Stress: Not on file   Social Connections: Not on file   Intimate Partner Violence: Not on file   Housing Stability: Not on file     Review of systems negative other than stated    Exam:  GENERAL APPEARANCE: healthy, alert and no distress  EYES: EOMI,  PERRL  HENT: Left TM is clear.  There is no wax, foreign body or signs of infection noticed.  Canal is clear.  Right TM and canal clear.  NECK: no adenopathy, no asymmetry, masses, or scars and thyroid normal to palpation  RESP: lungs clear to auscultation - no rales, rhonchi or wheezes  CV: regular rates and rhythm, normal S1 S2, no S3 or S4 and no murmur, click or rub -  SKIN: no suspicious lesions or rashes    assessment/plan:  (H92.02) Otalgia, left  (primary encounter diagnosis)  Comment:   Plan: Patient with a 3-day history of left-sided ear pain.  There is no foreign body present.  Follow-up with primary as needed

## 2022-10-03 ENCOUNTER — OFFICE VISIT (OUTPATIENT)
Dept: CONSULT | Facility: CLINIC | Age: 42
End: 2022-10-03
Attending: GENETIC COUNSELOR, MS
Payer: COMMERCIAL

## 2022-10-03 ENCOUNTER — LAB (OUTPATIENT)
Dept: LAB | Facility: CLINIC | Age: 42
End: 2022-10-03
Attending: GENETIC COUNSELOR, MS
Payer: COMMERCIAL

## 2022-10-03 DIAGNOSIS — Z84.81 FAMILY HISTORY OF CARRIER OF GENETIC DISEASE: ICD-10-CM

## 2022-10-03 DIAGNOSIS — Z71.83 ENCOUNTER FOR NONPROCREATIVE GENETIC COUNSELING: Primary | ICD-10-CM

## 2022-10-03 DIAGNOSIS — Z79.4 TYPE 2 DIABETES MELLITUS WITH DIABETIC NEPHROPATHY, WITH LONG-TERM CURRENT USE OF INSULIN (H): ICD-10-CM

## 2022-10-03 DIAGNOSIS — E11.21 TYPE 2 DIABETES MELLITUS WITH DIABETIC NEPHROPATHY, WITH LONG-TERM CURRENT USE OF INSULIN (H): ICD-10-CM

## 2022-10-03 DIAGNOSIS — Z71.83 ENCOUNTER FOR NONPROCREATIVE GENETIC COUNSELING: ICD-10-CM

## 2022-10-03 LAB — HBA1C MFR BLD: 6.5 % (ref 0–5.6)

## 2022-10-03 PROCEDURE — 36415 COLL VENOUS BLD VENIPUNCTURE: CPT

## 2022-10-03 PROCEDURE — 83036 HEMOGLOBIN GLYCOSYLATED A1C: CPT

## 2022-10-03 PROCEDURE — 999N000069 HC STATISTIC GENETIC COUNSELING, < 16 MIN: Performed by: GENETIC COUNSELOR, MS

## 2022-10-03 NOTE — LETTER
10/3/2022      RE: Marie Vogel  [address]  [place] [address]     Dear Colleague,    Thank you for the opportunity to participate in the care of your patient, Marie Vogel, at the [facility] at [facility]. Please see a copy of my visit note below.    Name:  Marie Crawford  :   1980  MRN:   [id number]  Date of service: Oct 3, 2022  Primary Provider: No Ref-Primary, Physician  Referring Provider: No ref. provider found    Presenting Information:  aMrie, a 42 year old female, was seen at the [facility] clinic for evaluation of family history of mitochondrial disorder. Marie was accompanied to this visit by her daughter. I met with Marie at the request of Dr. Saavedra to obtain informed consent for genetic testing.       Family History:   A three generation pedigree was previously obtained for the family. The following information was previously provided:     Personal:    Marie has a history of sensorineural hearing loss with hearing aids (in her 30's), gestational diabetes and type 2 diabetes, retinal dystrophy, and cardiomyopathy for which she takes a medication (since 2016). Genetic testing was done specifically for her history of retinal dystrophy, was reportedly non-diagnostic. We do not have a copy of these results for confirmation.  Children:     Daughter (20) has a history of type 2 diabetes and obesity. She has no children at this time.    Son (assigned female at birth, age 12) has a history of bilateral sensorineural hearing loss and type II diabetes mellitus. 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   Siblings:    Brother has a history of hypertrophic cardiomyopathy (HCM) diagnosed at 18 years, CHF, diabetes, and his care team is watching his kidneys (full renal details unknown).  He has one daughter, who has autism but is otherwise healthy.  Parents:    Mother has passed. she had a history of HCM diagnosed in her 30's with heart transplant, hearing loss, and diabetes that reportedly was possibly medication-related. Several of her siblings/extended family members also have HCM, one of whom has HCM with an enlarged heart.     Father has type 2 diabetes and stage 3 kidney failure. His mother had diabetes later in life.    In regards to the family history of cardiomyopathy, Marie notes these individuals did have some cardiac genetic testing, possibly at [facility]. This genetic testing reportedly found a nuclear (autosomal) gene mutation related to the cardiac muscle only. She has not been able to obtain records to date. I encouraged her to continue to attempt to obtain their genetic testing report or results. Marie is planning on calling her uncle prior to our next follow-up appointment to get additional information about this testing.       Discussion and Assessment:  Genes are long stretches of DNA that are responsible for how our bodies look and how our bodies work. When there is a change, called a mutation, in a gene it can cause it to not do its job correctly which can cause the signs and symptoms of a genetic condition. Genetic testing involves analyzing the relevant gene(s) for pathogenic genetic changes.     Most of our genes are in the nucleus of the cell, aka the nuclear genes. Typically one copy of each of these genes is inherited from the biological mother and one copy of each is inherited from the biological father. We also have a smaller amount of genes in the mitochondria of the cell, which produces energy for the cells that make up our body. Mitochondrial genes are only inherited from the biological mother, as we only inherit our mitochondria from our mother. We have multiple copies of each mitochondrial gene in a mitochondria.      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 This result very likely explains Jazmyn's history and is consistent with a diagnosis of a mitochondrial disorder.      This variant has been seen before with a range of presentation/severity, from quite mild, to hypertrophic cardiomyopathy only, to MIDD, to more severe disorders including MELAS. Generally, mitochondrial disorders can present at any age and can affect many organ systems such as the eyes, brain, muscles, heart, ears, and endocrine system. Individuals with the same mitochondrial disorder can present with different symptoms and different severity of symptoms even within the same family.     Our mitochondrial DNA is only inherited from our mother and therefore mitochondrial disorders are inherited in a maternal inheritance pattern. If a female has a mtDNA variant, she will pass down some amount of that variant to each of her children. The percent of mitochondria with a variant is they inherit can differ from mom, and thus a child can have higher or lower heteroplasmy than their mother. If a male has a mtDNA variant, he can be affected, but will not pass it down to his children.      Because Marie's child has the m.3243A>G variant and Marie has symptoms consistent with mitochondrial disease, it is very likely that Marie also has this variant. However, her heteroplasmy level could be different from her child's. Marie was interested in getting her own genetic testing for the variant today. Benefits, limitations, and possible results were reviewed.     It is medically necessary to determine if there is an underlying genetic cause for Marie's symptoms for several reasons. First and foremost this can be important for her own health. It is possible that an underlying cause may also predispose Marie to other health risks. Knowing about these additional health risks can help us stay ahead of Marie's healthcare to more appropriately screen for other  complications.  Some diagnoses may also have treatment options. Additionally, discovering an underlying reason may help predict the chance for other family members to have similar healthcare needs. Finally, having a specific underlying diagnosis can sometimes help individuals receive the services they need to help reach their full potential in school, in work, or in day to day life.    Insurance and billing procedures were covered with Marie. Once [facility] receives the sample, they will do a benefits investigation and contact Marie with an estimated out of pocket cost if expected to be more than $100. This estimation is not guaranteed. At that time, Marie has the right to decline to proceed with testing based on the benefits investigation. If [facility] does not hear back from Marie after three attempts to connect, testing will be canceled. If the benefits investigation is too high for Marie [facility] offers financial assistance based on house-hold income and household size. she may also switch to the patient-pay price. If the estimation of benefits is less than $100, Marie will not be contacted and testing will be automatically initiated.     Marie provided informed consent for the testing. I will plan to follow-up with Marie by phone when results are returned, approximately 3-4 weeks after the testing is initiated. A follow-up appointment will be scheduled as needed according to Dr. Saavedra. Additional questions or concerns were denied at this time.        Plan:  1. Marie had her blood drawn for targeted MT-TL1 m.3243A>G variant testing at [facility] today. Once the lab receives the sample, they will conduct a benefits investigation with Marie's insurance and she will be contacted about the outcome.     2. If she want to proceed at that time, Marie's genetic testing will be initiated. If the estimation is less than $100, she will not be contacted and testing will begin automatically.    3. Results are expected in approximately  3-4 weeks and will be returned by phone; a follow-up appointment will be scheduled as needed at that time.    4. Contact information was provided should any questions arise in the future.      Lisa Gutierrez [facility]  Genetic Counselor  [facility]   Phone: [phone]        Approximate Time Spent in Consultation: 10 min     CC: no letter       Please do not hesitate to contact me if you have any questions/concerns.     Sincerely,       Lisa Gutierrez,

## 2022-10-03 NOTE — LETTER
Date:October 5, 2022      Provider requested that no letter be sent. Do not send.       [facility]

## 2022-10-04 NOTE — PROGRESS NOTES
Name:  Marie Crawford  :   1980  MRN:   [id number]  Date of service: Oct 3, 2022  Primary Provider: No Ref-Primary, Physician  Referring Provider: No ref. provider found    Presenting Information:  Marie, a 42 year old female, was seen at the [facility] Genetics clinic for evaluation of family history of mitochondrial disorder. Marie was accompanied to this visit by her daughter. I met with Marie at the request of Dr. Saavedra to obtain informed consent for genetic testing.       Family History:   A three generation pedigree was previously obtained for the family. The following information was previously provided:     Personal:    Marie has a history of sensorineural hearing loss with hearing aids (in her 30's), gestational diabetes and type 2 diabetes, retinal dystrophy, and cardiomyopathy for which she takes a medication (since ). Genetic testing was done specifically for her history of retinal dystrophy, was reportedly non-diagnostic. We do not have a copy of these results for confirmation.  Children:     Daughter (20) has a history of type 2 diabetes and obesity. She has no children at this time.    Son (assigned female at birth, age 12) has a history of bilateral sensorineural hearing loss and type II diabetes mellitus. 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   Siblings:    Brother has a history of hypertrophic cardiomyopathy (HCM) diagnosed at 18 years, CHF, diabetes, and his care team is watching his kidneys (full renal details unknown). He has one daughter, who has autism but is otherwise healthy.  Parents:    Mother has passed. she had a history of HCM diagnosed in her 30's with heart transplant, hearing loss, and diabetes that reportedly was possibly medication-related. Several of her siblings/extended family members also have HCM, one of whom has HCM with an enlarged heart.     Father has type 2  diabetes and stage 3 kidney failure. His mother had diabetes later in life.    In regards to the family history of cardiomyopathy, Marie notes these individuals did have some cardiac genetic testing, possibly at [facility]. This genetic testing reportedly found a nuclear (autosomal) gene mutation related to the cardiac muscle only. She has not been able to obtain records to date. I encouraged her to continue to attempt to obtain their genetic testing report or results. Marie is planning on calling her uncle prior to our next follow-up appointment to get additional information about this testing.       Discussion and Assessment:  Genes are long stretches of DNA that are responsible for how our bodies look and how our bodies work. When there is a change, called a mutation, in a gene it can cause it to not do its job correctly which can cause the signs and symptoms of a genetic condition. Genetic testing involves analyzing the relevant gene(s) for pathogenic genetic changes.     Most of our genes are in the nucleus of the cell, aka the nuclear genes. Typically one copy of each of these genes is inherited from the biological mother and one copy of each is inherited from the biological father. We also have a smaller amount of genes in the mitochondria of the cell, which produces energy for the cells that make up our body. Mitochondrial genes are only inherited from the biological mother, as we only inherit our mitochondria from our mother. We have multiple copies of each mitochondrial gene in a mitochondria.      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 This result very likely explains Jazmyn's history and is consistent with a diagnosis of a mitochondrial disorder.      This variant has been seen before with a range of presentation/severity, from quite mild, to hypertrophic cardiomyopathy only, to MIDD, to more severe disorders including MELAS.  Generally, mitochondrial disorders can present at any age and can affect many organ systems such as the eyes, brain, muscles, heart, ears, and endocrine system. Individuals with the same mitochondrial disorder can present with different symptoms and different severity of symptoms even within the same family.     Our mitochondrial DNA is only inherited from our mother and therefore mitochondrial disorders are inherited in a maternal inheritance pattern. If a female has a mtDNA variant, she will pass down some amount of that variant to each of her children. The percent of mitochondria with a variant is they inherit can differ from mom, and thus a child can have higher or lower heteroplasmy than their mother. If a male has a mtDNA variant, he can be affected, but will not pass it down to his children.      Because Marie's child has the m.3243A>G variant and Marie has symptoms consistent with mitochondrial disease, it is very likely that Marie also has this variant. However, her heteroplasmy level could be different from her child's. Marie was interested in getting her own genetic testing for the variant today. Benefits, limitations, and possible results were reviewed.     It is medically necessary to determine if there is an underlying genetic cause for Marie's symptoms for several reasons. First and foremost this can be important for her own health. It is possible that an underlying cause may also predispose Marie to other health risks. Knowing about these additional health risks can help us stay ahead of Marie's healthcare to more appropriately screen for other complications.  Some diagnoses may also have treatment options. Additionally, discovering an underlying reason may help predict the chance for other family members to have similar healthcare needs. Finally, having a specific underlying diagnosis can sometimes help individuals receive the services they need to help reach their full potential in school, in work,  or in day to day life.    Insurance and billing procedures were covered with Marie. Once [facility] receives the sample, they will do a benefits investigation and contact Marie with an estimated out of pocket cost if expected to be more than $100. This estimation is not guaranteed. At that time, Marie has the right to decline to proceed with testing based on the benefits investigation. If [facility] does not hear back from Marie after three attempts to connect, testing will be canceled. If the benefits investigation is too high for Marie [facility] offers financial assistance based on house-hold income and household size. she may also switch to the patient-pay price. If the estimation of benefits is less than $100, Marie will not be contacted and testing will be automatically initiated.     Marie provided informed consent for the testing. I will plan to follow-up with Marie by phone when results are returned, approximately 3-4 weeks after the testing is initiated. A follow-up appointment will be scheduled as needed according to Dr. Saavedra. Additional questions or concerns were denied at this time.        Plan:  1. Marie had her blood drawn for targeted MT-TL1 m.3243A>G variant testing at [facility] today. Once the lab receives the sample, they will conduct a benefits investigation with Marie's insurance and she will be contacted about the outcome.     2. If she want to proceed at that time, Marie's genetic testing will be initiated. If the estimation is less than $100, she will not be contacted and testing will begin automatically.    3. Results are expected in approximately 3-4 weeks and will be returned by phone; a follow-up appointment will be scheduled as needed at that time.    4. Contact information was provided should any questions arise in the future.      Lisa Gutierrez [facility]  Genetic Counselor  [facility]   Phone: [phone]        Approximate Time Spent in Consultation: 10 min     CC: no letter

## 2022-10-12 ENCOUNTER — OFFICE VISIT (OUTPATIENT)
Dept: ENDOCRINOLOGY | Facility: CLINIC | Age: 42
End: 2022-10-12
Payer: COMMERCIAL

## 2022-10-12 VITALS
TEMPERATURE: 98.8 F | OXYGEN SATURATION: 97 % | HEART RATE: 100 BPM | BODY MASS INDEX: 29.33 KG/M2 | HEIGHT: 64 IN | WEIGHT: 171.8 LBS | RESPIRATION RATE: 18 BRPM | DIASTOLIC BLOOD PRESSURE: 98 MMHG | SYSTOLIC BLOOD PRESSURE: 149 MMHG

## 2022-10-12 DIAGNOSIS — E11.21 TYPE 2 DIABETES MELLITUS WITH DIABETIC NEPHROPATHY, WITH LONG-TERM CURRENT USE OF INSULIN (H): Primary | ICD-10-CM

## 2022-10-12 DIAGNOSIS — Z96.41 INSULIN PUMP IN PLACE: ICD-10-CM

## 2022-10-12 DIAGNOSIS — Z79.4 TYPE 2 DIABETES MELLITUS WITH DIABETIC NEPHROPATHY, WITH LONG-TERM CURRENT USE OF INSULIN (H): Primary | ICD-10-CM

## 2022-10-12 DIAGNOSIS — R80.9 MICROALBUMINURIA: ICD-10-CM

## 2022-10-12 PROCEDURE — 95251 CONT GLUC MNTR ANALYSIS I&R: CPT | Performed by: INTERNAL MEDICINE

## 2022-10-12 PROCEDURE — 99214 OFFICE O/P EST MOD 30 MIN: CPT | Performed by: INTERNAL MEDICINE

## 2022-10-12 RX ORDER — DULAGLUTIDE 3 MG/.5ML
3 INJECTION, SOLUTION SUBCUTANEOUS
Qty: 6 ML | Refills: 3 | Status: SHIPPED | OUTPATIENT
Start: 2022-10-12 | End: 2023-01-03

## 2022-10-12 RX ORDER — PROCHLORPERAZINE 25 MG/1
SUPPOSITORY RECTAL
Qty: 9 EACH | Refills: 3 | Status: SHIPPED | OUTPATIENT
Start: 2022-10-12 | End: 2023-08-01

## 2022-10-12 NOTE — NURSING NOTE
ENDOCRINOLOGY INTAKE FORM    Patient Name:  Marie Vogel  :  1980    Is patient Diabetic?   Yes: type 2  Does patient have non-diabetic or other endocrine issues?  Yes: hyperlipidemia    Vitals: There were no vitals taken for this visit.  BMI= There is no height or weight on file to calculate BMI.    Flu vaccine:  No  Pneumonia vaccine:  Yes: 23 x 1    Smoking and Alcohol use:  Social History     Tobacco Use     Smoking status: Former     Packs/day: 0.00     Years: 0.00     Pack years: 0.00     Types: Cigarettes     Quit date: 10/24/2005     Years since quittin.9     Smokeless tobacco: Never     Tobacco comments:     States only smokes when drinks maybe 2x/year   Substance Use Topics     Alcohol use: No     Alcohol/week: 0.0 standard drinks     Drug use: No       Foot Exam: No  Eye Exam:  Yes: 22  Dental Exam:    Aspirin Use:  No    Lab Results   Component Value Date    A1C 6.5 10/03/2022    A1C 7.0 2022    A1C 6.4 02/10/2022    A1C 6.5 2021    A1C 7.6 2021    A1C 6.7 2020    A1C 7.0 2020    A1C 7.5 2019       Lab Results   Component Value Date    MICROL 775 02/10/2022    MICROL 591 2021     No results found for: MICROALBUMIN    Lenore Hurley [facility]  [phone]

## 2022-10-12 NOTE — LETTER
10/12/2022         RE: Marie Vogel  [address] [facility] [address]  [place] [address]        Dear Colleague,    Thank you for referring your patient, Marie Vogel, to the [facility]. Please see a copy of my visit note below.    Name: Marie Vogel  Seen for f/u of DM.  HPI:  Marie Vogel is a 41 year old female who presents for the evaluation/management of DM.   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    Here for f/u. Previously has seen endo at [place] ( Dr Aguilera and Dr Zhou and ). 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 Busy at work, also variable schedule.. Concerned about weight gain.   H/o cardiomyopathy. Followed by cardiology.    On Omnipod since 10/2019 and using freestyle casey.  Using humalog in pump.  Using DEXCOM CGM    She is working at [employer].  Works as OR Nurse.    She is using hearing Aids.  Has questions about metformin related ototoxicity?    Reports that he has long acting insulin in case of pump malfunction at home. Marie also has glucagon kit at home for emergency.    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   Jovanna genetic testing is pending.    Wt is stable.  Wt Readings from Last 2 Encounters:   10/12/22 77.9 kg (171 lb 12.8 oz)   08/21/22 77.6 kg (171 lb)       1. Type 2 DM:  Orginally diagnosed at the age of: 21 with GDM during her first pregnancy. Diagnosed with DM 2 in 1/2002, diet controlled for 3 years, then started on  metformin. In 2010 during her second pregnancy, started on insulin and then insulin pump therapy in 2012.   Current Regimen: Insulin pump therapy ( Medtronic Minimed Paradigm), metformin XR 1000 mg bid, Trulicity 1.5 mg/week.  BS checks: Using Dexcom.  Average Meter Download: Blood glucose data reviewed personally. See nursing note from this encounter for details.  She is able to feel symptoms of hypoglycemia.    yes:     Diabetes Medication(s)     Biguanides       metFORMIN (GLUCOPHAGE XR) 500 MG 24 hr tablet    Take 2 Tablets (1,000 mg) by mouth 2 times daily with meals.    Diabetic Other       Glucagon, rDNA, (GLUCAGON EMERGENCY) 1 MG KIT    For Intramuscular use for low Blood glucose episode.    Insulin       insulin glargine (LANTUS PEN) 100 UNIT/ML pen    Take 20 units in case of pump malfunction only.     insulin lispro (HUMALOG) 100 UNIT/ML vial    USE WITH INSULIN PUMP AS DIRECTED, USES ABOUT 80 UNITS PER DAY    Incretin Mimetic Agents (GLP-1 Receptor Agonists)       dulaglutide (TRULICITY) 1.5 MG/0.5ML pen    Inject 1.5 mg Subcutaneous every 7 days        Current Regimen:   Insulin pump -   Time Rate (U/hr)   0000-  0.75                   Carbohydrate Ratio -    Time Ratio   0000-  10         Sensitivity   40   Active Insulin Time   hours   Basal      Bolus      Total Carbohydrates/day    Total Insulin/day     Average Blood Sugar                    Complications:   Diabetes Complications  Description / Detail    Diabetic Retinopathy  No   CAD / PAD  No   Neuropathy  No   Nephropathy / Microalbuminuria  Yes: microalbuminuria. ON cozaar.   Gastroparesis  No   Hypoglycemia [place]  No     2. Hypertension: Blood Pressure today:   BP Readings from Last 3 Encounters:   10/12/22 (!) 149/98   09/02/22 110/75   08/21/22 116/68     Takes medications everyday without forgetting a dose.  Denies feeling lightheaded or dizzy.    3. Hyperlipidemia:   Denies muscle aches of pains.       PMH/PSH:  Past Medical History:    Diagnosis Date     Allergic rhinitis due to pollen      Atypical glandular cells on Pap smear 3/1108     Cervical high risk HPV (human papillomavirus) test positive 2018    See problem list     Gastroesophageal reflux disease      PFO (patent foramen ovale)      Stargardt's disease 2019     Type II or unspecified type diabetes mellitus without mention of complication, not stated as uncontrolled     onset was gestational in      Past Surgical History:   Procedure Laterality Date     ABDOMEN SURGERY       C IUD,MIRENA  8/10/10, 7/28/15     C/SECTION, LOW TRANSVERSE  6/25/10    , Low Transverse     CHOLECYSTECTOMY, LAPOROSCOPIC      Cholecystectomy, Laparoscopic     ESOPHAGOSCOPY, GASTROSCOPY, DUODENOSCOPY (EGD), COMBINED N/A 2016    Procedure: COMBINED ESOPHAGOSCOPY, GASTROSCOPY, DUODENOSCOPY (EGD), BIOPSY SINGLE OR MULTIPLE;  Surgeon: Fadi Hunter MD;  Location: [facility] ESOPHAGEAL MOTILITY STUDY N/A 2016    Procedure: ESOPHAGEAL MOTILITY STUDY;  Surgeon: Fadi Hunter MD;  Location:  GI      REMOVE TONSILS/ADENOIDS,<11 Y/O      T &A     ZZC INDUCED ABORTN BY D&C           Family Hx:  Family History   Problem Relation Age of Onset     Cardiovascular Mother         hypertrophic cardiomyopathy     Genitourinary Problems Mother         gallbladder disease     Diabetes Mother         Transplnant induced/      Other - See Comments Mother         heart transplant 2005     Depression Mother      Diabetes Father         type 2     Hypertension Father      Hyperlipidemia Father      Genitourinary Problems Maternal Grandmother         gallbladder disease     Genitourinary Problems Paternal Grandmother         gallbladder disease     Hypertension Paternal Grandfather         high bp     Cerebrovascular Disease Paternal Grandfather              Cardiovascular Brother         hypertrophic cardiomyopathy     Diabetes Brother       Diabetes Brother         type 2     Diabetes Other         Type 2     Glaucoma No family hx of      Macular Degeneration No family hx of      Thyroid disease: No         DM2: Yes - father and mother         Autoimmune: DM1, SLE, RA, Vitiligo No    Social Hx:  Social History     Socioeconomic History     Marital status:      Spouse name: Not on file     Number of children: 2     Years of education: 14     Highest education level: Associate degree: academic program   Occupational History     Occupation: nurse     Occupation: RN   Tobacco Use     Smoking status: Former     Packs/day: 0.00     Years: 0.00     Pack years: 0.00     Types: Cigarettes     Quit date: 10/24/2005     Years since quittin.9     Smokeless tobacco: Never     Tobacco comments:     States only smokes when drinks maybe 2x/year   Substance and Sexual Activity     Alcohol use: No     Alcohol/week: 0.0 standard drinks     Drug use: No     Sexual activity: Yes     Partners: Male     Birth control/protection: I.U.D., Condom     Comment: Mirena IUD 7/28/15   Other Topics Concern     Parent/sibling w/ CABG, MI or angioplasty before 65F 55M? No   Social History Narrative    Caffeine intake/servings daily - 6-7    Calcium intake/servings daily - 2-3 yogurt, milk, cheese    Exercise 1 times weekly - describe aerobics    Sunscreen used - Yes    Seatbelts used - Yes    Guns stored in the home - No    Self Breast Exam - Yes    Pap test up to date -  Yes    Eye exam up to date -  Yes    Dental exam up to date -  Yes    DEXA scan up to date -  Not Applicable    Flex Sig/Colonoscopy up to date -  Not Applicable    Mammography up to date -  Not Applicable    Immunizations reviewed and up to date - Yes    Abuse: Current or Past (Physical, Sexual or Emotional) - No    Do you feel safe in your environment - Yes    Do you cope well with stress - Yes    Do you suffer from insomnia - Yes     Last updated by: Tatianna Lacey  2005     Social  "Determinants of Health     Financial Resource Strain: Not on file   Food Insecurity: Not on file   Transportation Needs: Not on file   Physical Activity: Not on file   Stress: Not on file   Social Connections: Not on file   Intimate Partner Violence: Not on file   Housing Stability: Not on file          MEDICATIONS:  has a current medication list which includes the following prescription(s): albuterol, atenolol, blood glucose, blood glucose monitoring, cetirizine, clotrimazole, dexcom g6 sensor, dexcom g6 transmitter, trulicity, epinephrine, fluticasone, glucagon emergency, ibuprofen, omnipod dash pods (gen 4), omnipod dash pdm (gen 4), insulin glargine, humalog, insulin pump, insulin syringe-needle u-100, blood glucose monitoring, levonorgestrel, losartan, magnesium oxide, metformin, montelukast, multiple vitamins-minerals, fish oil-omega-3 fatty acids, omeprazole, ondansetron, rosuvastatin, sertraline, sumatriptan, trazodone, and urine glucose-ketones test.    ROS     ROS: 10 point ROS neg other than the symptoms noted above in the HPI.    Physical Exam   VS: BP (!) 149/98 (BP Location: Left arm, Patient Position: Chair, Cuff Size: Adult Regular)   Pulse 100   Temp 98.8  F (37.1  C) (Tympanic)   Resp 18   Ht 1.626 m (5' 4.02\")   Wt 77.9 kg (171 lb 12.8 oz)   LMP  (LMP Unknown)   SpO2 97%   Breastfeeding No   BMI 29.47 kg/m    GENERAL: healthy, alert and no distress  EYES: Eyes grossly normal to inspection, conjunctivae and sclerae normal  ENT: no nose swelling, nasal discharge.  Thyroid: no apparent thyroid nodules  RESP: no audible wheeze, cough, or visible cyanosis.  No visible retractions or increased work of breathing.  Able to speak fully in complete sentences.  ABDO: not evaluated.  EXTREMITIES: no hand tremors.  NEURO: Cranial nerves grossly intact, mentation intact and speech normal  SKIN: No apparent skin lesions, rash or edema seen   PSYCH: mentation appears normal, affect normal/bright, " judgement and insight intact, normal speech and appearance well-groomed    LABS:  A1c:  Lab Results   Component Value Date    A1C 6.5 10/03/2022    A1C 7.0 05/04/2022    A1C 6.4 02/10/2022    A1C 6.5 07/05/2021    A1C 7.6 01/14/2021    A1C 6.7 07/14/2020    A1C 7.0 01/14/2020    A1C 7.5 09/24/2019     Basic Metabolic Panel:  Creatinine   Date Value Ref Range Status   09/02/2022 1.00 0.52 - 1.04 mg/dL Final   07/05/2021 1.20 (H) 0.52 - 1.04 mg/dL Final     Urinary microalbumin:  Lab Results   Component Value Date    UMALCR 591.60 02/10/2022    UMALCR 402.04 07/05/2021         LFTS/Cholesterol Panel:  Recent Labs   Lab Test 02/10/22  1103 07/05/21  0722 09/28/16  0811 07/01/15  0612   CHOL 205* 165   < > 190   HDL 48* 39*   < > 42*   * 91   < > 96   TRIG 123 176*   < > 260*   CHOLHDLRATIO  --   --   --  4.5    < > = values in this interval not displayed.       Thyroid Function:  ENDO THYROID LABS-[facility] Latest Ref Rng & Units 9/24/2019   TSH 0.40 - 4.00 mU/L 1.45     ENDO THYROID LABS-[facility] Latest Ref Rng & Units 4/16/2017   TSH 0.40 - 4.00 mU/L 2.06     ENDO THYROID LABS-[facility] Latest Ref Rng 9/27/2016 4/2/2014   TSH 0.40 - 4.00 mU/L 2.17 1.09   T4 FREE 0.70 - 1.85 ng/dL  0.75        Vitamin D:  Vitamin D Deficiency Screening Results:  Lab Results   Component Value Date    VITDT 33 11/05/2018    VITDT 39 09/27/2016    VITDT 45 02/23/2016    VITDT 31 03/31/2015    VITDT 29 (L) 03/27/2014     All pertinent notes, labs, and images personally reviewed by me.     Glucometer/ insulin pump (if applicable)/ CGM data (if applicable) downloaded, Personally reviewed and interpreted.  All Blood sugar data reviewed personally and discussed with pt.  See nursing note from today's clinic visit for details of BG/CGM log.      A/P  Ms.Marie WILLIAM Dino Vogel is a 41 year old here for the evaluation/management of diabetes:    1. DM2 - Under Fair control.  A1c 6.5%.  Diabetes complicated by microalbuminuria.  Using OMNIPOD DASH +  Dexcom.  She is also on metformin 1000 mg twice daily and Trulicity 1.5 mg/week  H/o hypertropic cardiomyopathy, followed by cardiology.  In general BG appear in range.  Noted some episodes of postmeal hyperglycemia  Plan:  Discussed diagnosis, pathophysiology, management and treatment options of condition with pt.  Continue metformin  1000 mg twice daily.  Increase TRULICITY to 3 mg/week.  Continue current pump settings.  Labs and follow up in 3-4 months.  Continue to use insulin pump-OmniPod Dash and Dexcom.  Discussed OmniPod 5.  She will check with insurance.    2. Hypertension - + microalbuminuria. On losartan. Blood pressure medications include cozaar 75 mg qd. Need aggressive BP/glycmeic control.    3. Hyperlipidemia - Under fair control.TG high with LDL 91.  Had myalgias on lipitor and stopped it.  On Crestor 5 mg/day.    4. Microalbuminuria:  Recommend strict BG control.  On Cozaar 50 mg/day.    DM Prevention:    Opthalmology- recommend annually.  Dental-Yes.  ASA-81 mg.  Smoking- No.    Most Recent Immunizations   Administered Date(s) Administered     COVID-19,PF,Pfizer (12+ Yrs) 12/28/2021     Influenza (IIV3) PF 09/26/2018     Influenza (intradermal) 10/01/2012     Influenza Vaccine IM > 6 months Valent IIV4 (Alfuria,Fluzone) 01/14/2021     MMR 09/17/2007     Pneumococcal 23 valent 01/22/2003     TD (ADULT, 7+) 01/01/2002     TDAP Vaccine (Adacel) 04/06/2012     Recommend checking blood sugars before meals and at bedtime.    If Blood glucose are low more often-> 2-3 times/week- give us a call.  The patient is advised to Make better food choices: reduce carbs, Reduce portion size, weight loss and exercise 3-4 times a week.  Discussed hypoglycemia signs and symptoms as well as management in detail.    All questions were answered.  The patient indicates understanding of the above issues and agrees with the plan set forth.   More than 50% of face to face time spent with Ms. Dino Vogel on counseling /  coordinating her care.      Follow-up:  follow up in 3 months.    Zita Fuentes MD  Endocrinology   [facility]/[place]    CC: Alan Paredes    Addendum to above note and clinic visit:    Labs reviewed.    See result note/telephone encounter.                  Answers for HPI/ROS submitted by the patient on 10/12/2022  General Symptoms: No  Skin Symptoms: No  HENT Symptoms: No  EYE SYMPTOMS: No  HEART SYMPTOMS: No  LUNG SYMPTOMS: No  INTESTINAL SYMPTOMS: No  URINARY SYMPTOMS: No  GYNECOLOGIC SYMPTOMS: No  BREAST SYMPTOMS: No  SKELETAL SYMPTOMS: No  BLOOD SYMPTOMS: No  NERVOUS SYSTEM SYMPTOMS: No  MENTAL HEALTH SYMPTOMS: No          Again, thank you for allowing me to participate in the care of your patient.        Sincerely,        Zita Fuentes MD

## 2022-10-12 NOTE — PROGRESS NOTES
Name: Marie Vogel  Seen for f/u of DM.  HPI:  Marie Vogel is a 41 year old female who presents for the evaluation/management of DM.   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    Here for f/u. Previously has seen endo at [place] ( Dr Aguilera and Dr Zhou and ). 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 Busy at work, also variable schedule.. Concerned about weight gain.   H/o cardiomyopathy. Followed by cardiology.    On Omnipod since 10/2019 and using freestyle casey.  Using humalog in pump.  Using DEXCOM CGM    She is working at [employer].  Works as OR Nurse.    She is using hearing Aids.  Has questions about metformin related ototoxicity?    Reports that he has long acting insulin in case of pump malfunction at home. Marie also has glucagon kit at home for emergency.    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   Jovanna genetic testing is pending.    Wt is stable.  Wt Readings from Last 2 Encounters:   10/12/22 77.9 kg (171 lb 12.8 oz)   08/21/22 77.6 kg (171 lb)       1. Type 2 DM:  Orginally diagnosed at the age of: 21 with GDM during her first pregnancy. Diagnosed with DM 2 in 1/2002, diet controlled for 3 years, then started on metformin. In 2010 during her second pregnancy, started on insulin and then insulin pump therapy in 2012.   Current Regimen: Insulin pump therapy ( Medtronic Minimed Paradigm), metformin XR 1000 mg bid, Trulicity 1.5 mg/week.  BS checks: Using Dexcom.  Average Meter  Download: Blood glucose data reviewed personally. See nursing note from this encounter for details.  She is able to feel symptoms of hypoglycemia.    yes:     Diabetes Medication(s)     Biguanides       metFORMIN (GLUCOPHAGE XR) 500 MG 24 hr tablet    Take 2 Tablets (1,000 mg) by mouth 2 times daily with meals.    Diabetic Other       Glucagon, rDNA, (GLUCAGON EMERGENCY) 1 MG KIT    For Intramuscular use for low Blood glucose episode.    Insulin       insulin glargine (LANTUS PEN) 100 UNIT/ML pen    Take 20 units in case of pump malfunction only.     insulin lispro (HUMALOG) 100 UNIT/ML vial    USE WITH INSULIN PUMP AS DIRECTED, USES ABOUT 80 UNITS PER DAY    Incretin Mimetic Agents (GLP-1 Receptor Agonists)       dulaglutide (TRULICITY) 1.5 MG/0.5ML pen    Inject 1.5 mg Subcutaneous every 7 days        Current Regimen:   Insulin pump -   Time Rate (U/hr)   0000-  0.75                   Carbohydrate Ratio -    Time Ratio   0000-  10         Sensitivity   40   Active Insulin Time   hours   Basal      Bolus      Total Carbohydrates/day    Total Insulin/day     Average Blood Sugar                    Complications:   Diabetes Complications  Description / Detail    Diabetic Retinopathy  No   CAD / PAD  No   Neuropathy  No   Nephropathy / Microalbuminuria  Yes: microalbuminuria. ON cozaar.   Gastroparesis  No   Hypoglycemia [place]  No     2. Hypertension: Blood Pressure today:   BP Readings from Last 3 Encounters:   10/12/22 (!) 149/98   09/02/22 110/75   08/21/22 116/68     Takes medications everyday without forgetting a dose.  Denies feeling lightheaded or dizzy.    3. Hyperlipidemia:   Denies muscle aches of pains.       PMH/PSH:  Past Medical History:   Diagnosis Date     Allergic rhinitis due to pollen      Atypical glandular cells on Pap smear 3/1108     Cervical high risk HPV (human papillomavirus) test positive 11/27/2018    See problem list     Gastroesophageal reflux disease      PFO (patent foramen ovale)       Stargardt's disease 2019     Type II or unspecified type diabetes mellitus without mention of complication, not stated as uncontrolled     onset was gestational in      Past Surgical History:   Procedure Laterality Date     ABDOMEN SURGERY       C IUD,MIRENA  8/10/10, 7/28/15     C/SECTION, LOW TRANSVERSE  6/25/10    , Low Transverse     CHOLECYSTECTOMY, LAPOROSCOPIC      Cholecystectomy, Laparoscopic     ESOPHAGOSCOPY, GASTROSCOPY, DUODENOSCOPY (EGD), COMBINED N/A 2016    Procedure: COMBINED ESOPHAGOSCOPY, GASTROSCOPY, DUODENOSCOPY (EGD), BIOPSY SINGLE OR MULTIPLE;  Surgeon: Fadi Hunter MD;  [facility]: [facility]     HC ESOPHAGEAL MOTILITY STUDY N/A 2016    Procedure: ESOPHAGEAL MOTILITY STUDY;  Surgeon: Fadi Hunter MD;  Location: [facility]      REMOVE TONSILS/ADENOIDS,<11 Y/O      T &A     ZZC INDUCED ABORTN BY D&C           Family Hx:  Family History   Problem Relation Age of Onset     Cardiovascular Mother         hypertrophic cardiomyopathy     Genitourinary Problems Mother         gallbladder disease     Diabetes Mother         Transplnant induced/      Other - See Comments Mother         heart transplant 2005     Depression Mother      Diabetes Father         type 2     Hypertension Father      Hyperlipidemia Father      Genitourinary Problems Maternal Grandmother         gallbladder disease     Genitourinary Problems Paternal Grandmother         gallbladder disease     Hypertension Paternal Grandfather         high bp     Cerebrovascular Disease Paternal Grandfather              Cardiovascular Brother         hypertrophic cardiomyopathy     Diabetes Brother      Diabetes Brother         type 2     Diabetes Other         Type 2     Glaucoma No family hx of      Macular Degeneration No family hx of      Thyroid disease: No         DM2: Yes - father and mother         Autoimmune: DM1, SLE, RA, Vitiligo No    Social  Hx:  Social History     Socioeconomic History     Marital status:      Spouse name: Not on file     Number of children: 2     Years of education: 14     Highest education level: Associate degree: academic program   Occupational History     Occupation: nurse     Occupation: RN   Tobacco Use     Smoking status: Former     Packs/day: 0.00     Years: 0.00     Pack years: 0.00     Types: Cigarettes     Quit date: 10/24/2005     Years since quittin.9     Smokeless tobacco: Never     Tobacco comments:     States only smokes when drinks maybe 2x/year   Substance and Sexual Activity     Alcohol use: No     Alcohol/week: 0.0 standard drinks     Drug use: No     Sexual activity: Yes     Partners: Male     Birth control/protection: I.U.D., Condom     Comment: Mirena IUD 7/28/15   Other Topics Concern     Parent/sibling w/ CABG, MI or angioplasty before 65F 55M? No   Social History Narrative    Caffeine intake/servings daily - 6-7    Calcium intake/servings daily - 2-3 yogurt, milk, cheese    Exercise 1 times weekly - describe aerobics    Sunscreen used - Yes    Seatbelts used - Yes    Guns stored in the home - No    Self Breast Exam - Yes    Pap test up to date -  Yes    Eye exam up to date -  Yes    Dental exam up to date -  Yes    DEXA scan up to date -  Not Applicable    Flex Sig/Colonoscopy up to date -  Not Applicable    Mammography up to date -  Not Applicable    Immunizations reviewed and up to date - Yes    Abuse: Current or Past (Physical, Sexual or Emotional) - No    Do you feel safe in your environment - Yes    Do you cope well with stress - Yes    Do you suffer from insomnia - Yes     Last updated by: Tatianna Lacey  2005     Social Determinants of Health     Financial Resource Strain: Not on file   Food Insecurity: Not on file   Transportation Needs: Not on file   Physical Activity: Not on file   Stress: Not on file   Social Connections: Not on file   Intimate Partner Violence: Not on file  "  Housing Stability: Not on file          MEDICATIONS:  has a current medication list which includes the following prescription(s): albuterol, atenolol, blood glucose, blood glucose monitoring, cetirizine, clotrimazole, dexcom g6 sensor, dexcom g6 transmitter, trulicity, epinephrine, fluticasone, glucagon emergency, ibuprofen, omnipod dash pods (gen 4), omnipod dash pdm (gen 4), insulin glargine, humalog, insulin pump, insulin syringe-needle u-100, blood glucose monitoring, levonorgestrel, losartan, magnesium oxide, metformin, montelukast, multiple vitamins-minerals, fish oil-omega-3 fatty acids, omeprazole, ondansetron, rosuvastatin, sertraline, sumatriptan, trazodone, and urine glucose-ketones test.    ROS     ROS: 10 point ROS neg other than the symptoms noted above in the HPI.    Physical Exam   VS: BP (!) 149/98 (BP Location: Left arm, Patient Position: Chair, Cuff Size: Adult Regular)   Pulse 100   Temp 98.8  F (37.1  C) (Tympanic)   Resp 18   Ht 1.626 m (5' 4.02\")   Wt 77.9 kg (171 lb 12.8 oz)   LMP  (LMP Unknown)   SpO2 97%   Breastfeeding No   BMI 29.47 kg/m    GENERAL: healthy, alert and no distress  EYES: Eyes grossly normal to inspection, conjunctivae and sclerae normal  ENT: no nose swelling, nasal discharge.  Thyroid: no apparent thyroid nodules  RESP: no audible wheeze, cough, or visible cyanosis.  No visible retractions or increased work of breathing.  Able to speak fully in complete sentences.  ABDO: not evaluated.  EXTREMITIES: no hand tremors.  NEURO: Cranial nerves grossly intact, mentation intact and speech normal  SKIN: No apparent skin lesions, rash or edema seen   PSYCH: mentation appears normal, affect normal/bright, judgement and insight intact, normal speech and appearance well-groomed    LABS:  A1c:  Lab Results   Component Value Date    A1C 6.5 10/03/2022    A1C 7.0 05/04/2022    A1C 6.4 02/10/2022    A1C 6.5 07/05/2021    A1C 7.6 01/14/2021    A1C 6.7 07/14/2020    A1C 7.0 " 01/14/2020    A1C 7.5 09/24/2019     Basic Metabolic Panel:  Creatinine   Date Value Ref Range Status   09/02/2022 1.00 0.52 - 1.04 mg/dL Final   07/05/2021 1.20 (H) 0.52 - 1.04 mg/dL Final     Urinary microalbumin:  Lab Results   Component Value Date    UMALCR 591.60 02/10/2022    UMALCR 402.04 07/05/2021         LFTS/Cholesterol Panel:  Recent Labs   Lab Test 02/10/22  1103 07/05/21  0722 09/28/16  0811 07/01/15  0612   CHOL 205* 165   < > 190   HDL 48* 39*   < > 42*   * 91   < > 96   TRIG 123 176*   < > 260*   CHOLHDLRATIO  --   --   --  4.5    < > = values in this interval not displayed.       Thyroid Function:  END[facility] THYROID LABS[facility] Latest Ref Rng & Units 9/24/2019   TSH 0.40 - 4.00 mU/L 1.45     ENDO THYROID LABS-[facility] Latest Ref Rng & Units 4/16/2017   TSH 0.40 - 4.00 mU/L 2.06     ENDO THYROID LABS-[facility] Latest Ref Rng 9/27/2016 4/2/2014   TSH 0.40 - 4.00 mU/L 2.17 1.09   T4 FREE 0.70 - 1.85 ng/dL  0.75        Vitamin D:  Vitamin D Deficiency Screening Results:  Lab Results   Component Value Date    VITDT 33 11/05/2018    VITDT 39 09/27/2016    VITDT 45 02/23/2016    VITDT 31 03/31/2015    VITDT 29 (L) 03/27/2014     All pertinent notes, labs, and images personally reviewed by me.     Glucometer/ insulin pump (if applicable)/ CGM data (if applicable) downloaded, Personally reviewed and interpreted.  All Blood sugar data reviewed personally and discussed with pt.  See nursing note from today's clinic visit for details of BG/CGM log.      A/P  Ms.Erica WILLIAM Vogel is a 41 year old here for the evaluation/management of diabetes:    1. DM2 - Under Fair control.  A1c 6.5%.  Diabetes complicated by microalbuminuria.  Using OMNIPOD DASH + Dexcom.  She is also on metformin 1000 mg twice daily and Trulicity 1.5 mg/week  H/o hypertropic cardiomyopathy, followed by cardiology.  In general BG appear in range.  Noted some episodes of postmeal hyperglycemia  Plan:  Discussed diagnosis, pathophysiology, management  and treatment options of condition with pt.  Continue metformin  1000 mg twice daily.  Increase TRULICITY to 3 mg/week.  Continue current pump settings.  Labs and follow up in 3-4 months.  Continue to use insulin pump-OmniPod Dash and Dexcom.  Discussed OmniPod 5.  She will check with insurance.    2. Hypertension - + microalbuminuria. On losartan. Blood pressure medications include cozaar 75 mg qd. Need aggressive BP/glycmeic control.    3. Hyperlipidemia - Under fair control.TG high with LDL 91.  Had myalgias on lipitor and stopped it.  On Crestor 5 mg/day.    4. Microalbuminuria:  Recommend strict BG control.  On Cozaar 50 mg/day.    DM Prevention:    Opthalmology- recommend annually.  Dental-Yes.  ASA-81 mg.  Smoking- No.    Most Recent Immunizations   Administered Date(s) Administered     COVID-19,PF,Pfizer (12+ Yrs) 12/28/2021     Influenza (IIV3) PF 09/26/2018     Influenza (intradermal) 10/01/2012     Influenza Vaccine IM > 6 months Valent IIV4 (Alfuria,Fluzone) 01/14/2021     MMR 09/17/2007     Pneumococcal 23 valent 01/22/2003     TD (ADULT, 7+) 01/01/2002     TDAP Vaccine (Adacel) 04/06/2012     Recommend checking blood sugars before meals and at bedtime.    If Blood glucose are low more often-> 2-3 times/week- give us a call.  The patient is advised to Make better food choices: reduce carbs, Reduce portion size, weight loss and exercise 3-4 times a week.  Discussed hypoglycemia signs and symptoms as well as management in detail.    All questions were answered.  The patient indicates understanding of the above issues and agrees with the plan set forth.   More than 50% of face to face time spent with Ms. Dino Vogel on counseling / coordinating her care.      Follow-up:  follow up in 3 months.    Zita Fuentes MD  Endocrinology   [facility]/[place]    CC: Alan Paredes    Addendum to above note and clinic visit:    Labs reviewed.    See result note/telephone  encounter.                  Answers for HPI/ROS submitted by the patient on 10/12/2022  General Symptoms: No  Skin Symptoms: No  HENT Symptoms: No  EYE SYMPTOMS: No  HEART SYMPTOMS: No  LUNG SYMPTOMS: No  INTESTINAL SYMPTOMS: No  URINARY SYMPTOMS: No  GYNECOLOGIC SYMPTOMS: No  BREAST SYMPTOMS: No  SKELETAL SYMPTOMS: No  BLOOD SYMPTOMS: No  NERVOUS SYSTEM SYMPTOMS: No  MENTAL HEALTH SYMPTOMS: No

## 2022-10-12 NOTE — PATIENT INSTRUCTIONS
[facility]  Dr Fuentes, Endocrinology Department    [facility]   [address] [facility]  [place] [address]  Appointment [phone]  Fax: [phone]  [place]: Monday - Thursday      Please check the cost coverage and copay with insurance before recommended tests, services and medications (especially if new medications are prescribed).     If ordered, please get blood work done 1 week prior to your next appointment so they will be available to Dr. Fuentes at your visit.    To provide the best diabetic care, please bring your blood glucose meter to each and every visit with your  Endocrinologist. Your blood glucose CGM/meter/insulin pump will be downloaded at every appointment.  Please arrive 15 minutes before your scheduled appointment. This will allow for your blood glucose CGM/meter/insulin pump  to be downloaded.  If you are wearing DEXCOM please bring  or sharing code from the Dexcom Clarity Odnna so that it can be downloaded.  If you are using FREESTYLE BESSIE personal sensors please bring the reader.    Check if OMNIPOD 5 is covered by insurance.  Increase Trulicity to 3.0 mg/week.  Rest regimen same.  Labs and follow up in 3-4 months.  Please make a lab appointment for blood work and follow up clinic appointment in 1 week after that to discuss results.    Recommend checking blood sugars before meals and at bedtime.    If Blood glucose are low more often-> 2-3 times/week- give us a call.  Make better food choices: reduce carbs, Reduce portion size, weight loss and exercise 3-4 times a week.    What is hypoglycemia:  Hypoglycemia is when blood sugar levels become too low - below 70 m/dl.      What causes hypoglycemia?  - using too much insulin  -taking too many diabetes pills  -not eating enough, or skipping meals or snacks  -not eating enough carbohydrate with meals  -changing your exercise routine  -drinking alcohol in excess    It is also  possible to have hypoglycemia even when you are carefully managing your blood sugar levels.    What does it feel like when blood sugars get too low?  You may feel:  - anxious  -confused  -dizzy  -hungry  -light-headed  -nervous  -shaky  -sleepy  -sweaty    You may have  -blurred or cloudy vision  -heart palpitations (heart skips a beat or races)  -tingling or numbness around the mouth and tongue  -tremors    What to do if you have symptoms of hypoglycmemia:  If you think your blood sugar is too low, check it with a glucose meter.  If its below 70 mg/dl, consume one of the following:  Fruit juice (1/2 cup)  Glucose tablets (15 grams)  Hard candy (5 to 7 pieces)  Honey or sugar (2 teaspoons)  Milk (1/2 cup)  Soft drink (non-diet, 1/2 cup)    Wait 15 minutes and check your blood glucose again.  IF it is still below 70 mg/dl, have another food item listed above. Wait another 15 minutes and repeat the blood glucose test.  Have a small meal or snack that contains some carbohydrate after your blood glucose rises above 70 mg/dl.    If you are at risk of hypoglycemia, always carry with you glucose tablets or one of the foods listed above.      To prevent Hypoglycemia:  Avoid situations that may cause hypoglycemia  Before making any change to your diet or exercise routine, discuss them with your healthcare provider  Keep a record of your blood glucose levels.  Include the time of day, diabetes medications, when you had your last meal or snack, and what you were doing at the time (e.g. Watching TV, gardening, jogging, etc).    Talk to your healthcare provider if your blood glucose levels are often low        Patient guide on hypoglycemia    http://www.hormone.org/Resources/upload/patient-guide-diagnosis-and-management-hypoglycemia-575412.pdf

## 2022-10-13 ENCOUNTER — MEDICAL CORRESPONDENCE (OUTPATIENT)
Dept: HEALTH INFORMATION MANAGEMENT | Facility: CLINIC | Age: 42
End: 2022-10-13

## 2022-10-13 ENCOUNTER — TELEPHONE (OUTPATIENT)
Dept: ENDOCRINOLOGY | Facility: CLINIC | Age: 42
End: 2022-10-13

## 2022-10-13 NOTE — TELEPHONE ENCOUNTER
[facility] omnipod D[facility]    LAST OFFICE/VIRTUAL VISIT:  10/12/22    FUTURE OFFICE/VIRTUAL VISIT:  None    Lab Results   Component Value Date    A1C 6.5 10/03/2022    A1C 7.0 05/04/2022    A1C 6.4 02/10/2022    A1C 6.5 07/05/2021    A1C 7.6 01/14/2021    A1C 6.7 07/14/2020    A1C 7.0 01/14/2020    A1C 7.5 09/24/2019           Lenore Hurley [facility] Endocrinology [place]  [phone]

## 2022-10-13 NOTE — TELEPHONE ENCOUNTER
Form was faxed and sent to scanning.      Lenore Hurley, [facility] Endocrinology  [place]

## 2022-10-18 ENCOUNTER — TELEPHONE (OUTPATIENT)
Dept: PEDIATRICS | Facility: CLINIC | Age: 42
End: 2022-10-18

## 2022-10-18 DIAGNOSIS — I42.1 HYPERTROPHIC OBSTRUCTIVE CARDIOMYOPATHY (H): Primary | ICD-10-CM

## 2022-10-18 DIAGNOSIS — Z82.49 FAMILY HISTORY OF CARDIOMYOPATHY: ICD-10-CM

## 2022-10-18 LAB — SCANNED LAB RESULT: NORMAL

## 2022-10-18 NOTE — TELEPHONE ENCOUNTER
I called Marie to discuss her targeted MT-TL1 genetic test results, which have returned. Marie was unavailable and a message was left with my number for her to call me back at her convenience to discuss.      Aggie Hughes MS, [facility]  Genetic Counseling  Genetics and Metabolism Division  [facility]   Phone: [phone]  Pager: [phone]  Email: [email or web address]

## 2022-10-20 NOTE — TELEPHONE ENCOUNTER
I have not heard back from Marie about the below, so called again today and was able to connect with her. We reviewed her genetic testing was targeted mitochondrial testing for the MT-TL1 variant her child Rain was found to have previously. Testing was done via [facility].    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 Specifically, it was seen at 23% heteroplasmy. This result very likely explains at least some of Marie's history and is consistent with a diagnosis of a mitochondrial disorder.     Based on the above, Marie would be recommended to establish with Dr. Saavedra () as well for diagnosis and management. Our  will reach out to arrange this, possibly coordinated with an appointment for Marie's older daughter.    We reviewed the variability in presentation/severity for this mutation from quite mild, to hypertrophic cardiomyopathy only, to MIDD, to more severe disorders including MELAS. Individuals with the same mitochondrial disorder can present with different symptoms and different severity of symptoms, even within the same family. Heteroplasmy was also reviewed, including that we unable to tell what percent this mutation is at in each organ/tissue, and therefore cannot predict exact presentation and severity.    As previously discussed, our mitochondrial DNA is only inherited from our mother and therefore mitochondrial disorders are inherited in a maternal inheritance pattern. If a female has a mtDNA mutation, she will pass down some amount of that mutation to each of her children. The percent of mitochondria with a mutation is they inherit can differ from mom, and thus a child can have higher or lower heteroplasmy than their mother. If a male has a mtDNA mutation, he can be affected, but will not pass it down to his children.     Given Marie's results, all of her children would be at risk for this mutation (could be lower or higher  percent heteroplasmy). In addition, Marie's maternal family members (for example her brother) also may have this mutation/diagnosis and this would be important information to share with family. Testing would be available for them, either here or by asking their PCP for a local genetic counseling referral. Different reproductive options would exist for anyone found to have this variant who is thinking about having children.    Marie noted she mostly expected the above result. She also shared she has not been able to get any cardio genetic testing results from her extended family, and their cardiac genetic testing sounds like it was done on a research-basis/they have not been able to obtain results. Given this, I noted meeting with a cardio genetic counselor would be available for Marie or another affected family member to discuss additional genetic testing, possibly for more information about if the family history of cardiomyopathy is related to the above mitochondrial diagnosis or if there is a separate cardiac disorder/mutation in the family as well. Marie was interested in this for herself, and a referral was placed for her to see cardio genetic counselor Bhargavi Lara.    Marie had no other questions at this time. My number was previously given.     Aggie Hughes MS, [facility]  Genetic Counseling  Genetics and Metabolism Division  [facility]   Phone: [phone]  Pager: [phone]  Email: [email or web address]

## 2022-10-21 ENCOUNTER — E-VISIT (OUTPATIENT)
Dept: PEDIATRICS | Facility: CLINIC | Age: 42
End: 2022-10-21
Payer: COMMERCIAL

## 2022-10-21 DIAGNOSIS — Z53.9 ERRONEOUS ENCOUNTER--DISREGARD: Primary | ICD-10-CM

## 2022-10-22 NOTE — TELEPHONE ENCOUNTER
Provider E-Visit time total (minutes): 5 October 27, 2022 --No response, will close evisit, no charge

## 2022-10-23 ENCOUNTER — HEALTH MAINTENANCE LETTER (OUTPATIENT)
Age: 42
End: 2022-10-23

## 2022-11-11 ENCOUNTER — MYC MEDICAL ADVICE (OUTPATIENT)
Dept: CARDIOLOGY | Facility: CLINIC | Age: 42
End: 2022-11-11

## 2022-11-15 DIAGNOSIS — E11.21 TYPE 2 DIABETES MELLITUS WITH DIABETIC NEPHROPATHY, WITH LONG-TERM CURRENT USE OF INSULIN (H): ICD-10-CM

## 2022-11-15 DIAGNOSIS — Z79.4 TYPE 2 DIABETES MELLITUS WITH DIABETIC NEPHROPATHY, WITH LONG-TERM CURRENT USE OF INSULIN (H): ICD-10-CM

## 2022-11-16 RX ORDER — METFORMIN HCL 500 MG
TABLET, EXTENDED RELEASE 24 HR ORAL
Qty: 360 TABLET | Refills: 1 | Status: SHIPPED | OUTPATIENT
Start: 2022-11-16 | End: 2023-01-19

## 2022-11-29 ENCOUNTER — TELEPHONE (OUTPATIENT)
Dept: CONSULT | Facility: CLINIC | Age: 42
End: 2022-11-29

## 2022-11-29 NOTE — TELEPHONE ENCOUNTER
LVM for patient to call back. Patient originally scheduled for new patient appointment on 11/30 with Dr. Saavedra back to back with daughter, Blanquita. Daughter's appointment has been rescheduled to 12/7 due to test results not yet being available. When patient calls back, OK to reschedule appointment to 12/7 @ 11:30 (GC visit @ 11), if mom would like to remain scheduled with daughter--OK to schedule using 2 back to back TYRONE slots. Otherwise, can leave 11/30 appt as scheduled.

## 2022-12-01 ENCOUNTER — VIRTUAL VISIT (OUTPATIENT)
Dept: PEDIATRIC CARDIOLOGY | Facility: CLINIC | Age: 42
End: 2022-12-01
Attending: GENETIC COUNSELOR, MS
Payer: COMMERCIAL

## 2022-12-01 VITALS — WEIGHT: 164 LBS | BODY MASS INDEX: 28.14 KG/M2

## 2022-12-01 DIAGNOSIS — Z82.49 FAMILY HISTORY OF CARDIOMYOPATHY: ICD-10-CM

## 2022-12-01 DIAGNOSIS — I42.1 HYPERTROPHIC OBSTRUCTIVE CARDIOMYOPATHY (H): ICD-10-CM

## 2022-12-01 PROCEDURE — 96040 HC GENETIC COUNSELING, EACH 30 MINUTES: CPT | Mod: GT,95 | Performed by: GENETIC COUNSELOR, MS

## 2022-12-01 ASSESSMENT — PAIN SCALES - GENERAL: PAINLEVEL: MILD PAIN (2)

## 2022-12-01 NOTE — PATIENT INSTRUCTIONS
"Indication for Genetic Counseling:     Hypertrophic cardiomyopathy (HCM) is a condition that causes part of the heart muscle (the left ventricle) to become thick and enlarged (hypertrophy).  It is usually diagnosed by echocardiogram (ECHO) or cardiac magnetic resonance imaging (MRI).  When the heart becomes enlarged, it cannot pump blood as effectively, which can lead to symptoms such as shortness of breath, fatigue, chest pains, irregular heartbeat, fainting, stroke, cardiac arrest, and sudden cardiac death (SCD).  HCM can be caused by a variety of factors, such as chronic hypertension, a narrow aorta, athletic heart, or genetics.  There are at least 20 known genes that, when not working properly, can cause HCM.    Inheritance:   Humans have over 20,000 genes that instruct our bodies how to function.  We have two copies of each gene because we inherit one from our mother and one from our father.  In most cardiac cases with a genetic component, the condition is inherited in an autosomal dominant (AD) pattern.  This means that in order to have the condition, a person needs to inherit a mutation on one copy of a particular gene.  This mutation or pathogenic variant dominates the \"normal\" working copy of the gene.  When an affected individual has children, they can either pass on the \"normal\" copy of the gene or the mutation.  Therefore, children have a 50% chance of inheriting the mutation.  Other family members also have an increased risk but the specific risk depends on the degree of relationship.  Additional inheritance patterns can occur within families and may alter the risk of recurrence.     Testing Options:   Genetic testing is available to assess a panel of genes known to cause this condition.  This test reads through the DNA (sequencing) of these genes to look for spelling mistakes or mutations that could cause the condition.      There are three types of results you could receive from this test.     " -Positive result (mutation identified) - confirms diagnosis and provides an answer to why this happened.  In addition, identifying a mutation allows family members to have testing to determine their risk.     -Negative result (mutation not identified) - no genetic changes were identified.  This does not rule out a genetic cause for the condition as the genetic testing only identifies 50-60% of genetic causes for this condition.    -Variant of uncertain significance (VUS) - a genetic change was identified, but there is not enough information to determine whether it is disease-causing or normal human genetic variation.     Although genetic testing may identify a mutation, it cannot provide information about the severity of symptoms or the progression of disease.  We cannot predict age of onset or severity of symptoms due to reduced penetrance and variable expressivity.    Logistics:   Genetic testing involves collecting a sample of DNA, thru blood, saliva, or cheek cells.  The sample will be sent to a laboratory to extract the DNA and sequence the genes for mutations.  The laboratory will work with your insurance company to determine the out of pocket (OOP) cost and will notify you if the OOP cost is greater than $100.  Remember to ask the lab about financial assistance pricing and self pay options as well.  Sometimes those are much lower than insurance pricing.  When testing is initiated, results take about 2-4 weeks to return. I will contact you over the phone when results are available.     Genetic Information and Nondiscrimination Act:  The Genetic Information and Nondiscrimination Act of 2008 (CAROLE) is a federal law that protects individuals from genetic discrimination in health insurance and employment. Genetic discrimination is defined as the misuse of genetic information. This law does not address potential discrimination regarding life insurance or disability insurance.      This is especially relevant for at  risk individuals who are considering presymptomatic testing.    Screening Recommendations:  Recommend clinical evaluation for all first degree relatives (parents, siblings, and children) of an affected individual regardless of decision to pursue genetic testing.  Clinical evaluation should be performed at least every 3 years, except yearly during puberty, and should include history, cardiac exam, ECHO, EKG, Holter monitoring, and exercise treadmill.    Resources:  Hypertrophic Cardiomyopathy Association - 4hcm.org  Children's Cardiomyopathy Foundation - childrenscardiomyopathy.org  [place] Cardiomyopathy Association - cardiomyopathy.org  HCM Care phone mathew    General   American Heart Association - americanheart.org  Genetics Home Reference - ghr.EnterpriseDB.nih.gov  Genetic Information and Nondiscrimination Act - [employer]nahelp.org    Contact Information:  Bhargavi Lara MS  Licensed Genetic Counselor  [facility]  [facility]    Office:  [phone]  Appointments:  [phone]  Fax: [phone]  Email: [email or web address]

## 2022-12-01 NOTE — PROGRESS NOTES
Marie Vogel  is being evaluated via a billable video visit.      How would you like to obtain your AVS? Metaps  For the video visit, send the invitation by: Text to cell phone: [phone]  Will anyone else be joining your video visit? No  {If patient encounters technical issues they should call [phone]     PROVIDER APPOINTMENT  Here is a copy of the progress note from your recent genetic counseling visit through the [facility] on Date: 12/1/2022.  Due to Covid-19 pandemic, this appointment was moved to a virtual visit.    PROGRESS NOTE:Marie was seen for genetic counseling due to her personal and family history of hypertrophic cardiomyopathy (HCM).  I had the opportunity to talk with Marie today to discuss the genetic component of HCM and testing options available to her .     MEDICAL HISTORY: Marie was diagnosed with HCM in 2016 after undergoing periodic cardiac screening due to family history.  MRI on 8/8/2016 showed asymmetrical septal hypertrophy measuring 1.5 cm; systolic anterior motion of the mitral valve; late gadolinium enhancement imaging, with mild patchy hyperenhancement in the septal segments to suggest myocardial fibrosis.  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 MRI in 11/19 showed hypertrophic cardiomyopathy with asymmetric septal hypertrophy, maximal wall thickness of 1.7 cm, normal biventricular function, LVEF 58% and RVEF 66%.  ECHO 1/21/22 showed IVS 1.6 cm. Left ventricular ejection fraction is 60-65%.  Marie denies any history of shortness of breath, dizziness, fainting, or chest pains.    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     FAMILY HISTORY: A four generation family history was obtained at office visit on 10/11/2016.  (Please see scanned pedigree for details).  Family history was significant for the following:     Mother was diagnosed with HCM at 37 years.  She had a heart transplant in  at 49 years. She  in 2016 at 60 years of age.  She participated in a [employer] research study looking for HCM genetic mutations but no records were able to be found.    Brother diagnosed with HCM at 18 years.  He had a myomectomy in about  and also has an ICD. He has one daughter with autism.    A maternal aunt and four maternal uncles have all been diagnosed with HCM. One uncle has an LVAD.  He also had a son who  suddenly and was found to have cardiomyopathy on autopsy.    Maternal grandmother and great grandfather also had HCM.  In addition, there are many distant relatives with HCM.      Daughter (21) with type 2 diabetes and obesity.  She had an ECHO around 14 years of age that was normal.    Son (12), assigned female at birth, also has bilateral hearing loss, DM2 and the mitochondrial mutation in the MT-TL1 gene. Normal ECHO this summer.     Father with DM2 and kidney failure.  His four siblings have no know health issues.    Paternal grandmother  in her 80's with Alzheimers.  She also had DM2 with onset in her 60-70's.  Grandfather  90's.  He had a history of blood clot in his eye, leading to blindness in 1 eye, and a stroke in his 60's.  There is no additional history of cardiomyopathy, arrythmias, heart attacks, fainting, sudden cardiac death, genetic conditions, or birth defects.     DISCUSSION: Reviewed definition of hypertrophic cardiomyopathy (HCM). Explained that a good portion of HCM cases have a genetic component. In Marie's case the cardiomyopathy could be associated with mitochondrial disease, but given the extensive history of HCM in her family without other symptoms of mitochondrial disease, and male to  male transmission, it is thought that there is another gene responsible for HCM.    Reviewed autosomal dominant (AD) inheritance pattern most commonly associated with HCM, including the 50% risk for recurrence. Explained that HCM gene mutations are associated with reduced penetrance and variable expressivity, meaning that individuals who carry a gene mutation may or may not get the disease and onset and severity can vary from one family member to the next. This explains why you may not see cardiomyopathy in each generation of a family.     Currently over 30 genes have been found to be related to HCM. Genetic testing currently identifies mutations in about 50-60% of idiopathic cases. Reviewed capabilities, limitations, and logistics of testing.  DNA sample via saliva or blood is collected and sent to testing lab for evaluation of selected genes. The results could directly impact care and treatment.      Explained three possible outcomes of genetic testing including: positive identification of a mutation, no mutation identified, and identification of a variant of unknown significance (VUS). If a mutation is identified, presymptomatic testing would be available to at risk family members. If no mutation is identified, it does not rule out the possibility of a genetic component to this disease. Family members could still be at risk for developing the same condition. If a VUS is identified, it is unclear if the mutation is disease causing or just a normal variation. It may take time and possibly additional testing to determine the meaning of a VUS result.     Test results take approximately 2-4 weeks on average. Discussed cost of testing through commercial labs. Explained that the lab will work with insurance to determine coverage and contact patient if out of pocket costs are expected to exceed $100. Also explained self pay option for $250.    Discussed pros and cons of genetic testing. Explained that results could  determine the cause for HCM. If a mutation is identified, presymptomatic testing is available to all at risk relatives. Reviewed possible issues associated with presymptomatic testing including genetic discrimination, current laws to prevent discrimination (ie. CAROLE), insurance issues, and emotional and psychosocial outcomes of testing.     Recommend clinical evaluation for all first degree relatives (parents, siblings, and children) of an affected individual regardless of decision to pursue genetic testing. Based on the Heart Failure Society of [place] Practice Guidelines (Marty et al, 2018), clinical evaluation should be performed at least every 3 years, except yearly during puberty, and should include history, cardiac exam, ECHO, EKG, Holter monitoring, and exercise treadmill.    All questions answered at this time.     PLAN:Marie elected to proceed with genetic testing.  Requisition and consent forms were completed and signed.  DNA will be collected via saliva sample and [facility]. I will contact patient when results are available.    TOTAL TIME SPENT IN COUNSELIN minutes    Bhargavi Lara MS, [facility] [place]  Licensed, Certified Genetic Counselor  [facility]

## 2022-12-06 NOTE — PROGRESS NOTES
"                   OUTPATIENT METABOLIC INITIAL VISIT          Date: 2022      Patient:  Marie Vogel   :   1980  MRN:     [id number]      Marie Vogel  [address] [place] [address]    Dear Dr. 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 (Heteroplasmy: 23%)      PAST MEDICAL HISTORY:    From the oral history, and medical records that are available, these items are noted:    Patient Active Problem List   Diagnosis     Allergic rhinitis     HYPERLIPIDEMIA LDL GOAL <100     Family history of other cardiovascular diseases     Health Care Home     Microalbuminuria     Insulin pump in place     Vitamin D deficiency     Nut allergy     Type 2 diabetes mellitus with diabetic nephropathy     Diabetic gastroparesis (H)     Hypertrophic obstructive cardiomyopathy (H)     PFO (patent foramen ovale)     Other migraine without status migrainosus, not intractable     Scars of posterior pole of chorioretina, bilateral     Cervical high risk HPV (human papillomavirus) test positive     Mirena IUD inserted 19: Due for removal 2024     Depression     Other insomnia     Moderate episode of recurrent major depressive disorder (H)         Systems Clinical features/manifestation   Neurology Strokes: No history of strokes  Headaches: Marie reports migraines and is on sumatriptan. History of neuro psychotic reaction to reglan in the past  Seizures: No history of seizures  Peripheral neuropathy: No history of any signs of peripheral neuropathy  Ataxia/Gait disturbance: No history of any gait abnormality     Ophthalmology Marie has been diagnosed with optic atrophy and peripheral vision defects. She is currently being followed by ophthalmology   ENT Hearing loss: History of b/l " sensorineural hearing loss. Currently on b/l hearing aids.No history of recurrent vertigo.    Endocrine History of diabetes mellitus on metformin.  She has not had any evaluation for hypothyroidism or hypoparathyroidism       Renal Most recent UA showed proteinuria. She has not been seen by nephrology yet.   Musculoskeletal No history of skeletal myopathy   Cardiac There is a significant family history of cardiomyopathy in Marie's mother's side. However inheritance in two individuals do not go along with mitochondrial inheritance (maternal great grandfather to grandmother and maternal uncle to nephew)  Marie reports occasional chest pain. She had a previous holter that came back normal.    GI Marie reports KENNETH and symptoms s/o dysmotility. However, imaging was not quite diagnostic of dysmotility.       Medications:  Current Outpatient Medications   Medication Sig     albuterol (PROAIR HFA/PROVENTIL HFA/VENTOLIN HFA) 108 (90 Base) MCG/ACT inhaler Inhale 2 puffs into the lungs every 4 hours as needed for shortness of breath / dyspnea or wheezing     ASPIRIN NOT PRESCRIBED (INTENTIONAL) Please choose reason not prescribed from choices below.     atenolol (TENORMIN) 25 MG tablet Take 0.5 tablets (12.5 mg) by mouth daily     blood glucose (PEPE CONTOUR NEXT) test strip Check 4 times/day     blood glucose monitoring (NO BRAND SPECIFIED) meter device kit Use to test blood sugar 6 times daily and as needed.     cetirizine (ZYRTEC) 10 MG tablet Take 10 mg by mouth 2 times daily      clotrimazole (LOTRIMIN) 1 % external cream Apply topically 2 times daily     Continuous Blood Gluc Sensor (DEXCOM G6 SENSOR) MISC Apply one sensor to the skin every 10 days     Continuous Blood Gluc Transmit (DEXCOM G6 TRANSMITTER) MISC 1 each every 3 months Change every 3 months.     Dulaglutide (TRULICITY) 3 MG/0.5ML SOPN Inject 3 mg Subcutaneous every 7 days     EPINEPHrine (ANY BX GENERIC EQUIV) 0.3 MG/0.3ML injection 2-pack Inject 0.3 mLs  "(0.3 mg) into the muscle once as needed for anaphylaxis     fluticasone (FLONASE) 50 MCG/ACT spray Spray 1-2 sprays into both nostrils daily     Glucagon, rDNA, (GLUCAGON EMERGENCY) 1 MG KIT For Intramuscular use for low Blood glucose episode.     ibuprofen (ADVIL/MOTRIN) 200 MG tablet Take 200 mg by mouth every 4 hours as needed      Insulin Disposable Pump (OMNIPOD 5 G6 POD, GEN 5,) MISC 1 each every 3 days     Insulin Disposable Pump (OMNIPOD DASH 5 PACK PODS) MISC Inject 1 each Subcutaneous every 48 hours 90 day RX     Insulin Disposable Pump (OMNIPOD DASH SYSTEM) KIT 1 kit continuous     insulin glargine (LANTUS PEN) 100 UNIT/ML pen Take 20 units in case of pump malfunction only.     insulin lispro (HUMALOG) 100 UNIT/ML vial USE WITH INSULIN PUMP AS DIRECTED, USES ABOUT 80 UNITS PER DAY     INSULIN PUMP - OUTPATIENT Updated 8/3/21:  OmniPod Dash  BASAL:  00:00: 0.75 units/hr  CARB RATIO:  00:00: 10  Sensitivity:  00:00: 40 mg/dL  BLOOD GLUCOSE TARGET and times:  12   AM (midnight): 120-120  Active Insulin Time: 4 hours  Sensor: Nadia/ Nadia Link Donna  [employer]:   Username [email or web address]  Password [email or web address]     insulin syringe-needle U-100 (29G X 1/2\" 1 ML) 29G X 1/2\" 1 ML miscellaneous Use 1 syringe once daily or as needed     Lancets (MICROLET) MISC 1 Device See Admin Instructions. Use up to 5 times daily for blood sugar checks.     levonorgestrel (MIRENA) 20 MCG/24HR IUD 1 each (20 mcg) by Intrauterine route once     losartan (COZAAR) 50 MG tablet 1 tab each morning and one half tab each evening     MAGNESIUM OXIDE PO Take 400 mg by mouth daily     metFORMIN (GLUCOPHAGE XR) 500 MG 24 hr tablet Take 2 Tablets (1,000 mg) by mouth in the morning and 2 Tablets (1,000 mg) in the evening. Take with meals.     montelukast (SINGULAIR) 10 MG tablet TAKE ONE TABLET BY MOUTH EVERY NIGHT AT BEDTIME     Multiple Vitamins-Minerals (MULTI VITAMIN/MINERALS PO) Take 1 each by mouth daily.     Omega-3 Fatty Acids (FISH OIL) " "500 MG CAPS Take 1 capsule by mouth     omeprazole (PRILOSEC) 40 MG DR capsule TAKE ONE CAPSULE BY MOUTH ONCE DAILY AS NEEDED     ondansetron (ZOFRAN ODT) 4 MG ODT tab Take 1 tablet (4 mg) by mouth every 8 hours as needed for nausea     rosuvastatin (CRESTOR) 5 MG tablet Take 1 tablet (5 mg) by mouth daily     sertraline (ZOLOFT) 25 MG tablet Take 1 tablet (25 mg) by mouth daily     SUMAtriptan (IMITREX) 25 MG tablet TAKE ONE TO FOUR TABLETS BY MOUTH ONE TIME. MAY REPEAT 1 TABLET EVERY TWO HOURS UP TO MAXIMUM OF 200MG IN 24 HOURS     traZODone (DESYREL) 50 MG tablet TAKE ONE-HALF TO ONE TABLET BY MOUTH NIGHTLY AS NEEDED FOR SLEEP     Urine Glucose-Ketones Test STRP Daily as needed     No current facility-administered medications for this visit.       Allergies:  Allergies   Allergen Reactions     Ivp Dye [Contrast Dye] Itching     Pt reports that throat itches     Nuts Anaphylaxis     Mariola nuts only     Bactrim Swelling     Pt reaction was swelling in mouth and tongue and itchy mouth.  Resolved with benadryl and prednisone     Pcn [Penicillins] Hives     Cephalosporins Itching     Seasonal Allergies Other (See Comments)     Molds, trees, grass, dust..  Upper congestion     Atorvastatin      myalgias     Shellfish Allergy Rash     Clams only       Physical Examination:  /85 (BP Location: Right arm, Patient Position: Sitting, Cuff Size: Adult Regular)   Pulse 77   Ht 5' 4.37\" (163.5 cm)   Wt 170 lb 3.1 oz (77.2 kg)   LMP  (LMP Unknown)   HC 55.5 cm (21.85\")   BMI 28.88 kg/m    FAMILY HISTORY: A brief family medical history was reviewed.  REVIEW OF SYSTEMS: The review of systems negative for new eye, ear, heart, lung, liver, spleen, gastrointestinal, bone, muscle, integumentary, endocrinologic, brain or psychiatric issues except as noted above.  PHYSICAL EXAMINATION:   General: The patient is oriented to person, place and time at an age-appropriate manner.   HEENT: The facial features are normal and " symmetric. The ears are of normal position and configuration and hearing is grossly normal.  The tongue protrudes normally without fasciculations.  Neck: The neck  appears to be supple with full range of motion  Chest: The chest is of normal configuration. There is no tachypnea or other visible abnormality. Normal breath sounds b/l  Heart: The patient appears to be well perfused, and in no apparent distress. Normal heart sounds  Abdomen: Not examined.  Extremities: The extremities are of normal configuration.  Back: Not examined,  Integument: The  visible portion of the integument is  of normal appearance without significant changes in pigmentation, birthmarks, or lesions.  Neuromuscular:  Mental Status Exam: Alert, awake. Fully oriented. No dysarthria; speech of normal fluency.  Cranial Nerves: EOMs intact, no nystagmus, facial movements symmetric.   Motor: There appears to be normal movement in all four extremities, no atrophy or fasciculations. No tremors.  Gait: By visual examination, there appears to be normal gait; normal arm swing and stance.    LABORATORY RESULTS: Laboratory studies from the past year were reviewed.    ASSESSMENT:    Assessment:  We discussed the different possible phenotypes that are associated with this specific mitochondrial variant: Mitochondrial Encephalopathy, Lactic Acidosis, Stroke-like episodes (MELAS), Chronic Progressive External Ophthalmoplegia (CPEO) and Maternally Inherited Deafness and Diabetes (MIDD) and concentric left ventricular hypertrophic cardiomyopathy. The onset of symptoms for the above mentioned disorders can start anytime from childhood to late-adulthood. Some individuals with MIDD may develop retinal dystrophy. Currently, there is not enough evidence for  citrulline treatment in the absence of any symptoms related to MELAS.        PLAN/RECOMMENDATIONS:    1. It is important to routinely evaluate for the emergence of any of the above mentioned phenotype for Marie.  Hence, we will obtain a lactic acid level, thyroid function test and HbA1c level.   2. Annual evaluation by ophthalmology and audiology is recommended. Recommend annual follow up with cardiology with echocardiogram and EKG.  3. A nephrology referral has been provided given her proteinuria.  4. Agree with additional genetic testing to evaluate other etiology for cardiomyopathy given the family history which is not quite consistent with maternal inheritance.  5. There is no evidence that citrulline supplementation in asymptomatic patients can prevent vascular problems such as stroke. At the same time, it is a safe medication without any major side effects. When given the option, Marie chose not to start citrulline supplementation. At the same time, we recommend supplementation with Ubiquinol and vitamin B100.  Prescriptions will be sent to the preferred pharmacy.  6. It is recommended that endocrinologist find an appropriate alternative to metformin due to the mitochondrial toxicity of this drug.  7. Medications to avoid include but are not limited to valproic acid; statins; metformin; high-dose acetaminophen; and selected antibiotics, including aminoglycosides, linezolid, tetracycline, azithromycin, and erythromycin. These medications maybe toxic to the mitochondria should be avoided when possible or a suitable treatment alternative exists.    8. It is recommended that we be informed in the event of any elective surgeries for Marie since sedation/anesthesia precautions are needed. Anesthesiology consultation and pre-coordination with the Genetics and Metabolism service should occur since some people with mitochondrial diseases tolerate types/doses of anesthetics less well. Avoid Propofol.  Caution must be used with volatile anesthetics because mitochondrial patients may potentially be hypersensitive. Caution must be used with muscle relaxants. Mitochondrial patients may be at a higher risk for propofol infusion  syndrome and propofol use should be avoided or limited to short procedures  9. Educated Marie to watch out for signs and symptoms of MELAS including changes in behavior, vomiting, abdominal pain, fatigue,muscle weakness, weakness or paralysis of an arm or leg or one side of the body, imbalance and falling and confusion.  10. Follow up in 6 months or sooner with new concerns.  11. Emergency letter was provided at the time of the current visit  12. Contact information provided for the genetics physician on call if Marie is to experience any of the above mentioned symptoms.        With warmest regards,       Chucho Saavedra MD     Division of Genetics and Metabolism    Appointments: [phone]      Monday afternoons: Metabolic/Lysosomal storage clinic              [facility] laboratory: [phone]/ [phone]               [facility] laboratory: [phone]    Nurse Coordinator, Metabolism and Genetics:  Elaina Banerjee RN, [phone]    Pharmacotherapy Consultant:  Jasvir Huynh, AidaD, Pharmacotherapy for Metabolic Disorders ([facility]): [phone]    Genetic Counselor:  Lisa Gutierrez MS, [facility] (Genetic test Results): [phone]    Metabolic Dietician:  Aicha Lopez, Registered Dietician: [phone]    [facility] Clinic Scheduler:  Stacy Blue, [phone]    Copies to:     Dr. Alan Paredes  [address] [place] [address]    Marie Vogel  [address] [place] [address]     No referring provider defined for this encounter.      80 minutes spent on the date of the encounter doing chart review, history and exam, documentation and further activities per the note

## 2022-12-07 ENCOUNTER — OFFICE VISIT (OUTPATIENT)
Dept: CONSULT | Facility: CLINIC | Age: 42
End: 2022-12-07
Attending: PEDIATRICS
Payer: COMMERCIAL

## 2022-12-07 ENCOUNTER — TELEPHONE (OUTPATIENT)
Dept: NEPHROLOGY | Facility: CLINIC | Age: 42
End: 2022-12-07

## 2022-12-07 VITALS
SYSTOLIC BLOOD PRESSURE: 124 MMHG | HEART RATE: 77 BPM | BODY MASS INDEX: 29.06 KG/M2 | WEIGHT: 170.19 LBS | HEIGHT: 64 IN | DIASTOLIC BLOOD PRESSURE: 85 MMHG

## 2022-12-07 DIAGNOSIS — G43.809 OTHER MIGRAINE WITHOUT STATUS MIGRAINOSUS, NOT INTRACTABLE: ICD-10-CM

## 2022-12-07 DIAGNOSIS — R80.9 MICROALBUMINURIA: Primary | ICD-10-CM

## 2022-12-07 DIAGNOSIS — E11.43 DIABETIC GASTROPARESIS (H): ICD-10-CM

## 2022-12-07 DIAGNOSIS — Q21.12 PFO (PATENT FORAMEN OVALE): ICD-10-CM

## 2022-12-07 DIAGNOSIS — Z15.89 MUTATION IN MT-TL1 GENE: Primary | ICD-10-CM

## 2022-12-07 DIAGNOSIS — I42.1 HYPERTROPHIC OBSTRUCTIVE CARDIOMYOPATHY (H): ICD-10-CM

## 2022-12-07 DIAGNOSIS — K31.84 DIABETIC GASTROPARESIS (H): ICD-10-CM

## 2022-12-07 LAB
LACTATE SERPL-SCNC: 2.4 MMOL/L (ref 0.7–2)
PTH-INTACT SERPL-MCNC: 54 PG/ML (ref 15–65)
TSH SERPL DL<=0.005 MIU/L-ACNC: 1.53 MU/L (ref 0.4–4)
VIT B12 SERPL-MCNC: 261 PG/ML (ref 232–1245)

## 2022-12-07 PROCEDURE — 83970 ASSAY OF PARATHORMONE: CPT | Performed by: PEDIATRICS

## 2022-12-07 PROCEDURE — 84443 ASSAY THYROID STIM HORMONE: CPT | Performed by: PEDIATRICS

## 2022-12-07 PROCEDURE — 84425 ASSAY OF VITAMIN B-1: CPT | Performed by: PEDIATRICS

## 2022-12-07 PROCEDURE — 36415 COLL VENOUS BLD VENIPUNCTURE: CPT | Performed by: PEDIATRICS

## 2022-12-07 PROCEDURE — 83605 ASSAY OF LACTIC ACID: CPT | Performed by: PEDIATRICS

## 2022-12-07 PROCEDURE — 82607 VITAMIN B-12: CPT | Performed by: PEDIATRICS

## 2022-12-07 PROCEDURE — G0463 HOSPITAL OUTPT CLINIC VISIT: HCPCS

## 2022-12-07 PROCEDURE — 99245 OFF/OP CONSLTJ NEW/EST HI 55: CPT | Performed by: PEDIATRICS

## 2022-12-07 NOTE — TELEPHONE ENCOUNTER
[facility] Call Center    Phone Message    May a detailed message be left on voicemail: yes     Reason for Call: Order(s): Other: Lab Orders  Reason for requested: 1/2 Lab Appt  Date needed: Before 1/2  Provider name: Shon Appt    Please add orders to Patients chart for 1/2 lab appointment for Dr. Menendez appointment. Thank you!      Action Taken: Message routed to:  [facility] ([facility]): Nephrology    Travel Screening: Not Applicable

## 2022-12-07 NOTE — NURSING NOTE
"Chief Complaint   Patient presents with     Consult     Positive for mutation       Vitals:    12/07/22 1032   BP: 124/85   BP Location: Right arm   Patient Position: Sitting   Cuff Size: Adult Regular   Pulse: 77   Weight: 170 lb 3.1 oz (77.2 kg)   Height: 5' 4.37\" (163.5 cm)   HC: 55.5 cm (21.85\")       Ra Quesada, EMT  December 7, 2022   "

## 2022-12-07 NOTE — LETTER
EMERGENCY LETTER    Date 2022  Name Marie Vogel   1980    MRN [id number]      Marie Vogel has a variant in MT-TL1 that puts her at increased risk for  mitochondrial disease, MELAS (mitochondrial myopathy, encephalopathy, lactic acidosis and stroke-like episodes).      When her body is under increased physical stress (a catabolic state) such as during an acute illness or infection, dehydration, vomiting, surgery, or prolonged fasting, it becomes even more difficult than usual for hercells to create adequate energy which can cause cellular or organ injury.      Symptoms that may manifest (particularly during a catabolic state) include stroke, muscle weakness, fatigue, activityintolerance, severe headaches, vomiting and other gastrointestinal conditions (pancreatitis, pain, obstruction), hearing loss, kidney (oligouria, nephrotic syndrome), cardiac, endocrine (thyroid, low or highglucose/diabetes), growth concerns, skin problems, ataxia, peripheral neuropathy, seizures, vision changes, or dementia. Lactic acid may be elevated.      This letter is not exhaustive and is not asubstitute for contact with the Genetics and Metabolism physician on call available 24 hours/day via the page  ([phone]).  Please initiate the protocol below and contact us immediately.      Acute Treatment:    During any acute illness increase intake ofsugar-containing liquids.    Usual home medications should be continued as appropriate.      In the presence of any stroke like symptoms,IV arginine should be initiated, dose is 10 grams/m^2 based on body surface area - please call metabolic on call if this is initiated!      Imaging may be helpful to determine to detect an acute stroke-like episode; however, arginine treatments shouldn't be delayed for imaging.    If Marie has symptoms ofconcern such as prolonged inadequate oral intake, acute illness/infection, NPO status for anesthesia/sedation,  or symptoms as noted above, room her immediately and start an IV with D10 1/2NS at 1.25 to 1.5 x maintenancewith appropriate electrolytes. If dehydrated do not wait to complete a bolus to start D10; add saline bolus parallel to D10 infusion. Lactated Ringers solution should NOT be used.      Insulin may be required to control hyperglycemia and should be coordinated with the Genetics and Metabolism physician on call.      IV levocarnitine may also need to be administered under such circumstances as coordinated with the Genetics and Metabolism physician on call.      Sedation/anesthesia precautions are needed.  Anesthesiology consultation and pre-coordination with the Genetics and Metabolism service should occur since some people with mitochondrial diseases toleratetypes/doses of anesthetics less well.  Avoid Propofol.      Other medications such as aminoglycosides, statins, Propofol, erythromycin and valproic acid which maybe toxic to the mitochondria should be avoided when possible or a suitable treatment alternative exists.      Aggressive fever management.    Evaluate and aggressively treat precipitating event.    Immediate Laboratory Studies to Order:    Blood glucose, electrolytes,liver function tests    CBC    Urinalysis (especially urine ketones)    Ammonia    Lactate    CK        cc: The Parents of Marie Vogel/[address]  [place] [address]   cc: [address] [place] [address]

## 2022-12-07 NOTE — PATIENT INSTRUCTIONS
Genetics  [facility]     Contact our nurse care coordinator Elaina WRIGHTN, RN, PHN at [phone] or send a [employer] message for any non-urgent general or medical questions.     If you had genetic testing and have further questions, please contact the genetic counselor:    Rosanne Souza  Ph: [phone]    To schedule appointments:  Pediatric Call Center for [facility]: [phone]  Neuropsychology [phone]   Radiology/ Imaging/Echocardiogram: [phone]   Services:   [phone]     You should receive a phone call about your next appointment. If you do not receive this within two weeks of your visit, please call [phone].     IF REFERRALS WERE PLACED/ DISCUSSED DURING THE VISIT, PLEASE LET OUR TEAM KNOW IF YOU DO NOT HEAR FROM THE SCHEDULERS IN 2 WEEKS    If you have not already done so consider signing up for [employer] by speaking with the person at the  on your way out or go to [email or web address] to sign up online.     [employer] enables easy and confidential communication with your care team.

## 2022-12-07 NOTE — LETTER
"2022      RE: Marie Vogel  [address]  [place] [address]     Dear Colleague,    Thank you for the opportunity to participate in the care of your patient, Marie Vogel, at the [facility] at [facility]. Please see a copy of my visit note below.                       OUTPATIENT METABOLIC INITIAL VISIT          Date: 2022      Patient:  Marie Vogel   :   1980  MRN:     [id number]      Marie Vogel  [address]  [place] [address]    Dear Dr. 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 (Heteroplasmy: 23%)      PAST MEDICAL HISTORY:    From the oral history, and medical records that are available, these items are noted:    Patient Active Problem List   Diagnosis     Allergic rhinitis     HYPERLIPIDEMIA LDL GOAL <100     Family history of other cardiovascular diseases     Health Care Home     Microalbuminuria     Insulin pump in place     Vitamin D deficiency     Nut allergy     Type 2 diabetes mellitus with diabetic nephropathy     Diabetic gastroparesis (H)     Hypertrophic obstructive cardiomyopathy (H)     PFO (patent foramen ovale)     Other migraine without status migrainosus, not intractable     Scars of posterior pole of chorioretina, bilateral     Cervical high risk HPV (human papillomavirus) test positive     Mirena IUD inserted 19: Due for removal 2024     Depression     Other insomnia     Moderate episode of recurrent major depressive disorder (H)         Systems Clinical features/manifestation   Neurology Strokes: No history of strokes  Headaches: Marie reports migraines and is on sumatriptan. 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 She is currently being followed by ophthalmology   ENT Hearing loss: History of b/l sensorineural hearing loss. Currently on b/l hearing aids.No history of recurrent vertigo.    Endocrine History of diabetes mellitus on metformin.  She has not had any evaluation for hypothyroidism or hypoparathyroidism       Renal Most recent UA showed proteinuria. She has not been seen by nephrology yet.   Musculoskeletal No history of skeletal myopathy   Cardiac There is a significant family history of cardiomyopathy in Marie's mother's side. However inheritance in two individuals do not go along with mitochondrial inheritance (maternal great grandfather to grandmother and maternal uncle to nephew)  Marie reports occasional chest pain. She had a previous holter that came back normal.    GI Marie reports KENNETH and symptoms s/o dysmotility. However, imaging was not quite diagnostic of dysmotility.       Medications:  Current Outpatient Medications   Medication Sig     albuterol (PROAIR HFA/PROVENTIL HFA/VENTOLIN HFA) 108 (90 Base) MCG/ACT inhaler Inhale 2 puffs into the lungs every 4 hours as needed for shortness of breath / dyspnea or wheezing     ASPIRIN NOT PRESCRIBED (INTENTIONAL) Please choose reason not prescribed from choices below.     atenolol (TENORMIN) 25 MG tablet Take 0.5 tablets (12.5 mg) by mouth daily     blood glucose (PEPE CONTOUR NEXT) test strip Check 4 times/day     blood glucose monitoring (NO BRAND SPECIFIED) meter device kit Use to test blood sugar 6 times daily and as needed.     cetirizine (ZYRTEC) 10 MG tablet Take 10 mg by mouth 2 times daily      clotrimazole (LOTRIMIN) 1 % external cream Apply topically 2 times daily  "    Continuous Blood Gluc Sensor (DEXCOM G6 SENSOR) MISC Apply one sensor to the skin every 10 days     Continuous Blood Gluc Transmit (DEXCOM G6 TRANSMITTER) MISC 1 each every 3 months Change every 3 months.     Dulaglutide (TRULICITY) 3 MG/0.5ML SOPN Inject 3 mg Subcutaneous every 7 days     EPINEPHrine (ANY BX GENERIC EQUIV) 0.3 MG/0.3ML injection 2-pack Inject 0.3 mLs (0.3 mg) into the muscle once as needed for anaphylaxis     fluticasone (FLONASE) 50 MCG/ACT spray Spray 1-2 sprays into both nostrils daily     Glucagon, rDNA, (GLUCAGON EMERGENCY) 1 MG KIT For Intramuscular use for low Blood glucose episode.     ibuprofen (ADVIL/MOTRIN) 200 MG tablet Take 200 mg by mouth every 4 hours as needed      Insulin Disposable Pump (OMNIPOD 5 G6 POD, GEN 5,) MISC 1 each every 3 days     Insulin Disposable Pump (OMNIPOD DASH 5 PACK PODS) MISC Inject 1 each Subcutaneous every 48 hours 90 day RX     Insulin Disposable Pump (OMNIPOD DASH SYSTEM) KIT 1 kit continuous     insulin glargine (LANTUS PEN) 100 UNIT/ML pen Take 20 units in case of pump malfunction only.     insulin lispro (HUMALOG) 100 UNIT/ML vial USE WITH INSULIN PUMP AS DIRECTED, USES ABOUT 80 UNITS PER DAY     INSULIN PUMP - OUTPATIENT Updated 8/3/21:  OmniPod Dash  BASAL:  00:00: 0.75 units/hr  CARB RATIO:  00:00: 10  Sensitivity:  00:00: 40 mg/dL  BLOOD GLUCOSE TARGET and times:  12   AM (midnight): 120-120  Active Insulin Time: 4 hours  Sensor: Nadia/ Nadia Link Donna  Sherryoko:   [email or web address]  Password [email or web address]     insulin syringe-needle U-100 (29G X 1/2\" 1 ML) 29G X 1/2\" 1 ML miscellaneous Use 1 syringe once daily or as needed     Lancets (MICROLET) MISC 1 Device See Admin Instructions. Use up to 5 times daily for blood sugar checks.     levonorgestrel (MIRENA) 20 MCG/24HR IUD 1 each (20 mcg) by Intrauterine route once     losartan (COZAAR) 50 MG tablet 1 tab each morning and one half tab each evening     MAGNESIUM OXIDE PO Take 400 mg by mouth " "daily     metFORMIN (GLUCOPHAGE XR) 500 MG 24 hr tablet Take 2 Tablets (1,000 mg) by mouth in the morning and 2 Tablets (1,000 mg) in the evening. Take with meals.     montelukast (SINGULAIR) 10 MG tablet TAKE ONE TABLET BY MOUTH EVERY NIGHT AT BEDTIME     Multiple Vitamins-Minerals (MULTI VITAMIN/MINERALS PO) Take 1 each by mouth daily.     Omega-3 Fatty Acids (FISH OIL) 500 MG CAPS Take 1 capsule by mouth     omeprazole (PRILOSEC) 40 MG DR capsule TAKE ONE CAPSULE BY MOUTH ONCE DAILY AS NEEDED     ondansetron (ZOFRAN ODT) 4 MG ODT tab Take 1 tablet (4 mg) by mouth every 8 hours as needed for nausea     rosuvastatin (CRESTOR) 5 MG tablet Take 1 tablet (5 mg) by mouth daily     sertraline (ZOLOFT) 25 MG tablet Take 1 tablet (25 mg) by mouth daily     SUMAtriptan (IMITREX) 25 MG tablet TAKE ONE TO FOUR TABLETS BY MOUTH ONE TIME. MAY REPEAT 1 TABLET EVERY TWO HOURS UP TO MAXIMUM OF 200MG IN 24 HOURS     traZODone (DESYREL) 50 MG tablet TAKE ONE-HALF TO ONE TABLET BY MOUTH NIGHTLY AS NEEDED FOR SLEEP     Urine Glucose-Ketones Test STRP Daily as needed     No current facility-administered medications for this visit.       Allergies:  Allergies   Allergen Reactions     Ivp Dye [Contrast Dye] Itching     Pt reports that throat itches     Nuts Anaphylaxis     Mariola nuts only     Bactrim Swelling     Pt reaction was swelling in mouth and tongue and itchy mouth.  Resolved with benadryl and prednisone     Pcn [Penicillins] Hives     Cephalosporins Itching     Seasonal Allergies Other (See Comments)     Molds, trees, grass, dust..  Upper congestion     Atorvastatin      myalgias     Shellfish Allergy Rash     Clams only       Physical Examination:  /85 (BP Location: Right arm, Patient Position: Sitting, Cuff Size: Adult Regular)   Pulse 77   Ht 5' 4.37\" (163.5 cm)   Wt 170 lb 3.1 oz (77.2 kg)   LMP  (LMP Unknown)   HC 55.5 cm (21.85\")   BMI 28.88 kg/m    FAMILY HISTORY: A brief family medical history was " reviewed.  REVIEW OF SYSTEMS: The review of systems negative for new eye, ear, heart, lung, liver, spleen, gastrointestinal, bone, muscle, integumentary, endocrinologic, brain or psychiatric issues except as noted above.  PHYSICAL EXAMINATION:   General: The patient is oriented to person, place and time at an age-appropriate manner.   HEENT: The facial features are normal and symmetric. The ears are of normal position and configuration and hearing is grossly normal.  The tongue protrudes normally without fasciculations.  Neck: The neck  appears to be supple with full range of motion  Chest: The chest is of normal configuration. There is no tachypnea or other visible abnormality. Normal breath sounds b/l  Heart: The patient appears to be well perfused, and in no apparent distress. Normal heart sounds  Abdomen: Not examined.  Extremities: The extremities are of normal configuration.  Back: Not examined,  Integument: The  visible portion of the integument is  of normal appearance without significant changes in pigmentation, birthmarks, or lesions.  Neuromuscular:  Mental Status Exam: Alert, awake. Fully oriented. No dysarthria; speech of normal fluency.  Cranial Nerves: EOMs intact, no nystagmus, facial movements symmetric.   Motor: There appears to be normal movement in all four extremities, no atrophy or fasciculations. No tremors.  Gait: By visual examination, there appears to be normal gait; normal arm swing and stance.    LABORATORY RESULTS: Laboratory studies from the past year were reviewed.    ASSESSMENT:    Assessment:  We discussed the different possible phenotypes that are associated with this specific mitochondrial variant: Mitochondrial Encephalopathy, Lactic Acidosis, Stroke-like episodes (MELAS), Chronic Progressive External Ophthalmoplegia (CPEO) and Maternally Inherited Deafness and Diabetes (MIDD) and concentric left ventricular hypertrophic cardiomyopathy. The onset of symptoms for the above  mentioned disorders can start anytime from childhood to late-adulthood. Some individuals with MIDD may develop retinal dystrophy. Currently, there is not enough evidence for  citrulline treatment in the absence of any symptoms related to MELAS.        PLAN/RECOMMENDATIONS:    1. It is important to routinely evaluate for the emergence of any of the above mentioned phenotype for Marie. Hence, we will obtain a lactic acid level, thyroid function test and HbA1c level.   2. Annual evaluation by ophthalmology and audiology is recommended. Recommend annual follow up with cardiology with echocardiogram and EKG.  3. A nephrology referral has been provided given her proteinuria.  4. Agree with additional genetic testing to evaluate other etiology for cardiomyopathy given the family history which is not quite consistent with maternal inheritance.  5. There is no evidence that citrulline supplementation in asymptomatic patients can prevent vascular problems such as stroke. At the same time, it is a safe medication without any major side effects. When given the option, Marie chose not to start citrulline supplementation. At the same time, we recommend supplementation with Ubiquinol and vitamin B100.  Prescriptions will be sent to the preferred pharmacy.  6. It is recommended that endocrinologist find an appropriate alternative to metformin due to the mitochondrial toxicity of this drug.  7. Medications to avoid include but are not limited to valproic acid; statins; metformin; high-dose acetaminophen; and selected antibiotics, including aminoglycosides, linezolid, tetracycline, azithromycin, and erythromycin. These medications maybe toxic to the mitochondria should be avoided when possible or a suitable treatment alternative exists.    8. It is recommended that we be informed in the event of any elective surgeries for Marie since sedation/anesthesia precautions are needed. Anesthesiology consultation and pre-coordination with the  Genetics and Metabolism service should occur since some people with mitochondrial diseases tolerate types/doses of anesthetics less well. Avoid Propofol.  Caution must be used with volatile anesthetics because mitochondrial patients may potentially be hypersensitive. Caution must be used with muscle relaxants. Mitochondrial patients may be at a higher risk for propofol infusion syndrome and propofol use should be avoided or limited to short procedures  9. Educated Marie to watch out for signs and symptoms of MELAS including changes in behavior, vomiting, abdominal pain, fatigue,muscle weakness, weakness or paralysis of an arm or leg or one side of the body, imbalance and falling and confusion.  10. Follow up in 6 months or sooner with new concerns.  11. Emergency letter was provided at the time of the current visit  12. Contact information provided for the genetics physician on call if Marie is to experience any of the above mentioned symptoms.        With warmest regards,       Chucho Saavedra MD     Division of Genetics and Metabolism    Appointments: [phone]      Monday afternoons: Metabolic/Lysosomal storage clinic              [facility]: [phone]/ [phone]               [facility] laboratory: [phone]    Nurse Coordinator, Metabolism and Genetics:  Elaina Banerjee RN, [phone]    Pharmacotherapy Consultant:  Jasvir Huynh, PharmD, Pharmacotherapy for Metabolic Disorders ([facility]): [phone]    Genetic Counselor:  Lisa Gutierrez MS, [facility] (Genetic test Results): [phone]    Metabolic Dietician:  Aicha Lopez, Registered Dietician: [phone]    [facility] Clinic Scheduler:  Stacy Blue, [phone]    Copies to:     Dr. Alan Paredes  [address] [place] [address]    Marie Vogel  [address]  [place] [address]     No referring provider defined for this  encounter.    80 minutes spent on the date of the encounter doing chart review, history and exam, documentation and further activities per the note      Please do not hesitate to contact me if you have any questions/concerns.     Sincerely,       Chucho Saavedra MD

## 2022-12-08 NOTE — TELEPHONE ENCOUNTER
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2022   UA Internal 09.02.2022   URINE PROTEIN Internal 09.02.2022   RENAL PANEL     BIOPSY     KIDNEY BIOPSY

## 2022-12-10 LAB
ACYLCARNITINE SERPL-SCNC: 6 UMOL/L
CARN ESTERS/C0 SERPL-SRTO: 0.2 {RATIO}
CARNITINE FREE SERPL-SCNC: 32 UMOL/L
CARNITINE SERPL-SCNC: 38 UMOL/L

## 2022-12-14 LAB — VIT B1 PYROPHOSHATE BLD-SCNC: 140 NMOL/L

## 2022-12-23 DIAGNOSIS — Z82.49 FAMILY HISTORY OF HYPERTROPHIC CARDIOMYOPATHY: ICD-10-CM

## 2022-12-23 NOTE — LETTER
January 3, 2023      Marie Sun Vogel  [address]  [place] [address]              Dear Marie,      We recently received a call from your pharmacy requesting a refill of your medication Atenolol.    We are contacting you today to notify you that you are due for a medication check for further refills.    We have authorized one refill of your medication to allow time for you to schedule your appointment.    This appointment can be in clinic or virtual by either telephone, video.    Please call [phone] schedule an appointment or if you have MyChart you can schedule with your provider as well.    Taking care of your health is important to us, an ongoing visits with your provider are vital to your care. We look forward to seeing you in the near future.    Thank you for using [facility] for your Medical Needs.          Sincerely,      Alan Paredes MD

## 2022-12-26 RX ORDER — ATENOLOL 25 MG/1
12.5 TABLET ORAL DAILY
Qty: 90 TABLET | Refills: 0 | Status: SHIPPED | OUTPATIENT
Start: 2022-12-26 | End: 2023-01-03

## 2022-12-26 NOTE — TELEPHONE ENCOUNTER
Atenolol:  Lisa refill.    MA/TC:  Please help pt schedule appt.  LOV 10/27/21    Sobia Castillo RN, BSN  [facility]

## 2023-01-02 ENCOUNTER — LAB (OUTPATIENT)
Dept: LAB | Facility: CLINIC | Age: 43
End: 2023-01-02
Payer: COMMERCIAL

## 2023-01-02 DIAGNOSIS — R80.9 MICROALBUMINURIA: ICD-10-CM

## 2023-01-02 LAB
ALBUMIN MFR UR ELPH: 462 MG/DL
ALBUMIN SERPL BCG-MCNC: 4.2 G/DL (ref 3.5–5.2)
ALBUMIN UR-MCNC: 300 MG/DL
ANION GAP SERPL CALCULATED.3IONS-SCNC: 8 MMOL/L (ref 7–15)
APPEARANCE UR: ABNORMAL
BILIRUB UR QL STRIP: NEGATIVE
BUN SERPL-MCNC: 16 MG/DL (ref 6–20)
CALCIUM SERPL-MCNC: 9.3 MG/DL (ref 8.6–10)
CHLORIDE SERPL-SCNC: 102 MMOL/L (ref 98–107)
COLOR UR AUTO: YELLOW
CREAT SERPL-MCNC: 1 MG/DL (ref 0.51–0.95)
CREAT UR-MCNC: 306 MG/DL
DEPRECATED CALCIDIOL+CALCIFEROL SERPL-MC: 22 UG/L (ref 20–75)
DEPRECATED HCO3 PLAS-SCNC: 27 MMOL/L (ref 22–29)
ERYTHROCYTE [DISTWIDTH] IN BLOOD BY AUTOMATED COUNT: 11.6 % (ref 10–15)
GFR SERPL CREATININE-BSD FRML MDRD: 71 ML/MIN/1.73M2
GLUCOSE SERPL-MCNC: 167 MG/DL (ref 70–99)
GLUCOSE UR STRIP-MCNC: NEGATIVE MG/DL
HCT VFR BLD AUTO: 40.7 % (ref 35–47)
HGB BLD-MCNC: 13.5 G/DL (ref 11.7–15.7)
HGB UR QL STRIP: ABNORMAL
HYALINE CASTS: 2 /LPF
KETONES UR STRIP-MCNC: ABNORMAL MG/DL
LEUKOCYTE ESTERASE UR QL STRIP: ABNORMAL
MCH RBC QN AUTO: 31.2 PG (ref 26.5–33)
MCHC RBC AUTO-ENTMCNC: 33.2 G/DL (ref 31.5–36.5)
MCV RBC AUTO: 94 FL (ref 78–100)
MUCOUS THREADS #/AREA URNS LPF: PRESENT /LPF
NITRATE UR QL: NEGATIVE
PH UR STRIP: 6 [PH] (ref 5–7)
PHOSPHATE SERPL-MCNC: 3 MG/DL (ref 2.5–4.5)
PLATELET # BLD AUTO: 266 10E3/UL (ref 150–450)
POTASSIUM SERPL-SCNC: 4.6 MMOL/L (ref 3.4–5.3)
PROT/CREAT 24H UR: 1.51 MG/MG CR (ref 0–0.2)
PTH-INTACT SERPL-MCNC: 79 PG/ML (ref 15–65)
RBC # BLD AUTO: 4.33 10E6/UL (ref 3.8–5.2)
RBC URINE: 3 /HPF
SODIUM SERPL-SCNC: 137 MMOL/L (ref 136–145)
SP GR UR STRIP: 1.02 (ref 1–1.03)
SQUAMOUS EPITHELIAL: 19 /HPF
UROBILINOGEN UR STRIP-MCNC: NORMAL MG/DL
WBC # BLD AUTO: 5.8 10E3/UL (ref 4–11)
WBC URINE: 8 /HPF

## 2023-01-02 PROCEDURE — 81001 URINALYSIS AUTO W/SCOPE: CPT | Performed by: PATHOLOGY

## 2023-01-02 PROCEDURE — 36415 COLL VENOUS BLD VENIPUNCTURE: CPT | Performed by: PATHOLOGY

## 2023-01-02 PROCEDURE — 99000 SPECIMEN HANDLING OFFICE-LAB: CPT | Performed by: PATHOLOGY

## 2023-01-02 PROCEDURE — 80069 RENAL FUNCTION PANEL: CPT | Performed by: PATHOLOGY

## 2023-01-02 PROCEDURE — 82306 VITAMIN D 25 HYDROXY: CPT | Performed by: PATHOLOGY

## 2023-01-02 PROCEDURE — 83970 ASSAY OF PARATHORMONE: CPT | Performed by: PATHOLOGY

## 2023-01-02 PROCEDURE — 85027 COMPLETE CBC AUTOMATED: CPT | Performed by: PATHOLOGY

## 2023-01-02 PROCEDURE — 84156 ASSAY OF PROTEIN URINE: CPT | Performed by: PATHOLOGY

## 2023-01-03 ENCOUNTER — VIRTUAL VISIT (OUTPATIENT)
Dept: PEDIATRICS | Facility: CLINIC | Age: 43
End: 2023-01-03
Payer: COMMERCIAL

## 2023-01-03 DIAGNOSIS — F33.1 MODERATE EPISODE OF RECURRENT MAJOR DEPRESSIVE DISORDER (H): ICD-10-CM

## 2023-01-03 DIAGNOSIS — Q99.9 MITOCHONDRIAL DNA MUTATION: Primary | ICD-10-CM

## 2023-01-03 DIAGNOSIS — I42.1 HYPERTROPHIC OBSTRUCTIVE CARDIOMYOPATHY (H): ICD-10-CM

## 2023-01-03 DIAGNOSIS — E11.21 TYPE 2 DIABETES MELLITUS WITH DIABETIC NEPHROPATHY, WITH LONG-TERM CURRENT USE OF INSULIN (H): ICD-10-CM

## 2023-01-03 DIAGNOSIS — Z79.4 TYPE 2 DIABETES MELLITUS WITH DIABETIC NEPHROPATHY, WITH LONG-TERM CURRENT USE OF INSULIN (H): ICD-10-CM

## 2023-01-03 PROCEDURE — 99214 OFFICE O/P EST MOD 30 MIN: CPT | Mod: 95 | Performed by: INTERNAL MEDICINE

## 2023-01-03 RX ORDER — INSULIN PMP CART,AUT,G6/7,CNTR
1 EACH SUBCUTANEOUS
Qty: 30 EACH | Refills: 3 | Status: SHIPPED | OUTPATIENT
Start: 2023-01-03 | End: 2023-10-18

## 2023-01-03 RX ORDER — MONTELUKAST SODIUM 10 MG/1
1 TABLET ORAL AT BEDTIME
Qty: 90 TABLET | Refills: 3 | Status: SHIPPED | OUTPATIENT
Start: 2023-01-03 | End: 2024-02-08

## 2023-01-03 RX ORDER — DULAGLUTIDE 4.5 MG/.5ML
4.5 INJECTION, SOLUTION SUBCUTANEOUS WEEKLY
Qty: 6 ML | Refills: 1 | Status: SHIPPED | OUTPATIENT
Start: 2023-01-03 | End: 2023-06-30

## 2023-01-03 RX ORDER — ATENOLOL 25 MG/1
12.5 TABLET ORAL DAILY
Qty: 90 TABLET | Refills: 3 | Status: SHIPPED | OUTPATIENT
Start: 2023-01-03 | End: 2024-09-23

## 2023-01-03 RX ORDER — ROSUVASTATIN CALCIUM 5 MG/1
5 TABLET, COATED ORAL DAILY
Qty: 90 TABLET | Refills: 3 | Status: SHIPPED | OUTPATIENT
Start: 2023-01-03 | End: 2024-07-30 | Stop reason: ALTCHOICE

## 2023-01-03 ASSESSMENT — PATIENT HEALTH QUESTIONNAIRE - PHQ9
10. IF YOU CHECKED OFF ANY PROBLEMS, HOW DIFFICULT HAVE THESE PROBLEMS MADE IT FOR YOU TO DO YOUR WORK, TAKE CARE OF THINGS AT HOME, OR GET ALONG WITH OTHER PEOPLE: SOMEWHAT DIFFICULT
SUM OF ALL RESPONSES TO PHQ QUESTIONS 1-9: 5
SUM OF ALL RESPONSES TO PHQ QUESTIONS 1-9: 5

## 2023-01-03 NOTE — PROGRESS NOTES
Marie is a 42 year old who is being evaluated via a billable video visit.        Assessment & Plan     (Q99.9) Mitochondrial DNA mutation  (primary encounter diagnosis)  Comment:   Plan: Metabolic clinic recommendations: Recent referral to nephrology regarding proteinuria/plans to set up this visit.  Ubiquinol supplementation was recommended/patient is currently taking co-Q10 supplement.  In addition, medications to be avoided include: Valproic acid, statins, metformin, high-dose acetaminophen, aminoglycosides, linezolid, tetracycline, and macrolide antibiotics.  Propofol should be avoided during procedures and caution regarding muscle relaxants.  Monitor for signs and symptoms of MELAS    (E11.21,  Z79.4) Type 2 diabetes mellitus with diabetic nephropathy, with long-term current use of insulin (H)  Comment: Insulin-dependent diabetes.  As per recommendations above did instruct patient to discontinue metformin.  Trulicity was increased from 3 mg to 4.5 mg weekly.  Patient will monitor blood sugars closely during this change--May need to adjust pump settings versus adding additional oral medication (?SGLT2)    ( At this time recommended pt continue rosuvastatin, given underlying cardiomyopathy and diabetes)  Plan: Insulin Disposable Pump (OMNIPOD 5 G6 POD, GEN         5,) MISC, rosuvastatin (CRESTOR) 5 MG tablet,         Dulaglutide (TRULICITY) 4.5 MG/0.5ML SOPN          (F33.1) Moderate episode of recurrent major depressive disorder (H)  Comment: History of depression, expresses significant stress and anxiety regarding the recent mitochondrial mutation diagnosis.  Interested in meeting with a counselor-referral.  Plan: Adult Mental Health  Referral          (I42.1) Hypertrophic obstructive cardiomyopathy (H)  Comment: Familial hypertrophic obstructive cardiomyopathy- appears to be unrelated to underlying mitochondrial mutation.  Managed by cardiology.  Plan: atenolol (TENORMIN) 25 MG tablet             Return in about 6 months (around 7/3/2023).    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    Patient Active Problem List   Diagnosis     Allergic rhinitis     HYPERLIPIDEMIA LDL GOAL <100     Family history of other cardiovascular diseases     Health Care Home     Microalbuminuria     Insulin pump in place     Vitamin D deficiency     Nut allergy     Type 2 diabetes mellitus with diabetic nephropathy     Diabetic gastroparesis (H)     Hypertrophic obstructive cardiomyopathy (H)     PFO (patent foramen ovale)     Other migraine without status migrainosus, not intractable     Scars of posterior pole of chorioretina, bilateral     Cervical high risk HPV (human papillomavirus) test positive     Mirena IUD inserted 11/29/19: Due for removal 11/29/2024     Depression     Other insomnia     Moderate episode of recurrent major depressive disorder (H)     Mitochondrial DNA mutation     Current Outpatient Medications   Medication Sig Dispense Refill     albuterol (PROAIR HFA/PROVENTIL HFA/VENTOLIN HFA) 108 (90 Base) MCG/ACT inhaler Inhale 2 puffs into the lungs every 4 hours as needed for shortness of breath / dyspnea or wheezing 18 g 3     ASPIRIN NOT PRESCRIBED (INTENTIONAL) Please choose reason not prescribed from choices below.       atenolol (TENORMIN) 25 MG tablet Take 0.5 tablets (12.5 mg) by mouth daily 90 tablet 3     blood glucose (PEPE CONTOUR NEXT) test strip Check 4 times/day 300 each 0     blood glucose monitoring (NO BRAND SPECIFIED) meter device kit Use to test blood sugar 6 times daily and as needed. 1 kit 0     cetirizine (ZYRTEC) 10 MG tablet  "Take 10 mg by mouth 2 times daily  90 tablet 3     clotrimazole (LOTRIMIN) 1 % external cream Apply topically 2 times daily 60 g 3     Continuous Blood Gluc Sensor (DEXCOM G6 SENSOR) MISC Apply one sensor to the skin every 10 days 9 each 3     Continuous Blood Gluc Transmit (DEXCOM G6 TRANSMITTER) MISC 1 each every 3 months Change every 3 months. 1 each 3     Dulaglutide (TRULICITY) 4.5 MG/0.5ML SOPN Inject 4.5 mg Subcutaneous once a week 6 mL 1     EPINEPHrine (ANY BX GENERIC EQUIV) 0.3 MG/0.3ML injection 2-pack Inject 0.3 mLs (0.3 mg) into the muscle once as needed for anaphylaxis 0.3 mL 11     fluticasone (FLONASE) 50 MCG/ACT spray Spray 1-2 sprays into both nostrils daily 1 Bottle 0     Glucagon, rDNA, (GLUCAGON EMERGENCY) 1 MG KIT For Intramuscular use for low Blood glucose episode. 1 kit 0     ibuprofen (ADVIL/MOTRIN) 200 MG tablet Take 200 mg by mouth every 4 hours as needed        Insulin Disposable Pump (OMNIPOD 5 G6 POD, GEN 5,) MISC 1 each every 3 days 30 each 3     Insulin Disposable Pump (OMNIPOD DASH SYSTEM) KIT 1 kit continuous 1 kit 0     insulin glargine (LANTUS PEN) 100 UNIT/ML pen Take 20 units in case of pump malfunction only. 15 mL 0     insulin lispro (HUMALOG) 100 UNIT/ML vial USE WITH INSULIN PUMP AS DIRECTED, USES ABOUT 80 UNITS PER DAY 80 mL 1     INSULIN PUMP - OUTPATIENT Updated 8/3/21:  OmniPod Dash  BASAL:  00:00: 0.75 units/hr  CARB RATIO:  00:00: 10  Sensitivity:  00:00: 40 mg/dL  BLOOD GLUCOSE TARGET and times:  12   AM (midnight): 120-120  Active Insulin Time: 4 hours  Sensor: Nadia/ Nadia Link Donna  Amish:   Username [email or web address]  Password [email or web address]       insulin syringe-needle U-100 (29G X 1/2\" 1 ML) 29G X 1/2\" 1 ML miscellaneous Use 1 syringe once daily or as needed 100 each 1     Lancets (MICROLET) MISC 1 Device See Admin Instructions. Use up to 5 times daily for blood sugar checks. 100 each prn     levonorgestrel (MIRENA) 20 MCG/24HR IUD 1 each (20 mcg) by Intrauterine " route once       losartan (COZAAR) 50 MG tablet 1 tab each morning and one half tab each evening 135 tablet 3     MAGNESIUM OXIDE PO Take 400 mg by mouth daily       metFORMIN (GLUCOPHAGE XR) 500 MG 24 hr tablet Take 2 Tablets (1,000 mg) by mouth in the morning and 2 Tablets (1,000 mg) in the evening. Take with meals. 360 tablet 1     montelukast (SINGULAIR) 10 MG tablet Take 1 tablet (10 mg) by mouth At Bedtime 90 tablet 3     Multiple Vitamins-Minerals (MULTI VITAMIN/MINERALS PO) Take 1 each by mouth daily.       Omega-3 Fatty Acids (FISH OIL) 500 MG CAPS Take 1 capsule by mouth       omeprazole (PRILOSEC) 40 MG DR capsule TAKE ONE CAPSULE BY MOUTH ONCE DAILY AS NEEDED 90 capsule 3     ondansetron (ZOFRAN ODT) 4 MG ODT tab Take 1 tablet (4 mg) by mouth every 8 hours as needed for nausea 8 tablet 0     rosuvastatin (CRESTOR) 5 MG tablet Take 1 tablet (5 mg) by mouth daily 90 tablet 3     sertraline (ZOLOFT) 25 MG tablet Take 1 tablet (25 mg) by mouth daily 90 tablet 3     SUMAtriptan (IMITREX) 25 MG tablet TAKE ONE TO FOUR TABLETS BY MOUTH ONE TIME. MAY REPEAT 1 TABLET EVERY TWO HOURS UP TO MAXIMUM OF 200MG IN 24 HOURS 8 tablet 11     traZODone (DESYREL) 50 MG tablet TAKE ONE-HALF TO ONE TABLET BY MOUTH NIGHTLY AS NEEDED FOR SLEEP 60 tablet 3     Ubiquinol 200 MG CAPS Take 1 capsule by mouth 2 times daily 70 capsule 11     Urine Glucose-Ketones Test STRP Daily as needed 25 strip 1     Vitamins-Lipotropics (B-100) TABS Take 1 tablet by mouth daily 30 tablet 11              Review of Systems         Objective           Vitals:  No vitals were obtained today due to virtual visit.    Physical Exam   GENERAL: Healthy, alert and no distress  EYES: Eyes grossly normal to inspection.  No discharge or erythema, or obvious scleral/conjunctival abnormalities.  RESP: No audible wheeze, cough, or visible cyanosis.  No visible retractions or increased work of breathing.    SKIN: Visible skin clear. No significant rash, abnormal  pigmentation or lesions.  NEURO: Cranial nerves grossly intact.  Mentation and speech appropriate for age.  PSYCH: Mentation appears normal, affect normal/bright, judgement and insight intact, normal speech and appearance well-groomed.                Video-Visit Details    Type of service:  Video Visit     Video visit start: 2:28pm                    End: 2:58pm    Originating Location (pt. Location): Home    Distant Location (provider location):  On-site  Platform used for Video Visit: Berrybenka    Answers for HPI/ROS submitted by the patient on 1/3/2023  If you checked off any problems, how difficult have these problems made it for you to do your work, take care of things at home, or get along with other people?: Somewhat difficult  PHQ9 TOTAL SCORE: 5  Frequency of checking blood sugars:: continous glucose monitor  What time of day are you checking your blood sugars : before and after meals  Have you had any blood sugars above 200?: Yes  Have you had any blood sugars below 70?: Yes  Hypoglycemia symptoms:: weakness, lethargy, blurred vision, confusion  Diabetic concerns:: none  Paraesthesia present:: none of these symptoms  Depression/Anxiety: Depression  Nitroglycerin use:: never  Do you take an aspirin every day?: No  Status since last visit:: good  Other associated symptoms of depression:: No  Significant life event: : No  Anxious:: No  Current substance use:: No  How many servings of fruits and vegetables do you eat daily?: 2-3  On average, how many sweetened beverages do you drink each day (Examples: soda, juice, sweet tea, etc.  Do NOT count diet or artificially sweetened beverages)?: 0  How many minutes a day do you exercise enough to make your heart beat faster?: 9 or less  How many days a week do you exercise enough to make your heart beat faster?: 3 or less  How many days per week do you miss taking your medication?: 2  What makes it hard for you to take your medication every day?: remembering to  take

## 2023-01-05 ENCOUNTER — MYC MEDICAL ADVICE (OUTPATIENT)
Dept: ENDOCRINOLOGY | Facility: CLINIC | Age: 43
End: 2023-01-05

## 2023-01-05 NOTE — TELEPHONE ENCOUNTER
See my chart message.    I left a message for the patient to return our call.     Lenore Hurley [facility] Endocrinology [place]  [phone]

## 2023-01-05 NOTE — PROGRESS NOTES
Endo staff- can you please take a look into this?  Please help schedule on 1/9/2023-- I see there is an opening-- for video visit.  Thank you.

## 2023-01-05 NOTE — PROGRESS NOTES
See my chart encounter too.    I left a message for the patient to return our call.     Lenore Hurley [facility] Endocrinology [place]  [phone]

## 2023-01-06 ASSESSMENT — ENCOUNTER SYMPTOMS
INCREASED ENERGY: 0
PANIC: 0
DEPRESSION: 1
CHILLS: 0
MUSCLE WEAKNESS: 0
DECREASED CONCENTRATION: 0
ARTHRALGIAS: 0
FEVER: 0
WEIGHT GAIN: 0
DECREASED APPETITE: 0
INSOMNIA: 1
BACK PAIN: 1
ALTERED TEMPERATURE REGULATION: 1
FATIGUE: 1
NERVOUS/ANXIOUS: 0
POLYDIPSIA: 0
STIFFNESS: 0
HALLUCINATIONS: 0
JOINT SWELLING: 0
POLYPHAGIA: 0
NECK PAIN: 0
MUSCLE CRAMPS: 1
MYALGIAS: 1
WEIGHT LOSS: 0

## 2023-01-06 NOTE — PROGRESS NOTES
Outcome for 01/06/23 9:58 AM: Data uploaded on Dexcom  ZEV Quigley  Outcome for 01/06/23 9:59 AM: Per patient, will upload device before appointment. Need glooko report.   ZEV Quigley  Outcome for 01/06/23 4:32 PM: Dexcom and Glooko emailed to provider  ZEV Quigley

## 2023-01-09 ENCOUNTER — VIRTUAL VISIT (OUTPATIENT)
Dept: ENDOCRINOLOGY | Facility: CLINIC | Age: 43
End: 2023-01-09
Payer: COMMERCIAL

## 2023-01-09 DIAGNOSIS — E11.43 DIABETIC GASTROPARESIS (H): ICD-10-CM

## 2023-01-09 DIAGNOSIS — R80.9 MICROALBUMINURIA: ICD-10-CM

## 2023-01-09 DIAGNOSIS — K31.84 DIABETIC GASTROPARESIS (H): ICD-10-CM

## 2023-01-09 DIAGNOSIS — E11.21 TYPE 2 DIABETES MELLITUS WITH DIABETIC NEPHROPATHY, WITH LONG-TERM CURRENT USE OF INSULIN (H): Primary | ICD-10-CM

## 2023-01-09 DIAGNOSIS — Z79.4 TYPE 2 DIABETES MELLITUS WITH DIABETIC NEPHROPATHY, WITH LONG-TERM CURRENT USE OF INSULIN (H): Primary | ICD-10-CM

## 2023-01-09 DIAGNOSIS — Z96.41 INSULIN PUMP IN PLACE: ICD-10-CM

## 2023-01-09 DIAGNOSIS — E78.5 HYPERLIPIDEMIA LDL GOAL <100: ICD-10-CM

## 2023-01-09 DIAGNOSIS — Q99.9 MITOCHONDRIAL DNA MUTATION: ICD-10-CM

## 2023-01-09 PROCEDURE — 95251 CONT GLUC MNTR ANALYSIS I&R: CPT | Performed by: INTERNAL MEDICINE

## 2023-01-09 PROCEDURE — 99214 OFFICE O/P EST MOD 30 MIN: CPT | Mod: 95 | Performed by: INTERNAL MEDICINE

## 2023-01-09 NOTE — PATIENT INSTRUCTIONS
[facility]  Dr Fuentes, Endocrinology Department    [facility]   [address] [facility]  [place] [address]  Appointment [phone]  Fax: [phone]  [place]: Monday - Thursday      To provide the best diabetic care, please bring your blood glucose meter to each and every visit with your Endocrinologist.  Your blood glucose meter/insulin pump will be downloaded at every appointment.    Please arrive 15 minutes before your scheduled appointment.  This will allow for your blood glucose meter/insulin pump to be downloaded.  If you are wearing DEXCOM please bring  or sharing code so that it can be downloaded.  If you are using freestyle casey personal sensors please bring the reader.  If you are using TANDEM insulin pump please have your username and password to get info from [employer]andem website.    Continue to use OMNIPOD 5+ Dexcom.   Continue current pump settings.  Continue to be in auto mode as much as possible.  Continue Trulicity at 4.5 mg/week.  Decrease bolus at night.  Labs and follow up in 3 months/as scheduled.    Recommend checking blood sugars before meals and at bedtime.    If Blood glucose are low more often-> 2-3 times/week- give us a call.  Make better food choices: reduce carbs, Reduce portion size, weight loss and exercise 3-4 times a week.    What is hypoglycemia:  Hypoglycemia is when blood sugar levels become too low - below 70 m/dl.      What causes hypoglycemia?  - using too much insulin  -taking too many diabetes pills  -not eating enough, or skipping meals or snacks  -not eating enough carbohydrate with meals  -changing your exercise routine  -drinking alcohol in excess    It is also possible to have hypoglycemia even when you are carefully managing your blood sugar levels.    What does it feel like when blood sugars get too low?  You may feel:  -  anxious  -confused  -dizzy  -hungry  -light-headed  -nervous  -shaky  -sleepy  -sweaty    You may have  -blurred or cloudy vision  -heart palpitations (heart skips a beat or races)  -tingling or numbness around the mouth and tongue  -tremors    What to do if you have symptoms of hypoglycmemia:  If you think your blood sugar is too low, check it with a glucose meter.  If its below 70 mg/dl, consume one of the following:  Fruit juice (1/2 cup)  Glucose tablets (15 grams)  Hard candy (5 to 7 pieces)  Honey or sugar (2 teaspoons)  Milk (1/2 cup)  Soft drink (non-diet, 1/2 cup)    Wait 15 minutes and check your blood glucose again.  IF it is still below 70 mg/dl, have another food item listed above. Wait another 15 minutes and repeat the blood glucose test.  Have a small meal or snack that contains some carbohydrate after your blood glucose rises above 70 mg/dl.    If you are at risk of hypoglycemia, always carry with you glucose tablets or one of the foods listed above.      To prevent Hypoglycemia:  Avoid situations that may cause hypoglycemia  Before making any change to your diet or exercise routine, discuss them with your healthcare provider  Keep a record of your blood glucose levels.  Include the time of day, diabetes medications, when you had your last meal or snack, and what you were doing at the time (e.g. Watching TV, gardening, jogging, etc).    Talk to your healthcare provider if your blood glucose levels are often low        Patient guide on hypoglycemia    [email or web address]/[email or web address]-diagnosis-[email or web address]

## 2023-01-09 NOTE — PROGRESS NOTES
Marie WILLIAM Dino Jaspreet  is being evaluated via a billable video visit.      Patient denies any changes since echeck-in regarding medication and allergies and states all information entered during echeck-in remains accurate.    How would you like to obtain your AVS? Funky Androidhart  For the video visit, send the invitation by: Text to cell phone: [phone]  Will anyone else be joining your video visit? No

## 2023-01-09 NOTE — PROGRESS NOTES
"THIS IS A VIDEO VISIT:    Phone call visit/virtual visit encounter:  (last part of visit was telephone only)    Name of patient: Marie Vogel    Date of encounter: 1/9/2023    Time of start of video visit: 12:59    Video started: 1:10    Video ended: 1:28    Provider location: working from [place]/ [facility]    Patient location: patients home.    Mode of transmission: [facility] video/ [employer]    Verbal consent: obtained before starting visit. Pt is agreeable.      The patient has been notified of following:      \"This VIDEO visit will be conducted via a call between you and your physician/provider. We have found that certain health care needs can be provided without the need for a physical exam.  This service lets us provide the care you need with a short phone conversation.  If a prescription is necessary we can send it directly to your pharmacy.  If lab work is needed we can place an order for that and you can then stop by our lab to have the test done at a later time.     With new updates with corona virus patient might be billed as clinic visit.     If during the course of the call the physician/provider feels a telephone visit is not appropriate, you will not be charged for this service.\"      Past medical history, social history, family history, allergy and medications were reviewed and updated as appropriate.  Reviewed pertinent labs, notes, imaging studies personally.      Name: Marie Vogel  Seen for f/u of DM.  HPI:  Marie Vogel is a 42 year old female who presents for the evaluation/management of DM.   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    Here for f/u. Previously has seen endo at [place] ( Dr Aguilera and Dr Zhou and ). 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 Busy at work, also variable schedule.. Concerned about weight gain.   H/o cardiomyopathy. Followed by cardiology.    On OMNIPOD 5+ Dexcom.   Using humalog in pump.  Using DEXCOM CGM.    She is working at [employer].  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  Has questions about metformin related ototoxicity?    Reports that he has long acting insulin in case of pump malfunction at home. Marie also has glucagon kit at home for emergency.    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    Metformin was discontinued following that.  Trulicity was also increased following that to 4.5 mg/week.  But has not started it yet.    Wt is stable.  Wt Readings from Last 2 Encounters:   12/07/22 77.2 kg (170 lb 3.1 oz)   12/01/22 74.4 kg (164 lb)       1. Type 2 DM:  Orginally diagnosed at the age of: 21 with GDM during her first pregnancy. Diagnosed with DM 2 in 1/2002, diet controlled for 3 years, then started on metformin. In 2010 during her second pregnancy, started on insulin and then insulin pump therapy in 2012.   Current Regimen: Insulin pump therapy ( Medtronic MinimCarrier Mobile Paradigm), Trulicity 4.5 mg/week.  BS checks: Using Dexcom.  Average Meter Download: Blood glucose data reviewed personally. See nursing note from this encounter for details.  She is able to feel symptoms of hypoglycemia.    yes:     Diabetes Medication(s)     Biguanides       metFORMIN (GLUCOPHAGE XR) 500 MG 24 hr tablet    Take 2 Tablets (1,000 mg) by mouth in the morning and 2 Tablets (1,000 mg) in the evening. Take with meals.    Diabetic Other       Glucagon, rDNA, (GLUCAGON EMERGENCY) 1 MG KIT    For Intramuscular use for low Blood  glucose episode.    Insulin       insulin glargine (LANTUS PEN) 100 UNIT/ML pen    Take 20 units in case of pump malfunction only.     insulin lispro (HUMALOG) 100 UNIT/ML vial    USE WITH INSULIN PUMP AS DIRECTED, USES ABOUT 80 UNITS PER DAY    Incretin Mimetic Agents       Dulaglutide (TRULICITY) 4.5 MG/0.5ML SOPN    Inject 4.5 mg Subcutaneous once a week        Current Regimen:   Insulin pump -   Time Rate (U/hr)   0000-  0.75                   Carbohydrate Ratio -    Time Ratio   0000-  10         Sensitivity   40   Active Insulin Time   hours   Basal      Bolus      Total Carbohydrates/day    Total Insulin/day     Average Blood Sugar                    Complications:   Diabetes Complications  Description / Detail    Diabetic Retinopathy  No   CAD / PAD  No   Neuropathy  No   Nephropathy / Microalbuminuria  Yes: microalbuminuria. ON cozaar.   Gastroparesis  No   Hypoglycemia [place]  No     2. Hypertension: Blood Pressure today:   BP Readings from Last 3 Encounters:   22 124/85   10/12/22 (!) 149/98   22 110/75     Takes medications everyday without forgetting a dose.  Denies feeling lightheaded or dizzy.    3. Hyperlipidemia:   Denies muscle aches of pains.       PMH/PSH:  Past Medical History:   Diagnosis Date     Allergic rhinitis due to pollen      Atypical glandular cells on Pap smear 3/1108     Cervical high risk HPV (human papillomavirus) test positive 2018    See problem list     Gastroesophageal reflux disease      PFO (patent foramen ovale)      Stargardt's disease 2019     Type II or unspecified type diabetes mellitus without mention of complication, not stated as uncontrolled     onset was gestational in      Past Surgical History:   Procedure Laterality Date     ABDOMEN SURGERY       C IUD,MIRENA  8/10/10, 7/28/15     C/SECTION, LOW TRANSVERSE  6/25/10    , Low Transverse     CHOLECYSTECTOMY, LAPOROSCOPIC      Cholecystectomy, Laparoscopic      ESOPHAGOSCOPY, GASTROSCOPY, DUODENOSCOPY (EGD), COMBINED N/A 2016    Procedure: COMBINED ESOPHAGOSCOPY, GASTROSCOPY, DUODENOSCOPY (EGD), BIOPSY SINGLE OR MULTIPLE;  Surgeon: Fadi Hunter MD;  Location: [facility]      ESOPHAGEAL MOTILITY STUDY N/A 2016    Procedure: ESOPHAGEAL MOTILITY STUDY;  Surgeon: Fadi Hunter MD;  Location: [facility]      REMOVE TONSILS/ADENOIDS,<13 Y/O      T &A     ZZC INDUCED ABORTN BY D&C           Family Hx:  Family History   Problem Relation Age of Onset     Cardiovascular Mother         hypertrophic cardiomyopathy     Genitourinary Problems Mother         gallbladder disease     Diabetes Mother         Transplnant induced/      Other - See Comments Mother         heart transplant 2005     Depression Mother      Diabetes Father         type 2     Hypertension Father      Hyperlipidemia Father      Genitourinary Problems Maternal Grandmother         gallbladder disease     Genitourinary Problems Paternal Grandmother         gallbladder disease     Hypertension Paternal Grandfather         high bp     Cerebrovascular Disease Paternal Grandfather              Cardiovascular Brother         hypertrophic cardiomyopathy     Diabetes Brother      Diabetes Brother         type 2     Diabetes Other         Type 2     Glaucoma No family hx of      Macular Degeneration No family hx of      Thyroid disease: No         DM2: Yes - father and mother         Autoimmune: DM1, SLE, RA, Vitiligo No    Social Hx:  Social History     Socioeconomic History     Marital status:      Spouse name: Not on file     Number of children: 2     Years of education: 14     Highest education level: Associate degree: academic program   Occupational History     Occupation: nurse     Occupation: RN   Tobacco Use     Smoking status: Former     Packs/day: 0.00     Years: 0.00     Pack years: 0.00     Types: Cigarettes     Quit date: 10/24/2005     Years since  quittin.2     Smokeless tobacco: Never     Tobacco comments:     States only smokes when drinks maybe 2x/year   Substance and Sexual Activity     Alcohol use: No     Alcohol/week: 0.0 standard drinks     Drug use: No     Sexual activity: Yes     Partners: Male     Birth control/protection: I.U.D., Condom     Comment: Mirena IUD 7/28/15   Other Topics Concern     Parent/sibling w/ CABG, MI or angioplasty before 65F 55M? No   Social History Narrative    Caffeine intake/servings daily - 6-7    Calcium intake/servings daily - 2-3 yogurt, milk, cheese    Exercise 1 times weekly - describe aerobics    Sunscreen used - Yes    Seatbelts used - Yes    Guns stored in the home - No    Self Breast Exam - Yes    Pap test up to date -  Yes    Eye exam up to date -  Yes    Dental exam up to date -  Yes    DEXA scan up to date -  Not Applicable    Flex Sig/Colonoscopy up to date -  Not Applicable    Mammography up to date -  Not Applicable    Immunizations reviewed and up to date - Yes    Abuse: Current or Past (Physical, Sexual or Emotional) - No    Do you feel safe in your environment - Yes    Do you cope well with stress - Yes    Do you suffer from insomnia - Yes     Last updated by: Tatianna Lacey  2005     Social Determinants of Health     Financial Resource Strain: Not on file   Food Insecurity: Not on file   Transportation Needs: Not on file   Physical Activity: Not on file   Stress: Not on file   Social Connections: Not on file   Intimate Partner Violence: Not on file   Housing Stability: Not on file          MEDICATIONS:  has a current medication list which includes the following prescription(s): albuterol, aspirin not prescribed, atenolol, blood glucose, blood glucose monitoring, cetirizine, clotrimazole, dexcom g6 sensor, dexcom g6 transmitter, trulicity, epinephrine, fluticasone, glucagon emergency, ibuprofen, omnipod 5 g6 pod (gen 5), omnipod dash pdm (gen 4), insulin glargine, humalog, insulin pump, insulin  syringe-needle u-100, blood glucose monitoring, levonorgestrel, losartan, magnesium oxide, metformin, montelukast, multiple vitamins-minerals, fish oil-omega-3 fatty acids, omeprazole, ondansetron, rosuvastatin, sertraline, sumatriptan, trazodone, ubiquinol, urine glucose-ketones test, and b-100.    ROS     ROS: 10 point ROS neg other than the symptoms noted above in the HPI.    Physical Exam   VS: There were no vitals taken for this visit.  GENERAL: healthy, alert and no distress  EYES: Eyes grossly normal to inspection, conjunctivae and sclerae normal  ENT: no nose swelling, nasal discharge.  Thyroid: no apparent thyroid nodules  RESP: no audible wheeze, cough, or visible cyanosis.  No visible retractions or increased work of breathing.  Able to speak fully in complete sentences.  ABDO: not evaluated.  EXTREMITIES: no hand tremors.  NEURO: Cranial nerves grossly intact, mentation intact and speech normal  SKIN: No apparent skin lesions, rash or edema seen   PSYCH: mentation appears normal, affect normal/bright, judgement and insight intact, normal speech and appearance well-groomed    LABS:  A1c:  Lab Results   Component Value Date    A1C 6.5 12/07/2022    A1C 6.5 10/03/2022    A1C 7.0 05/04/2022    A1C 6.4 02/10/2022    A1C 6.5 07/05/2021    A1C 7.6 01/14/2021    A1C 6.7 07/14/2020    A1C 7.0 01/14/2020    A1C 7.5 09/24/2019       Basic Metabolic Panel:  Creatinine   Date Value Ref Range Status   01/02/2023 1.00 (H) 0.51 - 0.95 mg/dL Final   07/05/2021 1.20 (H) 0.52 - 1.04 mg/dL Final     Urinary microalbumin:  Lab Results   Component Value Date    UMALCR 591.60 02/10/2022    UMALCR 402.04 07/05/2021         LFTS/Cholesterol Panel:  Recent Labs   Lab Test 02/10/22  1103 07/05/21  0722 09/28/16  0811 07/01/15  0612   CHOL 205* 165   < > 190   HDL 48* 39*   < > 42*   * 91   < > 96   TRIG 123 176*   < > 260*   CHOLHDLRATIO  --   --   --  4.5    < > = values in this interval not displayed.       Thyroid  Function:  [facility]ID [facility] Latest Ref Rng & Units 9/24/2019   TSH 0.40 - 4.00 mU/L 1.45     [facility] Latest Ref Rng & Units 4/16/2017   TSH 0.40 - 4.00 mU/L 2.06     [facility] THYROID [facility] Latest Ref Rng 9/27/2016 4/2/2014   TSH 0.40 - 4.00 mU/L 2.17 1.09   T4 FREE 0.70 - 1.85 ng/dL  0.75        Vitamin D:  Vitamin D Deficiency Screening Results:  Lab Results   Component Value Date    VITDT 22 01/02/2023    VITDT 33 11/05/2018    VITDT 39 09/27/2016    VITDT 45 02/23/2016    VITDT 31 03/31/2015     All pertinent notes, labs, and images personally reviewed by me.     Glucometer/ insulin pump (if applicable)/ CGM data (if applicable) downloaded, Personally reviewed and interpreted.  All Blood sugar data reviewed personally and discussed with pt.  See nursing note from today's clinic visit for details of BG/CGM log.      A/P  Ms.Marie Vogel is a 41 year old here for the evaluation/management of diabetes:    1. DM2 - Under Fair control.  A1c 6.5%.  Diabetes complicated by microalbuminuria.  Using OMNIPOD 5 + Dexcom.  In automode about 80% of time.  On Trulicity 4.5 mg/week ( but have not started it yet)  H/o hypertropic cardiomyopathy, followed by cardiology.  In general BG appear in range.  Noted some episodes of hypoglycemia overnight.  Plan:  Discussed diagnosis, pathophysiology, management and treatment options of condition with pt.  Continue to use OMNIPOD 5+ Dexcom.   Continue current pump settings.  Continue to be in auto mode as much as possible.  Continue Trulicity at 4.5 mg/week.  Decrease bolus at night.  Labs and follow up in 3 months/as scheduled.    2. Hypertension - + microalbuminuria. On losartan. Blood pressure medications include cozaar 75 mg qd. Need aggressive BP/glycmeic control.    3. Hyperlipidemia - Under fair control.TG high with LDL 91.  Had myalgias on lipitor and stopped it.  On Crestor 5 mg/day.    4. Microalbuminuria:  Recommend strict BG control.  On Cozaar 50  mg/day.    DM Prevention:    Opthalmology- recommend annually.  Dental-Yes.  ASA-81 mg.  Smoking- No.    Most Recent Immunizations   Administered Date(s) Administered     COVID-19,PF,Pfizer (12+ Yrs) 12/28/2021     Influenza (IIV3) PF 09/26/2018     Influenza (intradermal) 10/01/2012     Influenza Vaccine IM > 6 months Valent IIV4 (Alfuria,Fluzone) 01/14/2021     MMR 09/17/2007     Pneumococcal 23 valent 01/22/2003     TD (ADULT, 7+) 01/01/2002     TDAP Vaccine (Adacel) 04/06/2012     Recommend checking blood sugars before meals and at bedtime.    If Blood glucose are low more often-> 2-3 times/week- give us a call.  The patient is advised to Make better food choices: reduce carbs, Reduce portion size, weight loss and exercise 3-4 times a week.  Discussed hypoglycemia signs and symptoms as well as management in detail.    All questions were answered.  The patient indicates understanding of the above issues and agrees with the plan set forth.   More than 50% of face to face time spent with Ms. Dino Vogel on counseling / coordinating her care.      Follow-up:  follow up in 3 months.    Zita Fuentes MD  Endocrinology   [facility]    CC: Alan Paredes    Addendum to above note and clinic visit:    Labs reviewed.    See result note/telephone encounter.                Answers for HPI/ROS submitted by the patient on 1/6/2023  General Symptoms: Yes  Skin Symptoms: No  HENT Symptoms: No  EYE SYMPTOMS: No  HEART SYMPTOMS: No  LUNG SYMPTOMS: No  INTESTINAL SYMPTOMS: No  URINARY SYMPTOMS: No  GYNECOLOGIC SYMPTOMS: No  BREAST SYMPTOMS: No  SKELETAL SYMPTOMS: Yes  BLOOD SYMPTOMS: No  NERVOUS SYSTEM SYMPTOMS: No  MENTAL HEALTH SYMPTOMS: Yes  Fever: No  Loss of appetite: No  Weight loss: No  Weight gain: No  Fatigue: Yes  Chills: No  Increased stress: Yes  Excessive hunger: No  Excessive thirst: No  Feeling hot or cold when others believe the temperature is normal: Yes  Post-operative complications:  No  Surgical site pain: No  Hallucinations: No  Change in or Loss of Energy: No  Hyperactivity: No  Confusion: No  Back pain: Yes  Muscle aches: Yes  Neck pain: No  Swollen joints: No  Joint pain: No  Muscle cramps: Yes  Muscle weakness: No  Joint stiffness: No  Bone fracture: No  Nervous or Anxious: No  Depression: Yes  Trouble sleeping: Yes  Trouble thinking or concentrating: No  Mood changes: No  Panic attacks: No

## 2023-01-09 NOTE — LETTER
"    1/9/2023         RE: Marie Vogel  [address]  [place] [address]        Dear Colleague,    Thank you for referring your patient, Marie Vogel, to the [facility]. Please see a copy of my visit note below.    Outcome for 01/06/23 9:58 AM: Data uploaded on Dexcom  ZEV Quigley  Outcome for 01/06/23 9:59 AM: Per patient, will upload device before appointment. Need glooko report.   ZEV Quigley  Outcome for 01/06/23 4:32 PM: Dexcom and Glooko emailed to provider  ZEV Quigley              Marie Vogel  is being evaluated via a billable video visit.      Patient denies any changes since echeck-in regarding medication and allergies and states all information entered during echeck-in remains accurate.    How would you like to obtain your AVS? [employer]  For the video visit, send the invitation by: Text to cell phone: [phone]  Will anyone else be joining your video visit? No            THIS IS A VIDEO VISIT:    Phone call visit/virtual visit encounter:  (last part of visit was telephone only)    Name of patient: Marie Vogel    Date of encounter: 1/9/2023    Time of start of video visit: 12:59    Video started: 1:10    Video ended: 1:28    Provider location: working from home/ [facility]    Patient location: patients home.    Mode of transmission: [facility] video/ [facility]    Verbal consent: obtained before starting visit. Pt is agreeable.      The patient has been notified of following:      \"This VIDEO visit will be conducted via a call between you and your physician/provider. We have found that certain health care needs can be provided without the need for a physical exam.  This service lets us provide the care you need with a short phone conversation.  If a prescription is necessary we can send it directly to your pharmacy.  If lab work is needed we can place an order for that and you can then stop by our lab to " "have the test done at a later time.     With new updates with corona virus patient might be billed as clinic visit.     If during the course of the call the physician/provider feels a telephone visit is not appropriate, you will not be charged for this service.\"      Past medical history, social history, family history, allergy and medications were reviewed and updated as appropriate.  Reviewed pertinent labs, notes, imaging studies personally.      Name: Marie Vogel  Seen for f/u of DM.  HPI:  Marie Vogel is a 42 year old female who presents for the evaluation/management of DM.   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    Here for f/u. Previously has seen endo at [place] ( Dr Aguilera and Dr Zhou and ). 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. Busy at work, also variable schedule.. Concerned about weight gain.   H/o cardiomyopathy. Followed by cardiology.    On OMNIPOD 5+ Dexcom.   Using humalog in pump.  Using DEXCOM CGM.    She is working at [employer].  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  Has questions about metformin related ototoxicity?    Reports that he has long acting insulin in case of pump malfunction at home. Marie also has glucagon kit at home for emergency.    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    Metformin was discontinued following that.  Trulicity was also increased following that to 4.5 mg/week.  But has not started it yet.    Wt is " stable.  Wt Readings from Last 2 Encounters:   12/07/22 77.2 kg (170 lb 3.1 oz)   12/01/22 74.4 kg (164 lb)       1. Type 2 DM:  Orginally diagnosed at the age of: 21 with GDM during her first pregnancy. Diagnosed with DM 2 in 1/2002, diet controlled for 3 years, then started on metformin. In 2010 during her second pregnancy, started on insulin and then insulin pump therapy in 2012.   Current Regimen: Insulin pump therapy ( STYLHUNT), Trulicity 4.5 mg/week.  BS checks: Using Dexcom.  Average Meter Download: Blood glucose data reviewed personally. See nursing note from this encounter for details.  She is able to feel symptoms of hypoglycemia.    yes:     Diabetes Medication(s)     Biguanides       metFORMIN (GLUCOPHAGE XR) 500 MG 24 hr tablet    Take 2 Tablets (1,000 mg) by mouth in the morning and 2 Tablets (1,000 mg) in the evening. Take with meals.    Diabetic Other       Glucagon, rDNA, (GLUCAGON EMERGENCY) 1 MG KIT    For Intramuscular use for low Blood glucose episode.    Insulin       insulin glargine (LANTUS PEN) 100 UNIT/ML pen    Take 20 units in case of pump malfunction only.     insulin lispro (HUMALOG) 100 UNIT/ML vial    USE WITH INSULIN PUMP AS DIRECTED, USES ABOUT 80 UNITS PER DAY    Incretin Mimetic Agents       Dulaglutide (TRULICITY) 4.5 MG/0.5ML SOPN    Inject 4.5 mg Subcutaneous once a week        Current Regimen:   Insulin pump -   Time Rate (U/hr)   0000-  0.75                   Carbohydrate Ratio -    Time Ratio   0000-  10         Sensitivity   40   Active Insulin Time   hours   Basal      Bolus      Total Carbohydrates/day    Total Insulin/day     Average Blood Sugar                    Complications:   Diabetes Complications  Description / Detail    Diabetic Retinopathy  No   CAD / PAD  No   Neuropathy  No   Nephropathy / Microalbuminuria  Yes: microalbuminuria. ON cozaar.   Gastroparesis  No   Hypoglycemia [place]  No     2. Hypertension: Blood Pressure today:   BP  Readings from Last 3 Encounters:   22 124/85   10/12/22 (!) 149/98   22 110/75     Takes medications everyday without forgetting a dose.  Denies feeling lightheaded or dizzy.    3. Hyperlipidemia:   Denies muscle aches of pains.       PMH/PSH:  Past Medical History:   Diagnosis Date     Allergic rhinitis due to pollen      Atypical glandular cells on Pap smear 3/1108     Cervical high risk HPV (human papillomavirus) test positive 2018    See problem list     Gastroesophageal reflux disease      PFO (patent foramen ovale)      Stargardt's disease 2019     Type II or unspecified type diabetes mellitus without mention of complication, not stated as uncontrolled     onset was gestational in      Past Surgical History:   Procedure Laterality Date     ABDOMEN SURGERY       C IUD,MIRENA  8/10/10, 7/28/15     C/SECTION, LOW TRANSVERSE  6/25/10    , Low Transverse     CHOLECYSTECTOMY, LAPOROSCOPIC      Cholecystectomy, Laparoscopic     ESOPHAGOSCOPY, GASTROSCOPY, DUODENOSCOPY (EGD), COMBINED N/A 2016    Procedure: COMBINED ESOPHAGOSCOPY, GASTROSCOPY, DUODENOSCOPY (EGD), BIOPSY SINGLE OR MULTIPLE;  Surgeon: Fadi Hunter MD;  Location: [facility] ESOPHAGEAL MOTILITY STUDY N/A 2016    Procedure: ESOPHAGEAL MOTILITY STUDY;  Surgeon: Fadi Hunter MD;  Location: [facility] REMOVE TONSILS/ADENOIDS,<13 Y/O      T &A     ZZC INDUCED ABORTN BY D&C           Family Hx:  Family History   Problem Relation Age of Onset     Cardiovascular Mother         hypertrophic cardiomyopathy     Genitourinary Problems Mother         gallbladder disease     Diabetes Mother         Transplnant induced/      Other - See Comments Mother         heart transplant 2005     Depression Mother      Diabetes Father         type 2     Hypertension Father      Hyperlipidemia Father      Genitourinary Problems Maternal Grandmother         gallbladder disease      Genitourinary Problems Paternal Grandmother         gallbladder disease     Hypertension Paternal Grandfather         high bp     Cerebrovascular Disease Paternal Grandfather              Cardiovascular Brother         hypertrophic cardiomyopathy     Diabetes Brother      Diabetes Brother         type 2     Diabetes Other         Type 2     Glaucoma No family hx of      Macular Degeneration No family hx of      Thyroid disease: No         DM2: Yes - father and mother         Autoimmune: DM1, SLE, RA, Vitiligo No    Social Hx:  Social History     Socioeconomic History     Marital status:      Spouse name: Not on file     Number of children: 2     Years of education: 14     Highest education level: Associate degree: academic program   Occupational History     Occupation: nurse     Occupation: RN   Tobacco Use     Smoking status: Former     Packs/day: 0.00     Years: 0.00     Pack years: 0.00     Types: Cigarettes     Quit date: 10/24/2005     Years since quittin.2     Smokeless tobacco: Never     Tobacco comments:     States only smokes when drinks maybe 2x/year   Substance and Sexual Activity     Alcohol use: No     Alcohol/week: 0.0 standard drinks     Drug use: No     Sexual activity: Yes     Partners: Male     Birth control/protection: I.U.D., Condom     Comment: Mirena IUD 7/28/15   Other Topics Concern     Parent/sibling w/ CABG, MI or angioplasty before 65F 55M? No   Social History Narrative    Caffeine intake/servings daily - 6-7    Calcium intake/servings daily - 2-3 yogurt, milk, cheese    Exercise 1 times weekly - describe aerobics    Sunscreen used - Yes    Seatbelts used - Yes    Guns stored in the home - No    Self Breast Exam - Yes    Pap test up to date -  Yes    Eye exam up to date -  Yes    Dental exam up to date -  Yes    DEXA scan up to date -  Not Applicable    Flex Sig/Colonoscopy up to date -  Not Applicable    Mammography up to date -  Not Applicable    Immunizations  reviewed and up to date - Yes    Abuse: Current or Past (Physical, Sexual or Emotional) - No    Do you feel safe in your environment - Yes    Do you cope well with stress - Yes    Do you suffer from insomnia - Yes     Last updated by: Tatianna Lacey  5/9/2005     Social Determinants of Health     Financial Resource Strain: Not on file   Food Insecurity: Not on file   Transportation Needs: Not on file   Physical Activity: Not on file   Stress: Not on file   Social Connections: Not on file   Intimate Partner Violence: Not on file   Housing Stability: Not on file          MEDICATIONS:  has a current medication list which includes the following prescription(s): albuterol, aspirin not prescribed, atenolol, blood glucose, blood glucose monitoring, cetirizine, clotrimazole, dexcom g6 sensor, dexcom g6 transmitter, trulicity, epinephrine, fluticasone, glucagon emergency, ibuprofen, omnipod 5 g6 pod (gen 5), omnipod dash pdm (gen 4), insulin glargine, humalog, insulin pump, insulin syringe-needle u-100, blood glucose monitoring, levonorgestrel, losartan, magnesium oxide, metformin, montelukast, multiple vitamins-minerals, fish oil-omega-3 fatty acids, omeprazole, ondansetron, rosuvastatin, sertraline, sumatriptan, trazodone, ubiquinol, urine glucose-ketones test, and b-100.    ROS     ROS: 10 point ROS neg other than the symptoms noted above in the HPI.    Physical Exam   VS: There were no vitals taken for this visit.  GENERAL: healthy, alert and no distress  EYES: Eyes grossly normal to inspection, conjunctivae and sclerae normal  ENT: no nose swelling, nasal discharge.  Thyroid: no apparent thyroid nodules  RESP: no audible wheeze, cough, or visible cyanosis.  No visible retractions or increased work of breathing.  Able to speak fully in complete sentences.  ABDO: not evaluated.  EXTREMITIES: no hand tremors.  NEURO: Cranial nerves grossly intact, mentation intact and speech normal  SKIN: No apparent skin lesions, rash or  edema seen   PSYCH: mentation appears normal, affect normal/bright, judgement and insight intact, normal speech and appearance well-groomed    LABS:  A1c:  Lab Results   Component Value Date    A1C 6.5 12/07/2022    A1C 6.5 10/03/2022    A1C 7.0 05/04/2022    A1C 6.4 02/10/2022    A1C 6.5 07/05/2021    A1C 7.6 01/14/2021    A1C 6.7 07/14/2020    A1C 7.0 01/14/2020    A1C 7.5 09/24/2019       Basic Metabolic Panel:  Creatinine   Date Value Ref Range Status   01/02/2023 1.00 (H) 0.51 - 0.95 mg/dL Final   07/05/2021 1.20 (H) 0.52 - 1.04 mg/dL Final     Urinary microalbumin:  Lab Results   Component Value Date    UMALCR 591.60 02/10/2022    UMALCR 402.04 07/05/2021         LFTS/Cholesterol Panel:  Recent Labs   Lab Test 02/10/22  1103 07/05/21  0722 09/28/16  0811 07/01/15  0612   CHOL 205* 165   < > 190   HDL 48* 39*   < > 42*   * 91   < > 96   TRIG 123 176*   < > 260*   CHOLHDLRATIO  --   --   --  4.5    < > = values in this interval not displayed.       Thyroid Function:  ENDO THYROID LABS-[facility] Latest Ref Rng & Units 9/24/2019   TSH 0.40 - 4.00 mU/L 1.45     ENDO THYROID LABS-[facility] Latest Ref Rng & Units 4/16/2017   TSH 0.40 - 4.00 mU/L 2.06     ENDO THYROID LABS-[facility] Latest Ref Rng 9/27/2016 4/2/2014   TSH 0.40 - 4.00 mU/L 2.17 1.09   T4 FREE 0.70 - 1.85 ng/dL  0.75        Vitamin D:  Vitamin D Deficiency Screening Results:  Lab Results   Component Value Date    VITDT 22 01/02/2023    VITDT 33 11/05/2018    VITDT 39 09/27/2016    VITDT 45 02/23/2016    VITDT 31 03/31/2015     All pertinent notes, labs, and images personally reviewed by me.     Glucometer/ insulin pump (if applicable)/ CGM data (if applicable) downloaded, Personally reviewed and interpreted.  All Blood sugar data reviewed personally and discussed with pt.  See nursing note from today's clinic visit for details of BG/CGM log.      A/P  Ms.Marie WILLIAM Dino Vogel is a 41 year old here for the evaluation/management of diabetes:    1. DM2 - Under Fair  control.  A1c 6.5%.  Diabetes complicated by microalbuminuria.  Using OMNIPOD 5 + Dexcom.  In automode about 80% of time.  On Trulicity 4.5 mg/week ( but have not started it yet)  H/o hypertropic cardiomyopathy, followed by cardiology.  In general BG appear in range.  Noted some episodes of hypoglycemia overnight.  Plan:  Discussed diagnosis, pathophysiology, management and treatment options of condition with pt.  Continue to use OMNIPOD 5+ Dexcom.   Continue current pump settings.  Continue to be in auto mode as much as possible.  Continue Trulicity at 4.5 mg/week.  Decrease bolus at night.  Labs and follow up in 3 months/as scheduled.    2. Hypertension - + microalbuminuria. On losartan. Blood pressure medications include cozaar 75 mg qd. Need aggressive BP/glycmeic control.    3. Hyperlipidemia - Under fair control.TG high with LDL 91.  Had myalgias on lipitor and stopped it.  On Crestor 5 mg/day.    4. Microalbuminuria:  Recommend strict BG control.  On Cozaar 50 mg/day.    DM Prevention:    Opthalmology- recommend annually.  Dental-Yes.  ASA-81 mg.  Smoking- No.    Most Recent Immunizations   Administered Date(s) Administered     COVID-19,PF,Pfizer (12+ Yrs) 12/28/2021     Influenza (IIV3) PF 09/26/2018     Influenza (intradermal) 10/01/2012     Influenza Vaccine IM > 6 months Valent IIV4 (Alfuria,Fluzone) 01/14/2021     MMR 09/17/2007     Pneumococcal 23 valent 01/22/2003     TD (ADULT, 7+) 01/01/2002     TDAP Vaccine (Adacel) 04/06/2012     Recommend checking blood sugars before meals and at bedtime.    If Blood glucose are low more often-> 2-3 times/week- give us a call.  The patient is advised to Make better food choices: reduce carbs, Reduce portion size, weight loss and exercise 3-4 times a week.  Discussed hypoglycemia signs and symptoms as well as management in detail.    All questions were answered.  The patient indicates understanding of the above issues and agrees with the plan set forth.   More than  50% of face to face time spent with Ms. Dino Vogel on counseling / coordinating her care.      Follow-up:  follow up in 3 months.    Zita Fuentes MD  Endocrinology   [facility]/[place]    CC: Alan Paredes    Addendum to above note and clinic visit:    Labs reviewed.    See result note/telephone encounter.                Answers for HPI/ROS submitted by the patient on 1/6/2023  General Symptoms: Yes  Skin Symptoms: No  HENT Symptoms: No  EYE SYMPTOMS: No  HEART SYMPTOMS: No  LUNG SYMPTOMS: No  INTESTINAL SYMPTOMS: No  URINARY SYMPTOMS: No  GYNECOLOGIC SYMPTOMS: No  BREAST SYMPTOMS: No  SKELETAL SYMPTOMS: Yes  BLOOD SYMPTOMS: No  NERVOUS SYSTEM SYMPTOMS: No  MENTAL HEALTH SYMPTOMS: Yes  Fever: No  Loss of appetite: No  Weight loss: No  Weight gain: No  Fatigue: Yes  Chills: No  Increased stress: Yes  Excessive hunger: No  Excessive thirst: No  Feeling hot or cold when others believe the temperature is normal: Yes  Post-operative complications: No  Surgical site pain: No  Hallucinations: No  Change in or Loss of Energy: No  Hyperactivity: No  Confusion: No  Back pain: Yes  Muscle aches: Yes  Neck pain: No  Swollen joints: No  Joint pain: No  Muscle cramps: Yes  Muscle weakness: No  Joint stiffness: No  Bone fracture: No  Nervous or Anxious: No  Depression: Yes  Trouble sleeping: Yes  Trouble thinking or concentrating: No  Mood changes: No  Panic attacks: No          Again, thank you for allowing me to participate in the care of your patient.        Sincerely,        Zita Fuentes MD

## 2023-01-16 DIAGNOSIS — R80.9 MICROALBUMINURIA: Primary | ICD-10-CM

## 2023-01-18 NOTE — PROGRESS NOTES
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     The patient was first diagnosed with diabetes since 2001 during pregnancy. Since then, she DM was persistent and she was subsequently diagnosed with type 2 diabetes.  Previously, she was on insulin pump, Trulicity and metformin.  However, recently metformin was stopped due to concerning for mitochondrial toxicity.  In 2022, she noticed that her son who was at age 12 has some symptoms concerning for diabetes such as polyuria and polydipsia. Blood test revealed that he had DM with HbA1C of 10.5 and FBG of 350. Subsequent test suggest that he has type 2 DM. And that is when they started to work up and found that her son has MT-TL1 gene mutation. Subsequently, her and her oldest daughter are also found to have positive for this mutation as well. She has a stong family Hx of HOC running in her family. Her mother has HOCM Dx at the age of 47 and underwent heart Tx. She later passed away at the age of 60. She was diagnosed with DM post transplant. Her brother also has DM, CHF post myectomy and hearing loss but has not get tested yet. Both of her children were negative for HOCM so far    In regards to her presentation, after the diagnosis of DM in 2001. She was diagnosed with HL in ~2008. IN 4107-1949, she was diagnosed with HOCM. Sheis taking atenolol and losartan. If she missed atenolol, she may develop tachycardia. However, her [facility] class appears about 1. She has no leg  swelling, orthopnea or PND at this time. She has a cousin who is an uncle to her just pased away due to heart disease. She has no DR but she has retinal atrophy. She has no neuropathy. She never has DKA before. She has no leg swelling. She has SOB sometimes. It gets worse when she does not take atenolol at night.     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 She does not smoke or drink. She does not take NSAIDs. She does not have high BP problem, She has no problem with urination She has no Hx of preeclampsia. She has both  deliveries 34 older and 32 is the younger.  She has 2 children, 22 Yo girl and 13 Yo boy.     She was on lisinopril then was switched to losartan. She does not have UTI frequently.     Upon chart review, the patient has creatinine of 0.6 in .  In , creatinine started to increase to 0.7,  0.8 in 2017 and 0.9 in 2018. Since , Cr fluctuate between 1-1.2. UA showed trace intermittent protein since . Proteinuria has been progressively worse and noted >= 300 mg/dL since . UPCR is 1.51 g/g on 23. She also has intermittent blood in the urine since . Serum albumin is normal.     Labs on 23 showed creatinine 1.0, GFR 71, potassium 4.6, CO2 27, phosphorus 3.0, albumin 4.2.  Hemoglobin 13.5. UA showed large blood, RBC 3 cells per high-power field, WBC is still prior to review.  Squamous epithelial cell 19. UPCR 1.51 g/g. UPCR today is 1.27.     PAST MEDICAL HISTORY:  Reviewed with patient on 2023     Past Medical History:   Diagnosis Date     Allergic rhinitis due to pollen      Atypical glandular cells on Pap smear 3/1108     Cervical high risk HPV (human papillomavirus) test positive 2018    See problem list     Gastroesophageal reflux disease      PFO (patent foramen ovale)      Stargardt's disease 2019     Type II or unspecified type diabetes mellitus without mention of complication, not stated as uncontrolled     onset was gestational in  "       Past Surgical History:   Procedure Laterality Date     ABDOMEN SURGERY       C IUD,MIRENA  8/10/10, 7/28/15     C/SECTION, LOW TRANSVERSE  6/25/10    , Low Transverse     CHOLECYSTECTOMY, LAPOROSCOPIC      Cholecystectomy, Laparoscopic     ESOPHAGOSCOPY, GASTROSCOPY, DUODENOSCOPY (EGD), COMBINED N/A 2016    Procedure: COMBINED ESOPHAGOSCOPY, GASTROSCOPY, DUODENOSCOPY (EGD), BIOPSY SINGLE OR MULTIPLE;  Surgeon: Fadi Hunter MD;  Location: [facility]     HC ESOPHAGEAL MOTILITY STUDY N/A 2016    Procedure: ESOPHAGEAL MOTILITY STUDY;  Surgeon: Fadi Hunter MD;  Location: [facility]     HC REMOVE TONSILS/ADENOIDS,<13 Y/O      T &A     ZZC INDUCED ABORTN BY D&C              MEDICATIONS:  PTA Meds  Current Outpatient Medications   Medication     albuterol (PROAIR HFA/PROVENTIL HFA/VENTOLIN HFA) 108 (90 Base) MCG/ACT inhaler     ASPIRIN NOT PRESCRIBED (INTENTIONAL)     atenolol (TENORMIN) 25 MG tablet     blood glucose (PEPE CONTOUR NEXT) test strip     blood glucose monitoring (NO BRAND SPECIFIED) meter device kit     cetirizine (ZYRTEC) 10 MG tablet     clotrimazole (LOTRIMIN) 1 % external cream     Continuous Blood Gluc Sensor (DEXCOM G6 SENSOR) MISC     Continuous Blood Gluc Transmit (DEXCOM G6 TRANSMITTER) MISC     Dulaglutide (TRULICITY) 4.5 MG/0.5ML SOPN     EPINEPHrine (ANY BX GENERIC EQUIV) 0.3 MG/0.3ML injection 2-pack     fluticasone (FLONASE) 50 MCG/ACT spray     Glucagon, rDNA, (GLUCAGON EMERGENCY) 1 MG KIT     ibuprofen (ADVIL/MOTRIN) 200 MG tablet     Insulin Disposable Pump (OMNIPOD 5 G6 POD, GEN 5,) MISC     Insulin Disposable Pump (OMNIPOD DASH SYSTEM) KIT     insulin glargine (LANTUS PEN) 100 UNIT/ML pen     insulin lispro (HUMALOG) 100 UNIT/ML vial     INSULIN PUMP - OUTPATIENT     insulin syringe-needle U-100 (29G X 1/2\" 1 ML) 29G X 1/2\" 1 ML miscellaneous     Lancets (MICROLET) MISC     levonorgestrel (MIRENA) 20 MCG/24HR IUD     losartan " (COZAAR) 50 MG tablet     MAGNESIUM OXIDE PO     montelukast (SINGULAIR) 10 MG tablet     Multiple Vitamins-Minerals (MULTI VITAMIN/MINERALS PO)     Omega-3 Fatty Acids (FISH OIL) 500 MG CAPS     omeprazole (PRILOSEC) 40 MG DR capsule     ondansetron (ZOFRAN ODT) 4 MG ODT tab     rosuvastatin (CRESTOR) 5 MG tablet     sertraline (ZOLOFT) 25 MG tablet     SUMAtriptan (IMITREX) 25 MG tablet     traZODone (DESYREL) 50 MG tablet     Ubiquinol 200 MG CAPS     Urine Glucose-Ketones Test STRP     Vitamins-Lipotropics (B-100) TABS     No current facility-administered medications for this visit.         ALLERGIES:    Allergies   Allergen Reactions     Ivp Dye [Contrast Dye] Itching     Pt reports that throat itches     Nuts Anaphylaxis     [place] nuts only     Bactrim Swelling     Pt reaction was swelling in mouth and tongue and itchy mouth.  Resolved with benadryl and prednisone     Pcn [Penicillins] Hives     Cephalosporins Itching     Seasonal Allergies Other (See Comments)     Molds, trees, grass, dust..  Upper congestion     Atorvastatin      myalgias     Shellfish Allergy Rash     Clams only       REVIEW OF SYSTEMS:  A comprehensive of systems was negative except as noted above.    SOCIAL HISTORY:   Social History     Socioeconomic History     Marital status:      Spouse name: Not on file     Number of children: 2     Years of education: 14     Highest education level: Associate degree: academic program   Occupational History     Occupation: nurse     Occupation: RN   Tobacco Use     Smoking status: Former     Packs/day: 0.00     Years: 0.00     Pack years: 0.00     Types: Cigarettes     Quit date: 10/24/2005     Years since quittin.2     Smokeless tobacco: Never     Tobacco comments:     States only smokes when drinks maybe 2x/year   Substance and Sexual Activity     Alcohol use: No     Alcohol/week: 0.0 standard drinks     Drug use: No     Sexual activity: Yes     Partners: Male     Birth  control/protection: I.U.D., Condom     Comment: Mirena IUD 7/28/15   Other Topics Concern     Parent/sibling w/ CABG, MI or angioplasty before 65F 55M? No   Social History Narrative    Caffeine intake/servings daily - 6-7    Calcium intake/servings daily - 2-3 yogurt, milk, cheese    Exercise 1 times weekly - describe aerobics    Sunscreen used - Yes    Seatbelts used - Yes    Guns stored in the home - No    Self Breast Exam - Yes    Pap test up to date -  Yes    Eye exam up to date -  Yes    Dental exam up to date -  Yes    DEXA scan up to date -  Not Applicable    Flex Sig/Colonoscopy up to date -  Not Applicable    Mammography up to date -  Not Applicable    Immunizations reviewed and up to date - Yes    Abuse: Current or Past (Physical, Sexual or Emotional) - No    Do you feel safe in your environment - Yes    Do you cope well with stress - Yes    Do you suffer from insomnia - Yes     Last updated by: Tatianna Lacey  2005     Social Determinants of Health     Financial Resource Strain: Not on file   Food Insecurity: Not on file   Transportation Needs: Not on file   Physical Activity: Not on file   Stress: Not on file   Social Connections: Not on file   Intimate Partner Violence: Not on file   Housing Stability: Not on file     Reviewed with patient.    FAMILY MEDICAL HISTORY:   Family History   Problem Relation Age of Onset     Cardiovascular Mother         hypertrophic cardiomyopathy     Genitourinary Problems Mother         gallbladder disease     Diabetes Mother         Transplnant induced/      Other - See Comments Mother         heart transplant 2005     Depression Mother      Diabetes Father         type 2     Hypertension Father      Hyperlipidemia Father      Genitourinary Problems Maternal Grandmother         gallbladder disease     Genitourinary Problems Paternal Grandmother         gallbladder disease     Hypertension Paternal Grandfather         high bp     Cerebrovascular Disease  Paternal Grandfather              Cardiovascular Brother         hypertrophic cardiomyopathy     Diabetes Brother      Diabetes Brother         type 2     Diabetes Other         Type 2     Glaucoma No family hx of      Macular Degeneration No family hx of      Reviewed with patient.    PHYSICAL EXAM:   /86   Pulse 81   Temp 98  F (36.7  C) (Oral)   Wt 74.3 kg (163 lb 11.2 oz)   SpO2 100%   BMI 27.78 kg/m     GENERAL APPEARANCE: no distress, awake  EYES: No scleral icterus, pupils equal  Endo: no goiter, no moon facies  Lymphatics: no cervical or supraclavicular LAD  Pulmonary: lungs clear to auscultation with equal breath sounds bilaterally, no clubbing  CV: regular rhythm, normal rate, no rub   - Edema: none  GI: soft, nontender, normal bowel sounds  MS: no evidence of inflammation in joints, no muscle tenderness  : NO CVA tenderness.   SKIN: no rash, warm, dry, no cyanosis  NEURO: face symmetric, no asterixis     LABS:   Last Renal Panel:  Sodium   Date Value Ref Range Status   2023 138 136 - 145 mmol/L Final   2021 139 133 - 144 mmol/L Final     Potassium   Date Value Ref Range Status   2023 4.4 3.4 - 5.3 mmol/L Final   2022 4.8 3.4 - 5.3 mmol/L Final   2021 4.5 3.4 - 5.3 mmol/L Final     Chloride   Date Value Ref Range Status   2023 104 98 - 107 mmol/L Final   2022 104 94 - 109 mmol/L Final   2021 107 94 - 109 mmol/L Final     Carbon Dioxide   Date Value Ref Range Status   2021 27 20 - 32 mmol/L Final     Carbon Dioxide (CO2)   Date Value Ref Range Status   2023 25 22 - 29 mmol/L Final   2022 29 20 - 32 mmol/L Final     Anion Gap   Date Value Ref Range Status   2023 9 7 - 15 mmol/L Final   2022 7 3 - 14 mmol/L Final   2021 5 3 - 14 mmol/L Final     Glucose   Date Value Ref Range Status   2023 171 (H) 70 - 99 mg/dL Final   2022 95 70 - 99 mg/dL Final   2021 178 (H) 70 - 99 mg/dL Final      Comment:     Fasting specimen     Urea Nitrogen   Date Value Ref Range Status   01/19/2023 16.8 6.0 - 20.0 mg/dL Final   09/02/2022 18 7 - 30 mg/dL Final   07/05/2021 22 7 - 30 mg/dL Final     Creatinine   Date Value Ref Range Status   01/19/2023 1.08 (H) 0.51 - 0.95 mg/dL Final   07/05/2021 1.20 (H) 0.52 - 1.04 mg/dL Final     GFR Estimate   Date Value Ref Range Status   01/19/2023 65 >60 mL/min/1.73m2 Final     Comment:     Effective December 21, 2021 eGFRcr in adults is calculated using the 2021 CKD-EPI creatinine equation which includes age and gender (Luciano et al., NEJ, DOI: 10.1056/XUNByh8132529)   07/05/2021 55 (L) >60 mL/min/[1.73_m2] Final     Comment:     Non  GFR Calc  Starting 12/18/2018, serum creatinine based estimated GFR (eGFR) will be   calculated using the Chronic Kidney Disease Epidemiology Collaboration   (CKD-EPI) equation.       Calcium   Date Value Ref Range Status   01/19/2023 9.2 8.6 - 10.0 mg/dL Final   07/05/2021 8.9 8.5 - 10.1 mg/dL Final     Phosphorus   Date Value Ref Range Status   01/19/2023 2.6 2.5 - 4.5 mg/dL Final   11/12/2008 3.2 2.5 - 4.5 mg/dL Final     Albumin   Date Value Ref Range Status   01/19/2023 4.1 3.5 - 5.2 g/dL Final   07/05/2021 3.2 (L) 3.4 - 5.0 g/dL Final       URINE STUDIES  Recent Labs   Lab Test 01/19/23  1014 01/02/23  1246 09/02/22  1059 12/29/19  1706 01/02/19  1534 07/22/18  2035 07/22/18  1515 03/06/18  1433 03/02/18  1530   COLOR Light Yellow Yellow Yellow  --   --  Red Red Yellow Red   APPEARANCE Clear Slightly Cloudy* Clear  --   --  Cloudy Cloudy Clear Cloudy   URINEGLC Negative Negative Negative  --   --  Interfering substances, unable to perform test* 500* Negative Negative   URINEBILI Negative Negative Small*  --   --  Interfering substances, unable to perform test* Negative Negative Negative   URINEKETONE Negative Trace* Trace*  --   --  Interfering substances, unable to perform test* Negative Trace* Negative   SG 1.006 1.025 1.025   --   --  Interfering substances, unable to perform test 1.015 1.025 1.020   UBLD Negative Large* Negative  --   --  Interfering substances, unable to perform test* Moderate* Negative LARGE*   URINEPH 6.0 6.0 5.5  --   --  Interfering substances, unable to perform test 6.5 5.0 7.0   PROTEIN 30* 300* >=300*  --   --  Interfering substances, unable to perform test* 100* >=300* >=300*   UROBILINOGEN  --   --  1.0  --   --   --  0.2 0.2 0.2   NITRITE Negative Negative Negative  --   --  Interfering substances, unable to perform test* Positive* Negative Negative   LEUKEST Trace* Moderate* Negative  --   --  Interfering substances, unable to perform test* Negative Negative LARGE*   RBCU 0 3* 0-2 5-10*   < > >182* >100* O - 2 >100*   WBCU 2 8* 5-10* 25-50*   < > 3,840* * 0 - 5 >100*    < > = values in this interval not displayed.     No lab results found.  PTH  Recent Labs   Lab Test 01/19/23  1037 01/02/23  1239 12/07/22  1301   PTHI 75* 79* 54     IRON STUDIES  No lab results found.    IMAGING:  No recent kidney imaging but will order one.     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  The patient also has progressive proteinuria with the most recent urine protein creatinine ratio of 1.27 g/g.  The patient does not have evidence of nephrotic syndrome as she has no edema and serum albumin is normal at 4.2.  Etiology of her chronic kidney disease remains unclear but it could be related to diabetic nephropathy or genetic FSGS and it has been reported in this study DOI: [email or web address]. In the case series, the patient may present with proteinuric kidney  disease and may progress to end-stage kidney disease. Patient can have short stature, severe headache, hearing loss, diabetes mellitus and hypertrophic cardiomyopathy in which she also have HL, DM and HOCM. From the literature, kidney Bx may reveal increased number of abnormal mitochondria in tubular cells and podocytes. Moreover, she could have other causes of proteinuric kidney disease as well.  Therefore, next step is that we will have to check serology, Kidney US and pursue a kidney Bx. I discussed risk and benefit of kidney Bx with the patient which include 1/100 of Blood transfusion, 1/1000 requiring intervention to stop bleeding, and <1/26950 for losing kidney and death. The patient decided to proceed with a kidney Bx. Given the fact that the patient can not be on certain medication, we will confirm with Dr. Saavedra about that.    Regarding treatment, I discussed with her about the benefit of diabetes, hypertension control and weight loss. She is on losartan 50 mg per day with normal BP. Next step, we may increase losartan to help with proteuria as well as blood pressure is tolerable.. I also discussed with her about SGLT2 which has benefit in  proteinuric kidney disease but check with Dr. Saavedra as well.  - Serological testing  - Labs today and repeat UA  - Kidney biopsy-> IR  - We may use  SGLT2 inhibitor-> will confirm with Dr. Saavedra first  - Stay well hydrated and avoid nephrotoxic agents  - Continue losartan at 50 mg daily; goal BP < 130/80 mmHg    # MBD  # Secondary hyper PTH  PTH is 75 and Vit D is 22. Sixto and Phos are normal.Continue to monitor.     Follow-up after a kdiney Bx    I spent 100 minutes on the date of the encounter doing chart review, history and exam, documentation and further activities as noted above. 45 minutes of this visit is dedicated to direct patient interaction via face to face.    Patti Menendez MD on 01/19/2023

## 2023-01-19 ENCOUNTER — PRE VISIT (OUTPATIENT)
Dept: NEPHROLOGY | Facility: CLINIC | Age: 43
End: 2023-01-19

## 2023-01-19 ENCOUNTER — OFFICE VISIT (OUTPATIENT)
Dept: NEPHROLOGY | Facility: CLINIC | Age: 43
End: 2023-01-19
Attending: INTERNAL MEDICINE
Payer: COMMERCIAL

## 2023-01-19 ENCOUNTER — LAB (OUTPATIENT)
Dept: LAB | Facility: CLINIC | Age: 43
End: 2023-01-19
Attending: INTERNAL MEDICINE
Payer: COMMERCIAL

## 2023-01-19 ENCOUNTER — TELEPHONE (OUTPATIENT)
Dept: NEPHROLOGY | Facility: CLINIC | Age: 43
End: 2023-01-19

## 2023-01-19 VITALS
DIASTOLIC BLOOD PRESSURE: 86 MMHG | HEART RATE: 81 BPM | TEMPERATURE: 98 F | SYSTOLIC BLOOD PRESSURE: 122 MMHG | BODY MASS INDEX: 27.78 KG/M2 | WEIGHT: 163.7 LBS | OXYGEN SATURATION: 100 %

## 2023-01-19 DIAGNOSIS — N18.2 CKD (CHRONIC KIDNEY DISEASE) STAGE 2, GFR 60-89 ML/MIN: Primary | ICD-10-CM

## 2023-01-19 DIAGNOSIS — R80.9 MICROALBUMINURIA: ICD-10-CM

## 2023-01-19 DIAGNOSIS — N18.2 CKD (CHRONIC KIDNEY DISEASE) STAGE 2, GFR 60-89 ML/MIN: ICD-10-CM

## 2023-01-19 LAB
ALBUMIN MFR UR ELPH: 47.9 MG/DL (ref 1–14)
ALBUMIN SERPL BCG-MCNC: 4.1 G/DL (ref 3.5–5.2)
ALBUMIN UR-MCNC: 30 MG/DL
ANION GAP SERPL CALCULATED.3IONS-SCNC: 9 MMOL/L (ref 7–15)
APPEARANCE UR: CLEAR
BILIRUB UR QL STRIP: NEGATIVE
BUN SERPL-MCNC: 16.8 MG/DL (ref 6–20)
C3 SERPL-MCNC: 106 MG/DL (ref 81–157)
C4 SERPL-MCNC: 24 MG/DL (ref 13–39)
CALCIUM SERPL-MCNC: 9.2 MG/DL (ref 8.6–10)
CHLORIDE SERPL-SCNC: 104 MMOL/L (ref 98–107)
COLOR UR AUTO: ABNORMAL
CREAT SERPL-MCNC: 1.08 MG/DL (ref 0.51–0.95)
CREAT UR-MCNC: 37.7 MG/DL
DEPRECATED CALCIDIOL+CALCIFEROL SERPL-MC: 27 UG/L (ref 20–75)
DEPRECATED HCO3 PLAS-SCNC: 25 MMOL/L (ref 22–29)
ERYTHROCYTE [DISTWIDTH] IN BLOOD BY AUTOMATED COUNT: 11.6 % (ref 10–15)
GFR SERPL CREATININE-BSD FRML MDRD: 65 ML/MIN/1.73M2
GLUCOSE SERPL-MCNC: 171 MG/DL (ref 70–99)
GLUCOSE UR STRIP-MCNC: NEGATIVE MG/DL
HBV SURFACE AG SERPL QL IA: NONREACTIVE
HCT VFR BLD AUTO: 37.4 % (ref 35–47)
HCV AB SERPL QL IA: NONREACTIVE
HGB BLD-MCNC: 12.4 G/DL (ref 11.7–15.7)
HGB UR QL STRIP: NEGATIVE
HIV 1+2 AB+HIV1 P24 AG SERPL QL IA: NONREACTIVE
HOLD SPECIMEN: NORMAL
KAPPA LC FREE SER-MCNC: 3.22 MG/DL (ref 0.33–1.94)
KAPPA LC FREE/LAMBDA FREE SER NEPH: 1.55 {RATIO} (ref 0.26–1.65)
KETONES UR STRIP-MCNC: NEGATIVE MG/DL
LAMBDA LC FREE SERPL-MCNC: 2.08 MG/DL (ref 0.57–2.63)
LEUKOCYTE ESTERASE UR QL STRIP: ABNORMAL
Lab: NORMAL
MCH RBC QN AUTO: 31.2 PG (ref 26.5–33)
MCHC RBC AUTO-ENTMCNC: 33.2 G/DL (ref 31.5–36.5)
MCV RBC AUTO: 94 FL (ref 78–100)
MUCOUS THREADS #/AREA URNS LPF: PRESENT /LPF
NITRATE UR QL: NEGATIVE
PERFORMING LABORATORY: NORMAL
PH UR STRIP: 6 [PH] (ref 5–7)
PHOSPHATE SERPL-MCNC: 2.6 MG/DL (ref 2.5–4.5)
PLATELET # BLD AUTO: 289 10E3/UL (ref 150–450)
POTASSIUM SERPL-SCNC: 4.4 MMOL/L (ref 3.4–5.3)
PROT/CREAT 24H UR: 1.27 MG/MG CR (ref 0–0.2)
PTH-INTACT SERPL-MCNC: 75 PG/ML (ref 15–65)
RBC # BLD AUTO: 3.97 10E6/UL (ref 3.8–5.2)
RBC URINE: 0 /HPF
SODIUM SERPL-SCNC: 138 MMOL/L (ref 136–145)
SP GR UR STRIP: 1.01 (ref 1–1.03)
SPECIMEN STATUS: NORMAL
SQUAMOUS EPITHELIAL: 1 /HPF
TEST NAME: NORMAL
TOTAL PROTEIN SERUM FOR ELP: 6.7 G/DL (ref 6.4–8.3)
UROBILINOGEN UR STRIP-MCNC: NORMAL MG/DL
WBC # BLD AUTO: 7.4 10E3/UL (ref 4–11)
WBC URINE: 2 /HPF

## 2023-01-19 PROCEDURE — 36415 COLL VENOUS BLD VENIPUNCTURE: CPT | Performed by: PATHOLOGY

## 2023-01-19 PROCEDURE — 82306 VITAMIN D 25 HYDROXY: CPT | Performed by: PATHOLOGY

## 2023-01-19 PROCEDURE — G0463 HOSPITAL OUTPT CLINIC VISIT: HCPCS | Performed by: INTERNAL MEDICINE

## 2023-01-19 PROCEDURE — 81001 URINALYSIS AUTO W/SCOPE: CPT | Performed by: PATHOLOGY

## 2023-01-19 PROCEDURE — 87340 HEPATITIS B SURFACE AG IA: CPT | Performed by: PATHOLOGY

## 2023-01-19 PROCEDURE — 84155 ASSAY OF PROTEIN SERUM: CPT | Performed by: PATHOLOGY

## 2023-01-19 PROCEDURE — 83876 ASSAY MYELOPEROXIDASE: CPT | Performed by: PATHOLOGY

## 2023-01-19 PROCEDURE — 83516 IMMUNOASSAY NONANTIBODY: CPT | Performed by: PATHOLOGY

## 2023-01-19 PROCEDURE — 86038 ANTINUCLEAR ANTIBODIES: CPT | Performed by: PATHOLOGY

## 2023-01-19 PROCEDURE — 86255 FLUORESCENT ANTIBODY SCREEN: CPT | Mod: 90 | Performed by: PATHOLOGY

## 2023-01-19 PROCEDURE — 84156 ASSAY OF PROTEIN URINE: CPT | Performed by: PATHOLOGY

## 2023-01-19 PROCEDURE — 99205 OFFICE O/P NEW HI 60 MIN: CPT | Performed by: INTERNAL MEDICINE

## 2023-01-19 PROCEDURE — 99000 SPECIMEN HANDLING OFFICE-LAB: CPT | Performed by: PATHOLOGY

## 2023-01-19 PROCEDURE — 86334 IMMUNOFIX E-PHORESIS SERUM: CPT

## 2023-01-19 PROCEDURE — 85027 COMPLETE CBC AUTOMATED: CPT | Performed by: PATHOLOGY

## 2023-01-19 PROCEDURE — 87389 HIV-1 AG W/HIV-1&-2 AB AG IA: CPT | Performed by: PATHOLOGY

## 2023-01-19 PROCEDURE — 99214 OFFICE O/P EST MOD 30 MIN: CPT | Performed by: INTERNAL MEDICINE

## 2023-01-19 PROCEDURE — 84165 PROTEIN E-PHORESIS SERUM: CPT

## 2023-01-19 PROCEDURE — 80069 RENAL FUNCTION PANEL: CPT | Performed by: PATHOLOGY

## 2023-01-19 PROCEDURE — 99417 PROLNG OP E/M EACH 15 MIN: CPT | Performed by: INTERNAL MEDICINE

## 2023-01-19 PROCEDURE — 86160 COMPLEMENT ANTIGEN: CPT | Performed by: PATHOLOGY

## 2023-01-19 PROCEDURE — 83521 IG LIGHT CHAINS FREE EACH: CPT | Performed by: PATHOLOGY

## 2023-01-19 PROCEDURE — 86803 HEPATITIS C AB TEST: CPT | Performed by: PATHOLOGY

## 2023-01-19 PROCEDURE — 83970 ASSAY OF PARATHORMONE: CPT | Performed by: PATHOLOGY

## 2023-01-19 PROCEDURE — 83520 IMMUNOASSAY QUANT NOS NONAB: CPT | Performed by: PATHOLOGY

## 2023-01-19 PROCEDURE — 86039 ANTINUCLEAR ANTIBODIES (ANA): CPT | Performed by: PATHOLOGY

## 2023-01-19 ASSESSMENT — PAIN SCALES - GENERAL: PAINLEVEL: NO PAIN (0)

## 2023-01-19 NOTE — LETTER
1/19/2023       RE: Marie Vogel  [address]  [place] [address]     Dear Colleague,    Thank you for referring your patient, Marie Vogel, to the [facility]. Please see a copy of my visit note below.      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     The patient was first diagnosed with diabetes since 2001 during pregnancy. Since then, she DM was persistent and she was subsequently diagnosed with type 2 diabetes.  Previously, she was on insulin pump, Trulicity and metformin.  However, recently metformin was stopped due to concerning for mitochondrial toxicity.  In 2022, she noticed that her son who was at age 12 has some symptoms concerning for diabetes such as polyuria and polydipsia. Blood test revealed that he had DM with HbA1C of 10.5 and FBG of 350. Subsequent test suggest that he has type 2 DM. And that is when they started to work up and found that her son has MT-TL1 gene mutation. Subsequently, her and her oldest daughter are also found to have positive for this mutation as well. She has a stong family Hx of HOC running in her family. Her mother has HOCM Dx at the age of 47 and underwent heart Tx. She later passed away at the age of 60. She was diagnosed with DM post transplant. Her brother also has DM, CHF post myectomy and hearing loss but has not get tested yet. Both of her  children were negative for HOCM so far    In regards to her presentation, after the diagnosis of DM in . She was diagnosed with HL in ~. [place] 8569-1102, she was diagnosed with HOCM. Sheis taking atenolol and losartan. If she missed atenolol, she may develop tachycardia. However, her [facility] class appears about 1. She has no leg swelling, orthopnea or PND at this time. She has a cousin who is an uncle to her just pased away due to heart disease. She has no DR but she has retinal atrophy. She has no neuropathy. She never has DKA before. She has no leg swelling. She has SOB sometimes. It gets worse when she does not take atenolol at night.     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 She does not smoke or drink. She does not take NSAIDs. She does not have high BP problem, She has no problem with urination She has no Hx of preeclampsia. She has both  deliveries 34 older and 32 is the younger.  She has 2 children, 20 Yo girl and 13 Yo boy.     She was on lisinopril then was switched to losartan. She does not have UTI frequently.     Upon chart review, the patient has creatinine of 0.6 in 2012.  In , creatinine started to increase to 0.7,  0.8 in 2017 and 0.9 in 2018. Since , Cr fluctuate between 1-1.2. UA showed trace intermittent protein since . Proteinuria has been progressively worse and noted >= 300 mg/dL since . UPCR is 1.51 g/g on 23. She also has intermittent blood in the urine since . Serum albumin is normal.     Labs on 23 showed creatinine 1.0, GFR 71, potassium 4.6, CO2 27, phosphorus 3.0, albumin 4.2.  Hemoglobin 13.5. UA showed large blood, RBC 3 cells per high-power field, WBC is still prior to review.  Squamous epithelial cell 19. UPCR 1.51 g/g. UPCR today is 1.27.     PAST MEDICAL HISTORY:  Reviewed with patient on 2023     Past Medical History:   Diagnosis Date     Allergic rhinitis due to pollen      Atypical glandular cells on Pap smear 3/1108      Cervical high risk HPV (human papillomavirus) test positive 2018    See problem list     Gastroesophageal reflux disease      PFO (patent foramen ovale)      Stargardt's disease 2019     Type II or unspecified type diabetes mellitus without mention of complication, not stated as uncontrolled     onset was gestational in        Past Surgical History:   Procedure Laterality Date     ABDOMEN SURGERY       C IUD,MIRENA  8/10/10, 7/28/15     C/SECTION, LOW TRANSVERSE  6/25/10    , Low Transverse     CHOLECYSTECTOMY, LAPOROSCOPIC      Cholecystectomy, Laparoscopic     ESOPHAGOSCOPY, GASTROSCOPY, DUODENOSCOPY (EGD), COMBINED N/A 2016    Procedure: COMBINED ESOPHAGOSCOPY, GASTROSCOPY, DUODENOSCOPY (EGD), BIOPSY SINGLE OR MULTIPLE;  Surgeon: Fadi Hunter MD;  Location: [facility]     HC ESOPHAGEAL MOTILITY STUDY N/A 2016    Procedure: ESOPHAGEAL MOTILITY STUDY;  Surgeon: Fadi Hunter MD;  Location:  GI     HC REMOVE TONSILS/ADENOIDS,<13 Y/O      T &A     ZZC INDUCED ABORTN BY D&C              MEDICATIONS:  PTA Meds  Current Outpatient Medications   Medication     albuterol (PROAIR HFA/PROVENTIL HFA/VENTOLIN HFA) 108 (90 Base) MCG/ACT inhaler     ASPIRIN NOT PRESCRIBED (INTENTIONAL)     atenolol (TENORMIN) 25 MG tablet     blood glucose (PEPE CONTOUR NEXT) test strip     blood glucose monitoring (NO BRAND SPECIFIED) meter device kit     cetirizine (ZYRTEC) 10 MG tablet     clotrimazole (LOTRIMIN) 1 % external cream     Continuous Blood Gluc Sensor (DEXCOM G6 SENSOR) MISC     Continuous Blood Gluc Transmit (DEXCOM G6 TRANSMITTER) MISC     Dulaglutide (TRULICITY) 4.5 MG/0.5ML SOPN     EPINEPHrine (ANY BX GENERIC EQUIV) 0.3 MG/0.3ML injection 2-pack     fluticasone (FLONASE) 50 MCG/ACT spray     Glucagon, rDNA, (GLUCAGON EMERGENCY) 1 MG KIT     ibuprofen (ADVIL/MOTRIN) 200 MG tablet     Insulin Disposable Pump (OMNIPOD 5 G6 POD, GEN 5,) MISC     Insulin  "Disposable Pump (OMNIPOD DASH SYSTEM) KIT     insulin glargine (LANTUS PEN) 100 UNIT/ML pen     insulin lispro (HUMALOG) 100 UNIT/ML vial     INSULIN PUMP - OUTPATIENT     insulin syringe-needle U-100 (29G X 1/2\" 1 ML) 29G X 1/2\" 1 ML miscellaneous     Lancets (MICROLET) MISC     levonorgestrel (MIRENA) 20 MCG/24HR IUD     losartan (COZAAR) 50 MG tablet     MAGNESIUM OXIDE PO     montelukast (SINGULAIR) 10 MG tablet     Multiple Vitamins-Minerals (MULTI VITAMIN/MINERALS PO)     Omega-3 Fatty Acids (FISH OIL) 500 MG CAPS     omeprazole (PRILOSEC) 40 MG DR capsule     ondansetron (ZOFRAN ODT) 4 MG ODT tab     rosuvastatin (CRESTOR) 5 MG tablet     sertraline (ZOLOFT) 25 MG tablet     SUMAtriptan (IMITREX) 25 MG tablet     traZODone (DESYREL) 50 MG tablet     Ubiquinol 200 MG CAPS     Urine Glucose-Ketones Test STRP     Vitamins-Lipotropics (B-100) TABS     No current facility-administered medications for this visit.         ALLERGIES:    Allergies   Allergen Reactions     Ivp Dye [Contrast Dye] Itching     Pt reports that throat itches     Nuts Anaphylaxis     [place] nuts only     Bactrim Swelling     Pt reaction was swelling in mouth and tongue and itchy mouth.  Resolved with benadryl and prednisone     Pcn [Penicillins] Hives     Cephalosporins Itching     Seasonal Allergies Other (See Comments)     Molds, trees, grass, dust..  Upper congestion     Atorvastatin      myalgias     Shellfish Allergy Rash     Clams only       REVIEW OF SYSTEMS:  A comprehensive of systems was negative except as noted above.    SOCIAL HISTORY:   Social History     Socioeconomic History     Marital status:      Spouse name: Not on file     Number of children: 2     Years of education: 14     Highest education level: Associate degree: academic program   Occupational History     Occupation: nurse     Occupation: RN   Tobacco Use     Smoking status: Former     Packs/day: 0.00     Years: 0.00     Pack years: 0.00     Types: Cigarettes "     Quit date: 10/24/2005     Years since quittin.2     Smokeless tobacco: Never     Tobacco comments:     States only smokes when drinks maybe 2x/year   Substance and Sexual Activity     Alcohol use: No     Alcohol/week: 0.0 standard drinks     Drug use: No     Sexual activity: Yes     Partners: Male     Birth control/protection: I.U.D., Condom     Comment: Mirena IUD 7/28/15   Other Topics Concern     Parent/sibling w/ CABG, MI or angioplasty before 65F 55M? No   Social History Narrative    Caffeine intake/servings daily - 6-7    Calcium intake/servings daily - 2-3 yogurt, milk, cheese    Exercise 1 times weekly - describe aerobics    Sunscreen used - Yes    Seatbelts used - Yes    Guns stored in the home - No    Self Breast Exam - Yes    Pap test up to date -  Yes    Eye exam up to date -  Yes    Dental exam up to date -  Yes    DEXA scan up to date -  Not Applicable    Flex Sig/Colonoscopy up to date -  Not Applicable    Mammography up to date -  Not Applicable    Immunizations reviewed and up to date - Yes    Abuse: Current or Past (Physical, Sexual or Emotional) - No    Do you feel safe in your environment - Yes    Do you cope well with stress - Yes    Do you suffer from insomnia - Yes     Last updated by: Tatianna Lacey  2005     Social Determinants of Health     Financial Resource Strain: Not on file   Food Insecurity: Not on file   Transportation Needs: Not on file   Physical Activity: Not on file   Stress: Not on file   Social Connections: Not on file   Intimate Partner Violence: Not on file   Housing Stability: Not on file     Reviewed with patient.    FAMILY MEDICAL HISTORY:   Family History   Problem Relation Age of Onset     Cardiovascular Mother         hypertrophic cardiomyopathy     Genitourinary Problems Mother         gallbladder disease     Diabetes Mother         Transplnant induced/      Other - See Comments Mother         heart transplant 2005     Depression Mother       Diabetes Father         type 2     Hypertension Father      Hyperlipidemia Father      Genitourinary Problems Maternal Grandmother         gallbladder disease     Genitourinary Problems Paternal Grandmother         gallbladder disease     Hypertension Paternal Grandfather         high bp     Cerebrovascular Disease Paternal Grandfather              Cardiovascular Brother         hypertrophic cardiomyopathy     Diabetes Brother      Diabetes Brother         type 2     Diabetes Other         Type 2     Glaucoma No family hx of      Macular Degeneration No family hx of      Reviewed with patient.    PHYSICAL EXAM:   /86   Pulse 81   Temp 98  F (36.7  C) (Oral)   Wt 74.3 kg (163 lb 11.2 oz)   SpO2 100%   BMI 27.78 kg/m     GENERAL APPEARANCE: no distress, awake  EYES: No scleral icterus, pupils equal  Endo: no goiter, no moon facies  Lymphatics: no cervical or supraclavicular LAD  Pulmonary: lungs clear to auscultation with equal breath sounds bilaterally, no clubbing  CV: regular rhythm, normal rate, no rub   - Edema: none  GI: soft, nontender, normal bowel sounds  MS: no evidence of inflammation in joints, no muscle tenderness  : NO CVA tenderness.   SKIN: no rash, warm, dry, no cyanosis  NEURO: face symmetric, no asterixis     LABS:   Last Renal Panel:  Sodium   Date Value Ref Range Status   2023 138 136 - 145 mmol/L Final   2021 139 133 - 144 mmol/L Final     Potassium   Date Value Ref Range Status   2023 4.4 3.4 - 5.3 mmol/L Final   2022 4.8 3.4 - 5.3 mmol/L Final   2021 4.5 3.4 - 5.3 mmol/L Final     Chloride   Date Value Ref Range Status   2023 104 98 - 107 mmol/L Final   2022 104 94 - 109 mmol/L Final   2021 107 94 - 109 mmol/L Final     Carbon Dioxide   Date Value Ref Range Status   2021 27 20 - 32 mmol/L Final     Carbon Dioxide (CO2)   Date Value Ref Range Status   2023 25 22 - 29 mmol/L Final   2022 29 20 - 32 mmol/L Final      Anion Gap   Date Value Ref Range Status   01/19/2023 9 7 - 15 mmol/L Final   09/02/2022 7 3 - 14 mmol/L Final   07/05/2021 5 3 - 14 mmol/L Final     Glucose   Date Value Ref Range Status   01/19/2023 171 (H) 70 - 99 mg/dL Final   09/02/2022 95 70 - 99 mg/dL Final   07/05/2021 178 (H) 70 - 99 mg/dL Final     Comment:     Fasting specimen     Urea Nitrogen   Date Value Ref Range Status   01/19/2023 16.8 6.0 - 20.0 mg/dL Final   09/02/2022 18 7 - 30 mg/dL Final   07/05/2021 22 7 - 30 mg/dL Final     Creatinine   Date Value Ref Range Status   01/19/2023 1.08 (H) 0.51 - 0.95 mg/dL Final   07/05/2021 1.20 (H) 0.52 - 1.04 mg/dL Final     GFR Estimate   Date Value Ref Range Status   01/19/2023 65 >60 mL/min/1.73m2 Final     Comment:     Effective December 21, 2021 eGFRcr in adults is calculated using the 2021 CKD-EPI creatinine equation which includes age and gender (Luciano et al., NEJM, DOI: 10.1056/BPJPeu3058164)   07/05/2021 55 (L) >60 mL/min/[1.73_m2] Final     Comment:     Non  GFR Calc  Starting 12/18/2018, serum creatinine based estimated GFR (eGFR) will be   calculated using the Chronic Kidney Disease Epidemiology Collaboration   (CKD-EPI) equation.       Calcium   Date Value Ref Range Status   01/19/2023 9.2 8.6 - 10.0 mg/dL Final   07/05/2021 8.9 8.5 - 10.1 mg/dL Final     Phosphorus   Date Value Ref Range Status   01/19/2023 2.6 2.5 - 4.5 mg/dL Final   11/12/2008 3.2 2.5 - 4.5 mg/dL Final     Albumin   Date Value Ref Range Status   01/19/2023 4.1 3.5 - 5.2 g/dL Final   07/05/2021 3.2 (L) 3.4 - 5.0 g/dL Final       URINE STUDIES  Recent Labs   Lab Test 01/19/23  1014 01/02/23  1246 09/02/22  1059 12/29/19  1706 01/02/19  1534 07/22/18  2035 07/22/18  1515 03/06/18  1433 03/02/18  1530   COLOR Light Yellow Yellow Yellow  --   --  Red Red Yellow Red   APPEARANCE Clear Slightly Cloudy* Clear  --   --  Cloudy Cloudy Clear Cloudy   URINEGLC Negative Negative Negative  --   --  Interfering substances,  unable to perform test* 500* Negative Negative   URINEBILI Negative Negative Small*  --   --  Interfering substances, unable to perform test* Negative Negative Negative   URINEKETONE Negative Trace* Trace*  --   --  Interfering substances, unable to perform test* Negative Trace* Negative   SG 1.006 1.025 1.025  --   --  Interfering substances, unable to perform test 1.015 1.025 1.020   UBLD Negative Large* Negative  --   --  Interfering substances, unable to perform test* Moderate* Negative LARGE*   URINEPH 6.0 6.0 5.5  --   --  Interfering substances, unable to perform test 6.5 5.0 7.0   PROTEIN 30* 300* >=300*  --   --  Interfering substances, unable to perform test* 100* >=300* >=300*   UROBILINOGEN  --   --  1.0  --   --   --  0.2 0.2 0.2   NITRITE Negative Negative Negative  --   --  Interfering substances, unable to perform test* Positive* Negative Negative   LEUKEST Trace* Moderate* Negative  --   --  Interfering substances, unable to perform test* Negative Negative LARGE*   RBCU 0 3* 0-2 5-10*   < > >182* >100* O - 2 >100*   WBCU 2 8* 5-10* 25-50*   < > 3,840* * 0 - 5 >100*    < > = values in this interval not displayed.     No lab results found.  PTH  Recent Labs   Lab Test 01/19/23  1037 01/02/23  1239 12/07/22  1301   PTHI 75* 79* 54     IRON STUDIES  No lab results found.    IMAGING:  No recent kidney imaging but will order one.     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  The patient also has progressive proteinuria with the most recent urine protein creatinine ratio of 1.27 g/g.  The patient does not have evidence of nephrotic  syndrome as she has no edema and serum albumin is normal at 4.2.  Etiology of her chronic kidney disease remains unclear but it could be related to diabetic nephropathy or genetic FSGS and it has been reported in this study DOI: 10.14/cmb21132. In the case series, the patient may present with proteinuric kidney disease and may progress to end-stage kidney disease. Patient can have short stature, severe headache, hearing loss, diabetes mellitus and hypertrophic cardiomyopathy in which she also have HL, DM and HOCM. From the literature, kidney Bx may reveal increased number of abnormal mitochondria in tubular cells and podocytes. Moreover, she could have other causes of proteinuric kidney disease as well.  Therefore, next step is that we will have to check serology, Kidney US and pursue a kidney Bx. I discussed risk and benefit of kidney Bx with the patient which include 1/100 of Blood transfusion, 1/1000 requiring intervention to stop bleeding, and <1/49970 for losing kidney and death. The patient decided to proceed with a kidney Bx. Given the fact that the patient can not be on certain medication, we will confirm with Dr. Saavedra about that.    Regarding treatment, I discussed with her about the benefit of diabetes, hypertension control and weight loss. She is on losartan 50 mg per day with normal BP. Next step, we may increase losartan to help with proteuria as well as blood pressure is tolerable.. I also discussed with her about SGLT2 which has benefit in  proteinuric kidney disease but check with Dr. Saavedra as well.  - Serological testing  - Labs today and repeat UA  - Kidney biopsy-> IR  - We may use  SGLT2 inhibitor-> will confirm with Dr. Saavdera first  - Stay well hydrated and avoid nephrotoxic agents  - Continue losartan at 50 mg daily; goal BP < 130/80 mmHg    # MBD  # Secondary hyper PTH  PTH is 75 and Vit D is 22. Sixto and Phos are normal.Continue to monitor.     Follow-up after a kdiney Bx    I spent 100  minutes on the date of the encounter doing chart review, history and exam, documentation and further activities as noted above. 45 minutes of this visit is dedicated to direct patient interaction via face to face.    Patti Menendez MD on 01/19/2023

## 2023-01-19 NOTE — NURSING NOTE
Chief Complaint   Patient presents with     Consult       /86   Pulse 81   Temp 98  F (36.7  C) (Oral)   Wt 74.3 kg (163 lb 11.2 oz)   SpO2 100%   BMI 27.78 kg/m      Tien Becker on 1/19/2023 at 9:02 AM

## 2023-01-19 NOTE — TELEPHONE ENCOUNTER
[facility] Call Center    Phone Message    May a detailed message be left on voicemail: yes     Reason for Call: Patient called at 8:18 to say she would be late due to being stuck in traffic. [facility] [address] is backed [address]. Writer tried backline as well. Please contact patient if there's any questions or if she has to reschedule. Thank you      Action Taken: Message routed to:  [facility] ([facility] Nephrology    Travel Screening: Not Applicable

## 2023-01-19 NOTE — PATIENT INSTRUCTIONS
Will get labs today  Scheduled for kidney biopsy  Will also do a kidney ultrasound  4.  See me after kidney biopsy

## 2023-01-20 DIAGNOSIS — R80.9 MICROALBUMINURIA: Primary | ICD-10-CM

## 2023-01-20 DIAGNOSIS — N18.2 CKD (CHRONIC KIDNEY DISEASE) STAGE 2, GFR 60-89 ML/MIN: ICD-10-CM

## 2023-01-20 LAB
ALBUMIN SERPL ELPH-MCNC: 4 G/DL (ref 3.7–5.1)
ALPHA1 GLOB SERPL ELPH-MCNC: 0.3 G/DL (ref 0.2–0.4)
ALPHA2 GLOB SERPL ELPH-MCNC: 0.8 G/DL (ref 0.5–0.9)
ANA PAT SER IF-IMP: ABNORMAL
ANA SER QL IF: ABNORMAL
ANA TITR SER IF: ABNORMAL {TITER}
B-GLOBULIN SERPL ELPH-MCNC: 0.8 G/DL (ref 0.6–1)
GAMMA GLOB SERPL ELPH-MCNC: 0.8 G/DL (ref 0.7–1.6)
M PROTEIN SERPL ELPH-MCNC: 0 G/DL
MYELOPEROXIDASE AB SER IA-ACNC: <0.3 U/ML
MYELOPEROXIDASE AB SER IA-ACNC: NEGATIVE
PROT PATTERN SERPL ELPH-IMP: NORMAL
PROT PATTERN SERPL IFE-IMP: NORMAL
PROTEINASE3 AB SER IA-ACNC: <1 U/ML
PROTEINASE3 AB SER IA-ACNC: NEGATIVE

## 2023-01-23 DIAGNOSIS — E78.5 HYPERLIPIDEMIA LDL GOAL <100: Primary | ICD-10-CM

## 2023-01-25 ENCOUNTER — MYC MEDICAL ADVICE (OUTPATIENT)
Dept: INTERVENTIONAL RADIOLOGY/VASCULAR | Facility: CLINIC | Age: 43
End: 2023-01-25
Payer: COMMERCIAL

## 2023-01-26 SDOH — ECONOMIC STABILITY: INCOME INSECURITY: IN THE LAST 12 MONTHS, WAS THERE A TIME WHEN YOU WERE NOT ABLE TO PAY THE MORTGAGE OR RENT ON TIME?: YES

## 2023-01-26 SDOH — ECONOMIC STABILITY: FOOD INSECURITY: WITHIN THE PAST 12 MONTHS, THE FOOD YOU BOUGHT JUST DIDN'T LAST AND YOU DIDN'T HAVE MONEY TO GET MORE.: NEVER TRUE

## 2023-01-26 SDOH — HEALTH STABILITY: PHYSICAL HEALTH: ON AVERAGE, HOW MANY MINUTES DO YOU ENGAGE IN EXERCISE AT THIS LEVEL?: 10 MIN

## 2023-01-26 SDOH — HEALTH STABILITY: PHYSICAL HEALTH: ON AVERAGE, HOW MANY DAYS PER WEEK DO YOU ENGAGE IN MODERATE TO STRENUOUS EXERCISE (LIKE A BRISK WALK)?: 4 DAYS

## 2023-01-26 SDOH — ECONOMIC STABILITY: TRANSPORTATION INSECURITY
IN THE PAST 12 MONTHS, HAS LACK OF TRANSPORTATION KEPT YOU FROM MEETINGS, WORK, OR FROM GETTING THINGS NEEDED FOR DAILY LIVING?: NO

## 2023-01-26 SDOH — ECONOMIC STABILITY: FOOD INSECURITY: WITHIN THE PAST 12 MONTHS, YOU WORRIED THAT YOUR FOOD WOULD RUN OUT BEFORE YOU GOT MONEY TO BUY MORE.: SOMETIMES TRUE

## 2023-01-26 SDOH — ECONOMIC STABILITY: INCOME INSECURITY: HOW HARD IS IT FOR YOU TO PAY FOR THE VERY BASICS LIKE FOOD, HOUSING, MEDICAL CARE, AND HEATING?: SOMEWHAT HARD

## 2023-01-26 SDOH — ECONOMIC STABILITY: TRANSPORTATION INSECURITY
IN THE PAST 12 MONTHS, HAS THE LACK OF TRANSPORTATION KEPT YOU FROM MEDICAL APPOINTMENTS OR FROM GETTING MEDICATIONS?: NO

## 2023-01-26 ASSESSMENT — LIFESTYLE VARIABLES
HOW OFTEN DO YOU HAVE SIX OR MORE DRINKS ON ONE OCCASION: NEVER
HOW OFTEN DO YOU HAVE A DRINK CONTAINING ALCOHOL: NEVER
SKIP TO QUESTIONS 9-10: 1
HOW MANY STANDARD DRINKS CONTAINING ALCOHOL DO YOU HAVE ON A TYPICAL DAY: PATIENT DOES NOT DRINK
AUDIT-C TOTAL SCORE: 0

## 2023-01-26 ASSESSMENT — SOCIAL DETERMINANTS OF HEALTH (SDOH)
DO YOU BELONG TO ANY CLUBS OR ORGANIZATIONS SUCH AS CHURCH GROUPS UNIONS, FRATERNAL OR ATHLETIC GROUPS, OR SCHOOL GROUPS?: NO
HOW OFTEN DO YOU ATTEND CHURCH OR RELIGIOUS SERVICES?: NEVER
HOW OFTEN DO YOU GET TOGETHER WITH FRIENDS OR RELATIVES?: NEVER
IN A TYPICAL WEEK, HOW MANY TIMES DO YOU TALK ON THE PHONE WITH FAMILY, FRIENDS, OR NEIGHBORS?: THREE TIMES A WEEK

## 2023-01-27 ENCOUNTER — LAB (OUTPATIENT)
Dept: LAB | Facility: CLINIC | Age: 43
End: 2023-01-27
Payer: COMMERCIAL

## 2023-01-27 DIAGNOSIS — E78.5 HYPERLIPIDEMIA LDL GOAL <100: ICD-10-CM

## 2023-01-27 LAB
ALT SERPL W P-5'-P-CCNC: 17 U/L (ref 10–35)
AST SERPL W P-5'-P-CCNC: 19 U/L (ref 10–35)
CHOLEST SERPL-MCNC: 136 MG/DL
HDLC SERPL-MCNC: 39 MG/DL
LDLC SERPL CALC-MCNC: 75 MG/DL
MAYO MISC RESULT: NORMAL
NONHDLC SERPL-MCNC: 97 MG/DL
SCANNED LAB RESULT: NORMAL
TEST NAME: NORMAL
TRIGL SERPL-MCNC: 110 MG/DL

## 2023-01-27 PROCEDURE — 84460 ALANINE AMINO (ALT) (SGPT): CPT

## 2023-01-27 PROCEDURE — 36415 COLL VENOUS BLD VENIPUNCTURE: CPT

## 2023-01-27 PROCEDURE — 84450 TRANSFERASE (AST) (SGOT): CPT

## 2023-01-27 PROCEDURE — 80061 LIPID PANEL: CPT

## 2023-01-28 DIAGNOSIS — I42.1 HYPERTROPHIC OBSTRUCTIVE CARDIOMYOPATHY (H): Primary | ICD-10-CM

## 2023-01-30 ENCOUNTER — OFFICE VISIT (OUTPATIENT)
Dept: CARDIOLOGY | Facility: CLINIC | Age: 43
End: 2023-01-30
Attending: INTERNAL MEDICINE
Payer: COMMERCIAL

## 2023-01-30 ENCOUNTER — OFFICE VISIT (OUTPATIENT)
Dept: PEDIATRICS | Facility: CLINIC | Age: 43
End: 2023-01-30
Payer: COMMERCIAL

## 2023-01-30 VITALS
HEIGHT: 64 IN | WEIGHT: 168 LBS | BODY MASS INDEX: 28.68 KG/M2 | SYSTOLIC BLOOD PRESSURE: 131 MMHG | HEART RATE: 79 BPM | DIASTOLIC BLOOD PRESSURE: 78 MMHG | OXYGEN SATURATION: 98 %

## 2023-01-30 VITALS
RESPIRATION RATE: 20 BRPM | OXYGEN SATURATION: 98 % | BODY MASS INDEX: 28.71 KG/M2 | WEIGHT: 168.2 LBS | SYSTOLIC BLOOD PRESSURE: 128 MMHG | HEIGHT: 64 IN | TEMPERATURE: 98.2 F | HEART RATE: 72 BPM | DIASTOLIC BLOOD PRESSURE: 80 MMHG

## 2023-01-30 DIAGNOSIS — I10 ESSENTIAL HYPERTENSION: ICD-10-CM

## 2023-01-30 DIAGNOSIS — E11.21 TYPE 2 DIABETES MELLITUS WITH DIABETIC NEPHROPATHY, WITH LONG-TERM CURRENT USE OF INSULIN (H): ICD-10-CM

## 2023-01-30 DIAGNOSIS — Z01.818 PREOP GENERAL PHYSICAL EXAM: Primary | ICD-10-CM

## 2023-01-30 DIAGNOSIS — I42.1 HYPERTROPHIC OBSTRUCTIVE CARDIOMYOPATHY (H): ICD-10-CM

## 2023-01-30 DIAGNOSIS — N18.2 CHRONIC KIDNEY DISEASE (CKD) STAGE G2/A2, MILDLY DECREASED GLOMERULAR FILTRATION RATE (GFR) BETWEEN 60-89 ML/MIN/1.73 SQUARE METER AND ALBUMINURIA CREATININE RATIO BETWEEN 30-299 MG/G: ICD-10-CM

## 2023-01-30 DIAGNOSIS — I42.2 HYPERTROPHIC CARDIOMYOPATHY (H): Primary | ICD-10-CM

## 2023-01-30 DIAGNOSIS — Q99.9 MITOCHONDRIAL DNA MUTATION: ICD-10-CM

## 2023-01-30 DIAGNOSIS — I42.2 HYPERTROPHIC CARDIOMYOPATHY (H): ICD-10-CM

## 2023-01-30 DIAGNOSIS — Z79.4 TYPE 2 DIABETES MELLITUS WITH DIABETIC NEPHROPATHY, WITH LONG-TERM CURRENT USE OF INSULIN (H): ICD-10-CM

## 2023-01-30 PROBLEM — N18.31 CHRONIC KIDNEY DISEASE, STAGE 3A (H): Status: ACTIVE | Noted: 2023-01-30

## 2023-01-30 PROCEDURE — 90715 TDAP VACCINE 7 YRS/> IM: CPT | Performed by: INTERNAL MEDICINE

## 2023-01-30 PROCEDURE — 93005 ELECTROCARDIOGRAM TRACING: CPT

## 2023-01-30 PROCEDURE — 93248 EXT ECG>7D<15D REV&INTERPJ: CPT | Mod: XE | Performed by: INTERNAL MEDICINE

## 2023-01-30 PROCEDURE — 99214 OFFICE O/P EST MOD 30 MIN: CPT | Mod: 25 | Performed by: INTERNAL MEDICINE

## 2023-01-30 PROCEDURE — G0463 HOSPITAL OUTPT CLINIC VISIT: HCPCS | Mod: 25

## 2023-01-30 PROCEDURE — 90471 IMMUNIZATION ADMIN: CPT | Performed by: INTERNAL MEDICINE

## 2023-01-30 PROCEDURE — 93242 EXT ECG>48HR<7D RECORDING: CPT

## 2023-01-30 PROCEDURE — G0463 HOSPITAL OUTPT CLINIC VISIT: HCPCS | Mod: 25 | Performed by: INTERNAL MEDICINE

## 2023-01-30 PROCEDURE — 90472 IMMUNIZATION ADMIN EACH ADD: CPT | Performed by: INTERNAL MEDICINE

## 2023-01-30 PROCEDURE — 90677 PCV20 VACCINE IM: CPT | Performed by: INTERNAL MEDICINE

## 2023-01-30 ASSESSMENT — PAIN SCALES - GENERAL
PAINLEVEL: MILD PAIN (2)
PAINLEVEL: NO PAIN (0)

## 2023-01-30 NOTE — PROGRESS NOTES
[address]  [address]  [facility] [address]  Phone: [phone]  Fax: [phone]  Primary Provider: Alan Paredes  Pre-op Performing Provider: GUILLE MIRANDA MAI      PREOPERATIVE EVALUATION:  Today's date: 1/30/2023    Marie Vogel is a 42 year old female who presents for a preoperative evaluation.    Surgical Information:  Surgery/Procedure: Kidney Biospy  Surgery Location: [facility] OR  Surgeon:   Surgery Date: 02/03/2023  Time of Surgery: 9:30 am  Where patient plans to recover: At home with family  Fax number for surgical facility: Note does not need to be faxed, will be available electronically in Epic.    Type of Anesthesia Anticipated: MAC-Local    Assessment & Plan     The proposed surgical procedure is considered LOW risk.    Preop general physical exam  43 yo with hx of proteinuric CDK stage 2, recently diagnosed mitochondrial mutation, CKD, type 2 IDDM with insulin pump here for preoperative assessment for kidney biopsy.    Mitochondrial DNA mutation  CKD stage 2 with proteinuria  Reason for biopsy    Type 2 diabetes mellitus with diabetic nephropathy, with long-term current use of insulin (H)  Stable, well controlled, see instructions regarding preoperative medication adjustments.    Hypertrophic obstructive cardiomyopathy (H)  Stable, continue medications without modifications.  Has cardiology appt and EKG scheduled for later today.         Implanted Device:   - Type of device: CGM Patient advised to bring device information on day of surgery.      Risks and Recommendations:  The patient has the following additional risks and recommendations for perioperative complications:   - No identified additional risk factors other than previously addressed    Medication Instructions:  Patient is to take all scheduled medications on the day of surgery EXCEPT for modifications listed below:   - GLP-1 Injectable (exenitide, liraglutide,  semaglutide, dulaglutide, etc.): HOLD day of surgery    - Insulin pump: Continue basal rate of insulin up until the time of surgery.   - Continuous Glucose Monitor (CGM): Patient was made aware on the day of surgery, they should be prepared to remove the Continuous Glucose Monitor (CGM) prior to the operation in order to avoid damage to the equipment during the procedure. The CGM will not be the source of glucose monitoring during the operation.    RECOMMENDATION:  APPROVAL GIVEN to proceed with proposed procedure, without further diagnostic evaluation.        Subjective     HPI related to upcoming procedure: kidney biopsy for proteinuria    Preop Questions 1/26/2023   1. Have you ever had a heart attack or stroke? No   2. Have you ever had surgery on your heart or blood vessels, such as a stent placement, a coronary artery bypass, or surgery on an artery in your head, neck, heart, or legs? No   3. Do you have chest pain with activity? No   4. Do you have a history of  heart failure? No   5. Do you currently have a cold, bronchitis or symptoms of other infection? No   6. Do you have a cough, shortness of breath, or wheezing? YES    7. Do you or anyone in your family have previous history of blood clots? No   8. Do you or does anyone in your family have a serious bleeding problem such as prolonged bleeding following surgeries or cuts? No   9. Have you ever had problems with anemia or been told to take iron pills? No   10. Have you had any abnormal blood loss such as black, tarry or bloody stools, or abnormal vaginal bleeding? No   11. Have you ever had a blood transfusion? No   12. Are you willing to have a blood transfusion if it is medically needed before, during, or after your surgery? Yes   13. Have you or any of your relatives ever had problems with anesthesia? No   14. Do you have sleep apnea, excessive snoring or daytime drowsiness? No   15. Do you have any artifical heart valves or other implanted medical  devices like a pacemaker, defibrillator, or continuous glucose monitor? YES - CGM   15a. What type of device do you have? Insulin pump, cgm   15b. Name of the clinic that manages your device:  [facility]   16. Do you have artificial joints? No   17. Are you allergic to latex? No   18. Is there any chance that you may be pregnant? No       Health Care Directive:  Patient does not have a Health Care Directive or Living Will:     Preoperative Review of :   reviewed - no record of controlled substances prescribed.      Status of Chronic Conditions:  See problem list for active medical problems.  Problems all longstanding and stable, except as noted/documented.  See ROS for pertinent symptoms related to these conditions.      Review of Systems  All other systems on a 10-point review are negative, unless otherwise noted in HPI      Patient Active Problem List    Diagnosis Date Noted     Mitochondrial DNA mutation 01/03/2023     Priority: Medium     Moderate episode of recurrent major depressive disorder (H) 07/27/2021     Priority: Medium     Depression 01/14/2021     Priority: Medium     Other insomnia 01/14/2021     Priority: Medium     Mirena IUD inserted 11/29/19: Due for removal 11/29/2024 11/29/2019     Priority: Medium     Lot # [id number]         Cervical high risk HPV (human papillomavirus) test positive 11/27/2018     Priority: Medium     Previous abnormal pap hx.  3/11/08 pap = Atypical glandular cells  7/1/08 COLP = no visible lesions, no Bx taken.  1/13/10 pap = neg  6/16/10 pap = neg  1/5/11 pap = neg  6/21/11 pap = neg  09/11/12 pap = neg. Episode resolved.  7/28/15 NIL pap, Neg HPV.   11/27/18 NIL pap, + HR HPV (not 16 or 18). Plan cotest in one year.   11/29/19 NIL, Neg HPV. Plan: cotest 3 years       Scars of posterior pole of chorioretina, bilateral 04/17/2017     Priority: Medium     Other migraine without status migrainosus, not intractable 02/16/2017     Priority: Medium      Hypertrophic obstructive cardiomyopathy (H) 2016     Priority: Medium     PFO (patent foramen ovale) 2016     Priority: Medium     Diabetic gastroparesis (H) 2016     Priority: Medium     Type 2 diabetes mellitus with diabetic nephropathy 10/20/2015     Priority: Medium     Nut allergy 2015     Priority: Medium     Vitamin D deficiency 2015     Priority: Medium     Problem list name updated by automated process. Provider to review       Microalbuminuria 2014     Priority: Medium     Insulin pump in place 2014     Priority: Medium     Health Care Home 2011     Priority: Medium     X    DX V65.8 REPLACED WITH 16174 [facility] C[facility] HOME (2013)       Family history of other cardiovascular diseases 2010     Priority: Medium     Problem list name updated by automated process. Provider to review       HYPERLIPIDEMIA LDL GOAL <100 02/10/2010     Priority: Medium     Allergic rhinitis 2006     Priority: Medium     Problem list name updated by automated process. Provider to review        Past Medical History:   Diagnosis Date     Allergic rhinitis due to pollen      Atypical glandular cells on Pap smear 3/1108     Cervical high risk HPV (human papillomavirus) test positive 2018    See problem list     Gastroesophageal reflux disease      PFO (patent foramen ovale)      Stargardt's disease 2019     Type II or unspecified type diabetes mellitus without mention of complication, not stated as uncontrolled     onset was gestational in      Past Surgical History:   Procedure Laterality Date     ABDOMEN SURGERY       C IUD,MIRENA  8/10/10, 7/28/15     C/SECTION, LOW TRANSVERSE  6/25/10    , Low Transverse     CHOLECYSTECTOMY, LAPOROSCOPIC      Cholecystectomy, Laparoscopic     ESOPHAGOSCOPY, GASTROSCOPY, DUODENOSCOPY (EGD), COMBINED N/A 2016    Procedure: COMBINED ESOPHAGOSCOPY, GASTROSCOPY, DUODENOSCOPY (EGD), BIOPSY SINGLE OR  MULTIPLE;  Surgeon: Fadi Hunter MD;  [facility]      ESOPHAGEAL MOTILITY STUDY N/A 2016    Procedure: ESOPHAGEAL MOTILITY STUDY;  Surgeon: Fdai Hunter MD;  Location: [facility]     HC REMOVE TONSILS/ADENOIDS,<13 Y/O      T &A     ZZC INDUCED ABORTN BY D&C           Current Outpatient Medications   Medication Sig Dispense Refill     albuterol (PROAIR HFA/PROVENTIL HFA/VENTOLIN HFA) 108 (90 Base) MCG/ACT inhaler Inhale 2 puffs into the lungs every 4 hours as needed for shortness of breath / dyspnea or wheezing 18 g 3     ASPIRIN NOT PRESCRIBED (INTENTIONAL) Please choose reason not prescribed from choices below.       atenolol (TENORMIN) 25 MG tablet Take 0.5 tablets (12.5 mg) by mouth daily 90 tablet 3     blood glucose (PEPE CONTOUR NEXT) test strip Check 4 times/day 300 each 0     blood glucose monitoring (NO BRAND SPECIFIED) meter device kit Use to test blood sugar 6 times daily and as needed. 1 kit 0     cetirizine (ZYRTEC) 10 MG tablet Take 10 mg by mouth 2 times daily  90 tablet 3     clotrimazole (LOTRIMIN) 1 % external cream Apply topically 2 times daily 60 g 3     Continuous Blood Gluc Sensor (DEXCOM G6 SENSOR) MISC Apply one sensor to the skin every 10 days 9 each 3     Continuous Blood Gluc Transmit (DEXCOM G6 TRANSMITTER) MISC 1 each every 3 months Change every 3 months. 1 each 3     Dulaglutide (TRULICITY) 4.5 MG/0.5ML SOPN Inject 4.5 mg Subcutaneous once a week 6 mL 1     EPINEPHrine (ANY BX GENERIC EQUIV) 0.3 MG/0.3ML injection 2-pack Inject 0.3 mLs (0.3 mg) into the muscle once as needed for anaphylaxis 0.3 mL 11     fluticasone (FLONASE) 50 MCG/ACT spray Spray 1-2 sprays into both nostrils daily 1 Bottle 0     Glucagon, rDNA, (GLUCAGON EMERGENCY) 1 MG KIT For Intramuscular use for low Blood glucose episode. 1 kit 0     ibuprofen (ADVIL/MOTRIN) 200 MG tablet Take 200 mg by mouth every 4 hours as needed        Insulin Disposable Pump (OMNIPOD 5 G6 POD, GEN  "5,) MISC 1 each every 3 days 30 each 3     insulin glargine (LANTUS PEN) 100 UNIT/ML pen Take 20 units in case of pump malfunction only. 15 mL 0     insulin lispro (HUMALOG) 100 UNIT/ML vial USE WITH INSULIN PUMP AS DIRECTED, USES ABOUT 80 UNITS PER DAY 80 mL 1     INSULIN PUMP - OUTPATIENT Updated 8/3/21:  OmniPod Dash  BASAL:  00:00: 0.75 units/hr  CARB RATIO:  00:00: 10  Sensitivity:  00:00: 40 mg/dL  BLOOD GLUCOSE TARGET and times:  12   AM (midnight): 120-120  Active Insulin Time: 4 hours  Sensor: Nadia/ Nadia Link Donna  Amish:   [email or web address]  Password [email or web address]       insulin syringe-needle U-100 (29G X 1/2\" 1 ML) 29G X 1/2\" 1 ML miscellaneous Use 1 syringe once daily or as needed 100 each 1     Lancets (MICROLET) MISC 1 Device See Admin Instructions. Use up to 5 times daily for blood sugar checks. 100 each prn     levonorgestrel (MIRENA) 20 MCG/24HR IUD 1 each (20 mcg) by Intrauterine route once       losartan (COZAAR) 50 MG tablet 1 tab each morning and one half tab each evening 135 tablet 3     MAGNESIUM OXIDE PO Take 400 mg by mouth daily       montelukast (SINGULAIR) 10 MG tablet Take 1 tablet (10 mg) by mouth At Bedtime 90 tablet 3     Multiple Vitamins-Minerals (MULTI VITAMIN/MINERALS PO) Take 1 each by mouth daily.       Omega-3 Fatty Acids (FISH OIL) 500 MG CAPS Take 1 capsule by mouth       omeprazole (PRILOSEC) 40 MG DR capsule TAKE ONE CAPSULE BY MOUTH ONCE DAILY AS NEEDED 90 capsule 3     ondansetron (ZOFRAN ODT) 4 MG ODT tab Take 1 tablet (4 mg) by mouth every 8 hours as needed for nausea 8 tablet 0     rosuvastatin (CRESTOR) 5 MG tablet Take 1 tablet (5 mg) by mouth daily 90 tablet 3     sertraline (ZOLOFT) 25 MG tablet Take 1 tablet (25 mg) by mouth daily 90 tablet 3     SUMAtriptan (IMITREX) 25 MG tablet TAKE ONE TO FOUR TABLETS BY MOUTH ONE TIME. MAY REPEAT 1 TABLET EVERY TWO HOURS UP TO MAXIMUM OF 200MG IN 24 HOURS 8 tablet 11     traZODone (DESYREL) 50 MG tablet TAKE ONE-HALF " TO ONE TABLET BY MOUTH NIGHTLY AS NEEDED FOR SLEEP 60 tablet 3     Ubiquinol 200 MG CAPS Take 1 capsule by mouth 2 times daily 70 capsule 11     Urine Glucose-Ketones Test STRP Daily as needed 25 strip 1     Vitamins-Lipotropics (B-100) TABS Take 1 tablet by mouth daily 30 tablet 11       Allergies   Allergen Reactions     Ivp Dye [Contrast Dye] Itching     Pt reports that throat itches     Nuts Anaphylaxis     [place] nuts only     Bactrim Swelling     Pt reaction was swelling in mouth and tongue and itchy mouth.  Resolved with benadryl and prednisone     Pcn [Penicillins] Hives     Cephalosporins Itching     Seasonal Allergies Other (See Comments)     Molds, trees, grass, dust..  Upper congestion     Atorvastatin      myalgias     Shellfish Allergy Rash     Clams only        Social History     Tobacco Use     Smoking status: Former     Packs/day: 0.00     Years: 0.00     Pack years: 0.00     Types: Cigarettes     Quit date: 10/24/2005     Years since quittin.2     Smokeless tobacco: Never     Tobacco comments:     States only smokes when drinks maybe 2x/year   Substance Use Topics     Alcohol use: No     Alcohol/week: 0.0 standard drinks     Family History   Problem Relation Age of Onset     Cardiovascular Mother         hypertrophic cardiomyopathy     Genitourinary Problems Mother         gallbladder disease     Diabetes Mother         Transplnant induced/      Other - See Comments Mother         heart transplant 2005     Depression Mother      Diabetes Father         type 2     Hypertension Father      Hyperlipidemia Father      Genitourinary Problems Maternal Grandmother         gallbladder disease     Genitourinary Problems Paternal Grandmother         gallbladder disease     Hypertension Paternal Grandfather         high bp     Cerebrovascular Disease Paternal Grandfather              Cardiovascular Brother         hypertrophic cardiomyopathy     Diabetes Brother      Diabetes Brother  "        type 2     Diabetes Other         Type 2     Glaucoma No family hx of      Macular Degeneration No family hx of      History   Drug Use No         Objective     /80   Pulse 72   Temp 98.2  F (36.8  C) (Oral)   Resp 20   Ht 1.626 m (5' 4\")   Wt 76.3 kg (168 lb 3.2 oz)   LMP 01/27/2023   SpO2 98%   BMI 28.87 kg/m      Physical Exam    GENERAL APPEARANCE: healthy, alert and no distress     EYES: EOMI, PERRL     HENT: ear canals and TM's normal and nose and mouth without ulcers or lesions     NECK: no adenopathy, no asymmetry, masses, or scars and thyroid normal to palpation     RESP: lungs clear to auscultation - no rales, rhonchi or wheezes     CV: regular rates and rhythm, normal S1 S2, no S3 or S4 and no murmur, click or rub     ABDOMEN:  soft, nontender, no HSM or masses and bowel sounds normal     MS: extremities normal- no gross deformities noted, no evidence of inflammation in joints, FROM in all extremities.     SKIN: no suspicious lesions or rashes     NEURO: Normal strength and tone, sensory exam grossly normal, mentation intact and speech normal     PSYCH: mentation appears normal. and affect normal/bright     LYMPHATICS: No cervical adenopathy    Recent Labs   Lab Test 01/19/23  1037 01/02/23  1239 12/07/22  1343 10/03/22  1631   HGB 12.4 13.5  --   --     266  --   --     137  --   --    POTASSIUM 4.4 4.6  --   --    CR 1.08* 1.00*  --   --    A1C  --   --  6.5* 6.5*        Diagnostics:  No labs were ordered during this visit.   No EKG this visit, completed in the last 90 days. - to be completed by cardiology today    Revised Cardiac Risk Index (RCRI):  The patient has the following serious cardiovascular risks for perioperative complications:   - No serious cardiac risks = 0 points     RCRI Interpretation: 0 points: Class I (very low risk - 0.4% complication rate)           Signed Electronically by: Jluis Sands MD  Copy of this evaluation report is provided to " requesting physician.

## 2023-01-30 NOTE — NURSING NOTE
Patient to have a zio monitor for HCM    Patient to have a cardiac MRI for HCM    Ok for patient to take Crestor every other day for leg cramping    Patient to follow up with Dr Val Collins for multiple genetic abnormalities that she was just diagnosed with.  Patient will not return to see Dr Dewey.    No labs done or ordered    Patient given AVS in Clinic

## 2023-01-30 NOTE — NURSING NOTE
Chief Complaint   Patient presents with     Follow Up     Return for yearly follow up         Vitals were taken, medications reconciled at previous apt and EKG performed.     Freeman Glover, EMT   1:57 PM

## 2023-01-30 NOTE — PATIENT INSTRUCTIONS
"Cardiology Providers you saw during your visit:  Dr Melanie Dewey    Medication changes: No changes today.    Follow up: Make an appointment with Dr Val Collins after your MRI.    Dr Dewey has ordered a Zio Patch Monitor to be applied today    Labs: no labs today.    Recommendations: Ok for patient to take Rosuvastatin every other day to help minimize leg cramping side effect      Follow the American Heart Association Diet and Lifestyle recommendations:  Limit saturated fat, trans fat, sodium, red meat, sweets and sugar-sweetened beverages. If you choose to eat red meat, compare labels and select the leanest cuts available.  Aim for at least 150 minutes of moderate physical activity or 75 minutes of vigorous physical activity - or an equal combination of both - each week.    If you have any questions, contact  Santy Olvera RN. We are encouraging the use of I-frontdesk to communicate with your HealthCare Provider     To contact the [facility], please call, [phone]  To schedule an appointment or to leave a message for your Care Team Press #1  If you are a physician calling for another physician Press #2  For Billing Press #3  For Medical Records Press #4\"    "

## 2023-01-30 NOTE — LETTER
1/30/2023      RE: Marie Vogel  [address]  [place] [address]       Dear Colleague,    Thank you for the opportunity to participate in the care of your patient, Marie Vogel, at the [facility] [place] [facility]. Please see a copy of my visit note below.      History:    Ms. Vogel is a very pleasant 42 year old woman with insulin requiring type 2 diabetes and a family history of hypertrophic cardiomyopathy. She has hypertension and hyperlipidemia. She has asymmetrical septal hypertrophy with a septal thickness of 16 - 17 mm and no significant myocardial fibrosis. She has been under surveillance for HCM. She had treadmill stress test in 2019 that did not demonstrate VT or hypotension and exercise time was 10 mn in .      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  She has seen the genetic counselor and has [facility]. There is an extensive family history of hypertrophic cardiomyopathy (see family history).  Her younger child has the mitochondrial mutation. She has been informed about the possible phenotypes associated with the mutation.       She has no chest pain, syncope, LE edema, orthopnea, or PND. Has stable dyspnea with moderate exertion. She works as an OR nurse at [employer].      ROS:  A complete 10-point ROS was negative except as above.      Current Outpatient Medications   Medication Sig Dispense Refill     albuterol (PROAIR HFA/PROVENTIL HFA/VENTOLIN HFA) 108 (90 Base) MCG/ACT inhaler Inhale 2 puffs into the lungs every 4 hours as needed for shortness of breath / dyspnea or wheezing 18 g 3     ASPIRIN NOT PRESCRIBED (INTENTIONAL) Please choose reason not prescribed from choices below.       atenolol (TENORMIN) 25 MG tablet Take 0.5 tablets (12.5 mg) by mouth daily 90 tablet 3     blood glucose  "(PEPE CONTOUR NEXT) test strip Check 4 times/day 300 each 0     blood glucose monitoring (NO BRAND SPECIFIED) meter device kit Use to test blood sugar 6 times daily and as needed. 1 kit 0     cetirizine (ZYRTEC) 10 MG tablet Take 10 mg by mouth 2 times daily  90 tablet 3     clotrimazole (LOTRIMIN) 1 % external cream Apply topically 2 times daily 60 g 3     Continuous Blood Gluc Sensor (DEXCOM G6 SENSOR) MISC Apply one sensor to the skin every 10 days 9 each 3     Continuous Blood Gluc Transmit (DEXCOM G6 TRANSMITTER) MISC 1 each every 3 months Change every 3 months. 1 each 3     Dulaglutide (TRULICITY) 4.5 MG/0.5ML SOPN Inject 4.5 mg Subcutaneous once a week 6 mL 1     EPINEPHrine (ANY BX GENERIC EQUIV) 0.3 MG/0.3ML injection 2-pack Inject 0.3 mLs (0.3 mg) into the muscle once as needed for anaphylaxis 0.3 mL 11     fluticasone (FLONASE) 50 MCG/ACT spray Spray 1-2 sprays into both nostrils daily 1 Bottle 0     Glucagon, rDNA, (GLUCAGON EMERGENCY) 1 MG KIT For Intramuscular use for low Blood glucose episode. 1 kit 0     ibuprofen (ADVIL/MOTRIN) 200 MG tablet Take 200 mg by mouth every 4 hours as needed        Insulin Disposable Pump (OMNIPOD 5 G6 POD, GEN 5,) MISC 1 each every 3 days 30 each 3     insulin glargine (LANTUS PEN) 100 UNIT/ML pen Take 20 units in case of pump malfunction only. 15 mL 0     insulin lispro (HUMALOG) 100 UNIT/ML vial USE WITH INSULIN PUMP AS DIRECTED, USES ABOUT 80 UNITS PER DAY 80 mL 1     INSULIN PUMP - OUTPATIENT Updated 8/3/21:  OmniPod Dash  BASAL:  00:00: 0.75 units/hr  CARB RATIO:  00:00: 10  Sensitivity:  00:00: 40 mg/dL  BLOOD GLUCOSE TARGET and times:  12   AM (midnight): 120-120  Active Insulin Time: 4 hours  Sensor: Nadai/ Nadia Link Donna  Amish:   [email or web address]  Password [email or web address]!       insulin syringe-needle U-100 (29G X 1/2\" 1 ML) 29G X 1/2\" 1 ML miscellaneous Use 1 syringe once daily or as needed 100 each 1     Lancets (MICROLET) MISC 1 Device See Admin " Instructions. Use up to 5 times daily for blood sugar checks. 100 each prn     levonorgestrel (MIRENA) 20 MCG/24HR IUD 1 each (20 mcg) by Intrauterine route once       losartan (COZAAR) 50 MG tablet 1 tab each morning and one half tab each evening 135 tablet 3     MAGNESIUM OXIDE PO Take 400 mg by mouth daily       montelukast (SINGULAIR) 10 MG tablet Take 1 tablet (10 mg) by mouth At Bedtime 90 tablet 3     Multiple Vitamins-Minerals (MULTI VITAMIN/MINERALS PO) Take 1 each by mouth daily.       Omega-3 Fatty Acids (FISH OIL) 500 MG CAPS Take 1 capsule by mouth       omeprazole (PRILOSEC) 40 MG DR capsule TAKE ONE CAPSULE BY MOUTH ONCE DAILY AS NEEDED 90 capsule 3     ondansetron (ZOFRAN ODT) 4 MG ODT tab Take 1 tablet (4 mg) by mouth every 8 hours as needed for nausea 8 tablet 0     rosuvastatin (CRESTOR) 5 MG tablet Take 1 tablet (5 mg) by mouth daily 90 tablet 3     sertraline (ZOLOFT) 25 MG tablet Take 1 tablet (25 mg) by mouth daily 90 tablet 3     SUMAtriptan (IMITREX) 25 MG tablet TAKE ONE TO FOUR TABLETS BY MOUTH ONE TIME. MAY REPEAT 1 TABLET EVERY TWO HOURS UP TO MAXIMUM OF 200MG IN 24 HOURS 8 tablet 11     traZODone (DESYREL) 50 MG tablet TAKE ONE-HALF TO ONE TABLET BY MOUTH NIGHTLY AS NEEDED FOR SLEEP 60 tablet 3     Ubiquinol 200 MG CAPS Take 1 capsule by mouth 2 times daily 70 capsule 11     Urine Glucose-Ketones Test STRP Daily as needed 25 strip 1     Vitamins-Lipotropics (B-100) TABS Take 1 tablet by mouth daily 30 tablet 11       Allergies - reviewed     Allergies   Allergen Reactions     Ivp Dye [Contrast Dye] Itching     Pt reports that throat itches     Nuts Anaphylaxis     Mariola nuts only     Bactrim Swelling     Pt reaction was swelling in mouth and tongue and itchy mouth.  Resolved with benadryl and prednisone     Pcn [Penicillins] Hives     Cephalosporins Itching     Seasonal Allergies Other (See Comments)     Molds, trees, grass, dust..  Upper congestion     Atorvastatin      myalgias      Shellfish Allergy Rash     Clams only       Past history -reviewed  Active Ambulatory Problems     Diagnosis Date Noted     Allergic rhinitis 2006     HYPERLIPIDEMIA LDL GOAL <100 02/10/2010     Family history of other cardiovascular diseases 2010     Health Care Home 2011     Microalbuminuria 2014     Insulin pump in place 2014     Vitamin D deficiency 2015     Nut allergy 2015     Type 2 diabetes mellitus with diabetic nephropathy 10/20/2015     Diabetic gastroparesis (H) 2016     Hypertrophic obstructive cardiomyopathy (H) 2016     PFO (patent foramen ovale) 2016     Other migraine without status migrainosus, not intractable 2017     Scars of posterior pole of chorioretina, bilateral 2017     Cervical high risk HPV (human papillomavirus) test positive 2018     Mirena IUD inserted 19: Due for removal 2024     Depression 2021     Other insomnia 2021     Moderate episode of recurrent major depressive disorder (H) 2021     Mitochondrial DNA mutation 2023     Resolved Ambulatory Problems     Diagnosis Date Noted     Diabetes mellitus, type 2 (H) 2005     Mixed hyperlipidemia 2005     Adjustment disorder with depressed mood 2008     Atypical glandular cells on Pap smear 2008     Diabetes mellitus during pregnancy, antepartum 2009     High-risk pregnancy supervision 2009     High-risk pregnancy, young multigravida 2010     History of  delivery, currently pregnant 2010     Type 2 diabetes, HbA1c goal < 7% (H) 2010     Sudden hearing loss 2012     Anaphylactic reaction due to food - Avocado 2012     Anaphylactic reaction 2012     Diabetic nephropathy (H) 2013     Migraine without aura 2013     Type 2 diabetes mellitus, uncontrolled, with renal complications 2014     IUD (intrauterine device) in place  2015     Cervical pain 2016     Left-sided thoracic back pain 2016     Back pain 03/15/2018     Past Medical History:   Diagnosis Date     Allergic rhinitis due to pollen      Gastroesophageal reflux disease      Stargardt's disease 2019     Type II or unspecified type diabetes mellitus without mention of complication, not stated as uncontrolled         Social history - reviewed  Social History     Tobacco Use     Smoking status: Former     Packs/day: 0.00     Years: 0.00     Pack years: 0.00     Types: Cigarettes     Quit date: 10/24/2005     Years since quittin.2     Smokeless tobacco: Never     Tobacco comments:     States only smokes when drinks maybe 2x/year   Substance Use Topics     Alcohol use: No     Alcohol/week: 0.0 standard drinks     Drug use: No       Family history -reviewed  Family History   Problem Relation Age of Onset     Cardiovascular Mother         hypertrophic cardiomyopathy     Genitourinary Problems Mother         gallbladder disease     Diabetes Mother         Transplnant induced/      Other - See Comments Mother         heart transplant 2005     Depression Mother      Diabetes Father         type 2     Hypertension Father      Hyperlipidemia Father      Genitourinary Problems Maternal Grandmother         gallbladder disease     Genitourinary Problems Paternal Grandmother         gallbladder disease     Hypertension Paternal Grandfather         high bp     Cerebrovascular Disease Paternal Grandfather              Cardiovascular Brother         hypertrophic cardiomyopathy     Diabetes Brother      Diabetes Brother         type 2     Diabetes Other         Type 2     Glaucoma No family hx of      Macular Degeneration No family hx of      Her mother was diagnosed with HCM at age 37 and received heart transplantation at age 49,  at 60 y. Brother has HCM and ICD  Her mother's brother has an LVAD and his son (Marie's first cousin)  "recently  suddenly and the autopsy revealed cardiomyopathy. Maternal aunt and maternal uncles with HCM. Father - DMII , CKD III     ROS: non contributory on the 10-point review of system    Exam:   /78 (BP Location: Left arm, Patient Position: Sitting, Cuff Size: Adult Regular)   Pulse 79   Ht 1.626 m (5' 4\")   Wt 76.2 kg (168 lb)   LMP 2023   SpO2 98%   BMI 28.84 kg/m      In general, the patient is in no apparent distress.        HEENT: Sclerae white, not injected.    Neck: No JVD. No thyromegaly  Heart: regular with normal S1, S2. No murmur. No audible rub or gallop.    Lungs: Clear bilaterally.  No rhonchi, wheezes, rales.   Extremities: No edema.  The pulses are 2+at the radial bilaterally.  Psych: pleasant and conversant    Data:     Chemistry panel: Recent Labs   Lab Test 23  1037 23  1239    137   POTASSIUM 4.4 4.6   CHLORIDE 104 102   CO2 25 27   ANIONGAP 9 8   * 167*   BUN 16.8 16.0   CR 1.08* 1.00*   BILLY 9.2 9.3   MAG  --   --    GFRESTIMATED 65 71   AST  --   --    ALT  --   --     < > = values in this interval not displayed.       CBC:   Recent Labs   Lab Test 23  1037 23  1239   WBC 7.4 5.8   RBC 3.97 4.33   HGB 12.4 13.5   HCT 37.4 40.7   MCV 94 94   MCH 31.2 31.2   MCHC 33.2 33.2   RDW 11.6 11.6    266       Lipid Panel:  Recent Labs   Lab Test 23  1044 02/10/22  1103 16  0811 07/01/15  0612   CHOL 136 205*   < > 190   HDL 39* 48*   < > 42*   LDL 75 132*   < > 96   TRIG 110 123   < > 260*   CHOLHDLRATIO  --   --   --  4.5    < > = values in this interval not displayed.       Thyroid:   TSH   Date Value Ref Range Status   2022 1.53 0.40 - 4.00 mU/L Final   2019 1.45 0.40 - 4.00 mU/L Final     T4 Free   Date Value Ref Range Status   2014 0.75 0.70 - 1.85 ng/dL Final     Hemoglobin A1C   Date Value Ref Range Status   2022 6.5 (H) 0.0 - 5.6 % Final     Comment:     Normal <5.7%   Prediabetes 5.7-6.4%  "   Diabetes 6.5% or higher     Note: Adopted from ADA consensus guidelines.   07/05/2021 6.5 (H) 0 - 5.6 % Final     Comment:     Normal <5.7% Prediabetes 5.7-6.4%  Diabetes 6.5% or higher - adopted from ADA   consensus guidelines.       INR   Date Value Ref Range Status   02/03/2023 0.95 0.85 - 1.15 Final   11/15/2005 1.07 0.86 - 1.14 Final       Patient's all available and relevant cardiac investigations were personally reviewed and discussed with the patient today.    ECG today - sinus rhythm          Echo: 1.21.22  Left ventricular systolic function is normal.The visual ejection fraction is  60-65%.Left ventricular hypertrophy: asymmetric with no LVOT obstruction. Interventricular septum 1.6 cm.  The right ventricular systolic function is normal.  There is mild mitral regurgitation.  The inferior vena cava was normal in size with preserved respiratory variability.     Compared to echo dated 09/17/2018 no significant changes.    Echo: 11.7.19   No significant LVOT gradient at rest or with exercise.   Exercise stress echocardiogram negative for ischemia at a diagnostic level of   peak stress (86% MPHR).   Patient developed no chest pain during exercise.   Test terminated due to fatigue.   Exercise capacity average for age (10.9 METS).   Blunted BP response to exercise.   Baseline echocardiogram with hyperkinetic LVEF of 65-70%. No regional wall motion abnormalities.   At peak stress, all walls recruit normally. LV augments appropriately with near obliteration of cavity: LVEF >75%.   Peak LVOT gradient at baseline, Valsalva and with peak exercise was 11 mmHg, 20 mmHg and 23 mmHg, respectively.   EKG at baseline and with stress demonstrated no ischemic changes or arrhythmias.     Screening 2D echocardiogram demonstrated no significant valve disease. Normal sized aortic root.     ECG: Oct 2019  Sinus rhythm    CMR: Nov 2019  Hypertrophic cardiomyopathy with asymmetric septal hypertrophy phenotype. There is normal  biventricular function, LVEF 58% and RVEF 66%. The maximal wall thickness of 1.7 cm, there is mitral valve ZIA with mild LVOT acceleration at rest, and there is no fibrosis on LGE imaging.     Compared to the prior examination dated 08/08/2016, there has not been any change.        Assessment and Plan:    42 year old female with    1. HCM, with asymmetrical septal hypertrophy of 16-17 mm, no significant LVOT obstruction  2. Family history of HCM  3. Insulin requiring DM, controlled  4. Obesity  5. Hiatal hernia, gastroparesis  6. Hypertension, treated  7. Hyperlipidemia, treated with statin     Marie has a mutation in MT-TL1 gene.  She has been informed of the different possible phenotypes associated with the specific mitochondrial variant.  In fact the left ventricular hypertrophic cardiomyopathy could very well be a manifestation. There is a family history of sudden death in her first cousin. She has undergone additional genetic testing and has had a consultation with the genetic counselor.     Marie denies any presyncope, syncope or chest pain and has stable shortness of breath. ECG today shows sinus rhythm. CMR in 2019 noted asymmetrical septal hypertrophy with thickening of the basal to mid septal segments and normal biventricular function. There was no myocardial fibrosis. Echo in 2022 did not show any significant LVOT obstruction.        She is euvolemic on exam. Her blood pressure is normal. She is on rosuvastatin for primary prevention given that she is a diabetic. If this is an absolute contraindication then we will have to consider Zetia as a replacement. I have not recommended any changes to the cardiac medical regimen at this point.     Given that she has the mitochondrial mutation and the cardiomyopathy, I will go ahead and repeat the Zio patch for any occult ventricular arrhythmias and a cardiac MRI.  I will refer her to Dr. Collins for further follow up.           Please do not hesitate to contact me  if you have any questions/concerns.     Sincerely,     Melanie Dewey MD

## 2023-01-30 NOTE — TELEPHONE ENCOUNTER
Pre-procedure Mychart message reviewed with Marie for her 2/3 renal biopsy at the [facility].    She wanted the IR team aware of her letter from Dr. Saavedra at her metabolic clinic written on 12/7. It details issues from her mutation in the MT-TL1 gene.

## 2023-01-30 NOTE — PROGRESS NOTES
Per Dr. Dewey, patient to have 7-day Zio monitor placed.  Diagnosis: hypertrophic cardiomyopathy  Monitor placed: Yes  Patient Instructed: Yes  Patient verbalized understanding: Yes  Holter # [id number]    Monitor was placed by СЕРГЕЙ Fuentes.  4:06 PM

## 2023-01-30 NOTE — PROGRESS NOTES
History:    Ms. Vogel is a very pleasant 42 year old woman with insulin requiring type 2 diabetes and a family history of hypertrophic cardiomyopathy. She has hypertension and hyperlipidemia. She has asymmetrical septal hypertrophy with a septal thickness of 16 - 17 mm and no significant myocardial fibrosis. She has been under surveillance for HCM. She had treadmill stress test in 2019 that did not demonstrate VT or hypotension and exercise time was 10 mn in .      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  She has seen the genetic counselor and has sent her saliva sample for [facility]. There is an extensive family history of hypertrophic cardiomyopathy (see family history).  Her younger child has the mitochondrial mutation. She has been informed about the possible phenotypes associated with the mutation.       She has no chest pain, syncope, LE edema, orthopnea, or PND. Has stable dyspnea with moderate exertion. She works as an OR nurse at [employer].      ROS:  A complete 10-point ROS was negative except as above.      Current Outpatient Medications   Medication Sig Dispense Refill     albuterol (PROAIR HFA/PROVENTIL HFA/VENTOLIN HFA) 108 (90 Base) MCG/ACT inhaler Inhale 2 puffs into the lungs every 4 hours as needed for shortness of breath / dyspnea or wheezing 18 g 3     ASPIRIN NOT PRESCRIBED (INTENTIONAL) Please choose reason not prescribed from choices below.       atenolol (TENORMIN) 25 MG tablet Take 0.5 tablets (12.5 mg) by mouth daily 90 tablet 3     blood glucose (PEPE CONTOUR NEXT) test strip Check 4 times/day 300 each 0     blood glucose monitoring (NO BRAND SPECIFIED) meter device kit Use to test blood sugar 6 times daily and as needed. 1 kit 0     cetirizine (ZYRTEC) 10 MG tablet Take 10 mg by mouth 2 times daily  90 tablet 3     clotrimazole (LOTRIMIN) 1 % external cream Apply topically 2 times daily 60 g 3      "Continuous Blood Gluc Sensor (DEXCOM G6 SENSOR) MISC Apply one sensor to the skin every 10 days 9 each 3     Continuous Blood Gluc Transmit (DEXCOM G6 TRANSMITTER) MISC 1 each every 3 months Change every 3 months. 1 each 3     Dulaglutide (TRULICITY) 4.5 MG/0.5ML SOPN Inject 4.5 mg Subcutaneous once a week 6 mL 1     EPINEPHrine (ANY BX GENERIC EQUIV) 0.3 MG/0.3ML injection 2-pack Inject 0.3 mLs (0.3 mg) into the muscle once as needed for anaphylaxis 0.3 mL 11     fluticasone (FLONASE) 50 MCG/ACT spray Spray 1-2 sprays into both nostrils daily 1 Bottle 0     Glucagon, rDNA, (GLUCAGON EMERGENCY) 1 MG KIT For Intramuscular use for low Blood glucose episode. 1 kit 0     ibuprofen (ADVIL/MOTRIN) 200 MG tablet Take 200 mg by mouth every 4 hours as needed        Insulin Disposable Pump (OMNIPOD 5 G6 POD, GEN 5,) MISC 1 each every 3 days 30 each 3     insulin glargine (LANTUS PEN) 100 UNIT/ML pen Take 20 units in case of pump malfunction only. 15 mL 0     insulin lispro (HUMALOG) 100 UNIT/ML vial USE WITH INSULIN PUMP AS DIRECTED, USES ABOUT 80 UNITS PER DAY 80 mL 1     INSULIN PUMP - OUTPATIENT Updated 8/3/21:  OmniPod Dash  BASAL:  00:00: 0.75 units/hr  CARB RATIO:  00:00: 10  Sensitivity:  00:00: 40 mg/dL  BLOOD GLUCOSE TARGET and times:  12   AM (midnight): 120-120  Active Insulin Time: 4 hours  Sensor: Nadia/ Nadia Link Donna  Sherryokmadie:   Username [email or web address]  Password [email or web address]       insulin syringe-needle U-100 (29G X 1/2\" 1 ML) 29G X 1/2\" 1 ML miscellaneous Use 1 syringe once daily or as needed 100 each 1     Lancets (MICROLET) MISC 1 Device See Admin Instructions. Use up to 5 times daily for blood sugar checks. 100 each prn     levonorgestrel (MIRENA) 20 MCG/24HR IUD 1 each (20 mcg) by Intrauterine route once       losartan (COZAAR) 50 MG tablet 1 tab each morning and one half tab each evening 135 tablet 3     MAGNESIUM OXIDE PO Take 400 mg by mouth daily       montelukast (SINGULAIR) 10 MG tablet Take 1 " tablet (10 mg) by mouth At Bedtime 90 tablet 3     Multiple Vitamins-Minerals (MULTI VITAMIN/MINERALS PO) Take 1 each by mouth daily.       Omega-3 Fatty Acids (FISH OIL) 500 MG CAPS Take 1 capsule by mouth       omeprazole (PRILOSEC) 40 MG DR capsule TAKE ONE CAPSULE BY MOUTH ONCE DAILY AS NEEDED 90 capsule 3     ondansetron (ZOFRAN ODT) 4 MG ODT tab Take 1 tablet (4 mg) by mouth every 8 hours as needed for nausea 8 tablet 0     rosuvastatin (CRESTOR) 5 MG tablet Take 1 tablet (5 mg) by mouth daily 90 tablet 3     sertraline (ZOLOFT) 25 MG tablet Take 1 tablet (25 mg) by mouth daily 90 tablet 3     SUMAtriptan (IMITREX) 25 MG tablet TAKE ONE TO FOUR TABLETS BY MOUTH ONE TIME. MAY REPEAT 1 TABLET EVERY TWO HOURS UP TO MAXIMUM OF 200MG IN 24 HOURS 8 tablet 11     traZODone (DESYREL) 50 MG tablet TAKE ONE-HALF TO ONE TABLET BY MOUTH NIGHTLY AS NEEDED FOR SLEEP 60 tablet 3     Ubiquinol 200 MG CAPS Take 1 capsule by mouth 2 times daily 70 capsule 11     Urine Glucose-Ketones Test STRP Daily as needed 25 strip 1     Vitamins-Lipotropics (B-100) TABS Take 1 tablet by mouth daily 30 tablet 11       Allergies - reviewed     Allergies   Allergen Reactions     Ivp Dye [Contrast Dye] Itching     Pt reports that throat itches     Nuts Anaphylaxis     [place] nuts only     Bactrim Swelling     Pt reaction was swelling in mouth and tongue and itchy mouth.  Resolved with benadryl and prednisone     Pcn [Penicillins] Hives     Cephalosporins Itching     Seasonal Allergies Other (See Comments)     Molds, trees, grass, dust..  Upper congestion     Atorvastatin      myalgias     Shellfish Allergy Rash     Clams only       Past history -reviewed  Active Ambulatory Problems     Diagnosis Date Noted     Allergic rhinitis 11/20/2006     HYPERLIPIDEMIA LDL GOAL <100 02/10/2010     Family history of other cardiovascular diseases 02/22/2010     Health Care Home 07/13/2011     Microalbuminuria 04/02/2014     Insulin pump in place 04/02/2014      Vitamin D deficiency 2015     Nut allergy 2015     Type 2 diabetes mellitus with diabetic nephropathy 10/20/2015     Diabetic gastroparesis (H) 2016     Hypertrophic obstructive cardiomyopathy (H) 2016     PFO (patent foramen ovale) 2016     Other migraine without status migrainosus, not intractable 2017     Scars of posterior pole of chorioretina, bilateral 2017     Cervical high risk HPV (human papillomavirus) test positive 2018     Mirena IUD inserted 19: Due for removal 2024     Depression 2021     Other insomnia 2021     Moderate episode of recurrent major depressive disorder (H) 2021     Mitochondrial DNA mutation 2023     Resolved Ambulatory Problems     Diagnosis Date Noted     Diabetes mellitus, type 2 (H) 2005     Mixed hyperlipidemia 2005     Adjustment disorder with depressed mood 2008     Atypical glandular cells on Pap smear 2008     Diabetes mellitus during pregnancy, antepartum 2009     High-risk pregnancy supervision 2009     High-risk pregnancy, young multigravida 2010     History of  delivery, currently pregnant 2010     Type 2 diabetes, HbA1c goal < 7% (H) 2010     Sudden hearing loss 2012     Anaphylactic reaction due to food - Avocado 2012     Anaphylactic reaction 2012     Diabetic nephropathy (H) 2013     Migraine without aura 2013     Type 2 diabetes mellitus, uncontrolled, with renal complications 2014     IUD (intrauterine device) in place 2015     Cervical pain 2016     Left-sided thoracic back pain 2016     Back pain 03/15/2018     Past Medical History:   Diagnosis Date     Allergic rhinitis due to pollen      Gastroesophageal reflux disease      Stargardt's disease 2019     Type II or unspecified type diabetes mellitus without mention of complication, not stated as  "uncontrolled         Social history - reviewed  Social History     Tobacco Use     Smoking status: Former     Packs/day: 0.00     Years: 0.00     Pack years: 0.00     Types: Cigarettes     Quit date: 10/24/2005     Years since quittin.2     Smokeless tobacco: Never     Tobacco comments:     States only smokes when drinks maybe 2x/year   Substance Use Topics     Alcohol use: No     Alcohol/week: 0.0 standard drinks     Drug use: No       Family history -reviewed  Family History   Problem Relation Age of Onset     Cardiovascular Mother         hypertrophic cardiomyopathy     Genitourinary Problems Mother         gallbladder disease     Diabetes Mother         Transplnant induced/      Other - See Comments Mother         heart transplant 2005     Depression Mother      Diabetes Father         type 2     Hypertension Father      Hyperlipidemia Father      Genitourinary Problems Maternal Grandmother         gallbladder disease     Genitourinary Problems Paternal Grandmother         gallbladder disease     Hypertension Paternal Grandfather         high bp     Cerebrovascular Disease Paternal Grandfather              Cardiovascular Brother         hypertrophic cardiomyopathy     Diabetes Brother      Diabetes Brother         type 2     Diabetes Other         Type 2     Glaucoma No family hx of      Macular Degeneration No family hx of      Her mother was diagnosed with HCM at age 37 and received heart transplantation at age 49,  at 60 y. Brother has HCM and ICD  Her mother's brother has an LVAD and his son (Marie's first cousin) recently  suddenly and the autopsy revealed cardiomyopathy. Maternal aunt and maternal uncles with HCM. Father - DMII , CKD III     ROS: non contributory on the 10-point review of system    Exam:   /78 (BP Location: Left arm, Patient Position: Sitting, Cuff Size: Adult Regular)   Pulse 79   Ht 1.626 m (5' 4\")   Wt 76.2 kg (168 lb)   LMP 2023   " SpO2 98%   BMI 28.84 kg/m      In general, the patient is in no apparent distress.        HEENT: Sclerae white, not injected.    Neck: No JVD. No thyromegaly  Heart: regular with normal S1, S2. No murmur. No audible rub or gallop.    Lungs: Clear bilaterally.  No rhonchi, wheezes, rales.   Extremities: No edema.  The pulses are 2+at the radial bilaterally.  Psych: pleasant and conversant    Data:     Chemistry panel: Recent Labs   Lab Test 01/19/23  1037 01/02/23  1239    137   POTASSIUM 4.4 4.6   CHLORIDE 104 102   CO2 25 27   ANIONGAP 9 8   * 167*   BUN 16.8 16.0   CR 1.08* 1.00*   BILLY 9.2 9.3   MAG  --   --    GFRESTIMATED 65 71   AST  --   --    ALT  --   --     < > = values in this interval not displayed.       CBC:   Recent Labs   Lab Test 01/19/23  1037 01/02/23  1239   WBC 7.4 5.8   RBC 3.97 4.33   HGB 12.4 13.5   HCT 37.4 40.7   MCV 94 94   MCH 31.2 31.2   MCHC 33.2 33.2   RDW 11.6 11.6    266       Lipid Panel:  Recent Labs   Lab Test 01/27/23  1044 02/10/22  1103 09/28/16  0811 07/01/15  0612   CHOL 136 205*   < > 190   HDL 39* 48*   < > 42*   LDL 75 132*   < > 96   TRIG 110 123   < > 260*   CHOLHDLRATIO  --   --   --  4.5    < > = values in this interval not displayed.       Thyroid:   TSH   Date Value Ref Range Status   12/07/2022 1.53 0.40 - 4.00 mU/L Final   09/24/2019 1.45 0.40 - 4.00 mU/L Final     T4 Free   Date Value Ref Range Status   04/02/2014 0.75 0.70 - 1.85 ng/dL Final     Hemoglobin A1C   Date Value Ref Range Status   12/07/2022 6.5 (H) 0.0 - 5.6 % Final     Comment:     Normal <5.7%   Prediabetes 5.7-6.4%    Diabetes 6.5% or higher     Note: Adopted from ADA consensus guidelines.   07/05/2021 6.5 (H) 0 - 5.6 % Final     Comment:     Normal <5.7% Prediabetes 5.7-6.4%  Diabetes 6.5% or higher - adopted from ADA   consensus guidelines.       INR   Date Value Ref Range Status   02/03/2023 0.95 0.85 - 1.15 Final   11/15/2005 1.07 0.86 - 1.14 Final       Patient's all  available and relevant cardiac investigations were personally reviewed and discussed with the patient today.    ECG today - sinus rhythm          Echo: 1.21.22  Left ventricular systolic function is normal.The visual ejection fraction is  60-65%.Left ventricular hypertrophy: asymmetric with no LVOT obstruction. Interventricular septum 1.6 cm.  The right ventricular systolic function is normal.  There is mild mitral regurgitation.  The inferior vena cava was normal in size with preserved respiratory variability.     Compared to echo dated 09/17/2018 no significant changes.    Echo: 11.7.19   No significant LVOT gradient at rest or with exercise.   Exercise stress echocardiogram negative for ischemia at a diagnostic level of   peak stress (86% MPHR).   Patient developed no chest pain during exercise.   Test terminated due to fatigue.   Exercise capacity average for age (10.9 METS).   Blunted BP response to exercise.   Baseline echocardiogram with hyperkinetic LVEF of 65-70%. No regional wall motion abnormalities.   At peak stress, all walls recruit normally. LV augments appropriately with near obliteration of cavity: LVEF >75%.   Peak LVOT gradient at baseline, Valsalva and with peak exercise was 11 mmHg, 20 mmHg and 23 mmHg, respectively.   EKG at baseline and with stress demonstrated no ischemic changes or arrhythmias.     Screening 2D echocardiogram demonstrated no significant valve disease. Normal sized aortic root.     ECG: Oct 2019  Sinus rhythm    CMR: Nov 2019  Hypertrophic cardiomyopathy with asymmetric septal hypertrophy phenotype. There is normal biventricular function, LVEF 58% and RVEF 66%. The maximal wall thickness of 1.7 cm, there is mitral valve ZIA with mild LVOT acceleration at rest, and there is no fibrosis on LGE imaging.     Compared to the prior examination dated 08/08/2016, there has not been any change.        Assessment and Plan:    42 year old female with    1. HCM, with asymmetrical septal  hypertrophy of 16-17 mm, no significant LVOT obstruction  2. Family history of HCM  3. Insulin requiring DM, controlled  4. Obesity  5. Hiatal hernia, gastroparesis  6. Hypertension, treated  7. Hyperlipidemia, treated with statin     Marie has a mutation in MT-TL1 gene.  She has been informed of the different possible phenotypes associated with the specific mitochondrial variant.  In fact the left ventricular hypertrophic cardiomyopathy could very well be a manifestation. There is a family history of sudden death in her first cousin. She has undergone additional genetic testing and has had a consultation with the genetic counselor.     Marie denies any presyncope, syncope or chest pain and has stable shortness of breath. ECG today shows sinus rhythm. CMR in 2019 noted asymmetrical septal hypertrophy with thickening of the basal to mid septal segments and normal biventricular function. There was no myocardial fibrosis. Echo in 2022 did not show any significant LVOT obstruction.        She is euvolemic on exam. Her blood pressure is normal. She is on rosuvastatin for primary prevention given that she is a diabetic. If this is an absolute contraindication then we will have to consider Zetia as a replacement. I have not recommended any changes to the cardiac medical regimen at this point.     Given that she has the mitochondrial mutation and the cardiomyopathy, I will go ahead and repeat the Zio patch for any occult ventricular arrhythmias and a cardiac MRI.  I will refer her to Dr. Collins for further follow up.       Melanie Dewey MD, MS  Professor of Medicine  Cardiovascular Medicine

## 2023-01-30 NOTE — PATIENT INSTRUCTIONS
For informational purposes only. Not to replace the advice of your health care provider. Copyright   2003,  [employer] [facility]. All rights reserved. Clinically reviewed by Precious Berry MD. [employer] [id number] - REV .  Preparing for Your Surgery  Getting started  A nurse will call you to review your health history and instructions. They will give you an arrival time based on your scheduled surgery time. Please be ready to share:    Your doctor's clinic name and phone number    Your medical, surgical, and anesthesia history    A list of allergies and sensitivities    A list of medicines, including herbal treatments and over-the-counter drugs    Whether the patient has a legal guardian (ask how to send us the papers in advance)  Please tell us if you're pregnant--or if there's any chance you might be pregnant. Some surgeries may injure a fetus (unborn baby), so they require a pregnancy test. Surgeries that are safe for a fetus don't always need a test, and you can choose whether to have one.   If you have a child who's having surgery, please ask for a copy of Preparing for Your Child's Surgery.    Preparing for surgery    Within 10 to 30 days of surgery: Have a pre-op exam (sometimes called an H&P, or History and Physical). This can be done at a clinic or pre-operative center.  ? If you're having a , you may not need this exam. Talk to your care team.    At your pre-op exam, talk to your care team about all medicines you take. If you need to stop any medicines before surgery, ask when to start taking them again.  ? We do this for your safety. Many medicines can make you bleed too much during surgery. Some change how well surgery (anesthesia) drugs work.    Call your insurance company to let them know you're having surgery. (If you don't have insurance, call [phone].)    Call your clinic if there's any change in your health. This includes signs of a cold or flu (sore throat, runny nose,  cough, rash, fever). It also includes a scrape or scratch near the surgery site.    If you have questions on the day of surgery, call your hospital or surgery center.  Eating and drinking guidelines  For your safety: Unless your surgeon tells you otherwise, follow the guidelines below.    Eat and drink as usual until 8 hours before you arrive for surgery. After that, no food or milk.    Drink clear liquids until 2 hours before you arrive. These are liquids you can see through, like water, Gatorade, and Propel Water. They also include plain black coffee and tea (no cream or milk), candy, and breath mints. You can spit out gum when you arrive.    If you drink alcohol: Stop drinking it the night before surgery.    If your care team tells you to take medicine on the morning of surgery, it's okay to take it with a sip of water.  Preventing infection    Shower or bathe the night before and morning of your surgery. Follow the instructions your clinic gave you. (If no instructions, use regular soap.)    Don't shave or clip hair near your surgery site. We'll remove the hair if needed.    Don't smoke or vape the morning of surgery. You may chew nicotine gum up to 2 hours before surgery. A nicotine patch is okay.  ? Note: Some surgeries require you to completely quit smoking and nicotine. Check with your surgeon.    Your care team will make every effort to keep you safe from infection. We will:  ? Clean our hands often with soap and water (or an alcohol-based hand rub).  ? Clean the skin at your surgery site with a special soap that kills germs.  ? Give you a special gown to keep you warm. (Cold raises the risk of infection.)  ? Wear special hair covers, masks, gowns and gloves during surgery.  ? Give antibiotic medicine, if prescribed. Not all surgeries need antibiotics.  What to bring on the day of surgery    Photo ID and insurance card    Copy of your health care directive, if you have one    Glasses and hearing aids (bring  cases)  ? You can't wear contacts during surgery    Inhaler and eye drops, if you use them (tell us about these when you arrive)    CPAP machine or breathing device, if you use them    A few personal items, if spending the night    If you have . . .  ? A pacemaker, ICD (cardiac defibrillator) or other implant: Bring the ID card.  ? An implanted stimulator: Bring the remote control.  ? A legal guardian: Bring a copy of the certified (court-stamped) guardianship papers.  Please remove any jewelry, including body piercings. Leave jewelry and other valuables at home.  If you're going home the day of surgery    You must have a responsible adult drive you home. They should stay with you overnight as well.    If you don't have someone to stay with you, and you aren't safe to go home alone, we may keep you overnight. Insurance often won't pay for this.  After surgery  If it's hard to control your pain or you need more pain medicine, please call your surgeon's office.  Questions?   If you have any questions for your care team, list them here: _________________________________________________________________________________________________________________________________________________________________________ ____________________________________ ____________________________________ ____________________________________    Instructions About Your Continuous Glucose Monitor  You should be prepared to remove the Continuous Glucose Monitor prior to the operation in order to avoid damage to the equipment during the procedure. Your Continuous Glucose Monitor will not be the source of glucose monitoring during the operation.    How to Manage Your Diabetes Before Surgery  If you use insulin for your diabetes, follow these steps to keep your blood sugar in a safe range before surgery, when you ve been told not to eat or drink:     Check your blood sugar every 4 hours     If your blood sugar is high, take a corrective dose (not a meal  dose) of sliding-scale insulin, if that is what you re used to doing    If your blood sugar is below 100, or you have symptoms of hypoglycemia, follow these steps:   1) Have 1 item from this list:  - 4 oz (1/2 cup) of fruit juice without pulp    - 4 oz (1/2 cup) of regular soda (not diet soda)    - 3 glucose gels    - 5 sugar cubes or sugar packets   2) Check your blood sugar again after 15 minutes  3) Repeat steps 1 and 2 again until your blood sugar is greater than 100

## 2023-01-31 LAB
ATRIAL RATE - MUSE: 73 BPM
DIASTOLIC BLOOD PRESSURE - MUSE: NORMAL MMHG
INTERPRETATION ECG - MUSE: NORMAL
P AXIS - MUSE: 61 DEGREES
PR INTERVAL - MUSE: 168 MS
QRS DURATION - MUSE: 82 MS
QT - MUSE: 404 MS
QTC - MUSE: 445 MS
R AXIS - MUSE: 53 DEGREES
SYSTOLIC BLOOD PRESSURE - MUSE: NORMAL MMHG
T AXIS - MUSE: -32 DEGREES
VENTRICULAR RATE- MUSE: 73 BPM

## 2023-02-01 ENCOUNTER — HOSPITAL ENCOUNTER (OUTPATIENT)
Dept: ULTRASOUND IMAGING | Facility: CLINIC | Age: 43
Discharge: HOME | End: 2023-02-01
Attending: INTERNAL MEDICINE | Admitting: INTERNAL MEDICINE
Payer: COMMERCIAL

## 2023-02-01 DIAGNOSIS — N18.2 CKD (CHRONIC KIDNEY DISEASE) STAGE 2, GFR 60-89 ML/MIN: ICD-10-CM

## 2023-02-01 PROCEDURE — 76770 US EXAM ABDO BACK WALL COMP: CPT

## 2023-02-02 ENCOUNTER — CARE COORDINATION (OUTPATIENT)
Dept: CARDIOLOGY | Facility: CLINIC | Age: 43
End: 2023-02-02
Payer: COMMERCIAL

## 2023-02-02 DIAGNOSIS — I42.1 HYPERTROPHIC OBSTRUCTIVE CARDIOMYOPATHY (H): ICD-10-CM

## 2023-02-02 DIAGNOSIS — E78.5 DYSLIPIDEMIA: ICD-10-CM

## 2023-02-02 DIAGNOSIS — I42.2 HYPERTROPHIC CARDIOMYOPATHY (H): Primary | ICD-10-CM

## 2023-02-02 NOTE — PROGRESS NOTES
Date: 2023    Time of Call: 1:23 PM     Diagnosis:  HCM     [ TORB ] Ordering provider: Jamal Marr MD  Order: establish care after zio and CMRI are complete     Order received by: Jolene Mcbride RN     Follow-up/additional notes: sent to scheduling    Referral from Dr Dewey seen 2023    Ms. Vogel is a very pleasant 42 year old woman with insulin requiring type 2 diabetes and a family history of hypertrophic cardiomyopathy. She has hypertension and hyperlipidemia. She has asymmetrical septal hypertrophy with a septal thickness of 16 - 17 mm and no significant myocardial fibrosis. She has been under surveillance for HCM. She had treadmill stress test in 2019 that did not demonstrate VT or hypotension and exercise time was 10 mn in .      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  There is a family history of cardiomyopathy in her mother was diagnosed with HCM at age 37 and received heart transplantation at age 49.  Her mother's brother has an LVAD and his son (Marie's first cousin) recently  suddenly and the autopsy revealed cardiomyopathy with no other details furnished.  She has no chest pain, syncope, LE edema, orthopnea, or PND. She has stable exertional dyspnea. She till works as an OR nurse at [employer]    Assessment and Plan:     42 year old female with     1. HCM, with asymmetrical septal hypertrophy of 16-17 mm, no significant LVOT obstruction  2. Family history of HCM  3. Insulin requiring DM, controlled  4. Obesity  5. Hiatal hernia, gastroparesis  6. Hypertension, treated  7. 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  She has been informed of the different possible phenotypes associated with the specific mitochondrial variant.  In fact the left ventricular hypertrophic cardiomyopathy could very well be a manifestation.      Marie denies any presyncope, syncope or chest pain and has stable shortness  of breath. I have personally reviewed all relevant cardiac investigations and discussed my interpretation with the patient. ECG shows sinus rhythm. CMR confirms HCM of asymmetrical septal hypertrophy phenotype with thickening of the basal to mid septal segments and normal biventricular function. Echo done recently does not show any significant LVOT obstruction.   It is very reassuring that Marie does not have any high risk features for SCD (septum is <30mm, no family history of SCD, personal history of syncope, VT, or hypotension with exercise).     She appears euvolemic on exam. Her blood pressure is slightly elevated today from not taking her regular medications. I do not recommend any changes to the cardiac medical regimen at this point. She is on rosuvastatin for primary prevention given that she is a diabetic. She does not takeit everyday due to muscle pain and wishes to take it every other day.       Recommendations:   1. Continue current cardiac medications. Ok to take Rosuvastatin every other day to help minimize leg cramp/side effects.   2. Healthy lifestyle encouraged with regular exercise (moderate intensity,150 minutes/week) and diet low in carbohydrates and saturated fat  3. Follow up with me in 1 year  4/ CMR  5. Muriel

## 2023-02-03 ENCOUNTER — APPOINTMENT (OUTPATIENT)
Dept: INTERVENTIONAL RADIOLOGY/VASCULAR | Facility: CLINIC | Age: 43
End: 2023-02-03
Attending: INTERNAL MEDICINE
Payer: COMMERCIAL

## 2023-02-03 ENCOUNTER — APPOINTMENT (OUTPATIENT)
Dept: MEDSURG UNIT | Facility: CLINIC | Age: 43
End: 2023-02-03
Attending: RADIOLOGY
Payer: COMMERCIAL

## 2023-02-03 ENCOUNTER — HOSPITAL ENCOUNTER (OUTPATIENT)
Facility: CLINIC | Age: 43
Discharge: HOME | End: 2023-02-03
Attending: RADIOLOGY | Admitting: RADIOLOGY
Payer: COMMERCIAL

## 2023-02-03 VITALS
RESPIRATION RATE: 16 BRPM | HEART RATE: 64 BPM | WEIGHT: 162.2 LBS | BODY MASS INDEX: 27.69 KG/M2 | HEIGHT: 64 IN | SYSTOLIC BLOOD PRESSURE: 124 MMHG | OXYGEN SATURATION: 97 % | DIASTOLIC BLOOD PRESSURE: 70 MMHG | TEMPERATURE: 97.9 F

## 2023-02-03 DIAGNOSIS — N18.2 CKD (CHRONIC KIDNEY DISEASE) STAGE 2, GFR 60-89 ML/MIN: ICD-10-CM

## 2023-02-03 DIAGNOSIS — R80.9 MICROALBUMINURIA: ICD-10-CM

## 2023-02-03 LAB — INR PPP: 0.95 (ref 0.85–1.15)

## 2023-02-03 PROCEDURE — 258N000003 HC RX IP 258 OP 636: Performed by: NURSE PRACTITIONER

## 2023-02-03 PROCEDURE — 88305 TISSUE EXAM BY PATHOLOGIST: CPT | Mod: TC | Performed by: INTERNAL MEDICINE

## 2023-02-03 PROCEDURE — 999N000142 HC STATISTIC PROCEDURE PREP ONLY

## 2023-02-03 PROCEDURE — 999N000134 HC STATISTIC PP CARE STAGE 3

## 2023-02-03 PROCEDURE — 88305 TISSUE EXAM BY PATHOLOGIST: CPT | Mod: 26 | Performed by: PATHOLOGY

## 2023-02-03 PROCEDURE — 99152 MOD SED SAME PHYS/QHP 5/>YRS: CPT | Mod: GC | Performed by: RADIOLOGY

## 2023-02-03 PROCEDURE — 85610 PROTHROMBIN TIME: CPT | Performed by: NURSE PRACTITIONER

## 2023-02-03 PROCEDURE — 99152 MOD SED SAME PHYS/QHP 5/>YRS: CPT

## 2023-02-03 PROCEDURE — 76942 ECHO GUIDE FOR BIOPSY: CPT | Mod: 26 | Performed by: RADIOLOGY

## 2023-02-03 PROCEDURE — 88350 IMFLUOR EA ADDL 1ANTB STN PX: CPT | Mod: TC | Performed by: INTERNAL MEDICINE

## 2023-02-03 PROCEDURE — 250N000011 HC RX IP 250 OP 636: Performed by: PHYSICIAN ASSISTANT

## 2023-02-03 PROCEDURE — 88346 IMFLUOR 1ST 1ANTB STAIN PX: CPT | Mod: 26 | Performed by: PATHOLOGY

## 2023-02-03 PROCEDURE — 36415 COLL VENOUS BLD VENIPUNCTURE: CPT | Performed by: NURSE PRACTITIONER

## 2023-02-03 PROCEDURE — 50200 RENAL BIOPSY PERQ: CPT | Mod: RT | Performed by: RADIOLOGY

## 2023-02-03 PROCEDURE — 88313 SPECIAL STAINS GROUP 2: CPT | Mod: 26 | Performed by: PATHOLOGY

## 2023-02-03 PROCEDURE — 88350 IMFLUOR EA ADDL 1ANTB STN PX: CPT | Mod: 26 | Performed by: PATHOLOGY

## 2023-02-03 PROCEDURE — 250N000009 HC RX 250: Performed by: PHYSICIAN ASSISTANT

## 2023-02-03 RX ORDER — NALOXONE HYDROCHLORIDE 0.4 MG/ML
0.4 INJECTION, SOLUTION INTRAMUSCULAR; INTRAVENOUS; SUBCUTANEOUS
Status: DISCONTINUED | OUTPATIENT
Start: 2023-02-03 | End: 2023-02-03 | Stop reason: HOSPADM

## 2023-02-03 RX ORDER — DEXTROSE MONOHYDRATE 25 G/50ML
25-50 INJECTION, SOLUTION INTRAVENOUS
Status: DISCONTINUED | OUTPATIENT
Start: 2023-02-03 | End: 2023-02-03 | Stop reason: HOSPADM

## 2023-02-03 RX ORDER — NALOXONE HYDROCHLORIDE 0.4 MG/ML
0.2 INJECTION, SOLUTION INTRAMUSCULAR; INTRAVENOUS; SUBCUTANEOUS
Status: DISCONTINUED | OUTPATIENT
Start: 2023-02-03 | End: 2023-02-03 | Stop reason: HOSPADM

## 2023-02-03 RX ORDER — UBIDECARENONE 100 MG
100 CAPSULE ORAL DAILY
COMMUNITY
End: 2024-03-11

## 2023-02-03 RX ORDER — NICOTINE POLACRILEX 4 MG
15-30 LOZENGE BUCCAL
Status: DISCONTINUED | OUTPATIENT
Start: 2023-02-03 | End: 2023-02-03 | Stop reason: HOSPADM

## 2023-02-03 RX ORDER — FENTANYL CITRATE 50 UG/ML
25-50 INJECTION, SOLUTION INTRAMUSCULAR; INTRAVENOUS EVERY 5 MIN PRN
Status: DISCONTINUED | OUTPATIENT
Start: 2023-02-03 | End: 2023-02-03 | Stop reason: HOSPADM

## 2023-02-03 RX ORDER — SODIUM CHLORIDE 9 MG/ML
INJECTION, SOLUTION INTRAVENOUS CONTINUOUS
Status: DISCONTINUED | OUTPATIENT
Start: 2023-02-03 | End: 2023-02-03 | Stop reason: HOSPADM

## 2023-02-03 RX ORDER — LIDOCAINE 40 MG/G
CREAM TOPICAL
Status: DISCONTINUED | OUTPATIENT
Start: 2023-02-03 | End: 2023-02-03 | Stop reason: HOSPADM

## 2023-02-03 RX ORDER — FLUMAZENIL 0.1 MG/ML
0.2 INJECTION, SOLUTION INTRAVENOUS
Status: DISCONTINUED | OUTPATIENT
Start: 2023-02-03 | End: 2023-02-03 | Stop reason: HOSPADM

## 2023-02-03 RX ADMIN — FENTANYL CITRATE 25 MCG: 50 INJECTION, SOLUTION INTRAMUSCULAR; INTRAVENOUS at 12:16

## 2023-02-03 RX ADMIN — LIDOCAINE HYDROCHLORIDE 7 ML: 10 INJECTION, SOLUTION EPIDURAL; INFILTRATION; INTRACAUDAL; PERINEURAL at 12:11

## 2023-02-03 RX ADMIN — MIDAZOLAM HYDROCHLORIDE 0.5 MG: 1 INJECTION, SOLUTION INTRAMUSCULAR; INTRAVENOUS at 12:16

## 2023-02-03 RX ADMIN — FENTANYL CITRATE 50 MCG: 50 INJECTION, SOLUTION INTRAMUSCULAR; INTRAVENOUS at 12:06

## 2023-02-03 RX ADMIN — SODIUM CHLORIDE: 9 INJECTION, SOLUTION INTRAVENOUS at 10:17

## 2023-02-03 RX ADMIN — MIDAZOLAM HYDROCHLORIDE 1 MG: 1 INJECTION, SOLUTION INTRAMUSCULAR; INTRAVENOUS at 12:06

## 2023-02-03 ASSESSMENT — ACTIVITIES OF DAILY LIVING (ADL)
ADLS_ACUITY_SCORE: 35

## 2023-02-03 NOTE — PRE-PROCEDURE
GENERAL PRE-PROCEDURE:   Procedure:  IR RENAL BIOPSY  Date/Time:  2/3/2023 10:15 AM    Verbal consent obtained?: Yes    Written consent obtained?: Yes    Risks and benefits: Risks, benefits and alternatives were discussed    Consent given by:  Patient  Patient states understanding of procedure being performed: Yes    Patient's understanding of procedure matches consent: Yes    Procedure consent matches procedure scheduled: Yes    Expected level of sedation:  Moderate  Appropriately NPO:  Yes  ASA Class:  2  Mallampati  :  Grade 1- soft palate, uvula, tonsillar pillars, and posterior pharyngeal wall visible  Lungs:  Lungs clear with good breath sounds bilaterally  Heart:  Normal heart sounds and rate  History & Physical reviewed:  History and physical reviewed and no updates needed  Statement of review:  I have reviewed the lab findings, diagnostic data, medications, and the plan for sedation

## 2023-02-03 NOTE — DISCHARGE INSTRUCTIONS
[facility] Following Kidney Biopsy    Physician: Dr. Euceda and Dr. Kwong                                         Date:February 3, 2023    ACTIVITY:    Relax and take it easy, no strenuous activity for 24 hours.  No heavy lifting (>10 lbs.) for 1 week    DIET:            Resume your regular diet and drink plenty of fluids, unless you are fluid restricted                    DRAINAGE:    There should be minimal drainage from the biopsy site. If bleeding soaks the dressing, you should lie down and apply pressure to the site for a minimum of 10 minutes. If the bleeding persists, refer to the emergency contact numbers below; whether the bleeding persists or not, you should report the occurrence to one of the numbers below.    Refer to the emergency numbers below for the following:   Excessive bleeding or drainage  Excessive swelling, redness, or tenderness at the site  Fever above 100.5 degrees orally  Severe pain  Drainage that is green, yellow, thick white, or has a bad odor  Passage of bloody urine or clots after you are discharged.     Emergency Contact Numbers:     Telephone Numbers: [phone]      Monday-Friday 7:30 am to 4:00 pm  [phone] After 4:00 pm Monday-Friday, Weekends & Holidays.   Ask for the Interventional Radiologist on call.  Someone is on call 24 hrs/day  [facility] toll free number: [phone] Monday-Friday 8:00 am to 4:30 pm  [facility] Emergency Dept: [phone]

## 2023-02-03 NOTE — IR NOTE
Patient Name: Marie Vogel  Medical Record Number: [id number]  Today's Date: 2/3/2023    Procedure: random native kidney biopsy  Proceduralist: MD Kwong, MD Euceda  Pathology present: yes - renal pathology, 4 cores obtained    Procedure Start: 1205  Procedure end: 1222  Sedation medications administered: 1.5 mg of versed, 75 mcg of fentanyl  Sedation time: 17 minutes ([id number])    Report given to: Milady RAE RN  : N/A    Other Notes: Pt arrived to IR room 06 from . Consent reviewed. Pt denies any questions or concerns regarding procedure. Pt positioned prone and monitored per protocol. Pt tolerated procedure without any noted complications. Right flank site cleansed and dressed per protocol. Blood sugars monitored during procedure (130s) with dexcom on patient's phone, per patient request. Pt transferred back to .

## 2023-02-03 NOTE — PROGRESS NOTES
Pt has met discharge criteria. R flank dressing is intact, dry, scant drainage unchanged, soft, no hematoma. VSS, denies pain. Pt ambulated to bathroom and voided without complication. Tolerated PO. Discharge education given to pt, verbalizes understanding denies questions and concerns. Pt brought by wheelchair to car at front of hospital, discharged home with significant other.

## 2023-02-03 NOTE — PROGRESS NOTES
Pt returned to 2A following kidney biopsy. Dressing on Right flank is dry, intact with scant drainage marked, soft, no hematoma. VSS, denies pain. Declines PO at this time. BG = 129.  Pt is sleepy but responds to voice.

## 2023-02-03 NOTE — PROGRESS NOTES
Pt arrived to 2A for renal biopsy. VSS, denies pain. Consent signed. Labs resulted. Pt prepped.  [place], s/o, at bedside.

## 2023-02-08 NOTE — PROGRESS NOTES
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      The patient was first diagnosed with diabetes since 2001 during pregnancy. Since then, she DM was persistent and she was subsequently diagnosed with type 2 diabetes.  Previously, she was on insulin pump, Trulicity and metformin.  However, recently metformin was stopped due to concerning for mitochondrial toxicity.  In 2022, she noticed that her son who was at age 12 has some symptoms concerning for diabetes such as polyuria and polydipsia. Blood test revealed that he had DM with HbA1C of 10.5 and FBG of 350. Subsequent test suggest that he has type 2 DM. And that is when they started to work up and found that her son has MT-TL1 gene mutation. Subsequently, her and her oldest daughter are also found to have positive for this mutation as well. She has a stong family Hx of HOC running in her family. Her mother has HOCM Dx at the age of 47 and underwent heart Tx. She later passed away at the age of 60. She was diagnosed with DM post transplant. Her brother also has DM, CHF post myectomy and hearing loss but has not get tested yet. Both of her children were negative for HOCM so far     In regards to her presentation, after the diagnosis of DM in 2001. She was diagnosed with HL in ~2008. IN 5421-2514, she was diagnosed with HOCM. Sheis taking atenolol and losartan. If she missed atenolol, she may develop tachycardia. However, her [facility] class appears about 1. She has no leg swelling, orthopnea or PND at this time. She has a cousin who is an uncle to her just pased away due to heart disease. She has no DR but she  has retinal atrophy. She has no neuropathy. She never has DKA before. She has no leg swelling. She has SOB sometimes. It gets worse when she does not take atenolol at night.      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 She does not smoke or drink. She does not take NSAIDs. She does not have high BP problem, She has no problem with urination She has no Hx of preeclampsia. She has both  deliveries 34 older and 32 is the younger.  She has 2 children, 20 Yo girl and 11 Yo boy.      She was on lisinopril then was switched to losartan. She does not have UTI frequently.      Upon chart review, the patient has creatinine of 0.6 in .  In , creatinine started to increase to 0.7,  0.8 in 2017 and 0.9 in 2018. Since , Cr fluctuate between 1-1.2. UA showed trace intermittent protein since . Proteinuria has been progressively worse and noted >= 300 mg/dL since . UPCR is 1.51 g/g on 23. She also has intermittent blood in the urine since . Serum albumin is normal.      Labs on 23 showed creatinine 1.0, GFR 71, potassium 4.6, CO2 27, phosphorus 3.0, albumin 4.2.  Hemoglobin 13.5. UA showed large blood, RBC 3 cells per high-power field, WBC is still prior to review.  Squamous epithelial cell 19. UPCR 1.51 g/g. UPCR today is 1.27.   2/3/23: Underwent kidney Bx.   23: She had serological testing done which was unrevealing except KYLER was borderline positive with speckled pattern.  Kappa LC was elevated at 3.22 but ratio is preserved at 1.55.  There is no medical protein and immunofixation.  Also had a kidney Bx. I spoke to Dr. Mosquera on 23 about the results. Tissue needs to be reprocessed as no glom on EM and LM. Only 9 glom (4 globally sclerosis and 1 segmentally sclerosis) in IF which shows no reactivities. Today, she is doing ok. She finally received Trulicity (higher dose) this Monday and she felt nauseous taking it but she will continue to take it. She still has some tender at Bx  site. No swelling/warmth.     Past medical history  Past Medical History:   Diagnosis Date     Allergic rhinitis due to pollen      Atypical glandular cells on Pap smear 3/1108     Cervical high risk HPV (human papillomavirus) test positive 2018    See problem list     Gastroesophageal reflux disease      PFO (patent foramen ovale)      Stargardt's disease 2019     Type II or unspecified type diabetes mellitus without mention of complication, not stated as uncontrolled     onset was gestational in      Past surgical history  Past Surgical History:   Procedure Laterality Date     ABDOMEN SURGERY       C IUD,MIRENA  8/10/10, 7/28/15     C/SECTION, LOW TRANSVERSE  6/25/10    , Low Transverse     CHOLECYSTECTOMY, LAPOROSCOPIC      Cholecystectomy, Laparoscopic     ESOPHAGOSCOPY, GASTROSCOPY, DUODENOSCOPY (EGD), COMBINED N/A 2016    Procedure: COMBINED ESOPHAGOSCOPY, GASTROSCOPY, DUODENOSCOPY (EGD), BIOPSY SINGLE OR MULTIPLE;  Surgeon: Fadi Hunter MD;  Location:  GI     HC ESOPHAGEAL MOTILITY STUDY N/A 2016    Procedure: ESOPHAGEAL MOTILITY STUDY;  Surgeon: Fadi Hunter MD;  Location:  GI      REMOVE TONSILS/ADENOIDS,<13 Y/O      T &A     IR RENAL BIOPSY RIGHT  2/3/2023     ZZC INDUCED ABORTN BY D&C               Review of Systems:   14 systems were reviewed and all negative except as mentioned above.   Current Medications:  Current Outpatient Medications   Medication     albuterol (PROAIR HFA/PROVENTIL HFA/VENTOLIN HFA) 108 (90 Base) MCG/ACT inhaler     ASPIRIN NOT PRESCRIBED (INTENTIONAL)     atenolol (TENORMIN) 25 MG tablet     blood glucose (PEPE CONTOUR NEXT) test strip     blood glucose monitoring (NO BRAND SPECIFIED) meter device kit     cetirizine (ZYRTEC) 10 MG tablet     clotrimazole (LOTRIMIN) 1 % external cream     co-enzyme Q-10 100 MG CAPS capsule     Continuous Blood Gluc Sensor (DEXCOM G6 SENSOR) MISC     Continuous Blood  "Gluc Transmit (DEXCOM G6 TRANSMITTER) MISC     Dulaglutide (TRULICITY) 4.5 MG/0.5ML SOPN     EPINEPHrine (ANY BX GENERIC EQUIV) 0.3 MG/0.3ML injection 2-pack     fluticasone (FLONASE) 50 MCG/ACT spray     Glucagon, rDNA, (GLUCAGON EMERGENCY) 1 MG KIT     ibuprofen (ADVIL/MOTRIN) 200 MG tablet     Insulin Disposable Pump (OMNIPOD 5 G6 POD, GEN 5,) MISC     insulin glargine (LANTUS PEN) 100 UNIT/ML pen     insulin lispro (HUMALOG) 100 UNIT/ML vial     INSULIN PUMP - OUTPATIENT     insulin syringe-needle U-100 (29G X 1/2\" 1 ML) 29G X 1/2\" 1 ML miscellaneous     Lancets (MICROLET) MISC     levonorgestrel (MIRENA) 20 MCG/24HR IUD     losartan (COZAAR) 50 MG tablet     MAGNESIUM OXIDE PO     montelukast (SINGULAIR) 10 MG tablet     Multiple Vitamins-Minerals (MULTI VITAMIN/MINERALS PO)     Omega-3 Fatty Acids (FISH OIL) 500 MG CAPS     omeprazole (PRILOSEC) 40 MG DR capsule     ondansetron (ZOFRAN ODT) 4 MG ODT tab     rosuvastatin (CRESTOR) 5 MG tablet     sertraline (ZOLOFT) 25 MG tablet     SUMAtriptan (IMITREX) 25 MG tablet     traZODone (DESYREL) 50 MG tablet     Ubiquinol 200 MG CAPS     Urine Glucose-Ketones Test STRP     Vitamins-Lipotropics (B-100) TABS     No current facility-administered medications for this visit.       Physical Exam:   LMP 01/27/2023    There is no height or weight on file to calculate BMI.    On video, she is alert and not in acute distress, pleasant, no swelling per patient.  Labs:   All labs reviewed by me  Last Renal Panel:  Sodium   Date Value Ref Range Status   01/19/2023 138 136 - 145 mmol/L Final   07/05/2021 139 133 - 144 mmol/L Final     Potassium   Date Value Ref Range Status   01/19/2023 4.4 3.4 - 5.3 mmol/L Final   09/02/2022 4.8 3.4 - 5.3 mmol/L Final   07/05/2021 4.5 3.4 - 5.3 mmol/L Final     Chloride   Date Value Ref Range Status   01/19/2023 104 98 - 107 mmol/L Final   09/02/2022 104 94 - 109 mmol/L Final   07/05/2021 107 94 - 109 mmol/L Final     Carbon Dioxide   Date Value " Ref Range Status   07/05/2021 27 20 - 32 mmol/L Final     Carbon Dioxide (CO2)   Date Value Ref Range Status   01/19/2023 25 22 - 29 mmol/L Final   09/02/2022 29 20 - 32 mmol/L Final     Anion Gap   Date Value Ref Range Status   01/19/2023 9 7 - 15 mmol/L Final   09/02/2022 7 3 - 14 mmol/L Final   07/05/2021 5 3 - 14 mmol/L Final     Glucose   Date Value Ref Range Status   01/19/2023 171 (H) 70 - 99 mg/dL Final   09/02/2022 95 70 - 99 mg/dL Final   07/05/2021 178 (H) 70 - 99 mg/dL Final     Comment:     Fasting specimen     Urea Nitrogen   Date Value Ref Range Status   01/19/2023 16.8 6.0 - 20.0 mg/dL Final   09/02/2022 18 7 - 30 mg/dL Final   07/05/2021 22 7 - 30 mg/dL Final     Creatinine   Date Value Ref Range Status   01/19/2023 1.08 (H) 0.51 - 0.95 mg/dL Final   07/05/2021 1.20 (H) 0.52 - 1.04 mg/dL Final     GFR Estimate   Date Value Ref Range Status   01/19/2023 65 >60 mL/min/1.73m2 Final     Comment:     Effective December 21, 2021 eGFRcr in adults is calculated using the 2021 CKD-EPI creatinine equation which includes age and gender (Luciano et al., NEJ, DOI: 10.1056/VGLXwg0613030)   07/05/2021 55 (L) >60 mL/min/[1.73_m2] Final     Comment:     Non  GFR Calc  Starting 12/18/2018, serum creatinine based estimated GFR (eGFR) will be   calculated using the Chronic Kidney Disease Epidemiology Collaboration   (CKD-EPI) equation.       Calcium   Date Value Ref Range Status   01/19/2023 9.2 8.6 - 10.0 mg/dL Final   07/05/2021 8.9 8.5 - 10.1 mg/dL Final     Phosphorus   Date Value Ref Range Status   01/19/2023 2.6 2.5 - 4.5 mg/dL Final   11/12/2008 3.2 2.5 - 4.5 mg/dL Final     Albumin   Date Value Ref Range Status   01/19/2023 4.1 3.5 - 5.2 g/dL Final   07/05/2021 3.2 (L) 3.4 - 5.0 g/dL Final       Imaging:  I reviewed imaging studies.     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  The patient also has progressive proteinuria with the most recent urine protein creatinine ratio of 1.27 g/g.  The patient does not have evidence of nephrotic syndrome as she has no edema and serum albumin is normal at 4.2.  Etiology of her chronic kidney disease remains unclear but it could be related to diabetic nephropathy or genetic FSGS and it has been reported in this study DOI: [email or web address].[email or web address]. In the case series, the patient may present with proteinuric kidney disease and may progress to end-stage kidney disease. Patient can have short stature, severe headache, hearing loss, diabetes mellitus and hypertrophic cardiomyopathy in which she also have HL, DM and HOCM. From the literature, kidney Bx may reveal increased number of abnormal mitochondria in tubular cells and podocytes. I did check serological work-up and was unrevealing except borderline KYLER and mildly elvated Kappa (likely in the setting of CKD). Kidney Bx was done on 2/3/23 but full report is pending requiring reprocessing of the tissues.  So far there were 9 glom in IF, 4 globally sclerosis and 1 segmentally sclerosis. EM would be helpful in confirming FSGS and assess mitochondria but might be techically challenging due to the nature of reprocess tissue.  However, there is no positive immune O reactant to suggest any autoimmune process at this time.    Regarding treatment, I discussed with her about the benefit of diabetes, hypertension control and weight loss. She is on losartan 50-25 mg per day with normal BP. BP is excellent at office but she has not measured BP at home which  I recommend. I think she would be a good candidate for SGLT2 inhibitor for renal protection but will check with Dr. Saavedra first.  - Pending final report of kidney Bx  - Recommending adding SGLT2 inhibitor, pending Dr. Saavedra's rec  - Stay well hydrated and avoid nephrotoxic agents  - Continue losartan at 50-25 mg daily; goal BP < 130/80 mmHg   # MBD  # Secondary hyper PTH  PTH is 75 and Vit D is 22. Sixto and Phos are normal.Continue to monitor.   # Hypertension; good control  She takes 50-25 losartan and atenolol 12.5 mg per day.  Recommend home blood pressure monitoring  # DM type 2 on insulin and trulicity; good control  On insulin and Trulicity. Suggest SGLT2i as above but pending ok from genetic first.      Follow-up in 6 months with labs    I spent  40 minutes on the date of the encounter doing chart review, history and exam, documentation and further activities as noted above. 20 (9.51-10.10) minutes of this visit is dedicated to direct patient interaction via video.    Patti Menendez MD on 02/08/2023

## 2023-02-09 ENCOUNTER — VIRTUAL VISIT (OUTPATIENT)
Dept: NEPHROLOGY | Facility: CLINIC | Age: 43
End: 2023-02-09
Attending: INTERNAL MEDICINE
Payer: COMMERCIAL

## 2023-02-09 VITALS — WEIGHT: 152 LBS | BODY MASS INDEX: 26.09 KG/M2

## 2023-02-09 DIAGNOSIS — Z79.4 TYPE 2 DIABETES MELLITUS WITH DIABETIC NEPHROPATHY, WITH LONG-TERM CURRENT USE OF INSULIN (H): ICD-10-CM

## 2023-02-09 DIAGNOSIS — Q99.9 MITOCHONDRIAL DNA MUTATION: ICD-10-CM

## 2023-02-09 DIAGNOSIS — E11.21 TYPE 2 DIABETES MELLITUS WITH DIABETIC NEPHROPATHY, WITH LONG-TERM CURRENT USE OF INSULIN (H): ICD-10-CM

## 2023-02-09 DIAGNOSIS — N18.2 CKD (CHRONIC KIDNEY DISEASE) STAGE 2, GFR 60-89 ML/MIN: Primary | ICD-10-CM

## 2023-02-09 DIAGNOSIS — I42.1 HYPERTROPHIC OBSTRUCTIVE CARDIOMYOPATHY (H): ICD-10-CM

## 2023-02-09 DIAGNOSIS — N25.81 SECONDARY HYPERPARATHYROIDISM (H): ICD-10-CM

## 2023-02-09 PROCEDURE — G0463 HOSPITAL OUTPT CLINIC VISIT: HCPCS | Mod: PN,GT | Performed by: INTERNAL MEDICINE

## 2023-02-09 PROCEDURE — 99215 OFFICE O/P EST HI 40 MIN: CPT | Mod: VID | Performed by: INTERNAL MEDICINE

## 2023-02-09 ASSESSMENT — PAIN SCALES - GENERAL: PAINLEVEL: NO PAIN (0)

## 2023-02-09 NOTE — PATIENT INSTRUCTIONS
Thank you for having a visit with me today  Would recommend starting SGLT2 pending and okay from genetics  If SGLT2 inhibitor is okay from genetics, then would recommend endocrinology start this  Would check BMP 2 weeks after starting to make sure Cr ok  See you in 6 months with labs

## 2023-02-09 NOTE — LETTER
2/9/2023       RE: Marie Vogel  [address]  [place] [address]     Dear Colleague,    Thank you for referring your patient, Marie Vogel, to the [facility] NEPHROLOG[facility]. Please see a copy of my visit note below.    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      The patient was first diagnosed with diabetes since 2001 during pregnancy. Since then, she DM was persistent and she was subsequently diagnosed with type 2 diabetes.  Previously, she was on insulin pump, Trulicity and metformin.  However, recently metformin was stopped due to concerning for mitochondrial toxicity.  In 2022, she noticed that her son who was at age 12 has some symptoms concerning for diabetes such as polyuria and polydipsia. Blood test revealed that he had DM with HbA1C of 10.5 and FBG of 350. Subsequent test suggest that he has type 2 DM. And that is when they started to work up and found that her son has MT-TL1 gene mutation. Subsequently, her and her oldest daughter are also found to have positive for this mutation as well. She has a stong family Hx of HOC running in her family. Her mother has HOCM Dx at the age of 47 and underwent heart Tx. She later passed away at the age of 60. She was diagnosed with DM post transplant. Her brother also has DM, CHF post myectomy and hearing loss but has not get tested yet. Both of her children were negative for HOCM so far     In regards to her presentation, after the diagnosis of DM in 2001. She was diagnosed with HL in  ~2008. IN [id number], she was diagnosed with HOCM. Sheis taking atenolol and losartan. If she missed atenolol, she may develop tachycardia. However, her [facility] class appears about 1. She has no leg swelling, orthopnea or PND at this time. She has a cousin who is an uncle to her just pased away due to heart disease. She has no DR but she has retinal atrophy. She has no neuropathy. She never has DKA before. She has no leg swelling. She has SOB sometimes. It gets worse when she does not take atenolol at night.      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 She does not smoke or drink. She does not take NSAIDs. She does not have high BP problem, She has no problem with urination She has no Hx of preeclampsia. She has both  deliveries 34 older and 32 is the younger.  She has 2 children, 22 Yo girl and 11 Yo boy.      She was on lisinopril then was switched to losartan. She does not have UTI frequently.      Upon chart review, the patient has creatinine of 0.6 in .  In , creatinine started to increase to 0.7,  0.8 in 2017 and 0.9 in 2018. Since 2020, Cr fluctuate between 1-1.2. UA showed trace intermittent protein since . Proteinuria has been progressively worse and noted >= 300 mg/dL since . UPCR is 1.51 g/g on 23. She also has intermittent blood in the urine since . Serum albumin is normal.      Labs on 23 showed creatinine 1.0, GFR 71, potassium 4.6, CO2 27, phosphorus 3.0, albumin 4.2.  Hemoglobin 13.5. UA showed large blood, RBC 3 cells per high-power field, WBC is still prior to review.  Squamous epithelial cell 19. UPCR 1.51 g/g. UPCR today is 1.27.   2/3/23: Underwent kidney Bx.   23: She had serological testing done which was unrevealing except KYLER was borderline positive with speckled pattern.  Kappa LC was elevated at 3.22 but ratio is preserved at 1.55.  There is no medical protein and immunofixation.  Also had a kidney Bx. I spoke to Dr. Mosquera on 23 about the  results. Tissue needs to be reprocessed as no glom on EM and LM. Only 9 glom (4 globally sclerosis and 1 segmentally sclerosis) in IF which shows no reactivities. Today, she is doing ok. She finally received Trulicity (higher dose) this Monday and she felt nauseous taking it but she will continue to take it. She still has some tender at Bx site. No swelling/warmth.     Past medical history  Past Medical History:   Diagnosis Date     Allergic rhinitis due to pollen      Atypical glandular cells on Pap smear 3/1108     Cervical high risk HPV (human papillomavirus) test positive 2018    See problem list     Gastroesophageal reflux disease      PFO (patent foramen ovale)      Stargardt's disease 2019     Type II or unspecified type diabetes mellitus without mention of complication, not stated as uncontrolled     onset was gestational in      Past surgical history  Past Surgical History:   Procedure Laterality Date     ABDOMEN SURGERY       C IUD,MIRENA  8/10/10, 7/28/15     C/SECTION, LOW TRANSVERSE  6/25/10    , Low Transverse     CHOLECYSTECTOMY, LAPOROSCOPIC      Cholecystectomy, Laparoscopic     ESOPHAGOSCOPY, GASTROSCOPY, DUODENOSCOPY (EGD), COMBINED N/A 2016    Procedure: COMBINED ESOPHAGOSCOPY, GASTROSCOPY, DUODENOSCOPY (EGD), BIOPSY SINGLE OR MULTIPLE;  Surgeon: Fadi Hunter MD;  Location: [facility] ESOPHAGEAL MOTILITY STUDY N/A 2016    Procedure: ESOPHAGEAL MOTILITY STUDY;  Surgeon: Fadi Hunter MD;  Location: [facility] REMOVE TONSILS/ADENOIDS,<11 Y/O      T &A     IR RENAL BIOPSY RIGHT  2/3/2023     ZZC INDUCED ABORTN BY D&C               Review of Systems:   14 systems were reviewed and all negative except as mentioned above.   Current Medications:  Current Outpatient Medications   Medication     albuterol (PROAIR HFA/PROVENTIL HFA/VENTOLIN HFA) 108 (90 Base) MCG/ACT inhaler     ASPIRIN NOT PRESCRIBED (INTENTIONAL)     atenolol  "(TENORMIN) 25 MG tablet     blood glucose (PEPE CONTOUR NEXT) test strip     blood glucose monitoring (NO BRAND SPECIFIED) meter device kit     cetirizine (ZYRTEC) 10 MG tablet     clotrimazole (LOTRIMIN) 1 % external cream     co-enzyme Q-10 100 MG CAPS capsule     Continuous Blood Gluc Sensor (DEXCOM G6 SENSOR) MISC     Continuous Blood Gluc Transmit (DEXCOM G6 TRANSMITTER) MISC     Dulaglutide (TRULICITY) 4.5 MG/0.5ML SOPN     EPINEPHrine (ANY BX GENERIC EQUIV) 0.3 MG/0.3ML injection 2-pack     fluticasone (FLONASE) 50 MCG/ACT spray     Glucagon, rDNA, (GLUCAGON EMERGENCY) 1 MG KIT     ibuprofen (ADVIL/MOTRIN) 200 MG tablet     Insulin Disposable Pump (OMNIPOD 5 G6 POD, GEN 5,) MISC     insulin glargine (LANTUS PEN) 100 UNIT/ML pen     insulin lispro (HUMALOG) 100 UNIT/ML vial     INSULIN PUMP - OUTPATIENT     insulin syringe-needle U-100 (29G X 1/2\" 1 ML) 29G X 1/2\" 1 ML miscellaneous     Lancets (MICROLET) MISC     levonorgestrel (MIRENA) 20 MCG/24HR IUD     losartan (COZAAR) 50 MG tablet     MAGNESIUM OXIDE PO     montelukast (SINGULAIR) 10 MG tablet     Multiple Vitamins-Minerals (MULTI VITAMIN/MINERALS PO)     Omega-3 Fatty Acids (FISH OIL) 500 MG CAPS     omeprazole (PRILOSEC) 40 MG DR capsule     ondansetron (ZOFRAN ODT) 4 MG ODT tab     rosuvastatin (CRESTOR) 5 MG tablet     sertraline (ZOLOFT) 25 MG tablet     SUMAtriptan (IMITREX) 25 MG tablet     traZODone (DESYREL) 50 MG tablet     Ubiquinol 200 MG CAPS     Urine Glucose-Ketones Test STRP     Vitamins-Lipotropics (B-100) TABS     No current facility-administered medications for this visit.       Physical Exam:   LMP 01/27/2023    There is no height or weight on file to calculate BMI.    On video, she is alert and not in acute distress, pleasant, no swelling per patient.  Labs:   All labs reviewed by me  Last Renal Panel:  Sodium   Date Value Ref Range Status   01/19/2023 138 136 - 145 mmol/L Final   07/05/2021 139 133 - 144 mmol/L Final     Potassium "   Date Value Ref Range Status   01/19/2023 4.4 3.4 - 5.3 mmol/L Final   09/02/2022 4.8 3.4 - 5.3 mmol/L Final   07/05/2021 4.5 3.4 - 5.3 mmol/L Final     Chloride   Date Value Ref Range Status   01/19/2023 104 98 - 107 mmol/L Final   09/02/2022 104 94 - 109 mmol/L Final   07/05/2021 107 94 - 109 mmol/L Final     Carbon Dioxide   Date Value Ref Range Status   07/05/2021 27 20 - 32 mmol/L Final     Carbon Dioxide (CO2)   Date Value Ref Range Status   01/19/2023 25 22 - 29 mmol/L Final   09/02/2022 29 20 - 32 mmol/L Final     Anion Gap   Date Value Ref Range Status   01/19/2023 9 7 - 15 mmol/L Final   09/02/2022 7 3 - 14 mmol/L Final   07/05/2021 5 3 - 14 mmol/L Final     Glucose   Date Value Ref Range Status   01/19/2023 171 (H) 70 - 99 mg/dL Final   09/02/2022 95 70 - 99 mg/dL Final   07/05/2021 178 (H) 70 - 99 mg/dL Final     Comment:     Fasting specimen     Urea Nitrogen   Date Value Ref Range Status   01/19/2023 16.8 6.0 - 20.0 mg/dL Final   09/02/2022 18 7 - 30 mg/dL Final   07/05/2021 22 7 - 30 mg/dL Final     Creatinine   Date Value Ref Range Status   01/19/2023 1.08 (H) 0.51 - 0.95 mg/dL Final   07/05/2021 1.20 (H) 0.52 - 1.04 mg/dL Final     GFR Estimate   Date Value Ref Range Status   01/19/2023 65 >60 mL/min/1.73m2 Final     Comment:     Effective December 21, 2021 eGFRcr in adults is calculated using the 2021 CKD-EPI creatinine equation which includes age and gender (Luciano love al., NEJM, DOI: 10.1056/TKKTza3221541)   07/05/2021 55 (L) >60 mL/min/[1.73_m2] Final     Comment:     Non  GFR Calc  Starting 12/18/2018, serum creatinine based estimated GFR (eGFR) will be   calculated using the Chronic Kidney Disease Epidemiology Collaboration   (CKD-EPI) equation.       Calcium   Date Value Ref Range Status   01/19/2023 9.2 8.6 - 10.0 mg/dL Final   07/05/2021 8.9 8.5 - 10.1 mg/dL Final     Phosphorus   Date Value Ref Range Status   01/19/2023 2.6 2.5 - 4.5 mg/dL Final   11/12/2008 3.2 2.5 - 4.5  mg/dL Final     Albumin   Date Value Ref Range Status   01/19/2023 4.1 3.5 - 5.2 g/dL Final   07/05/2021 3.2 (L) 3.4 - 5.0 g/dL Final       Imaging:  I reviewed imaging studies.     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  The patient also has progressive proteinuria with the most recent urine protein creatinine ratio of 1.27 g/g.  The patient does not have evidence of nephrotic syndrome as she has no edema and serum albumin is normal at 4.2.  Etiology of her chronic kidney disease remains unclear but it could be related to diabetic nephropathy or genetic FSGS and it has been reported in this study DOI: 10.[email or web address]. In the case series, the patient may present with proteinuric kidney disease and may progress to end-stage kidney disease. Patient can have short stature, severe headache, hearing loss, diabetes mellitus and hypertrophic cardiomyopathy in which she also have HL, DM and HOCM. From the literature, kidney Bx may reveal increased number of abnormal mitochondria in tubular cells and podocytes. I did check serological work-up and was unrevealing except borderline KYLER and mildly elvated Kappa (likely in the setting of CKD). Kidney Bx was done on 2/3/23 but full report is pending requiring reprocessing of the tissues.  So far there were 9 glom in IF, 4 globally sclerosis and 1 segmentally sclerosis. EM would be helpful in confirming FSGS and assess mitochondria but might be techically challenging due to the nature of reprocess  tissue.  However, there is no positive immune O reactant to suggest any autoimmune process at this time.    Regarding treatment, I discussed with her about the benefit of diabetes, hypertension control and weight loss. She is on losartan 50-25 mg per day with normal BP. BP is excellent at office but she has not measured BP at home which I recommend. I think she would be a good candidate for SGLT2 inhibitor for renal protection but will check with Dr. Saavedra first.  - Pending final report of kidney Bx  - Recommending adding SGLT2 inhibitor, pending Dr. Saavedra's rec  - Stay well hydrated and avoid nephrotoxic agents  - Continue losartan at 50-25 mg daily; goal BP < 130/80 mmHg   # MBD  # Secondary hyper PTH  PTH is 75 and Vit D is 22. Sixto and Phos are normal.Continue to monitor.   # Hypertension; good control  She takes 50-25 losartan and atenolol 12.5 mg per day.  Recommend home blood pressure monitoring  # DM type 2 on insulin and trulicity; good control  On insulin and Trulicity. Suggest SGLT2i as above but pending ok from [facility].      Follow-up in 6 months with labs    I spent  40 minutes on the date of the encounter doing chart review, history and exam, documentation and further activities as noted above. 20 (9.51-10.10) minutes of this visit is dedicated to direct patient interaction via video.    Patti Menendez MD on 02/08/2023

## 2023-02-09 NOTE — PROGRESS NOTES
Video-Visit Details    Type of service:  Video Visit    Video Start Time (time video started): 9.51    Video End Time (time video stopped): 10.11    Originating Location (pt. Location): Home I use [employer].        Distant Location (provider location):  On-site    Mode of Communication:  Video Conference via [employer]      Is the patient currently in the state of [place]? YES    Visit mode:VIDEO    If the visit is dropped, the patient can be reconnected by: VIDEO VISIT: Text to cell phone: [phone]    Will anyone else be joining the visit? NO      How would you like to obtain your AVS? MyChart    Are changes needed to the allergy or medication list? NO    Comments or concerns regarding today's visit: Frozen video so has to switch to D[employer]imity shortly after using [employer].

## 2023-02-13 ENCOUNTER — TELEPHONE (OUTPATIENT)
Dept: CARDIOLOGY | Facility: CLINIC | Age: 43
End: 2023-02-13
Payer: COMMERCIAL

## 2023-02-13 DIAGNOSIS — I42.2 HYPERTROPHIC CARDIOMYOPATHY (H): Primary | ICD-10-CM

## 2023-02-14 NOTE — TELEPHONE ENCOUNTER
2/13/23Spoke with pt briefly because she was busy at work.  However, she wanted to know her results.  Explained that they were negative but we did not go in to much detail about this.  We set up a time to talk later in the week.  Need to review detection rate and option for add on testing for full CM panel.      2/15/23 Called pt back to discuss genetic testing results in more detail.  She underwent genetic testing on January 23, 2023 due to her  diagnosis of hypertrophic cardiomyopathy (HCM).   Genetic testing for the HCM panel thru [facility] revealed that Marie does NOT carry a mutation in the 30 genes analyzed. It is predicted that this panel will identify mutations in 50-60% of HCM cases.   Therefore, this negative result does not rule out a genetic basis for the HCM in Marie but eliminates the option of presymptomatic genetic testing in at risk family members, at this time.   Discussed the history of a mitochondrial (mt) mutation in Marie and other family members.  She knows that mt mutations can be associated with cardiomyopathy and this could be the cause for her symptoms.  However, the family history includes male to female and male to male transmission, which is NOT compatible with mt inheritance.    Discussed all possibilities of inheritance.  Since we do not have complete records for family members, it is still possible that this is all related to mt mutation and that the offspring of males actually had something different.  Also discussed the possibility that there is another mutation in the family responsible for all cardiomyopathy cases.    Offered option of add on testing for the CM Next panel through [employer]. This panel looks at the HCM genes plus other genes known to be associated with other forms of cardiomyopathy.  Since we have learned that genes can be responsible for different conditions and presentations, it may be worthwhile to look at full panel.  [facility] offers option of add on testing at  no charge if done shortly after initial results.  Marie would like to move forward with this.  I will order additional genes through [facility].    I will contact her when new results are available.   A summary letter and copy of the results will be sent to patient once all results are back. All questions answered at this time.     Bhargavi Lara MS, [facility]  Licensed, Certified Genetic Counselor  [facility]  [facility]

## 2023-02-16 LAB
PATH REPORT.COMMENTS IMP SPEC: NORMAL
PATH REPORT.FINAL DX SPEC: NORMAL
PATH REPORT.GROSS SPEC: NORMAL
PATH REPORT.MICROSCOPIC SPEC OTHER STN: NORMAL
PATH REPORT.RELEVANT HX SPEC: NORMAL
PHOTO IMAGE: NORMAL

## 2023-02-22 ENCOUNTER — TELEPHONE (OUTPATIENT)
Dept: MULTI SPECIALTY CLINIC | Facility: CLINIC | Age: 43
End: 2023-02-22
Payer: COMMERCIAL

## 2023-02-22 NOTE — TELEPHONE ENCOUNTER
Called to reschedule pt 3.27.23 appt  with Dr Shon york 2x . Also sent over 2x [employer] messages. Reschedule for next available.Writer will now cancel pt appt for 3.27.23

## 2023-02-22 NOTE — TELEPHONE ENCOUNTER
Called to reschedule pt 3.27.23 appt  with Dr Shon york 2x . Also sent over 2x [employer] messages. Reschedule for next available.Writer will now cancel pt appt for 3.27.23

## 2023-02-27 NOTE — PROGRESS NOTES
Outcome for 02/27/23 8:33 AM: Data uploaded on Dexrenato and Amish Nash LPN   Outcome for 03/02/23 3:33 PM: Dexcom and Amish emailed to provider  Taylor Myhre, RMA

## 2023-02-28 ENCOUNTER — VIRTUAL VISIT (OUTPATIENT)
Dept: PSYCHOLOGY | Facility: CLINIC | Age: 43
End: 2023-02-28
Attending: INTERNAL MEDICINE
Payer: COMMERCIAL

## 2023-02-28 ENCOUNTER — TELEPHONE (OUTPATIENT)
Dept: CARDIOLOGY | Facility: CLINIC | Age: 43
End: 2023-02-28
Payer: COMMERCIAL

## 2023-02-28 DIAGNOSIS — F43.21 ADJUSTMENT DISORDER WITH DEPRESSED MOOD: Primary | ICD-10-CM

## 2023-02-28 PROCEDURE — 90832 PSYTX W PT 30 MINUTES: CPT | Mod: VID

## 2023-02-28 ASSESSMENT — ANXIETY QUESTIONNAIRES
IF YOU CHECKED OFF ANY PROBLEMS ON THIS QUESTIONNAIRE, HOW DIFFICULT HAVE THESE PROBLEMS MADE IT FOR YOU TO DO YOUR WORK, TAKE CARE OF THINGS AT HOME, OR GET ALONG WITH OTHER PEOPLE: SOMEWHAT DIFFICULT
GAD7 TOTAL SCORE: 4
6. BECOMING EASILY ANNOYED OR IRRITABLE: SEVERAL DAYS
2. NOT BEING ABLE TO STOP OR CONTROL WORRYING: SEVERAL DAYS
8. IF YOU CHECKED OFF ANY PROBLEMS, HOW DIFFICULT HAVE THESE MADE IT FOR YOU TO DO YOUR WORK, TAKE CARE OF THINGS AT HOME, OR GET ALONG WITH OTHER PEOPLE?: SOMEWHAT DIFFICULT
1. FEELING NERVOUS, ANXIOUS, OR ON EDGE: NOT AT ALL
4. TROUBLE RELAXING: SEVERAL DAYS
5. BEING SO RESTLESS THAT IT IS HARD TO SIT STILL: NOT AT ALL
3. WORRYING TOO MUCH ABOUT DIFFERENT THINGS: SEVERAL DAYS
7. FEELING AFRAID AS IF SOMETHING AWFUL MIGHT HAPPEN: NOT AT ALL
7. FEELING AFRAID AS IF SOMETHING AWFUL MIGHT HAPPEN: NOT AT ALL
GAD7 TOTAL SCORE: 4
GAD7 TOTAL SCORE: 4

## 2023-02-28 ASSESSMENT — COLUMBIA-SUICIDE SEVERITY RATING SCALE - C-SSRS
2. HAVE YOU ACTUALLY HAD ANY THOUGHTS OF KILLING YOURSELF?: NO
1. IN THE PAST MONTH, HAVE YOU WISHED YOU WERE DEAD OR WISHED YOU COULD GO TO SLEEP AND NOT WAKE UP?: NO
6. IN YOUR LIFETIME, HAVE YOU EVER DONE ANYTHING, STARTED TO DO ANYTHING, OR PREPARED TO DO ANYTHING TO END YOUR LIFE?: NO

## 2023-02-28 NOTE — PROGRESS NOTES
"[facility]   Mental Health & Addiction Services     Progress Note - Initial Visit    Patient  Name:  Marie Vogel Date: 2023          Service Type: Individual     Visit Start Time: 2:11 PM  Visit End Time: 2:41 PM (The patient reported they had to go to work)     Visit #: 1    Attendees: Client    Service Modality:  Video Visit:      Provider verified identity through the following two step process.  Patient provided:  Patient  and Patient address    Telemedicine Visit: The patient's condition can be safely assessed and treated via synchronous audio and visual telemedicine encounter.      Reason for Telemedicine Visit: Patient has requested telehealth visit    Originating Site (Patient Location): Patient's home    Distant Site (Provider Location): Provider Remote Setting- Home Office    Consent:  The patient/guardian has verbally consented to: the potential risks and benefits of telemedicine (video visit) versus in person care; bill my insurance or make self-payment for services provided; and responsibility for payment of non-covered services.     Patient would like the video invitation sent by:  Text to cell phone: [phone]    Mode of Communication:  Video Conference via [employer]    Distant Location (Provider):  Off-site    As the provider I attest to compliance with applicable laws and regulations related to telemedicine.       DATA:   Interactive Complexity: No   Crisis: No     Presenting Concerns/  Current Stressors:   The reason for seeking services at this time is: \"Depression new diagnoses\".  The problem(s) began 22. She has been experiencing feelings of depression \"for years now\". The patient reports internal family stressors related to a diabetes diagnosis of her son in 2022. The patient also reported that her son, daughter, and self have mitochondrial disease. The patients reports a history of trauma due to internal family stressors. The patient has " been dealing with ongoing feelings of sadness, fatigue, and lack of motivation. She has also had to endure the loss of her mother in 2016. The patient identified one of her most pressing stressors comes from internal relational stress with her partner. The patient's scores of 5 on the PHQ-9 and 4 on the ALEXSANDRA-7 affirm the patients reported mental health symptoms.     Patient has attempted to resolve these concerns in the past through DBT therapy through [facility] around 2016 to improve communication patterns between family members. The patient attended individual therapy with [facility] for a diagnosis of major depressive disorder moderate at the time. She also attended therapy with an individual therapist in 2001.     ASSESSMENT:  Mental Status Assessment:  Appearance:   Appropriate   Eye Contact:   Good   Psychomotor Behavior: Normal   Attitude:   Cooperative  Interested Friendly  Orientation:   All  Speech   Rate / Production: Hyperverbal    Volume:  Normal   Mood:    Anxious  Depressed  Sad   Affect:    Tearful  Thought Content:  Clear   Thought Form:  Coherent   Insight:    Fair       Safety Issues and Plan for Safety and Risk Management:     Brea Suicide Severity Rating Scale (Short Version)  Brea Suicide Severity Rating (Short Version) 1/13/2019   Q1 Wished to be Dead (Past Month) no   Q2 Suicidal Thoughts (Past Month) no   Q6 Suicide Behavior (Lifetime) no     Patient denies current fears or concerns for personal safety.  Patient denies current or recent suicidal ideation or behaviors.  Patient denies current or recent homicidal ideation or behaviors.  Patient denies current or recent self injurious behavior or ideation.  Patient denies other safety concerns.  Recommended that patient call 911 or go to the local ED should there be a change in any of these risk factors.  Patient reports there are firearms in the house. The firearms are secured in a locked space.     Diagnostic  Criteria:  Adjustment Disorder  A. The development of emotional or behavioral symptoms in response to an identifiable stressor(s) occurring within 3 months of the onset of the stressor(s)  B. These symptoms or behaviors are clinically significant, as evidenced by one or both of the following:       - Marked distress that is out of proportion to the severity/intensity of the stressor (with consideration for external context & culture)       - Significant impairment in social, occupational, or other important areas of functioning  C. The stress-related disturbance does not meet criteria for another disorder & is not not an exacerbation of another mental disorder  D. The symptoms do not represent normal bereavement  E. Once the stressor or its consequences have terminated, the symptoms do not persist for more than an additional 6 months       * Adjustment Disorder with Depressed Mood: The predominant manifestations are symptoms such as low mood, tearfulness, or feelings of hopelessness      DSM5 Diagnoses: (Sustained by DSM5 Criteria Listed Above)  Diagnoses: Adjustment Disorders  309.0 (F43.21) With depressed mood  Psychosocial & Contextual Factors: The patient reports numerous internal family stressors. The patient also identifies finances as a stressor due to loans and accrued debt. She also reports internal stressors and a history of trauma.   WHODAS 2.0 (12 item):   WHODAS 2.0 Total Score 10/5/2020 2/21/2023   Total Score - 14   Total Score MyChart 21 14   Some encounter information is confidential and restricted. Go to Review Flowsheets activity to see all data.     Intervention:   MI-  Patient was educated on following motivational interviewing skill The student intern therapist elicited responses from the patient regarding her motivations for seeking outpatient therapy services. The writer explored the patients current level of functioning and what she desires as her preferred way of being. The writer utilized  open ended questions to assess the patients current life stressors as well and the writer and the patient worked on completing the diagnsotice assessment, while also reviewing the patients reported mental health symptoms.   Collateral Reports Completed:  Not Applicable      PLAN: (Homework, other):  1. Provider will continue Diagnostic Assessment.  Patient was given the following to do until next session:  The patient will begin contemplating goals and interventions she would like to complete during therapy. The student intern therapist will continue working on the diagnostic assessment next session.     2. Provider recommended the following referrals: N/A.      3.  Suicide Risk and Safety Concerns were assessed for Marie Vogel.    Patient meets the following risk assessment and triage: Patient denied any current/recent/lifetime history of suicidal ideation and/or behaviors.  No safety plan indicated at this time.       CYRIL Tao Student Intern Therapist February 28, 2023

## 2023-03-02 NOTE — TELEPHONE ENCOUNTER
Spoke with Marie today to review results of genetic testing.  Marie initially underwent genetic testing on January 23, 2023 due to her diagnosis of hypertrophic cardiomyopathy (HCM). Genetic testing for the HCM panel thru [employer] revealed that Marie does NOT carry a mutation in the 30 genes analyzed. It is predicted that this panel will identify mutations in 50-60% of HCM cases.   Due to the family history of cardiomyopathy, we discussed the option of testing for the full CM Next panel (which includes 26 additional genes), to be certain we aren't missing some other gene mutation.  Add on testing of the full CM Next panel showed that Marie does NOT carry a mutation in the 56 genes analyzed.   Therefore, this negative result does not rule out a genetic basis for the HCM in Marie but eliminates the option of presymptomatic genetic testing in at risk family members, at this time.   Discussed the history of a mitochondrial (mt) mutation in Marie and other family members.  She knows that mt mutations can be associated with cardiomyopathy and this could be the cause for her symptoms. However, the family history includes male to female and male to male transmission, which is NOT compatible with mt inheritance.  So the mitochondrial gene cannot be the sole explanation of  is not the only cause for disease in this family  A summary letter and copy of the results will be sent to patient. All questions answered at this time.     Bhargavi Lara MS, CGC  Licensed, Certified Genetic Counselor  [facility]  [facility]

## 2023-03-06 ENCOUNTER — DOCUMENTATION ONLY (OUTPATIENT)
Dept: LAB | Facility: CLINIC | Age: 43
End: 2023-03-06

## 2023-03-06 ENCOUNTER — VIRTUAL VISIT (OUTPATIENT)
Dept: ENDOCRINOLOGY | Facility: CLINIC | Age: 43
End: 2023-03-06
Payer: COMMERCIAL

## 2023-03-06 DIAGNOSIS — R80.9 MICROALBUMINURIA: ICD-10-CM

## 2023-03-06 DIAGNOSIS — E11.43 DIABETIC GASTROPARESIS (H): ICD-10-CM

## 2023-03-06 DIAGNOSIS — E11.21 TYPE 2 DIABETES MELLITUS WITH DIABETIC NEPHROPATHY, WITH LONG-TERM CURRENT USE OF INSULIN (H): Primary | ICD-10-CM

## 2023-03-06 DIAGNOSIS — K31.84 DIABETIC GASTROPARESIS (H): ICD-10-CM

## 2023-03-06 DIAGNOSIS — Z96.41 INSULIN PUMP IN PLACE: ICD-10-CM

## 2023-03-06 DIAGNOSIS — E78.5 HYPERLIPIDEMIA LDL GOAL <100: ICD-10-CM

## 2023-03-06 DIAGNOSIS — Z79.4 TYPE 2 DIABETES MELLITUS WITH DIABETIC NEPHROPATHY, WITH LONG-TERM CURRENT USE OF INSULIN (H): Primary | ICD-10-CM

## 2023-03-06 PROCEDURE — 95251 CONT GLUC MNTR ANALYSIS I&R: CPT | Performed by: INTERNAL MEDICINE

## 2023-03-06 PROCEDURE — 99214 OFFICE O/P EST MOD 30 MIN: CPT | Mod: VID | Performed by: INTERNAL MEDICINE

## 2023-03-06 NOTE — PROGRESS NOTES
"THIS IS A VIDEO VISIT:    Phone call visit/virtual visit encounter:    Name of patient: Marie Vogel    Date of encounter: 3/6/2023    Time of start of video visit: 9:30    Video started: 9:41    Video ended: 10:00    Provider location: working from home/ [facility]    Patient location: patients home.    Mode of transmission: YOUnite video/ [employer]    Verbal consent: obtained before starting visit. Pt is agreeable.      The patient has been notified of following:      \"This VIDEO visit will be conducted via a call between you and your physician/provider. We have found that certain health care needs can be provided without the need for a physical exam.  This service lets us provide the care you need with a short phone conversation.  If a prescription is necessary we can send it directly to your pharmacy.  If lab work is needed we can place an order for that and you can then stop by our lab to have the test done at a later time.     With new updates with corona virus patient might be billed as clinic visit.     If during the course of the call the physician/provider feels a telephone visit is not appropriate, you will not be charged for this service.\"      Past medical history, social history, family history, allergy and medications were reviewed and updated as appropriate.  Reviewed pertinent labs, notes, imaging studies personally.    Name: Marie Vogel  Seen for f/u of DM.  HPI:  Marie Vogel is a 42 year old female who presents for the evaluation/management of DM.   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    Here for f/u. " Previously has seen endo at [facility] ( Dr Aguilera and Dr Zhou and ). 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 Busy at work, also variable schedule.. Concerned about weight gain.   H/o cardiomyopathy. Followed by cardiology.    On OMNIPOD 5+ Dexcom.   Using humalog in pump.  Using DEXCOM CGM.    She is working at [employer].  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 Marie also has glucagon kit at home for emergency.    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  Trulicity was also increased following that.    Wt is stable.  Wt Readings from Last 2 Encounters:   02/09/23 68.9 kg (152 lb)   02/03/23 73.6 kg (162 lb 3.2 oz)       1. Type 2 DM:  Orginally diagnosed at the age of: 21 with GDM during her first pregnancy. Diagnosed with DM 2 in 1/2002, diet controlled for 3 years, then started on metformin. In 2010 during her second pregnancy, started on insulin and then insulin pump therapy in 2012.   Current Regimen: Insulin pump therapy ( [email or web address] Paradigm), Trulicity 4.5 mg/week.  BS checks: Using Dexcom.  Average Meter Download: Blood glucose data reviewed personally. See nursing note from this encounter for details.  She is able to feel symptoms of hypoglycemia.    yes:     yes:     Diabetes Medication(s)     Diabetic Other       Glucagon, rDNA, (GLUCAGON EMERGENCY) 1 MG KIT    For Intramuscular use for low Blood glucose episode.    Insulin       insulin glargine (LANTUS PEN) 100 UNIT/ML pen    Take 20 units in case of pump malfunction only.     insulin lispro (HUMALOG) 100 UNIT/ML vial    USE WITH INSULIN PUMP AS DIRECTED, USES ABOUT 80 UNITS PER DAY    Incretin Mimetic Agents       Dulaglutide (TRULICITY) 4.5 MG/0.5ML SOPN    Inject 4.5 mg Subcutaneous once a week           Current Regimen:   Insulin pump -   Time Rate (U/hr)   0000-  0.75                   Carbohydrate Ratio -    Time Ratio   0000-  10         Sensitivity   40   Active Insulin Time   hours   Basal      Bolus      Total Carbohydrates/day    Total Insulin/day     Average Blood Sugar                Complications:   Diabetes Complications  Description / Detail    Diabetic Retinopathy  No   CAD / PAD  No   Neuropathy  No   Nephropathy / Microalbuminuria  Yes: microalbuminuria. ON cozaar.   Gastroparesis  No   Hypoglycemia [place]  No     2. Hypertension: Blood Pressure today:   BP Readings from Last 3 Encounters:   23 124/70   23 131/78   23 128/80     Takes medications everyday without forgetting a dose.  Denies feeling lightheaded or dizzy.    3. Hyperlipidemia:   Denies muscle aches of pains.       PMH/PSH:  Past Medical History:   Diagnosis Date     Allergic rhinitis due to pollen      Atypical glandular cells on Pap smear 3/1108     Cervical high risk HPV (human papillomavirus) test positive 2018    See problem list     Gastroesophageal reflux disease      PFO (patent foramen ovale)      Stargardt's disease 2019     Type II or unspecified type diabetes mellitus without mention of complication, not stated as uncontrolled     onset was gestational in      Past Surgical History:   Procedure Laterality Date     ABDOMEN SURGERY       C IUD,MIRENA  8/10/10, 7/28/15     C/SECTION, LOW TRANSVERSE  6/25/10    , Low Transverse     CHOLECYSTECTOMY, LAPOROSCOPIC      Cholecystectomy, Laparoscopic     ESOPHAGOSCOPY, GASTROSCOPY, DUODENOSCOPY (EGD), COMBINED N/A 2016    Procedure: COMBINED ESOPHAGOSCOPY, GASTROSCOPY, DUODENOSCOPY (EGD), BIOPSY SINGLE OR MULTIPLE;  Surgeon: Fadi Hunter MD;  Location: [facility] ESOPHAGEAL MOTILITY STUDY N/A 2016    Procedure: ESOPHAGEAL MOTILITY STUDY;  Surgeon: Fadi Hunter MD;  Location: [facility] REMOVE  TONSILS/ADENOIDS,<13 Y/O      T &A     IR RENAL BIOPSY RIGHT  2/3/2023     ZZC INDUCED ABORTN BY D&C           Family Hx:  Family History   Problem Relation Age of Onset     Cardiovascular Mother         hypertrophic cardiomyopathy     Genitourinary Problems Mother         gallbladder disease     Diabetes Mother         Transplnant induced/      Other - See Comments Mother         heart transplant 2005     Depression Mother      Diabetes Father         type 2     Hypertension Father      Hyperlipidemia Father      Genitourinary Problems Maternal Grandmother         gallbladder disease     Genitourinary Problems Paternal Grandmother         gallbladder disease     Hypertension Paternal Grandfather         high bp     Cerebrovascular Disease Paternal Grandfather              Cardiovascular Brother         hypertrophic cardiomyopathy     Diabetes Brother      Diabetes Brother         type 2     Diabetes Other         Type 2     Glaucoma No family hx of      Macular Degeneration No family hx of      Thyroid disease: No         DM2: Yes - father and mother         Autoimmune: DM1, SLE, RA, Vitiligo No    Social Hx:  Social History     Socioeconomic History     Marital status:      Spouse name: Not on file     Number of children: 2     Years of education: 14     Highest education level: Associate degree: academic program   Occupational History     Occupation: nurse     Occupation: RN   Tobacco Use     Smoking status: Former     Packs/day: 0.00     Years: 0.00     Pack years: 0.00     Types: Cigarettes     Quit date: 10/24/2005     Years since quittin.3     Smokeless tobacco: Never     Tobacco comments:     States only smokes when drinks maybe 2x/year   Substance and Sexual Activity     Alcohol use: No     Alcohol/week: 0.0 standard drinks     Drug use: No     Sexual activity: Yes     Partners: Male     Birth control/protection: I.U.D., Condom     Comment: Mirena IUD  7/28/15   Other Topics Concern     Parent/sibling w/ CABG, MI or angioplasty before 65F 55M? No   Social History Narrative    Caffeine intake/servings daily - 6-7    Calcium intake/servings daily - 2-3 yogurt, milk, cheese    Exercise 1 times weekly - describe aerobics    Sunscreen used - Yes    Seatbelts used - Yes    Guns stored in the home - No    Self Breast Exam - Yes    Pap test up to date -  Yes    Eye exam up to date -  Yes    Dental exam up to date -  Yes    DEXA scan up to date -  Not Applicable    Flex Sig/Colonoscopy up to date -  Not Applicable    Mammography up to date -  Not Applicable    Immunizations reviewed and up to date - Yes    Abuse: Current or Past (Physical, Sexual or Emotional) - No    Do you feel safe in your environment - Yes    Do you cope well with stress - Yes    Do you suffer from insomnia - Yes     Last updated by: Tatianna Lacey  5/9/2005     Social Determinants of Health     Financial Resource Strain: Medium Risk     Difficulty of Paying Living Expenses: Somewhat hard   Food Insecurity: Food Insecurity Present     Worried About Running Out of Food in the Last Year: Sometimes true     Ran Out of Food in the Last Year: Never true   Transportation Needs: No Transportation Needs     Lack of Transportation (Medical): No     Lack of Transportation (Non-Medical): No   Physical Activity: Insufficiently Active     Days of Exercise per Week: 4 days     Minutes of Exercise per Session: 10 min   Stress: No Stress Concern Present     Feeling of Stress : Only a little   Social Connections: Socially Isolated     Frequency of Communication with Friends and Family: Three times a week     Frequency of Social Gatherings with Friends and Family: Never     Attends Buddhist Services: Never     Active Member of Clubs or Organizations: No     Attends Club or Organization Meetings: Not on file     Marital Status:    Intimate Partner Violence: Not on file   Housing Stability: High Risk     Unable  to Pay for Housing in the Last Year: Yes     Number of Places Lived in the Last Year: 1     Unstable Housing in the Last Year: No          MEDICATIONS:  has a current medication list which includes the following prescription(s): albuterol, aspirin not prescribed, atenolol, blood glucose, blood glucose monitoring, cetirizine, clotrimazole, co-enzyme q-10, dexcom g6 sensor, dexcom g6 transmitter, trulicity, epinephrine, fluticasone, glucagon emergency, ibuprofen, omnipod 5 g6 pod (gen 5), insulin glargine, humalog, insulin pump, insulin syringe-needle u-100, blood glucose monitoring, levonorgestrel, losartan, magnesium oxide, montelukast, multiple vitamins-minerals, fish oil-omega-3 fatty acids, omeprazole, ondansetron, rosuvastatin, sertraline, sumatriptan, trazodone, urine glucose-ketones test, and b-100.    ROS     ROS: 10 point ROS neg other than the symptoms noted above in the HPI.    Physical Exam   VS: LMP 01/27/2023   GENERAL: healthy, alert and no distress  EYES: Eyes grossly normal to inspection, conjunctivae and sclerae normal  ENT: no nose swelling, nasal discharge.  Thyroid: no apparent thyroid nodules  RESP: no audible wheeze, cough, or visible cyanosis.  No visible retractions or increased work of breathing.  Able to speak fully in complete sentences.  ABDO: not evaluated.  EXTREMITIES: no hand tremors.  NEURO: Cranial nerves grossly intact, mentation intact and speech normal  SKIN: No apparent skin lesions, rash or edema seen   PSYCH: mentation appears normal, affect normal/bright, judgement and insight intact, normal speech and appearance well-groomed    LABS:  A1c:  Lab Results   Component Value Date    A1C 6.5 12/07/2022    A1C 6.5 10/03/2022    A1C 7.0 05/04/2022    A1C 6.4 02/10/2022    A1C 6.5 07/05/2021    A1C 7.6 01/14/2021    A1C 6.7 07/14/2020    A1C 7.0 01/14/2020    A1C 7.5 09/24/2019       Basic Metabolic Panel:  Creatinine   Date Value Ref Range Status   01/19/2023 1.08 (H) 0.51 - 0.95  mg/dL Final   07/05/2021 1.20 (H) 0.52 - 1.04 mg/dL Final     Urinary microalbumin:  Lab Results   Component Value Date    UMALCR 591.60 02/10/2022    UMALCR 402.04 07/05/2021         LFTS/Cholesterol Panel:  Recent Labs   Lab Test 01/27/23  1044 02/10/22  1103 09/28/16  0811 07/01/15  0612   CHOL 136 205*   < > 190   HDL 39* 48*   < > 42*   LDL 75 132*   < > 96   TRIG 110 123   < > 260*   CHOLHDLRATIO  --   --   --  4.5    < > = values in this interval not displayed.       Thyroid Function:  TSH   Date Value Ref Range Status   12/07/2022 1.53 0.40 - 4.00 mU/L Final   09/24/2019 1.45 0.40 - 4.00 mU/L Final     Vitamin D:  Vitamin D Deficiency Screening Results:  Lab Results   Component Value Date    VITDT 27 01/19/2023    VITDT 22 01/02/2023    VITDT 33 11/05/2018    VITDT 39 09/27/2016    VITDT 45 02/23/2016     All pertinent notes, labs, and images personally reviewed by me.     Glucometer/ insulin pump (if applicable)/ CGM data (if applicable) downloaded, Personally reviewed and interpreted.  All Blood sugar data reviewed personally and discussed with pt.  See nursing note from today's clinic visit for details of BG/CGM log.      A/P  Ms.Erica WILLIAM Vogel is a 42 year old here for the evaluation/management of diabetes:    1. DM2 - Under Fair control.  A1c 6.5%.  Diabetes complicated by microalbuminuria.  Using OMNIPOD 5 + Dexcom.  In automode about 80% of time.  On Trulicity 4.5 mg/week ( but have not started it yet)  H/o hypertropic cardiomyopathy, followed by cardiology.  In general BG appear in range.  Noted some episodes of hypoglycemia overnight.  Plan:  Discussed diagnosis, pathophysiology, management and treatment options of condition with pt.  Continue to use OMNIPOD 5+ Dexcom.   Change insulin: carb to 11 for dinner (from 6:00 pm- midnight)  Rest settings same.  Continue TRULICITY 4.5 mg/week.  Consider Jardiance 10 mg if OK from cardiology and nephrology.  Please inform us so that Rx can be  sent.  Continue to use insulin pump and Dexcom.  Labs and follow up in 3 months.    2. Hypertension - + microalbuminuria. On losartan. Blood pressure medications include cozaar 75 mg qd.    3. Hyperlipidemia - Under fair control. LDL 75.  Had myalgias on lipitor and stopped it.  On Crestor 5 mg/day.    4. Microalbuminuria:  Recommend strict BG control.  On Cozaar 50 mg/day.    DM Prevention:    Opthalmology- recommend annually.  Dental-Yes.  ASA- No  Smoking- No.    Medications     HMG CoA Reductase Inhibitors     rosuvastatin (CRESTOR) 5 MG tablet       Salicylates     ASPIRIN NOT PRESCRIBED (INTENTIONAL)           Most Recent Immunizations   Administered Date(s) Administered     COVID-19,PF,Pfizer (12+ Yrs) 12/28/2021     Influenza (IIV3) PF 09/26/2018     Influenza (intradermal) 10/01/2012     Influenza Vaccine IM > 6 months Valent IIV4 (Alfuria,Fluzone) 01/14/2021     MMR 09/17/2007     Pneumococcal 23 valent 01/22/2003     TD (ADULT, 7+) 01/01/2002     TDAP Vaccine (Adacel) 04/06/2012     Recommend checking blood sugars before meals and at bedtime.    If Blood glucose are low more often-> 2-3 times/week- give us a call.  The patient is advised to Make better food choices: reduce carbs, Reduce portion size, weight loss and exercise 3-4 times a week.  Discussed hypoglycemia signs and symptoms as well as management in detail.    All questions were answered.  The patient indicates understanding of the above issues and agrees with the plan set forth.   More than 50% of face to face time spent with Ms. Dino Vogel on counseling / coordinating her care.      Follow-up:  follow up in 3 months.    Zita Fuentes MD  Endocrinology   [facility]    CC: Alan Paredes    Addendum to above note and clinic visit:    Labs reviewed.    See result note/telephone encounter.

## 2023-03-06 NOTE — PATIENT INSTRUCTIONS
[facility]  Dr Fuentes, Endocrinology Department    [place] [address] [id number]  [place] [address]  Appointment [phone]  Fax: [phone]  [place]: Monday - Thursday      To provide the best diabetic care, please bring your blood glucose meter to each and every visit with your Endocrinologist.  Your blood glucose meter/insulin pump will be downloaded at every appointment.    Please arrive 15 minutes before your scheduled appointment.  This will allow for your blood glucose meter/insulin pump to be downloaded.  If you are wearing DEXCOM please bring  or sharing code so that it can be downloaded.  If you are using freestyle casey personal sensors please bring the reader.  If you are using TANDEM insulin pump please have your username and password to get info from [employer] website.    Change insulin: carb to 11 for dinner (from 6:00 pm- midnight)  Rest settings same.  Continue TRULICITY 4.5 mg/week.  Consider Jardiance 10 mg if OK from cardiology and nephrology.  Please inform us so that Rx can be sent.  Continue to use insulin pump and Dexcom.  Labs and follow up in 3 months.    Canagliflozin (Invokana), Dapagliflozin (Farxiga), empagliflozin (Jardiance),ertugliflozin (Steglatro) - these are other alternatives.  Please check cost with insurance.    Possible side effect of this class of medications include: high potassium levels, genitourinary infections including vaginal infections and urinary tract infections, low blood glucose, hypersensitivity reactions.  Rare side effects include risk for amputations, kidney infections and ketoacidosis.    Recommend checking blood sugars before meals and at bedtime.    If Blood glucose are low more often-> 2-3 times/week- give us a call.  Make better food choices: reduce carbs, Reduce portion size, weight loss and exercise 3-4 times a week.    What is hypoglycemia:  Hypoglycemia is when blood sugar  levels become too low - below 70 m/dl.      What causes hypoglycemia?  - using too much insulin  -taking too many diabetes pills  -not eating enough, or skipping meals or snacks  -not eating enough carbohydrate with meals  -changing your exercise routine  -drinking alcohol in excess    It is also possible to have hypoglycemia even when you are carefully managing your blood sugar levels.    What does it feel like when blood sugars get too low?  You may feel:  - anxious  -confused  -dizzy  -hungry  -light-headed  -nervous  -shaky  -sleepy  -sweaty    You may have  -blurred or cloudy vision  -heart palpitations (heart skips a beat or races)  -tingling or numbness around the mouth and tongue  -tremors    What to do if you have symptoms of hypoglycmemia:  If you think your blood sugar is too low, check it with a glucose meter.  If its below 70 mg/dl, consume one of the following:  Fruit juice (1/2 cup)  Glucose tablets (15 grams)  Hard candy (5 to 7 pieces)  Honey or sugar (2 teaspoons)  Milk (1/2 cup)  Soft drink (non-diet, 1/2 cup)    Wait 15 minutes and check your blood glucose again.  IF it is still below 70 mg/dl, have another food item listed above. Wait another 15 minutes and repeat the blood glucose test.  Have a small meal or snack that contains some carbohydrate after your blood glucose rises above 70 mg/dl.    If you are at risk of hypoglycemia, always carry with you glucose tablets or one of the foods listed above.      To prevent Hypoglycemia:  Avoid situations that may cause hypoglycemia  Before making any change to your diet or exercise routine, discuss them with your healthcare provider  Keep a record of your blood glucose levels.  Include the time of day, diabetes medications, when you had your last meal or snack, and what you were doing at the time (e.g. Watching TV, gardening, jogging, etc).    Talk to your healthcare provider if your blood glucose levels are often low        Patient guide on  hypoglycemia    http://www.hormone.org/Resources/upload/patient-guide-diagnosis-and-management-hypoglycemia-[email or web address]

## 2023-03-06 NOTE — NURSING NOTE
Is the patient currently in the state of [place]? YES    Visit mode:VIDEO    If the visit is dropped, the patient can be reconnected by: VIDEO VISIT: Text to cell phone: [phone]    Will anyone else be joining the visit? NO      How would you like to obtain your AVS? MyChart    Are changes needed to the allergy or medication list? NO     Patient declined individual allergy and medication review by support staff because patient states she reviewed everything during online check in.       Nellie Cameron, Virtual Facilitator      Reason for visit: MYC Return Diabetes Visit

## 2023-03-06 NOTE — LETTER
"    3/6/2023         RE: Marie Vogel  [address] [place] [address]        Dear Colleague,    Thank you for referring your patient, Marie Vogel, to the [facility]. Please see a copy of my visit note below.    Outcome for 02/27/23 8:33 AM: Data uploaded on Dexcom and Glooko  Jolene Nash LPN   Outcome for 03/02/23 3:33 PM: Dexcom and Glooko emailed to provider  Taylor Myhre, RMA            THIS IS A VIDEO VISIT:    Phone call visit/virtual visit encounter:    Name of patient: Marie Vogel    Date of encounter: 3/6/2023    Time of start of video visit: 9:30    Video started: 9:41    Video ended: 10:00    Provider location: working from home/ [facility]    Patient location: patients home.    Mode of transmission: [employer] video/ Mind[employer]    Verbal consent: obtained before starting visit. Pt is agreeable.      The patient has been notified of following:      \"This VIDEO visit will be conducted via a call between you and your physician/provider. We have found that certain health care needs can be provided without the need for a physical exam.  This service lets us provide the care you need with a short phone conversation.  If a prescription is necessary we can send it directly to your pharmacy.  If lab work is needed we can place an order for that and you can then stop by our lab to have the test done at a later time.     With new updates with corona virus patient might be billed as clinic visit.     If during the course of the call the physician/provider feels a telephone visit is not appropriate, you will not be charged for this service.\"      Past medical history, social history, family history, allergy and medications were reviewed and updated as appropriate.  Reviewed pertinent labs, notes, imaging studies personally.    Name: Marie Vogel  Seen for f/u of DM.  HPI:  Marie Vogel is a 42 year old female who presents for the " evaluation/management of DM.   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    Here for f/u. Previously has seen endo at [place] ( Dr Aguilera and Dr Zhou and ). 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 Busy at work, also variable schedule.. Concerned about weight gain.   H/o cardiomyopathy. Followed by cardiology.    On OMNIPOD 5+ Dexcom.   Using humalog in pump.  Using DEXCOM CGM.    She is working at [employer].  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 Marie also has glucagon kit at home for emergency.    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  Trulicity was also increased following that.    Wt is stable.  Wt Readings from Last 2 Encounters:   02/09/23 68.9 kg (152 lb)   02/03/23 73.6 kg (162 lb 3.2 oz)       1. Type 2 DM:  Orginally diagnosed at the age of: 21 with GDM during her first pregnancy. Diagnosed with DM 2 in 1/2002, diet controlled for 3 years, then started on metformin. In 2010 during her second pregnancy, started on insulin and then insulin pump therapy in 2012.   Current Regimen: Insulin pump therapy ( Medtronic Frugoton Paradigm), Trulicity 4.5 mg/week.  BS checks: Using Dexcom.  Average Meter Download: Blood glucose data reviewed personally. See nursing note from this encounter for details.  She is able to feel symptoms of hypoglycemia.    yes:     yes:     Diabetes Medication(s)     Diabetic Other        Glucagon, rDNA, (GLUCAGON EMERGENCY) 1 MG KIT    For Intramuscular use for low Blood glucose episode.    Insulin       insulin glargine (LANTUS PEN) 100 UNIT/ML pen    Take 20 units in case of pump malfunction only.     insulin lispro (HUMALOG) 100 UNIT/ML vial    USE WITH INSULIN PUMP AS DIRECTED, USES ABOUT 80 UNITS PER DAY    Incretin Mimetic Agents       Dulaglutide (TRULICITY) 4.5 MG/0.5ML SOPN    Inject 4.5 mg Subcutaneous once a week          Current Regimen:   Insulin pump -   Time Rate (U/hr)   0000-  0.75                   Carbohydrate Ratio -    Time Ratio   0000-  10         Sensitivity   40   Active Insulin Time   hours   Basal      Bolus      Total Carbohydrates/day    Total Insulin/day     Average Blood Sugar                Complications:   Diabetes Complications  Description / Detail    Diabetic Retinopathy  No   CAD / PAD  No   Neuropathy  No   Nephropathy / Microalbuminuria  Yes: microalbuminuria. ON cozaar.   Gastroparesis  No   Hypoglycemia [place]  No     2. Hypertension: Blood Pressure today:   BP Readings from Last 3 Encounters:   23 124/70   23 131/78   23 128/80     Takes medications everyday without forgetting a dose.  Denies feeling lightheaded or dizzy.    3. Hyperlipidemia:   Denies muscle aches of pains.       PMH/PSH:  Past Medical History:   Diagnosis Date     Allergic rhinitis due to pollen      Atypical glandular cells on Pap smear 3/1108     Cervical high risk HPV (human papillomavirus) test positive 2018    See problem list     Gastroesophageal reflux disease      PFO (patent foramen ovale)      Stargardt's disease 2019     Type II or unspecified type diabetes mellitus without mention of complication, not stated as uncontrolled     onset was gestational in      Past Surgical History:   Procedure Laterality Date     ABDOMEN SURGERY       C IUD,MIRENA  8/10/10, 7/28/15     C/SECTION, LOW TRANSVERSE  6/25/10    , Low Transverse      CHOLECYSTECTOMY, LAPOROSCOPIC      Cholecystectomy, Laparoscopic     ESOPHAGOSCOPY, GASTROSCOPY, DUODENOSCOPY (EGD), COMBINED N/A 2016    Procedure: COMBINED ESOPHAGOSCOPY, GASTROSCOPY, DUODENOSCOPY (EGD), BIOPSY SINGLE OR MULTIPLE;  Surgeon: Fadi Hunter MD;  Location: [facility]      ESOPHAGEAL MOTILITY STUDY N/A 2016    Procedure: ESOPHAGEAL MOTILITY STUDY;  Surgeon: Fadi Hunter MD;  Location: [facility]     HC REMOVE TONSILS/ADENOIDS,<13 Y/O      T &A     IR RENAL BIOPSY RIGHT  2/3/2023     ZZC INDUCED ABORTN BY D&C           Family Hx:  Family History   Problem Relation Age of Onset     Cardiovascular Mother         hypertrophic cardiomyopathy     Genitourinary Problems Mother         gallbladder disease     Diabetes Mother         Transplnant induced/      Other - See Comments Mother         heart transplant 2005     Depression Mother      Diabetes Father         type 2     Hypertension Father      Hyperlipidemia Father      Genitourinary Problems Maternal Grandmother         gallbladder disease     Genitourinary Problems Paternal Grandmother         gallbladder disease     Hypertension Paternal Grandfather         high bp     Cerebrovascular Disease Paternal Grandfather              Cardiovascular Brother         hypertrophic cardiomyopathy     Diabetes Brother      Diabetes Brother         type 2     Diabetes Other         Type 2     Glaucoma No family hx of      Macular Degeneration No family hx of      Thyroid disease: No         DM2: Yes - father and mother         Autoimmune: DM1, SLE, RA, Vitiligo No    Social Hx:  Social History     Socioeconomic History     Marital status:      Spouse name: Not on file     Number of children: 2     Years of education: 14     Highest education level: Associate degree: academic program   Occupational History     Occupation: nurse     Occupation: RN   Tobacco Use     Smoking status: Former      Packs/day: 0.00     Years: 0.00     Pack years: 0.00     Types: Cigarettes     Quit date: 10/24/2005     Years since quittin.3     Smokeless tobacco: Never     Tobacco comments:     States only smokes when drinks maybe 2x/year   Substance and Sexual Activity     Alcohol use: No     Alcohol/week: 0.0 standard drinks     Drug use: No     Sexual activity: Yes     Partners: Male     Birth control/protection: I.U.D., Condom     Comment: Mirena IUD 7/28/15   Other Topics Concern     Parent/sibling w/ CABG, MI or angioplasty before 65F 55M? No   Social History Narrative    Caffeine intake/servings daily - 6-7    Calcium intake/servings daily - 2-3 yogurt, milk, cheese    Exercise 1 times weekly - describe aerobics    Sunscreen used - Yes    Seatbelts used - Yes    Guns stored in the home - No    Self Breast Exam - Yes    Pap test up to date -  Yes    Eye exam up to date -  Yes    Dental exam up to date -  Yes    DEXA scan up to date -  Not Applicable    Flex Sig/Colonoscopy up to date -  Not Applicable    Mammography up to date -  Not Applicable    Immunizations reviewed and up to date - Yes    Abuse: Current or Past (Physical, Sexual or Emotional) - No    Do you feel safe in your environment - Yes    Do you cope well with stress - Yes    Do you suffer from insomnia - Yes     Last updated by: Tatianna Lacey  2005     Social Determinants of Health     Financial Resource Strain: Medium Risk     Difficulty of Paying Living Expenses: Somewhat hard   Food Insecurity: Food Insecurity Present     Worried About Running Out of Food in the Last Year: Sometimes true     Ran Out of Food in the Last Year: Never true   Transportation Needs: No Transportation Needs     Lack of Transportation (Medical): No     Lack of Transportation (Non-Medical): No   Physical Activity: Insufficiently Active     Days of Exercise per Week: 4 days     Minutes of Exercise per Session: 10 min   Stress: No Stress Concern Present     Feeling of Stress  : Only a little   Social Connections: Socially Isolated     Frequency of Communication with Friends and Family: Three times a week     Frequency of Social Gatherings with Friends and Family: Never     Attends Alevism Services: Never     Active Member of Clubs or Organizations: No     Attends Club or Organization Meetings: Not on file     Marital Status:    Intimate Partner Violence: Not on file   Housing Stability: High Risk     Unable to Pay for Housing in the Last Year: Yes     Number of Places Lived in the Last Year: 1     Unstable Housing in the Last Year: No          MEDICATIONS:  has a current medication list which includes the following prescription(s): albuterol, aspirin not prescribed, atenolol, blood glucose, blood glucose monitoring, cetirizine, clotrimazole, co-enzyme q-10, dexcom g6 sensor, dexcom g6 transmitter, trulicity, epinephrine, fluticasone, glucagon emergency, ibuprofen, omnipod 5 g6 pod (gen 5), insulin glargine, humalog, insulin pump, insulin syringe-needle u-100, blood glucose monitoring, levonorgestrel, losartan, magnesium oxide, montelukast, multiple vitamins-minerals, fish oil-omega-3 fatty acids, omeprazole, ondansetron, rosuvastatin, sertraline, sumatriptan, trazodone, urine glucose-ketones test, and b-100.    ROS     ROS: 10 point ROS neg other than the symptoms noted above in the HPI.    Physical Exam   VS: LMP 01/27/2023   GENERAL: healthy, alert and no distress  EYES: Eyes grossly normal to inspection, conjunctivae and sclerae normal  ENT: no nose swelling, nasal discharge.  Thyroid: no apparent thyroid nodules  RESP: no audible wheeze, cough, or visible cyanosis.  No visible retractions or increased work of breathing.  Able to speak fully in complete sentences.  ABDO: not evaluated.  EXTREMITIES: no hand tremors.  NEURO: Cranial nerves grossly intact, mentation intact and speech normal  SKIN: No apparent skin lesions, rash or edema seen   PSYCH: mentation appears normal,  affect normal/bright, judgement and insight intact, normal speech and appearance well-groomed    LABS:  A1c:  Lab Results   Component Value Date    A1C 6.5 12/07/2022    A1C 6.5 10/03/2022    A1C 7.0 05/04/2022    A1C 6.4 02/10/2022    A1C 6.5 07/05/2021    A1C 7.6 01/14/2021    A1C 6.7 07/14/2020    A1C 7.0 01/14/2020    A1C 7.5 09/24/2019       Basic Metabolic Panel:  Creatinine   Date Value Ref Range Status   01/19/2023 1.08 (H) 0.51 - 0.95 mg/dL Final   07/05/2021 1.20 (H) 0.52 - 1.04 mg/dL Final     Urinary microalbumin:  Lab Results   Component Value Date    UMALCR 591.60 02/10/2022    UMALCR 402.04 07/05/2021         LFTS/Cholesterol Panel:  Recent Labs   Lab Test 01/27/23  1044 02/10/22  1103 09/28/16  0811 07/01/15  0612   CHOL 136 205*   < > 190   HDL 39* 48*   < > 42*   LDL 75 132*   < > 96   TRIG 110 123   < > 260*   CHOLHDLRATIO  --   --   --  4.5    < > = values in this interval not displayed.       Thyroid Function:  TSH   Date Value Ref Range Status   12/07/2022 1.53 0.40 - 4.00 mU/L Final   09/24/2019 1.45 0.40 - 4.00 mU/L Final     Vitamin D:  Vitamin D Deficiency Screening Results:  Lab Results   Component Value Date    VITDT 27 01/19/2023    VITDT 22 01/02/2023    VITDT 33 11/05/2018    VITDT 39 09/27/2016    VITDT 45 02/23/2016     All pertinent notes, labs, and images personally reviewed by me.     Glucometer/ insulin pump (if applicable)/ CGM data (if applicable) downloaded, Personally reviewed and interpreted.  All Blood sugar data reviewed personally and discussed with pt.  See nursing note from today's clinic visit for details of BG/CGM log.      A/P  Ms.Erica WILLIAM Vogel is a 42 year old here for the evaluation/management of diabetes:    1. DM2 - Under Fair control.  A1c 6.5%.  Diabetes complicated by microalbuminuria.  Using OMNIPOD 5 + Dexcom.  In automode about 80% of time.  On Trulicity 4.5 mg/week ( but have not started it yet)  H/o hypertropic cardiomyopathy, followed by  cardiology.  In general BG appear in range.  Noted some episodes of hypoglycemia overnight.  Plan:  Discussed diagnosis, pathophysiology, management and treatment options of condition with pt.  Continue to use OMNIPOD 5+ Dexcom.   Change insulin: carb to 11 for dinner (from 6:00 pm- midnight)  Rest settings same.  Continue TRULICITY 4.5 mg/week.  Consider Jardiance 10 mg if OK from cardiology and nephrology.  Please inform us so that Rx can be sent.  Continue to use insulin pump and Dexcom.  Labs and follow up in 3 months.    2. Hypertension - + microalbuminuria. On losartan. Blood pressure medications include cozaar 75 mg qd.    3. Hyperlipidemia - Under fair control. LDL 75.  Had myalgias on lipitor and stopped it.  On Crestor 5 mg/day.    4. Microalbuminuria:  Recommend strict BG control.  On Cozaar 50 mg/day.    DM Prevention:    Opthalmology- recommend annually.  Dental-Yes.  ASA- No  Smoking- No.    Medications     HMG CoA Reductase Inhibitors     rosuvastatin (CRESTOR) 5 MG tablet       Salicylates     ASPIRIN NOT PRESCRIBED (INTENTIONAL)           Most Recent Immunizations   Administered Date(s) Administered     COVID-19,PF,Pfizer (12+ Yrs) 12/28/2021     Influenza (IIV3) PF 09/26/2018     Influenza (intradermal) 10/01/2012     Influenza Vaccine IM > 6 months Valent IIV4 (Alfuria,Fluzone) 01/14/2021     MMR 09/17/2007     Pneumococcal 23 valent 01/22/2003     TD (ADULT, 7+) 01/01/2002     TDAP Vaccine (Adacel) 04/06/2012     Recommend checking blood sugars before meals and at bedtime.    If Blood glucose are low more often-> 2-3 times/week- give us a call.  The patient is advised to Make better food choices: reduce carbs, Reduce portion size, weight loss and exercise 3-4 times a week.  Discussed hypoglycemia signs and symptoms as well as management in detail.    All questions were answered.  The patient indicates understanding of the above issues and agrees with the plan set forth.   More than 50% of face to  face time spent with Ms. Dino Vogel on counseling / coordinating her care.      Follow-up:  follow up in 3 months.    Zita Fuentes MD  Endocrinology   [facility]    CC: Alan Paredes    Addendum to above note and clinic visit:    Labs reviewed.    See result note/telephone encounter.                    Again, thank you for allowing me to participate in the care of your patient.        Sincerely,        Zita Fuentes MD

## 2023-03-07 ENCOUNTER — OFFICE VISIT (OUTPATIENT)
Dept: OPHTHALMOLOGY | Facility: CLINIC | Age: 43
End: 2023-03-07
Attending: OPHTHALMOLOGY
Payer: COMMERCIAL

## 2023-03-07 DIAGNOSIS — R68.89 SUSPECTED GLAUCOMA OF BOTH EYES: Primary | ICD-10-CM

## 2023-03-07 DIAGNOSIS — H35.50 RETINAL DYSTROPHY: ICD-10-CM

## 2023-03-07 DIAGNOSIS — E11.9 TYPE 2 DIABETES MELLITUS WITHOUT RETINOPATHY (H): ICD-10-CM

## 2023-03-07 PROCEDURE — 92015 DETERMINE REFRACTIVE STATE: CPT

## 2023-03-07 PROCEDURE — 92133 CPTRZD OPH DX IMG PST SGM ON: CPT | Mod: 26 | Performed by: OPHTHALMOLOGY

## 2023-03-07 PROCEDURE — 92014 COMPRE OPH EXAM EST PT 1/>: CPT | Mod: GC | Performed by: OPHTHALMOLOGY

## 2023-03-07 PROCEDURE — G0463 HOSPITAL OUTPT CLINIC VISIT: HCPCS | Mod: 25

## 2023-03-07 PROCEDURE — 92133 CPTRZD OPH DX IMG PST SGM ON: CPT | Performed by: OPHTHALMOLOGY

## 2023-03-07 ASSESSMENT — SLIT LAMP EXAM - LIDS
COMMENTS: NORMAL
COMMENTS: NORMAL

## 2023-03-07 ASSESSMENT — CONF VISUAL FIELD
OD_SUPERIOR_TEMPORAL_RESTRICTION: 0
OD_NORMAL: 1
OD_INFERIOR_TEMPORAL_RESTRICTION: 0
METHOD: COUNTING FINGERS
OD_INFERIOR_NASAL_RESTRICTION: 0
OD_SUPERIOR_NASAL_RESTRICTION: 0

## 2023-03-07 ASSESSMENT — TONOMETRY
IOP_METHOD: ICARE
OD_IOP_MMHG: 21
OS_IOP_MMHG: 17

## 2023-03-07 ASSESSMENT — REFRACTION_WEARINGRX
OS_SPHERE: -4.50
OD_AXIS: 105
OD_ADD: +1.25
OS_ADD: +1.25
OS_CYLINDER: +2.25
OS_AXIS: 055
OD_CYLINDER: +2.00
OD_SPHERE: -4.75

## 2023-03-07 ASSESSMENT — EXTERNAL EXAM - RIGHT EYE: OD_EXAM: NORMAL

## 2023-03-07 ASSESSMENT — VISUAL ACUITY
METHOD: SNELLEN - LINEAR
CORRECTION_TYPE: GLASSES
OS_CC: 20/20
OD_CC: 20/20
OD_CC+: -1

## 2023-03-07 ASSESSMENT — REFRACTION_MANIFEST
OS_CYLINDER: +2.00
OD_SPHERE: -4.50
OS_AXIS: 055
OS_ADD: +1.50
OD_AXIS: 110
OD_CYLINDER: +1.75
OS_SPHERE: -4.00
OD_ADD: +1.50

## 2023-03-07 ASSESSMENT — EXTERNAL EXAM - LEFT EYE: OS_EXAM: NORMAL

## 2023-03-07 ASSESSMENT — CUP TO DISC RATIO
OS_RATIO: 0.55
OD_RATIO: 0.55

## 2023-03-07 ASSESSMENT — PACHYMETRY
OD_CT(UM): 569
OS_CT(UM): 589

## 2023-03-07 NOTE — Clinical Note
Hi all,   There was some confusion regarding the need for follow up in retina clinic with this patient.  She is reporting worsening night vision, and I think reasonable to see [facility] again.  The patient prefers [facility] for communication/scheduling.    Thanks Jaime

## 2023-03-07 NOTE — NURSING NOTE
Chief Complaints and History of Present Illnesses   Patient presents with     COMPREHENSIVE EYE EXAM     Chief Complaint(s) and History of Present Illness(es)     COMPREHENSIVE EYE EXAM            Laterality: both eyes    Course: gradually worsening    Associated symptoms: floaters.  Negative for eye pain and flashes    Treatments tried: no treatments          Comments    Marie Vogel is a 42 year old female who presents for a comprehensive exam. Last eye exam was over 1 year ago. Since exam, vision has been gradually worsening. She notes an occasional spiral-like object in left eye. Stable floaters without flashes. Denies using lubricating drops. No eye pain.    LBS: 162 which she notes is higher than normal.  Lab Results       Component                Value               Date                       A1C                      6.5                 12/07/2022                 A1C                      6.5                 10/03/2022                 A1C                      7.0                 05/04/2022                 A1C                      6.4                 02/10/2022                 A1C                      6.5                 07/05/2021                 A1C                      7.6                 01/14/2021                 A1C                      6.7                 07/14/2020                 A1C                      7.0                 01/14/2020                 A1C                      7.5                 09/24/2019                Brandon Ochoa COT 8:24 AM March 7, 2023

## 2023-03-07 NOTE — PROGRESS NOTES
Chief Complaint(s) and History of Present Illness(es)     COMPREHENSIVE EYE EXAM            Laterality: both eyes    Course: gradually worsening    Associated symptoms: floaters.  Negative for eye pain and flashes    Treatments tried: no treatments          Comments    Marie Vogel is a 42 year old female who presents for a   comprehensive exam. Last eye exam was over 1 year ago. Since exam, vision   has been gradually worsening. She notes an occasional spiral-like object   in left eye. Stable floaters without flashes. Denies using lubricating   drops. No eye pain.    LBS: 162 which she notes is higher than normal.  Lab Results       Component                Value               Date                       A1C                      6.5                 12/07/2022                 A1C                      6.5                 10/03/2022                 A1C                      7.0                 05/04/2022                 A1C                      6.4                 02/10/2022                 A1C                      6.5                 07/05/2021                 A1C                      7.6                 01/14/2021                 A1C                      6.7                 07/14/2020                 A1C                      7.0                 01/14/2020                 A1C                      7.5                 09/24/2019                Brandon Greeno COT 8:24 AM March 7, 2023                  Review of systems for the eyes was negative other than the pertinent positives/negatives listed in the HPI.      Assessment & Plan      Marie Vogel is a 42 year old female with the following diagnoses:   1. Suspected glaucoma of both eyes    2. Type 2 diabetes mellitus without retinopathy (H)       Type 2 diabetes mellitus, without retinopathy  -A1c: 6.5% (3 months ago, hx in 6-7% range)  -Reviewed adequate blood sugar control, and healthy lifestyle habits  -No diabetic retinopathy on exam  -Monitor  annually    Glaucoma suspect due to cup-disc ratio  -Last GVF 1/2021: Paracentral visual field defects both eyes (secondary to retinal dystrophy)  -IOP today: 21/17 (stable from prior)  -C/d: 0.55 in both eyes  -OCT RNFL today: right eye nerve thickness stable. Left eye has new thinning temporally, however poor quality image/tracing. Will see if BMO-MRW possible next time  -Variability within expected range given retinal dystrophy.  -Clinically stable. No disc heme's  -Monitor    #. Vitreous syneresis OU              - advised S/Sx RD     # Retinal dystrophy both eyes               - based on imaging & GVF              - SMD vs cone dystrophy              - genetic testing by [facility]   - Following with retina service. Due for annual visit in ~ 2months    # Myopia with astigmatism and presbyopia, both eyes  -BCVA 20/20 each eye   -Reviewed glasses prescription with patient and offered Rx.     Patient disposition:   Return in about 2 months (around 5/7/2023) for [place] for annual koko Walsh in 1 year prn .           Attending Physician Attestation:  Complete documentation of historical and exam elements from today's encounter can be found in the full encounter summary report (not reduplicated in this progress note).  I personally obtained the chief complaint(s) and history of present illness.  I confirmed and edited as necessary the review of systems, past medical/surgical history, family history, social history, and examination findings as documented by others; and I examined the patient myself.  I personally reviewed the relevant tests, images, and reports as documented above.  I formulated and edited as necessary the assessment and plan and discussed the findings and management plan with the patient and family. . - Lakhwinder Walsh MD

## 2023-03-08 ENCOUNTER — MYC MEDICAL ADVICE (OUTPATIENT)
Dept: OPHTHALMOLOGY | Facility: CLINIC | Age: 43
End: 2023-03-08
Payer: COMMERCIAL

## 2023-03-08 ENCOUNTER — VIRTUAL VISIT (OUTPATIENT)
Dept: PSYCHOLOGY | Facility: CLINIC | Age: 43
End: 2023-03-08
Payer: COMMERCIAL

## 2023-03-08 ENCOUNTER — TELEPHONE (OUTPATIENT)
Dept: ENDOCRINOLOGY | Facility: CLINIC | Age: 43
End: 2023-03-08
Payer: COMMERCIAL

## 2023-03-08 DIAGNOSIS — F43.21 ADJUSTMENT DISORDER WITH DEPRESSED MOOD: Primary | ICD-10-CM

## 2023-03-08 PROCEDURE — 90834 PSYTX W PT 45 MINUTES: CPT | Mod: VID

## 2023-03-08 ASSESSMENT — PATIENT HEALTH QUESTIONNAIRE - PHQ9
SUM OF ALL RESPONSES TO PHQ QUESTIONS 1-9: 7
10. IF YOU CHECKED OFF ANY PROBLEMS, HOW DIFFICULT HAVE THESE PROBLEMS MADE IT FOR YOU TO DO YOUR WORK, TAKE CARE OF THINGS AT HOME, OR GET ALONG WITH OTHER PEOPLE: SOMEWHAT DIFFICULT
SUM OF ALL RESPONSES TO PHQ QUESTIONS 1-9: 7

## 2023-03-08 ASSESSMENT — CONF VISUAL FIELD
OS_NORMAL: 1
OS_SUPERIOR_TEMPORAL_RESTRICTION: 0
OS_INFERIOR_TEMPORAL_RESTRICTION: 0
OS_INFERIOR_NASAL_RESTRICTION: 0
OS_SUPERIOR_NASAL_RESTRICTION: 0

## 2023-03-08 NOTE — TELEPHONE ENCOUNTER
Follow-up:  follow up in 3 months.     Zita Fuentes MD  Endocrinology   [facility]  -----------------------------------------   Writer contacted patient to schedule follow up per chart notes above from provider. Patient's name and  verified during call to ensure right patient.  Patient scheduled appointment with writer. Patient in agreement with time/date/mode of appointment and did not verbalize any further questions at this time.  Stephani RENTERIA, Virtual Visit Facilitator 1:25 PM 2023

## 2023-03-08 NOTE — PROGRESS NOTES
[facility]                                     Progress Note    Patient Name: Marie Vogel  Date: 03/08/2023         Service Type: Individual      Session Start Time: 8:09 PM  Session End Time: 8:56 PM     Session Length: 47 Minutes     Session #: 2    Attendees: Client attended alone    Service Modality:  Video Visit:      Provider verified identity through the following two step process.  Patient provided:  Patient is known previously to provider    Telemedicine Visit: The patient's condition can be safely assessed and treated via synchronous audio and visual telemedicine encounter.      Reason for Telemedicine Visit: Patient has requested telehealth visit    Originating Site (Patient Location): Patient's home    Distant Site (Provider Location): [facility]    Consent:  The patient/guardian has verbally consented to: the potential risks and benefits of telemedicine (video visit) versus in person care; bill my insurance or make self-payment for services provided; and responsibility for payment of non-covered services.     Patient would like the video invitation sent by:  Text to cell phone: [phone]     Mode of Communication:  Video Conference via Amwell    Distant Location (Provider):  On-site    As the provider I attest to compliance with applicable laws and regulations related to telemedicine.    DATA  Interactive Complexity: No  Crisis: No        Progress Since Last Session (Related to Symptoms / Goals / Homework):   Symptoms: No change The patient reports continued internal family stressors impacting her functioning     Homework: No homework assigned at this time. Continue working on the diagnostic assessment.       Episode of Care Goals: No care goals established at this time due to the treatment plan being incomplete.      Current / Ongoing Stressors and Concerns:   The patient reports internal relational stressors  that are ongoing. The patient also reports internal family stressors within her nuclear family. She is currently taking on a new role at work that comes with a lot of stress and responsibility. She is also trying to manage all of the medical appointments for both herself and her kids.      Treatment Objective(s) Addressed in This Session:   No treatment objectives addressed during this session due to the treatment plan being incomplete. The student intern therapist and patient will continue working on the diagnostic assessment next session.      Intervention:   Motivational Interviewing: The student intern therapist elicited responses from the patient regarding their current level of functioning. The writer utilized reflective listening as the patient gave a breif recap of the events that have occurred in her life over the past two weeks. The writer then utilized open-ended questions to assess the patients motivation for change and assist the patient in establishing a plan to address their concerns. While listening, the writer affirmed the patients feelings and celebrated the progress that the patient has made thus far.   Solution Focused: The student intern therapist and the writer continued to work on the diagnostic assessment. The writer questioned the patient about her previous therapy experiences, diagnoses, while collecting additional contextual information.     Assessments completed prior to visit:  PHQ9:   PHQ-9 SCORE 11/2/2020 7/27/2021 5/9/2022 11/16/2022 1/3/2023 2/28/2023 3/8/2023   PHQ-9 Total Score - - - - - - -   PHQ-9 Total Score MyChart - - - 6 (Mild depression) 5 (Mild depression) 5 (Mild depression) 7 (Mild depression)   PHQ-9 Total Score 6 2 1 6 5 5 7   Some encounter information is confidential and restricted. Go to Review Flowsheets activity to see all data.     GAD7:   ALEXSANDRA-7 SCORE 10/5/2020 11/2/2020 2/28/2023   Total Score 5 (mild anxiety) - 4 (minimal anxiety)   Total Score - 2 4   Some  encounter information is confidential and restricted. Go to Review Flowsheets activity to see all data.     CAGE-AID:   CAGE-AID Total Score 2/21/2023   Total Score 0   Total Score MyChart 0 (A total score of 2 or greater is considered clinically significant)     PROMIS 10-Global Health (all questions and answers displayed):   PROMIS 10 2/21/2023 3/5/2023   In general, would you say your health is: Fair Good   In general, would you say your quality of life is: Good Good   In general, how would you rate your physical health? Fair Good   In general, how would you rate your mental health, including your mood and your ability to think? Fair Fair   In general, how would you rate your satisfaction with your social activities and relationships? Fair Fair   In general, please rate how well you carry out your usual social activities and roles Fair Good   To what extent are you able to carry out your everyday physical activities such as walking, climbing stairs, carrying groceries, or moving a chair? Moderately Mostly   How often have you been bothered by emotional problems such as feeling anxious, depressed or irritable? Sometimes Sometimes   How would you rate your fatigue on average? Mild Moderate   How would you rate your pain on average?   0 = No Pain  to  10 = Worst Imaginable Pain 2 2   In general, would you say your health is: 2 3   In general, would you say your quality of life is: 3 3   In general, how would you rate your physical health? 2 3   In general, how would you rate your mental health, including your mood and your ability to think? 2 2   In general, how would you rate your satisfaction with your social activities and relationships? 2 2   In general, please rate how well you carry out your usual social activities and roles. (This includes activities at home, at work and in your community, and responsibilities as a parent, child, spouse, employee, friend, etc.) 2 3   To what extent are you able to carry out  your everyday physical activities such as walking, climbing stairs, carrying groceries, or moving a chair? 3 4   In the past 7 days, how often have you been bothered by emotional problems such as feeling anxious, depressed, or irritable? 3 3   In the past 7 days, how would you rate your fatigue on average? 2 3   In the past 7 days, how would you rate your pain on average, where 0 means no pain, and 10 means worst imaginable pain? 2 2   Global Mental Health Score 10 10   Global Physical Health Score 13 14   PROMIS TOTAL - SUBSCORES 23 24   Some recent data might be hidden     [place] Suicide Severity Rating Scale (Lifetime/Recent)  [place] Suicide Severity Rating (Lifetime/Recent) 1/13/2019 2/28/2023   Q1 Wished to be Dead (Past Month) no -   Q2 Suicidal Thoughts (Past Month) no -   Q6 Suicide Behavior (Lifetime) no -   1. Wish to be Dead (Past 1 Month) - 0   2. Non-Specific Active Suicidal Thoughts (Past 1 Month) - 0   Calculated C-SSRS Risk Score (Lifetime/Recent) - No Risk Indicated         ASSESSMENT: Current Emotional / Mental Status (status of significant symptoms):   Risk status (Self / Other harm or suicidal ideation)   Patient denies current fears or concerns for personal safety.   Patient denies current or recent suicidal ideation or behaviors.   Patient denies current or recent homicidal ideation or behaviors.   Patient denies current or recent self injurious behavior or ideation.   Patient denies other safety concerns.   Patient reports there has been no change in risk factors since their last session.     Patient reports there has been no change in protective factors since their last session.     Recommended that patient call 911 or go to the local ED should there be a change in any of these risk factors.     Appearance:   Appropriate    Eye Contact:   Good    Psychomotor Behavior: Normal    Attitude:   Cooperative  Interested Friendly   Orientation:   All   Speech    Rate / Production: Normal/  Responsive Normal     Volume:  Normal    Mood:    Depressed    Affect:    Appropriate    Thought Content:  Clear    Thought Form:  Coherent  Logical    Insight:    Good      Medication Review:   No changes to current psychiatric medication(s)     Medication Compliance:   Yes     Changes in Health Issues:   None reported     Chemical Use Review:   Substance Use: Chemical use reviewed, no active concerns identified      Tobacco Use: No current tobacco use.      Diagnosis:  1. Adjustment disorder with depressed mood        Collateral Reports Completed:   Not Applicable    PLAN: (Patient Tasks / Therapist Tasks / Other)  The student intern therapist and the patient will continue working on the diagnostic assessment next session. The patient will also begin contemplating goals and objectives that she would like to accomplish in therapy.         CYRIL Tao Student Intern Therapist    March, 8th 2023

## 2023-03-16 DIAGNOSIS — E11.21 TYPE 2 DIABETES MELLITUS WITH DIABETIC NEPHROPATHY, WITH LONG-TERM CURRENT USE OF INSULIN (H): ICD-10-CM

## 2023-03-16 DIAGNOSIS — Z79.4 TYPE 2 DIABETES MELLITUS WITH DIABETIC NEPHROPATHY, WITH LONG-TERM CURRENT USE OF INSULIN (H): ICD-10-CM

## 2023-03-16 NOTE — TELEPHONE ENCOUNTER
Requested Prescriptions   Pending Prescriptions Disp Refills     insulin lispro (HUMALOG) 100 UNIT/ML vial      Last Written Prescription Date:  05/05/22  Last Fill Quantity: 80 mL,  # refills: 1   Last office visit: 10/12/2022 with prescribing provider:  DEMETRIUS 03/06/23   Future Office Visit:           80 mL 1     Sig: USE WITH INSULIN PUMP AS DIRECTED, USES ABOUT 80 UNITS PER DAY       There is no refill protocol information for this order

## 2023-03-16 NOTE — TELEPHONE ENCOUNTER
"Requested Prescriptions   Pending Prescriptions Disp Refills     insulin lispro (HUMALOG) 100 UNIT/ML vial 80 mL 1     Sig: USE WITH INSULIN PUMP AS DIRECTED, USES ABOUT 80 UNITS PER DAY       Short Acting Insulin Protocol Passed - 3/16/2023  1:15 PM        Passed - Serum creatinine on file in past 12 months     Recent Labs   Lab Test 01/19/23  1037   CR 1.08*       Ok to refill medication if creatinine is low          Passed - HgbA1C in past 3 or 6 months     If HgbA1C is 8 or greater, it needs to be on file within the past 3 months.  If less than 8, must be on file within the past 6 months.     Recent Labs   Lab Test 12/07/22  1343   A1C 6.5*             Passed - Medication is active on med list        Passed - Patient is age 18 or older        Passed - Recent (6 mo) or future (30 days) visit within the authorizing provider's specialty     Patient had office visit in the last 6 months or has a visit in the next 30 days with authorizing provider or within the authorizing provider's specialty.  See \"Patient Info\" tab in inbasket, or \"Choose Columns\" in Meds & Orders section of the refill encounter.                 "

## 2023-03-24 ENCOUNTER — E-VISIT (OUTPATIENT)
Dept: URGENT CARE | Facility: CLINIC | Age: 43
End: 2023-03-24
Payer: COMMERCIAL

## 2023-03-24 DIAGNOSIS — J01.90 ACUTE SINUSITIS, RECURRENCE NOT SPECIFIED, UNSPECIFIED LOCATION: Primary | ICD-10-CM

## 2023-03-24 PROCEDURE — 99421 OL DIG E/M SVC 5-10 MIN: CPT | Performed by: NURSE PRACTITIONER

## 2023-03-24 NOTE — PATIENT INSTRUCTIONS
* Sinusitis (No Antibiotics)    The sinuses are air-filled spaces within the bones of the face. They connect to the inside of the nose. Sinusitis is an inflammation of the tissue that lines the sinuses. Sinusitis can occur during a cold. It can also happen due to allergies to pollens and other particles in the air. It can cause symptoms such as sinus congestion, headache, sore throat, facial swelling, and a feeling of fullness. It may also cause a low-grade fever. Your sinusitis does not include an infection with bacteria. Because of this, antibiotics are not used to treat this problem.  Home care  Drink plenty of water, hot tea, and other liquids. This may help thin nasal mucus. It also may help your sinuses drain fluids.  Heat may help soothe painful areas of your face. Use a towel soaked in hot water. Or,  the shower and direct the warm spray onto your face. Using a vaporizer along with a menthol rub at night may also help soothe symptoms.   An expectorant with guaifenesin may help thin nasal mucus and help your sinuses drain fluids.  You can use an over-the-counter decongestant, unless a similar medicine was prescribed to you. Nasal sprays work the fastest. Use one that contains phenylephrine or oxymetazoline. First blow your nose gently. Then use the spray. Do not use these medicines more often than directed on the label. If you do, your symptoms may get worse. You may also take pills that contain pseudoephedrine. Don t use products that combine multiple medicines. This is because side effects may be increased. Read all medicine labels. You can also ask the pharmacist for help. (People with high blood pressure should not use decongestants. They can raise blood pressure.)  Over-the-counter antihistamines may help if allergies contributed to your sinusitis.    Use acetaminophen or ibuprofen to control pain, unless another pain medicine was prescribed to you. If you have chronic liver or kidney disease  or ever had a stomach ulcer, talk with your healthcare provider before using these medicines. (Aspirin should never be taken by anyone under age 18 who is ill with a fever. It may cause severe liver damage.)  Use nasal rinses or irrigation as instructed by your healthcare provider.  Don't smoke. This can make symptoms worse.  Follow-up care  Follow up with your healthcare provider or our staff if you are NOT better in 1 week.  When to seek medical advice  Call your healthcare provider if any of these occur:  Green or yellow fluid draining from your nose or into your throat  Facial pain or headache that gets worse  Stiff neck  Unusual drowsiness or confusion  Swelling of your forehead or eyelids  Vision problems, such as blurred or double vision  Fever of 100.4 F (38 C) or higher, or as directed by your healthcare provider  Seizure  Breathing problems  Symptoms that don't go away in 10 days  For informational purposes only. Not to replace the advice of your health care provider.  Copyright   2018 [place] [email or web address] [facility]. All rights reserved.        Dear Marie Vogel    After reviewing your responses, I've been able to diagnose you with Acute sinusitis, recurrence not specified, unspecified location.        It is also important to stay well hydrated, get lots of rest and take over-the-counter decongestants,?tylenol?or ibuprofen if you?are able to?take those medications per your primary care provider to help relieve discomfort.?     If your symptoms worsen, you develop severe headache, vomiting, high fever (>102), or are not improving in 7 days, please contact your primary care provider for an appointment or visit any of our convenient Walk-in Care or Urgent Care Centers to be seen which can be found on our website?here.?     Thanks again for choosing?us?as your health care partner,?   ?  SHELBIE Atkins CNP?

## 2023-04-02 ENCOUNTER — HEALTH MAINTENANCE LETTER (OUTPATIENT)
Age: 43
End: 2023-04-02

## 2023-04-04 ENCOUNTER — OFFICE VISIT (OUTPATIENT)
Dept: URGENT CARE | Facility: URGENT CARE | Age: 43
End: 2023-04-04
Payer: COMMERCIAL

## 2023-04-04 ENCOUNTER — E-VISIT (OUTPATIENT)
Dept: URGENT CARE | Facility: URGENT CARE | Age: 43
End: 2023-04-04
Payer: COMMERCIAL

## 2023-04-04 VITALS
HEART RATE: 69 BPM | OXYGEN SATURATION: 98 % | TEMPERATURE: 98 F | SYSTOLIC BLOOD PRESSURE: 137 MMHG | RESPIRATION RATE: 20 BRPM | DIASTOLIC BLOOD PRESSURE: 97 MMHG

## 2023-04-04 DIAGNOSIS — J02.9 SORE THROAT: ICD-10-CM

## 2023-04-04 DIAGNOSIS — J32.9 SINUSITIS, UNSPECIFIED CHRONICITY, UNSPECIFIED LOCATION: Primary | ICD-10-CM

## 2023-04-04 DIAGNOSIS — J01.00 ACUTE MAXILLARY SINUSITIS, RECURRENCE NOT SPECIFIED: Primary | ICD-10-CM

## 2023-04-04 LAB
DEPRECATED S PYO AG THROAT QL EIA: NEGATIVE
GROUP A STREP BY PCR: NOT DETECTED

## 2023-04-04 PROCEDURE — 87651 STREP A DNA AMP PROBE: CPT | Performed by: PHYSICIAN ASSISTANT

## 2023-04-04 PROCEDURE — 99213 OFFICE O/P EST LOW 20 MIN: CPT | Performed by: PHYSICIAN ASSISTANT

## 2023-04-04 PROCEDURE — 99207 PR NON-BILLABLE SERV PER CHARTING: CPT | Performed by: NURSE PRACTITIONER

## 2023-04-04 RX ORDER — DOXYCYCLINE HYCLATE 100 MG
100 TABLET ORAL 2 TIMES DAILY
Qty: 14 TABLET | Refills: 0 | Status: SHIPPED | OUTPATIENT
Start: 2023-04-04 | End: 2023-04-11

## 2023-04-04 ASSESSMENT — ENCOUNTER SYMPTOMS
SINUS PRESSURE: 1
SINUS PAIN: 1
HEADACHES: 1
SORE THROAT: 1

## 2023-04-04 NOTE — Clinical Note
Dear Reggie Garg, I saw your patient in the clinic and treated her for acute maxillary sinusitis. Due to her mitochondrial disorder, I will have her closely follow up with you for recheck. I have started her on Doxycycline BID for 7 days. Thank you Isis Joiner PA-C

## 2023-04-04 NOTE — PROGRESS NOTES
Assessment & Plan        1. Acute maxillary sinusitis, recurrence not specified  -Patient has clinical presentation of bacterial rhinosinusitis.  Patient has allergies to penicillins, Bactrim, cephalosporins.  Due to mitochondria disease, she cannot take aminoglycosides.  I consulted with the genetics and metabolism physician at the [facility] to figure out a treatment plan.  I also consulted with the pharmacist at the clinic.  The consensus was to treat with doxycycline twice a day for 7 days and patient to follow-up with primary care provider.  Doxycycline was the least cytotoxic antibiotic for acute rhinosinusitis treatment.   - doxycycline hyclate (VIBRA-TABS) 100 MG tablet; Take 1 tablet (100 mg) by mouth 2 times daily for 7 days  Dispense: 14 tablet; Refill: 0    2. Sore throat  -Strep (-)  - Streptococcus A Rapid Screen w/Reflex to PCR  - Group A Streptococcus PCR Throat Swab    Results for orders placed or performed in visit on 04/04/23   Streptococcus A Rapid Screen w/Reflex to PCR     Status: Normal    Specimen: Throat; Swab   Result Value Ref Range    Group A Strep antigen Negative Negative       Patient Instructions   Follow up with PCP for recheck to ensure there are no side effects from the medication      Return for Follow up, PCP.    At the end of the encounter, I discussed results, diagnosis, medications. Discussed red flags for immediate return to clinic/ER, as well as indications for follow up if no improvement. Patient understood and agreed to plan. Patient was stable for discharge.    Gabby Salazar is a 42 year old female who presents to clinic today for the following health issues:  Chief Complaint   Patient presents with     Pharyngitis     Sore throat pt lose voice yesterday      HPI  Patient reports sinus symptoms for 10 days. She reports congestion, sinus pain and pressure. She notes sore throat and voice loss which started 1 day ago. She has been using her Neti poti, Flonase and  cetirizine for allergies with minimal help. 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 She denies fever and chills or GI symptoms.           Review of Systems   HENT: Positive for congestion, sinus pressure, sinus pain and sore throat.    Neurological: Positive for headaches.       Problem List:  2023: Chronic kidney disease, stage 3a (H)  2023: Mitochondrial DNA mutation  2021: Moderate episode of recurrent major depressive disorder (H)  2021: Depression  2021: Other insomnia  2019: Mirena IUD inserted 19: Due for removal 2024: Cervical high risk HPV (human papillomavirus) test positive  2018: Back pain  2017: Scars of posterior pole of chorioretina, bilateral  2017: Other migraine without status migrainosus, not intractable  2016: Hypertrophic obstructive cardiomyopathy (H)  2016: PFO (patent foramen ovale)  2016: Diabetic gastroparesis (H)  2016: Cervical pain  2016: Left-sided thoracic back pain  2015-10: Type 2 diabetes mellitus with diabetic nephropathy  2015: IUD (intrauterine device) in place  2015: Nut allergy  2015: Vitamin D deficiency  2014: Microalbuminuria  2014: Insulin pump in place  2014: Type 2 diabetes mellitus, uncontrolled, with renal   complications  2013: Migraine without aura  2013: Diabetic nephropathy (H)  2012: Anaphylactic reaction due to food - Avocado  2012: Anaphylactic reaction  2012: Sudden hearing loss  2011: Health Care Home  2010: Family history of other cardiovascular diseases  2010: HYPERLIPIDEMIA LDL GOAL <100  2010: Type 2 diabetes, HbA1c goal < 7% (H)  2010: High-risk pregnancy, young multigravida  2010: History of  delivery, currently pregnant  2009: High-risk pregnancy supervision  2009: Diabetes mellitus during pregnancy, antepartum  -: Adjustment  disorder with depressed mood  2008: Atypical glandular cells on Pap smear  -: Allergic rhinitis  -: Mixed hyperlipidemia  -: Diabetes mellitus, type 2 (H)      Past Medical History:   Diagnosis Date     Allergic rhinitis due to pollen      Atypical glandular cells on Pap smear 3/1108     Cervical high risk HPV (human papillomavirus) test positive 2018    See problem list     Chronic kidney disease      Gastroesophageal reflux disease      PFO (patent foramen ovale)      Stargardt's disease 2019     Type II or unspecified type diabetes mellitus without mention of complication, not stated as uncontrolled     onset was gestational in        Social History     Tobacco Use     Smoking status: Former     Packs/day: 0.00     Years: 0.00     Pack years: 0.00     Types: Cigarettes     Quit date: 10/24/2005     Years since quittin.4     Smokeless tobacco: Never     Tobacco comments:     States only smokes when drinks maybe 2x/year   Vaping Use     Vaping status: Not on file   Substance Use Topics     Alcohol use: Not Currently           Objective    BP (!) 137/97   Pulse 69   Temp 98  F (36.7  C)   Resp 20   SpO2 98%   Physical Exam  Constitutional:       Appearance: Normal appearance.   HENT:      Head: Normocephalic.      Right Ear: Tympanic membrane normal.      Left Ear: Tympanic membrane normal.      Nose:      Right Sinus: Maxillary sinus tenderness present. No frontal sinus tenderness.      Left Sinus: Maxillary sinus tenderness present. No frontal sinus tenderness.      Mouth/Throat:      Mouth: Mucous membranes are moist.      Pharynx: Oropharynx is clear. Uvula midline. No posterior oropharyngeal erythema.   Eyes:      Conjunctiva/sclera: Conjunctivae normal.      Pupils: Pupils are equal, round, and reactive to light.   Cardiovascular:      Rate and Rhythm: Normal rate and regular rhythm.      Heart sounds: Normal heart sounds.   Pulmonary:      Effort:  Pulmonary effort is normal.      Breath sounds: Normal breath sounds.   Musculoskeletal:      Cervical back: Normal range of motion and neck supple.   Lymphadenopathy:      Head:      Right side of head: No submental, submandibular, tonsillar, preauricular or posterior auricular adenopathy.      Left side of head: No submental, submandibular, tonsillar, preauricular or posterior auricular adenopathy.   Skin:     General: Skin is warm and dry.   Neurological:      Mental Status: She is alert and oriented to person, place, and time.   Psychiatric:         Mood and Affect: Mood normal.         Behavior: Behavior normal.              Isis Joiner PA-C

## 2023-04-04 NOTE — PATIENT INSTRUCTIONS
Dear Marie Vogel,    We are sorry you are not feeling well. Based on the responses you provided, it is recommended that you be seen in-person in urgent care so we can better evaluate your symptoms. Please click here to find the nearest urgent care location to you.   You will not be charged for this Visit. Thank you for trusting us with your care.    SHELBIE Atkins CNP

## 2023-04-13 ENCOUNTER — VIRTUAL VISIT (OUTPATIENT)
Dept: PSYCHOLOGY | Facility: CLINIC | Age: 43
End: 2023-04-13
Payer: COMMERCIAL

## 2023-04-13 DIAGNOSIS — F43.21 ADJUSTMENT DISORDER WITH DEPRESSED MOOD: Primary | ICD-10-CM

## 2023-04-13 PROCEDURE — 90791 PSYCH DIAGNOSTIC EVALUATION: CPT | Mod: VID

## 2023-04-13 ASSESSMENT — PATIENT HEALTH QUESTIONNAIRE - PHQ9
10. IF YOU CHECKED OFF ANY PROBLEMS, HOW DIFFICULT HAVE THESE PROBLEMS MADE IT FOR YOU TO DO YOUR WORK, TAKE CARE OF THINGS AT HOME, OR GET ALONG WITH OTHER PEOPLE: SOMEWHAT DIFFICULT
SUM OF ALL RESPONSES TO PHQ QUESTIONS 1-9: 4
SUM OF ALL RESPONSES TO PHQ QUESTIONS 1-9: 4

## 2023-04-13 NOTE — PROGRESS NOTES
"[facility]      PATIENT'S NAME: Marie Vogel  PREFERRED NAME: Marie  PRONOUNS:       MRN: [id number]  : 1980  ADDRESS: [address] [place] [address]  [facility] NUMBER:  [id number]  DATE OF SERVICE: 2023  START TIME: 7:59 AM      END TIME: 8:44 AM  PREFERRED PHONE: [phone]  May we leave a program related message: Yes  SERVICE MODALITY:  Video Visit:      Provider verified identity through the following two step process.  Patient provided:  Patient  and Patient address    Telemedicine Visit: The patient's condition can be safely assessed and treated via synchronous audio and visual telemedicine encounter.      Reason for Telemedicine Visit: Patient has requested telehealth visit    Originating Site (Patient Location): Patient's home    Distant Site (Provider Location): Provider Remote Setting- Home Office    Consent:  The patient/guardian has verbally consented to: the potential risks and benefits of telemedicine (video visit) versus in person care; bill my insurance or make self-payment for services provided; and responsibility for payment of non-covered services.     Patient would like the video invitation sent by:  Text to cell phone: [phone]     Mode of Communication:  Video Conference via [employer]    Distant Location (Provider):  Off-site    As the provider I attest to compliance with applicable laws and regulations related to telemedicine.    UNIVERSAL ADULT Mental Health DIAGNOSTIC ASSESSMENT    Identifying Information:  Patient is a 42 year old,   individual.  Patient was referred for an assessment by primary care provider.  Patient attended the session alone.    Chief Complaint:   The reason for seeking services at this time is: \"Depression new diagnoses\".  The problem(s) began 22. She has been experiencing feelings of depression \"for years now\". The patient reports internal family stressors related to a diabetes diagnosis of " "her son in June of 2022. The patient also reported that her son, daughter, and self have mitochondrial disease. The patients reports a history of trauma due to internal family stressors. The patient has been dealing with ongoing feelings of sadness, fatigue, and lack of motivation. She has also had to endure the loss of her mother in 2016. The patient identified one of her most pressing stressors comes from internal relational stress with her partner. The patient's scores of 5 on the PHQ-9 and 4 on the ALEXSANDRA-7 affirm the patients reported mental health symptoms.      Patient has attempted to resolve these concerns in the past through DBT therapy through [facility] around 2016 to improve communication patterns between family members. The patient attended individual therapy with [facility] for a diagnosis of major depressive disorder moderate at the time. She also attended therapy with an individual therapist in 2001.     Social/Family History:  Patient reported they grew up in [facility].  They were raised by biological parents  .  Parents one or both remarried.  Patient reported that their childhood was \"complicated. There was a lot of hard things that happened\".  Patient described their current relationships with family of origin as very close with dad. The patient reports \"talking with her dad very frequently\". The patients mother passed away in 2016, though the patient reports having a strong relationship with her mom up till that time.  The patients brother currently lives with her alongside the patients niece, 21 year old daughter, and 18 year old half sister of her daughter.      The patient describes their cultural background as .  Cultural influences and impact on patient's life structure, values, norms, and healthcare: [place]  Contextual influences on patient's health include: Contextual Factors: Individual Factors Internal family concerns, Family Factors " internal family stressors and Economic Factors the patient reports being behind on bills and identifies finances as a stressor. These factors will be addressed in the Preliminary Treatment plan. Patient identified their preferred language to be English. Patient reported they does not need the assistance of an  or other support involved in therapy.     Patient reported had no significant delays in developmental tasks.  No concerns reported at this time. Patient's highest education level was associate degree / vocational certificate.  Patient identified the following learning problems: none reported.  Modifications will not be used to assist communication in therapy. No concerns presented Patient reports they are  able to understand written materials.    Patient reported the following relationship history  in 2009 from her  and currently dating.  Patient's current relationship status is has a partner or significant other for approximately two years.   Patient identified their sexual orientation as heterosexual.  Patient reported having 2 child(katina). Patient identified father; adult child; friends as part of their support system.  Patient identified the quality of these relationships as good,  .      Patient's current living/housing situation involves staying in own home/apartment.  The immediate members of family and household include Blanquita, Anish,Daughter, the patients daughters 18 year old half sister, the patients boyfriend, the patients brother, and the patients niece and they report that housing is not stable.The patient identifies finances as a stressor.     Patient is currently employed fulltime. 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  Patient reports their finances are obtained through employment. Patient does identify finances as a current stressor.      Patient reported that they have been involved with the  "legal system.  The patient filed an \"order for protection back in 2004 against someome elses not against me\".  Patient does not report being under probation/ parole/ jurisdiction. They are not under any current court jurisdiction.  No concerns presented at this time.     Patient's Strengths and Limitations:  Patient identified the following strengths or resources that will help them succeed in treatment: commitment to health and well being, friends / good social support, intelligence, positive work environment, motivation and work ethic. Things that may interfere with the patient's success in treatment include: lack of family support and unsupportive environment.     Assessments:  The following assessments were completed by patient for this visit:  PHQ9:   PHQ-9 SCORE 10/6/2020 11/2/2020 7/27/2021 5/9/2022 11/16/2022 1/3/2023 2/28/2023   PHQ-9 Total Score - - - - - - -   PHQ-9 Total Score MyChart 8 (Mild depression) - - - 6 (Mild depression) 5 (Mild depression) 5 (Mild depression)   PHQ-9 Total Score 8 6 2 1 6 5 5   Some encounter information is confidential and restricted. Go to Review Flowsheets activity to see all data.     GAD2:   ALEXSANDRA-2 2/21/2023   Feeling nervous, anxious, or on edge 1   Not being able to stop or control worrying 1   ALEXSANDRA-2 Total Score 2     CAGE-AID:   CAGE-AID Total Score 2/21/2023   Total Score 0   Total Score MyChart 0 (A total score of 2 or greater is considered clinically significant)     PROMIS 10-Global Health (all questions and answers displayed):   PROMIS 10 2/21/2023   In general, would you say your health is: Fair   In general, would you say your quality of life is: Good   In general, how would you rate your physical health? Fair   In general, how would you rate your mental health, including your mood and your ability to think? Fair   In general, how would you rate your satisfaction with your social activities and relationships? Fair   In general, please rate how well you carry out " your usual social activities and roles Fair   To what extent are you able to carry out your everyday physical activities such as walking, climbing stairs, carrying groceries, or moving a chair? Moderately   How often have you been bothered by emotional problems such as feeling anxious, depressed or irritable? Sometimes   How would you rate your fatigue on average? Mild   How would you rate your pain on average?   0 = No Pain  to  10 = Worst Imaginable Pain 2   In general, would you say your health is: 2   In general, would you say your quality of life is: 3   In general, how would you rate your physical health? 2   In general, how would you rate your mental health, including your mood and your ability to think? 2   In general, how would you rate your satisfaction with your social activities and relationships? 2   In general, please rate how well you carry out your usual social activities and roles. (This includes activities at home, at work and in your community, and responsibilities as a parent, child, spouse, employee, friend, etc.) 2   To what extent are you able to carry out your everyday physical activities such as walking, climbing stairs, carrying groceries, or moving a chair? 3   In the past 7 days, how often have you been bothered by emotional problems such as feeling anxious, depressed, or irritable? 3   In the past 7 days, how would you rate your fatigue on average? 2   In the past 7 days, how would you rate your pain on average, where 0 means no pain, and 10 means worst imaginable pain? 2   Global Mental Health Score 10   Global Physical Health Score 13   PROMIS TOTAL - SUBSCORES 23   Some recent data might be hidden     [place] Suicide Severity Rating Scale (Lifetime/Recent)  [place] Suicide Severity Rating (Lifetime/Recent) 1/13/2019 2/28/2023   Q1 Wished to be Dead (Past Month) no -   Q2 Suicidal Thoughts (Past Month) no -   Q6 Suicide Behavior (Lifetime) no -   1. Wish to be Dead (Past 1 Month) - 0    2. Non-Specific Active Suicidal Thoughts (Past 1 Month) - 0   Calculated C-SSRS Risk Score (Lifetime/Recent) - No Risk Indicated       Personal and Family Medical History:  Patient does report a family history of mental health concerns.  Patient reports family history includes Cardiovascular in her brother and mother; Cerebrovascular Disease in her paternal grandfather; Depression in her mother; Diabetes in her brother, brother, daughter, father, mother, and another family member; Genitourinary Problems in her maternal grandmother, mother, and paternal grandmother; Hyperlipidemia in her father; Hypertension in her father and paternal grandfather; Other - See Comments in her mother.    Patient does report Mental Health Diagnosis and/or Treatment.  Patient Patient reported the following previous diagnoses which include(s): Depression.  Patient reported symptoms began in the year 2000.   Patient has received mental health services in the past: therapy with [facility], and then individual therapy with a therapist in 2018.   Psychiatric Hospitalizations: None.  Patient denies a history of civil commitment.  Patient is not receiving other mental health services.  These include none.         Patient has had a physical exam to rule out medical causes for current symptoms.  Date of last physical exam was within the past year. Client was encouraged to follow up with PCP if symptoms were to develop. The patient has a [facility] Provider, who is named Alan Paredes..  Patient reports no current medical concerns.  Patient reports pain concerns including plantar fasciitis.  Patient does not want help addressing pain concerns..   There are not significant appetite / nutritional concerns / weight changes.   Patient does not report a history of head injury / trauma / cognitive impairment.  No concerns reported at this time.     Patient reports current meds as:   Sertraline 25 mg tablet     Medication  "Adherence:  Patient reports taking.  taking prescribed medications as prescribed.    Patient Allergies:    Allergies   Allergen Reactions     Ivp Dye [Contrast Dye] Itching     Pt reports that throat itches     Nuts Anaphylaxis     Mariola nuts only     Bactrim Swelling     Pt reaction was swelling in mouth and tongue and itchy mouth.  Resolved with benadryl and prednisone     Pcn [Penicillins] Hives     Cephalosporins Itching     Compazine [Prochlorperazine] Other (See Comments)     Pt states \"psychosis\"     Erythromycin      Mitochondrial disorder     Lactated Ringers Other (See Comments)     Mitochondrial disorder     Propofol      Mitochondrial disorder     Reglan [Metoclopramide] Other (See Comments)     Pt sates \"psychosis\"     Seasonal Allergies Other (See Comments)     Molds, trees, grass, dust..  Upper congestion     Valproic Acid      Mitochondrial disorder     Atorvastatin      myalgias     Shellfish Allergy Rash     Clams only       Medical History:    Past Medical History:   Diagnosis Date     Allergic rhinitis due to pollen      Atypical glandular cells on Pap smear 3/1108     Cervical high risk HPV (human papillomavirus) test positive 11/27/2018    See problem list     Chronic kidney disease 2023     Gastroesophageal reflux disease      PFO (patent foramen ovale)      Stargardt's disease 11/29/2019     Type II or unspecified type diabetes mellitus without mention of complication, not stated as uncontrolled 2002    onset was gestational in 2001         Current Mental Status Exam:   Appearance:  Appropriate    Eye Contact:  Good   Psychomotor:  Normal       Gait / station:  no problem  Attitude / Demeanor: Cooperative  Interested Friendly  Speech      Rate / Production: Normal/ Responsive      Volume:  Normal  volume      Language:  intact  Mood:   Anxious  Depressed   Affect:   Appropriate    Thought Content: Clear   Thought Process: Coherent       Associations: No loosening of " associations  Insight:   Fair   Judgment:  Intact   Orientation:  All  Attention/concentration: Good      Substance Use:  Patient did report a family history of substance use concerns; see medical history section for details.  Patient has not received chemical dependency treatment in the past.  Patient has not ever been to detox.      Patient is not currently receiving any chemical dependency treatment. Patient reported the following problems as a result of their substance use:  No concerns reported at this time. .    Patient denies using alcohol.  Patient denies using tobacco.  Patient denies using cannabis.  Patient reports using caffeine 2 times per day and drinks 1 at a time. Patient started using caffeine at age during the patients childhood. .  Patient reports using/abusing the following substance(s). Patient reported no other substance use.     Substance Use: No symptoms    Based on the negative CAGE score and clinical interview there  are not indications of drug or alcohol abuse.      Significant Losses / Trauma / Abuse / Neglect Issues:   Patient did not  serve in the .  There are indications or report of significant loss, trauma, abuse or neglect issues related to: death of the patients mom.. The patient reported that she had to see her mom bleed out in the hospital in 2016. The patient also reported that her step dad put the responsibility of the decision to end her mom's life and ending care.   Concerns for possible neglect are not present. No concerns presented at this time.     Safety Assessment:   Patient denies current homicidal ideation and behaviors.  Patient denies current self-injurious ideation and behaviors.    Patient denied risk behaviors associated with substance use.  Patient denies any high risk behaviors associated with mental health symptoms.  Patient reports the following current concerns for their personal safety: None.  Patient reports there are firearms in the house.     yes,  they are secured. The firearms are secured in a locked space.    History of Safety Concerns:  Patient denied a history of homicidal ideation.     Patient denied a history of personal safety concerns.    Patient denied a history of assaultive behaviors.    Patient denied a history of sexual assault behaviors.     Patient denied a history of risk behaviors associated with substance use.  Patient denies any history of high risk behaviors associated with mental health symptoms.  Patient reports the following protective factors: forward or future oriented thinking; dedication to family or friends; living with other people    Risk Plan:  See Recommendations for Safety and Risk Management Plan    Review of Symptoms per patient report:   Depression: Change in sleep, Lack of interest, Change in energy level, Change in appetite and Feeling sad, down, or depressed  Irene:  No Symptoms  Psychosis: No Symptoms  Anxiety: Irritability  Panic:  No symptoms  Post Traumatic Stress Disorder:  No Symptoms   Eating Disorder: No Symptoms  ADD / ADHD:  No symptoms  Conduct Disorder: No symptoms  Autism Spectrum Disorder: No symptoms  Obsessive Compulsive Disorder: No Symptoms    Patient reports the following compulsive behaviors and treatment history: No behaviors reported at this time. .      Diagnostic Criteria:   Adjustment Disorder  A. The development of emotional or behavioral symptoms in response to an identifiable stressor(s) occurring within 3 months of the onset of the stressor(s)  B. These symptoms or behaviors are clinically significant, as evidenced by one or both of the following:       - Marked distress that is out of proportion to the severity/intensity of the stressor (with consideration for external context & culture)       - Significant impairment in social, occupational, or other important areas of functioning  C. The stress-related disturbance does not meet criteria for another disorder & is not not an exacerbation of  another mental disorder  D. The symptoms do not represent normal bereavement  E. Once the stressor or its consequences have terminated, the symptoms do not persist for more than an additional 6 months       * Adjustment Disorder with Depressed Mood: The predominant manifestations are symptoms such as low mood, tearfulness, or feelings of hopelessness    Functional Status:  Patient reports the following functional impairments:  management of the household and or completion of tasks, relationship(s), self-care, social interactions and work / vocational responsibilities.     Nonprogrammatic care:  Patient is requesting basic services to address current mental health concerns.    Clinical Summary:  1. Reason for assessment: The patient is seeking outpatient mental health services.  2. Psychosocial, Cultural and Contextual Factors: The patient is currently managing internal family and relationship stressors. The patient also reports a history of traumatic experiences and corresponding therapy to manage the symptoms from those experiences. Finally, her family has varying medical needs that put additional stress on the patient.   3. Principal DSM5 Diagnoses  (Sustained by DSM5 Criteria Listed Above):   Adjustment Disorders  309.0 (F43.21) With depressed mood.  6. Prognosis: Expect Improvement and Relieve Acute Symptoms.  7. Likely consequences of symptoms if not treated: The patient could see the worsening of internal family concerns. The patient could also experience a decreased ability to manage depressive symptoms if untreated.   8. Client strengths include:  caring, educated, empathetic, employed, has a previous history of therapy, motivated, open to suggestions / feedback and willing to ask questions .     Recommendations:     1. Plan for Safety and Risk Management:   Safety and Risk: Recommended that patient call 911 or go to the local ED should there be a change in any of these risk factors..          Report to  child / adult protection services was NA.     2. Patient's identified no core components at this time. The stuedent intern therapist will incorporate these items as they arise. .     3. Initial Treatment will focus on:    Adjustment Difficulties related to: family concerns and depressive symptoms related to stressors. .     4. Resources/Service Plan:    services are not indicated.   Modifications to assist communication are not indicated.   Additional disability accommodations are not indicated.      5. Collaboration:   Collaboration / coordination of treatment will be initiated with the following  support professionals: N/A. .      6.  Referrals:   The following referral(s) will be initiated: N/A. Next Scheduled Appointment: 04/27/2023.      A Release of Information has been obtained for the following: N/A. .     Emergency Contact  was obtained. Please view the patients chart to access the patients emergency contact information.      Clinical Substantiation/medical necessity for the above recommendations:  N/A.    7. WILBERTO:    WILBERTO:  Discussed the general effects of drugs and alcohol on health and well-being.Recommendations:  No concerns were presented to the writer at this time.     8. Records:   These were reviewed at time of assessment.   Information in this assessment was obtained from the medical record and  provided by patient who is a good historian.    Patient will have open access to their mental health medical record.    9.   Interactive Complexity: No      Provider Name/ Credentials:  CYRIL Foley Student Intern Therapist   April 13th, 2023  Service Performed and Documented by ALEJANDRO BAUTISTA reviewed and clinical supervision by CYRIL Ball [facility] 4/13/2023

## 2023-04-19 ENCOUNTER — MYC MEDICAL ADVICE (OUTPATIENT)
Dept: ENDOCRINOLOGY | Facility: CLINIC | Age: 43
End: 2023-04-19
Payer: COMMERCIAL

## 2023-04-19 DIAGNOSIS — E11.21 TYPE 2 DIABETES MELLITUS WITH DIABETIC NEPHROPATHY, WITH LONG-TERM CURRENT USE OF INSULIN (H): Primary | ICD-10-CM

## 2023-04-19 DIAGNOSIS — Z79.4 TYPE 2 DIABETES MELLITUS WITH DIABETIC NEPHROPATHY, WITH LONG-TERM CURRENT USE OF INSULIN (H): Primary | ICD-10-CM

## 2023-04-20 ENCOUNTER — TELEPHONE (OUTPATIENT)
Dept: ENDOCRINOLOGY | Facility: CLINIC | Age: 43
End: 2023-04-20

## 2023-04-20 NOTE — TELEPHONE ENCOUNTER
PA Initiation    Medication: Jardiance 10 mg tablets  Insurance Company: MED[employer] - Phone [phone] Fax [phone]  Pharmacy Filling the Rx:    Filling Pharmacy Phone:    Filling Pharmacy Fax:    Start Date: 4/20/2023    Key: [id number]

## 2023-04-20 NOTE — TELEPHONE ENCOUNTER
Prior Authorization Approval    Authorization Effective Date: 3/21/2023  Authorization Expiration Date: 4/19/2024  Medication: Jardiance 10 mg tablets  Approved Dose/Quantity: 30/30 days   Reference #: [id number]   Insurance Company: MED[employer] - Phone [phone] Fax [phone]  Expected CoPay:       CoPay Card Available:      Foundation Assistance Needed:    Which Pharmacy is filling the prescription (Not needed for infusion/clinic administered):    Pharmacy Notified:    Patient Notified:

## 2023-04-23 DIAGNOSIS — E11.21 TYPE 2 DIABETES MELLITUS WITH DIABETIC NEPHROPATHY, WITH LONG-TERM CURRENT USE OF INSULIN (H): ICD-10-CM

## 2023-04-23 DIAGNOSIS — Z79.4 TYPE 2 DIABETES MELLITUS WITH DIABETIC NEPHROPATHY, WITH LONG-TERM CURRENT USE OF INSULIN (H): ICD-10-CM

## 2023-04-24 RX ORDER — PROCHLORPERAZINE 25 MG/1
SUPPOSITORY RECTAL
Qty: 1 EACH | Refills: 1 | Status: SHIPPED | OUTPATIENT
Start: 2023-04-24 | End: 2023-10-18

## 2023-04-24 NOTE — TELEPHONE ENCOUNTER
"Requested Prescriptions   Pending Prescriptions Disp Refills     Continuous Blood Gluc Transmit (DEXCOM G6 TRANSMITTER) MISC [Pharmacy Med Name: Dexcom G6 Transmitter device]  3     Sig: Change every 90 days.  (Dexcom G6 Transmitter, #[id number])       Diabetic Supplies Protocol Passed - 4/23/2023  4:15 PM        Passed - Medication is active on med list        Passed - Patient is 18 years of age or older        Passed - Recent (6 mo) or future (30 days) visit within the authorizing provider's specialty     Patient had office visit in the last 6 months or has a visit in the next 30 days with authorizing provider.  See \"Patient Info\" tab in inbasket, or \"Choose Columns\" in Meds & Orders section of the refill encounter.                 "

## 2023-04-26 DIAGNOSIS — H35.50 RETINAL DYSTROPHY: Primary | ICD-10-CM

## 2023-05-11 ENCOUNTER — OFFICE VISIT (OUTPATIENT)
Dept: OPHTHALMOLOGY | Facility: CLINIC | Age: 43
End: 2023-05-11
Attending: OPHTHALMOLOGY
Payer: COMMERCIAL

## 2023-05-11 DIAGNOSIS — H35.50 RETINAL DYSTROPHY: ICD-10-CM

## 2023-05-11 PROCEDURE — 99214 OFFICE O/P EST MOD 30 MIN: CPT | Performed by: OPHTHALMOLOGY

## 2023-05-11 PROCEDURE — 92134 CPTRZ OPH DX IMG PST SGM RTA: CPT | Performed by: OPHTHALMOLOGY

## 2023-05-11 PROCEDURE — 99213 OFFICE O/P EST LOW 20 MIN: CPT | Performed by: OPHTHALMOLOGY

## 2023-05-11 PROCEDURE — 92250 FUNDUS PHOTOGRAPHY W/I&R: CPT | Performed by: OPHTHALMOLOGY

## 2023-05-11 PROCEDURE — 99207 FUNDUS AUTOFLUORESCENCE IMAGE (FAF) OU (BOTH EYES): CPT | Mod: 26 | Performed by: OPHTHALMOLOGY

## 2023-05-11 ASSESSMENT — REFRACTION_WEARINGRX
OD_ADD: +1.50
OS_AXIS: 055
OD_CYLINDER: +1.75
OS_ADD: +1.50
OD_AXIS: 109
OS_CYLINDER: +2.00
SPECS_TYPE: BIFOCAL
OS_SPHERE: -4.00
OD_SPHERE: -4.50

## 2023-05-11 ASSESSMENT — CONF VISUAL FIELD
OD_INFERIOR_NASAL_RESTRICTION: 0
OS_SUPERIOR_TEMPORAL_RESTRICTION: 0
OS_INFERIOR_TEMPORAL_RESTRICTION: 0
OD_NORMAL: 1
OD_INFERIOR_TEMPORAL_RESTRICTION: 0
OS_SUPERIOR_NASAL_RESTRICTION: 0
OD_SUPERIOR_NASAL_RESTRICTION: 0
OS_NORMAL: 1
METHOD: COUNTING FINGERS
OD_SUPERIOR_TEMPORAL_RESTRICTION: 0
OS_INFERIOR_NASAL_RESTRICTION: 0

## 2023-05-11 ASSESSMENT — EXTERNAL EXAM - LEFT EYE: OS_EXAM: NORMAL

## 2023-05-11 ASSESSMENT — CUP TO DISC RATIO
OS_RATIO: 0.55
OD_RATIO: 0.55

## 2023-05-11 ASSESSMENT — VISUAL ACUITY
CORRECTION_TYPE: GLASSES
OD_CC+: +1
METHOD: SNELLEN - LINEAR
OD_CC: 20/20
OS_CC: 20/20

## 2023-05-11 ASSESSMENT — TONOMETRY
OS_IOP_MMHG: 17
OD_IOP_MMHG: 20
IOP_METHOD: TONOPEN

## 2023-05-11 ASSESSMENT — SLIT LAMP EXAM - LIDS
COMMENTS: NORMAL
COMMENTS: NORMAL

## 2023-05-11 ASSESSMENT — EXTERNAL EXAM - RIGHT EYE: OD_EXAM: NORMAL

## 2023-05-11 NOTE — PROGRESS NOTES
CC -   Macular atrophy    INTERVAL HISTORY - patient feels night vision is worse; able to see the signs. Occasional floaters; no new flashes. BG good control.    Patient has 2 kids. Older 21 year old with history of DMII  Patient states her younger boy (biological female) was tested and noted his A1c 10.3 and was diagnosed with Diabetes mellitus II.  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  In the past saw Dr. Gabriela Becker- optometrist at [facility].  DM II since age 21, ESPERANZA  Hypertrophic cardiomyopathy familial. Mom with history of heart transplant; brother with history of heart surgery   Uncle with cardiomyopathy  History of hearing loss in family    Taking vitamin B100 complex; folic acid CQ10    Retinal Dystrophy assessment:  Nyctalopia: yes  Problems going from outside bright light to inside: no  Problems going from inside to bright light outside: no   Photosensitivity: mild  Problems with steps, curbs, or stairs: rarely  Problems with color vision: no   Sees flashing lights: no     PAST OCULAR SURGERY  None    RETINAL IMAGING:  Optical Coherence Tomography 05/11/23 stable compared to 2022  OD - paracentral GA, mild IRF, tr ERM, PHF attached  OS -  paracentral GA, mild IRF, tr ERM, PHF attached    FAF   05/11/23  stable compared to 2022  OD - central hypoF & hyperF speckled  OS - central hypoF & hyperF speckled    GVF 11/25/19  OD - paracentral scotoma, normal peripheral  OS - paracentral scotoma, normal peripheral    ASSESSMENT & PLAN    # 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    - patient with  history of Hypertrophic cardiomyopathy familial; Diabetes mellitus II and CKDII; hearing aids  - ffERG: cone dysfunction 2019  - Paracentral visual field defects  - fundus exam and Autofluorescence consistent with mitochondrial retinal dystrophy both eyes    #. DM II no DR    #. 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  I personally obtained the chief complaint(s) and history of present illness.  I confirmed and edited as necessary the review of systems, past medical/surgical history, family history, social history, and examination findings as documented by others; and I examined the patient myself.  I personally reviewed the relevant tests, images, and reports as documented above.  I formulated and edited as necessary the assessment and plan and discussed the findings and management plan with the patient and family    Isis Vickers MD  Professor of Ophthalmology  Vitreo-Retinal surgeon   Department of Ophthalmology and Visual Neurosciences   [facility]  Phone: [phone]   Fax: [phone]

## 2023-05-11 NOTE — NURSING NOTE
Chief Complaints and History of Present Illnesses   Patient presents with     Follow Up     1 year follow up for Retinal Dystrophy both eyes     Chief Complaint(s) and History of Present Illness(es)     Follow Up            Associated symptoms: Negative for eye pain and flashes    Treatments tried: no treatments    Pain scale: 0/10    Comments: 1 year follow up for Retinal Dystrophy both eyes          Comments    Pt tells me her night vision has been worsening, making night driving difficult.   New glasses Rx was filled recently and happy with vision both eyes.   Dx with mitochondrial disease 9/2022.  No other ocular concerns today.     Doug Gerson 10:07 AM May 11, 2023

## 2023-06-09 ENCOUNTER — LAB (OUTPATIENT)
Dept: LAB | Facility: CLINIC | Age: 43
End: 2023-06-09
Payer: COMMERCIAL

## 2023-06-09 DIAGNOSIS — R80.9 MICROALBUMINURIA: ICD-10-CM

## 2023-06-09 DIAGNOSIS — Z96.41 INSULIN PUMP IN PLACE: ICD-10-CM

## 2023-06-09 DIAGNOSIS — N18.31 CHRONIC KIDNEY DISEASE, STAGE 3A (H): Primary | ICD-10-CM

## 2023-06-09 DIAGNOSIS — E11.21 TYPE 2 DIABETES MELLITUS WITH DIABETIC NEPHROPATHY, WITH LONG-TERM CURRENT USE OF INSULIN (H): ICD-10-CM

## 2023-06-09 DIAGNOSIS — Z79.4 TYPE 2 DIABETES MELLITUS WITH DIABETIC NEPHROPATHY, WITH LONG-TERM CURRENT USE OF INSULIN (H): ICD-10-CM

## 2023-06-09 LAB
CREAT UR-MCNC: 82.3 MG/DL
HBA1C MFR BLD: 6.4 % (ref 0–5.6)
MICROALBUMIN UR-MCNC: 210 MG/L
MICROALBUMIN/CREAT UR: 255.16 MG/G CR (ref 0–25)

## 2023-06-09 PROCEDURE — 82043 UR ALBUMIN QUANTITATIVE: CPT

## 2023-06-09 PROCEDURE — 82570 ASSAY OF URINE CREATININE: CPT

## 2023-06-09 PROCEDURE — 36415 COLL VENOUS BLD VENIPUNCTURE: CPT

## 2023-06-09 PROCEDURE — 83036 HEMOGLOBIN GLYCOSYLATED A1C: CPT

## 2023-06-11 NOTE — RESULT ENCOUNTER NOTE
Julia Salazar ,    The results from your recent lab work are within normal limits.    -Microalbumin (urine protein) level is elevated. This is suggestive of early damage to your kidneys from high blood pressure and diabetes.  ADVISE: avoiding anti-inflamatory agents such as ibuprofen (Advil, Motrin) or naproxen (Aleve) as much as possible, keeping your blood pressure in a normal range, and continuing your medication hat helps protect your kidneys.  Also, this should be rechecked in 1 year.       Thank you for choosing [facility] for your health care needs,      Karla Llanos PA-C

## 2023-06-13 ENCOUNTER — HOSPITAL ENCOUNTER (OUTPATIENT)
Dept: MRI IMAGING | Facility: CLINIC | Age: 43
Discharge: HOME | End: 2023-06-13
Attending: INTERNAL MEDICINE
Payer: COMMERCIAL

## 2023-06-13 ENCOUNTER — OFFICE VISIT (OUTPATIENT)
Dept: CARDIOLOGY | Facility: CLINIC | Age: 43
End: 2023-06-13
Attending: INTERNAL MEDICINE
Payer: COMMERCIAL

## 2023-06-13 VITALS
HEART RATE: 65 BPM | WEIGHT: 157.5 LBS | BODY MASS INDEX: 27.03 KG/M2 | OXYGEN SATURATION: 98 % | SYSTOLIC BLOOD PRESSURE: 105 MMHG | DIASTOLIC BLOOD PRESSURE: 74 MMHG

## 2023-06-13 DIAGNOSIS — I42.2 HYPERTROPHIC CARDIOMYOPATHY (H): ICD-10-CM

## 2023-06-13 DIAGNOSIS — E78.5 DYSLIPIDEMIA: ICD-10-CM

## 2023-06-13 DIAGNOSIS — I42.1 HYPERTROPHIC OBSTRUCTIVE CARDIOMYOPATHY (H): ICD-10-CM

## 2023-06-13 PROCEDURE — 99417 PROLNG OP E/M EACH 15 MIN: CPT | Performed by: INTERNAL MEDICINE

## 2023-06-13 PROCEDURE — 255N000002 HC RX 255 OP 636: Performed by: INTERNAL MEDICINE

## 2023-06-13 PROCEDURE — 93005 ELECTROCARDIOGRAM TRACING: CPT

## 2023-06-13 PROCEDURE — A9585 GADOBUTROL INJECTION: HCPCS | Performed by: INTERNAL MEDICINE

## 2023-06-13 PROCEDURE — 99215 OFFICE O/P EST HI 40 MIN: CPT | Performed by: INTERNAL MEDICINE

## 2023-06-13 PROCEDURE — 75561 CARDIAC MRI FOR MORPH W/DYE: CPT

## 2023-06-13 PROCEDURE — 75561 CARDIAC MRI FOR MORPH W/DYE: CPT | Mod: 26 | Performed by: STUDENT IN AN ORGANIZED HEALTH CARE EDUCATION/TRAINING PROGRAM

## 2023-06-13 PROCEDURE — 99213 OFFICE O/P EST LOW 20 MIN: CPT | Performed by: INTERNAL MEDICINE

## 2023-06-13 PROCEDURE — 93010 ELECTROCARDIOGRAM REPORT: CPT | Performed by: INTERNAL MEDICINE

## 2023-06-13 RX ORDER — GADOBUTROL 604.72 MG/ML
10 INJECTION INTRAVENOUS ONCE
Status: COMPLETED | OUTPATIENT
Start: 2023-06-13 | End: 2023-06-13

## 2023-06-13 RX ADMIN — GADOBUTROL 10 ML: 604.72 INJECTION INTRAVENOUS at 11:01

## 2023-06-13 ASSESSMENT — PAIN SCALES - GENERAL: PAINLEVEL: NO PAIN (0)

## 2023-06-13 NOTE — PATIENT INSTRUCTIONS
You were seen today in the Adult Congenital and Cardiovascular Genetics Clinic at the [facility].    Cardiology Providers you saw during your visit:  Jamal Marr MD    Diagnosis:  family history of HCM    Results:  Jamal Marr MD reviewed the results of your EKG, cardiac MRI, and zio testing today in clinic.    Recommendations for you:    See Dr Sandra to discuss a need for an implanted loop recorder      General Cardiac Recommendations:  Continue to eat a heart healthy, low salt diet.  Continue to get 20-30 minutes of aerobic activity, 4-5 days per week.  Examples of aerobic activity include walking, running, swimming, cycling, etc.  Continue to observe good oral hygiene, with regular dental visits.      SBE prophylaxis:   Yes____  No__x__      Exercise restrictions:   Yes__X__  No____         If yes, list restrictions:  Must be allowed to rest if fatigued or SOB      FASTING CHOLESTEROL was checked in the last 5 years YES___  NO___ (2023)  If no, please follow up with your primary care physician. You should have a cholesterol screening every 5 years.      Follow-up:  Follow up with Dr Marr in a year with an echo prior    If you have questions or concerns please contact us at:    Jolene Mcbride RN, BSN   Aicha Walker (Scheduling)  Nurse Care Coordinator     Clinic   Adult Congenital and CV Genetics   Adult Congenital and CV Genetic  [facility]   [facility]  (P) [phone]     (P) [phone]            (F) [phone]        For after hours urgent needs, call [phone] and ask to speak to the Adult Congenital Physician on call.  Mention Job Code 0401.    For emergencies call 911.    [facility]  [facility]   Clinics and Surgery Center  Mail Code [id number]  [address] [address]

## 2023-06-13 NOTE — PROGRESS NOTES
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 Mitochondrial mutation was ultimately initially diagnosed in her youngest child; her youngest child was initially found to have DM but was negative for testing for DM1. This mutation has been associated with a variety of phenotypes, including MELAS, Notably, population studies have shown that most patients with this mutation do NOT meet clinical criteria for MELAS and that many have other phenotypic manifestations or are asymptomatic. Of patients with m.3243A>G, 15% will have MELAS; others will have maternally-inherited diabetes and deafness (MIDD), progressive external ophthalmoplegia, skeletal myopathy, diabetes mellitus, GI dysmotility, and myocardial diase. Myocardial disease most commonly manifests as HCM, which is reported in 20-40% patients with this mutation in some studies; other potential cardiac manifestations include WPW, SVT, A.fib, and VT. (Molecular Genetics and Metabolism 2019 128:19-29.) She was seen by Dr. Saavedra 22 for evaluation of MT-TL1/m.3243A>G associated disease.    Her family history is notable for HCM in multiple maternal relatives, including her mother (diagnosed at age 37, heart transplant  at age 49, passed away in 2016 at age 60 (hemorrhage after lung biopsy which diagnosed histoplasmosis, also had allograft vasculopathy)), brother (diagnosed at age 18, had a myectomy in , has an ICD), 1 maternal aunt, 4 maternal uncles (1 has an LVAD, recently passed away; his son  suddenly), maternal grandmother, and possibly maternal great grandfather. She has 2 children, a daughter (22, has DM) and a son (13, assigned female at birth, has bilateral  hearing loss, DM,  and mitochondrial MT-TL1 mutation.) Both children have had normal echocardiograms.  She was diagnosed with HCM in 2016 after a period of cardiac screening due to family history. CMR 16 showed asymmetric septal hypertophy up to 1.5 cm with ZIA and minimal LGE (seee below.)     Her brother recently collapsed and went to the ED. This was felt due to dehydration. He has had ICD shocks in the past, but none recently. His last LVEF was reduced at 35%.     She has previously been followed by Dr. Melanie Dewey. Given her family history and also question as to whether her mitochondrial mutation could fully account for her extensive family history of HCM which did not appear to be entirely consistent with completely maternal transmission, she was referred to [facility] for genetic counseling and testing for non-mitochondrial mutations associated with HCM. This testing was done 3/2/23 and was negative.    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 She has history of SVT but has not had any episodes in >1 year. ROS is otherwise notable for occasional numbness in her left arm which improves when she changes positions and cold extremities.     ECG 23: sinus rhythm with nonspecific ST/T abnormality, VR 70    She denies any dyspnea at rest or with exertion, PND, orthopnea, peripheral edema, palpitations, lightheadedness or syncope.       FAMILY HISTORY:    Mother was diagnosed with HCM at 37 years.  She had a heart transplant in  at 49 years. She  in 2016 at 60 years of age.  She participated in a [place] research study looking for HCM genetic mutations but no records were able to be found.    Brother diagnosed with HCM at 18 years.  He had a myomectomy in about  and also has an ICD and has had appropriate shocks in the past. He has one daughter with autism.    A maternal aunt and four maternal uncles have all been diagnosed with HCM. One uncle has an LVAD.  He also  had a son who  suddenly and was found to have cardiomyopathy on autopsy.    Maternal grandmother and great grandfather also had HCM.  In addition, there are many distant relatives with HCM.      Daughter (22) with type 2 diabetes and obesity.  She had an ECHO around 12 years of age that was normal.    Son (13), assigned female at birth, also has bilateral hearing loss, DM2 and the mitochondrial mutation in the MT-TL1 gene. Normal ECHO in .     Father with DM2 and kidney failure.  His four siblings have no know health issues.    Paternal grandmother  in her 80's with Alzheimers.  She also had DM2 with onset in her 60-70's.  Grandfather  90's.  He had a history of blood clot in his eye, leading to blindness in 1 eye, and a stroke in his 60's.    PAST MEDICAL HISTORY:  Past Medical History:   Diagnosis Date     Allergic rhinitis due to pollen      Atypical glandular cells on Pap smear 3/1108     Cervical high risk HPV (human papillomavirus) test positive 2018    See problem list     Chronic kidney disease      Gastroesophageal reflux disease      Mitochondrial disease (H) 2022     PFO (patent foramen ovale)      Stargardt's disease 2019     Type II or unspecified type diabetes mellitus without mention of complication, not stated as uncontrolled     onset was gestational in        CURRENT MEDICATIONS:  Current Outpatient Medications   Medication Sig Dispense Refill     albuterol (PROAIR HFA/PROVENTIL HFA/VENTOLIN HFA) 108 (90 Base) MCG/ACT inhaler Inhale 2 puffs into the lungs every 4 hours as needed for shortness of breath / dyspnea or wheezing 18 g 3     ASPIRIN NOT PRESCRIBED (INTENTIONAL) Please choose reason not prescribed from choices below.       atenolol (TENORMIN) 25 MG tablet Take 0.5 tablets (12.5 mg) by mouth daily 90 tablet 3     blood glucose (PEPE CONTOUR NEXT) test strip Check 4 times/day 300 each 0     blood glucose monitoring (NO BRAND SPECIFIED) meter device  "kit Use to test blood sugar 6 times daily and as needed. 1 kit 0     cetirizine (ZYRTEC) 10 MG tablet Take 10 mg by mouth 2 times daily  90 tablet 3     clotrimazole (LOTRIMIN) 1 % external cream Apply topically 2 times daily 60 g 3     co-enzyme Q-10 100 MG CAPS capsule Take 100 mg by mouth daily       Continuous Blood Gluc Sensor (DEXCOM G6 SENSOR) MISC Apply one sensor to the skin every 10 days 9 each 3     Continuous Blood Gluc Transmit (DEXCOM G6 TRANSMITTER) MISC Change every 90 days.  (Dexcom G6 Transmitter, #[id number]) 1 each 1     Dulaglutide (TRULICITY) 4.5 MG/0.5ML SOPN Inject 4.5 mg Subcutaneous once a week 6 mL 1     empagliflozin (JARDIANCE) 10 MG TABS tablet Take 1 tablet (10 mg) by mouth daily 90 tablet 0     EPINEPHrine (ANY BX GENERIC EQUIV) 0.3 MG/0.3ML injection 2-pack Inject 0.3 mLs (0.3 mg) into the muscle once as needed for anaphylaxis 0.3 mL 11     fluticasone (FLONASE) 50 MCG/ACT spray Spray 1-2 sprays into both nostrils daily 1 Bottle 0     Glucagon, rDNA, (GLUCAGON EMERGENCY) 1 MG KIT For Intramuscular use for low Blood glucose episode. 1 kit 0     ibuprofen (ADVIL/MOTRIN) 200 MG tablet Take 200 mg by mouth every 4 hours as needed        Insulin Disposable Pump (OMNIPOD 5 G6 POD, GEN 5,) MISC 1 each every 3 days 30 each 3     insulin glargine (LANTUS PEN) 100 UNIT/ML pen Take 20 units in case of pump malfunction only. 15 mL 0     insulin lispro (HUMALOG) 100 UNIT/ML vial USE WITH INSULIN PUMP AS DIRECTED, USES ABOUT 80 UNITS PER DAY 80 mL 0     INSULIN PUMP - OUTPATIENT Updated 8/3/21:  OmniPod Dash  BASAL:  00:00: 0.75 units/hr  CARB RATIO:  00:00: 10  Sensitivity:  00:00: 40 mg/dL  BLOOD GLUCOSE TARGET and times:  12   AM (midnight): 120-120  Active Insulin Time: 4 hours  Sensor: Nadia/ Nadia Link Donna  Amish:   Username [email or web address]  Password [email or web address]       insulin syringe-needle U-100 (29G X 1/2\" 1 ML) 29G X 1/2\" 1 ML miscellaneous Use 1 syringe once daily or as needed 100 " "each 1     Lancets (MICROLET) MISC 1 Device See Admin Instructions. Use up to 5 times daily for blood sugar checks. 100 each prn     levonorgestrel (MIRENA) 20 MCG/24HR IUD 1 each (20 mcg) by Intrauterine route once       losartan (COZAAR) 50 MG tablet 1 tab each morning and one half tab each evening 135 tablet 3     MAGNESIUM OXIDE PO Take 400 mg by mouth daily       montelukast (SINGULAIR) 10 MG tablet Take 1 tablet (10 mg) by mouth At Bedtime 90 tablet 3     Multiple Vitamins-Minerals (MULTI VITAMIN/MINERALS PO) Take 1 each by mouth daily.       Omega-3 Fatty Acids (FISH OIL) 500 MG CAPS Take 1 capsule by mouth       omeprazole (PRILOSEC) 40 MG DR capsule TAKE ONE CAPSULE BY MOUTH ONCE DAILY AS NEEDED 90 capsule 3     ondansetron (ZOFRAN ODT) 4 MG ODT tab Take 1 tablet (4 mg) by mouth every 8 hours as needed for nausea 8 tablet 0     rosuvastatin (CRESTOR) 5 MG tablet Take 1 tablet (5 mg) by mouth daily 90 tablet 3     sertraline (ZOLOFT) 25 MG tablet Take 1 tablet (25 mg) by mouth daily 90 tablet 3     SUMAtriptan (IMITREX) 25 MG tablet TAKE ONE TO FOUR TABLETS BY MOUTH ONE TIME. MAY REPEAT 1 TABLET EVERY TWO HOURS UP TO MAXIMUM OF 200MG IN 24 HOURS 8 tablet 11     traZODone (DESYREL) 50 MG tablet TAKE ONE-HALF TO ONE TABLET BY MOUTH NIGHTLY AS NEEDED FOR SLEEP 60 tablet 3     Urine Glucose-Ketones Test STRP Daily as needed 25 strip 1     Vitamins-Lipotropics (B-100) TABS Take 1 tablet by mouth daily 30 tablet 11       ALLERGIES:     Allergies   Allergen Reactions     Ivp Dye [Contrast Dye] Itching     Pt reports that throat itches     Nuts Anaphylaxis     [place]ariola nuts only     Bactrim Swelling     Pt reaction was swelling in mouth and tongue and itchy mouth.  Resolved with benadryl and prednisone     Pcn [Penicillins] Hives     Cephalosporins Itching     Compazine [Prochlorperazine] Other (See Comments)     Pt states \"psychosis\"     Erythromycin      Mitochondrial disorder     Lactated Ringers Other (See " "Comments)     Mitochondrial disorder     Propofol      Mitochondrial disorder     Reglan [Metoclopramide] Other (See Comments)     Pt sates \"psychosis\"     Seasonal Allergies Other (See Comments)     Molds, trees, grass, dust..  Upper congestion     Valproic Acid      Mitochondrial disorder     Atorvastatin      myalgias     Shellfish Allergy Rash     Clams only       SOCIAL HISTORY:  Social History     Tobacco Use     Smoking status: Former     Packs/day: 0.00     Years: 0.00     Pack years: 0.00     Types: Cigarettes     Quit date: 10/24/2005     Years since quittin.6     Smokeless tobacco: Never     Tobacco comments:     States only smokes when drinks maybe 2x/year   Substance Use Topics     Alcohol use: Not Currently     Drug use: Never       ROS:   A comprehensive 14 point review of systems is negative other than as mentioned in HPI.    Exam:  /74 (BP Location: Right arm, Patient Position: Supine, Cuff Size: Adult Regular)   Pulse 65   Wt 71.4 kg (157 lb 8 oz)   SpO2 98%   BMI 27.03 kg/m    GENERAL APPEARANCE: healthy, alert and no distress  EYES: no icterus, no xanthelasmas  ENT: normal palate, mucosa moist, no central cyanosis  NECK: JVP not elevated  RESPIRATORY: lungs clear to auscultation - no rales, rhonchi or wheezes, no use of accessory muscles, no retractions, respirations are unlabored, normal respiratory rate  CARDIOVASCULAR: regular rhythm, normal S1 with physiologic split S2, no S3 or S4 and no murmur, click or rub.  GI: soft, non tender, bowel sounds normal,no abdominal bruits  EXTREMITIES: no edema, no bruits  NEURO: alert and oriented to person/place/time, normal speech, gait and affect  VASC: Radial, dorsalis pedis and posterior tibialis pulses 2+ bilaterally.  SKIN: no ecchymoses, no rashes.  PSYCH: cooperative, affect appropriate.     Labs:  Reviewed.  Of note:    Mitochondrial genetic testing :Positive; MT-TL1 c.3243A>G mutation was identified. Specifically, it was seen at " 23% heteroplasmy. This testing is consistent with a diagnosis of a mitochondrial disorder.    HCM panel genetic testing 3/2/23 ([facility]): Marie does NOT carry a mutation in the 30 genes analyzed.    Testing/Procedures:  Dr. Marr personally visualized and interpreted:  CMR 8/8/16:   HCM with asymmetric septal hypertrophy (basal anteroseptum ~1.6 cm, mid inferoseptum 1.7 cm) Normal LV/RV size/function, LVEF=64% RVEF=67%.   ZIA without septal contact without obvious LVOT obstruction. Multiple accessory papillary muscles, some of which are apically-displaced. 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  Very mild patchy mid-myocardial LGE involving the hypertrophied segments typical of HCM, LGE burden<5%.     CMR 11/7/19:   HCM with asymmetric septal hypertrophy (basal anteroseptum ~1.6 cm, mid inferoseptum 1.7 cm) Normal LV/RV size/function, LVEF=58% RVEF=66%.   ZIA without septal contact without obvious LVOT obstruction. Multiple accessory papillary muscles, some of which are apically-displaced. 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  Very mild patchy mid-myocardial LGE involving the hypertrophied segments typical of HCM, LGE burden<5%.   No significant change compared with 8/8/16.    CMR 6/13/23:  HCM with asymmetric septal hypertrophy (basal anteroseptum ~1.6 cm, mid inferoseptum 1.7 cm) Normal LV/RV size/function, LVEF=65% RVEF>70%.   ZIA without septal contact without obvious LVOT obstruction. Multiple accessory papillary muscles, some of which are apically-displaced. 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  Very mild patchy mid-myocardial LGE involving the hypertrophied segments typical of HCM, LGE burden<5%.   No significant change compared with 8/8/16.    Exercise echocardiogram 10/7/19:  Exercised 10:05, peak  workload 10.9 METS.   No significant resting or exercise-induced LVOT obstruction (peak gradient at rest 11 mmHg, Valsalva 20 mmHg, peak  exercise 23 mmHg.)    ZioPatch 1/30/23-2/6/23: Sinus rhythm throughout (average VR 74 bpm, range .) No significant tachyarrhythmias, bradyarrhythmias, or pauses. No sustained or nonsustained VT. No A.fib. 1 patient-triggered event correlated with normal sinus rhythm.      Assessment and Plan:   1. Hypertrophic cardiomyopathy  2. 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 Family history of sudden death in brother (appropriate ICD shock) and cousin  Marie has extensive family history of HCM with severe phenotype in several family members, including her mother (who had a heart transplant), her maternal uncle (who had an LVAD and a heart transplant and recently passed away.) Aside from her MT-TL1 mitochondrial mutation, she does not have any identified HCM mutations. I discussed that while her family history may not be completely consistent with purely maternal transmission as would be expected for mitochondrial disease (1 uncle with a son with cardiomyopathy and possibly also her great-grandfather), that HCM is relatively common among patients with MT-TL1 mutations (20-40% in some studies) and that it is also currently possible that she could have 2 separate mutations genetically predisposing her to HCM (1 of which has yet to be elucidated.) In the absence of more extensive evaluation such as whole-exome sequencing, it is difficult to determine this with any degree of certainty.   As there is some evidence that MI-[id number] HCM has a somewhat more malignant phenotype from an arrhythmic standpoint (including additional arrhythmias such as pathway-mediated SVT as well as VT) and as she has family history of aborted SCD in her brother (appropriate ICD shock), I believe the most prudent course would be to assume her arrhythmia risk is higher than conventional HCM risk factors (aside from family history) would predict and to pursue more aggressive arrhythmia surveillance, with a low  threshold for ICD if ventricular arrhythmias are disclosed. As such, I have placed an EP referral for placement of an implantable loop recorder. Marie is agreeable to this.     We discussed the potential complications of HCM, including outflow obstruction, arrhythmias, and heart failure. Marie has no significant LVOT obstruction (including latent LVOTO), despite having mild ZIA based on her last stress echocardiogram.    She has pursued appropriate family screening and is followed in Clinical Genetics for her MT-TL1 disease.   The patient was counseled at length regarding the nature of hypertrophic cardiomyopathy including its genetic nature, its natural history, and potential complications.     We discussed the activity recommendations for hypertrophic cardiomyopathy, and in particular that moderate intensity aerobic exercise is encouraged and that only the highest intensity competitive sports with start-stop activity (e.g. basketball and ice hockey) are felt to be contraindicated.     Plan in brief:    -EP referral for consideration of ILR as above   -follow up in 1 year with echocardiogram prior    4. HL  If myopathy becomes limiting for statin, bempedoic acid (which should not effect skeletal muscle) or a PCSK9 inhibitor may be promising options. She is OK continuing rosuvastatin for now but may want to pursue one of these options in the future.       The patient states understanding and is agreeable with plan.     Total time spent today: 87 minutes    Jamal Ferrell MD  Cardiology    CC  JAMAL FERRELL

## 2023-06-13 NOTE — LETTER
6/13/2023      RE: Marie Vogel  [address]  [place] [address]       Dear Colleague,    Thank you for the opportunity to participate in the care of your patient, Marie Vogel, at the [facility]. Please see a copy of my visit note below.    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 Mitochondrial mutation was ultimately initially diagnosed in her youngest child; her youngest child was initially found to have DM but was negative for testing for DM1. This mutation has been associated with a variety of phenotypes, including MELAS, Notably, population studies have shown that most patients with this mutation do NOT meet clinical criteria for MELAS and that many have other phenotypic manifestations or are asymptomatic. Of patients with m.3243A>G, 15% will have MELAS; others will have maternally-inherited diabetes and deafness (MIDD), progressive external ophthalmoplegia, skeletal myopathy, diabetes mellitus, GI dysmotility, and myocardial diase. Myocardial disease most commonly manifests as HCM, which is reported in 20-40% patients with this mutation in some studies; other potential cardiac manifestations include WPW, SVT, A.fib, and VT. (Molecular Genetics and Metabolism 2019 128:19-29.) She was seen by Dr. Saavedra 12/7/22 for evaluation of MT-TL1/m.3243A>G associated disease.    Her family history is notable for HCM in multiple maternal relatives, including her mother (diagnosed at age 37, heart transplant 2005 at age 49, passed away in 2016 at age 60 (hemorrhage after lung biopsy which diagnosed histoplasmosis, also had allograft  vasculopathy)), brother (diagnosed at age 18, had a myectomy in , has an ICD), 1 maternal aunt, 4 maternal uncles (1 has an LVAD, recently passed away; his son  suddenly), maternal grandmother, and possibly maternal great grandfather. She has 2 children, a daughter (22, has DM) and a son (13, assigned female at birth, has bilateral  hearing loss, DM, and mitochondrial MT-TL1 mutation.) Both children have had normal echocardiograms.  She was diagnosed with HCM in 2016 after a period of cardiac screening due to family history. CMR 16 showed asymmetric septal hypertophy up to 1.5 cm with ZIA and minimal LGE (seee below.)     Her brother recently collapsed and went to the ED. This was felt due to dehydration. He has had ICD shocks in the past, but none recently. His last LVEF was reduced at 35%.     She has previously been followed by Dr. Melanie Dewey. Given her family history and also question as to whether her mitochondrial mutation could fully account for her extensive family history of HCM which did not appear to be entirely consistent with completely maternal transmission, she was referred to [facility] for genetic counseling and testing for non-mitochondrial mutations associated with HCM. This testing was done 3/2/23 and was negative.    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 She has history of SVT but has not had any episodes in >1 year. ROS is otherwise notable for occasional numbness in her left arm which improves when she changes positions and cold extremities.     ECG 23: sinus rhythm with nonspecific ST/T abnormality, VR 70    She denies any dyspnea at rest or with exertion, PND, orthopnea, peripheral edema, palpitations, lightheadedness or syncope.       FAMILY HISTORY:  Mother was diagnosed with HCM at 37 years.  She had a heart transplant in  at 49 years. She  in 2016 at 60 years of age.  She participated in a [place] research study  looking for HCM genetic mutations but no records were able to be found.  Brother diagnosed with HCM at 18 years.  He had a myomectomy in about  and also has an ICD and has had appropriate shocks in the past. He has one daughter with autism.  A maternal aunt and four maternal uncles have all been diagnosed with HCM. One uncle has an LVAD.  He also had a son who  suddenly and was found to have cardiomyopathy on autopsy.  Maternal grandmother and great grandfather also had HCM.  In addition, there are many distant relatives with HCM.    Daughter (22) with type 2 diabetes and obesity.  She had an ECHO around 12 years of age that was normal.  Son (13), assigned female at birth, also has bilateral hearing loss, DM2 and the mitochondrial mutation in the MT-TL1 gene. Normal ECHO in .   Father with DM2 and kidney failure.  His four siblings have no know health issues.  Paternal grandmother  in her 80's with Alzheimers.  She also had DM2 with onset in her 60-70's.  Grandfather  90's.  He had a history of blood clot in his eye, leading to blindness in 1 eye, and a stroke in his 60's.    PAST MEDICAL HISTORY:  Past Medical History:   Diagnosis Date     Allergic rhinitis due to pollen      Atypical glandular cells on Pap smear 3/1108     Cervical high risk HPV (human papillomavirus) test positive 2018    See problem list     Chronic kidney disease      Gastroesophageal reflux disease      Mitochondrial disease (H) 2022     PFO (patent foramen ovale)      Stargardt's disease 2019     Type II or unspecified type diabetes mellitus without mention of complication, not stated as uncontrolled     onset was gestational in        CURRENT MEDICATIONS:  Current Outpatient Medications   Medication Sig Dispense Refill     albuterol (PROAIR HFA/PROVENTIL HFA/VENTOLIN HFA) 108 (90 Base) MCG/ACT inhaler Inhale 2 puffs into the lungs every 4 hours as needed for shortness of breath / dyspnea or  wheezing 18 g 3     ASPIRIN NOT PRESCRIBED (INTENTIONAL) Please choose reason not prescribed from choices below.       atenolol (TENORMIN) 25 MG tablet Take 0.5 tablets (12.5 mg) by mouth daily 90 tablet 3     blood glucose (PEPE CONTOUR NEXT) test strip Check 4 times/day 300 each 0     blood glucose monitoring (NO BRAND SPECIFIED) meter device kit Use to test blood sugar 6 times daily and as needed. 1 kit 0     cetirizine (ZYRTEC) 10 MG tablet Take 10 mg by mouth 2 times daily  90 tablet 3     clotrimazole (LOTRIMIN) 1 % external cream Apply topically 2 times daily 60 g 3     co-enzyme Q-10 100 MG CAPS capsule Take 100 mg by mouth daily       Continuous Blood Gluc Sensor (DEXCOM G6 SENSOR) MISC Apply one sensor to the skin every 10 days 9 each 3     Continuous Blood Gluc Transmit (DEXCOM G6 TRANSMITTER) MISC Change every 90 days.  (Dexcom G6 Transmitter, #[id number]) 1 each 1     Dulaglutide (TRULICITY) 4.5 MG/0.5ML SOPN Inject 4.5 mg Subcutaneous once a week 6 mL 1     empagliflozin (JARDIANCE) 10 MG TABS tablet Take 1 tablet (10 mg) by mouth daily 90 tablet 0     EPINEPHrine (ANY BX GENERIC EQUIV) 0.3 MG/0.3ML injection 2-pack Inject 0.3 mLs (0.3 mg) into the muscle once as needed for anaphylaxis 0.3 mL 11     fluticasone (FLONASE) 50 MCG/ACT spray Spray 1-2 sprays into both nostrils daily 1 Bottle 0     Glucagon, rDNA, (GLUCAGON EMERGENCY) 1 MG KIT For Intramuscular use for low Blood glucose episode. 1 kit 0     ibuprofen (ADVIL/MOTRIN) 200 MG tablet Take 200 mg by mouth every 4 hours as needed        Insulin Disposable Pump (OMNIPOD 5 G6 POD, GEN 5,) MISC 1 each every 3 days 30 each 3     insulin glargine (LANTUS PEN) 100 UNIT/ML pen Take 20 units in case of pump malfunction only. 15 mL 0     insulin lispro (HUMALOG) 100 UNIT/ML vial USE WITH INSULIN PUMP AS DIRECTED, USES ABOUT 80 UNITS PER DAY 80 mL 0     INSULIN PUMP - OUTPATIENT Updated 8/3/21:  OmniPod Dash  BASAL:  00:00: 0.75 units/hr  CARB  "RATIO:  00:00: 10  Sensitivity:  00:00: 40 mg/dL  BLOOD GLUCOSE TARGET and times:  12   AM (midnight): 120-120  Active Insulin Time: 4 hours  Sensor: Nadia/ Nadia Link Donna  Amish:   Username [email or web address]  Password [email or web address]       insulin syringe-needle U-100 (29G X 1/2\" 1 ML) 29G X 1/2\" 1 ML miscellaneous Use 1 syringe once daily or as needed 100 each 1     Lancets (MICROLET) MISC 1 Device See Admin Instructions. Use up to 5 times daily for blood sugar checks. 100 each prn     levonorgestrel (MIRENA) 20 MCG/24HR IUD 1 each (20 mcg) by Intrauterine route once       losartan (COZAAR) 50 MG tablet 1 tab each morning and one half tab each evening 135 tablet 3     MAGNESIUM OXIDE PO Take 400 mg by mouth daily       montelukast (SINGULAIR) 10 MG tablet Take 1 tablet (10 mg) by mouth At Bedtime 90 tablet 3     Multiple Vitamins-Minerals (MULTI VITAMIN/MINERALS PO) Take 1 each by mouth daily.       Omega-3 Fatty Acids (FISH OIL) 500 MG CAPS Take 1 capsule by mouth       omeprazole (PRILOSEC) 40 MG DR capsule TAKE ONE CAPSULE BY MOUTH ONCE DAILY AS NEEDED 90 capsule 3     ondansetron (ZOFRAN ODT) 4 MG ODT tab Take 1 tablet (4 mg) by mouth every 8 hours as needed for nausea 8 tablet 0     rosuvastatin (CRESTOR) 5 MG tablet Take 1 tablet (5 mg) by mouth daily 90 tablet 3     sertraline (ZOLOFT) 25 MG tablet Take 1 tablet (25 mg) by mouth daily 90 tablet 3     SUMAtriptan (IMITREX) 25 MG tablet TAKE ONE TO FOUR TABLETS BY MOUTH ONE TIME. MAY REPEAT 1 TABLET EVERY TWO HOURS UP TO MAXIMUM OF 200MG IN 24 HOURS 8 tablet 11     traZODone (DESYREL) 50 MG tablet TAKE ONE-HALF TO ONE TABLET BY MOUTH NIGHTLY AS NEEDED FOR SLEEP 60 tablet 3     Urine Glucose-Ketones Test STRP Daily as needed 25 strip 1     Vitamins-Lipotropics (B-100) TABS Take 1 tablet by mouth daily 30 tablet 11       ALLERGIES:     Allergies   Allergen Reactions     Ivp Dye [Contrast Dye] Itching     Pt reports that throat itches     Nuts Anaphylaxis     [place] " "nuts only     Bactrim Swelling     Pt reaction was swelling in mouth and tongue and itchy mouth.  Resolved with benadryl and prednisone     Pcn [Penicillins] Hives     Cephalosporins Itching     Compazine [Prochlorperazine] Other (See Comments)     Pt states \"psychosis\"     Erythromycin      Mitochondrial disorder     Lactated Ringers Other (See Comments)     Mitochondrial disorder     Propofol      Mitochondrial disorder     Reglan [Metoclopramide] Other (See Comments)     Pt sates \"psychosis\"     Seasonal Allergies Other (See Comments)     Molds, trees, grass, dust..  Upper congestion     Valproic Acid      Mitochondrial disorder     Atorvastatin      myalgias     Shellfish Allergy Rash     Clams only       SOCIAL HISTORY:  Social History     Tobacco Use     Smoking status: Former     Packs/day: 0.00     Years: 0.00     Pack years: 0.00     Types: Cigarettes     Quit date: 10/24/2005     Years since quittin.6     Smokeless tobacco: Never     Tobacco comments:     States only smokes when drinks maybe 2x/year   Substance Use Topics     Alcohol use: Not Currently     Drug use: Never       ROS:   A comprehensive 14 point review of systems is negative other than as mentioned in HPI.    Exam:  /74 (BP Location: Right arm, Patient Position: Supine, Cuff Size: Adult Regular)   Pulse 65   Wt 71.4 kg (157 lb 8 oz)   SpO2 98%   BMI 27.03 kg/m    GENERAL APPEARANCE: healthy, alert and no distress  EYES: no icterus, no xanthelasmas  ENT: normal palate, mucosa moist, no central cyanosis  NECK: JVP not elevated  RESPIRATORY: lungs clear to auscultation - no rales, rhonchi or wheezes, no use of accessory muscles, no retractions, respirations are unlabored, normal respiratory rate  CARDIOVASCULAR: regular rhythm, normal S1 with physiologic split S2, no S3 or S4 and no murmur, click or rub.  GI: soft, non tender, bowel sounds normal,no abdominal bruits  EXTREMITIES: no edema, no bruits  NEURO: alert and oriented to " person/place/time, normal speech, gait and affect  VASC: Radial, dorsalis pedis and posterior tibialis pulses 2+ bilaterally.  SKIN: no ecchymoses, no rashes.  PSYCH: cooperative, affect appropriate.     Labs:  Reviewed.  Of note:    Mitochondrial genetic testing 2022:Positive; MT-TL1 c.3243A>G mutation was identified. Specifically, it was seen at 23% heteroplasmy. This testing is consistent with a diagnosis of a mitochondrial disorder.    HCM panel genetic testing 3/2/23 ([facility]): Marie does NOT carry a mutation in the 30 genes analyzed.    Testing/Procedures:  Dr. Marr personally visualized and interpreted:  CMR 8/8/16:   HCM with asymmetric septal hypertrophy (basal anteroseptum ~1.6 cm, mid inferoseptum 1.7 cm) Normal LV/RV size/function, LVEF=64% RVEF=67%.   ZIA without septal contact without obvious LVOT obstruction. Multiple accessory papillary muscles, some of which are apically-displaced. 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  Very mild patchy mid-myocardial LGE involving the hypertrophied segments typical of HCM, LGE burden<5%.     CMR 11/7/19:   HCM with asymmetric septal hypertrophy (basal anteroseptum ~1.6 cm, mid inferoseptum 1.7 cm) Normal LV/RV size/function, LVEF=58% RVEF=66%.   ZIA without septal contact without obvious LVOT obstruction. Multiple accessory papillary muscles, some of which are apically-displaced. 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  Very mild patchy mid-myocardial LGE involving the hypertrophied segments typical of HCM, LGE burden<5%.   No significant change compared with 8/8/16.    CMR 6/13/23:  HCM with asymmetric septal hypertrophy (basal anteroseptum ~1.6 cm, mid inferoseptum 1.7 cm) Normal LV/RV size/function, LVEF=65% RVEF>70%.   ZIA without septal contact without obvious LVOT obstruction. Multiple accessory papillary muscles, some of which are apically-displaced. 1 anomalous chord/papillary muscle inserting into the  septum.  Very mild patchy mid-myocardial LGE involving the hypertrophied segments typical of HCM, LGE burden<5%.   No significant change compared with 8/8/16.    Exercise echocardiogram 10/7/19:  Exercised 10:05, peak  workload 10.9 METS.   No significant resting or exercise-induced LVOT obstruction (peak gradient at rest 11 mmHg, Valsalva 20 mmHg, peak exercise 23 mmHg.)    ZioPatch 1/30/23-2/6/23: Sinus rhythm throughout (average VR 74 bpm, range .) No significant tachyarrhythmias, bradyarrhythmias, or pauses. No sustained or nonsustained VT. No A.fib. 1 patient-triggered event correlated with normal sinus rhythm.      Assessment and Plan:   1. Hypertrophic cardiomyopathy  2. 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 Family history of sudden death in brother (appropriate ICD shock) and cousin  Marie has extensive family history of HCM with severe phenotype in several family members, including her mother (who had a heart transplant), her maternal uncle (who had an LVAD and a heart transplant and recently passed away.) Aside from her MT-TL1 mitochondrial mutation, she does not have any identified HCM mutations. I discussed that while her family history may not be completely consistent with purely maternal transmission as would be expected for mitochondrial disease (1 uncle with a son with cardiomyopathy and possibly also her great-grandfather), that HCM is relatively common among patients with MT-TL1 mutations (20-40% in some studies) and that it is also currently possible that she could have 2 separate mutations genetically predisposing her to HCM (1 of which has yet to be elucidated.) In the absence of more extensive evaluation such as whole-exome sequencing, it is difficult to determine this with any degree of certainty.   As there is some evidence that [id number] HCM has a somewhat more malignant phenotype from an arrhythmic standpoint (including additional arrhythmias  such as pathway-mediated SVT as well as VT) and as she has family history of aborted SCD in her brother (appropriate ICD shock), I believe the most prudent course would be to assume her arrhythmia risk is higher than conventional HCM risk factors (aside from family history) would predict and to pursue more aggressive arrhythmia surveillance, with a low threshold for ICD if ventricular arrhythmias are disclosed. As such, I have placed an EP referral for placement of an implantable loop recorder. Marie is agreeable to this.     We discussed the potential complications of HCM, including outflow obstruction, arrhythmias, and heart failure. Marie has no significant LVOT obstruction (including latent LVOTO), despite having mild ZIA based on her last stress echocardiogram.    She has pursued appropriate family screening and is followed in [facility] for her MT-TL1 disease.   The patient was counseled at length regarding the nature of hypertrophic cardiomyopathy including its genetic nature, its natural history, and potential complications.     We discussed the activity recommendations for hypertrophic cardiomyopathy, and in particular that moderate intensity aerobic exercise is encouraged and that only the highest intensity competitive sports with start-stop activity (e.g. basketball and ice hockey) are felt to be contraindicated.     Plan in brief:    -EP referral for consideration of ILR as above   -follow up in 1 year with echocardiogram prior    4. HL  If myopathy becomes limiting for statin, bempedoic acid (which should not effect skeletal muscle) or a PCSK9 inhibitor may be promising options. She is OK continuing rosuvastatin for now but may want to pursue one of these options in the future.       The patient states understanding and is agreeable with plan.     Total time spent today: 87 minutes    Jamal Ferrell MD  Cardiology    [facility]  [facility]        Please do not hesitate to contact me if  you have any questions/concerns.     Sincerely,     Jamal Marr MD

## 2023-06-14 LAB
ATRIAL RATE - MUSE: 70 BPM
DIASTOLIC BLOOD PRESSURE - MUSE: NORMAL MMHG
INTERPRETATION ECG - MUSE: NORMAL
P AXIS - MUSE: 62 DEGREES
PR INTERVAL - MUSE: 172 MS
QRS DURATION - MUSE: 78 MS
QT - MUSE: 410 MS
QTC - MUSE: 442 MS
R AXIS - MUSE: 60 DEGREES
SYSTOLIC BLOOD PRESSURE - MUSE: NORMAL MMHG
T AXIS - MUSE: -18 DEGREES
VENTRICULAR RATE- MUSE: 70 BPM

## 2023-06-17 ASSESSMENT — ENCOUNTER SYMPTOMS
WEAKNESS: 0
SMELL DISTURBANCE: 0
BACK PAIN: 1
SPEECH CHANGE: 0
HYPERTENSION: 0
SLEEP DISTURBANCES DUE TO BREATHING: 0
PARALYSIS: 0
DISTURBANCES IN COORDINATION: 0
TINGLING: 0
SORE THROAT: 0
LOSS OF CONSCIOUSNESS: 0
MUSCLE CRAMPS: 1
NUMBNESS: 0
STIFFNESS: 1
ARTHRALGIAS: 1
ORTHOPNEA: 0
LIGHT-HEADEDNESS: 0
MUSCLE WEAKNESS: 0
SINUS CONGESTION: 1
HEADACHES: 1
SYNCOPE: 0
MYALGIAS: 1
HYPOTENSION: 0
MEMORY LOSS: 0
JOINT SWELLING: 0
SEIZURES: 0
SINUS PAIN: 1
PALPITATIONS: 0
NECK MASS: 0
NECK PAIN: 0
LEG PAIN: 0
HOARSE VOICE: 0
TASTE DISTURBANCE: 0
DIZZINESS: 0
EXERCISE INTOLERANCE: 0
TREMORS: 0
TROUBLE SWALLOWING: 0

## 2023-06-20 ENCOUNTER — VIRTUAL VISIT (OUTPATIENT)
Dept: ENDOCRINOLOGY | Facility: CLINIC | Age: 43
End: 2023-06-20
Payer: COMMERCIAL

## 2023-06-20 DIAGNOSIS — R80.9 MICROALBUMINURIA: ICD-10-CM

## 2023-06-20 DIAGNOSIS — E11.43 DIABETIC GASTROPARESIS (H): ICD-10-CM

## 2023-06-20 DIAGNOSIS — E78.5 HYPERLIPIDEMIA LDL GOAL <100: ICD-10-CM

## 2023-06-20 DIAGNOSIS — E11.21 TYPE 2 DIABETES MELLITUS WITH DIABETIC NEPHROPATHY, WITH LONG-TERM CURRENT USE OF INSULIN (H): Primary | ICD-10-CM

## 2023-06-20 DIAGNOSIS — Z79.4 TYPE 2 DIABETES MELLITUS WITH DIABETIC NEPHROPATHY, WITH LONG-TERM CURRENT USE OF INSULIN (H): Primary | ICD-10-CM

## 2023-06-20 DIAGNOSIS — Z96.41 INSULIN PUMP IN PLACE: ICD-10-CM

## 2023-06-20 DIAGNOSIS — K31.84 DIABETIC GASTROPARESIS (H): ICD-10-CM

## 2023-06-20 PROCEDURE — 99214 OFFICE O/P EST MOD 30 MIN: CPT | Mod: VID | Performed by: INTERNAL MEDICINE

## 2023-06-20 PROCEDURE — 95251 CONT GLUC MNTR ANALYSIS I&R: CPT | Performed by: INTERNAL MEDICINE

## 2023-06-20 NOTE — PROGRESS NOTES
"THIS IS A VIDEO VISIT:    Phone call visit/virtual visit encounter:    Name of patient: Marie Vogel    Date of encounter: 6/20/2023    Time of start of video visit: 1:31    Video started: 1:41    Video ended: 1:52    Provider location: working from home/ [facility] [address]    Patient location: patients home.    Mode of transmission: [employer] video/ [employer]    Verbal consent: obtained before starting visit. Pt is agreeable.      The patient has been notified of following:      \"This VIDEO visit will be conducted via a call between you and your physician/provider. We have found that certain health care needs can be provided without the need for a physical exam.  This service lets us provide the care you need with a short phone conversation.  If a prescription is necessary we can send it directly to your pharmacy.  If lab work is needed we can place an order for that and you can then stop by our lab to have the test done at a later time.     With new updates with corona virus patient might be billed as clinic visit.     If during the course of the call the physician/provider feels a telephone visit is not appropriate, you will not be charged for this service.\"      Past medical history, social history, family history, allergy and medications were reviewed and updated as appropriate.  Reviewed pertinent labs, notes, imaging studies personally.    Name: Marie Vogel  Seen for f/u of DM.  HPI:  Marie Vogel is a 42 year old female who presents for the evaluation/management of DM.   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    Here for f/u. Previously has seen endo at [facility] ( Dr Aguilera and Dr Zhou and ). 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 Busy at work, also variable schedule.. Concerned about weight gain.   H/o cardiomyopathy. Followed by cardiology.    On OMNIPOD 5+ Dexcom.   Using humalog in pump.  Using DEXCOM CGM.    She is working at [employer].  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 Marie also has glucagon kit at home for emergency.    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  Trulicity was also increased following that.    Wt is stable.  Wt Readings from Last 2 Encounters:   06/13/23 71.4 kg (157 lb 8 oz)   02/09/23 68.9 kg (152 lb)       1. Type 2 DM:  Orginally diagnosed at the age of: 21 with GDM during her first pregnancy. Diagnosed with DM 2 in 1/2002, diet controlled for 3 years, then started on metformin. In 2010 during her second pregnancy, started on insulin and then insulin pump therapy in 2012.   Current Regimen:   Humalog Insulin pump therapy ( Medtronic MinimTeramind Paradigm),   Trulicity 4.5 mg/week.  Jardinace 10 mg/day    BS checks: Using Dexcom.  Average Meter Download: Blood glucose data reviewed personally. See nursing note from this encounter for details.  She is able to feel symptoms of hypoglycemia.    yes:     yes:     Diabetes Medication(s)     Diabetic Other       Glucagon, rDNA, (GLUCAGON EMERGENCY) 1 MG KIT    For Intramuscular use for low Blood glucose episode.    Insulin       insulin glargine (LANTUS PEN) 100 UNIT/ML pen    Take 20 units in case of pump malfunction only.     insulin lispro (HUMALOG) 100 UNIT/ML vial    USE WITH INSULIN PUMP AS DIRECTED, USES ABOUT 80 UNITS PER DAY    Sodium-Glucose  Co-Transporter 2 (SGLT2) Inhibitors       empagliflozin (JARDIANCE) 10 MG TABS tablet    Take 1 tablet (10 mg) by mouth daily    Incretin Mimetic Agents       Dulaglutide (TRULICITY) 4.5 MG/0.5ML SOPN    Inject 4.5 mg Subcutaneous once a week          Current Regimen:     In automode: 68% of time    Insulin pump -   Time Rate (U/hr)   0000-  0.75                   Carbohydrate Ratio -    Time Ratio   0000-  10   0600 PM  11     Sensitivity   40   Active Insulin Time   hours   Basal      Bolus      Total Carbohydrates/day    Total Insulin/day     Average Blood Sugar                Complications:   Diabetes Complications  Description / Detail    Diabetic Retinopathy  No   CAD / PAD  No   Neuropathy  No   Nephropathy / Microalbuminuria  Yes: microalbuminuria. ON cozaar.   Gastroparesis  No   Hypoglycemia [place]  No     2. Hypertension: Blood Pressure today:   BP Readings from Last 3 Encounters:   23 105/74   23 (!) 137/97   23 124/70     Takes medications everyday without forgetting a dose.  Denies feeling lightheaded or dizzy.    3. Hyperlipidemia:   Denies muscle aches of pains.       PMH/PSH:  Past Medical History:   Diagnosis Date     Allergic rhinitis due to pollen      Atypical glandular cells on Pap smear 3/1108     Cervical high risk HPV (human papillomavirus) test positive 2018    See problem list     Chronic kidney disease      Gastroesophageal reflux disease      Mitochondrial disease (H) 2022     PFO (patent foramen ovale)      Stargardt's disease 2019     Type II or unspecified type diabetes mellitus without mention of complication, not stated as uncontrolled     onset was gestational in      Past Surgical History:   Procedure Laterality Date     ABDOMEN SURGERY       C IUD,MIRENA  8/10/10, 7/28/15     C/SECTION, LOW TRANSVERSE  6/25/10    , Low Transverse     CHOLECYSTECTOMY, LAPOROSCOPIC      Cholecystectomy, Laparoscopic     ESOPHAGOSCOPY,  GASTROSCOPY, DUODENOSCOPY (EGD), COMBINED N/A 2016    Procedure: COMBINED ESOPHAGOSCOPY, GASTROSCOPY, DUODENOSCOPY (EGD), BIOPSY SINGLE OR MULTIPLE;  Surgeon: Fadi Hunter MD;  Location: [facility]     HC ESOPHAGEAL MOTILITY STUDY N/A 2016    Procedure: ESOPHAGEAL MOTILITY STUDY;  Surgeon: Fadi Hunter MD;  Location: [facility]     HC REMOVE TONSILS/ADENOIDS,<13 Y/O      T &A     IR RENAL BIOPSY RIGHT  2/3/2023     ZZC INDUCED ABORTN BY D&C           Family Hx:  Family History   Problem Relation Age of Onset     Cardiovascular Mother         hypertrophic cardiomyopathy     Genitourinary Problems Mother         gallbladder disease     Diabetes Mother         Transplnant induced/      Other - See Comments Mother         heart transplant 2005     Depression Mother      Diabetes Father         type 2     Hypertension Father      Hyperlipidemia Father      Genitourinary Problems Maternal Grandmother         gallbladder disease     Genitourinary Problems Paternal Grandmother         gallbladder disease     Hypertension Paternal Grandfather         Deaceased     Cerebrovascular Disease Paternal Grandfather              Cardiovascular Brother         hypertrophic cardiomyopathy     Diabetes Brother      Diabetes Brother         type 2     Diabetes Other         Type 2     Diabetes Daughter      Glaucoma No family hx of      Macular Degeneration No family hx of      Thyroid disease: No         DM2: Yes - father and mother         Autoimmune: DM1, SLE, RA, Vitiligo No    Social Hx:  Social History     Socioeconomic History     Marital status:      Spouse name: Not on file     Number of children: 2     Years of education: 14     Highest education level: Associate degree: academic program   Occupational History     Occupation: nurse     Occupation: RN   Tobacco Use     Smoking status: Former     Packs/day: 0.00     Years: 0.00     Pack years: 0.00     Types:  Cigarettes     Quit date: 10/24/2005     Years since quittin.6     Smokeless tobacco: Never     Tobacco comments:     States only smokes when drinks maybe 2x/year   Vaping Use     Vaping status: Not on file   Substance and Sexual Activity     Alcohol use: Not Currently     Drug use: Never     Sexual activity: Not Currently     Partners: Male     Birth control/protection: I.U.D.     Comment: Mirena IUD 7/28/15   Other Topics Concern     Parent/sibling w/ CABG, MI or angioplasty before 65F 55M? No   Social History Narrative    Caffeine intake/servings daily - 6-7    Calcium intake/servings daily - 2-3 yogurt, milk, cheese    Exercise 1 times weekly - describe aerobics    Sunscreen used - Yes    Seatbelts used - Yes    Guns stored in the home - No    Self Breast Exam - Yes    Pap test up to date -  Yes    Eye exam up to date -  Yes    Dental exam up to date -  Yes    DEXA scan up to date -  Not Applicable    Flex Sig/Colonoscopy up to date -  Not Applicable    Mammography up to date -  Not Applicable    Immunizations reviewed and up to date - Yes    Abuse: Current or Past (Physical, Sexual or Emotional) - No    Do you feel safe in your environment - Yes    Do you cope well with stress - Yes    Do you suffer from insomnia - Yes     Last updated by: Tatianna Lacey  2005     Social Determinants of Health     Financial Resource Strain: Medium Risk (2023)    Overall Financial Resource Strain (CARDIA)      Difficulty of Paying Living Expenses: Somewhat hard   Food Insecurity: Food Insecurity Present (2023)    Hunger Vital Sign      Worried About Running Out of Food in the Last Year: Sometimes true      Ran Out of Food in the Last Year: Never true   Transportation Needs: No Transportation Needs (2023)    PRAPARE - Transportation      Lack of Transportation (Medical): No      Lack of Transportation (Non-Medical): No   Physical Activity: Insufficiently Active (2023)    Exercise Vital Sign      Days  of Exercise per Week: 4 days      Minutes of Exercise per Session: 10 min   Stress: No Stress Concern Present (1/26/2023)    [employer] [place] - Occupational Stress Questionnaire      Feeling of Stress : Only a little   Social Connections: Socially Isolated (1/26/2023)    Social Connection and Isolation Panel [NHANES]      Frequency of Communication with Friends and Family: Three times a week      Frequency of Social Gatherings with Friends and Family: Never      Attends Mu-ism Services: Never      Active Member of Clubs or Organizations: No      Attends Club or Organization Meetings: Not on file      Marital Status:    Intimate Partner Violence: Not on file   Housing Stability: High Risk (1/26/2023)    Housing Stability Vital Sign      Unable to Pay for Housing in the Last Year: Yes      Number of Places Lived in the Last Year: 1      Unstable Housing in the Last Year: No          MEDICATIONS:  has a current medication list which includes the following prescription(s): albuterol, aspirin not prescribed, atenolol, blood glucose, blood glucose monitoring, cetirizine, clotrimazole, co-enzyme q-10, dexcom g6 sensor, dexcom g6 transmitter, trulicity, empagliflozin, epinephrine, fluticasone, glucagon emergency, ibuprofen, omnipod 5 g6 pod (gen 5), insulin glargine, humalog, insulin pump, insulin syringe-needle u-100, blood glucose monitoring, levonorgestrel, losartan, magnesium oxide, montelukast, multiple vitamins-minerals, fish oil-omega-3 fatty acids, omeprazole, ondansetron, rosuvastatin, sertraline, sumatriptan, trazodone, urine glucose-ketones test, and b-100.    ROS     ROS: 10 point ROS neg other than the symptoms noted above in the HPI.    Physical Exam   VS: There were no vitals taken for this visit.  GENERAL: healthy, alert and no distress  EYES: Eyes grossly normal to inspection, conjunctivae and sclerae normal  ENT: no nose swelling, nasal discharge.  Thyroid: no apparent  thyroid nodules  RESP: no audible wheeze, cough, or visible cyanosis.  No visible retractions or increased work of breathing.  Able to speak fully in complete sentences.  ABDO: not evaluated.  EXTREMITIES: no hand tremors.  NEURO: Cranial nerves grossly intact, mentation intact and speech normal  SKIN: No apparent skin lesions, rash or edema seen   PSYCH: mentation appears normal, affect normal/bright, judgement and insight intact, normal speech and appearance well-groomed    LABS:  A1c:  Lab Results   Component Value Date    A1C 6.4 06/09/2023    A1C 6.5 12/07/2022    A1C 6.5 10/03/2022    A1C 7.0 05/04/2022    A1C 6.4 02/10/2022    A1C 6.5 07/05/2021    A1C 7.6 01/14/2021    A1C 6.7 07/14/2020    A1C 7.0 01/14/2020    A1C 7.5 09/24/2019     Basic Metabolic Panel:  Creatinine   Date Value Ref Range Status   01/19/2023 1.08 (H) 0.51 - 0.95 mg/dL Final   07/05/2021 1.20 (H) 0.52 - 1.04 mg/dL Final     Urinary microalbumin:  Lab Results   Component Value Date    UMALCR 255.16 06/09/2023    UMALCR 591.60 02/10/2022    UMALCR 402.04 07/05/2021         LFTS/Cholesterol Panel:  Recent Labs   Lab Test 01/27/23  1044 02/10/22  1103 09/28/16  0811 07/01/15  0612   CHOL 136 205*   < > 190   HDL 39* 48*   < > 42*   LDL 75 132*   < > 96   TRIG 110 123   < > 260*   CHOLHDLRATIO  --   --   --  4.5    < > = values in this interval not displayed.       Thyroid Function:  TSH   Date Value Ref Range Status   12/07/2022 1.53 0.40 - 4.00 mU/L Final   09/24/2019 1.45 0.40 - 4.00 mU/L Final     Vitamin D:  Vitamin D Deficiency Screening Results:  Lab Results   Component Value Date    VITDT 27 01/19/2023    VITDT 22 01/02/2023    VITDT 33 11/05/2018    VITDT 39 09/27/2016    VITDT 45 02/23/2016     All pertinent notes, labs, and images personally reviewed by me.     Glucometer/ insulin pump (if applicable)/ CGM data (if applicable) downloaded, Personally reviewed and interpreted.  All Blood sugar data reviewed personally and discussed  with pt.  See nursing note from today's clinic visit for details of BG/CGM log.      A/P  Ms.Marie Vogel is a 42 year old here for the evaluation/management of diabetes:    1. DM2 - Under Fair control.  A1c 6.4%.  Diabetes complicated by microalbuminuria.  Using OMNIPOD 5 + Dexcom.  In automode about 68% of time.  H/o hypertropic cardiomyopathy, followed by cardiology.  In general BG appear in range.  Noted some episodes of hypoglycemia overnight.  Plan:  Discussed diagnosis, pathophysiology, management and treatment options of condition with pt.    Increase Jardiance to 25 mg/day.  Decrease basal rate ( in manual mode ) to 0.65 units/hr ( from 0.75 units/hr)  Continue TRULICITY 4.5 mg/week.  Try to be in auto mode as much as possible.  Continue to use DEXCOM.  Continue to use OMNIPOD 5+ Dexcom.   Follow up with CDE in 4 weeks. (Insulin requirement are low, about 20-25 units/day.  Based on blood sugar records can consider to stop insulin pump and switch to once a day long-acting insulin if blood sugars are under better control with increase in Jardiance dose)    Repeat labs and follow up in 3 months.  Please make a lab appointment for blood work and follow up clinic appointment in 1 week after that to discuss results.    2. Hypertension - + microalbuminuria. On losartan. Blood pressure medications include cozaar 75 mg qd.    3. Hyperlipidemia - Under fair control. LDL 75.  Had myalgias on lipitor and stopped it.  On Crestor 5 mg/day.    4. Microalbuminuria:  Recommend strict BG control.  On Cozaar 50 mg/day.    DM Prevention:    Opthalmology- recommend annually.  Dental-Yes.  ASA- No  Smoking- No.    Medications     HMG CoA Reductase Inhibitors     rosuvastatin (CRESTOR) 5 MG tablet       Salicylates     ASPIRIN NOT PRESCRIBED (INTENTIONAL)           Most Recent Immunizations   Administered Date(s) Administered     COVID-19,TRAN,Pfizer (12+ Yrs) 12/28/2021     Influenza (IIV3) PF 09/26/2018     Influenza  (intradermal) 10/01/2012     Influenza Vaccine IM > 6 months Valent IIV4 (Alfuria,Fluzone) 01/14/2021     MMR 09/17/2007     Pneumococcal 23 valent 01/22/2003     TD (ADULT, 7+) 01/01/2002     TDAP Vaccine (Adacel) 04/06/2012     Recommend checking blood sugars before meals and at bedtime.    If Blood glucose are low more often-> 2-3 times/week- give us a call.  The patient is advised to Make better food choices: reduce carbs, Reduce portion size, weight loss and exercise 3-4 times a week.  Discussed hypoglycemia signs and symptoms as well as management in detail.    All questions were answered.  The patient indicates understanding of the above issues and agrees with the plan set forth.   More than 50% of face to face time spent with Ms. Dino Vogel on counseling / coordinating her care.      Follow-up:  follow up in 3 months.    Zita Fuentes MD  Endocrinology   [facility]    CC: Alan Paredes    Addendum to above note and clinic visit:    Labs reviewed.    See result note/telephone encounter.                           Answers for HPI/ROS submitted by the patient on 6/17/2023  General Symptoms: No  Skin Symptoms: No  HENT Symptoms: Yes  EYE SYMPTOMS: No  HEART SYMPTOMS: Yes  LUNG SYMPTOMS: No  INTESTINAL SYMPTOMS: No  URINARY SYMPTOMS: No  GYNECOLOGIC SYMPTOMS: No  BREAST SYMPTOMS: No  SKELETAL SYMPTOMS: Yes  BLOOD SYMPTOMS: No  NERVOUS SYSTEM SYMPTOMS: Yes  MENTAL HEALTH SYMPTOMS: No  Ear pain: No  Ear discharge: No  Hearing loss: Yes  Tinnitus: No  Nosebleeds: No  Congestion: Yes  Sinus pain: Yes  Trouble swallowing: No   Voice hoarseness: No  Mouth sores: No  Sore throat: No  Tooth pain: No  Gum tenderness: No  Bleeding gums: No  Change in taste: No  Change in sense of smell: No  Dry mouth: Yes  Hearing aid used: Yes  Neck lump: No  Chest pain or pressure: Yes  Fast or irregular heartbeat: No  Pain in legs with walking: No  Trouble breathing while lying down: No  Fingers or toes  appear blue: No  High blood pressure: No  Low blood pressure: No  Fainting: No  Murmurs: No  Pacemaker: No  Varicose veins: No  Edema or swelling: No  Wake up at night with shortness of breath: No  Light-headedness: No  Exercise intolerance: No  Back pain: Yes  Muscle aches: Yes  Neck pain: No  Swollen joints: No  Joint pain: Yes  Bone pain: No  Muscle cramps: Yes  Muscle weakness: No  Joint stiffness: Yes  Bone fracture: No  Trouble with coordination: No  Dizziness or trouble with balance: No  Fainting or black-out spells: No  Memory loss: No  Headache: Yes  Seizures: No  Speech problems: No  Tingling: No  Tremor: No  Weakness: No  Difficulty walking: No  Paralysis: No  Numbness: No

## 2023-06-20 NOTE — NURSING NOTE
Is the patient currently in the state of [place]? YES    Visit mode:VIDEO    If the visit is dropped, the patient can be reconnected by: VIDEO VISIT: Text to cell phone: [phone]    Will anyone else be joining the visit? NO      How would you like to obtain your AVS? MyChart    Are changes needed to the allergy or medication list? NO patient notes medications/allergies were up to date since completing e-check in for this visit. Medications/allergies not reviewed again due to this.    Reason for visit: RECHECK

## 2023-06-20 NOTE — LETTER
"    6/20/2023         RE: Marie Vogel  [address] [place] [address]        Dear Colleague,    Thank you for referring your patient, Marie Vogel, to the [facility]. Please see a copy of my visit note below.    Outcome for 06/19/23 9:43 AM: Dexcom and Alyxo emailed to provider  Elaina Shipley MA        THIS IS A VIDEO VISIT:    Phone call visit/virtual visit encounter:    Name of patient: Marie Vogel    Date of encounter: 6/20/2023    Time of start of video visit: 1:31    Video started: 1:41    Video ended: 1:52    Provider location: working from home/ [facility]    Patient location: patients home.    Mode of transmission: [facility] video/ [employer]    Verbal consent: obtained before starting visit. Pt is agreeable.      The patient has been notified of following:      \"This VIDEO visit will be conducted via a call between you and your physician/provider. We have found that certain health care needs can be provided without the need for a physical exam.  This service lets us provide the care you need with a short phone conversation.  If a prescription is necessary we can send it directly to your pharmacy.  If lab work is needed we can place an order for that and you can then stop by our lab to have the test done at a later time.     With new updates with corona virus patient might be billed as clinic visit.     If during the course of the call the physician/provider feels a telephone visit is not appropriate, you will not be charged for this service.\"      Past medical history, social history, family history, allergy and medications were reviewed and updated as appropriate.  Reviewed pertinent labs, notes, imaging studies personally.    Name: Marie Vogel  Seen for f/u of DM.  HPI:  Marie Vogel is a 42 year old female who presents for the evaluation/management of DM.   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    Here for f/u. Previously has seen endo at [place] ( Dr Aguilera and Dr Zhou and ). 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 Busy at work, also variable schedule.. Concerned about weight gain.   H/o cardiomyopathy. Followed by cardiology.    On OMNIPOD 5+ Dexcom.   Using humalog in pump.  Using DEXCOM CGM.    She is working at [employer].  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 Marie also has glucagon kit at home for emergency.    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  Trulicity was also increased following that.    Wt is stable.  Wt Readings from Last 2 Encounters:   06/13/23 71.4 kg (157 lb 8 oz)   02/09/23 68.9 kg (152 lb)       1. Type 2 DM:  Orginally diagnosed at the age of: 21 with GDM during her first pregnancy. Diagnosed with DM 2 in 1/2002, diet controlled for 3 years, then started on metformin. In 2010 during her second pregnancy, started on insulin and then insulin pump therapy in 2012.   Current Regimen:   Humalog Insulin pump therapy ( Cyzone Paradigm),   Trulicity 4.5 mg/week.  Jardinace 10 mg/day    BS checks: Using Dexcom.  Average Meter Download: Blood glucose data reviewed personally. See nursing note from this encounter for details.  She is able to feel symptoms of hypoglycemia.    yes:     yes:     Diabetes Medication(s)     Diabetic  Other       Glucagon, rDNA, (GLUCAGON EMERGENCY) 1 MG KIT    For Intramuscular use for low Blood glucose episode.    Insulin       insulin glargine (LANTUS PEN) 100 UNIT/ML pen    Take 20 units in case of pump malfunction only.     insulin lispro (HUMALOG) 100 UNIT/ML vial    USE WITH INSULIN PUMP AS DIRECTED, USES ABOUT 80 UNITS PER DAY    Sodium-Glucose Co-Transporter 2 (SGLT2) Inhibitors       empagliflozin (JARDIANCE) 10 MG TABS tablet    Take 1 tablet (10 mg) by mouth daily    Incretin Mimetic Agents       Dulaglutide (TRULICITY) 4.5 MG/0.5ML SOPN    Inject 4.5 mg Subcutaneous once a week          Current Regimen:     In automode: 68% of time    Insulin pump -   Time Rate (U/hr)   0000-  0.75                   Carbohydrate Ratio -    Time Ratio   0000-  10   0600 PM  11     Sensitivity   40   Active Insulin Time   hours   Basal      Bolus      Total Carbohydrates/day    Total Insulin/day     Average Blood Sugar                Complications:   Diabetes Complications  Description / Detail    Diabetic Retinopathy  No   CAD / PAD  No   Neuropathy  No   Nephropathy / Microalbuminuria  Yes: microalbuminuria. ON cozaar.   Gastroparesis  No   Hypoglycemia [place]  No     2. Hypertension: Blood Pressure today:   BP Readings from Last 3 Encounters:   06/13/23 105/74   04/04/23 (!) 137/97   02/03/23 124/70     Takes medications everyday without forgetting a dose.  Denies feeling lightheaded or dizzy.    3. Hyperlipidemia:   Denies muscle aches of pains.       PMH/PSH:  Past Medical History:   Diagnosis Date     Allergic rhinitis due to pollen      Atypical glandular cells on Pap smear 3/1108     Cervical high risk HPV (human papillomavirus) test positive 11/27/2018    See problem list     Chronic kidney disease 2023     Gastroesophageal reflux disease      Mitochondrial disease (H) 09/2022     PFO (patent foramen ovale)      Stargardt's disease 11/29/2019     Type II or unspecified type diabetes mellitus without  mention of complication, not stated as uncontrolled     onset was gestational in      Past Surgical History:   Procedure Laterality Date     ABDOMEN SURGERY       C IUD,MIRENA  8/10/10, 7/28/15     C/SECTION, LOW TRANSVERSE  6/25/10    , Low Transverse     CHOLECYSTECTOMY, LAPOROSCOPIC      Cholecystectomy, Laparoscopic     ESOPHAGOSCOPY, GASTROSCOPY, DUODENOSCOPY (EGD), COMBINED N/A 2016    Procedure: COMBINED ESOPHAGOSCOPY, GASTROSCOPY, DUODENOSCOPY (EGD), BIOPSY SINGLE OR MULTIPLE;  Surgeon: Fadi Hunter MD;  Location:  GI      ESOPHAGEAL MOTILITY STUDY N/A 2016    Procedure: ESOPHAGEAL MOTILITY STUDY;  Surgeon: Fadi Hunter MD;  Location:  GI      REMOVE TONSILS/ADENOIDS,<13 Y/O      T &A     IR RENAL BIOPSY RIGHT  2/3/2023     ZZC INDUCED ABORTN BY D&C           Family Hx:  Family History   Problem Relation Age of Onset     Cardiovascular Mother         hypertrophic cardiomyopathy     Genitourinary Problems Mother         gallbladder disease     Diabetes Mother         Transplnant induced/      Other - See Comments Mother         heart transplant 2005     Depression Mother      Diabetes Father         type 2     Hypertension Father      Hyperlipidemia Father      Genitourinary Problems Maternal Grandmother         gallbladder disease     Genitourinary Problems Paternal Grandmother         gallbladder disease     Hypertension Paternal Grandfather         Deaceased     Cerebrovascular Disease Paternal Grandfather              Cardiovascular Brother         hypertrophic cardiomyopathy     Diabetes Brother      Diabetes Brother         type 2     Diabetes Other         Type 2     Diabetes Daughter      Glaucoma No family hx of      Macular Degeneration No family hx of      Thyroid disease: No         DM2: Yes - father and mother         Autoimmune: DM1, SLE, RA, Vitiligo No    Social Hx:  Social History     Socioeconomic  History     Marital status:      Spouse name: Not on file     Number of children: 2     Years of education: 14     Highest education level: Associate degree: academic program   Occupational History     Occupation: nurse     Occupation: RN   Tobacco Use     Smoking status: Former     Packs/day: 0.00     Years: 0.00     Pack years: 0.00     Types: Cigarettes     Quit date: 10/24/2005     Years since quittin.6     Smokeless tobacco: Never     Tobacco comments:     States only smokes when drinks maybe 2x/year   Vaping Use     Vaping status: Not on file   Substance and Sexual Activity     Alcohol use: Not Currently     Drug use: Never     Sexual activity: Not Currently     Partners: Male     Birth control/protection: I.U.D.     Comment: Mirena IUD 7/28/15   Other Topics Concern     Parent/sibling w/ CABG, MI or angioplasty before 65F 55M? No   Social History Narrative    Caffeine intake/servings daily - 6-7    Calcium intake/servings daily - 2-3 yogurt, milk, cheese    Exercise 1 times weekly - describe aerobics    Sunscreen used - Yes    Seatbelts used - Yes    Guns stored in the home - No    Self Breast Exam - Yes    Pap test up to date -  Yes    Eye exam up to date -  Yes    Dental exam up to date -  Yes    DEXA scan up to date -  Not Applicable    Flex Sig/Colonoscopy up to date -  Not Applicable    Mammography up to date -  Not Applicable    Immunizations reviewed and up to date - Yes    Abuse: Current or Past (Physical, Sexual or Emotional) - No    Do you feel safe in your environment - Yes    Do you cope well with stress - Yes    Do you suffer from insomnia - Yes     Last updated by: Tatianna Lacey  2005     Social Determinants of Health     Financial Resource Strain: Medium Risk (2023)    Overall Financial Resource Strain (CARDIA)      Difficulty of Paying Living Expenses: Somewhat hard   Food Insecurity: Food Insecurity Present (2023)    Hunger Vital Sign      Worried About Running Out  of Food in the Last Year: Sometimes true      Ran Out of Food in the Last Year: Never true   Transportation Needs: No Transportation Needs (1/26/2023)    PRAPARE - Transportation      Lack of Transportation (Medical): No      Lack of Transportation (Non-Medical): No   Physical Activity: Insufficiently Active (1/26/2023)    Exercise Vital Sign      Days of Exercise per Week: 4 days      Minutes of Exercise per Session: 10 min   Stress: No Stress Concern Present (1/26/2023)    [employer] - Occupational Stress Questionnaire      Feeling of Stress : Only a little   Social Connections: Socially Isolated (1/26/2023)    Social Connection and Isolation Panel [NHANES]      Frequency of Communication with Friends and Family: Three times a week      Frequency of Social Gatherings with Friends and Family: Never      Attends Cheondoism Services: Never      Active Member of Clubs or Organizations: No      Attends Club or Organization Meetings: Not on file      Marital Status:    Intimate Partner Violence: Not on file   Housing Stability: High Risk (1/26/2023)    Housing Stability Vital Sign      Unable to Pay for Housing in the Last Year: Yes      Number of Places Lived in the Last Year: 1      Unstable Housing in the Last Year: No          MEDICATIONS:  has a current medication list which includes the following prescription(s): albuterol, aspirin not prescribed, atenolol, blood glucose, blood glucose monitoring, cetirizine, clotrimazole, co-enzyme q-10, dexcom g6 sensor, dexcom g6 transmitter, trulicity, empagliflozin, epinephrine, fluticasone, glucagon emergency, ibuprofen, omnipod 5 g6 pod (gen 5), insulin glargine, humalog, insulin pump, insulin syringe-needle u-100, blood glucose monitoring, levonorgestrel, losartan, magnesium oxide, montelukast, multiple vitamins-minerals, fish oil-omega-3 fatty acids, omeprazole, ondansetron, rosuvastatin, sertraline, sumatriptan, trazodone, urine  glucose-ketones test, and b-100.    ROS     ROS: 10 point ROS neg other than the symptoms noted above in the HPI.    Physical Exam   VS: There were no vitals taken for this visit.  GENERAL: healthy, alert and no distress  EYES: Eyes grossly normal to inspection, conjunctivae and sclerae normal  ENT: no nose swelling, nasal discharge.  Thyroid: no apparent thyroid nodules  RESP: no audible wheeze, cough, or visible cyanosis.  No visible retractions or increased work of breathing.  Able to speak fully in complete sentences.  ABDO: not evaluated.  EXTREMITIES: no hand tremors.  NEURO: Cranial nerves grossly intact, mentation intact and speech normal  SKIN: No apparent skin lesions, rash or edema seen   PSYCH: mentation appears normal, affect normal/bright, judgement and insight intact, normal speech and appearance well-groomed    LABS:  A1c:  Lab Results   Component Value Date    A1C 6.4 06/09/2023    A1C 6.5 12/07/2022    A1C 6.5 10/03/2022    A1C 7.0 05/04/2022    A1C 6.4 02/10/2022    A1C 6.5 07/05/2021    A1C 7.6 01/14/2021    A1C 6.7 07/14/2020    A1C 7.0 01/14/2020    A1C 7.5 09/24/2019     Basic Metabolic Panel:  Creatinine   Date Value Ref Range Status   01/19/2023 1.08 (H) 0.51 - 0.95 mg/dL Final   07/05/2021 1.20 (H) 0.52 - 1.04 mg/dL Final     Urinary microalbumin:  Lab Results   Component Value Date    UMALCR 255.16 06/09/2023    UMALCR 591.60 02/10/2022    UMALCR 402.04 07/05/2021         LFTS/Cholesterol Panel:  Recent Labs   Lab Test 01/27/23  1044 02/10/22  1103 09/28/16  0811 07/01/15  0612   CHOL 136 205*   < > 190   HDL 39* 48*   < > 42*   LDL 75 132*   < > 96   TRIG 110 123   < > 260*   CHOLHDLRATIO  --   --   --  4.5    < > = values in this interval not displayed.       Thyroid Function:  TSH   Date Value Ref Range Status   12/07/2022 1.53 0.40 - 4.00 mU/L Final   09/24/2019 1.45 0.40 - 4.00 mU/L Final     Vitamin D:  Vitamin D Deficiency Screening Results:  Lab Results   Component Value Date     VITDT 27 01/19/2023    VITDT 22 01/02/2023    VITDT 33 11/05/2018    VITDT 39 09/27/2016    VITDT 45 02/23/2016     All pertinent notes, labs, and images personally reviewed by me.     Glucometer/ insulin pump (if applicable)/ CGM data (if applicable) downloaded, Personally reviewed and interpreted.  All Blood sugar data reviewed personally and discussed with pt.  See nursing note from today's clinic visit for details of BG/CGM log.      A/P  Ms.Marie Vogel is a 42 year old here for the evaluation/management of diabetes:    1. DM2 - Under Fair control.  A1c 6.4%.  Diabetes complicated by microalbuminuria.  Using OMNIPOD 5 + Dexcom.  In automode about 68% of time.  H/o hypertropic cardiomyopathy, followed by cardiology.  In general BG appear in range.  Noted some episodes of hypoglycemia overnight.  Plan:  Discussed diagnosis, pathophysiology, management and treatment options of condition with pt.    Increase Jardiance to 25 mg/day.  Decrease basal rate ( in manual mode ) to 0.65 units/hr ( from 0.75 units/hr)  Continue TRULICITY 4.5 mg/week.  Try to be in auto mode as much as possible.  Continue to use DEXCOM.  Continue to use OMNIPOD 5+ Dexcom.   Follow up with CDE in 4 weeks. (Insulin requirement are low, about 20-25 units/day.  Based on blood sugar records can consider to stop insulin pump and switch to once a day long-acting insulin if blood sugars are under better control with increase in Jardiance dose)    Repeat labs and follow up in 3 months.  Please make a lab appointment for blood work and follow up clinic appointment in 1 week after that to discuss results.    2. Hypertension - + microalbuminuria. On losartan. Blood pressure medications include cozaar 75 mg qd.    3. Hyperlipidemia - Under fair control. LDL 75.  Had myalgias on lipitor and stopped it.  On Crestor 5 mg/day.    4. Microalbuminuria:  Recommend strict BG control.  On Cozaar 50 mg/day.    DM Prevention:    Opthalmology- recommend  annually.  Dental-Yes.  ASA- No  Smoking- No.    Medications     HMG CoA Reductase Inhibitors     rosuvastatin (CRESTOR) 5 MG tablet       Salicylates     ASPIRIN NOT PRESCRIBED (INTENTIONAL)           Most Recent Immunizations   Administered Date(s) Administered     COVID-19TRAN,Pfizer (12+ Yrs) 12/28/2021     Influenza (IIV3) PF 09/26/2018     Influenza (intradermal) 10/01/2012     Influenza Vaccine IM > 6 months Valent IIV4 (Alfuria,Fluzone) 01/14/2021     MMR 09/17/2007     Pneumococcal 23 valent 01/22/2003     TD (ADULT, 7+) 01/01/2002     TDAP Vaccine (Adacel) 04/06/2012     Recommend checking blood sugars before meals and at bedtime.    If Blood glucose are low more often-> 2-3 times/week- give us a call.  The patient is advised to Make better food choices: reduce carbs, Reduce portion size, weight loss and exercise 3-4 times a week.  Discussed hypoglycemia signs and symptoms as well as management in detail.    All questions were answered.  The patient indicates understanding of the above issues and agrees with the plan set forth.   More than 50% of face to face time spent with Ms. Dino Vogel on counseling / coordinating her care.      Follow-up:  follow up in 3 months.    Zita Fuentes MD  Endocrinology   [facility]    CC: Alan Paredes    Addendum to above note and clinic visit:    Labs reviewed.    See result note/telephone encounter.                           Answers for HPI/ROS submitted by the patient on 6/17/2023  General Symptoms: No  Skin Symptoms: No  HENT Symptoms: Yes  EYE SYMPTOMS: No  HEART SYMPTOMS: Yes  LUNG SYMPTOMS: No  INTESTINAL SYMPTOMS: No  URINARY SYMPTOMS: No  GYNECOLOGIC SYMPTOMS: No  BREAST SYMPTOMS: No  SKELETAL SYMPTOMS: Yes  BLOOD SYMPTOMS: No  NERVOUS SYSTEM SYMPTOMS: Yes  MENTAL HEALTH SYMPTOMS: No  Ear pain: No  Ear discharge: No  Hearing loss: Yes  Tinnitus: No  Nosebleeds: No  Congestion: Yes  Sinus pain: Yes  Trouble swallowing: No   Voice  hoarseness: No  Mouth sores: No  Sore throat: No  Tooth pain: No  Gum tenderness: No  Bleeding gums: No  Change in taste: No  Change in sense of smell: No  Dry mouth: Yes  Hearing aid used: Yes  Neck lump: No  Chest pain or pressure: Yes  Fast or irregular heartbeat: No  Pain in legs with walking: No  Trouble breathing while lying down: No  Fingers or toes appear blue: No  High blood pressure: No  Low blood pressure: No  Fainting: No  Murmurs: No  Pacemaker: No  Varicose veins: No  Edema or swelling: No  Wake up at night with shortness of breath: No  Light-headedness: No  Exercise intolerance: No  Back pain: Yes  Muscle aches: Yes  Neck pain: No  Swollen joints: No  Joint pain: Yes  Bone pain: No  Muscle cramps: Yes  Muscle weakness: No  Joint stiffness: Yes  Bone fracture: No  Trouble with coordination: No  Dizziness or trouble with balance: No  Fainting or black-out spells: No  Memory loss: No  Headache: Yes  Seizures: No  Speech problems: No  Tingling: No  Tremor: No  Weakness: No  Difficulty walking: No  Paralysis: No  Numbness: No          Again, thank you for allowing me to participate in the care of your patient.        Sincerely,        Zita Fuentes MD

## 2023-06-20 NOTE — PATIENT INSTRUCTIONS
[facility]  Dr Fuentes, Endocrinology Department    [facility]   [address] [id number]  [place] [address]  Appointment [phone]  Fax: [phone]  [place]: Monday - Thursday      To provide the best diabetic care, please bring your blood glucose meter to each and every visit with your Endocrinologist.  Your blood glucose meter/insulin pump will be downloaded at every appointment.    Please arrive 15 minutes before your scheduled appointment.  This will allow for your blood glucose meter/insulin pump to be downloaded.  If you are wearing DEXCOM please bring  or sharing code so that it can be downloaded.  If you are using freestyle casey personal sensors please bring the reader.  If you are using TANDEM insulin pump please have your username and password to get info from [employer] website.  Increase Jardiance to 25 mg/day.  Decrease basal rate ( in manual mode ) to 0.65 units/hr ( from 0.75 units/hr)  Continue TRULICITY 4.5 mg/week.  Try to be in auto mode as much as possible.  Continue to use DEXCOM.  Continue to use OMNIPOD 5+ Dexcom.   Follow up with CDE in 4 weeks.  Repeat labs and follow up in 3 months.  Please make a lab appointment for blood work and follow up clinic appointment in 1 week after that to discuss results.    Your provider has referred you to Diabetes Education: For all [facility]:  Phone [phone]; Fax [phone]  Please call and make the appointment.    Recommend checking blood sugars before meals and at bedtime.    If Blood glucose are low more often-> 2-3 times/week- give us a call.  Make better food choices: reduce carbs, Reduce portion size, weight loss and exercise 3-4 times a week.    What is hypoglycemia:  Hypoglycemia is when blood sugar levels become too low - below 70 m/dl.      What causes hypoglycemia?  - using too much insulin  -taking too many diabetes pills  -not eating enough, or skipping meals or  snacks  -not eating enough carbohydrate with meals  -changing your exercise routine  -drinking alcohol in excess    It is also possible to have hypoglycemia even when you are carefully managing your blood sugar levels.    What does it feel like when blood sugars get too low?  You may feel:  - anxious  -confused  -dizzy  -hungry  -light-headed  -nervous  -shaky  -sleepy  -sweaty    You may have  -blurred or cloudy vision  -heart palpitations (heart skips a beat or races)  -tingling or numbness around the mouth and tongue  -tremors    What to do if you have symptoms of hypoglycmemia:  If you think your blood sugar is too low, check it with a glucose meter.  If its below 70 mg/dl, consume one of the following:  Fruit juice (1/2 cup)  Glucose tablets (15 grams)  Hard candy (5 to 7 pieces)  Honey or sugar (2 teaspoons)  Milk (1/2 cup)  Soft drink (non-diet, 1/2 cup)    Wait 15 minutes and check your blood glucose again.  IF it is still below 70 mg/dl, have another food item listed above. Wait another 15 minutes and repeat the blood glucose test.  Have a small meal or snack that contains some carbohydrate after your blood glucose rises above 70 mg/dl.    If you are at risk of hypoglycemia, always carry with you glucose tablets or one of the foods listed above.      To prevent Hypoglycemia:  Avoid situations that may cause hypoglycemia  Before making any change to your diet or exercise routine, discuss them with your healthcare provider  Keep a record of your blood glucose levels.  Include the time of day, diabetes medications, when you had your last meal or snack, and what you were doing at the time (e.g. Watching TV, gardening, jogging, etc).    Talk to your healthcare provider if your blood glucose levels are often low        Patient guide on hypoglycemia    http://www.hormone.org/Resources/upload/patient-guide-diagnosis-and-management-hypoglycemia-950767.pdf

## 2023-06-21 ENCOUNTER — MYC MEDICAL ADVICE (OUTPATIENT)
Dept: ENDOCRINOLOGY | Facility: CLINIC | Age: 43
End: 2023-06-21
Payer: COMMERCIAL

## 2023-06-26 ENCOUNTER — TELEPHONE (OUTPATIENT)
Dept: CARDIOLOGY | Facility: CLINIC | Age: 43
End: 2023-06-26
Payer: COMMERCIAL

## 2023-06-26 DIAGNOSIS — Q99.9 MITOCHONDRIAL DNA MUTATION: ICD-10-CM

## 2023-06-26 DIAGNOSIS — I42.1 HYPERTROPHIC OBSTRUCTIVE CARDIOMYOPATHY (H): ICD-10-CM

## 2023-06-26 DIAGNOSIS — I42.2 HYPERTROPHIC CARDIOMYOPATHY (H): Primary | ICD-10-CM

## 2023-06-26 NOTE — TELEPHONE ENCOUNTER
Talked with patient, she is going to see if Dr Sandra is covered but if he is not then she will get ILR placed at [facility] and still follow up with Dr Marr.

## 2023-06-26 NOTE — TELEPHONE ENCOUNTER
Pt called back in regards to scheduling with Boaz. Pt had questions about wether or not insurance will cover it. Told pt she would need to check with her insurance. Pt also had a question if wether or not if she go the loop recorder placed elsewhere if Dr. Cardozo would still follow with her. Pt could like a call back to discuss.

## 2023-07-10 ENCOUNTER — TELEPHONE (OUTPATIENT)
Dept: PSYCHOLOGY | Facility: CLINIC | Age: 43
End: 2023-07-10
Payer: COMMERCIAL

## 2023-07-10 NOTE — TELEPHONE ENCOUNTER
Documentation only - The therapist called the patient in an attempt to assess the patient s interest in continuing therapy with the writer. The patient did not answer at this time. The writer left the patient a detailed message including both the writer and scheduling s numbers if the patient would like to schedule future follow up appointments. - CYRIL Foley [facility]. July 10th, 2023.

## 2023-07-17 DIAGNOSIS — K44.9 HIATAL HERNIA: ICD-10-CM

## 2023-07-17 DIAGNOSIS — K21.9 GASTROESOPHAGEAL REFLUX DISEASE WITHOUT ESOPHAGITIS: ICD-10-CM

## 2023-07-21 DIAGNOSIS — Z79.4 TYPE 2 DIABETES MELLITUS WITH DIABETIC NEPHROPATHY, WITH LONG-TERM CURRENT USE OF INSULIN (H): ICD-10-CM

## 2023-07-21 DIAGNOSIS — E11.21 TYPE 2 DIABETES MELLITUS WITH DIABETIC NEPHROPATHY, WITH LONG-TERM CURRENT USE OF INSULIN (H): ICD-10-CM

## 2023-07-21 RX ORDER — OMEPRAZOLE 40 MG/1
CAPSULE, DELAYED RELEASE ORAL
Qty: 90 CAPSULE | Refills: 3 | Status: SHIPPED | OUTPATIENT
Start: 2023-07-21 | End: 2024-09-10

## 2023-07-21 NOTE — TELEPHONE ENCOUNTER
Prescription approved per [facility] Refill Protocol.  Isabel COOPER RN, BSN

## 2023-07-24 RX ORDER — PROCHLORPERAZINE 25 MG/1
SUPPOSITORY RECTAL
Refills: 3 | OUTPATIENT
Start: 2023-07-24

## 2023-07-24 NOTE — TELEPHONE ENCOUNTER
Requested Prescriptions   Pending Prescriptions Disp Refills    Continuous Blood Gluc Sensor (DEXCOM G6 SENSOR) MISC [Pharmacy Med Name: Dexcom G6 Sensor device]  3     Sig: Apply one sensor to the skin every 10 days       There is no refill protocol information for this order

## 2023-07-31 DIAGNOSIS — Z79.4 TYPE 2 DIABETES MELLITUS WITH DIABETIC NEPHROPATHY, WITH LONG-TERM CURRENT USE OF INSULIN (H): ICD-10-CM

## 2023-07-31 DIAGNOSIS — E11.21 TYPE 2 DIABETES MELLITUS WITH DIABETIC NEPHROPATHY, WITH LONG-TERM CURRENT USE OF INSULIN (H): ICD-10-CM

## 2023-08-01 RX ORDER — PROCHLORPERAZINE 25 MG/1
SUPPOSITORY RECTAL
Qty: 9 EACH | Refills: 0 | Status: SHIPPED | OUTPATIENT
Start: 2023-08-01 | End: 2023-10-18

## 2023-08-30 ASSESSMENT — ENCOUNTER SYMPTOMS
FREQUENCY: 0
HEADACHES: 1
BREAST MASS: 0
JOINT SWELLING: 0
SHORTNESS OF BREATH: 0
FEVER: 0
HEARTBURN: 1
ARTHRALGIAS: 0
HEMATOCHEZIA: 0
HEMATURIA: 0
NERVOUS/ANXIOUS: 0
ABDOMINAL PAIN: 0
SORE THROAT: 0
PALPITATIONS: 0
WEAKNESS: 0
PARESTHESIAS: 0
CHILLS: 0
NAUSEA: 0
EYE PAIN: 0
DIARRHEA: 0
DIZZINESS: 0
MYALGIAS: 1
COUGH: 0
DYSURIA: 0

## 2023-08-30 ASSESSMENT — PATIENT HEALTH QUESTIONNAIRE - PHQ9
SUM OF ALL RESPONSES TO PHQ QUESTIONS 1-9: 3
SUM OF ALL RESPONSES TO PHQ QUESTIONS 1-9: 3
10. IF YOU CHECKED OFF ANY PROBLEMS, HOW DIFFICULT HAVE THESE PROBLEMS MADE IT FOR YOU TO DO YOUR WORK, TAKE CARE OF THINGS AT HOME, OR GET ALONG WITH OTHER PEOPLE: NOT DIFFICULT AT ALL

## 2023-08-31 ENCOUNTER — OFFICE VISIT (OUTPATIENT)
Dept: PEDIATRICS | Facility: CLINIC | Age: 43
End: 2023-08-31
Payer: COMMERCIAL

## 2023-08-31 VITALS
OXYGEN SATURATION: 97 % | HEIGHT: 64 IN | DIASTOLIC BLOOD PRESSURE: 70 MMHG | RESPIRATION RATE: 16 BRPM | BODY MASS INDEX: 25.92 KG/M2 | SYSTOLIC BLOOD PRESSURE: 90 MMHG | HEART RATE: 74 BPM | TEMPERATURE: 98.1 F | WEIGHT: 151.8 LBS

## 2023-08-31 DIAGNOSIS — I42.1 HYPERTROPHIC OBSTRUCTIVE CARDIOMYOPATHY (H): ICD-10-CM

## 2023-08-31 DIAGNOSIS — E78.5 HYPERLIPIDEMIA LDL GOAL <100: ICD-10-CM

## 2023-08-31 DIAGNOSIS — E11.21 TYPE 2 DIABETES MELLITUS WITH DIABETIC NEPHROPATHY, WITH LONG-TERM CURRENT USE OF INSULIN (H): ICD-10-CM

## 2023-08-31 DIAGNOSIS — Q99.9 MITOCHONDRIAL DNA MUTATION: ICD-10-CM

## 2023-08-31 DIAGNOSIS — Z79.4 TYPE 2 DIABETES MELLITUS WITH DIABETIC NEPHROPATHY, WITH LONG-TERM CURRENT USE OF INSULIN (H): ICD-10-CM

## 2023-08-31 DIAGNOSIS — N18.31 CHRONIC KIDNEY DISEASE, STAGE 3A (H): ICD-10-CM

## 2023-08-31 DIAGNOSIS — Z00.00 ROUTINE GENERAL MEDICAL EXAMINATION AT A HEALTH CARE FACILITY: Primary | ICD-10-CM

## 2023-08-31 DIAGNOSIS — Z12.31 VISIT FOR SCREENING MAMMOGRAM: ICD-10-CM

## 2023-08-31 PROCEDURE — 99396 PREV VISIT EST AGE 40-64: CPT | Performed by: INTERNAL MEDICINE

## 2023-08-31 PROCEDURE — 99213 OFFICE O/P EST LOW 20 MIN: CPT | Mod: 25 | Performed by: INTERNAL MEDICINE

## 2023-08-31 ASSESSMENT — ENCOUNTER SYMPTOMS
JOINT SWELLING: 0
DIARRHEA: 0
SHORTNESS OF BREATH: 0
HEMATURIA: 0
FREQUENCY: 0
HEARTBURN: 1
ABDOMINAL PAIN: 0
WEAKNESS: 0
NAUSEA: 0
COUGH: 0
MYALGIAS: 1
FEVER: 0
ARTHRALGIAS: 0
PARESTHESIAS: 0
HEADACHES: 1
CHILLS: 0
NERVOUS/ANXIOUS: 0
BREAST MASS: 0
PALPITATIONS: 0
EYE PAIN: 0
DYSURIA: 0
HEMATOCHEZIA: 0
DIZZINESS: 0
SORE THROAT: 0

## 2023-08-31 NOTE — PROGRESS NOTES
SUBJECTIVE:   CC: Marie is an 43 year old who presents for preventive health visit.       2023     4:25 PM   Additional Questions   Roomed by Araceli Clemens CMA   Accompanied by DONATO       Healthy Habits:     Getting at least 3 servings of Calcium per day:  Yes    Bi-annual eye exam:  Yes    Dental care twice a year:  NO    Sleep apnea or symptoms of sleep apnea:  None    Diet:  Low salt, Diabetic, Carbohydrate counting and Breakfast skipped    Frequency of exercise:  None    Medication side effects:  Muscle aches    Additional concerns today:  No      Today's PHQ-9 Score:       2023     2:46 PM   PHQ-9 SCORE   PHQ-9 Total Score MyChart 3 (Minimal depression)   PHQ-9 Total Score 3         Have you ever done Advance Care Planning? (For example, a Health Directive, POLST, or a discussion with a medical provider or your loved ones about your wishes):     Social History     Tobacco Use    Smoking status: Former     Packs/day: 0.00     Years: 0.00     Pack years: 0.00     Types: Cigarettes     Quit date: 10/24/2005     Years since quittin.8    Smokeless tobacco: Never    Tobacco comments:     States only smokes when drinks maybe 2x/year   Substance Use Topics    Alcohol use: Not Currently             2023     2:49 PM   Alcohol Use   Prescreen: >3 drinks/day or >7 drinks/week? No     Reviewed orders with patient.  Reviewed health maintenance and updated orders accordingly - Yes  Patient Active Problem List   Diagnosis    Allergic rhinitis    HYPERLIPIDEMIA LDL GOAL <100    Family history of other cardiovascular diseases    Health Care Home    Microalbuminuria    Insulin pump in place    Vitamin D deficiency    Nut allergy    Type 2 diabetes mellitus with diabetic nephropathy    Diabetic gastroparesis (H)    Hypertrophic obstructive cardiomyopathy (H)    PFO (patent foramen ovale)    Other migraine without status migrainosus, not intractable    Scars of posterior pole of chorioretina, bilateral     Cervical high risk HPV (human papillomavirus) test positive    Mirena IUD inserted 19: Due for removal 2024    Depression    Other insomnia    Moderate episode of recurrent major depressive disorder (H)    Mitochondrial DNA mutation    Chronic kidney disease, stage 3a (H)     Past Surgical History:   Procedure Laterality Date    ABDOMEN SURGERY      C IUD,MIRENA  8/10/10, 7/28/15    C/SECTION, LOW TRANSVERSE  6/25/10    , Low Transverse    CHOLECYSTECTOMY, LAPOROSCOPIC      Cholecystectomy, Laparoscopic    ESOPHAGOSCOPY, GASTROSCOPY, DUODENOSCOPY (EGD), COMBINED N/A 2016    Procedure: COMBINED ESOPHAGOSCOPY, GASTROSCOPY, DUODENOSCOPY (EGD), BIOPSY SINGLE OR MULTIPLE;  Surgeon: Fadi Hunter MD;  Location:  GI     ESOPHAGEAL MOTILITY STUDY N/A 2016    Procedure: ESOPHAGEAL MOTILITY STUDY;  Surgeon: Fadi Hunter MD;  Location: [facility] REMOVE TONSILS/ADENOIDS,<11 Y/O      T &A    IR RENAL BIOPSY RIGHT  2/3/2023    ZZC INDUCED ABORTN BY D&C             Social History     Tobacco Use    Smoking status: Former     Packs/day: 0.00     Years: 0.00     Pack years: 0.00     Types: Cigarettes     Quit date: 10/24/2005     Years since quittin.8    Smokeless tobacco: Never    Tobacco comments:     States only smokes when drinks maybe 2x/year   Substance Use Topics    Alcohol use: Not Currently     Family History   Problem Relation Age of Onset    Cardiovascular Mother         hypertrophic cardiomyopathy    Genitourinary Problems Mother         gallbladder disease    Diabetes Mother         Transplnant induced/     Other - See Comments Mother         heart transplant 2005    Depression Mother     Diabetes Father         type 2    Hypertension Father     Hyperlipidemia Father     Genitourinary Problems Maternal Grandmother         gallbladder disease    Genitourinary Problems Paternal Grandmother         gallbladder disease     Hypertension Paternal Grandfather         Deaceased    Cerebrovascular Disease Paternal Grandfather             Cardiovascular Brother         hypertrophic cardiomyopathy    Diabetes Brother     Diabetes Brother         type 2    Diabetes Other         Type 2    Diabetes Daughter     Genetic Disorder Daughter         Mitochondrial disorder    Depression Daughter     Anxiety Disorder Daughter     Genetic Disorder Daughter         Mitochondrial disorder    Obesity Daughter     Glaucoma No family hx of     Macular Degeneration No family hx of          Current Outpatient Medications   Medication Sig Dispense Refill    albuterol (PROAIR HFA/PROVENTIL HFA/VENTOLIN HFA) 108 (90 Base) MCG/ACT inhaler Inhale 2 puffs into the lungs every 4 hours as needed for shortness of breath / dyspnea or wheezing 18 g 3    ASPIRIN NOT PRESCRIBED (INTENTIONAL) Please choose reason not prescribed from choices below.      atenolol (TENORMIN) 25 MG tablet Take 0.5 tablets (12.5 mg) by mouth daily 90 tablet 3    blood glucose (PEPE CONTOUR NEXT) test strip Check 4 times/day 300 each 0    blood glucose monitoring (NO BRAND SPECIFIED) meter device kit Use to test blood sugar 6 times daily and as needed. 1 kit 0    cetirizine (ZYRTEC) 10 MG tablet Take 10 mg by mouth 2 times daily  90 tablet 3    clotrimazole (LOTRIMIN) 1 % external cream Apply topically 2 times daily 60 g 3    co-enzyme Q-10 100 MG CAPS capsule Take 100 mg by mouth daily      Continuous Blood Gluc Sensor (DEXCOM G6 SENSOR) MISC Apply one sensor to the skin every 10 days 9 each 0    Continuous Blood Gluc Transmit (DEXCOM G6 TRANSMITTER) MISC Change every 90 days.  (Dexcom G6 Transmitter, #[id number]) 1 each 1    empagliflozin (JARDIANCE) 25 MG TABS tablet Take 1 tablet (25 mg) by mouth daily 90 tablet 1    EPINEPHrine (ANY BX GENERIC EQUIV) 0.3 MG/0.3ML injection 2-pack Inject 0.3 mLs (0.3 mg) into the muscle once as needed for anaphylaxis 0.3 mL 11    fluticasone  "(FLONASE) 50 MCG/ACT spray Spray 1-2 sprays into both nostrils daily 1 Bottle 0    Glucagon, rDNA, (GLUCAGON EMERGENCY) 1 MG KIT For Intramuscular use for low Blood glucose episode. 1 kit 0    ibuprofen (ADVIL/MOTRIN) 200 MG tablet Take 200 mg by mouth every 4 hours as needed       Insulin Disposable Pump (OMNIPOD 5 G6 POD, GEN 5,) MISC 1 each every 3 days 30 each 3    insulin glargine (LANTUS PEN) 100 UNIT/ML pen Take 20 units in case of pump malfunction only. 15 mL 0    insulin lispro (HUMALOG) 100 UNIT/ML vial USE WITH INSULIN PUMP AS DIRECTED, USES ABOUT 80 UNITS PER DAY 80 mL 0    INSULIN PUMP - OUTPATIENT Updated 8/3/21:  OmniPod Dash  BASAL:  00:00: 0.75 units/hr  CARB RATIO:  00:00: 10  Sensitivity:  00:00: 40 mg/dL  BLOOD GLUCOSE TARGET and times:  12   AM (midnight): 120-120  Active Insulin Time: 4 hours  Sensor: Nadia/ Nadia Link Donna  Sherryokmadie:   Username [email or web address]  Password [email or web address]      insulin syringe-needle U-100 (29G X 1/2\" 1 ML) 29G X 1/2\" 1 ML miscellaneous Use 1 syringe once daily or as needed 100 each 1    Lancets (MICROLET) MISC 1 Device See Admin Instructions. Use up to 5 times daily for blood sugar checks. 100 each prn    levonorgestrel (MIRENA) 20 MCG/24HR IUD 1 each (20 mcg) by Intrauterine route once      losartan (COZAAR) 50 MG tablet 1 tab each morning and one half tab each evening 135 tablet 3    MAGNESIUM OXIDE PO Take 400 mg by mouth daily      montelukast (SINGULAIR) 10 MG tablet Take 1 tablet (10 mg) by mouth At Bedtime 90 tablet 3    Multiple Vitamins-Minerals (MULTI VITAMIN/MINERALS PO) Take 1 each by mouth daily.      Omega-3 Fatty Acids (FISH OIL) 500 MG CAPS Take 1 capsule by mouth      omeprazole (PRILOSEC) 40 MG DR capsule TAKE ONE CAPSULE BY MOUTH ONCE DAILY AS NEEDED 90 capsule 3    ondansetron (ZOFRAN ODT) 4 MG ODT tab Take 1 tablet (4 mg) by mouth every 8 hours as needed for nausea 8 tablet 0    rosuvastatin (CRESTOR) 5 MG tablet Take 1 tablet (5 mg) by mouth " daily 90 tablet 3    sertraline (ZOLOFT) 25 MG tablet Take 1 tablet (25 mg) by mouth daily 90 tablet 3    SUMAtriptan (IMITREX) 25 MG tablet TAKE ONE TO FOUR TABLETS BY MOUTH ONE TIME. MAY REPEAT 1 TABLET EVERY TWO HOURS UP TO MAXIMUM OF 200MG IN 24 HOURS 8 tablet 11    traZODone (DESYREL) 50 MG tablet TAKE ONE-HALF TO ONE TABLET BY MOUTH NIGHTLY AS NEEDED FOR SLEEP 60 tablet 3    TRULICITY 4.5 MG/0.5ML SOPN Inject 4.5 mg subcutaneous once a week. 6 mL 1    Urine Glucose-Ketones Test STRP Daily as needed 25 strip 11    Vitamins-Lipotropics (B-100) TABS Take 1 tablet by mouth daily 30 tablet 11       Breast Cancer Screenin/23/2021     4:16 PM   Breast CA Risk Assessment (FHS-7)   Do you have a family history of breast, colon, or ovarian cancer? No / Unknown         Pertinent mammograms are reviewed under the imaging tab.    History of abnormal Pap smear:       Latest Ref Rng & Units 2019     3:15 PM 2019     1:10 PM 2018     3:35 PM   PAP / HPV   PAP (Historical)  NIL      HPV 16 DNA NEG^Negative  Negative  Negative    HPV 18 DNA NEG^Negative  Negative  Negative    Other HR HPV NEG^Negative  Negative  Positive      Reviewed and updated as needed this visit by clinical staff     Meds              Reviewed and updated as needed this visit by Provider                     Review of Systems   Constitutional:  Negative for chills and fever.   HENT:  Positive for congestion and hearing loss. Negative for ear pain and sore throat.    Eyes:  Negative for pain and visual disturbance.   Respiratory:  Negative for cough and shortness of breath.    Cardiovascular:  Negative for palpitations and peripheral edema.   Gastrointestinal:  Positive for heartburn. Negative for abdominal pain, diarrhea, hematochezia and nausea.   Breasts:  Negative for tenderness, breast mass and discharge.   Genitourinary:  Negative for dysuria, frequency, genital sores, hematuria, pelvic pain, urgency, vaginal bleeding and  "vaginal discharge.   Musculoskeletal:  Positive for myalgias. Negative for arthralgias and joint swelling.   Skin:  Negative for rash.   Neurological:  Positive for headaches. Negative for dizziness, weakness and paresthesias.   Psychiatric/Behavioral:  Negative for mood changes. The patient is not nervous/anxious.       OBJECTIVE:   BP 90/70 (BP Location: Right arm, Patient Position: Sitting, Cuff Size: Adult Regular)   Pulse 74   Temp 98.1  F (36.7  C) (Tympanic)   Resp 16   Ht 1.626 m (5' 4\")   Wt 68.9 kg (151 lb 12.8 oz)   SpO2 97%   BMI 26.06 kg/m    Physical Exam  GENERAL: healthy, alert and no distress  EYES: Eyes grossly normal to inspection, PERRL and conjunctivae and sclerae normal  HENT: ear canals and TM's normal, nose and mouth without ulcers or lesions  NECK: no adenopathy, no asymmetry, masses, or scars and thyroid normal to palpation  RESP: lungs clear to auscultation - no rales, rhonchi or wheezes  CV: regular rate and rhythm, normal S1 S2, no S3 or S4, no murmur  ABDOMEN: soft, nontender, no hepatosplenomegaly, no masses and bowel sounds normal  MS: no gross musculoskeletal defects noted, no edema  SKIN: no suspicious lesions or rashes  NEURO: Normal strength and tone, mentation intact and speech normal  PSYCH: mentation appears normal, affect normal/bright      ASSESSMENT/PLAN:   (Z00.00) Routine general medical examination at a health care facility  (primary encounter diagnosis)     Identifies several ongoing social stressors.  Currently lives at home with her 2 daughters, her brother with his family and her partner.  Expresses frustration that she is supporting several people in the household and that her partner has not resumed work.  She states that she would like her partner to move out-she has previously been given several resources to assist with this process.  She was encouraged to follow-up with these services such that she has support as she goes forward.  Additional stressors: " Mitochondrial disorder and cardiomyopathy, daughter also has a disorder.  We discussed resuming counseling which has been helpful for her-she plans to schedule follow-up with her therapist    (Q99.9) Mitochondrial DNA mutation  Comment: MT-TL! Mutation.  Familial mitochondrial mutation.  Monitor for signs and symptoms of MELAS, several medication should be avoided-see problem list summary.  Requires annual ophthalmology and audiology follow-up.  Caution regarding anesthesia secondary to medication effects/consultation with genetics/metabolism prior to procedures involving anesthesia.    (I42.1) Hypertrophic obstructive cardiomyopathy (H)  Comment:   Plan: Hypertrophic cardiomyopathy, several family members with cardiomyopathy.  Managed by cardiology-concern regarding arrhythmia risk.  Recent implanted loop recorder, cardiology has recommended ICD depending on loop recorder results.    (E11.21,  Z79.4) Type 2 diabetes mellitus with diabetic nephropathy, with long-term current use of insulin (H)  Comment: Well-controlled.  Continue Trulicity, Jardiance, OmniPod insulin pump.  Managed by endocrinology  Lab Results   Component Value Date    A1C 6.4 06/09/2023    A1C 6.5 07/05/2021    Plan: Urine Glucose-Ketones Test STRP          (N18.31) Chronic kidney disease, stage 3a (H)  Comment:   Lab Results   Component Value Date    CR 1.08 01/19/2023    CR 1.00 01/02/2023   Plan: Renal function stable continue losartan.    (E78.5) Hyperlipidemia LDL goal <100  Comment:   Lab Results   Component Value Date    LDL 75 01/27/2023    LDL 91 07/05/2021    Plan: Well-controlled continue rosuvastatin    (Z12.31) Visit for screening mammogram  Comment:   Plan: MA SCREENING DIGITAL BILAT - Future  (s+30)              COUNSELING:  Reviewed preventive health counseling, as reflected in patient instructions       Regular exercise       Healthy diet/nutrition       Osteoporosis prevention/bone health       Colorectal Cancer  "Screening      BMI:   Estimated body mass index is 26.06 kg/m  as calculated from the following:    Height as of this encounter: 1.626 m (5' 4\").    Weight as of this encounter: 68.9 kg (151 lb 12.8 oz).   Weight management plan: Discussed healthy diet and exercise guidelines      She reports that she quit smoking about 17 years ago. Her smoking use included cigarettes. She has never used smokeless tobacco.          Alan Paredes MD  [facility] EAGANAnswers submitted by the patient for this visit:  Patient Health Questionnaire (Submitted on 8/30/2023)  If you checked off any problems, how difficult have these problems made it for you to do your work, take care of things at home, or get along with other people?: Not difficult at all  PHQ9 TOTAL SCORE: 3    "

## 2023-09-02 ENCOUNTER — HEALTH MAINTENANCE LETTER (OUTPATIENT)
Age: 43
End: 2023-09-02

## 2023-09-04 PROBLEM — Q99.9: Chronic | Status: ACTIVE | Noted: 2023-01-03

## 2023-10-17 ENCOUNTER — LAB (OUTPATIENT)
Dept: LAB | Facility: CLINIC | Age: 43
End: 2023-10-17
Payer: COMMERCIAL

## 2023-10-17 DIAGNOSIS — N25.81 SECONDARY HYPERPARATHYROIDISM (H): ICD-10-CM

## 2023-10-17 DIAGNOSIS — N18.2 CKD (CHRONIC KIDNEY DISEASE) STAGE 2, GFR 60-89 ML/MIN: ICD-10-CM

## 2023-10-17 DIAGNOSIS — E11.21 TYPE 2 DIABETES MELLITUS WITH DIABETIC NEPHROPATHY, WITH LONG-TERM CURRENT USE OF INSULIN (H): ICD-10-CM

## 2023-10-17 DIAGNOSIS — R80.9 MICROALBUMINURIA: ICD-10-CM

## 2023-10-17 DIAGNOSIS — Z96.41 INSULIN PUMP IN PLACE: ICD-10-CM

## 2023-10-17 DIAGNOSIS — Z79.4 TYPE 2 DIABETES MELLITUS WITH DIABETIC NEPHROPATHY, WITH LONG-TERM CURRENT USE OF INSULIN (H): ICD-10-CM

## 2023-10-17 LAB
ALBUMIN MFR UR ELPH: 20.3 MG/DL
ALBUMIN SERPL BCG-MCNC: 3.9 G/DL (ref 3.5–5.2)
ALBUMIN UR-MCNC: ABNORMAL MG/DL
ANION GAP SERPL CALCULATED.3IONS-SCNC: 8 MMOL/L (ref 7–15)
APPEARANCE UR: CLEAR
BASO+EOS+MONOS # BLD AUTO: NORMAL 10*3/UL
BASO+EOS+MONOS NFR BLD AUTO: NORMAL %
BASOPHILS # BLD AUTO: 0 10E3/UL (ref 0–0.2)
BASOPHILS NFR BLD AUTO: 0 %
BILIRUB UR QL STRIP: NEGATIVE
BUN SERPL-MCNC: 21.8 MG/DL (ref 6–20)
CALCIUM SERPL-MCNC: 9.2 MG/DL (ref 8.6–10)
CHLORIDE SERPL-SCNC: 106 MMOL/L (ref 98–107)
COLOR UR AUTO: YELLOW
CREAT SERPL-MCNC: 1.14 MG/DL (ref 0.51–0.95)
CREAT UR-MCNC: 73.5 MG/DL
DEPRECATED HCO3 PLAS-SCNC: 26 MMOL/L (ref 22–29)
EGFRCR SERPLBLD CKD-EPI 2021: 60 ML/MIN/1.73M2
EOSINOPHIL # BLD AUTO: 0.1 10E3/UL (ref 0–0.7)
EOSINOPHIL NFR BLD AUTO: 2 %
ERYTHROCYTE [DISTWIDTH] IN BLOOD BY AUTOMATED COUNT: 11.6 % (ref 10–15)
GLUCOSE SERPL-MCNC: 157 MG/DL (ref 70–99)
GLUCOSE UR STRIP-MCNC: >=1000 MG/DL
HBA1C MFR BLD: 6.8 % (ref 0–5.6)
HCT VFR BLD AUTO: 40.3 % (ref 35–47)
HGB BLD-MCNC: 13.3 G/DL (ref 11.7–15.7)
HGB UR QL STRIP: NEGATIVE
IMM GRANULOCYTES # BLD: 0 10E3/UL
IMM GRANULOCYTES NFR BLD: 0 %
KETONES UR STRIP-MCNC: NEGATIVE MG/DL
LEUKOCYTE ESTERASE UR QL STRIP: NEGATIVE
LYMPHOCYTES # BLD AUTO: 1.7 10E3/UL (ref 0.8–5.3)
LYMPHOCYTES NFR BLD AUTO: 36 %
MCH RBC QN AUTO: 31.9 PG (ref 26.5–33)
MCHC RBC AUTO-ENTMCNC: 33 G/DL (ref 31.5–36.5)
MCV RBC AUTO: 97 FL (ref 78–100)
MONOCYTES # BLD AUTO: 0.4 10E3/UL (ref 0–1.3)
MONOCYTES NFR BLD AUTO: 9 %
NEUTROPHILS # BLD AUTO: 2.5 10E3/UL (ref 1.6–8.3)
NEUTROPHILS NFR BLD AUTO: 52 %
NITRATE UR QL: NEGATIVE
PH UR STRIP: 6 [PH] (ref 5–8)
PHOSPHATE SERPL-MCNC: 3.4 MG/DL (ref 2.5–4.5)
PLATELET # BLD AUTO: 246 10E3/UL (ref 150–450)
POTASSIUM SERPL-SCNC: 5.2 MMOL/L (ref 3.4–5.3)
PROT/CREAT 24H UR: 0.28 MG/MG CR (ref 0–0.2)
PTH-INTACT SERPL-MCNC: 70 PG/ML (ref 15–65)
RBC # BLD AUTO: 4.17 10E6/UL (ref 3.8–5.2)
RBC #/AREA URNS AUTO: ABNORMAL /HPF
SODIUM SERPL-SCNC: 140 MMOL/L (ref 135–145)
SP GR UR STRIP: 1.01 (ref 1–1.03)
SQUAMOUS #/AREA URNS AUTO: ABNORMAL /LPF
UROBILINOGEN UR STRIP-ACNC: 0.2 E.U./DL
VIT D+METAB SERPL-MCNC: 26 NG/ML (ref 20–50)
WBC # BLD AUTO: 4.8 10E3/UL (ref 4–11)
WBC #/AREA URNS AUTO: ABNORMAL /HPF

## 2023-10-17 PROCEDURE — 83036 HEMOGLOBIN GLYCOSYLATED A1C: CPT

## 2023-10-17 PROCEDURE — 84156 ASSAY OF PROTEIN URINE: CPT

## 2023-10-17 PROCEDURE — 82306 VITAMIN D 25 HYDROXY: CPT

## 2023-10-17 PROCEDURE — 83970 ASSAY OF PARATHORMONE: CPT

## 2023-10-17 PROCEDURE — 36415 COLL VENOUS BLD VENIPUNCTURE: CPT

## 2023-10-17 PROCEDURE — 85025 COMPLETE CBC W/AUTO DIFF WBC: CPT

## 2023-10-17 PROCEDURE — 80069 RENAL FUNCTION PANEL: CPT

## 2023-10-17 PROCEDURE — 81001 URINALYSIS AUTO W/SCOPE: CPT

## 2023-10-18 ENCOUNTER — OFFICE VISIT (OUTPATIENT)
Dept: ENDOCRINOLOGY | Facility: CLINIC | Age: 43
End: 2023-10-18
Payer: COMMERCIAL

## 2023-10-18 VITALS
WEIGHT: 154.5 LBS | HEART RATE: 81 BPM | RESPIRATION RATE: 18 BRPM | TEMPERATURE: 98.1 F | DIASTOLIC BLOOD PRESSURE: 84 MMHG | OXYGEN SATURATION: 99 % | BODY MASS INDEX: 26.38 KG/M2 | SYSTOLIC BLOOD PRESSURE: 119 MMHG | HEIGHT: 64 IN

## 2023-10-18 DIAGNOSIS — E11.43 DIABETIC GASTROPARESIS (H): ICD-10-CM

## 2023-10-18 DIAGNOSIS — Z79.4 TYPE 2 DIABETES MELLITUS WITH DIABETIC NEPHROPATHY, WITH LONG-TERM CURRENT USE OF INSULIN (H): Primary | ICD-10-CM

## 2023-10-18 DIAGNOSIS — R80.9 MICROALBUMINURIA: ICD-10-CM

## 2023-10-18 DIAGNOSIS — K31.84 DIABETIC GASTROPARESIS (H): ICD-10-CM

## 2023-10-18 DIAGNOSIS — E11.21 TYPE 2 DIABETES MELLITUS WITH DIABETIC NEPHROPATHY, WITH LONG-TERM CURRENT USE OF INSULIN (H): Primary | ICD-10-CM

## 2023-10-18 DIAGNOSIS — Z96.41 INSULIN PUMP IN PLACE: ICD-10-CM

## 2023-10-18 DIAGNOSIS — E78.5 HYPERLIPIDEMIA LDL GOAL <100: ICD-10-CM

## 2023-10-18 PROBLEM — G43.909 MIGRAINE: Status: ACTIVE | Noted: 2017-02-16

## 2023-10-18 PROBLEM — S92.902K CLOSED FRACTURE OF LEFT FOOT WITH NONUNION: Status: ACTIVE | Noted: 2022-07-26

## 2023-10-18 PROCEDURE — 99207 PR FOOT EXAM NO CHARGE: CPT | Performed by: INTERNAL MEDICINE

## 2023-10-18 PROCEDURE — 99214 OFFICE O/P EST MOD 30 MIN: CPT | Performed by: INTERNAL MEDICINE

## 2023-10-18 PROCEDURE — 95251 CONT GLUC MNTR ANALYSIS I&R: CPT | Performed by: INTERNAL MEDICINE

## 2023-10-18 RX ORDER — PROCHLORPERAZINE 25 MG/1
SUPPOSITORY RECTAL
Qty: 1 EACH | Refills: 1 | Status: SHIPPED | OUTPATIENT
Start: 2023-10-18 | End: 2024-04-08

## 2023-10-18 RX ORDER — PROCHLORPERAZINE 25 MG/1
SUPPOSITORY RECTAL
Qty: 9 EACH | Refills: 0 | Status: SHIPPED | OUTPATIENT
Start: 2023-10-18 | End: 2024-01-02

## 2023-10-18 RX ORDER — INSULIN PMP CART,AUT,G6/7,CNTR
1 EACH SUBCUTANEOUS
Qty: 30 EACH | Refills: 3 | Status: SHIPPED | OUTPATIENT
Start: 2023-10-18 | End: 2024-01-02

## 2023-10-18 RX ORDER — DULAGLUTIDE 4.5 MG/.5ML
4.5 INJECTION, SOLUTION SUBCUTANEOUS WEEKLY
Qty: 6 ML | Refills: 1 | Status: SHIPPED | OUTPATIENT
Start: 2023-10-18 | End: 2024-04-08

## 2023-10-18 NOTE — PATIENT INSTRUCTIONS
[facility]  Dr Fuentes, Endocrinology Department    [facility]   [address] [facility]  [place] [address]  Appointment [phone]  Fax: [phone]  [place]: Monday - Thursday      Please check the cost coverage and copay with insurance before recommended tests, services and medications (especially if new medications are prescribed).     If ordered, please get blood work done 1 week prior to your next appointment so they will be available to Dr. Fuentes at your visit.    To provide the best diabetic care, please bring your blood glucose meter to each and every visit with your  Endocrinologist. Your blood glucose CGM/meter/insulin pump will be downloaded at every appointment.  Please arrive 15 minutes before your scheduled appointment. This will allow for your blood glucose CGM/meter/insulin pump  to be downloaded.  If you are wearing DEXCOM please bring  or sharing code from the Dexcom Clarity Donna so that it can be downloaded.  If you are using [employer] personal sensors please bring the reader.    Continue current pump settings.  Continue Jardiance  25 mg/day.  Continue TRULICITY 4.5 mg/week.  Try to be in auto mode as much as possible.  Continue to use DEXCOM.  Continue to use OMNIPOD 5+ Dexcom.   Repeat labs and follow up in 3-4 months.  Please make a lab appointment for blood work and follow up clinic appointment in 1 week after that to discuss results.    Recommend checking blood sugars before meals and at bedtime.    If Blood glucose are low more often-> 2-3 times/week- give us a call.  Make better food choices: reduce carbs, Reduce portion size, weight loss and exercise 3-4 times a week.    What is hypoglycemia:  Hypoglycemia is when blood sugar levels become too low - below 70 m/dl.      What causes hypoglycemia?  - using too much insulin  -taking too many diabetes pills  -not eating enough, or skipping meals or snacks  -not eating  enough carbohydrate with meals  -changing your exercise routine  -drinking alcohol in excess    It is also possible to have hypoglycemia even when you are carefully managing your blood sugar levels.    What does it feel like when blood sugars get too low?  You may feel:  - anxious  -confused  -dizzy  -hungry  -light-headed  -nervous  -shaky  -sleepy  -sweaty    You may have  -blurred or cloudy vision  -heart palpitations (heart skips a beat or races)  -tingling or numbness around the mouth and tongue  -tremors    What to do if you have symptoms of hypoglycmemia:  If you think your blood sugar is too low, check it with a glucose meter.  If its below 70 mg/dl, consume one of the following:  Fruit juice (1/2 cup)  Glucose tablets (15 grams)  Hard candy (5 to 7 pieces)  Honey or sugar (2 teaspoons)  Milk (1/2 cup)  Soft drink (non-diet, 1/2 cup)    Wait 15 minutes and check your blood glucose again.  IF it is still below 70 mg/dl, have another food item listed above. Wait another 15 minutes and repeat the blood glucose test.  Have a small meal or snack that contains some carbohydrate after your blood glucose rises above 70 mg/dl.    If you are at risk of hypoglycemia, always carry with you glucose tablets or one of the foods listed above.      To prevent Hypoglycemia:  Avoid situations that may cause hypoglycemia  Before making any change to your diet or exercise routine, discuss them with your healthcare provider  Keep a record of your blood glucose levels.  Include the time of day, diabetes medications, when you had your last meal or snack, and what you were doing at the time (e.g. Watching TV, gardening, jogging, etc).    Talk to your healthcare provider if your blood glucose levels are often low        Patient guide on hypoglycemia    [email or web address]

## 2023-10-18 NOTE — LETTER
10/18/2023         RE: Marie Vogel  [address]  [place] [address]        Dear Colleague,    Thank you for referring your patient, Marie Vogel, to the [facility]. Please see a copy of my visit note below.    Name: Marie Vogel  Seen for f/u of DM.  HPI:  Marie Vogel is a 43 year old female who presents for the evaluation/management of DM.   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    Here for f/u. Previously has seen endo at [facility] ( Dr Aguilera and Dr Zhou and ). 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 Busy at work, also variable schedule.. Concerned about weight gain.   H/o cardiomyopathy. Followed by cardiology.    On OMNIPOD 5+ Dexcom G6  Using humalog in pump.  Using DEXCOM CGM.    She is working at [employer].  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 Marie also has glucagon kit at home for emergency.    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  Trulicity was increased following that.    Wt is stable. Earlier 40 lbs in last 3 years.  Wt Readings from Last 2 Encounters:   10/18/23 70.1 kg (154 lb 8 oz)   08/31/23 68.9 kg (151 lb 12.8 oz)       1. Type 2 DM:  Orginally diagnosed at the age of: 21 with GDM during her first pregnancy.  Diagnosed with DM 2 in 1/2002, diet controlled for 3 years, then started on metformin. In 2010 during her second pregnancy, started on insulin and then insulin pump therapy in 2012.   Current Regimen:   Humalog Insulin pump therapy ( Medtronic Minimgumi Paradigm),   Trulicity 4.5 mg/week.  Jardinace 25 mg/day    BS checks: Using Dexcom.  Average Meter Download: Blood glucose data reviewed personally. See nursing note from this encounter for details.  She is able to feel symptoms of hypoglycemia.    yes:     yes:     Diabetes Medication(s)       Diabetic Other       Glucagon, rDNA, (GLUCAGON EMERGENCY) 1 MG KIT    For Intramuscular use for low Blood glucose episode.      Insulin       insulin glargine (LANTUS PEN) 100 UNIT/ML pen    Take 20 units in case of pump malfunction only.     insulin lispro (HUMALOG) 100 UNIT/ML vial    USE WITH INSULIN PUMP AS DIRECTED, USES ABOUT 80 UNITS PER DAY      Sodium-Glucose Co-Transporter 2 (SGLT2) Inhibitors       empagliflozin (JARDIANCE) 25 MG TABS tablet    Take 1 tablet (25 mg) by mouth daily      Incretin Mimetic Agents       TRULICITY 4.5 MG/0.5ML SOPN    Inject 4.5 mg subcutaneous once a week.            Current Regimen:     In automode: 68% of time    Insulin pump -   Time Rate (U/hr)   0000-  0.75                   Carbohydrate Ratio -    Time Ratio   0000-  10   0600 PM  11     Sensitivity   40   Active Insulin Time   hours   Basal      Bolus      Total Carbohydrates/day    Total Insulin/day     Average Blood Sugar                Complications:   Diabetes Complications  Description / Detail    Diabetic Retinopathy  No   CAD / PAD  No   Neuropathy  No   Nephropathy / Microalbuminuria  Yes: microalbuminuria. ON cozaar.   Gastroparesis  No   Hypoglycemia [place]  No     2. Hypertension: Blood Pressure today:   BP Readings from Last 3 Encounters:   08/31/23 90/70   06/13/23 105/74   04/04/23 (!) 137/97     Takes medications everyday without forgetting a dose.  Denies  feeling lightheaded or dizzy.    3. Hyperlipidemia:   Denies muscle aches of pains.       PMH/PSH:  Past Medical History:   Diagnosis Date     Allergic rhinitis due to pollen      Atypical glandular cells on Pap smear 3/1108     Cervical high risk HPV (human papillomavirus) test positive 2018    See problem list     Chronic kidney disease      Depressive disorder 2-3 years ago     Gastroesophageal reflux disease      Mitochondrial disease (H24) 2022     PFO (patent foramen ovale)      Stargardt's disease 2019     Type II or unspecified type diabetes mellitus without mention of complication, not stated as uncontrolled     onset was gestational in      Past Surgical History:   Procedure Laterality Date     ABDOMEN SURGERY       C IUD,MIRENA  8/10/10, 7/28/15     C/SECTION, LOW TRANSVERSE  6/25/10    , Low Transverse     CHOLECYSTECTOMY, LAPOROSCOPIC      Cholecystectomy, Laparoscopic     ESOPHAGOSCOPY, GASTROSCOPY, DUODENOSCOPY (EGD), COMBINED N/A 2016    Procedure: COMBINED ESOPHAGOSCOPY, GASTROSCOPY, DUODENOSCOPY (EGD), BIOPSY SINGLE OR MULTIPLE;  Surgeon: Fadi Hunter MD;  Location: [facility] ESOPHAGEAL MOTILITY STUDY N/A 2016    Procedure: ESOPHAGEAL MOTILITY STUDY;  Surgeon: Fadi Hunter MD;  Location: [facility] REMOVE TONSILS/ADENOIDS,<11 Y/O      T &A     IR RENAL BIOPSY RIGHT  2/3/2023     ZZC INDUCED ABORTN BY D&C           Family Hx:  Family History   Problem Relation Age of Onset     Cardiovascular Mother         hypertrophic cardiomyopathy     Genitourinary Problems Mother         gallbladder disease     Diabetes Mother         Transplnant induced/      Other - See Comments Mother         heart transplant 2005     Depression Mother      Diabetes Father         type 2     Hypertension Father      Hyperlipidemia Father      Genitourinary Problems Maternal Grandmother         gallbladder disease      Genitourinary Problems Paternal Grandmother         gallbladder disease     Hypertension Paternal Grandfather         Deaceased     Cerebrovascular Disease Paternal Grandfather              Cardiovascular Brother         hypertrophic cardiomyopathy     Diabetes Brother      Diabetes Brother         type 2     Diabetes Other         Type 2     Diabetes Daughter      Genetic Disorder Daughter         Mitochondrial disorder     Depression Daughter      Anxiety Disorder Daughter      Genetic Disorder Daughter         Mitochondrial disorder     Obesity Daughter      Glaucoma No family hx of      Macular Degeneration No family hx of      Thyroid disease: No         DM2: Yes - father and mother         Autoimmune: DM1, SLE, RA, Vitiligo No    Social Hx:  Social History     Socioeconomic History     Marital status:      Spouse name: Not on file     Number of children: 2     Years of education: 14     Highest education level: Associate degree: academic program   Occupational History     Occupation: nurse     Occupation: RN   Tobacco Use     Smoking status: Former     Packs/day: 0.00     Years: 0.00     Additional pack years: 0.00     Total pack years: 0.00     Types: Cigarettes     Quit date: 10/24/2005     Years since quittin.9     Smokeless tobacco: Never     Tobacco comments:     States only smokes when drinks maybe 2x/year   Substance and Sexual Activity     Alcohol use: Not Currently     Drug use: Never     Sexual activity: Not Currently     Partners: Male     Birth control/protection: I.U.D.     Comment: Mirena IUD 7/28/15   Other Topics Concern     Parent/sibling w/ CABG, MI or angioplasty before 65F 55M? No   Social History Narrative    Caffeine intake/servings daily - 6-7    Calcium intake/servings daily - 2-3 yogurt, milk, cheese    Exercise 1 times weekly - describe aerobics    Sunscreen used - Yes    Seatbelts used - Yes    Guns stored in the home - No    Self Breast Exam - Yes    Pap test up  to date -  Yes    Eye exam up to date -  Yes    Dental exam up to date -  Yes    DEXA scan up to date -  Not Applicable    Flex Sig/Colonoscopy up to date -  Not Applicable    Mammography up to date -  Not Applicable    Immunizations reviewed and up to date - Yes    Abuse: Current or Past (Physical, Sexual or Emotional) - No    Do you feel safe in your environment - Yes    Do you cope well with stress - Yes    Do you suffer from insomnia - Yes     Last updated by: Tatianna Lacey  5/9/2005     Social Determinants of Health     Financial Resource Strain: Medium Risk (1/26/2023)    Overall Financial Resource Strain (CARDIA)      Difficulty of Paying Living Expenses: Somewhat hard   Food Insecurity: Food Insecurity Present (1/26/2023)    Hunger Vital Sign      Worried About Running Out of Food in the Last Year: Sometimes true      Ran Out of Food in the Last Year: Never true   Transportation Needs: No Transportation Needs (1/26/2023)    PRAPARE - Transportation      Lack of Transportation (Medical): No      Lack of Transportation (Non-Medical): No   Physical Activity: Insufficiently Active (1/26/2023)    Exercise Vital Sign      Days of Exercise per Week: 4 days      Minutes of Exercise per Session: 10 min   Stress: No Stress Concern Present (1/26/2023)    [employer] [place] [employer] - Occupational Stress Questionnaire      Feeling of Stress : Only a little   Social Connections: Socially Isolated (1/26/2023)    Social Connection and Isolation Panel [NHANES]      Frequency of Communication with Friends and Family: Three times a week      Frequency of Social Gatherings with Friends and Family: Never      Attends Confucianism Services: Never      Active Member of Clubs or Organizations: No      Attends Club or Organization Meetings: Not on file      Marital Status:    Interpersonal Safety: Not on file   Housing Stability: High Risk (1/26/2023)    Housing Stability Vital Sign      Unable to Pay for Housing  in the Last Year: Yes      Number of Places Lived in the Last Year: 1      Unstable Housing in the Last Year: No          MEDICATIONS:  has a current medication list which includes the following prescription(s): albuterol, reason aspirin not prescribed (intentional), atenolol, blood glucose, blood glucose monitoring, cetirizine, clotrimazole, co-enzyme q-10, dexcom g6 sensor, dexcom g6 transmitter, empagliflozin, epinephrine, fluticasone, glucagon emergency, ibuprofen, omnipod 5 g6 pod (gen 5), insulin glargine, humalog, insulin pump, insulin syringe-needle u-100, blood glucose monitoring, levonorgestrel, losartan, magnesium oxide, montelukast, multiple vitamins-minerals, fish oil-omega-3 fatty acids, omeprazole, ondansetron, rosuvastatin, sertraline, sumatriptan, trazodone, trulicity, urine glucose-ketones test, and b-100.    ROS     ROS: 10 point ROS neg other than the symptoms noted above in the HPI.    Physical Exam   VS: Breastfeeding No   GENERAL: healthy, alert and no distress  EYES: Eyes grossly normal to inspection, conjunctivae and sclerae normal  ENT: no nose swelling, nasal discharge.  Thyroid: no apparent thyroid nodules.  Thyroid appears normal in size and nontender.  CV: RRR, no rubs, gallops, no murmurs  RESP: CTAB, no wheezes, rales, or ronchi  ABDO: +BS  EXTREMITIES: no hand tremors.  NEURO: Cranial nerves grossly intact, mentation intact and speech normal  SKIN: No apparent skin lesions, rash or edema seen   PSYCH: mentation appears normal, affect normal/bright, judgement and insight intact, normal speech and appearance well-groomed  DM FOOT EXAM: normal DP and PT pulses, no trophic changes or ulcerative lesions, normal sensory exam and normal monofilament exam.     LABS:  A1c:  Lab Results   Component Value Date    A1C 6.8 10/17/2023    A1C 6.4 06/09/2023    A1C 6.5 12/07/2022    A1C 6.5 10/03/2022    A1C 7.0 05/04/2022    A1C 6.5 07/05/2021    A1C 7.6 01/14/2021    A1C 6.7 07/14/2020    A1C 7.0  01/14/2020    A1C 7.5 09/24/2019     Basic Metabolic Panel:  Creatinine   Date Value Ref Range Status   10/17/2023 1.14 (H) 0.51 - 0.95 mg/dL Final   07/05/2021 1.20 (H) 0.52 - 1.04 mg/dL Final     Urinary microalbumin:  Lab Results   Component Value Date    UMALCR 255.16 06/09/2023    UMALCR 591.60 02/10/2022    UMALCR 402.04 07/05/2021     LFTS/Cholesterol Panel:  Recent Labs   Lab Test 01/27/23  1044 02/10/22  1103   CHOL 136 205*   HDL 39* 48*   LDL 75 132*   TRIG 110 123     Thyroid Function:  TSH   Date Value Ref Range Status   12/07/2022 1.53 0.40 - 4.00 mU/L Final   09/24/2019 1.45 0.40 - 4.00 mU/L Final       All pertinent notes, labs, and images personally reviewed by me.     Glucometer/ insulin pump (if applicable)/ CGM data (if applicable) downloaded, Personally reviewed and interpreted.  All Blood sugar data reviewed personally and discussed with pt.  See nursing note from today's clinic visit for details of BG/CGM log.      A/P  Ms.Marie Vogel is a 43  year old here for the evaluation/management of diabetes:    1. DM2 - Under Fair control.  A1c 6.8%.  Diabetes complicated by microalbuminuria.  Using OMNIPOD 5 + Dexcom.  In automode about 100% of time.  H/o hypertropic cardiomyopathy, followed by cardiology.  In general BG appear in range.  Noted some episodes of hypoglycemia overnight.  Plan:  Discussed diagnosis, pathophysiology, management and treatment options of condition with pt.    Continue current pump settings.  Continue Jardiance  25 mg/day.  Continue TRULICITY 4.5 mg/week.  Try to be in auto mode as much as possible.  DO NOT overcorrect high BG.  Continue to use OMNIPOD 5+ Dexcom.   Repeat labs and follow up in 3-4 months.  Please make a lab appointment for blood work and follow up clinic appointment in 1 week after that to discuss results.    2. Hypertension - + microalbuminuria. On losartan. Blood pressure medications include cozaar 75 mg qd.    3. Hyperlipidemia - Under fair  control. LDL 75.  Had myalgias on lipitor and stopped it.  On Crestor 5 mg/day.    4. Microalbuminuria:  Recommend strict BG control.  On Cozaar 50 mg/day.    DM Prevention:    Opthalmology- recommend annually.  Dental-Yes.  ASA- No  Smoking- No.    Medications       HMG CoA Reductase Inhibitors     rosuvastatin (CRESTOR) 5 MG tablet         Salicylates     ASPIRIN NOT PRESCRIBED (INTENTIONAL)             Most Recent Immunizations   Administered Date(s) Administered     COVID-19,PF,Pfizer (12+ Yrs) 12/28/2021     Influenza (IIV3) PF 09/26/2018     Influenza (intradermal) 10/01/2012     Influenza Vaccine IM > 6 months Valent IIV4 (Alfuria,Fluzone) 01/14/2021     MMR 09/17/2007     Pneumococcal 23 valent 01/22/2003     TD (ADULT, 7+) 01/01/2002     TDAP Vaccine (Adacel) 04/06/2012     Recommend checking blood sugars before meals and at bedtime.    If Blood glucose are low more often-> 2-3 times/week- give us a call.  The patient is advised to Make better food choices: reduce carbs, Reduce portion size, weight loss and exercise 3-4 times a week.  Discussed hypoglycemia signs and symptoms as well as management in detail.    All questions were answered.  The patient indicates understanding of the above issues and agrees with the plan set forth.   More than 50% of face to face time spent with Ms. Dino Vogel on counseling / coordinating her care.      Follow-up:  follow up in 3 months.    Zita Fuentes MD  Endocrinology   [facility]/[place]    CC: Alan Paredes    Addendum to above note and clinic visit:    Labs reviewed.    See result note/telephone encounter.                       Again, thank you for allowing me to participate in the care of your patient.        Sincerely,        Zita Fuentes MD

## 2023-10-18 NOTE — NURSING NOTE
"ENDOCRINOLOGY INTAKE FORM    Patient Name:  Marie Vogel  :  1980    Is patient Diabetic?   Yes: type 2  Does patient have non-diabetic or other endocrine issues?  Yes: hyperlipidemia    Vitals: There were no vitals taken for this visit.  BMI= There is no height or weight on file to calculate BMI.    Flu vaccine:  No  Pneumonia vaccine:  Yes: 23 x 1, 20 x 1    Smoking and Alcohol use:  Social History     Tobacco Use    Smoking status: Former     Packs/day: 0.00     Years: 0.00     Additional pack years: 0.00     Total pack years: 0.00     Types: Cigarettes     Quit date: 10/24/2005     Years since quittin.9    Smokeless tobacco: Never    Tobacco comments:     States only smokes when drinks maybe 2x/year   Substance Use Topics    Alcohol use: Not Currently    Drug use: Never       Foot Exam: No  Eye Exam:  Yes: 21  Dental Exam:    Aspirin Use:  No    Lab Results   Component Value Date    A1C 6.8 10/17/2023    A1C 6.4 2023    A1C 6.5 2022    A1C 6.5 10/03/2022    A1C 7.0 2022    A1C 6.5 2021    A1C 7.6 2021    A1C 6.7 2020    A1C 7.0 2020    A1C 7.5 2019       Lab Results   Component Value Date    MICROL 210.0 2023    MICROL 775 02/10/2022    MICROL 591 2021     No results found for: \"MICROALBUMIN\"    Lenore [facility] [place]  [phone]    "

## 2023-10-18 NOTE — PROGRESS NOTES
Name: Marie Vogel  Seen for f/u of DM.  HPI:  Marie Vogel is a 43 year old female who presents for the evaluation/management of DM.   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    Here for f/u. Previously has seen endo at [facility] ( Dr Aguilera and Dr Zhou and ). 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 Busy at work, also variable schedule.. Concerned about weight gain.   H/o cardiomyopathy. Followed by cardiology.    On OMNIPOD 5+ Dexcom G6  Using humalog in pump.  Using DEXCOM CGM.    She is working at [employer].  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 Marie also has glucagon kit at home for emergency.    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  Trulicity was increased following that.    Wt is stable. Earlier 40 lbs in last 3 years.  Wt Readings from Last 2 Encounters:   10/18/23 70.1 kg (154 lb 8 oz)   08/31/23 68.9 kg (151 lb 12.8 oz)       1. Type 2 DM:  Orginally diagnosed at the age of: 21 with GDM during her first pregnancy. Diagnosed with DM 2 in 1/2002, diet controlled for 3 years, then started on metformin. In 2010 during her second pregnancy, started on insulin and then insulin pump therapy in 2012.   Current Regimen:   Humalog Insulin pump therapy ( First Opinion),   Trulicity  4.5 mg/week.  Jardinace 25 mg/day    BS checks: Using Dexcom.  Average Meter Download: Blood glucose data reviewed personally. See nursing note from this encounter for details.  She is able to feel symptoms of hypoglycemia.    yes:     yes:     Diabetes Medication(s)       Diabetic Other       Glucagon, rDNA, (GLUCAGON EMERGENCY) 1 MG KIT    For Intramuscular use for low Blood glucose episode.      Insulin       insulin glargine (LANTUS PEN) 100 UNIT/ML pen    Take 20 units in case of pump malfunction only.     insulin lispro (HUMALOG) 100 UNIT/ML vial    USE WITH INSULIN PUMP AS DIRECTED, USES ABOUT 80 UNITS PER DAY      Sodium-Glucose Co-Transporter 2 (SGLT2) Inhibitors       empagliflozin (JARDIANCE) 25 MG TABS tablet    Take 1 tablet (25 mg) by mouth daily      Incretin Mimetic Agents       TRULICITY 4.5 MG/0.5ML SOPN    Inject 4.5 mg subcutaneous once a week.            Current Regimen:     In automode: 68% of time    Insulin pump -   Time Rate (U/hr)   0000-  0.75                   Carbohydrate Ratio -    Time Ratio   0000-  10   0600 PM  11     Sensitivity   40   Active Insulin Time   hours   Basal      Bolus      Total Carbohydrates/day    Total Insulin/day     Average Blood Sugar                Complications:   Diabetes Complications  Description / Detail    Diabetic Retinopathy  No   CAD / PAD  No   Neuropathy  No   Nephropathy / Microalbuminuria  Yes: microalbuminuria. ON cozaar.   Gastroparesis  No   Hypoglycemia [place]  No     2. Hypertension: Blood Pressure today:   BP Readings from Last 3 Encounters:   08/31/23 90/70   06/13/23 105/74   04/04/23 (!) 137/97     Takes medications everyday without forgetting a dose.  Denies feeling lightheaded or dizzy.    3. Hyperlipidemia:   Denies muscle aches of pains.       PMH/PSH:  Past Medical History:   Diagnosis Date    Allergic rhinitis due to pollen     Atypical glandular cells on Pap smear 3/1108    Cervical high risk HPV (human papillomavirus) test  positive 2018    See problem list    Chronic kidney disease     Depressive disorder 2-3 years ago    Gastroesophageal reflux disease     Mitochondrial disease (H24) 2022    PFO (patent foramen ovale)     Stargardt's disease 2019    Type II or unspecified type diabetes mellitus without mention of complication, not stated as uncontrolled     onset was gestational in      Past Surgical History:   Procedure Laterality Date    ABDOMEN SURGERY      C IUD,MIRENA  8/10/10, 7/28/15    C/SECTION, LOW TRANSVERSE  6/25/10    , Low Transverse    CHOLECYSTECTOMY, LAPOROSCOPIC      Cholecystectomy, Laparoscopic    ESOPHAGOSCOPY, GASTROSCOPY, DUODENOSCOPY (EGD), COMBINED N/A 2016    Procedure: COMBINED ESOPHAGOSCOPY, GASTROSCOPY, DUODENOSCOPY (EGD), BIOPSY SINGLE OR MULTIPLE;  Surgeon: Fadi Hunter MD;  Location:  GI     ESOPHAGEAL MOTILITY STUDY N/A 2016    Procedure: ESOPHAGEAL MOTILITY STUDY;  Surgeon: Fadi Hunter MD;  Location:  GI     REMOVE TONSILS/ADENOIDS,<13 Y/O      T &A    IR RENAL BIOPSY RIGHT  2/3/2023    ZZC INDUCED ABORTN BY D&C           Family Hx:  Family History   Problem Relation Age of Onset    Cardiovascular Mother         hypertrophic cardiomyopathy    Genitourinary Problems Mother         gallbladder disease    Diabetes Mother         Transplnant induced/     Other - See Comments Mother         heart transplant 2005    Depression Mother     Diabetes Father         type 2    Hypertension Father     Hyperlipidemia Father     Genitourinary Problems Maternal Grandmother         gallbladder disease    Genitourinary Problems Paternal Grandmother         gallbladder disease    Hypertension Paternal Grandfather         Deaceased    Cerebrovascular Disease Paternal Grandfather             Cardiovascular Brother         hypertrophic cardiomyopathy    Diabetes Brother     Diabetes Brother         type 2     Diabetes Other         Type 2    Diabetes Daughter     Genetic Disorder Daughter         Mitochondrial disorder    Depression Daughter     Anxiety Disorder Daughter     Genetic Disorder Daughter         Mitochondrial disorder    Obesity Daughter     Glaucoma No family hx of     Macular Degeneration No family hx of      Thyroid disease: No         DM2: Yes - father and mother         Autoimmune: DM1, SLE, RA, Vitiligo No    Social Hx:  Social History     Socioeconomic History    Marital status:      Spouse name: Not on file    Number of children: 2    Years of education: 14    Highest education level: Associate degree: academic program   Occupational History    Occupation: nurse    Occupation: RN   Tobacco Use    Smoking status: Former     Packs/day: 0.00     Years: 0.00     Additional pack years: 0.00     Total pack years: 0.00     Types: Cigarettes     Quit date: 10/24/2005     Years since quittin.9    Smokeless tobacco: Never    Tobacco comments:     States only smokes when drinks maybe 2x/year   Substance and Sexual Activity    Alcohol use: Not Currently    Drug use: Never    Sexual activity: Not Currently     Partners: Male     Birth control/protection: I.U.D.     Comment: Mirena IUD 7/28/15   Other Topics Concern    Parent/sibling w/ CABG, MI or angioplasty before 65F 55M? No   Social History Narrative    Caffeine intake/servings daily - 6-7    Calcium intake/servings daily - 2-3 yogurt, milk, cheese    Exercise 1 times weekly - describe aerobics    Sunscreen used - Yes    Seatbelts used - Yes    Guns stored in the home - No    Self Breast Exam - Yes    Pap test up to date -  Yes    Eye exam up to date -  Yes    Dental exam up to date -  Yes    DEXA scan up to date -  Not Applicable    Flex Sig/Colonoscopy up to date -  Not Applicable    Mammography up to date -  Not Applicable    Immunizations reviewed and up to date - Yes    Abuse: Current or Past (Physical, Sexual or Emotional) - No    Do you  feel safe in your environment - Yes    Do you cope well with stress - Yes    Do you suffer from insomnia - Yes     Last updated by: Tatianna Lacey  5/9/2005     Social Determinants of Health     Financial Resource Strain: Medium Risk (1/26/2023)    Overall Financial Resource Strain (CARDIA)     Difficulty of Paying Living Expenses: Somewhat hard   Food Insecurity: Food Insecurity Present (1/26/2023)    Hunger Vital Sign     Worried About Running Out of Food in the Last Year: Sometimes true     Ran Out of Food in the Last Year: Never true   Transportation Needs: No Transportation Needs (1/26/2023)    PRAPARE - Transportation     Lack of Transportation (Medical): No     Lack of Transportation (Non-Medical): No   Physical Activity: Insufficiently Active (1/26/2023)    Exercise Vital Sign     Days of Exercise per Week: 4 days     Minutes of Exercise per Session: 10 min   Stress: No Stress Concern Present (1/26/2023)    [place] [employer] - Occupational Stress Questionnaire     Feeling of Stress : Only a little   Social Connections: Socially Isolated (1/26/2023)    Social Connection and Isolation Panel [NHANES]     Frequency of Communication with Friends and Family: Three times a week     Frequency of Social Gatherings with Friends and Family: Never     Attends Anabaptism Services: Never     Active Member of Clubs or Organizations: No     Attends Club or Organization Meetings: Not on file     Marital Status:    Interpersonal Safety: Not on file   Housing Stability: High Risk (1/26/2023)    Housing Stability Vital Sign     Unable to Pay for Housing in the Last Year: Yes     Number of Places Lived in the Last Year: 1     Unstable Housing in the Last Year: No          MEDICATIONS:  has a current medication list which includes the following prescription(s): albuterol, reason aspirin not prescribed (intentional), atenolol, blood glucose, blood glucose monitoring, cetirizine, clotrimazole,  co-enzyme q-10, dexcom g6 sensor, dexcom g6 transmitter, empagliflozin, epinephrine, fluticasone, glucagon emergency, ibuprofen, omnipod 5 g6 pod (gen 5), insulin glargine, humalog, insulin pump, insulin syringe-needle u-100, blood glucose monitoring, levonorgestrel, losartan, magnesium oxide, montelukast, multiple vitamins-minerals, fish oil-omega-3 fatty acids, omeprazole, ondansetron, rosuvastatin, sertraline, sumatriptan, trazodone, trulicity, urine glucose-ketones test, and b-100.    ROS     ROS: 10 point ROS neg other than the symptoms noted above in the HPI.    Physical Exam   VS: Breastfeeding No   GENERAL: healthy, alert and no distress  EYES: Eyes grossly normal to inspection, conjunctivae and sclerae normal  ENT: no nose swelling, nasal discharge.  Thyroid: no apparent thyroid nodules.  Thyroid appears normal in size and nontender.  CV: RRR, no rubs, gallops, no murmurs  RESP: CTAB, no wheezes, rales, or ronchi  ABDO: +BS  EXTREMITIES: no hand tremors.  NEURO: Cranial nerves grossly intact, mentation intact and speech normal  SKIN: No apparent skin lesions, rash or edema seen   PSYCH: mentation appears normal, affect normal/bright, judgement and insight intact, normal speech and appearance well-groomed  DM FOOT EXAM: normal DP and PT pulses, no trophic changes or ulcerative lesions, normal sensory exam and normal monofilament exam.     LABS:  A1c:  Lab Results   Component Value Date    A1C 6.8 10/17/2023    A1C 6.4 06/09/2023    A1C 6.5 12/07/2022    A1C 6.5 10/03/2022    A1C 7.0 05/04/2022    A1C 6.5 07/05/2021    A1C 7.6 01/14/2021    A1C 6.7 07/14/2020    A1C 7.0 01/14/2020    A1C 7.5 09/24/2019     Basic Metabolic Panel:  Creatinine   Date Value Ref Range Status   10/17/2023 1.14 (H) 0.51 - 0.95 mg/dL Final   07/05/2021 1.20 (H) 0.52 - 1.04 mg/dL Final     Urinary microalbumin:  Lab Results   Component Value Date    UMALCR 255.16 06/09/2023    UMALCR 591.60 02/10/2022    UMALCR 402.04 07/05/2021      LFTS/Cholesterol Panel:  Recent Labs   Lab Test 01/27/23  1044 02/10/22  1103   CHOL 136 205*   HDL 39* 48*   LDL 75 132*   TRIG 110 123     Thyroid Function:  TSH   Date Value Ref Range Status   12/07/2022 1.53 0.40 - 4.00 mU/L Final   09/24/2019 1.45 0.40 - 4.00 mU/L Final       All pertinent notes, labs, and images personally reviewed by me.     Glucometer/ insulin pump (if applicable)/ CGM data (if applicable) downloaded, Personally reviewed and interpreted.  All Blood sugar data reviewed personally and discussed with pt.  See nursing note from today's clinic visit for details of BG/CGM log.      A/P  Ms.Marie Vogel is a 43  year old here for the evaluation/management of diabetes:    1. DM2 - Under Fair control.  A1c 6.8%.  Diabetes complicated by microalbuminuria.  Using OMNIPOD 5 + Dexcom.  In automode about 100% of time.  H/o hypertropic cardiomyopathy, followed by cardiology.  In general BG appear in range.  Noted some episodes of hypoglycemia overnight.  Plan:  Discussed diagnosis, pathophysiology, management and treatment options of condition with pt.    Continue current pump settings.  Continue Jardiance  25 mg/day.  Continue TRULICITY 4.5 mg/week.  Try to be in auto mode as much as possible.  DO NOT overcorrect high BG.  Continue to use OMNIPOD 5+ Dexcom.   Repeat labs and follow up in 3-4 months.  Please make a lab appointment for blood work and follow up clinic appointment in 1 week after that to discuss results.    2. Hypertension - + microalbuminuria. On losartan. Blood pressure medications include cozaar 75 mg qd.    3. Hyperlipidemia - Under fair control. LDL 75.  Had myalgias on lipitor and stopped it.  On Crestor 5 mg/day.    4. Microalbuminuria:  Recommend strict BG control.  On Cozaar 50 mg/day.    DM Prevention:    Opthalmology- recommend annually.  Dental-Yes.  ASA- No  Smoking- No.    Medications       HMG CoA Reductase Inhibitors     rosuvastatin (CRESTOR) 5 MG tablet          Salicylates     ASPIRIN NOT PRESCRIBED (INTENTIONAL)             Most Recent Immunizations   Administered Date(s) Administered    COVID-19,PF,Pfizer (12+ Yrs) 12/28/2021    Influenza (IIV3) PF 09/26/2018    Influenza (intradermal) 10/01/2012    Influenza Vaccine IM > 6 months Valent IIV4 (Alfuria,Fluzone) 01/14/2021    MMR 09/17/2007    Pneumococcal 23 valent 01/22/2003    TD (ADULT, 7+) 01/01/2002    TDAP Vaccine (Adacel) 04/06/2012     Recommend checking blood sugars before meals and at bedtime.    If Blood glucose are low more often-> 2-3 times/week- give us a call.  The patient is advised to Make better food choices: reduce carbs, Reduce portion size, weight loss and exercise 3-4 times a week.  Discussed hypoglycemia signs and symptoms as well as management in detail.    All questions were answered.  The patient indicates understanding of the above issues and agrees with the plan set forth.   More than 50% of face to face time spent with Ms. Dino Vogel on counseling / coordinating her care.      Follow-up:  follow up in 3 months.    Zita Fuentes MD  Endocrinology   [facility]/[place]    CC: Alan Paredes    Addendum to above note and clinic visit:    Labs reviewed.    See result note/telephone encounter.

## 2023-10-19 ENCOUNTER — OFFICE VISIT (OUTPATIENT)
Dept: OBGYN | Facility: CLINIC | Age: 43
End: 2023-10-19
Payer: COMMERCIAL

## 2023-10-19 VITALS
WEIGHT: 153 LBS | SYSTOLIC BLOOD PRESSURE: 108 MMHG | HEIGHT: 63 IN | BODY MASS INDEX: 27.11 KG/M2 | DIASTOLIC BLOOD PRESSURE: 62 MMHG

## 2023-10-19 DIAGNOSIS — Z12.4 SCREENING FOR MALIGNANT NEOPLASM OF CERVIX: ICD-10-CM

## 2023-10-19 DIAGNOSIS — Z01.419 WELL WOMAN EXAM: Primary | ICD-10-CM

## 2023-10-19 DIAGNOSIS — L68.0 HIRSUTISM: ICD-10-CM

## 2023-10-19 LAB
ESTRADIOL SERPL-MCNC: 61 PG/ML
FSH SERPL IRP2-ACNC: 9.9 MIU/ML

## 2023-10-19 PROCEDURE — 82670 ASSAY OF TOTAL ESTRADIOL: CPT | Performed by: OBSTETRICS & GYNECOLOGY

## 2023-10-19 PROCEDURE — 36415 COLL VENOUS BLD VENIPUNCTURE: CPT | Performed by: OBSTETRICS & GYNECOLOGY

## 2023-10-19 PROCEDURE — G0145 SCR C/V CYTO,THINLAYER,RESCR: HCPCS | Performed by: OBSTETRICS & GYNECOLOGY

## 2023-10-19 PROCEDURE — 99396 PREV VISIT EST AGE 40-64: CPT | Performed by: OBSTETRICS & GYNECOLOGY

## 2023-10-19 PROCEDURE — 82627 DEHYDROEPIANDROSTERONE: CPT | Performed by: OBSTETRICS & GYNECOLOGY

## 2023-10-19 PROCEDURE — 83001 ASSAY OF GONADOTROPIN (FSH): CPT | Performed by: OBSTETRICS & GYNECOLOGY

## 2023-10-19 PROCEDURE — 87624 HPV HI-RISK TYP POOLED RSLT: CPT | Performed by: OBSTETRICS & GYNECOLOGY

## 2023-10-19 PROCEDURE — 84403 ASSAY OF TOTAL TESTOSTERONE: CPT | Performed by: OBSTETRICS & GYNECOLOGY

## 2023-10-19 NOTE — PROGRESS NOTES
SUBJECTIVE:                                                      Marie is a 43 year old  female who presents for annual exam.     Patient's last menstrual period was 2023 (approximate).. Menses are rare and variable.  Using Mirena IUD for contraception.  It was placed in 2019  She is not currently considering pregnancy.  Besides routine health maintenance,  she would like to discuss some increased hair growth on her neck and chin.  GYNECOLOGIC HISTORY:    Marie is not sexually active presently  History sexually transmitted infections:No STD history    History of abnormal Pap smear: Last 3 Pap and HPV Results:       Latest Ref Rng & Units 2019     3:15 PM 2019     1:10 PM 2018     3:35 PM   PAP / HPV   PAP (Historical)  NIL      HPV 16 DNA NEG^Negative  Negative  Negative    HPV 18 DNA NEG^Negative  Negative  Negative    Other HR HPV NEG^Negative  Negative  Positive      Family history of breast CA: No  Family history of uterine/ovarian CA: No    Family history of colon CA: No      HISTORY:  OB History    Para Term  AB Living   5 2 0 2 3 2   SAB IAB Ectopic Multiple Live Births   1 2 0 0 2      # Outcome Date GA Lbr Donnell/2nd Weight Sex Delivery Anes PTL Lv   5  06/25/10 32w1d  1.928 kg (4 lb 4 oz) F CS-LTranv Spinal Y SARAH      Birth Comments: PROM, breech      Name: Brenda      Apgar1: 8  Apgar5: 9   4  10/14/01 34w0d  2.835 kg (6 lb 4 oz) F   Y SARAH      Birth Comments: GDM, retained placenta, 3rd degree tear      Name: Blanquita   3 IAB            2 IAB            1 SAB              Past Medical History:   Diagnosis Date    Allergic rhinitis due to pollen     Atypical glandular cells on Pap smear 3/1108    Cervical high risk HPV (human papillomavirus) test positive 2018    See problem list    Chronic kidney disease     Depressive disorder 2-3 years ago    Gastroesophageal reflux disease     Mitochondrial disease (H24) 2022    PFO (patent  foramen ovale)     Stargardt's disease 2019    Type II or unspecified type diabetes mellitus without mention of complication, not stated as uncontrolled     onset was gestational in      Past Surgical History:   Procedure Laterality Date    ABDOMEN SURGERY      C IUD,MIRENA  8/10/10, 7/28/15    C/SECTION, LOW TRANSVERSE  6/25/10    , Low Transverse    CHOLECYSTECTOMY, LAPOROSCOPIC      Cholecystectomy, Laparoscopic    ESOPHAGOSCOPY, GASTROSCOPY, DUODENOSCOPY (EGD), COMBINED N/A 2016    Procedure: COMBINED ESOPHAGOSCOPY, GASTROSCOPY, DUODENOSCOPY (EGD), BIOPSY SINGLE OR MULTIPLE;  Surgeon: Fadi Hunter MD;  Location: [facility]    HC ESOPHAGEAL MOTILITY STUDY N/A 2016    Procedure: ESOPHAGEAL MOTILITY STUDY;  Surgeon: Fadi Hunter MD;  Location:  GI     REMOVE TONSILS/ADENOIDS,<11 Y/O      T &A    IR RENAL BIOPSY RIGHT  2/3/2023    ZZC INDUCED ABORTN BY D&C           Family History   Problem Relation Age of Onset    Cardiovascular Mother         hypertrophic cardiomyopathy    Genitourinary Problems Mother         gallbladder disease    Diabetes Mother         Transplnant induced/     Other - See Comments Mother         heart transplant 2005    Depression Mother     Diabetes Father         type 2    Hypertension Father     Hyperlipidemia Father     Genitourinary Problems Maternal Grandmother         gallbladder disease    Genitourinary Problems Paternal Grandmother         gallbladder disease    Hypertension Paternal Grandfather         Deaceased    Cerebrovascular Disease Paternal Grandfather             Cardiovascular Brother         hypertrophic cardiomyopathy    Diabetes Brother     Diabetes Brother         type 2    Diabetes Other         Type 2    Diabetes Daughter     Genetic Disorder Daughter         Mitochondrial disorder    Depression Daughter     Anxiety Disorder Daughter     Genetic Disorder Daughter          Mitochondrial disorder    Obesity Daughter     Glaucoma No family hx of     Macular Degeneration No family hx of      Social History     Socioeconomic History    Marital status:      Spouse name: None    Number of children: 2    Years of education: 14    Highest education level: Associate degree: academic program   Occupational History    Occupation: nurse    Occupation: RN   Tobacco Use    Smoking status: Former     Packs/day: 0.00     Years: 0.00     Additional pack years: 0.00     Total pack years: 0.00     Types: Cigarettes     Quit date: 10/24/2005     Years since quittin.9    Smokeless tobacco: Never    Tobacco comments:     States only smokes when drinks maybe 2x/year   Substance and Sexual Activity    Alcohol use: Not Currently    Drug use: Never    Sexual activity: Not Currently     Partners: Male     Birth control/protection: I.U.D.     Comment: Mirena IUD 7/28/15   Other Topics Concern    Parent/sibling w/ CABG, MI or angioplasty before 65F 55M? No   Social History Narrative    Caffeine intake/servings daily - 6-7    Calcium intake/servings daily - 2-3 yogurt, milk, cheese    Exercise 1 times weekly - describe aerobics    Sunscreen used - Yes    Seatbelts used - Yes    Guns stored in the home - No    Self Breast Exam - Yes    Pap test up to date -  Yes    Eye exam up to date -  Yes    Dental exam up to date -  Yes    DEXA scan up to date -  Not Applicable    Flex Sig/Colonoscopy up to date -  Not Applicable    Mammography up to date -  Not Applicable    Immunizations reviewed and up to date - Yes    Abuse: Current or Past (Physical, Sexual or Emotional) - No    Do you feel safe in your environment - Yes    Do you cope well with stress - Yes    Do you suffer from insomnia - Yes     Last updated by: Tatianna Lacey  2005     Social Determinants of Health     Financial Resource Strain: Medium Risk (2023)    Overall Financial Resource Strain (CARDIA)     Difficulty of Paying Living  Expenses: Somewhat hard   Food Insecurity: Food Insecurity Present (1/26/2023)    Hunger Vital Sign     Worried About Running Out of Food in the Last Year: Sometimes true     Ran Out of Food in the Last Year: Never true   Transportation Needs: No Transportation Needs (1/26/2023)    PRAPARE - Transportation     Lack of Transportation (Medical): No     Lack of Transportation (Non-Medical): No   Physical Activity: Insufficiently Active (1/26/2023)    Exercise Vital Sign     Days of Exercise per Week: 4 days     Minutes of Exercise per Session: 10 min   Stress: No Stress Concern Present (1/26/2023)    Citizen of [place] of Occupational Health - Occupational Stress Questionnaire     Feeling of Stress : Only a little   Social Connections: Socially Isolated (1/26/2023)    Social Connection and Isolation Panel [NHANES]     Frequency of Communication with Friends and Family: Three times a week     Frequency of Social Gatherings with Friends and Family: Never     Attends Latter-day Services: Never     Active Member of Clubs or Organizations: No     Marital Status:    Housing Stability: High Risk (1/26/2023)    Housing Stability Vital Sign     Unable to Pay for Housing in the Last Year: Yes     Number of Places Lived in the Last Year: 1     Unstable Housing in the Last Year: No       Current Outpatient Medications:     albuterol (PROAIR HFA/PROVENTIL HFA/VENTOLIN HFA) 108 (90 Base) MCG/ACT inhaler, Inhale 2 puffs into the lungs every 4 hours as needed for shortness of breath / dyspnea or wheezing, Disp: 18 g, Rfl: 3    ASPIRIN NOT PRESCRIBED (INTENTIONAL), Please choose reason not prescribed from choices below., Disp: , Rfl:     atenolol (TENORMIN) 25 MG tablet, Take 0.5 tablets (12.5 mg) by mouth daily, Disp: 90 tablet, Rfl: 3    blood glucose (PEPE CONTOUR NEXT) test strip, Check 4 times/day, Disp: 300 each, Rfl: 0    blood glucose monitoring (NO BRAND SPECIFIED) meter device kit, Use to test blood sugar 6 times daily  and as needed., Disp: 1 kit, Rfl: 0    cetirizine (ZYRTEC) 10 MG tablet, Take 10 mg by mouth 2 times daily , Disp: 90 tablet, Rfl: 3    clotrimazole (LOTRIMIN) 1 % external cream, Apply topically 2 times daily, Disp: 60 g, Rfl: 3    co-enzyme Q-10 100 MG CAPS capsule, Take 100 mg by mouth daily, Disp: , Rfl:     Continuous Blood Gluc Sensor (DEXCOM G6 SENSOR) MISC, Apply one sensor to the skin every 10 days, Disp: 9 each, Rfl: 0    Continuous Blood Gluc Transmit (DEXCOM G6 TRANSMITTER) MISC, Change every 90 days.  (Dexcom G6 Transmitter, #[id number]), Disp: 1 each, Rfl: 1    Dulaglutide (TRULICITY) 4.5 MG/0.5ML SOPN, Inject 4.5 mg Subcutaneous once a week, Disp: 6 mL, Rfl: 1    empagliflozin (JARDIANCE) 25 MG TABS tablet, Take 1 tablet (25 mg) by mouth daily, Disp: 90 tablet, Rfl: 1    EPINEPHrine (ANY BX GENERIC EQUIV) 0.3 MG/0.3ML injection 2-pack, Inject 0.3 mLs (0.3 mg) into the muscle once as needed for anaphylaxis, Disp: 0.3 mL, Rfl: 11    fluticasone (FLONASE) 50 MCG/ACT spray, Spray 1-2 sprays into both nostrils daily, Disp: 1 Bottle, Rfl: 0    Glucagon, rDNA, (GLUCAGON EMERGENCY) 1 MG KIT, For Intramuscular use for low Blood glucose episode., Disp: 1 kit, Rfl: 0    ibuprofen (ADVIL/MOTRIN) 200 MG tablet, Take 200 mg by mouth every 4 hours as needed , Disp: , Rfl:     Insulin Disposable Pump (OMNIPOD 5 G6 POD, GEN 5,) MISC, 1 each every 3 days, Disp: 30 each, Rfl: 3    insulin glargine (LANTUS PEN) 100 UNIT/ML pen, Take 20 units in case of pump malfunction only., Disp: 15 mL, Rfl: 0    insulin lispro (HUMALOG) 100 UNIT/ML vial, USE WITH INSULIN PUMP AS DIRECTED, USES ABOUT 80 UNITS PER DAY, Disp: 80 mL, Rfl: 0    INSULIN PUMP - OUTPATIENT, Updated 8/3/21: OmniPod Dash BASAL: 00:00: 0.75 units/hr CARB RATIO: 00:00: 10 Sensitivity: 00:00: 40 mg/dL BLOOD GLUCOSE TARGET and times: 12   AM (midnight): 120-120 Active Insulin Time: 4 hours Sensor: Nadia/ Nadia Link Donna Amish:  Username [email or web address] Password  [email or web address] Disp: , Rfl:     insulin syringe-needle U-100 (29G X 1/2\" 1 ML) 29G X 1/2\" 1 ML miscellaneous, Use 1 syringe once daily or as needed, Disp: 100 each, Rfl: 1    Lancets (MICROLET) MISC, 1 Device See Admin Instructions. Use up to 5 times daily for blood sugar checks., Disp: 100 each, Rfl: prn    levonorgestrel (MIRENA) 20 MCG/24HR IUD, 1 each (20 mcg) by Intrauterine route once, Disp: , Rfl:     losartan (COZAAR) 50 MG tablet, 1 tab each morning and one half tab each evening, Disp: 135 tablet, Rfl: 3    MAGNESIUM OXIDE PO, Take 400 mg by mouth daily, Disp: , Rfl:     montelukast (SINGULAIR) 10 MG tablet, Take 1 tablet (10 mg) by mouth At Bedtime, Disp: 90 tablet, Rfl: 3    Multiple Vitamins-Minerals (MULTI VITAMIN/MINERALS PO), Take 1 each by mouth daily., Disp: , Rfl:     Omega-3 Fatty Acids (FISH OIL) 500 MG CAPS, Take 1 capsule by mouth, Disp: , Rfl:     omeprazole (PRILOSEC) 40 MG DR capsule, TAKE ONE CAPSULE BY MOUTH ONCE DAILY AS NEEDED, Disp: 90 capsule, Rfl: 3    ondansetron (ZOFRAN ODT) 4 MG ODT tab, Take 1 tablet (4 mg) by mouth every 8 hours as needed for nausea, Disp: 8 tablet, Rfl: 0    rosuvastatin (CRESTOR) 5 MG tablet, Take 1 tablet (5 mg) by mouth daily, Disp: 90 tablet, Rfl: 3    sertraline (ZOLOFT) 25 MG tablet, Take 1 tablet (25 mg) by mouth daily, Disp: 90 tablet, Rfl: 3    SUMAtriptan (IMITREX) 25 MG tablet, TAKE ONE TO FOUR TABLETS BY MOUTH ONE TIME. MAY REPEAT 1 TABLET EVERY TWO HOURS UP TO MAXIMUM OF 200MG IN 24 HOURS, Disp: 8 tablet, Rfl: 11    traZODone (DESYREL) 50 MG tablet, TAKE ONE-HALF TO ONE TABLET BY MOUTH NIGHTLY AS NEEDED FOR SLEEP, Disp: 60 tablet, Rfl: 3    Urine Glucose-Ketones Test STRP, Daily as needed, Disp: 25 strip, Rfl: 11    Vitamins-Lipotropics (B-100) TABS, Take 1 tablet by mouth daily, Disp: 30 tablet, Rfl: 11     Allergies   Allergen Reactions    Corylus Anaphylaxis     Patient reports anaphylaxis was caused by hazelnut, not avocado    Ivp Dye [Contrast Dye] " "Itching     Pt reports that throat itches    Nuts Anaphylaxis     Mariola nuts only    Bactrim Swelling     Pt reaction was swelling in mouth and tongue and itchy mouth.  Resolved with benadryl and prednisone    Pcn [Penicillins] Hives    Cephalosporins Itching    Compazine [Prochlorperazine] Other (See Comments)     Pt states \"psychosis\"    Diatrizoate Itching     throat irritation    Erythromycin      Mitochondrial disorder    Lactated Ringers Other (See Comments)     Mitochondrial disorder    Propofol      Mitochondrial disorder    Reglan [Metoclopramide] Other (See Comments)     Pt sates \"psychosis\"    Seasonal Allergies Other (See Comments)     Molds, trees, grass, dust..  Upper congestion    Sulfa Antibiotics Itching    Valproic Acid      Mitochondrial disorder    Atorvastatin      myalgias    Shellfish Allergy Rash     Clams only       Past medical, surgical, social and family history were reviewed and updated in EPIC.    ROS:   12 point review of systems negative other than symptoms noted below or in the HPI.  12 point review of systems negative other than symptoms noted below.      OBJECTIVE:                                                      EXAM:  /62   Ht 1.607 m (5' 3.25\")   Wt 69.4 kg (153 lb)   LMP 07/19/2023 (Approximate)   BMI 26.89 kg/m     BMI: Body mass index is 26.89 kg/m .  General: Alert and oriented, no distress.  Psychiatric: Mood and affect within normal limits.  Skin: Warm and dry, no lesions, rashes or discolorations.  Neck: Neck supple. Thyroid palpbably normal in size and without nodularity.  Cardiovascular: Regular rate and rhythm, no murmurs, rubs or gallops.   Lungs:  Clear to auscultation bilaterally, breathing is unlabored.  Breasts:  Symmetric, no skin changes.  No dominant masses bilaterally.   Lymph:  No cervical, supraclavicular, infraclavicular, axillary or inguinal lymphadenopathy palpable.   Abdomen: Soft, nontender, no hepatosplenomegaly, no rebound or guarding, " no masses, no hernias.   Vulva:  No external lesions, normal female hair distribution, no inguinal adenopathy.    Urethra:  Midline, non-tender, well supported, no discharge  Vagina:  Well-estrogenized, no abnormal discharge, no lesions  Cervix: no lesions, no discharge  Uterus:  anteverted, smooth contour, without enlargement, mobile, and without tenderness  Ovaries:  No masses appreciated, non-tender, mobile  Rectal Exam: deferred  Musculoskeletal: extremities normal      COUNSELING:   Reviewed preventive health counseling, as reflected in patient instructions       Mammo and colonoscopy discussed   reports that she quit smoking about 17 years ago. Her smoking use included cigarettes. She has never used smokeless tobacco.        ASSESSMENT/PLAN:                                                      43 year old female with satisfactory annual exam  (Z01.419) Well woman exam  (primary encounter diagnosis)  Comment: Normal exam with IUD strings in good position  Plan: Follicle stimulating hormone, Estradiol, DHEA         sulfate, Testosterone, total, Pap screen with         HPV - recommended age 30 - 65 years            (L68.0) Hirsutism  Comment: Lab assessment for hyperandrogenism  Plan: Follicle stimulating hormone, Estradiol, DHEA         sulfate, Testosterone, total            (Z12.4) Screening for malignant neoplasm of cervix  Comment:   Plan: Pap screen with HPV - recommended age 30 - 65         years        Obtained today    Yan Cortes MD

## 2023-10-20 LAB — DHEA-S SERPL-MCNC: 24 UG/DL (ref 35–430)

## 2023-10-21 LAB — TESTOST SERPL-MCNC: 14 NG/DL (ref 8–60)

## 2023-10-23 LAB
BKR LAB AP GYN ADEQUACY: NORMAL
BKR LAB AP GYN INTERPRETATION: NORMAL
BKR LAB AP HPV REFLEX: NORMAL
BKR LAB AP LMP: NORMAL
BKR LAB AP PREVIOUS ABNORMAL: NORMAL
PATH REPORT.COMMENTS IMP SPEC: NORMAL
PATH REPORT.COMMENTS IMP SPEC: NORMAL
PATH REPORT.RELEVANT HX SPEC: NORMAL

## 2023-10-24 ENCOUNTER — TELEPHONE (OUTPATIENT)
Dept: ENDOCRINOLOGY | Facility: CLINIC | Age: 43
End: 2023-10-24

## 2023-10-24 ENCOUNTER — E-VISIT (OUTPATIENT)
Dept: PEDIATRICS | Facility: CLINIC | Age: 43
End: 2023-10-24
Payer: COMMERCIAL

## 2023-10-24 DIAGNOSIS — E11.21 TYPE 2 DIABETES MELLITUS WITH DIABETIC NEPHROPATHY, WITH LONG-TERM CURRENT USE OF INSULIN (H): Primary | ICD-10-CM

## 2023-10-24 DIAGNOSIS — R05.1 ACUTE COUGH: ICD-10-CM

## 2023-10-24 DIAGNOSIS — B34.9 VIRAL SYNDROME: Primary | ICD-10-CM

## 2023-10-24 DIAGNOSIS — Z79.4 TYPE 2 DIABETES MELLITUS WITH DIABETIC NEPHROPATHY, WITH LONG-TERM CURRENT USE OF INSULIN (H): Primary | ICD-10-CM

## 2023-10-24 LAB
HUMAN PAPILLOMA VIRUS 16 DNA: NEGATIVE
HUMAN PAPILLOMA VIRUS 18 DNA: NEGATIVE
HUMAN PAPILLOMA VIRUS FINAL DIAGNOSIS: NORMAL
HUMAN PAPILLOMA VIRUS OTHER HR: NEGATIVE

## 2023-10-24 PROCEDURE — 99422 OL DIG E/M SVC 11-20 MIN: CPT | Mod: 93 | Performed by: INTERNAL MEDICINE

## 2023-10-24 RX ORDER — INSULIN LISPRO 100 [IU]/ML
INJECTION, SOLUTION INTRAVENOUS; SUBCUTANEOUS
Qty: 80 ML | Refills: 0 | Status: SHIPPED | OUTPATIENT
Start: 2023-10-24 | End: 2024-03-11

## 2023-10-24 NOTE — TELEPHONE ENCOUNTER
Pharmacy requesting insulin lispro (Humalog U-100) 100 unit/mL injection.    It won't let me pend it.    Lenore Hurley [facility] [place]  [phone]

## 2023-10-24 NOTE — LETTER
October 25, 2023    RE:  Marie Vogel  [address] [place] [address]        To Whom It May Concern:    Marie Vogel is under our care and has been ill.  Please excuse her from 10/23/23 through 10/26/23 due to illness.        Sincerely,        Alan Paredes MD

## 2023-10-24 NOTE — TELEPHONE ENCOUNTER
RX sent. Pt needs a follow up in 3-6 months. First available is ok. Labs prior. Please schedule both.

## 2023-10-25 PROBLEM — R87.810 CERVICAL HIGH RISK HPV (HUMAN PAPILLOMAVIRUS) TEST POSITIVE: Status: ACTIVE | Noted: 2018-11-27

## 2023-10-25 RX ORDER — CODEINE PHOSPHATE AND GUAIFENESIN 10; 100 MG/5ML; MG/5ML
1-2 SOLUTION ORAL
Qty: 180 ML | Refills: 0 | Status: SHIPPED | OUTPATIENT
Start: 2023-10-25 | End: 2024-09-10

## 2023-10-25 RX ORDER — CODEINE PHOSPHATE AND GUAIFENESIN 10; 100 MG/5ML; MG/5ML
1-2 SOLUTION ORAL
Qty: 180 ML | Refills: 0 | Status: SHIPPED | OUTPATIENT
Start: 2023-10-25 | End: 2023-10-25

## 2023-10-25 RX ORDER — ALBUTEROL SULFATE 90 UG/1
2 AEROSOL, METERED RESPIRATORY (INHALATION) EVERY 4 HOURS PRN
Qty: 18 G | Refills: 3 | Status: SHIPPED | OUTPATIENT
Start: 2023-10-25 | End: 2024-03-11

## 2023-11-09 ENCOUNTER — MYC MEDICAL ADVICE (OUTPATIENT)
Dept: ENDOCRINOLOGY | Facility: CLINIC | Age: 43
End: 2023-11-09
Payer: COMMERCIAL

## 2023-11-09 NOTE — TELEPHONE ENCOUNTER
Appt hrenán.    Message sent via [employer].      Lenore Hurley [facility]  [phone]

## 2023-11-10 ENCOUNTER — FCC EXTENDED DOCUMENTATION (OUTPATIENT)
Dept: PSYCHOLOGY | Facility: CLINIC | Age: 43
End: 2023-11-10
Payer: COMMERCIAL

## 2023-11-10 NOTE — PROGRESS NOTES
"                    Discharge Summary  Multiple Sessions    Client Name: Marie Vogel MRN#: [id number] YOB: 1980      Intake / Discharge Date: 02/28/2023 - 11/10/2023      DSM5 Diagnoses: (Sustained by DSM5 Criteria Listed Above)  Diagnoses: Adjustment Disorders  309.0 (F43.21) With depressed mood  Psychosocial & Contextual Factors: The patient reports numerous internal family stressors. The patient also identifies finances as a stressor due to loans and accrued debt. She also reports internal stressors and a history of trauma.     Presenting Concern:  The reason for seeking services at this time is: \"Depression new diagnoses\".  The problem(s) began 09/01/22. She has been experiencing feelings of depression \"for years now\". The patient reports internal family stressors related to a diabetes diagnosis of her son in June of 2022. The patient also reported that her son, daughter, and self have mitochondrial disease. The patients reports a history of trauma due to internal family stressors. The patient has been dealing with ongoing feelings of sadness, fatigue, and lack of motivation. She has also had to endure the loss of her mother in 2016. The patient identified one of her most pressing stressors comes from internal relational stress with her partner. The patient's scores of 5 on the PHQ-9 and 4 on the ALEXSANDRA-7 affirm the patients reported mental health symptoms.       Reason for Discharge:  Client did not return      Disposition at Time of Last Encounter:   Comments:   The writer attempted to reach out to the patient on 07/10/2023 to assess the patient's interest in continuing outpatient therapy. The patient did not answer at the time and did not reach out for further scheduling. The patient is discharged at this time, however, she can resume services with [facility] at any time.      Risk Management:   Client denies a history of suicidal ideation, suicide attempts, self-injurious behavior, " homicidal ideation, homicidal behavior, and and other safety concerns  Recommended that patient call 911 or go to the local ED should there be a change in any of these risk factors.      Referred To:  The patient is discharged at this time, however, she can resume services with [facility] at any time.        CYRIL Tao   11/10/2023

## 2023-11-22 DIAGNOSIS — F33.1 MODERATE EPISODE OF RECURRENT MAJOR DEPRESSIVE DISORDER (H): ICD-10-CM

## 2023-11-22 DIAGNOSIS — R80.9 MICROALBUMINURIA: ICD-10-CM

## 2023-11-22 RX ORDER — LOSARTAN POTASSIUM 50 MG/1
TABLET ORAL
Qty: 135 TABLET | Refills: 3 | Status: SHIPPED | OUTPATIENT
Start: 2023-11-22

## 2023-11-22 RX ORDER — SERTRALINE HYDROCHLORIDE 25 MG/1
25 TABLET, FILM COATED ORAL DAILY
Qty: 90 TABLET | Refills: 3 | Status: SHIPPED | OUTPATIENT
Start: 2023-11-22

## 2024-01-02 DIAGNOSIS — E11.21 TYPE 2 DIABETES MELLITUS WITH DIABETIC NEPHROPATHY, WITH LONG-TERM CURRENT USE OF INSULIN (H): ICD-10-CM

## 2024-01-02 DIAGNOSIS — Z79.4 TYPE 2 DIABETES MELLITUS WITH DIABETIC NEPHROPATHY, WITH LONG-TERM CURRENT USE OF INSULIN (H): ICD-10-CM

## 2024-01-02 RX ORDER — PROCHLORPERAZINE 25 MG/1
SUPPOSITORY RECTAL
Qty: 9 EACH | Refills: 0 | Status: SHIPPED | OUTPATIENT
Start: 2024-01-02 | End: 2024-04-08

## 2024-01-02 RX ORDER — INSULIN PMP CART,AUT,G6/7,CNTR
EACH SUBCUTANEOUS
Qty: 30 EACH | Refills: 11 | Status: SHIPPED | OUTPATIENT
Start: 2024-01-02

## 2024-01-02 NOTE — TELEPHONE ENCOUNTER
Requested Prescriptions   Pending Prescriptions Disp Refills    Continuous Blood Gluc Sensor (DEXCOM G6 SENSOR) MISC [Pharmacy Med Name: blood-glucose sensor device]  0     Sig: Apply one sensor to the skin every 10 days.       There is no refill protocol information for this order

## 2024-01-08 ENCOUNTER — TELEPHONE (OUTPATIENT)
Dept: ENDOCRINOLOGY | Facility: CLINIC | Age: 44
End: 2024-01-08
Payer: COMMERCIAL

## 2024-01-08 NOTE — TELEPHONE ENCOUNTER
PA for dulaglutide 4.5 mg/0.5 mL subcutaneous pen.    Lenore Hurley, [facility]  [phone]

## 2024-01-11 NOTE — TELEPHONE ENCOUNTER
Central Prior Authorization Team  Phone: [phone]    PA Initiation    Medication: TRULICITY 4.5 MG/0.5ML SC SOPN  Insurance Company: [employer] - Phone [phone] Fax [phone]  Pharmacy Filling the Rx: [facility] - [place], [place] - [address] [place] [address]  Filling Pharmacy Phone: [phone]  Filling Pharmacy Fax:    Start Date: 1/11/2024

## 2024-01-12 NOTE — TELEPHONE ENCOUNTER
Central Prior Authorization Team  Phone: [phone]    RECEIVED FORM FROM INSURANCE, FILLED OUT AND FAXED BACK

## 2024-01-15 NOTE — TELEPHONE ENCOUNTER
Byetta/exenatide (twice a day) , Buydureon/Exenatide (once a week), Ozempic/Semaglutide (once a week), Trulicity/Dulaglutide (once a week), Victoza/Liraglutide (once a day), Adlyxin/Lixisenatide (once a day) or Munjaro. Please check cost with insurance.    Possible side effect profile of these class of medication includes: Nausea, diarrhea, gastrointestinal intolerance, abdominal pain, upper respiratory tract infection, headache, increased heart rate, drop in blood sugar, injection site reaction.  Rare side effects include pancreatitis, kidney problems.  It has been associated with thyroid cancer in animal models.

## 2024-01-15 NOTE — TELEPHONE ENCOUNTER
Central Prior Authorization Team  Phone: [phone]    PRIOR AUTHORIZATION DENIED    Medication: TRULICITY 4.5 MG/0.5ML SC SOPN  Insurance Company: [employer] - Phone [phone] Fax [phone]  Denial Date: 1/12/2024  Denial Reason(s):         Appeal Information:         Patient Notified: no

## 2024-01-20 ENCOUNTER — HEALTH MAINTENANCE LETTER (OUTPATIENT)
Age: 44
End: 2024-01-20

## 2024-01-23 NOTE — TELEPHONE ENCOUNTER
See 01/15/24 my chart encounter.    Lenore Hurley [facility] [place]  [phone]

## 2024-02-07 DIAGNOSIS — J30.9 ALLERGIC RHINITIS, UNSPECIFIED: ICD-10-CM

## 2024-02-08 ENCOUNTER — TELEPHONE (OUTPATIENT)
Dept: ENDOCRINOLOGY | Facility: CLINIC | Age: 44
End: 2024-02-08
Payer: COMMERCIAL

## 2024-02-08 ENCOUNTER — TELEPHONE (OUTPATIENT)
Dept: ENDOCRINOLOGY | Facility: CLINIC | Age: 44
End: 2024-02-08

## 2024-02-08 RX ORDER — MONTELUKAST SODIUM 10 MG/1
1 TABLET ORAL AT BEDTIME
Qty: 90 TABLET | Refills: 3 | Status: SHIPPED | OUTPATIENT
Start: 2024-02-08 | End: 2024-09-10

## 2024-02-08 NOTE — TELEPHONE ENCOUNTER
FREE DRUG APPLICATION INITIATED    Medication: JARDIANCE 25 MG PO TABS  Free Drug Program Name:  [employer]  Date Submitted: 2/8/2024  4:04 PM  Phone #: [phone] opt:0.  Fax #: [phone]  Additional Information: left message for patient to call back to see if they would qualify with income requirements  Spoke to Company and their income requirements went up a little:    1 person household $37,650  2 person household $51,100

## 2024-02-08 NOTE — TELEPHONE ENCOUNTER
Provider starting pt on ozempic. Pt would like to look into patient assistance for Ozempic and Jardiance.

## 2024-02-08 NOTE — TELEPHONE ENCOUNTER
FREE DRUG APPLICATION INITIATED    Medication: OZEMPIC (1 MG/DOSE) 4 MG/3ML SC SOPN  Free Drug Program Name:  [employer]  Date Submitted: 2/8/2024  4:09 PM  Phone #: [phone]  Fax #: [phone] or expedited fax # [phone]   Additional Information: Left message for patient to call back to see if they would qualify for patient assistance

## 2024-02-23 NOTE — TELEPHONE ENCOUNTER
Left final voice mail for patient. Left 3 voicemail's. Closing encounter due to no repsonse back from patient. If patient calls back I'll reopen the encounter

## 2024-03-11 ENCOUNTER — MYC REFILL (OUTPATIENT)
Dept: ENDOCRINOLOGY | Facility: CLINIC | Age: 44
End: 2024-03-11
Payer: COMMERCIAL

## 2024-03-11 ENCOUNTER — MYC REFILL (OUTPATIENT)
Dept: PEDIATRICS | Facility: CLINIC | Age: 44
End: 2024-03-11
Payer: COMMERCIAL

## 2024-03-11 DIAGNOSIS — K21.9 GASTROESOPHAGEAL REFLUX DISEASE WITHOUT ESOPHAGITIS: ICD-10-CM

## 2024-03-11 DIAGNOSIS — Z79.4 TYPE 2 DIABETES MELLITUS WITH DIABETIC NEPHROPATHY, WITH LONG-TERM CURRENT USE OF INSULIN (H): ICD-10-CM

## 2024-03-11 DIAGNOSIS — K44.9 HIATAL HERNIA: ICD-10-CM

## 2024-03-11 DIAGNOSIS — F33.1 MODERATE EPISODE OF RECURRENT MAJOR DEPRESSIVE DISORDER (H): ICD-10-CM

## 2024-03-11 DIAGNOSIS — Z91.018 NUT ALLERGY: ICD-10-CM

## 2024-03-11 DIAGNOSIS — Q99.9 MITOCHONDRIAL DNA MUTATION: Primary | ICD-10-CM

## 2024-03-11 DIAGNOSIS — G43.809 OTHER MIGRAINE WITHOUT STATUS MIGRAINOSUS, NOT INTRACTABLE: ICD-10-CM

## 2024-03-11 DIAGNOSIS — E11.21 TYPE 2 DIABETES MELLITUS WITH DIABETIC NEPHROPATHY, WITH LONG-TERM CURRENT USE OF INSULIN (H): ICD-10-CM

## 2024-03-11 DIAGNOSIS — G47.00 INSOMNIA, UNSPECIFIED TYPE: ICD-10-CM

## 2024-03-11 DIAGNOSIS — R05.1 ACUTE COUGH: ICD-10-CM

## 2024-03-11 RX ORDER — SERTRALINE HYDROCHLORIDE 25 MG/1
25 TABLET, FILM COATED ORAL DAILY
Qty: 90 TABLET | Refills: 3 | OUTPATIENT
Start: 2024-03-11

## 2024-03-11 RX ORDER — OMEPRAZOLE 40 MG/1
CAPSULE, DELAYED RELEASE ORAL
Qty: 90 CAPSULE | Refills: 3 | OUTPATIENT
Start: 2024-03-11

## 2024-03-11 RX ORDER — SUMATRIPTAN 25 MG/1
TABLET, FILM COATED ORAL
Qty: 20 TABLET | Refills: 11 | Status: SHIPPED | OUTPATIENT
Start: 2024-03-11

## 2024-03-11 RX ORDER — UBIDECARENONE 100 MG
100 CAPSULE ORAL DAILY
Qty: 90 CAPSULE | Refills: 11 | Status: SHIPPED | OUTPATIENT
Start: 2024-03-11 | End: 2024-07-08

## 2024-03-11 RX ORDER — EPINEPHRINE 0.3 MG/.3ML
0.3 INJECTION SUBCUTANEOUS
Qty: 0.3 ML | Refills: 11 | Status: SHIPPED | OUTPATIENT
Start: 2024-03-11

## 2024-03-11 RX ORDER — TRAZODONE HYDROCHLORIDE 50 MG/1
TABLET, FILM COATED ORAL
Qty: 60 TABLET | Refills: 3 | Status: SHIPPED | OUTPATIENT
Start: 2024-03-11

## 2024-03-11 RX ORDER — ALBUTEROL SULFATE 90 UG/1
2 AEROSOL, METERED RESPIRATORY (INHALATION) EVERY 4 HOURS PRN
Qty: 18 G | Refills: 11 | Status: SHIPPED | OUTPATIENT
Start: 2024-03-11

## 2024-03-11 RX ORDER — INSULIN LISPRO 100 [IU]/ML
INJECTION, SOLUTION INTRAVENOUS; SUBCUTANEOUS
Qty: 30 ML | Refills: 0 | Status: SHIPPED | OUTPATIENT
Start: 2024-03-11 | End: 2024-04-08

## 2024-03-11 RX ORDER — PROCHLORPERAZINE 25 MG/1
SUPPOSITORY RECTAL
Qty: 9 EACH | Refills: 0 | OUTPATIENT
Start: 2024-03-11

## 2024-03-11 NOTE — TELEPHONE ENCOUNTER
Requested Prescriptions   Pending Prescriptions Disp Refills    insulin lispro (HUMALOG) 100 UNIT/ML vial 80 mL 0     Sig: USE WITH INSULIN PUMP AS DIRECTED, USES ABOUT 80 UNITS PER DAY       Insulin Protocol Failed - 3/11/2024 12:52 PM        Failed - Has GFR on file in past 12 months and most recent value is normal        Failed - Chart Review Required     Review Chart.    Do not approve if insulin is used in a pump.  Instead, direct refill request to the patient's endocrinologist.  If the patient doesn't have an endocrinologist, then send the refill to the patient's PCP for review            Passed - Medication is active on med list        Passed - Recent (6 mo) or future (90 days) visit within the authorizing provider's specialty     The patient must have completed an in-person or virtual visit within the past 6 months or has a future visit scheduled within the next 90 days with the authorizing provider s specialty.  Urgent care and e-visits do not quality as an office visit for this protocol.          Passed - Medication indicated for associated diagnosis     Medication is associated with one or more of the following diagnoses:   - Type 1 diabetes mellitus  - Type 2 diabetes mellitus  - Diabetic nephropathy; Prophylaxis  - Neuropathy due to diabetes mellitus; Prophylaxis  - Retinopathy due to diabetes mellitus; Prophylaxis  - Diabetes mellitus associated with cystic fibrosis  - Disorder of cardiovascular system; Prophylaxis - Type 1 diabetes mellitus   - Disorder of cardiovascular system; Prophylaxis - Type 2 diabetes mellitus            Passed - Patient is 18 years of age or older       Short Acting Insulin Protocol Passed - 3/11/2024 12:52 PM        Passed - Serum creatinine on file in past 12 months     Recent Labs   Lab Test 10/17/23  0926   CR 1.14*       Ok to refill medication if creatinine is low          Passed - HgbA1C in past 3 or 6 months     If HgbA1C is 8 or greater, it needs to be on file  "within the past 3 months.  If less than 8, must be on file within the past 6 months.     Recent Labs   Lab Test 10/17/23  0926   A1C 6.8*             Passed - Medication is active on med list        Passed - Patient is age 18 or older        Passed - Recent (6 mo) or future (30 days) visit within the authorizing provider's specialty     Patient had office visit in the last 6 months or has a visit in the next 30 days with authorizing provider or within the authorizing provider's specialty.  See \"Patient Info\" tab in inbasket, or \"Choose Columns\" in Meds & Orders section of the refill encounter.              insulin lispro (HUMALOG VIAL) 100 UNIT/ML vial 80 mL 0     Sig: USE WITH INSULIN PUMP AS DIRECTED, USES ABOUT 80 UNITS PER DAY       Insulin Protocol Failed - 3/11/2024 12:52 PM        Failed - Has GFR on file in past 12 months and most recent value is normal        Failed - Chart Review Required     Review Chart.    Do not approve if insulin is used in a pump.  Instead, direct refill request to the patient's endocrinologist.  If the patient doesn't have an endocrinologist, then send the refill to the patient's PCP for review            Passed - Medication is active on med list        Passed - Recent (6 mo) or future (90 days) visit within the authorizing provider's specialty     The patient must have completed an in-person or virtual visit within the past 6 months or has a future visit scheduled within the next 90 days with the authorizing provider s specialty.  Urgent care and e-visits do not quality as an office visit for this protocol.          Passed - Medication indicated for associated diagnosis     Medication is associated with one or more of the following diagnoses:   - Type 1 diabetes mellitus  - Type 2 diabetes mellitus  - Diabetic nephropathy; Prophylaxis  - Neuropathy due to diabetes mellitus; Prophylaxis  - Retinopathy due to diabetes mellitus; Prophylaxis  - Diabetes mellitus associated with cystic " "fibrosis  - Disorder of cardiovascular system; Prophylaxis - Type 1 diabetes mellitus   - Disorder of cardiovascular system; Prophylaxis - Type 2 diabetes mellitus            Passed - Patient is 18 years of age or older       Short Acting Insulin Protocol Passed - 3/11/2024 12:52 PM        Passed - Serum creatinine on file in past 12 months     Recent Labs   Lab Test 10/17/23  0926   CR 1.14*       Ok to refill medication if creatinine is low          Passed - HgbA1C in past 3 or 6 months     If HgbA1C is 8 or greater, it needs to be on file within the past 3 months.  If less than 8, must be on file within the past 6 months.     Recent Labs   Lab Test 10/17/23  0926   A1C 6.8*             Passed - Medication is active on med list        Passed - Patient is age 18 or older        Passed - Recent (6 mo) or future (30 days) visit within the authorizing provider's specialty     Patient had office visit in the last 6 months or has a visit in the next 30 days with authorizing provider or within the authorizing provider's specialty.  See \"Patient Info\" tab in inbasket, or \"Choose Columns\" in Meds & Orders section of the refill encounter.              Continuous Blood Gluc Sensor (DEXCOM G6 SENSOR) MISC 9 each 0     Sig: Apply one sensor to the skin every 10 days       There is no refill protocol information for this order       Semaglutide (1 MG/DOSE) (OZEMPIC) 4 MG/3ML pen 3 mL 0     Sig: Inject 1 mg Subcutaneous every 7 days       GLP-1 Agonists Protocol Failed - 3/11/2024 12:52 PM        Failed - Has GFR on file in past 12 months and most recent value is normal        Passed - HgbA1C in past 3 or 6 months     If HgbA1C is 8 or greater, it needs to be on file within the past 3 months.  If less than 8, must be on file within the past 6 months.     Recent Labs   Lab Test 10/17/23  0926   A1C 6.8*             Passed - Medication is active on med list        Passed - Recent (6 mo) or future (90 days) visit within the " authorizing provider's specialty     The patient must have completed an in-person or virtual visit within the past 6 months or has a future visit scheduled within the next 90 days with the authorizing provider s specialty.  Urgent care and e-visits do not quality as an office visit for this protocol.          Passed - Medication indicated for associated diagnosis     Medication is associated with one or more of the following diagnoses:     Type 2 diabetes mellitus           Passed - Patient is age 18 or older        Passed - No active pregnancy on record        Passed - No positive pregnancy test in past 12 months

## 2024-03-14 ENCOUNTER — MYC MEDICAL ADVICE (OUTPATIENT)
Dept: ENDOCRINOLOGY | Facility: CLINIC | Age: 44
End: 2024-03-14
Payer: COMMERCIAL

## 2024-03-29 NOTE — PROGRESS NOTES
Outcome for 03/29/24 10:28 AM: Data uploaded on Amish Nash LPN   Outcome for 03/29/24 10:28 AM: [employer] message sent  Jolene Nash LPN   Outcome for 04/04/24 11:02 AM: Dexcom and Amish emailed to provider  Kelly Minaya MA

## 2024-04-02 ENCOUNTER — LAB (OUTPATIENT)
Dept: LAB | Facility: CLINIC | Age: 44
End: 2024-04-02
Payer: COMMERCIAL

## 2024-04-02 DIAGNOSIS — Z79.4 TYPE 2 DIABETES MELLITUS WITH DIABETIC NEPHROPATHY, WITH LONG-TERM CURRENT USE OF INSULIN (H): ICD-10-CM

## 2024-04-02 DIAGNOSIS — E11.21 TYPE 2 DIABETES MELLITUS WITH DIABETIC NEPHROPATHY, WITH LONG-TERM CURRENT USE OF INSULIN (H): ICD-10-CM

## 2024-04-02 LAB
CHOLEST SERPL-MCNC: 162 MG/DL
CREAT SERPL-MCNC: 1.19 MG/DL (ref 0.51–0.95)
CREAT UR-MCNC: 103 MG/DL
EGFRCR SERPLBLD CKD-EPI 2021: 57 ML/MIN/1.73M2
FASTING STATUS PATIENT QL REPORTED: YES
HBA1C MFR BLD: 6.4 % (ref 0–5.6)
HDLC SERPL-MCNC: 47 MG/DL
LDLC SERPL CALC-MCNC: 94 MG/DL
MICROALBUMIN UR-MCNC: 107 MG/L
MICROALBUMIN/CREAT UR: 103.88 MG/G CR (ref 0–25)
NONHDLC SERPL-MCNC: 115 MG/DL
TRIGL SERPL-MCNC: 105 MG/DL
TSH SERPL DL<=0.005 MIU/L-ACNC: 1.52 UIU/ML (ref 0.3–4.2)

## 2024-04-02 PROCEDURE — 82570 ASSAY OF URINE CREATININE: CPT

## 2024-04-02 PROCEDURE — 84443 ASSAY THYROID STIM HORMONE: CPT

## 2024-04-02 PROCEDURE — 80061 LIPID PANEL: CPT

## 2024-04-02 PROCEDURE — 36415 COLL VENOUS BLD VENIPUNCTURE: CPT

## 2024-04-02 PROCEDURE — 82565 ASSAY OF CREATININE: CPT

## 2024-04-02 PROCEDURE — 83036 HEMOGLOBIN GLYCOSYLATED A1C: CPT

## 2024-04-02 PROCEDURE — 82043 UR ALBUMIN QUANTITATIVE: CPT

## 2024-04-08 ENCOUNTER — VIRTUAL VISIT (OUTPATIENT)
Dept: ENDOCRINOLOGY | Facility: CLINIC | Age: 44
End: 2024-04-08
Payer: COMMERCIAL

## 2024-04-08 ENCOUNTER — MYC MEDICAL ADVICE (OUTPATIENT)
Dept: ENDOCRINOLOGY | Facility: CLINIC | Age: 44
End: 2024-04-08

## 2024-04-08 VITALS — HEIGHT: 64 IN | BODY MASS INDEX: 26.56 KG/M2 | WEIGHT: 155.6 LBS

## 2024-04-08 DIAGNOSIS — Z79.4 TYPE 2 DIABETES MELLITUS WITH DIABETIC NEPHROPATHY, WITH LONG-TERM CURRENT USE OF INSULIN (H): Primary | ICD-10-CM

## 2024-04-08 DIAGNOSIS — E11.43 DIABETIC GASTROPARESIS (H): ICD-10-CM

## 2024-04-08 DIAGNOSIS — E78.5 HYPERLIPIDEMIA LDL GOAL <100: ICD-10-CM

## 2024-04-08 DIAGNOSIS — Z96.41 INSULIN PUMP IN PLACE: ICD-10-CM

## 2024-04-08 DIAGNOSIS — K31.84 DIABETIC GASTROPARESIS (H): ICD-10-CM

## 2024-04-08 DIAGNOSIS — E11.21 TYPE 2 DIABETES MELLITUS WITH DIABETIC NEPHROPATHY, WITH LONG-TERM CURRENT USE OF INSULIN (H): Primary | ICD-10-CM

## 2024-04-08 PROCEDURE — G2211 COMPLEX E/M VISIT ADD ON: HCPCS | Mod: 95 | Performed by: INTERNAL MEDICINE

## 2024-04-08 PROCEDURE — 95251 CONT GLUC MNTR ANALYSIS I&R: CPT | Performed by: INTERNAL MEDICINE

## 2024-04-08 PROCEDURE — 99214 OFFICE O/P EST MOD 30 MIN: CPT | Mod: 95 | Performed by: INTERNAL MEDICINE

## 2024-04-08 RX ORDER — INSULIN LISPRO 100 [IU]/ML
INJECTION, SOLUTION INTRAVENOUS; SUBCUTANEOUS
Qty: 30 ML | Refills: 2 | Status: SHIPPED | OUTPATIENT
Start: 2024-04-08

## 2024-04-08 RX ORDER — PROCHLORPERAZINE 25 MG/1
SUPPOSITORY RECTAL
Qty: 9 EACH | Refills: 3 | Status: SHIPPED | OUTPATIENT
Start: 2024-04-08

## 2024-04-08 RX ORDER — PROCHLORPERAZINE 25 MG/1
SUPPOSITORY RECTAL
Qty: 1 EACH | Refills: 1 | Status: SHIPPED | OUTPATIENT
Start: 2024-04-08 | End: 2024-09-24

## 2024-04-08 ASSESSMENT — PAIN SCALES - GENERAL: PAINLEVEL: MILD PAIN (3)

## 2024-04-08 NOTE — NURSING NOTE
Is the patient currently in the state of [place]? YES    Visit mode:VIDEO    If the visit is dropped, the patient can be reconnected by: VIDEO VISIT: Text to cell phone:   Telephone Information:   Mobile [phone]       Will anyone else be joining the visit? NO  (If patient encounters technical issues they should call [phone]    How would you like to obtain your AVS? MyChart    Are changes needed to the allergy or medication list? No    Are refills needed on medications prescribed by this physician?     Reason for visit: LANI ADAMS

## 2024-04-08 NOTE — PROGRESS NOTES
"THIS IS A VIDEO VISIT:    Phone call visit/virtual visit encounter:    Name of patient: Marie Vogel    Date of encounter: 4/8/2024    Time of start of video visit: 1:02    Video started: 1:11    Video ended: 1:25    Provider location: working from home/ [facility]    Patient location: patients home.    Mode of transmission: Basha video/ [employer]    Verbal consent: obtained before starting visit. Pt is agreeable.      The patient has been notified of following:      \"This VIDEO visit will be conducted via a call between you and your physician/provider. We have found that certain health care needs can be provided without the need for a physical exam.  This service lets us provide the care you need with a short phone conversation.  If a prescription is necessary we can send it directly to your pharmacy.  If lab work is needed we can place an order for that and you can then stop by our lab to have the test done at a later time.     With new updates with corona virus patient might be billed as clinic visit.     If during the course of the call the physician/provider feels a telephone visit is not appropriate, you will not be charged for this service.\"      Past medical history, social history, family history, allergy and medications were reviewed and updated as appropriate.  Reviewed pertinent labs, notes, imaging studies personally.    Name: Marie Vogel  Seen for f/u of DM.  HPI:  Marie Vogel is a 43 year old female who presents for the evaluation/management of DM.   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    Here for f/u. Previously has seen endo at [facility] ( Dr Aguilera and Dr Zhou and ). 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 Busy at work, also variable schedule.. Concerned about weight gain.   H/o cardiomyopathy. Followed by cardiology.    On OMNIPOD 5+ Dexcom G6  Using humalog in pump.  Using DEXCOM CGM.    She is working at [employer].  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 Marie also has glucagon kit at home for emergency.    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  Trulicity was not covered by insurance and was switched to Ozempic in 2023. On it X 8 weeks. Initially had some s/e but now tolerating ok.  She feels less hungry.     Wt is fluctuating.   Wt Readings from Last 2 Encounters:   04/08/24 70.6 kg (155 lb 9.6 oz)   10/19/23 69.4 kg (153 lb)       1. Type 2 DM:  Orginally diagnosed at the age of: 21 with GDM during her first pregnancy. Diagnosed with DM 2 in 1/2002, diet controlled for 3 years, then started on metformin. In 2010 during her second pregnancy, started on insulin and then insulin pump therapy in 2012.   Current Regimen:   Humalog Insulin pump therapy ( [employer]),   Ozempic 1.0 mg/week.  Jardinace 25 mg/day    BS checks: Using Dexcom.  Average Meter Download: Blood glucose data reviewed personally. See nursing note from this encounter for details.  She is able to feel symptoms of hypoglycemia.      Current Regimen:     In automode: 100% of time    Insulin pump -   Time Rate (U/hr)   0000-  0.75                   Carbohydrate Ratio -    Time Ratio   0000-  10   0600 PM  11     Sensitivity   40   Active Insulin Time   hours   Basal    38%-- 5 units/day   Bolus   63%-- 7 units/day   Total Carbohydrates/day    Total Insulin/day   12 units/day   Average Blood Sugar                  Complications:   Diabetes Complications  Description / Detail    Diabetic Retinopathy  H/o Retinal dystrophy and followed by retina specialist.   CAD / PAD  No   Neuropathy  No   Nephropathy / Microalbuminuria  Yes: microalbuminuria. ON cozaar.   Gastroparesis  No   Hypoglycemia [place]  No     2. Hypertension: Blood Pressure today:   BP Readings from Last 3 Encounters:   10/19/23 108/62   10/18/23 119/84   23 90/70     Takes medications everyday without forgetting a dose.  Denies feeling lightheaded or dizzy.    3. Hyperlipidemia:   Denies muscle aches of pains.       PMH/PSH:  Past Medical History:   Diagnosis Date    Allergic rhinitis due to pollen     Atypical glandular cells on Pap smear 3/1108    Cervical high risk HPV (human papillomavirus) test positive 2018    See problem list    Chronic kidney disease     Depressive disorder 2-3 years ago    Gastroesophageal reflux disease     Mitochondrial disease (H24) 2022    PFO (patent foramen ovale)     Stargardt's disease 2019    Type II or unspecified type diabetes mellitus without mention of complication, not stated as uncontrolled     onset was gestational in      Past Surgical History:   Procedure Laterality Date    ABDOMEN SURGERY      C IUD,MIRENA  8/10/10, 7/28/15    C/SECTION, LOW TRANSVERSE  6/25/10    , Low Transverse    CHOLECYSTECTOMY, LAPOROSCOPIC      Cholecystectomy, Laparoscopic    ESOPHAGOSCOPY, GASTROSCOPY, DUODENOSCOPY (EGD), COMBINED N/A 2016    Procedure: COMBINED ESOPHAGOSCOPY, GASTROSCOPY, DUODENOSCOPY (EGD), BIOPSY SINGLE OR MULTIPLE;  Surgeon: Fadi Hunter MD;  Location: [facility] ESOPHAGEAL MOTILITY STUDY N/A 2016    Procedure: ESOPHAGEAL MOTILITY STUDY;  Surgeon: Fadi Hunter MD;  Location: [facility] REMOVE  TONSILS/ADENOIDS,<11 Y/O      T &A    IR RENAL BIOPSY RIGHT  2/3/2023    ZZC INDUCED ABORTN BY D&C           Family Hx:  Family History   Problem Relation Age of Onset    Cardiovascular Mother         hypertrophic cardiomyopathy    Genitourinary Problems Mother         gallbladder disease    Diabetes Mother         Transplnant induced/     Other - See Comments Mother         heart transplant 2005    Depression Mother     Diabetes Father         type 2    Hypertension Father     Hyperlipidemia Father     Genitourinary Problems Maternal Grandmother         gallbladder disease    Genitourinary Problems Paternal Grandmother         gallbladder disease    Hypertension Paternal Grandfather         Deaceased    Cerebrovascular Disease Paternal Grandfather             Cardiovascular Brother         hypertrophic cardiomyopathy    Diabetes Brother     Diabetes Brother         type 2    Diabetes Other         Type 2    Diabetes Daughter     Genetic Disorder Daughter         Mitochondrial disorder    Depression Daughter     Anxiety Disorder Daughter     Genetic Disorder Daughter         Mitochondrial disorder    Obesity Daughter     Glaucoma No family hx of     Macular Degeneration No family hx of      Thyroid disease: No         DM2: Yes - father and mother         Autoimmune: DM1, SLE, RA, Vitiligo No    Social Hx:  Social History     Socioeconomic History    Marital status:      Spouse name: Not on file    Number of children: 2    Years of education: 14    Highest education level: Associate degree: academic program   Occupational History    Occupation: nurse    Occupation: RN   Tobacco Use    Smoking status: Former     Packs/day: 0.00     Years: 0.00     Additional pack years: 0.00     Total pack years: 0.00     Types: Cigarettes     Quit date: 10/24/2005     Years since quittin.4    Smokeless tobacco: Never    Tobacco comments:     States only smokes when drinks maybe  2x/year   Substance and Sexual Activity    Alcohol use: Not Currently    Drug use: Never    Sexual activity: Not Currently     Partners: Male     Birth control/protection: I.U.D.     Comment: Mirena IUD 7/28/15   Other Topics Concern    Parent/sibling w/ CABG, MI or angioplasty before 65F 55M? No   Social History Narrative    Caffeine intake/servings daily - 6-7    Calcium intake/servings daily - 2-3 yogurt, milk, cheese    Exercise 1 times weekly - describe aerobics    Sunscreen used - Yes    Seatbelts used - Yes    Guns stored in the home - No    Self Breast Exam - Yes    Pap test up to date -  Yes    Eye exam up to date -  Yes    Dental exam up to date -  Yes    DEXA scan up to date -  Not Applicable    Flex Sig/Colonoscopy up to date -  Not Applicable    Mammography up to date -  Not Applicable    Immunizations reviewed and up to date - Yes    Abuse: Current or Past (Physical, Sexual or Emotional) - No    Do you feel safe in your environment - Yes    Do you cope well with stress - Yes    Do you suffer from insomnia - Yes     Last updated by: Tatianna Lacey  5/9/2005     Social Determinants of Health     Financial Resource Strain: Medium Risk (1/26/2023)    Overall Financial Resource Strain (CARDIA)     Difficulty of Paying Living Expenses: Somewhat hard   Food Insecurity: Food Insecurity Present (1/26/2023)    Hunger Vital Sign     Worried About Running Out of Food in the Last Year: Sometimes true     Ran Out of Food in the Last Year: Never true   Transportation Needs: No Transportation Needs (1/26/2023)    PRAPARE - Transportation     Lack of Transportation (Medical): No     Lack of Transportation (Non-Medical): No   Physical Activity: Insufficiently Active (1/26/2023)    Exercise Vital Sign     Days of Exercise per Week: 4 days     Minutes of Exercise per Session: 10 min   Stress: No Stress Concern Present (1/26/2023)    [employer] [place] [employer] - Occupational Stress Questionnaire     Feeling  "of Stress : Only a little   Social Connections: Socially Isolated (1/26/2023)    Social Connection and Isolation Panel [NHANES]     Frequency of Communication with Friends and Family: Three times a week     Frequency of Social Gatherings with Friends and Family: Never     Attends Congregation Services: Never     Active Member of Clubs or Organizations: No     Attends Club or Organization Meetings: Not on file     Marital Status:    Interpersonal Safety: Not on file   Housing Stability: High Risk (1/26/2023)    Housing Stability Vital Sign     Unable to Pay for Housing in the Last Year: Yes     Number of Places Lived in the Last Year: 1     Unstable Housing in the Last Year: No          MEDICATIONS:  has a current medication list which includes the following prescription(s): albuterol, reason aspirin not prescribed (intentional), atenolol, blood glucose, blood glucose monitoring, cetirizine, clotrimazole, co-enzyme q-10, dexcom g6 sensor, dexcom g6 transmitter, empagliflozin, epinephrine, fluticasone, glucagon emergency, ibuprofen, insulin glargine, insulin lispro, humalog, insulin pump, insulin syringe-needle u-100, blood glucose monitoring, levonorgestrel, losartan, magnesium oxide, montelukast, multiple vitamins-minerals, omeprazole, rosuvastatin, semaglutide (1 mg/dose), sertraline, sumatriptan, trazodone, urine glucose-ketones test, b-100, trulicity, guaifenesin-codeine, omnipod 5 g6 pods (gen 5), fish oil-omega-3 fatty acids, and ondansetron.    ROS     ROS: 10 point ROS neg other than the symptoms noted above in the HPI.    Physical Exam   VS: Ht 1.626 m (5' 4\")   Wt 70.6 kg (155 lb 9.6 oz)   BMI 26.71 kg/m    GENERAL: healthy, alert and no distress  EYES: Eyes grossly normal to inspection, conjunctivae and sclerae normal  ENT: no nose swelling, nasal discharge.  Thyroid: no apparent thyroid nodules  RESP: no audible wheeze, cough, or visible cyanosis.  No visible retractions or increased work of " breathing.  Able to speak fully in complete sentences.  ABDO: not evaluated.  EXTREMITIES: no hand tremors.  NEURO: Cranial nerves grossly intact, mentation intact and speech normal  SKIN: No apparent skin lesions, rash or edema seen   PSYCH: mentation appears normal, affect normal/bright, judgement and insight intact, normal speech and appearance well-groomed    LABS:  A1c:  Lab Results   Component Value Date    A1C 6.4 04/02/2024    A1C 6.8 10/17/2023    A1C 6.4 06/09/2023    A1C 6.5 12/07/2022    A1C 6.5 10/03/2022    A1C 6.5 07/05/2021    A1C 7.6 01/14/2021    A1C 6.7 07/14/2020    A1C 7.0 01/14/2020    A1C 7.5 09/24/2019     Basic Metabolic Panel:  Creatinine   Date Value Ref Range Status   04/02/2024 1.19 (H) 0.51 - 0.95 mg/dL Final   07/05/2021 1.20 (H) 0.52 - 1.04 mg/dL Final     Urinary microalbumin:  Lab Results   Component Value Date    UMALCR 103.88 04/02/2024    UMALCR 591.60 02/10/2022    UMALCR 402.04 07/05/2021         LFTS/Cholesterol Panel:  Recent Labs   Lab Test 04/02/24  1015 01/27/23  1044   CHOL 162 136   HDL 47* 39*   LDL 94 75   TRIG 105 110     Thyroid Function:  TSH   Date Value Ref Range Status   04/02/2024 1.52 0.30 - 4.20 uIU/mL Final   12/07/2022 1.53 0.40 - 4.00 mU/L Final   09/24/2019 1.45 0.40 - 4.00 mU/L Final       All pertinent notes, labs, and images personally reviewed by me.     Glucometer/ insulin pump (if applicable)/ CGM data (if applicable) downloaded, Personally reviewed and interpreted.  All Blood sugar data reviewed personally and discussed with pt.  See nursing note from today's clinic visit for details of BG/CGM log.      A/P  Ms.Erica WILLIAM Vogel is a 43  year old here for the evaluation/management of diabetes:    1. DM2 - Under Fair control.  A1c 6.4%.  Diabetes complicated by microalbuminuria.  Using OMNIPOD 5 + Dexcom.  In automode about 100% of time.  H/o hypertropic cardiomyopathy, followed by cardiology.  In general BG appear in range.  Noted some episodes  of hyperglycemia after correcting low BG.  Plan:  Discussed diagnosis, pathophysiology, management and treatment options of condition with pt.    Continue current pump settings.  Continue Jardiance  25 mg/day.  Continue Ozempic 1.0 mg/week.  Try to be in auto mode as much as possible.  DO NOT overcorrect high BG.  Continue to use OMNIPOD 5+ Dexcom.   Repeat labs and follow up in 6 months.  Please make a lab appointment for blood work and follow up clinic appointment in 1 week after that to discuss results.     2. Hypertension - + microalbuminuria. On losartan. Blood pressure medications include cozaar 75 mg qd.    3. Hyperlipidemia - Under fair control. LDL 75.  Had myalgias on lipitor and stopped it.  On Crestor 5 mg/day.    4. Microalbuminuria:  Recommend strict BG control.  On Cozaar 50 mg/day.    DM Prevention:    Opthalmology- recommend annually.  Dental-Yes.  ASA- No  Smoking- No.    Medications       HMG CoA Reductase Inhibitors     rosuvastatin (CRESTOR) 5 MG tablet       Salicylates     ASPIRIN NOT PRESCRIBED (INTENTIONAL)          Most Recent Immunizations   Administered Date(s) Administered    COVID-19,PF,Pfizer (12+ Yrs) 12/28/2021    Influenza (IIV3) PF 09/26/2018    Influenza (intradermal) 10/01/2012    Influenza Vaccine IM > 6 months Valent IIV4 (Alfuria,Fluzone) 01/14/2021    MMR 09/17/2007    Pneumococcal 23 valent 01/22/2003    TD (ADULT, 7+) 01/01/2002    TDAP Vaccine (Adacel) 04/06/2012     Recommend checking blood sugars before meals and at bedtime.    If Blood glucose are low more often-> 2-3 times/week- give us a call.  The patient is advised to Make better food choices: reduce carbs, Reduce portion size, weight loss and exercise 3-4 times a week.  Discussed hypoglycemia signs and symptoms as well as management in detail.    All questions were answered.  The patient indicates understanding of the above issues and agrees with the plan set forth.   More than 50% of face to face time spent with  Ms. Dino Vogel on counseling / coordinating her care.      Follow-up:  follow up in 3 months.    Zita Fuentes MD  Endocrinology   [facility]    CC: Alan Paredes    Addendum to above note and clinic visit:    Labs reviewed.    See result note/telephone encounter.

## 2024-04-08 NOTE — LETTER
"    4/8/2024         RE: Marie Vogel  [address] [place] [address]        Dear Colleague,    Thank you for referring your patient, Marie Vogel, to the [facility]. Please see a copy of my visit note below.    Outcome for 03/29/24 10:28 AM: Data uploaded on [employer]  Jolene Nash LPN   Outcome for 03/29/24 10:28 AM: [employer] message sent  Jolene Nash LPN   Outcome for 04/04/24 11:02 AM: Dexcom and Glook[employer] emailed to provider  Kelly Minaya MA          THIS IS A VIDEO VISIT:    Phone call visit/virtual visit encounter:    Name of patient: Marie Vogel    Date of encounter: 4/8/2024    Time of start of video visit: 1:02    Video started: 1:11    Video ended: 1:25    Provider location: working from home/ [facility]    Patient location: patients home.    Mode of transmission: [employer] video/ [employer]    Verbal consent: obtained before starting visit. Pt is agreeable.      The patient has been notified of following:      \"This VIDEO visit will be conducted via a call between you and your physician/provider. We have found that certain health care needs can be provided without the need for a physical exam.  This service lets us provide the care you need with a short phone conversation.  If a prescription is necessary we can send it directly to your pharmacy.  If lab work is needed we can place an order for that and you can then stop by our lab to have the test done at a later time.     With new updates with corona virus patient might be billed as clinic visit.     If during the course of the call the physician/provider feels a telephone visit is not appropriate, you will not be charged for this service.\"      Past medical history, social history, family history, allergy and medications were reviewed and updated as appropriate.  Reviewed pertinent labs, notes, imaging studies personally.    Name: Marie Vogel  Seen for f/u of DM.  HPI:  Marie" WILLIAM Vogel is a 43 year old female who presents for the evaluation/management of DM.   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    Here for f/u. Previously has seen endo at [place] ( Dr Aguilera and Dr Zhou and ). 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 Busy at work, also variable schedule.. Concerned about weight gain.   H/o cardiomyopathy. Followed by cardiology.    On OMNIPOD 5+ Dexcom G6  Using humalog in pump.  Using DEXCOM CGM.    She is working at [employer].  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 Marie also has glucagon kit at home for emergency.    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  Trulicity was not covered by insurance and was switched to Ozempic in 2023. On it X 8 weeks. Initially had some s/e but now tolerating ok.  She feels less hungry.     Wt is fluctuating.   Wt Readings from Last 2 Encounters:   04/08/24 70.6 kg (155 lb 9.6 oz)   10/19/23 69.4 kg (153 lb)       1. Type 2 DM:  Orginally diagnosed at the age of: 21 with GDM during her first pregnancy. Diagnosed with DM 2 in 1/2002, diet controlled for 3 years, then started on metformin. In 2010 during her second pregnancy, started on insulin and then insulin pump therapy in 2012.   Current Regimen:   Humalog Insulin pump  therapy ( [employer]),   Ozempic 1.0 mg/week.  Jardinace 25 mg/day    BS checks: Using Dexcom.  Average Meter Download: Blood glucose data reviewed personally. See nursing note from this encounter for details.  She is able to feel symptoms of hypoglycemia.      Current Regimen:     In automode: 100% of time    Insulin pump -   Time Rate (U/hr)   0000-  0.75                   Carbohydrate Ratio -    Time Ratio   0000-  10   0600 PM  11     Sensitivity   40   Active Insulin Time   hours   Basal   38%-- 5 units/day   Bolus   63%-- 7 units/day   Total Carbohydrates/day    Total Insulin/day   12 units/day   Average Blood Sugar                  Complications:   Diabetes Complications  Description / Detail    Diabetic Retinopathy  H/o Retinal dystrophy and followed by retina specialist.   CAD / PAD  No   Neuropathy  No   Nephropathy / Microalbuminuria  Yes: microalbuminuria. ON cozaar.   Gastroparesis  No   Hypoglycemia [place]  No     2. Hypertension: Blood Pressure today:   BP Readings from Last 3 Encounters:   10/19/23 108/62   10/18/23 119/84   08/31/23 90/70     Takes medications everyday without forgetting a dose.  Denies feeling lightheaded or dizzy.    3. Hyperlipidemia:   Denies muscle aches of pains.       PMH/PSH:  Past Medical History:   Diagnosis Date     Allergic rhinitis due to pollen      Atypical glandular cells on Pap smear 3/1108     Cervical high risk HPV (human papillomavirus) test positive 11/27/2018    See problem list     Chronic kidney disease 2023     Depressive disorder 2-3 years ago     Gastroesophageal reflux disease      Mitochondrial disease (H24) 09/2022     PFO (patent foramen ovale)      Stargardt's disease 11/29/2019     Type II or unspecified type diabetes mellitus without mention of complication, not stated as uncontrolled 2002    onset was gestational in 2001     Past Surgical History:   Procedure Laterality Date     ABDOMEN SURGERY       C IUD,MIRENA  8/10/10,  7/28/15     C/SECTION, LOW TRANSVERSE  6/25/10    , Low Transverse     CHOLECYSTECTOMY, LAPOROSCOPIC      Cholecystectomy, Laparoscopic     ESOPHAGOSCOPY, GASTROSCOPY, DUODENOSCOPY (EGD), COMBINED N/A 2016    Procedure: COMBINED ESOPHAGOSCOPY, GASTROSCOPY, DUODENOSCOPY (EGD), BIOPSY SINGLE OR MULTIPLE;  Surgeon: Fadi Hunter MD;  Location: [facility]     HC ESOPHAGEAL MOTILITY STUDY N/A 2016    Procedure: ESOPHAGEAL MOTILITY STUDY;  Surgeon: Fadi Hunter MD;  Location: [facility]     HC REMOVE TONSILS/ADENOIDS,<11 Y/O      T &A     IR RENAL BIOPSY RIGHT  2/3/2023     ZZC INDUCED ABORTN BY D&C           Family Hx:  Family History   Problem Relation Age of Onset     Cardiovascular Mother         hypertrophic cardiomyopathy     Genitourinary Problems Mother         gallbladder disease     Diabetes Mother         Transplnant induced/      Other - See Comments Mother         heart transplant 2005     Depression Mother      Diabetes Father         type 2     Hypertension Father      Hyperlipidemia Father      Genitourinary Problems Maternal Grandmother         gallbladder disease     Genitourinary Problems Paternal Grandmother         gallbladder disease     Hypertension Paternal Grandfather         Deaceased     Cerebrovascular Disease Paternal Grandfather              Cardiovascular Brother         hypertrophic cardiomyopathy     Diabetes Brother      Diabetes Brother         type 2     Diabetes Other         Type 2     Diabetes Daughter      Genetic Disorder Daughter         Mitochondrial disorder     Depression Daughter      Anxiety Disorder Daughter      Genetic Disorder Daughter         Mitochondrial disorder     Obesity Daughter      Glaucoma No family hx of      Macular Degeneration No family hx of      Thyroid disease: No         DM2: Yes - father and mother         Autoimmune: DM1, SLE, RA, Vitiligo No    Social Hx:  Social History      Socioeconomic History     Marital status:      Spouse name: Not on file     Number of children: 2     Years of education: 14     Highest education level: Associate degree: academic program   Occupational History     Occupation: nurse     Occupation: RN   Tobacco Use     Smoking status: Former     Packs/day: 0.00     Years: 0.00     Additional pack years: 0.00     Total pack years: 0.00     Types: Cigarettes     Quit date: 10/24/2005     Years since quittin.4     Smokeless tobacco: Never     Tobacco comments:     States only smokes when drinks maybe 2x/year   Substance and Sexual Activity     Alcohol use: Not Currently     Drug use: Never     Sexual activity: Not Currently     Partners: Male     Birth control/protection: I.U.D.     Comment: Mirena IUD 7/28/15   Other Topics Concern     Parent/sibling w/ CABG, MI or angioplasty before 65F 55M? No   Social History Narrative    Caffeine intake/servings daily - 6-7    Calcium intake/servings daily - 2-3 yogurt, milk, cheese    Exercise 1 times weekly - describe aerobics    Sunscreen used - Yes    Seatbelts used - Yes    Guns stored in the home - No    Self Breast Exam - Yes    Pap test up to date -  Yes    Eye exam up to date -  Yes    Dental exam up to date -  Yes    DEXA scan up to date -  Not Applicable    Flex Sig/Colonoscopy up to date -  Not Applicable    Mammography up to date -  Not Applicable    Immunizations reviewed and up to date - Yes    Abuse: Current or Past (Physical, Sexual or Emotional) - No    Do you feel safe in your environment - Yes    Do you cope well with stress - Yes    Do you suffer from insomnia - Yes     Last updated by: Tatianna Lacey  2005     Social Determinants of Health     Financial Resource Strain: Medium Risk (2023)    Overall Financial Resource Strain (CARDIA)      Difficulty of Paying Living Expenses: Somewhat hard   Food Insecurity: Food Insecurity Present (2023)    Hunger Vital Sign      Worried About  Running Out of Food in the Last Year: Sometimes true      Ran Out of Food in the Last Year: Never true   Transportation Needs: No Transportation Needs (1/26/2023)    PRAPARE - Transportation      Lack of Transportation (Medical): No      Lack of Transportation (Non-Medical): No   Physical Activity: Insufficiently Active (1/26/2023)    Exercise Vital Sign      Days of Exercise per Week: 4 days      Minutes of Exercise per Session: 10 min   Stress: No Stress Concern Present (1/26/2023)    [employer] [place] [employer] - Occupational Stress Questionnaire      Feeling of Stress : Only a little   Social Connections: Socially Isolated (1/26/2023)    Social Connection and Isolation Panel [NHANES]      Frequency of Communication with Friends and Family: Three times a week      Frequency of Social Gatherings with Friends and Family: Never      Attends Restoration Services: Never      Active Member of Clubs or Organizations: No      Attends Club or Organization Meetings: Not on file      Marital Status:    Interpersonal Safety: Not on file   Housing Stability: High Risk (1/26/2023)    Housing Stability Vital Sign      Unable to Pay for Housing in the Last Year: Yes      Number of Places Lived in the Last Year: 1      Unstable Housing in the Last Year: No          MEDICATIONS:  has a current medication list which includes the following prescription(s): albuterol, reason aspirin not prescribed (intentional), atenolol, blood glucose, blood glucose monitoring, cetirizine, clotrimazole, co-enzyme q-10, dexcom g6 sensor, dexcom g6 transmitter, empagliflozin, epinephrine, fluticasone, glucagon emergency, ibuprofen, insulin glargine, insulin lispro, humalog, insulin pump, insulin syringe-needle u-100, blood glucose monitoring, levonorgestrel, losartan, magnesium oxide, montelukast, multiple vitamins-minerals, omeprazole, rosuvastatin, semaglutide (1 mg/dose), sertraline, sumatriptan, trazodone, urine glucose-ketones  "test, b-100, trulicity, guaifenesin-codeine, omnipod 5 g6 pods (gen 5), fish oil-omega-3 fatty acids, and ondansetron.    ROS     ROS: 10 point ROS neg other than the symptoms noted above in the HPI.    Physical Exam   VS: Ht 1.626 m (5' 4\")   Wt 70.6 kg (155 lb 9.6 oz)   BMI 26.71 kg/m    GENERAL: healthy, alert and no distress  EYES: Eyes grossly normal to inspection, conjunctivae and sclerae normal  ENT: no nose swelling, nasal discharge.  Thyroid: no apparent thyroid nodules  RESP: no audible wheeze, cough, or visible cyanosis.  No visible retractions or increased work of breathing.  Able to speak fully in complete sentences.  ABDO: not evaluated.  EXTREMITIES: no hand tremors.  NEURO: Cranial nerves grossly intact, mentation intact and speech normal  SKIN: No apparent skin lesions, rash or edema seen   PSYCH: mentation appears normal, affect normal/bright, judgement and insight intact, normal speech and appearance well-groomed    LABS:  A1c:  Lab Results   Component Value Date    A1C 6.4 04/02/2024    A1C 6.8 10/17/2023    A1C 6.4 06/09/2023    A1C 6.5 12/07/2022    A1C 6.5 10/03/2022    A1C 6.5 07/05/2021    A1C 7.6 01/14/2021    A1C 6.7 07/14/2020    A1C 7.0 01/14/2020    A1C 7.5 09/24/2019     Basic Metabolic Panel:  Creatinine   Date Value Ref Range Status   04/02/2024 1.19 (H) 0.51 - 0.95 mg/dL Final   07/05/2021 1.20 (H) 0.52 - 1.04 mg/dL Final     Urinary microalbumin:  Lab Results   Component Value Date    UMALCR 103.88 04/02/2024    UMALCR 591.60 02/10/2022    UMALCR 402.04 07/05/2021         LFTS/Cholesterol Panel:  Recent Labs   Lab Test 04/02/24  1015 01/27/23  1044   CHOL 162 136   HDL 47* 39*   LDL 94 75   TRIG 105 110     Thyroid Function:  TSH   Date Value Ref Range Status   04/02/2024 1.52 0.30 - 4.20 uIU/mL Final   12/07/2022 1.53 0.40 - 4.00 mU/L Final   09/24/2019 1.45 0.40 - 4.00 mU/L Final       All pertinent notes, labs, and images personally reviewed by me.     Glucometer/ insulin pump " (if applicable)/ CGM data (if applicable) downloaded, Personally reviewed and interpreted.  All Blood sugar data reviewed personally and discussed with pt.  See nursing note from today's clinic visit for details of BG/CGM log.      A/P  Ms.Erica WILLIAM Vogel is a 43  year old here for the evaluation/management of diabetes:    1. DM2 - Under Fair control.  A1c 6.4%.  Diabetes complicated by microalbuminuria.  Using OMNIPOD 5 + Dexcom.  In automode about 100% of time.  H/o hypertropic cardiomyopathy, followed by cardiology.  In general BG appear in range.  Noted some episodes of hyperglycemia after correcting low BG.  Plan:  Discussed diagnosis, pathophysiology, management and treatment options of condition with pt.    Continue current pump settings.  Continue Jardiance  25 mg/day.  Continue Ozempic 1.0 mg/week.  Try to be in auto mode as much as possible.  DO NOT overcorrect high BG.  Continue to use OMNIPOD 5+ Dexcom.   Repeat labs and follow up in 6 months.  Please make a lab appointment for blood work and follow up clinic appointment in 1 week after that to discuss results.     2. Hypertension - + microalbuminuria. On losartan. Blood pressure medications include cozaar 75 mg qd.    3. Hyperlipidemia - Under fair control. LDL 75.  Had myalgias on lipitor and stopped it.  On Crestor 5 mg/day.    4. Microalbuminuria:  Recommend strict BG control.  On Cozaar 50 mg/day.    DM Prevention:    Opthalmology- recommend annually.  Dental-Yes.  ASA- No  Smoking- No.    Medications       HMG CoA Reductase Inhibitors     rosuvastatin (CRESTOR) 5 MG tablet       Salicylates     ASPIRIN NOT PRESCRIBED (INTENTIONAL)          Most Recent Immunizations   Administered Date(s) Administered     COVID-19,PF,Pfizer (12+ Yrs) 12/28/2021     Influenza (IIV3) PF 09/26/2018     Influenza (intradermal) 10/01/2012     Influenza Vaccine IM > 6 months Valent IIV4 (Alfuria,Fluzone) 01/14/2021     MMR 09/17/2007     Pneumococcal 23 valent  01/22/2003     TD (ADULT, 7+) 01/01/2002     TDAP Vaccine (Adacel) 04/06/2012     Recommend checking blood sugars before meals and at bedtime.    If Blood glucose are low more often-> 2-3 times/week- give us a call.  The patient is advised to Make better food choices: reduce carbs, Reduce portion size, weight loss and exercise 3-4 times a week.  Discussed hypoglycemia signs and symptoms as well as management in detail.    All questions were answered.  The patient indicates understanding of the above issues and agrees with the plan set forth.   More than 50% of face to face time spent with Ms. Dino Vogel on counseling / coordinating her care.      Follow-up:  follow up in 3 months.    Zita Fuentes MD  Endocrinology   [facility]    CC: Alan Paredes    Addendum to above note and clinic visit:    Labs reviewed.    See result note/telephone encounter.                       Again, thank you for allowing me to participate in the care of your patient.        Sincerely,        Zita Fuentes MD

## 2024-04-08 NOTE — PATIENT INSTRUCTIONS
[facility]  Dr Fuentes, Endocrinology Department    [facility]   [address] [facility]  [place] [address]  Appointment [phone]  Fax: [phone]  [place]: Monday - Thursday      To provide the best diabetic care, please bring your blood glucose meter to each and every visit with your Endocrinologist.  Your blood glucose meter/insulin pump will be downloaded at every appointment.    Please arrive 15 minutes before your scheduled appointment.  This will allow for your blood glucose meter/insulin pump to be downloaded.  If you are wearing DEXCOM please bring  or sharing code so that it can be downloaded.  If you are using freestyle casey personal sensors please bring the reader.  If you are using TANDEM insulin pump please have your username and password to get info from [employer] website.    Continue current pump settings.  Continue Jardiance  25 mg/day.  Continue Ozempic 1.0 mg/week.  Try to be in auto mode as much as possible.  DO NOT overcorrect high BG.  Continue to use OMNIPOD 5+ Dexcom.   Fasting labs and follow up in 6 months.  Please make a lab appointment for blood work and follow up clinic appointment in 1 week after that to discuss results. ( In person)    Recommend checking blood sugars before meals and at bedtime.    If Blood glucose are low more often-> 2-3 times/week- give us a call.  Make better food choices: reduce carbs, Reduce portion size, weight loss and exercise 3-4 times a week.    What is hypoglycemia:  Hypoglycemia is when blood sugar levels become too low - below 70 m/dl.      What causes hypoglycemia?  - using too much insulin  -taking too many diabetes pills  -not eating enough, or skipping meals or snacks  -not eating enough carbohydrate with meals  -changing your exercise routine  -drinking alcohol in excess    It is also possible to have hypoglycemia even when you are carefully managing your blood sugar levels.    What  does it feel like when blood sugars get too low?  You may feel:  - anxious  -confused  -dizzy  -hungry  -light-headed  -nervous  -shaky  -sleepy  -sweaty    You may have  -blurred or cloudy vision  -heart palpitations (heart skips a beat or races)  -tingling or numbness around the mouth and tongue  -tremors    What to do if you have symptoms of hypoglycmemia:  If you think your blood sugar is too low, check it with a glucose meter.  If its below 70 mg/dl, consume one of the following:  Fruit juice (1/2 cup)  Glucose tablets (15 grams)  Hard candy (5 to 7 pieces)  Honey or sugar (2 teaspoons)  Milk (1/2 cup)  Soft drink (non-diet, 1/2 cup)    Wait 15 minutes and check your blood glucose again.  IF it is still below 70 mg/dl, have another food item listed above. Wait another 15 minutes and repeat the blood glucose test.  Have a small meal or snack that contains some carbohydrate after your blood glucose rises above 70 mg/dl.    If you are at risk of hypoglycemia, always carry with you glucose tablets or one of the foods listed above.      To prevent Hypoglycemia:  Avoid situations that may cause hypoglycemia  Before making any change to your diet or exercise routine, discuss them with your healthcare provider  Keep a record of your blood glucose levels.  Include the time of day, diabetes medications, when you had your last meal or snack, and what you were doing at the time (e.g. Watching TV, gardening, jogging, etc).    Talk to your healthcare provider if your blood glucose levels are often low        Patient guide on hypoglycemia    http://www.hormone.org/Resources/upload/patient-guide-diagnosis-and-management-hypoglycemia-[email or web address].pdf

## 2024-04-15 ENCOUNTER — TELEPHONE (OUTPATIENT)
Dept: ENDOCRINOLOGY | Facility: CLINIC | Age: 44
End: 2024-04-15
Payer: COMMERCIAL

## 2024-04-15 NOTE — TELEPHONE ENCOUNTER
Prior Authorization Not Needed per Insurance    Medication: JARDIANCE 25 MG PO TABS  Insurance Company:  [employer]  Expected CoPay: $    Pharmacy Filling the Rx:

## 2024-06-25 ENCOUNTER — TELEPHONE (OUTPATIENT)
Dept: PEDIATRICS | Facility: CLINIC | Age: 44
End: 2024-06-25
Payer: COMMERCIAL

## 2024-06-25 NOTE — TELEPHONE ENCOUNTER
Need more detail to complete forms--need detail regarding episode of loss of consciousness and when it occurred

## 2024-06-25 NOTE — TELEPHONE ENCOUNTER
Called patient. Patient states that she has never had episodes of unconsciousness, but needs the form completed for renewing license.    Patient asked that form be faxed ASAP when completed.    Bibi Obregon

## 2024-06-26 NOTE — TELEPHONE ENCOUNTER
Spoke with patient to clarify whether or not she has the right form, since it seems that she would need the diabetes form rather than a loss of consciousness. Patient said that she would double check the form, and send in the correct form after checking to us for completion.    Bibi Obregon on 6/26/2024 at 9:11 AM

## 2024-07-07 NOTE — PROGRESS NOTES
"                   OUTPATIENT METABOLIC FOLLOW UP VISIT          Date: 2024      Patient:  Marie Vogel   :   1980  MRN:     [id number]      Marie Vogel  [address] [place] [address]    Dear Dr. Alan Paredes and Marie Vogel,    Thank you for sending Marie Vogel to the [facility] Monday \"Metabolic clinic\" for consultation and treatment of:    1. Mitochondrial DNA mutation    2. Secondary hyperparathyroidism (H24)    3. Moderate episode of recurrent major depressive disorder (H)    4. Hypertrophic obstructive cardiomyopathy (H)    5. Chronic kidney disease, stage 3a (H)        Genotype: m.3243A>G (Heteroplasmy: 23%)      PAST MEDICAL HISTORY:    From the oral history, and medical records that are available, these items are noted:    Patient Active Problem List   Diagnosis    Allergic rhinitis    HYPERLIPIDEMIA LDL GOAL <100    Family history of other cardiovascular diseases    Microalbuminuria    Insulin pump in place    Vitamin D deficiency    Nut allergy    Type 2 diabetes mellitus with diabetic nephropathy    Diabetic gastroparesis (H)    Hypertrophic obstructive cardiomyopathy (H)    PFO (patent foramen ovale)    Other migraine without status migrainosus, not intractable    Scars of posterior pole of chorioretina, bilateral    Cervical high risk HPV (human papillomavirus) test positive    Mirena IUD inserted 19: Due for removal 2024    Depression    Other insomnia    Moderate episode of recurrent major depressive disorder (H)    Mitochondrial DNA mutation    Chronic kidney disease, stage 3a (H)    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 History of neuro psychotic reaction to reglan in the past  Seizures: No history of seizures  Peripheral neuropathy: " No history of any signs of peripheral neuropathy  Ataxia/Gait disturbance: No history of any gait abnormality     Ophthalmology Marie has been diagnosed with optic atrophy and peripheral vision defects. She is currently being followed by ophthalmology. She reports that she has not been seen by the provider recently, but does not report any worsening of vision.   ENT Hearing loss: History of b/l sensorineural hearing loss. Currently on b/l hearing aids.No history of recurrent vertigo.    Endocrine History of diabetes mellitus on metformin. After detection of MT-TL1 variant, metformin was discontinued and she was started on insulin.   She is followed by endocrinology for secondary hyperparathyroidism.       Renal Following the detection of proteinuria, she was seen by nephrology.  A renal biopsy showed global sclerosis and segmental sclerosis. FSGS was suspected and she was started on SGLT2 inhibitors.    Musculoskeletal No history of skeletal myopathy   Cardiac There is a significant family history of cardiomyopathy in Marie's mother's side. However inheritance in two individuals do not go along with mitochondrial inheritance (maternal great grandfather to grandmother and maternal uncle to nephew). She is seen by Dr. Gabino Becerra at [facility] for the management of her hypertrophic cardiomyopathy. She had genetic testing for HCM through [facility] that came back negative.   She reports a recent episode of SVT for which she was seen in the ER in April 2024.   GI Marie reports KENNETH and symptoms s/o dysmotility. However, imaging was not quite diagnostic of dysmotility.       Medications:  Current Outpatient Medications   Medication Sig Dispense Refill    albuterol (PROAIR HFA/PROVENTIL HFA/VENTOLIN HFA) 108 (90 Base) MCG/ACT inhaler Inhale 2 puffs into the lungs every 4 hours as needed for shortness of breath or wheezing 18 g 11    ASPIRIN NOT PRESCRIBED (INTENTIONAL) Please choose reason not prescribed from choices below.       atenolol (TENORMIN) 25 MG tablet Take 0.5 tablets (12.5 mg) by mouth daily 90 tablet 3    blood glucose (PEPE CONTOUR NEXT) test strip Check 4 times/day 300 each 0    blood glucose monitoring (NO BRAND SPECIFIED) meter device kit Use to test blood sugar 6 times daily and as needed. 1 kit 0    cetirizine (ZYRTEC) 10 MG tablet Take 10 mg by mouth 2 times daily  90 tablet 3    clotrimazole (LOTRIMIN) 1 % external cream Apply topically 2 times daily 60 g 3    co-enzyme Q-10 100 MG CAPS capsule Take 1 capsule (100 mg) by mouth daily 90 capsule 11    Continuous Blood Gluc Sensor (DEXCOM G6 SENSOR) MISC Apply one sensor to the skin every 10 days 9 each 3    Continuous Blood Gluc Transmit (DEXCOM G6 TRANSMITTER) MISC Change every 90 days.  (Dexcom G6 Transmitter, #[id number]) 1 each 1    empagliflozin (JARDIANCE) 25 MG TABS tablet Take 1 tablet (25 mg) by mouth daily 90 tablet 2    EPINEPHrine (ANY BX GENERIC EQUIV) 0.3 MG/0.3ML injection 2-pack Inject 0.3 mLs (0.3 mg) into the muscle once as needed for anaphylaxis 0.3 mL 11    fluticasone (FLONASE) 50 MCG/ACT spray Spray 1-2 sprays into both nostrils daily 1 Bottle 0    Glucagon, rDNA, (GLUCAGON EMERGENCY) 1 MG KIT For Intramuscular use for low Blood glucose episode. 1 kit 0    guaiFENesin-codeine (ROBITUSSIN AC) 100-10 MG/5ML solution Take 5-10 mLs by mouth nightly as needed for cough (Patient not taking: Reported on 4/8/2024) 180 mL 0    ibuprofen (ADVIL/MOTRIN) 200 MG tablet Take 200 mg by mouth every 4 hours as needed       Insulin Disposable Pump (OMNIPOD 5 G6 POD, GEN 5,) MISC Change every 3 days. (Patient not taking: Reported on 4/8/2024) 30 each 11    insulin glargine (LANTUS PEN) 100 UNIT/ML pen Take 20 units in case of pump malfunction only. 15 mL 0    insulin lispro (HUMALOG VIAL) 100 UNIT/ML vial USE WITH INSULIN PUMP AS DIRECTED, USES ABOUT 80 UNITS PER DAY 30 mL 2    insulin lispro (HUMALOG) 100 UNIT/ML vial USE WITH INSULIN PUMP AS DIRECTED, USES ABOUT  "80 UNITS PER DAY 80 mL 0    INSULIN PUMP - OUTPATIENT Updated 8/3/21:  OmniPod Dash  BASAL:  00:00: 0.75 units/hr  CARB RATIO:  00:00: 10  Sensitivity:  00:00: 40 mg/dL  BLOOD GLUCOSE TARGET and times:  12   AM (midnight): 120-120  Active Insulin Time: 4 hours  Sensor: Nadia/ Nadia Link Donna  Amish:   [email or web address]  Password [email or web address]      insulin syringe-needle U-100 (29G X 1/2\" 1 ML) 29G X 1/2\" 1 ML miscellaneous Use 1 syringe once daily or as needed 100 each 1    Lancets (MICROLET) MISC 1 Device See Admin Instructions. Use up to 5 times daily for blood sugar checks. 100 each prn    levonorgestrel (MIRENA) 20 MCG/24HR IUD 1 each (20 mcg) by Intrauterine route once      losartan (COZAAR) 50 MG tablet Take 1 tablet (50 mg) by mouth in the morning and take one-half tablet (25 mg) by mouth in the evening. 135 tablet 3    MAGNESIUM OXIDE PO Take 400 mg by mouth daily      montelukast (SINGULAIR) 10 MG tablet Take 1 tablet (10 mg) by mouth at bedtime 90 tablet 3    Multiple Vitamins-Minerals (MULTI VITAMIN/MINERALS PO) Take 1 each by mouth daily.      Omega-3 Fatty Acids (FISH OIL) 500 MG CAPS Take 1 capsule by mouth (Patient not taking: Reported on 4/8/2024)      omeprazole (PRILOSEC) 40 MG DR capsule TAKE ONE CAPSULE BY MOUTH ONCE DAILY AS NEEDED 90 capsule 3    ondansetron (ZOFRAN ODT) 4 MG ODT tab Take 1 tablet (4 mg) by mouth every 8 hours as needed for nausea (Patient not taking: Reported on 4/8/2024) 8 tablet 0    rosuvastatin (CRESTOR) 5 MG tablet Take 1 tablet (5 mg) by mouth daily 90 tablet 3    Semaglutide, 1 MG/DOSE, (OZEMPIC) 4 MG/3ML pen Inject 1 mg Subcutaneous every 7 days 9 mL 2    sertraline (ZOLOFT) 25 MG tablet Take 1 tablet (25 mg) by mouth daily 90 tablet 3    SUMAtriptan (IMITREX) 25 MG tablet TAKE ONE TO FOUR TABLETS BY MOUTH ONE TIME. MAY REPEAT 1 TABLET EVERY TWO HOURS UP TO MAXIMUM OF 200MG IN 24 HOURS 20 tablet 11    traZODone (DESYREL) 50 MG tablet TAKE ONE-HALF TO ONE TABLET " "BY MOUTH NIGHTLY AS NEEDED FOR SLEEP 60 tablet 3    Urine Glucose-Ketones Test STRP Daily as needed 25 strip 11    Vitamins-Lipotropics (B-100) TABS Take 1 tablet by mouth daily Appointment needed for further refills please call [phone] to schedule 30 tablet 2     No current facility-administered medications for this visit.       Allergies:  Allergies   Allergen Reactions    Corylus Anaphylaxis     Patient reports anaphylaxis was caused by hazelnut, not avocado    Ivp Dye [Contrast Dye] Itching     Pt reports that throat itches    Nuts Anaphylaxis     Mariola nuts only    Bactrim Swelling     Pt reaction was swelling in mouth and tongue and itchy mouth.  Resolved with benadryl and prednisone    Pcn [Penicillins] Hives    Cephalosporins Itching    Compazine [Prochlorperazine] Other (See Comments)     Pt states \"psychosis\"    Diatrizoate Itching     throat irritation    Erythromycin      Mitochondrial disorder    Lactated Ringers Other (See Comments)     Mitochondrial disorder    Propofol      Mitochondrial disorder    Reglan [Metoclopramide] Other (See Comments)     Pt sates \"psychosis\"    Seasonal Allergies Other (See Comments)     Molds, trees, grass, dust..  Upper congestion    Sulfa Antibiotics Itching    Valproic Acid      Mitochondrial disorder    Atorvastatin      myalgias    Shellfish Allergy Rash     Clams only       Physical Examination:  Ht 5' 4.17\" (163 cm)   Wt 148 lb 9.4 oz (67.4 kg)   BMI 25.37 kg/m    FAMILY HISTORY: A brief family medical history was reviewed.  REVIEW OF SYSTEMS: The review of systems negative for new eye, ear, heart, lung, liver, spleen, gastrointestinal, bone, muscle, integumentary, endocrinologic, brain or psychiatric issues except as noted above.  PHYSICAL EXAMINATION:   General: The patient is oriented to person, place and time at an age-appropriate manner.   HEENT: The facial features are normal and symmetric. The ears are of normal position and configuration and hearing " is grossly normal.  The tongue protrudes normally without fasciculations.  Neck: The neck  appears to be supple with full range of motion  Chest: The chest is of normal configuration. There is no tachypnea or other visible abnormality. Normal breath sounds b/l  Heart: The patient appears to be well perfused, and in no apparent distress. Normal heart sounds  Abdomen: Not examined.  Extremities: The extremities are of normal configuration.  Back: Not examined,  Integument: The  visible portion of the integument is  of normal appearance without significant changes in pigmentation, birthmarks, or lesions.  Neuromuscular:  Mental Status Exam: Alert, awake. Fully oriented. No dysarthria; speech of normal fluency.  Cranial Nerves: EOMs intact, no nystagmus, facial movements symmetric.   Motor: There appears to be normal movement in all four extremities, no atrophy or fasciculations. No tremors.  Gait: By visual examination, there appears to be normal gait; normal arm swing and stance.    LABORATORY RESULTS: Laboratory studies from the past year were reviewed.        Assessment:  We discussed the different possible phenotypes that are associated with this specific mitochondrial variant: Mitochondrial Encephalopathy, Lactic Acidosis, Stroke-like episodes (MELAS), Chronic Progressive External Ophthalmoplegia (CPEO) and Maternally Inherited Deafness and Diabetes (MIDD) and concentric left ventricular hypertrophic cardiomyopathy. The onset of symptoms for the above mentioned disorders can start anytime from childhood to late-adulthood. Some individuals with MIDD may develop retinal dystrophy. Currently, there is not enough evidence for  citrulline treatment in the absence of any symptoms related to MELAS.        PLAN/RECOMMENDATIONS:    It is important to routinely evaluate for the emergence of any of the above mentioned phenotype for Marie. Hence, we will obtain a lactic acid level. With a recent and normal thyroid function  test, no additional testing is recommended .  Annual evaluation by ophthalmology and audiology is recommended.   The two phenotypes that does not go along with the MT-TL1 includes hypertrophic cardiomyopathy that does not follow a mitochondrial inheritance pattern and early onset hypercholesterolemia. It is important to rule out a genetic etiology despite a negative previous genetic testing for HCM. Given the additional phenotypes Marie has and the management implications for her children, it is important to rule out a genetic etiology for the same. As a result, an exome sequencing is recommended for Marie as the next step including her siblings with cardiomyopathy.   There is no evidence that citrulline supplementation in asymptomatic patients can prevent vascular problems such as stroke. At the same time, it is a safe medication without any major side effects. When given the option, Marie chose not to start citrulline supplementation. At the same time, we recommend supplementation with Ubiquinol and vitamin B100.  Refills will be sent to the preferred pharmacy.  Medications to avoid include but are not limited to valproic acid; statins; metformin; high-dose acetaminophen; and selected antibiotics, including aminoglycosides, linezolid, tetracycline, azithromycin, and erythromycin. These medications maybe toxic to the mitochondria should be avoided when possible or a suitable treatment alternative exists.    It is recommended that we be informed in the event of any elective surgeries for Marie since sedation/anesthesia precautions are needed. Anesthesiology consultation and pre-coordination with the Genetics and Metabolism service should occur since some people with mitochondrial diseases tolerate types/doses of anesthetics less well. Avoid Propofol.  Caution must be used with volatile anesthetics because mitochondrial patients may potentially be hypersensitive. Caution must be used with muscle relaxants.  Mitochondrial patients may be at a higher risk for propofol infusion syndrome and propofol use should be avoided or limited to short procedures  Educated Marie to watch out for signs and symptoms of MELAS including changes in behavior, vomiting, abdominal pain, fatigue,muscle weakness, weakness or paralysis of an arm or leg or one side of the body, imbalance and falling and confusion.  Follow up in 1 year or sooner with new concerns.  Updated Emergency letter will be provided via [employer]  Contact information provided for the metabolic physician on call if Marie is to experience any of the above mentioned symptoms.        With warmest regards,       Chucho Saavedra MD     Division of Genetics and Metabolism    Appointments: [phone]      Monday afternoons: Metabolic/Lysosomal storage clinic              [facility] laboratory: [phone]/ [phone]               [facility] laboratory: [phone]    Nurse Coordinator, Metabolism and Genetics:  Elaina Banerjee RN, [phone]    Pharmacotherapy Consultant:  Pina Euceda, PharmD, Pharmacotherapy for Metabolic Disorders ([facility]): [phone]     Genetic Counselor:  Lisa Gutierrez MS, [facility] (Genetic test Results): [phone]    Metabolic Dietician:  Aicha Lopez, Registered Dietician: [phone]    [facility] Scheduler:  Stacy Blue, [phone]    Copies to:     Dr. Alan Paredes  [address] [place] [address]    Marie Dykeson  [address] [place] [address]    Dr. Mccarthy referring provider defined for this encounter.      40 minutes spent on the date of the encounter doing chart review, history and exam, documentation and further activities per the note

## 2024-07-08 ENCOUNTER — OFFICE VISIT (OUTPATIENT)
Dept: PEDIATRICS | Facility: CLINIC | Age: 44
End: 2024-07-08
Attending: PEDIATRICS
Payer: COMMERCIAL

## 2024-07-08 ENCOUNTER — OFFICE VISIT (OUTPATIENT)
Dept: CONSULT | Facility: CLINIC | Age: 44
End: 2024-07-08
Attending: GENETIC COUNSELOR, MS
Payer: COMMERCIAL

## 2024-07-08 VITALS — HEIGHT: 64 IN | WEIGHT: 148.59 LBS | BODY MASS INDEX: 25.37 KG/M2

## 2024-07-08 DIAGNOSIS — E78.00 HYPERCHOLESTEROLEMIA: ICD-10-CM

## 2024-07-08 DIAGNOSIS — Z82.49 FAMILY HISTORY OF CARDIOMYOPATHY: ICD-10-CM

## 2024-07-08 DIAGNOSIS — Z71.83 ENCOUNTER FOR NONPROCREATIVE GENETIC COUNSELING: ICD-10-CM

## 2024-07-08 DIAGNOSIS — I42.1 HYPERTROPHIC OBSTRUCTIVE CARDIOMYOPATHY (H): ICD-10-CM

## 2024-07-08 DIAGNOSIS — Z15.89 MUTATION IN MT-TL1 GENE: ICD-10-CM

## 2024-07-08 DIAGNOSIS — N18.31 CHRONIC KIDNEY DISEASE, STAGE 3A (H): ICD-10-CM

## 2024-07-08 DIAGNOSIS — F33.1 MODERATE EPISODE OF RECURRENT MAJOR DEPRESSIVE DISORDER (H): ICD-10-CM

## 2024-07-08 DIAGNOSIS — Q99.9 MITOCHONDRIAL DNA MUTATION: Primary | ICD-10-CM

## 2024-07-08 DIAGNOSIS — I42.1 HYPERTROPHIC OBSTRUCTIVE CARDIOMYOPATHY (H): Primary | ICD-10-CM

## 2024-07-08 DIAGNOSIS — N25.81 SECONDARY HYPERPARATHYROIDISM (H): ICD-10-CM

## 2024-07-08 LAB — LACTATE SERPL-SCNC: 1.2 MMOL/L (ref 0.7–2)

## 2024-07-08 PROCEDURE — 36415 COLL VENOUS BLD VENIPUNCTURE: CPT | Performed by: PEDIATRICS

## 2024-07-08 PROCEDURE — 83605 ASSAY OF LACTIC ACID: CPT | Performed by: PEDIATRICS

## 2024-07-08 PROCEDURE — 99215 OFFICE O/P EST HI 40 MIN: CPT | Performed by: PEDIATRICS

## 2024-07-08 PROCEDURE — 99211 OFF/OP EST MAY X REQ PHY/QHP: CPT | Performed by: PEDIATRICS

## 2024-07-08 PROCEDURE — 96040 HC GENETIC COUNSELING, EACH 30 MINUTES: CPT | Performed by: GENETIC COUNSELOR, MS

## 2024-07-08 RX ORDER — VITAMIN B COMPLEX/FOLIC ACID 0.4 MG
1 TABLET ORAL DAILY
Qty: 60 TABLET | Refills: 1 | Status: SHIPPED | OUTPATIENT
Start: 2024-07-08

## 2024-07-08 RX ORDER — UBIDECARENONE 100 MG
100 CAPSULE ORAL DAILY
Qty: 90 CAPSULE | Refills: 11 | Status: SHIPPED | OUTPATIENT
Start: 2024-07-08

## 2024-07-08 NOTE — PATIENT INSTRUCTIONS
"Pediatric Metabolism/PKU Clinic  [facility]  Pediatric Specialty Clinic ([facility])      Medications   We did not make any changes to your medications today.      Emergency & Sick Calls   Keep your emergency letter with you at all times  ( in your vehicles, purse/bag and home, etc).  Consider making a medical alert bracelet.    If your child is unresponsive or has other life threatening medical emergency YOU SHOULD CALL 911.     If your child is NOT ACTING NORMALLY such as: confused or sleepier than normal, having nausea or vomiting, not tolerating their formula or medications, breathing faster than normal, has a fever, diarrhea, or other parental concern CALL US IMMEDIATELY.     Call [phone] and ask the  to \"page Genetic Metabolic Physician on-call\"   If no one calls you back within 15 minutes call again.        Helpful Numbers   To schedule appointments:  Pediatric Call Center for [facility]: [phone]  Neuropsychology [phone]   Radiology/ Imaging/ Echocardiogram: [phone]   Services:   [phone]     For questions about medications/ supplies or non-urgent medical concerns:        Elaina PADILLA, RN, PHN  Nurse Care Coordinator               Ph: [phone]        Soha Hill APRN, CNP             Ph: [phone]    For questions about your child's nutrition:  Aicha Lopez RD  Ph: [phone]    For questions about genetic counseling:                    If you have not already done so consider signing up for [employer] by speaking with the person at the  on your way out or go to [email or web address] to sign up online.      You should receive a phone call about your next appointment. If you do not receive this within two weeks of your visit, please call [phone].    "

## 2024-07-08 NOTE — PROGRESS NOTES
Presenting information: Marie is a 44 year old female with a history of a mitochondrial DNA variant, hypercholesterolemia and hypertrophic obstructive cardiomyopathy. She was referred for a genetics evaluation by Dr. Paredes and evaluated in genetics clinic by Dr. Saavedra today. Marie was accompanied to her appointment by her son Marcos. I met with the family at the request of Dr. Saavedra to discuss possible genetic contributions to her symptoms, and to obtain informed consent for genetic testing.     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 This has resulted in clinical manifestations including optic atrophy, b/l sensoribeural hearing loss, diabetes mellitus, global and segmental sclerosis of the kidneys and GI dysfunction. Marie has early onset hypercholesterolemia. Marie has hypertrophic cardiomyopathy and underwent genetic testing in 2023 via a 56 gene CM panel at [facility] that was negative or normal. See Dr. Saavedra's note for additional details.     Family History: A three generation pedigree was obtained in 2016 and scanned into the EMR. This family history is by patient report only and has not been verified with medical records except where noted. The following information is significant:   Mother was diagnosed with HCM at 37 years.  She had a heart transplant in  at 49 years. She  in 2016 at 60 years of age.  She participated in a [place] research study looking for HCM genetic mutations but no records were able to be found.  Brother diagnosed with HCM at 18 years.  He had a myomectomy in about  and also has an ICD. He has one daughter with autism.  A maternal aunt and four maternal uncles have all been diagnosed with HCM. One uncle has an LVAD.  He also had a son who  suddenly and was found to have cardiomyopathy on autopsy.  Maternal grandmother and great grandfather also had HCM.  In addition,  there are many distant relatives with HCM.    Daughter (21) with type 2 diabetes and obesity.  She had an ECHO around 14 years of age that was normal.  Son (12), assigned female at birth, also has bilateral hearing loss, DM2 and the mitochondrial mutation in the MT-TL1 gene. Normal ECHO this summer.   Father with DM2 and kidney failure.  His four siblings have no know health issues.  Paternal grandmother  in her 80's with Alzheimers.  She also had DM2 with onset in her 60-70's.  Grandfather  90's.  He had a history of blood clot in his eye, leading to blindness in 1 eye, and a stroke in his 60's.  There is no additional history of cardiomyopathy, arrythmias, heart attacks, fainting, sudden cardiac death, genetic conditions, or birth defects.      Discussion:   Our genes are sequences of letters that provide instructions that help our body grow, develop and function. Sometimes a change occurs in one of our genes that causes the body to be unable to read these instructions. This results in a genetic condition.    Marie has an extensive family history of HCM. This condition was present in her maternal great grandfather and her maternal grandmother suggesting transmission from male to female. This does not align with the mitochondrial inheritance pattern exhibited by her MT-TL1 variant. This suggests that there is a different genetic cause of HCM in the family. Additionally, Marie has early onset hypercholesteremia. This is not a known feature of her mitochondrial variant.    Given that Marie's previous genetic testing has failed to identify a clear cause of her HCM and hypercholesterolemia, whole exome sequencing is the next best diagnostic step.     Whole exome sequencing is the largest genetic test that is currently available in our clinic. This test looks for variants or changes in almost all of the genes (~20,000). To complete this test, we take samples from the patient and up to two family members. Family  member samples help the lab to narrow down all of the changes in Marie's genes and identify which seem to be most important. If the lab finds a genetic change in Marie, they are also able to tell us if participating family members have this change as well. Although we ask family members to provide samples, this testing will only provide information regarding a change in their genes if it was found in Marie first. Today we discussed inclusion of Marie's brother and one of her maternal aunts or uncles.    There are three possible results for this testing:    Positive: A positive result indicates that a genetic variant has been identified that explains the cause of Marie s symptoms. A positive result may provide information on prognosis and other symptoms related to the genetic change. It may also help guide medical management for Marie and will provide information to other family members regarding their risk.   Negative: A negative result indicates that a disease causing genetic variant was not identified  Variant of uncertain significance (VUS): A VUS is an uncertain result that indicates a genetic change was identified, but it is currently unknown if that change is associated with a genetic disorder.     Whole exome sequencing can also provide information regarding certain conditions that are unrelated to the reason we are doing the testing today. These are called secondary findings. Currently, there is a list of over 70 genes that are considered medically actionable meaning if we know there is a change in these genes there is something we can change in a person's medical management to help keep them healthy. Marie consented to secondary findings today.    Risks, benefits and limitations for whole exome sequencing was reviewed. Positive results in this genetic test could provide important information related to Anas health and may have implications for her medical management. Marie expressed a good understanding  of this information and decided to pursue genetic testing today pending insurance approval     Plan:   1. Whole exome sequencing trio at the [facility] molecular diagnostics lab pending insurance approval. Trio to include Marie's brother and one maternal aunt or uncle. These family members must contact me within 2 weeks if they would like to participate. If family members do not contact me within two weeks of this appointment, the test will run as a proband only exome.  2. Return pending results of genetic testing  3. Contact information was provided should any questions arise in the future.         Rosanne Souza MS [facility]  Genetic Counselor  Division of Genetics and Metabolism  (p) [phone]  (f) [phone]    Total time spent in consultation with the family was approximately 20 minutes      Cc: No Letter

## 2024-07-08 NOTE — Clinical Note
2024      RE: Marie Vogel  [address]  [place] [address]     Dear Colleague,    Thank you for the opportunity to participate in the care of your patient, Marie Vogel, at the [facility] at [facility]. Please see a copy of my visit note below.      Presenting information: Marie is a 44 year old female with a history of a mitochondrial DNA variant, hypercholesterolemia and hypertrophic obstructive cardiomyopathy. She was referred for a genetics evaluation by Dr. Paredes and evaluated in genetics clinic by Dr. Saavedra today. Marie was accompanied to her appointment by her son Marcos. I met with the family at the request of Dr. Saavedra to discuss possible genetic contributions to her symptoms, and to obtain informed consent for genetic testing.     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 This has resulted in clinical manifestations including optic atrophy, b/l sensoribeural hearing loss, diabetes mellitus, global and segmental sclerosis of the kidneys and GI dysfunction. Marie has early onset hypercholesterolemia. Marie has hypertrophic cardiomyopathy and underwent genetic testing in 2023 via a 56 gene CM panel at [facility] that was negative or normal. See Dr. Saavedra's note for additional details.     Family History: A three generation pedigree was obtained in  and scanned into the EMR. This family history is by patient report only and has not been verified with medical records except where noted. The following information is significant:   Mother was diagnosed with HCM at 37 years.  She had a heart transplant in  at 49 years. She  in 2016 at 60 years of age.  She participated in a [employer] research study looking for HCM genetic mutations but no records were able to be found.  Brother  diagnosed with HCM at 18 years.  He had a myomectomy in about  and also has an ICD. He has one daughter with autism.  A maternal aunt and four maternal uncles have all been diagnosed with HCM. One uncle has an LVAD.  He also had a son who  suddenly and was found to have cardiomyopathy on autopsy.  Maternal grandmother and great grandfather also had HCM.  In addition, there are many distant relatives with HCM.    Daughter (21) with type 2 diabetes and obesity.  She had an ECHO around 14 years of age that was normal.  Son (12), assigned female at birth, also has bilateral hearing loss, DM2 and the mitochondrial mutation in the MT-TL1 gene. Normal ECHO this summer.   Father with DM2 and kidney failure.  His four siblings have no know health issues.  Paternal grandmother  in her 80's with Alzheimers.  She also had DM2 with onset in her 60-70's.  Grandfather  90's.  He had a history of blood clot in his eye, leading to blindness in 1 eye, and a stroke in his 60's.  There is no additional history of cardiomyopathy, arrythmias, heart attacks, fainting, sudden cardiac death, genetic conditions, or birth defects.      Discussion:   Our genes are sequences of letters that provide instructions that help our body grow, develop and function. Sometimes a change occurs in one of our genes that causes the body to be unable to read these instructions. This results in a genetic condition.    Marie has an extensive family history of HCM. This condition was present in her maternal great grandfather and her maternal grandmother suggesting transmission from male to female. This does not align with the mitochondrial inheritance pattern exhibited by her MT-TL1 variant. This suggests that there is a different genetic cause of HCM in the family. Additionally, Marie has early onset hypercholesteremia. This is not a known feature of her mitochondrial variant.    Given that Marie's previous genetic testing has failed to identify  a clear cause of her HCM and hypercholesterolemia, whole exome sequencing is the next best diagnostic step.     Whole exome sequencing is the largest genetic test that is currently available in our clinic. This test looks for variants or changes in almost all of the genes (~20,000). To complete this test, we take samples from the patient and up Parent samples help the lab to narrow down all of the changes in Marie's genes and identify which seem to be most important. If the lab finds a genetic change in Marie, they are also able to tell us if she inherited that change from her parents or if this change is brand new in her. Although we ask parents to provide samples, this testing will only provide information regarding a change in their genes if it was found in Marie first.     There are three possible results for this testing:    Positive: A positive result indicates that a genetic variant has been identified that explains the cause of Marie s symptoms. A positive result may provide information on prognosis and other symptoms related to the genetic change. It may also help guide medical management for Marie and will provide information to other family members regarding their risk.   Negative: A negative result indicates that a disease causing genetic variant was not identified  Variant of uncertain significance (VUS): A VUS is an uncertain result that indicates a genetic change was identified, but it is currently unknown if that change is associated with a genetic disorder.     Whole exome sequencing can also provide information regarding certain conditions that are unrelated to the reason we are doing the testing today. These are called secondary findings. Currently, there is a list of 59 genes that are considered medically actionable meaning if we know there is a change in these genes there is something we can change in a person's medical management to help keep them healthy. Parents can choose whether or not  receive these secondary findings for Marie as well as for themselves.    OR    Whole exome sequencing will not tell us information about whether Marie is a carrier for certain genetic condition, if she has gene changes that increase or decrease her risk for common diseases with many environmental components such as diabetes, and if she has changes in genes that cause adult onset disease in which medical intervention is not available such as Alzeimer's Disease.     Additionally, this test can identify non paternity, if the parents are related, and family members may learn that they have an increased risk to develop a certain condition based on the results of this testing.    OR     ___ was informed that another type of genetic testing (***) is available if ___ is uncomfortable with any of the information provided above. This genetic testing may not be as informative as VICKIE but could still be helpful in the event that ___ decides not to pursue whole exome sequencing.    OR    Risks, benefits and limitations for whole exome sequencing was reviewed. Positive results in this genetic test could provide important information related to Marie's health and may have implications for her medical management. *** expressed a good understanding of this information and decided to pursue genetic testing today.     Plan:   1. *** expressed an excellent understanding of this information and decided to pursue testing today for *** . *** provided a blood sample that will be sent to *** laboratories. Results are expected in *** weeks and we will follow up by phone when results are available. In person follow up will be provided as needed.  2. Return in ***   3. Contact information was provided should any questions arise in the future.         Rosanne Souza, MS [facility]  Genetic Counselor  Division of Genetics and Metabolism  p) [phone]  (f) [phone]    Total time spent in consultation with the family was approximately ___  minutes      Cc: No Letter       Please do not hesitate to contact me if you have any questions/concerns.     Sincerely,       Rosanne Souza GC

## 2024-07-08 NOTE — LETTER
"2024      RE: Marie Vogel  [address]  [place] [address]     Dear Colleague,    Thank you for the opportunity to participate in the care of your patient, Marie Vogel, at the [facility] at [facility]. Please see a copy of my visit note below.                       OUTPATIENT METABOLIC FOLLOW UP VISIT          Date: 2024      Patient:  Marie Vogel   :   1980  MRN:     [id number]      Maire Vogel  [address]  [place] [address]    Dear Dr. Alan Paredes and Marie Vogel,    Thank you for sending Marie Vogel to the [facility] Monday \"Metabolic clinic\" for consultation and treatment of:    1. Mitochondrial DNA mutation    2. Secondary hyperparathyroidism (H24)    3. Moderate episode of recurrent major depressive disorder (H)    4. Hypertrophic obstructive cardiomyopathy (H)    5. Chronic kidney disease, stage 3a (H)        Genotype: m.3243A>G (Heteroplasmy: 23%)      PAST MEDICAL HISTORY:    From the oral history, and medical records that are available, these items are noted:    Patient Active Problem List   Diagnosis    Allergic rhinitis    HYPERLIPIDEMIA LDL GOAL <100    Family history of other cardiovascular diseases    Microalbuminuria    Insulin pump in place    Vitamin D deficiency    Nut allergy    Type 2 diabetes mellitus with diabetic nephropathy    Diabetic gastroparesis (H)    Hypertrophic obstructive cardiomyopathy (H)    PFO (patent foramen ovale)    Other migraine without status migrainosus, not intractable    Scars of posterior pole of chorioretina, bilateral    Cervical high risk HPV (human papillomavirus) test positive    Mirena IUD inserted 19: Due for removal 2024    Depression    Other insomnia    Moderate episode of recurrent major depressive disorder (H)    Mitochondrial " DNA mutation    Chronic kidney disease, stage 3a (H)    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 History of neuro psychotic reaction to reglan in the past  Seizures: No history of seizures  Peripheral neuropathy: No history of any signs of peripheral neuropathy  Ataxia/Gait disturbance: No history of any gait abnormality     Ophthalmology Marie has been diagnosed with optic atrophy and peripheral vision defects. She is currently being followed by ophthalmology. She reports that she has not been seen by the provider recently, but does not report any worsening of vision.   ENT Hearing loss: History of b/l sensorineural hearing loss. Currently on b/l hearing aids.No history of recurrent vertigo.    Endocrine History of diabetes mellitus on metformin. After detection of MT-TL1 variant, metformin was discontinued and she was started on insulin.   She is followed by endocrinology for secondary hyperparathyroidism.       Renal Following the detection of proteinuria, she was seen by nephrology.  A renal biopsy showed global sclerosis and segmental sclerosis. FSGS was suspected and she was started on SGLT2 inhibitors.    Musculoskeletal No history of skeletal myopathy   Cardiac There is a significant family history of cardiomyopathy in Marie's mother's side. However inheritance in two individuals do not go along with mitochondrial inheritance (maternal great grandfather to grandmother and maternal uncle to nephew). She is seen by Dr. Gabino Becerra at [facility] for the management of her hypertrophic cardiomyopathy. She had genetic testing for HCM through [facility] that came back negative.   She reports a recent episode of SVT for which she was seen in the ER in April 2024.   GI Marie reports KENNETH and symptoms s/o dysmotility. However, imaging was not quite diagnostic  of dysmotility.       Medications:  Current Outpatient Medications   Medication Sig Dispense Refill    albuterol (PROAIR HFA/PROVENTIL HFA/VENTOLIN HFA) 108 (90 Base) MCG/ACT inhaler Inhale 2 puffs into the lungs every 4 hours as needed for shortness of breath or wheezing 18 g 11    ASPIRIN NOT PRESCRIBED (INTENTIONAL) Please choose reason not prescribed from choices below.      atenolol (TENORMIN) 25 MG tablet Take 0.5 tablets (12.5 mg) by mouth daily 90 tablet 3    blood glucose (PEPE CONTOUR NEXT) test strip Check 4 times/day 300 each 0    blood glucose monitoring (NO BRAND SPECIFIED) meter device kit Use to test blood sugar 6 times daily and as needed. 1 kit 0    cetirizine (ZYRTEC) 10 MG tablet Take 10 mg by mouth 2 times daily  90 tablet 3    clotrimazole (LOTRIMIN) 1 % external cream Apply topically 2 times daily 60 g 3    co-enzyme Q-10 100 MG CAPS capsule Take 1 capsule (100 mg) by mouth daily 90 capsule 11    Continuous Blood Gluc Sensor (DEXCOM G6 SENSOR) MISC Apply one sensor to the skin every 10 days 9 each 3    Continuous Blood Gluc Transmit (DEXCOM G6 TRANSMITTER) MISC Change every 90 days.  (Dexcom G6 Transmitter, #[id number]) 1 each 1    empagliflozin (JARDIANCE) 25 MG TABS tablet Take 1 tablet (25 mg) by mouth daily 90 tablet 2    EPINEPHrine (ANY BX GENERIC EQUIV) 0.3 MG/0.3ML injection 2-pack Inject 0.3 mLs (0.3 mg) into the muscle once as needed for anaphylaxis 0.3 mL 11    fluticasone (FLONASE) 50 MCG/ACT spray Spray 1-2 sprays into both nostrils daily 1 Bottle 0    Glucagon, rDNA, (GLUCAGON EMERGENCY) 1 MG KIT For Intramuscular use for low Blood glucose episode. 1 kit 0    guaiFENesin-codeine (ROBITUSSIN AC) 100-10 MG/5ML solution Take 5-10 mLs by mouth nightly as needed for cough (Patient not taking: Reported on 4/8/2024) 180 mL 0    ibuprofen (ADVIL/MOTRIN) 200 MG tablet Take 200 mg by mouth every 4 hours as needed       Insulin Disposable Pump (OMNIPOD 5 G6 POD, GEN 5,) MISC Change every 3  "days. (Patient not taking: Reported on 4/8/2024) 30 each 11    insulin glargine (LANTUS PEN) 100 UNIT/ML pen Take 20 units in case of pump malfunction only. 15 mL 0    insulin lispro (HUMALOG VIAL) 100 UNIT/ML vial USE WITH INSULIN PUMP AS DIRECTED, USES ABOUT 80 UNITS PER DAY 30 mL 2    insulin lispro (HUMALOG) 100 UNIT/ML vial USE WITH INSULIN PUMP AS DIRECTED, USES ABOUT 80 UNITS PER DAY 80 mL 0    INSULIN PUMP - OUTPATIENT Updated 8/3/21:  OmniPod Dash  BASAL:  00:00: 0.75 units/hr  CARB RATIO:  00:00: 10  Sensitivity:  00:00: 40 mg/dL  BLOOD GLUCOSE TARGET and times:  12   AM (midnight): 120-120  Active Insulin Time: 4 hours  Sensor: Nadia/ Nadia Link Donna  Amish:   [email or web address]  Password [email or web address]      insulin syringe-needle U-100 (29G X 1/2\" 1 ML) 29G X 1/2\" 1 ML miscellaneous Use 1 syringe once daily or as needed 100 each 1    Lancets (MICROLET) MISC 1 Device See Admin Instructions. Use up to 5 times daily for blood sugar checks. 100 each prn    levonorgestrel (MIRENA) 20 MCG/24HR IUD 1 each (20 mcg) by Intrauterine route once      losartan (COZAAR) 50 MG tablet Take 1 tablet (50 mg) by mouth in the morning and take one-half tablet (25 mg) by mouth in the evening. 135 tablet 3    MAGNESIUM OXIDE PO Take 400 mg by mouth daily      montelukast (SINGULAIR) 10 MG tablet Take 1 tablet (10 mg) by mouth at bedtime 90 tablet 3    Multiple Vitamins-Minerals (MULTI VITAMIN/MINERALS PO) Take 1 each by mouth daily.      Omega-3 Fatty Acids (FISH OIL) 500 MG CAPS Take 1 capsule by mouth (Patient not taking: Reported on 4/8/2024)      omeprazole (PRILOSEC) 40 MG DR capsule TAKE ONE CAPSULE BY MOUTH ONCE DAILY AS NEEDED 90 capsule 3    ondansetron (ZOFRAN ODT) 4 MG ODT tab Take 1 tablet (4 mg) by mouth every 8 hours as needed for nausea (Patient not taking: Reported on 4/8/2024) 8 tablet 0    rosuvastatin (CRESTOR) 5 MG tablet Take 1 tablet (5 mg) by mouth daily 90 tablet 3    Semaglutide, 1 MG/DOSE, " "(OZEMPIC) 4 MG/3ML pen Inject 1 mg Subcutaneous every 7 days 9 mL 2    sertraline (ZOLOFT) 25 MG tablet Take 1 tablet (25 mg) by mouth daily 90 tablet 3    SUMAtriptan (IMITREX) 25 MG tablet TAKE ONE TO FOUR TABLETS BY MOUTH ONE TIME. MAY REPEAT 1 TABLET EVERY TWO HOURS UP TO MAXIMUM OF 200MG IN 24 HOURS 20 tablet 11    traZODone (DESYREL) 50 MG tablet TAKE ONE-HALF TO ONE TABLET BY MOUTH NIGHTLY AS NEEDED FOR SLEEP 60 tablet 3    Urine Glucose-Ketones Test STRP Daily as needed 25 strip 11    Vitamins-Lipotropics (B-100) TABS Take 1 tablet by mouth daily Appointment needed for further refills please call [phone] to schedule 30 tablet 2     No current facility-administered medications for this visit.       Allergies:  Allergies   Allergen Reactions    Corylus Anaphylaxis     Patient reports anaphylaxis was caused by hazelnut, not avocado    Ivp Dye [Contrast Dye] Itching     Pt reports that throat itches    Nuts Anaphylaxis     Mariola nuts only    Bactrim Swelling     Pt reaction was swelling in mouth and tongue and itchy mouth.  Resolved with benadryl and prednisone    Pcn [Penicillins] Hives    Cephalosporins Itching    Compazine [Prochlorperazine] Other (See Comments)     Pt states \"psychosis\"    Diatrizoate Itching     throat irritation    Erythromycin      Mitochondrial disorder    Lactated Ringers Other (See Comments)     Mitochondrial disorder    Propofol      Mitochondrial disorder    Reglan [Metoclopramide] Other (See Comments)     Pt sates \"psychosis\"    Seasonal Allergies Other (See Comments)     Molds, trees, grass, dust..  Upper congestion    Sulfa Antibiotics Itching    Valproic Acid      Mitochondrial disorder    Atorvastatin      myalgias    Shellfish Allergy Rash     Clams only       Physical Examination:  Ht 5' 4.17\" (163 cm)   Wt 148 lb 9.4 oz (67.4 kg)   BMI 25.37 kg/m    FAMILY HISTORY: A brief family medical history was reviewed.  REVIEW OF SYSTEMS: The review of systems negative for new " eye, ear, heart, lung, liver, spleen, gastrointestinal, bone, muscle, integumentary, endocrinologic, brain or psychiatric issues except as noted above.  PHYSICAL EXAMINATION:   General: The patient is oriented to person, place and time at an age-appropriate manner.   HEENT: The facial features are normal and symmetric. The ears are of normal position and configuration and hearing is grossly normal.  The tongue protrudes normally without fasciculations.  Neck: The neck  appears to be supple with full range of motion  Chest: The chest is of normal configuration. There is no tachypnea or other visible abnormality. Normal breath sounds b/l  Heart: The patient appears to be well perfused, and in no apparent distress. Normal heart sounds  Abdomen: Not examined.  Extremities: The extremities are of normal configuration.  Back: Not examined,  Integument: The  visible portion of the integument is  of normal appearance without significant changes in pigmentation, birthmarks, or lesions.  Neuromuscular:  Mental Status Exam: Alert, awake. Fully oriented. No dysarthria; speech of normal fluency.  Cranial Nerves: EOMs intact, no nystagmus, facial movements symmetric.   Motor: There appears to be normal movement in all four extremities, no atrophy or fasciculations. No tremors.  Gait: By visual examination, there appears to be normal gait; normal arm swing and stance.    LABORATORY RESULTS: Laboratory studies from the past year were reviewed.        Assessment:  We discussed the different possible phenotypes that are associated with this specific mitochondrial variant: Mitochondrial Encephalopathy, Lactic Acidosis, Stroke-like episodes (MELAS), Chronic Progressive External Ophthalmoplegia (CPEO) and Maternally Inherited Deafness and Diabetes (MIDD) and concentric left ventricular hypertrophic cardiomyopathy. The onset of symptoms for the above mentioned disorders can start anytime from childhood to late-adulthood. Some individuals  with MIDD may develop retinal dystrophy. Currently, there is not enough evidence for  citrulline treatment in the absence of any symptoms related to MELAS.        PLAN/RECOMMENDATIONS:    It is important to routinely evaluate for the emergence of any of the above mentioned phenotype for Marei. Hence, we will obtain a lactic acid level. With a recent and normal thyroid function test, no additional testing is recommended .  Annual evaluation by ophthalmology and audiology is recommended.   The two phenotypes that does not go along with the MT-TL1 includes hypertrophic cardiomyopathy that does not follow a mitochondrial inheritance pattern and early onset hypercholesterolemia. It is important to rule out a genetic etiology despite a negative previous genetic testing for HCM. Given the additional phenotypes Marie has and the management implications for her children, it is important to rule out a genetic etiology for the same. As a result, an exome sequencing is recommended for Marie as the next step including her siblings with cardiomyopathy.   There is no evidence that citrulline supplementation in asymptomatic patients can prevent vascular problems such as stroke. At the same time, it is a safe medication without any major side effects. When given the option, Marie chose not to start citrulline supplementation. At the same time, we recommend supplementation with Ubiquinol and vitamin B100.  Refills will be sent to the preferred pharmacy.  Medications to avoid include but are not limited to valproic acid; statins; metformin; high-dose acetaminophen; and selected antibiotics, including aminoglycosides, linezolid, tetracycline, azithromycin, and erythromycin. These medications maybe toxic to the mitochondria should be avoided when possible or a suitable treatment alternative exists.    It is recommended that we be informed in the event of any elective surgeries for Marie since sedation/anesthesia precautions are needed.  Anesthesiology consultation and pre-coordination with the Genetics and Metabolism service should occur since some people with mitochondrial diseases tolerate types/doses of anesthetics less well. Avoid Propofol.  Caution must be used with volatile anesthetics because mitochondrial patients may potentially be hypersensitive. Caution must be used with muscle relaxants. Mitochondrial patients may be at a higher risk for propofol infusion syndrome and propofol use should be avoided or limited to short procedures  Educated Marie to watch out for signs and symptoms of MELAS including changes in behavior, vomiting, abdominal pain, fatigue,muscle weakness, weakness or paralysis of an arm or leg or one side of the body, imbalance and falling and confusion.  Follow up in 1 year or sooner with new concerns.  Updated Emergency letter will be provided via [employer]  Contact information provided for the metabolic physician on call if Marie is to experience any of the above mentioned symptoms.        With warmest regards,       Chucho Saavedra MD     Division of Genetics and Metabolism    Appointments: [phone]      Monday afternoons: Metabolic/Lysosomal storage clinic              [facility]: [phone]/ [phone]               [facility] laboratory: [phone]    Nurse Coordinator, Metabolism and Genetics:  Elaina Banerjee RN, [phone]    Pharmacotherapy Consultant:  Pina Euceda, PharmD, Pharmacotherapy for Metabolic Disorders ([facility]): [phone]     Genetic Counselor:  Lisa Gutierrez MS, [facility] (Genetic test Results): [phone]    Metabolic Dietician:  Aicha Lopez, Registered Dietician: [phone]    [facility] Scheduler:  Stacy Blue, [phone]    Copies to:     Dr. Alan Paredes  [address] [place] [address]    Marie Vogel  [address] [place] [address]    Dr. Shari marsh  provider defined for this encounter.      40 minutes spent on the date of the encounter doing chart review, history and exam, documentation and further activities per the note

## 2024-07-08 NOTE — NURSING NOTE
"Chief Complaint   Patient presents with    RECHECK       Vitals:    07/08/24 1036   Weight: 148 lb 9.4 oz (67.4 kg)   Height: 5' 4.17\" (163 cm)       Chris Vogel  July 8, 2024    "

## 2024-07-09 LAB — INTERPRETATION: NORMAL

## 2024-07-11 LAB
ACYLCARNITINE SERPL-SCNC: 26 UMOL/L
CARN ESTERS/C0 SERPL-SRTO: 0.9 {RATIO}
CARNITINE FREE SERPL-SCNC: 29 UMOL/L
CARNITINE SERPL-SCNC: 55 UMOL/L

## 2024-07-15 ENCOUNTER — TELEPHONE (OUTPATIENT)
Dept: LAB | Facility: CLINIC | Age: 44
End: 2024-07-15
Payer: COMMERCIAL

## 2024-07-15 NOTE — PROGRESS NOTES
I called Marie, patient to update her on the insurance PA outcome for her genetic testing. No authorization is required. Unfortunately, I was unable to reach Marie. I left a message with my name and direct line for Marie to call me back.      Star Machado  Genomics Billing    [facility]  [facility] Diagnostics [facility]  [address] [address]  [email or web address]  Office: [phone]  Fax:   Employed by [employer] [facility]

## 2024-07-30 ENCOUNTER — TELEPHONE (OUTPATIENT)
Dept: CARDIOLOGY | Facility: CLINIC | Age: 44
End: 2024-07-30

## 2024-07-30 ENCOUNTER — ANCILLARY PROCEDURE (OUTPATIENT)
Dept: CARDIOLOGY | Facility: CLINIC | Age: 44
End: 2024-07-30
Attending: INTERNAL MEDICINE
Payer: COMMERCIAL

## 2024-07-30 VITALS
HEART RATE: 79 BPM | SYSTOLIC BLOOD PRESSURE: 104 MMHG | WEIGHT: 148 LBS | DIASTOLIC BLOOD PRESSURE: 68 MMHG | BODY MASS INDEX: 25.27 KG/M2 | OXYGEN SATURATION: 97 %

## 2024-07-30 DIAGNOSIS — I42.2 HYPERTROPHIC CARDIOMYOPATHY (H): ICD-10-CM

## 2024-07-30 DIAGNOSIS — Z78.9 STATIN INTOLERANCE: ICD-10-CM

## 2024-07-30 DIAGNOSIS — E78.5 DYSLIPIDEMIA: ICD-10-CM

## 2024-07-30 DIAGNOSIS — I42.1 HYPERTROPHIC OBSTRUCTIVE CARDIOMYOPATHY (H): Primary | ICD-10-CM

## 2024-07-30 DIAGNOSIS — I42.1 HYPERTROPHIC OBSTRUCTIVE CARDIOMYOPATHY (H): ICD-10-CM

## 2024-07-30 DIAGNOSIS — E78.5 HYPERLIPIDEMIA LDL GOAL <100: ICD-10-CM

## 2024-07-30 DIAGNOSIS — Q99.9 MITOCHONDRIAL DNA MUTATION: ICD-10-CM

## 2024-07-30 LAB — LVEF ECHO: NORMAL

## 2024-07-30 PROCEDURE — 93010 ELECTROCARDIOGRAM REPORT: CPT | Performed by: INTERNAL MEDICINE

## 2024-07-30 PROCEDURE — 93306 TTE W/DOPPLER COMPLETE: CPT | Performed by: INTERNAL MEDICINE

## 2024-07-30 PROCEDURE — 99215 OFFICE O/P EST HI 40 MIN: CPT | Mod: 25 | Performed by: INTERNAL MEDICINE

## 2024-07-30 PROCEDURE — 99213 OFFICE O/P EST LOW 20 MIN: CPT | Performed by: INTERNAL MEDICINE

## 2024-07-30 PROCEDURE — 93005 ELECTROCARDIOGRAM TRACING: CPT

## 2024-07-30 RX ORDER — METHYLPREDNISOLONE 32 MG/1
32 TABLET ORAL DAILY
Qty: 1 TABLET | Refills: 0 | Status: SHIPPED | OUTPATIENT
Start: 2024-07-30 | End: 2024-09-10

## 2024-07-30 RX ORDER — DIPHENHYDRAMINE HCL 50 MG
50 CAPSULE ORAL EVERY 6 HOURS PRN
Qty: 1 CAPSULE | Refills: 0 | Status: SHIPPED | OUTPATIENT
Start: 2024-07-30

## 2024-07-30 RX ORDER — EVOLOCUMAB 140 MG/ML
140 INJECTION, SOLUTION SUBCUTANEOUS
Qty: 4 ML | Refills: 5 | Status: SHIPPED | OUTPATIENT
Start: 2024-07-30

## 2024-07-30 ASSESSMENT — PAIN SCALES - GENERAL: PAINLEVEL: NO PAIN (0)

## 2024-07-30 NOTE — TELEPHONE ENCOUNTER
Central Prior Authorization Team   Phone: [phone]    PA Initiation    Medication: Repatha 140mg/ml  Insurance Company: [employer] Non-Specialty PA's - Phone [phone] Fax [phone]  Pharmacy Filling the Rx: [facility] - [place], [place] - [address] [place] [address]  Filling Pharmacy Phone: [phone]  Filling Pharmacy Fax:    Start Date: 7/30/2024

## 2024-07-30 NOTE — TELEPHONE ENCOUNTER
Test claim 28 days supply   $217.16 copay    Patient does have commercial insurance and may be eligible for a copay card through the .  Website is listed below           Website for copay card:    [email or web address]    Click on the Repatha copay card tab on the upper right corner of the screen

## 2024-07-30 NOTE — TELEPHONE ENCOUNTER
Called and spoke with Luis Antonio to discuss prior authorization for Repatha [employer]. Reviewed cost of copay and copay card through . Patient verbalizes understanding and agrees to start.     Anupam Christopher RN

## 2024-07-30 NOTE — PATIENT INSTRUCTIONS
You were seen today in the Adult Congenital and Cardiovascular Genetics Clinic at the [facility].    Cardiology Providers you saw during your visit:  Jamal Marr MD    Diagnosis:  HOCM    Results:  Jamal Marr MD reviewed the results of your Echo and EKG testing today in clinic.    Recommendations for you:    Stop rosuvastatin  Start PCSK9 Inhibitor. We will run a test claim to see which option is best for you.       General Cardiac Recommendations:  Continue to eat a heart healthy, low salt diet.  Continue to get 20-30 minutes of aerobic activity, 4-5 days per week.  Examples of aerobic activity include walking, running, swimming, cycling, etc.  Continue to observe good oral hygiene, with regular dental visits.      SBE prophylaxis:   Yes____  No_x___    Exercise restrictions:   Yes__X__  No____         If yes, list restrictions:  Must be allowed to rest if fatigued or SOB      FASTING CHOLESTEROL was checked in the last 5 years YES__x__  NO____ (2024)  If no, please follow up with your primary care physician. You should have a cholesterol screening every 5 years.      Follow-up:  Follow up with Dr. Still in 1 year with CMR and device check.     Cardiac MRI  Magnetic resonance imaging (MRI) is a test that lets your doctor see detailed pictures of the inside of your body. MRI combines the use of strong magnets and radio waves to form an MRI image. A Cardiac MRI will give a detailed image of your heart.  Follow these instructions:  1. Please no eating or drinking 3 hours prior to the procedure.   2. No caffeine, alcohol or tobacco 12 hours prior to the procedure.    During the procedure:  Please arrive to the Gold Waiting Room in the [facility].   You will be required to lay flat and follow breath-hold instructions.   You will need to remove all metal and answer a safety questionnaire. Please wear clothes without metal (snaps and zippers). Please remove any body piercings and hair  extensions before you arrive. You will also remove watches, jewelry, hairpins, wallets, dentures, partial dental plates and hearing aids. You may wear contact lenses, and you may be able to wear your rings. We have a safe place to keep your personal items, but it is safer to leave them at home.  You will be given IV contrast for this exam.  To prepare:  The day before the exam drink extra fluid - at least six 8oz glasses (unless you are on a fluid restriction per your doctor)    If you have questions or concerns please contact us at:    Anupam Christopher RN, BSN     Lexi Carlson and Ana Cuevas (Scheduling)  Nurse Care Coordinator     Clinic   CV Genetics      Adult Congenital and CV Genetic  [facility]  (P) [phone]     (P) [phone]  (F) [phone]     (F) [phone]      For after hours urgent needs, call [phone] and ask to speak to the Adult Congenital Physician on call.  Mention Job Code 0401.    For emergencies call 911.    [facility]  [facility]   [facility]  Mail Code [id number]  [address] [place] [address]  [phone]

## 2024-07-30 NOTE — LETTER
7/30/2024      RE: Marie Vogel  [address]  [place] [address]       Dear Colleague,    Thank you for the opportunity to participate in the care of your patient, Marie Vogel, at the [facility] [place] at [facility]. Please see a copy of my visit note below.    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 Mitochondrial mutation was ultimately initially diagnosed in her youngest child; her youngest child was initially found to have DM but was negative for testing for DM1. This mutation has been associated with a variety of phenotypes, including MELAS, Notably, population studies have shown that most patients with this mutation do NOT meet clinical criteria for MELAS and that many have other phenotypic manifestations or are asymptomatic. Of patients with m.3243A>G, 15% will have MELAS; others will have maternally-inherited diabetes and deafness (MIDD), progressive external ophthalmoplegia, skeletal myopathy, diabetes mellitus, GI dysmotility, and myocardial diase. Myocardial disease most commonly manifests as HCM, which is reported in 20-40% patients with this mutation in some studies; other potential cardiac manifestations include WPW, SVT, A.fib, and VT. (Molecular Genetics and Metabolism 2019 128:19-29.) She was seen by Dr. Saavedra 12/7/22 for evaluation of MT-TL1/m.3243A>G associated disease.    Her family history is notable for HCM in multiple maternal relatives, including her mother (diagnosed at age 37, heart transplant 2005 at age 49, passed away in 2016 at age 60 (hemorrhage after lung biopsy which diagnosed histoplasmosis, also had allograft  vasculopathy)), brother (diagnosed at age 18, had a myectomy in , has an ICD), 1 maternal aunt, 4 maternal uncles (1 has an LVAD, recently passed away; his son  suddenly), maternal grandmother, and possibly maternal great grandfather. She has 2 children, a daughter (22, has DM) and a son (13, assigned female at birth, has bilateral  hearing loss, DM, and mitochondrial MT-TL1 mutation.) Both children have had normal echocardiograms.  She was diagnosed with HCM in 2016 after a period of cardiac screening due to family history. CMR 16 showed asymmetric septal hypertophy up to 1.5 cm with ZIA and minimal LGE (seee below.)     Her brother recently collapsed and went to the ED. This was felt due to dehydration. He has had ICD shocks in the past, but none recently. His last LVEF was reduced at 35%.     She has previously been followed by Dr. Melanie Dewey. Given her family history and also question as to whether her mitochondrial mutation could fully account for her extensive family history of HCM which did not appear to be entirely consistent with completely maternal transmission, she was referred to [facility] for genetic counseling and testing for non-mitochondrial mutations associated with HCM. This testing was done 3/2/23 and was negative.    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 She has history of SVT but has not had any episodes in >1 year. ROS is otherwise notable for occasional numbness in her left arm which improves when she changes positions and cold extremities.     ECG 23: sinus rhythm with nonspecific ST/T abnormality, VR 70    She denies any dyspnea at rest or with exertion, PND, orthopnea, peripheral edema, palpitations, lightheadedness or syncope.     24:  Since her last visit, she underwent placement of an ILR 10/2023 at [employer]. Her brother is being evaluated fr a heart transplant at [facility] currently. She has felt generally well.  She does still have some exertional chest pain at work when she is moving OR tables, none with lesser degrees of exertion. She has been under a lot of stress at home due to relationship difficulties as well as her brother's illness. She saw Dr. Saavedra 24 who has recommended whole exome sequencing.         FAMILY HISTORY:  Mother was diagnosed with HCM at 37 years.  She had a heart transplant in  at 49 years. She  in 2016 at 58 years of age.  She participated in a [place] research study looking for HCM genetic mutations but no records were able to be found.  Brother diagnosed with HCM at 18 years.  He had a myomectomy in about  and also has an ICD and has had appropriate shocks in the past. He has one daughter with autism.  A maternal aunt and four maternal uncles have all been diagnosed with HCM. One uncle has an LVAD.  He also had a son who  suddenly and was found to have cardiomyopathy on autopsy.  Maternal grandmother and great grandfather also had HCM.  In addition, there are many distant relatives with HCM.    Daughter (22) with type 2 diabetes and obesity.  She had an ECHO around 12 years of age that was normal.  Son (13), assigned female at birth, also has bilateral hearing loss, DM2 and the mitochondrial mutation in the MT-TL1 gene. Normal ECHO in .   Father with DM2 and kidney failure.  His four siblings have no know health issues.  Paternal grandmother  in her 80's with Alzheimers.  She also had DM2 with onset in her 60-70's.  Grandfather  90's.  He had a history of blood clot in his eye, leading to blindness in 1 eye, and a stroke in his 60's.    PAST MEDICAL HISTORY:  Past Medical History:   Diagnosis Date     Allergic rhinitis due to pollen      Atypical glandular cells on Pap smear 3/1108     Cervical high risk HPV (human papillomavirus) test positive 2018    See problem list     Chronic kidney disease      Depressive disorder 2-3 years ago     Gastroesophageal  reflux disease      Mitochondrial disease (H24) 09/2022     PFO (patent foramen ovale)      Stargardt's disease 11/29/2019     Type II or unspecified type diabetes mellitus without mention of complication, not stated as uncontrolled 2002    onset was gestational in 2001       CURRENT MEDICATIONS:  Current Outpatient Medications   Medication Sig Dispense Refill     albuterol (PROAIR HFA/PROVENTIL HFA/VENTOLIN HFA) 108 (90 Base) MCG/ACT inhaler Inhale 2 puffs into the lungs every 4 hours as needed for shortness of breath or wheezing 18 g 11     ASPIRIN NOT PRESCRIBED (INTENTIONAL) Please choose reason not prescribed from choices below.       atenolol (TENORMIN) 25 MG tablet Take 0.5 tablets (12.5 mg) by mouth daily 90 tablet 3     blood glucose (PEPE CONTOUR NEXT) test strip Check 4 times/day 300 each 0     blood glucose monitoring (NO BRAND SPECIFIED) meter device kit Use to test blood sugar 6 times daily and as needed. 1 kit 0     cetirizine (ZYRTEC) 10 MG tablet Take 10 mg by mouth 2 times daily  90 tablet 3     clotrimazole (LOTRIMIN) 1 % external cream Apply topically 2 times daily 60 g 3     co-enzyme Q-10 100 MG CAPS capsule Take 1 capsule (100 mg) by mouth daily 90 capsule 11     Continuous Blood Gluc Sensor (DEXCOM G6 SENSOR) MISC Apply one sensor to the skin every 10 days 9 each 3     Continuous Blood Gluc Transmit (DEXCOM G6 TRANSMITTER) MISC Change every 90 days.  (Dexcom G6 Transmitter, #[id number]) 1 each 1     empagliflozin (JARDIANCE) 25 MG TABS tablet Take 1 tablet (25 mg) by mouth daily 90 tablet 2     EPINEPHrine (ANY BX GENERIC EQUIV) 0.3 MG/0.3ML injection 2-pack Inject 0.3 mLs (0.3 mg) into the muscle once as needed for anaphylaxis 0.3 mL 11     fluticasone (FLONASE) 50 MCG/ACT spray Spray 1-2 sprays into both nostrils daily 1 Bottle 0     Glucagon, rDNA, (GLUCAGON EMERGENCY) 1 MG KIT For Intramuscular use for low Blood glucose episode. 1 kit 0     ibuprofen (ADVIL/MOTRIN) 200 MG tablet Take 200  "mg by mouth every 4 hours as needed        insulin glargine (LANTUS PEN) 100 UNIT/ML pen Take 20 units in case of pump malfunction only. 15 mL 0     insulin lispro (HUMALOG VIAL) 100 UNIT/ML vial USE WITH INSULIN PUMP AS DIRECTED, USES ABOUT 80 UNITS PER DAY 30 mL 2     insulin lispro (HUMALOG) 100 UNIT/ML vial USE WITH INSULIN PUMP AS DIRECTED, USES ABOUT 80 UNITS PER DAY 80 mL 0     INSULIN PUMP - OUTPATIENT Updated 8/3/21:  OmniPod Dash  BASAL:  00:00: 0.75 units/hr  CARB RATIO:  00:00: 10  Sensitivity:  00:00: 40 mg/dL  BLOOD GLUCOSE TARGET and times:  12   AM (midnight): 120-120  Active Insulin Time: 4 hours  Sensor: Nadia/ Nadia Link Donna  Amish:   Username [email or web address]  Password [email or web address]       insulin syringe-needle U-100 (29G X 1/2\" 1 ML) 29G X 1/2\" 1 ML miscellaneous Use 1 syringe once daily or as needed 100 each 1     Lancets (MICROLET) MISC 1 Device See Admin Instructions. Use up to 5 times daily for blood sugar checks. 100 each prn     levonorgestrel (MIRENA) 20 MCG/24HR IUD 1 each (20 mcg) by Intrauterine route once       losartan (COZAAR) 50 MG tablet Take 1 tablet (50 mg) by mouth in the morning and take one-half tablet (25 mg) by mouth in the evening. 135 tablet 3     MAGNESIUM OXIDE PO Take 400 mg by mouth daily       montelukast (SINGULAIR) 10 MG tablet Take 1 tablet (10 mg) by mouth at bedtime 90 tablet 3     Multiple Vitamins-Minerals (MULTI VITAMIN/MINERALS PO) Take 1 each by mouth daily.       omeprazole (PRILOSEC) 40 MG DR capsule TAKE ONE CAPSULE BY MOUTH ONCE DAILY AS NEEDED 90 capsule 3     rosuvastatin (CRESTOR) 5 MG tablet Take 1 tablet (5 mg) by mouth daily 90 tablet 3     Semaglutide, 1 MG/DOSE, (OZEMPIC) 4 MG/3ML pen Inject 1 mg Subcutaneous every 7 days 9 mL 2     sertraline (ZOLOFT) 25 MG tablet Take 1 tablet (25 mg) by mouth daily 90 tablet 3     SUMAtriptan (IMITREX) 25 MG tablet TAKE ONE TO FOUR TABLETS BY MOUTH ONE TIME. MAY REPEAT 1 TABLET EVERY TWO HOURS UP TO MAXIMUM " "OF 200MG IN 24 HOURS 20 tablet 11     traZODone (DESYREL) 50 MG tablet TAKE ONE-HALF TO ONE TABLET BY MOUTH NIGHTLY AS NEEDED FOR SLEEP 60 tablet 3     Urine Glucose-Ketones Test STRP Daily as needed 25 strip 11     Vitamins-Lipotropics (BALANCE B-100) TABS Take 1 tablet by mouth daily ATake 1 tablet by mouth daily Appointment needed for further refills please call [phone] to schedule 60 tablet 1     guaiFENesin-codeine (ROBITUSSIN AC) 100-10 MG/5ML solution Take 5-10 mLs by mouth nightly as needed for cough (Patient not taking: Reported on 4/8/2024) 180 mL 0     Insulin Disposable Pump (OMNIPOD 5 G6 POD, GEN 5,) MISC Change every 3 days. (Patient not taking: Reported on 4/8/2024) 30 each 11     Omega-3 Fatty Acids (FISH OIL) 500 MG CAPS Take 1 capsule by mouth (Patient not taking: Reported on 4/8/2024)       ondansetron (ZOFRAN ODT) 4 MG ODT tab Take 1 tablet (4 mg) by mouth every 8 hours as needed for nausea (Patient not taking: Reported on 4/8/2024) 8 tablet 0       ALLERGIES:     Allergies   Allergen Reactions     Corylus Anaphylaxis     Patient reports anaphylaxis was caused by hazelnut, not avocado     Ivp Dye [Contrast Dye] Itching     Pt reports that throat itches     Nuts Anaphylaxis     Mariola nuts only     Bactrim Swelling     Pt reaction was swelling in mouth and tongue and itchy mouth.  Resolved with benadryl and prednisone     Pcn [Penicillins] Hives     Cephalosporins Itching     Compazine [Prochlorperazine] Other (See Comments)     Pt states \"psychosis\"     Diatrizoate Itching     throat irritation     Erythromycin      Mitochondrial disorder     Lactated Ringers Other (See Comments)     Mitochondrial disorder     Propofol      Mitochondrial disorder     Reglan [Metoclopramide] Other (See Comments)     Pt sates \"psychosis\"     Seasonal Allergies Other (See Comments)     Molds, trees, grass, dust..  Upper congestion     Sulfa Antibiotics Itching     Valproic Acid      Mitochondrial disorder     " Atorvastatin      myalgias     Shellfish Allergy Rash     Clams only       SOCIAL HISTORY:  Social History     Tobacco Use     Smoking status: Former     Current packs/day: 0.00     Types: Cigarettes     Quit date: 10/24/2005     Years since quittin.7     Smokeless tobacco: Never     Tobacco comments:     States only smokes when drinks maybe 2x/year   Substance Use Topics     Alcohol use: Not Currently     Drug use: Never       ROS:   A comprehensive 14 point review of systems is negative other than as mentioned in HPI.    Exam:  /68 (BP Location: Right arm, Patient Position: Chair, Cuff Size: Adult Regular)   Pulse 79   Wt 67.1 kg (148 lb)   SpO2 97%   BMI 25.27 kg/m    GENERAL APPEARANCE: healthy, alert and no distress  EYES: no icterus, no xanthelasmas  ENT: normal palate, mucosa moist, no central cyanosis  NECK: JVP not elevated  RESPIRATORY: lungs clear to auscultation - no rales, rhonchi or wheezes, no use of accessory muscles, no retractions, respirations are unlabored, normal respiratory rate  CARDIOVASCULAR: regular rhythm, normal S1 with physiologic split S2, no S3 or S4 and no murmur, click or rub.  GI: soft, non tender, bowel sounds normal,no abdominal bruits  EXTREMITIES: no edema, no bruits  NEURO: alert and oriented to person/place/time, normal speech, gait and affect  VASC: Radial, dorsalis pedis and posterior tibialis pulses 2+ bilaterally.  SKIN: no ecchymoses, no rashes.  PSYCH: cooperative, affect appropriate.     Labs:  Reviewed.  Of note:    Mitochondrial genetic testing :Positive; MT-TL1 c.3243A>G mutation was identified. Specifically, it was seen at 23% heteroplasmy. This testing is consistent with a diagnosis of a mitochondrial disorder.    HCM panel genetic testing 3/2/23 ([facility]): Marie does NOT carry a mutation in the 30 genes analyzed.    Testing/Procedures:  Dr. Marr personally visualized and interpreted:  ECG 24: sinus bradycardia, VR 57, nonspecific T-wave  abnormality  ILR interrogation 4./23/24 ([facility]): no arrhythmias detect    TTE 07/30/24: Mild asymmetric septal hypertrophy unchanged from prior studies. Normal LV function. ZIA without septal contact with mild LVOTO (Valsalva gradient ~33 mmHg)      CMR 8/8/16:   HCM with asymmetric septal hypertrophy (basal anteroseptum ~1.6 cm, mid inferoseptum 1.7 cm) Normal LV/RV size/function, LVEF=64% RVEF=67%.   ZIA without septal contact without obvious LVOT obstruction. Multiple accessory papillary muscles, some of which are apically-displaced. 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  Very mild patchy mid-myocardial LGE involving the hypertrophied segments typical of HCM, LGE burden<5%.     CMR 11/7/19:   HCM with asymmetric septal hypertrophy (basal anteroseptum ~1.6 cm, mid inferoseptum 1.7 cm) Normal LV/RV size/function, LVEF=58% RVEF=66%.   ZIA without septal contact without obvious LVOT obstruction. Multiple accessory papillary muscles, some of which are apically-displaced. 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  Very mild patchy mid-myocardial LGE involving the hypertrophied segments typical of HCM, LGE burden<5%.   No significant change compared with 8/8/16.    CMR 6/13/23:  HCM with asymmetric septal hypertrophy (basal anteroseptum ~1.6 cm, mid inferoseptum 1.7 cm) Normal LV/RV size/function, LVEF=65% RVEF>70%.   ZIA without septal contact without obvious LVOT obstruction. Multiple accessory papillary muscles, some of which are apically-displaced. 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  Very mild patchy mid-myocardial LGE involving the hypertrophied segments typical of HCM, LGE burden<5%.   No significant change compared with 8/8/16.    Exercise echocardiogram 10/7/19:  Exercised 10:05, peak  workload 10.9 METS.   No significant resting or exercise-induced LVOT obstruction (peak gradient at rest 11 mmHg, Valsalva 20 mmHg, peak exercise 23  mmHg.)    ZioPatch 1/30/23-2/6/23: Sinus rhythm throughout (average VR 74 bpm, range .) No significant tachyarrhythmias, bradyarrhythmias, or pauses. No sustained or nonsustained VT. No A.fib. 1 patient-triggered event correlated with normal sinus rhythm.      Assessment and Plan:   1. Hypertrophic cardiomyopathy  2. 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 Family history of sudden death in brother (appropriate ICD shock) and cousin  4. ILR in situ  Marie has extensive family history of HCM with severe phenotype in several family members, including her mother (who had a heart transplant), her maternal uncle (who had an LVAD and a heart transplant and recently passed away.) Aside from her MT-TL1 mitochondrial mutation, she does not have any identified HCM mutations. I discussed that while her family history may not be completely consistent with purely maternal transmission as would be expected for mitochondrial disease (1 uncle with a son with cardiomyopathy and possibly also her great-grandfather), that HCM is relatively common among patients with MT-TL1 mutations (20-40% in some studies) and that it is also currently possible that she could have 2 separate mutations genetically predisposing her to HCM (1 of which has yet to be elucidated.) In the absence of more extensive evaluation such as whole-exome sequencing, it is difficult to determine this with any degree of certainty.   As there is some evidence that [id number] HCM has a somewhat more malignant phenotype from an arrhythmic standpoint (including additional arrhythmias such as pathway-mediated SVT as well as VT) and as she has family history of aborted SCD in her brother (appropriate ICD shock), I believe the most prudent course would be to assume her arrhythmia risk is higher than conventional HCM risk factors (aside from family history) would predict and to pursue more aggressive arrhythmia surveillance, with a  low threshold for ICD if ventricular arrhythmias are disclosed.     Marie underwent placement of ILR 10/2023 for better arrhythmia surveillance/risk stratification. This has not shown significant arrhythmias to date.      We discussed the potential complications of HCM, including outflow obstruction, arrhythmias, and heart failure. Marie has only very minimal and likely clinically-insignificant LVOT obstruction.    She has pursued appropriate family screening and is followed in [facility] for her MT-TL1 disease. Whole-exome sequence is being planned and I agree with proceeding with this if feasible.      We have discussed the activity recommendations for hypertrophic cardiomyopathy, and in particular that moderate intensity aerobic exercise is encouraged and that only the highest intensity competitive sports with start-stop activity (e.g. basketball and ice hockey) are felt to be contraindicated.     Plan in brief:   -ILR interrogation 1 year   -follow-up in 1 year with CMR and ILR interrogation prior    -agree with  plan for whole-exome sequencing per Dr. Saavedra    5. HL   6. Statin intolerance  Myalgias from multiple statins (most recently rosuvastatin) have become intolerance.   -start PCSK9i if financially feasible  -stop rosuvastatin    If myopathy becomes limiting for statin, bempedoic acid (which should not effect skeletal muscle) or a PCSK9 inhibitor may be promising options. She is OK continuing rosuvastatin for now but may want to pursue one of these options in the future.       The patient states understanding and is agreeable with plan.     I spent a total of 42 minutes on the day of the visit.   Time spent by me doing chart review, history and exam, documentation and further activities per the note    Jamal Marr MD  Cardiology    [facility]  JAMAL MARR      Please do not hesitate to contact me if you have any questions/concerns.     Sincerely,     Jamal Marr MD

## 2024-07-30 NOTE — TELEPHONE ENCOUNTER
30 days supply test claims requested for the drugs below:  Repatha 140mg/ml  Praluent 75mg/ml      Repatha 140mg/ml  -preferred but PA needed        Praluent 75mg/ml  -non-preferred/not covered

## 2024-07-30 NOTE — PROGRESS NOTES
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 Mitochondrial mutation was ultimately initially diagnosed in her youngest child; her youngest child was initially found to have DM but was negative for testing for DM1. This mutation has been associated with a variety of phenotypes, including MELAS, Notably, population studies have shown that most patients with this mutation do NOT meet clinical criteria for MELAS and that many have other phenotypic manifestations or are asymptomatic. Of patients with m.3243A>G, 15% will have MELAS; others will have maternally-inherited diabetes and deafness (MIDD), progressive external ophthalmoplegia, skeletal myopathy, diabetes mellitus, GI dysmotility, and myocardial diase. Myocardial disease most commonly manifests as HCM, which is reported in 20-40% patients with this mutation in some studies; other potential cardiac manifestations include WPW, SVT, A.fib, and VT. (Molecular Genetics and Metabolism 2019 128:19-29.) She was seen by Dr. Saavedra 22 for evaluation of MT-TL1/m.3243A>G associated disease.    Her family history is notable for HCM in multiple maternal relatives, including her mother (diagnosed at age 37, heart transplant  at age 49, passed away in 2016 at age 60 (hemorrhage after lung biopsy which diagnosed histoplasmosis, also had allograft vasculopathy)), brother (diagnosed at age 18, had a myectomy in , has an ICD), 1 maternal aunt, 4 maternal uncles (1 has an LVAD, recently passed away; his son  suddenly), maternal grandmother, and possibly maternal great grandfather. She has 2 children, a daughter (22, has DM) and a son (13, assigned female at birth, has bilateral  hearing loss, DM,  and mitochondrial MT-TL1 mutation.) Both children have had normal echocardiograms.  She was diagnosed with HCM in 2016 after a period of cardiac screening due to family history. CMR 8/8/16 showed asymmetric septal hypertophy up to 1.5 cm with ZIA and minimal LGE (seee below.)     Her brother recently collapsed and went to the ED. This was felt due to dehydration. He has had ICD shocks in the past, but none recently. His last LVEF was reduced at 35%.     She has previously been followed by Dr. Melanie Dewey. Given her family history and also question as to whether her mitochondrial mutation could fully account for her extensive family history of HCM which did not appear to be entirely consistent with completely maternal transmission, she was referred to [facility] for genetic counseling and testing for non-mitochondrial mutations associated with HCM. This testing was done 3/2/23 and was negative.    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 She has history of SVT but has not had any episodes in >1 year. ROS is otherwise notable for occasional numbness in her left arm which improves when she changes positions and cold extremities.     ECG 06/13/23: sinus rhythm with nonspecific ST/T abnormality, VR 70    She denies any dyspnea at rest or with exertion, PND, orthopnea, peripheral edema, palpitations, lightheadedness or syncope.     07/30/24:  Since her last visit, she underwent placement of an ILR 10/2023 at [place]. Her brother is being evaluated fr a heart transplant at [facility] currently. She has felt generally well. She does still have some exertional chest pain at work when she is moving OR tables, none with lesser degrees of exertion. She has been under a lot of stress at home due to relationship difficulties as well as her brother's illness. She saw Dr. Saavedra 7/8/24 who has recommended whole exome sequencing.         FAMILY HISTORY:  Mother was diagnosed with HCM at 37  years.  She had a heart transplant in  at 49 years. She  in 2016 at 58 years of age.  She participated in a [place] research study looking for HCM genetic mutations but no records were able to be found.  Brother diagnosed with HCM at 18 years.  He had a myomectomy in about  and also has an ICD and has had appropriate shocks in the past. He has one daughter with autism.  A maternal aunt and four maternal uncles have all been diagnosed with HCM. One uncle has an LVAD.  He also had a son who  suddenly and was found to have cardiomyopathy on autopsy.  Maternal grandmother and great grandfather also had HCM.  In addition, there are many distant relatives with HCM.    Daughter (22) with type 2 diabetes and obesity.  She had an ECHO around 12 years of age that was normal.  Son (13), assigned female at birth, also has bilateral hearing loss, DM2 and the mitochondrial mutation in the MT-TL1 gene. Normal ECHO in .   Father with DM2 and kidney failure.  His four siblings have no know health issues.  Paternal grandmother  in her 80's with Alzheimers.  She also had DM2 with onset in her 60-70's.  Grandfather  90's.  He had a history of blood clot in his eye, leading to blindness in 1 eye, and a stroke in his 60's.    PAST MEDICAL HISTORY:  Past Medical History:   Diagnosis Date    Allergic rhinitis due to pollen     Atypical glandular cells on Pap smear 3/1108    Cervical high risk HPV (human papillomavirus) test positive 2018    See problem list    Chronic kidney disease     Depressive disorder 2-3 years ago    Gastroesophageal reflux disease     Mitochondrial disease (H24) 2022    PFO (patent foramen ovale)     Stargardt's disease 2019    Type II or unspecified type diabetes mellitus without mention of complication, not stated as uncontrolled     onset was gestational in        CURRENT MEDICATIONS:  Current Outpatient Medications   Medication Sig Dispense Refill    albuterol  (PROAIR HFA/PROVENTIL HFA/VENTOLIN HFA) 108 (90 Base) MCG/ACT inhaler Inhale 2 puffs into the lungs every 4 hours as needed for shortness of breath or wheezing 18 g 11    ASPIRIN NOT PRESCRIBED (INTENTIONAL) Please choose reason not prescribed from choices below.      atenolol (TENORMIN) 25 MG tablet Take 0.5 tablets (12.5 mg) by mouth daily 90 tablet 3    blood glucose (PEPE CONTOUR NEXT) test strip Check 4 times/day 300 each 0    blood glucose monitoring (NO BRAND SPECIFIED) meter device kit Use to test blood sugar 6 times daily and as needed. 1 kit 0    cetirizine (ZYRTEC) 10 MG tablet Take 10 mg by mouth 2 times daily  90 tablet 3    clotrimazole (LOTRIMIN) 1 % external cream Apply topically 2 times daily 60 g 3    co-enzyme Q-10 100 MG CAPS capsule Take 1 capsule (100 mg) by mouth daily 90 capsule 11    Continuous Blood Gluc Sensor (DEXCOM G6 SENSOR) MISC Apply one sensor to the skin every 10 days 9 each 3    Continuous Blood Gluc Transmit (DEXCOM G6 TRANSMITTER) MISC Change every 90 days.  (Dexcom G6 Transmitter, #[id number]) 1 each 1    empagliflozin (JARDIANCE) 25 MG TABS tablet Take 1 tablet (25 mg) by mouth daily 90 tablet 2    EPINEPHrine (ANY BX GENERIC EQUIV) 0.3 MG/0.3ML injection 2-pack Inject 0.3 mLs (0.3 mg) into the muscle once as needed for anaphylaxis 0.3 mL 11    fluticasone (FLONASE) 50 MCG/ACT spray Spray 1-2 sprays into both nostrils daily 1 Bottle 0    Glucagon, rDNA, (GLUCAGON EMERGENCY) 1 MG KIT For Intramuscular use for low Blood glucose episode. 1 kit 0    ibuprofen (ADVIL/MOTRIN) 200 MG tablet Take 200 mg by mouth every 4 hours as needed       insulin glargine (LANTUS PEN) 100 UNIT/ML pen Take 20 units in case of pump malfunction only. 15 mL 0    insulin lispro (HUMALOG VIAL) 100 UNIT/ML vial USE WITH INSULIN PUMP AS DIRECTED, USES ABOUT 80 UNITS PER DAY 30 mL 2    insulin lispro (HUMALOG) 100 UNIT/ML vial USE WITH INSULIN PUMP AS DIRECTED, USES ABOUT 80 UNITS PER DAY 80 mL 0     "INSULIN PUMP - OUTPATIENT Updated 8/3/21:  OmniPod Dash  BASAL:  00:00: 0.75 units/hr  CARB RATIO:  00:00: 10  Sensitivity:  00:00: 40 mg/dL  BLOOD GLUCOSE TARGET and times:  12   AM (midnight): 120-120  Active Insulin Time: 4 hours  Sensor: Nadia/ Nadia Link Donna  Amish:   [email or web address]  Password [email or web address]!      insulin syringe-needle U-100 (29G X 1/2\" 1 ML) 29G X 1/2\" 1 ML miscellaneous Use 1 syringe once daily or as needed 100 each 1    Lancets (MICROLET) MISC 1 Device See Admin Instructions. Use up to 5 times daily for blood sugar checks. 100 each prn    levonorgestrel (MIRENA) 20 MCG/24HR IUD 1 each (20 mcg) by Intrauterine route once      losartan (COZAAR) 50 MG tablet Take 1 tablet (50 mg) by mouth in the morning and take one-half tablet (25 mg) by mouth in the evening. 135 tablet 3    MAGNESIUM OXIDE PO Take 400 mg by mouth daily      montelukast (SINGULAIR) 10 MG tablet Take 1 tablet (10 mg) by mouth at bedtime 90 tablet 3    Multiple Vitamins-Minerals (MULTI VITAMIN/MINERALS PO) Take 1 each by mouth daily.      omeprazole (PRILOSEC) 40 MG DR capsule TAKE ONE CAPSULE BY MOUTH ONCE DAILY AS NEEDED 90 capsule 3    rosuvastatin (CRESTOR) 5 MG tablet Take 1 tablet (5 mg) by mouth daily 90 tablet 3    Semaglutide, 1 MG/DOSE, (OZEMPIC) 4 MG/3ML pen Inject 1 mg Subcutaneous every 7 days 9 mL 2    sertraline (ZOLOFT) 25 MG tablet Take 1 tablet (25 mg) by mouth daily 90 tablet 3    SUMAtriptan (IMITREX) 25 MG tablet TAKE ONE TO FOUR TABLETS BY MOUTH ONE TIME. MAY REPEAT 1 TABLET EVERY TWO HOURS UP TO MAXIMUM OF 200MG IN 24 HOURS 20 tablet 11    traZODone (DESYREL) 50 MG tablet TAKE ONE-HALF TO ONE TABLET BY MOUTH NIGHTLY AS NEEDED FOR SLEEP 60 tablet 3    Urine Glucose-Ketones Test STRP Daily as needed 25 strip 11    Vitamins-Lipotropics (BALANCE B-100) TABS Take 1 tablet by mouth daily ATake 1 tablet by mouth daily Appointment needed for further refills please call [phone] to schedule 60 tablet 1 " "   guaiFENesin-codeine (ROBITUSSIN AC) 100-10 MG/5ML solution Take 5-10 mLs by mouth nightly as needed for cough (Patient not taking: Reported on 2024) 180 mL 0    Insulin Disposable Pump (OMNIPOD 5 G6 POD, GEN 5,) MISC Change every 3 days. (Patient not taking: Reported on 2024) 30 each 11    Omega-3 Fatty Acids (FISH OIL) 500 MG CAPS Take 1 capsule by mouth (Patient not taking: Reported on 2024)      ondansetron (ZOFRAN ODT) 4 MG ODT tab Take 1 tablet (4 mg) by mouth every 8 hours as needed for nausea (Patient not taking: Reported on 2024) 8 tablet 0       ALLERGIES:     Allergies   Allergen Reactions    Corylus Anaphylaxis     Patient reports anaphylaxis was caused by hazelnut, not avocado    Ivp Dye [Contrast Dye] Itching     Pt reports that throat itches    Nuts Anaphylaxis     Mariola nuts only    Bactrim Swelling     Pt reaction was swelling in mouth and tongue and itchy mouth.  Resolved with benadryl and prednisone    Pcn [Penicillins] Hives    Cephalosporins Itching    Compazine [Prochlorperazine] Other (See Comments)     Pt states \"psychosis\"    Diatrizoate Itching     throat irritation    Erythromycin      Mitochondrial disorder    Lactated Ringers Other (See Comments)     Mitochondrial disorder    Propofol      Mitochondrial disorder    Reglan [Metoclopramide] Other (See Comments)     Pt sates \"psychosis\"    Seasonal Allergies Other (See Comments)     Molds, trees, grass, dust..  Upper congestion    Sulfa Antibiotics Itching    Valproic Acid      Mitochondrial disorder    Atorvastatin      myalgias    Shellfish Allergy Rash     Clams only       SOCIAL HISTORY:  Social History     Tobacco Use    Smoking status: Former     Current packs/day: 0.00     Types: Cigarettes     Quit date: 10/24/2005     Years since quittin.7    Smokeless tobacco: Never    Tobacco comments:     States only smokes when drinks maybe 2x/year   Substance Use Topics    Alcohol use: Not Currently    Drug use: Never "       ROS:   A comprehensive 14 point review of systems is negative other than as mentioned in HPI.    Exam:  /68 (BP Location: Right arm, Patient Position: Chair, Cuff Size: Adult Regular)   Pulse 79   Wt 67.1 kg (148 lb)   SpO2 97%   BMI 25.27 kg/m    GENERAL APPEARANCE: healthy, alert and no distress  EYES: no icterus, no xanthelasmas  ENT: normal palate, mucosa moist, no central cyanosis  NECK: JVP not elevated  RESPIRATORY: lungs clear to auscultation - no rales, rhonchi or wheezes, no use of accessory muscles, no retractions, respirations are unlabored, normal respiratory rate  CARDIOVASCULAR: regular rhythm, normal S1 with physiologic split S2, no S3 or S4 and no murmur, click or rub.  GI: soft, non tender, bowel sounds normal,no abdominal bruits  EXTREMITIES: no edema, no bruits  NEURO: alert and oriented to person/place/time, normal speech, gait and affect  VASC: Radial, dorsalis pedis and posterior tibialis pulses 2+ bilaterally.  SKIN: no ecchymoses, no rashes.  PSYCH: cooperative, affect appropriate.     Labs:  Reviewed.  Of note:    Mitochondrial genetic testing 2022:Positive; MT-TL1 c.3243A>G mutation was identified. Specifically, it was seen at 23% heteroplasmy. This testing is consistent with a diagnosis of a mitochondrial disorder.    HCM panel genetic testing 3/2/23 ([facility]): Marie does NOT carry a mutation in the 30 genes analyzed.    Testing/Procedures:  Dr. Marr personally visualized and interpreted:  ECG 07/30/24: sinus bradycardia, VR 57, nonspecific T-wave abnormality  ILR interrogation 4./23/24 ([facility]): no arrhythmias detect    TTE 07/30/24: Mild asymmetric septal hypertrophy unchanged from prior studies. Normal LV function. ZIA without septal contact with mild LVOTO (Valsalva gradient ~33 mmHg)      CMR 8/8/16:   HCM with asymmetric septal hypertrophy (basal anteroseptum ~1.6 cm, mid inferoseptum 1.7 cm) Normal LV/RV size/function, LVEF=64% RVEF=67%.   ZIA without septal  contact without obvious LVOT obstruction. Multiple accessory papillary muscles, some of which are apically-displaced. 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  Very mild patchy mid-myocardial LGE involving the hypertrophied segments typical of HCM, LGE burden<5%.     CMR 11/7/19:   HCM with asymmetric septal hypertrophy (basal anteroseptum ~1.6 cm, mid inferoseptum 1.7 cm) Normal LV/RV size/function, LVEF=58% RVEF=66%.   ZIA without septal contact without obvious LVOT obstruction. Multiple accessory papillary muscles, some of which are apically-displaced. 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  Very mild patchy mid-myocardial LGE involving the hypertrophied segments typical of HCM, LGE burden<5%.   No significant change compared with 8/8/16.    CMR 6/13/23:  HCM with asymmetric septal hypertrophy (basal anteroseptum ~1.6 cm, mid inferoseptum 1.7 cm) Normal LV/RV size/function, LVEF=65% RVEF>70%.   ZIA without septal contact without obvious LVOT obstruction. Multiple accessory papillary muscles, some of which are apically-displaced. 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  Very mild patchy mid-myocardial LGE involving the hypertrophied segments typical of HCM, LGE burden<5%.   No significant change compared with 8/8/16.    Exercise echocardiogram 10/7/19:  Exercised 10:05, peak  workload 10.9 METS.   No significant resting or exercise-induced LVOT obstruction (peak gradient at rest 11 mmHg, Valsalva 20 mmHg, peak exercise 23 mmHg.)    ZioPatch 1/30/23-2/6/23: Sinus rhythm throughout (average VR 74 bpm, range .) No significant tachyarrhythmias, bradyarrhythmias, or pauses. No sustained or nonsustained VT. No A.fib. 1 patient-triggered event correlated with normal sinus rhythm.      Assessment and Plan:   1. Hypertrophic cardiomyopathy  2. 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 Family history of sudden death in  brother (appropriate ICD shock) and cousin  4. ILR in situ  Marie has extensive family history of HCM with severe phenotype in several family members, including her mother (who had a heart transplant), her maternal uncle (who had an LVAD and a heart transplant and recently passed away.) Aside from her MT-TL1 mitochondrial mutation, she does not have any identified HCM mutations. I discussed that while her family history may not be completely consistent with purely maternal transmission as would be expected for mitochondrial disease (1 uncle with a son with cardiomyopathy and possibly also her great-grandfather), that HCM is relatively common among patients with MT-TL1 mutations (20-40% in some studies) and that it is also currently possible that she could have 2 separate mutations genetically predisposing her to HCM (1 of which has yet to be elucidated.) In the absence of more extensive evaluation such as whole-exome sequencing, it is difficult to determine this with any degree of certainty.   As there is some evidence that [id number] HCM has a somewhat more malignant phenotype from an arrhythmic standpoint (including additional arrhythmias such as pathway-mediated SVT as well as VT) and as she has family history of aborted SCD in her brother (appropriate ICD shock), I believe the most prudent course would be to assume her arrhythmia risk is higher than conventional HCM risk factors (aside from family history) would predict and to pursue more aggressive arrhythmia surveillance, with a low threshold for ICD if ventricular arrhythmias are disclosed.     Marie underwent placement of ILR 10/2023 for better arrhythmia surveillance/risk stratification. This has not shown significant arrhythmias to date.      We discussed the potential complications of HCM, including outflow obstruction, arrhythmias, and heart failure. Marie has only very minimal and likely clinically-insignificant LVOT obstruction.    She has  pursued appropriate family screening and is followed in [facility] for her MT-TL1 disease. Whole-exome sequence is being planned and I agree with proceeding with this if feasible.      We have discussed the activity recommendations for hypertrophic cardiomyopathy, and in particular that moderate intensity aerobic exercise is encouraged and that only the highest intensity competitive sports with start-stop activity (e.g. basketball and ice hockey) are felt to be contraindicated.     Plan in brief:   -ILR interrogation 1 year   -follow-up in 1 year with CMR and ILR interrogation prior    -agree with  plan for whole-exome sequencing per Dr. Saavedra    5. HL   6. Statin intolerance  Myalgias from multiple statins (most recently rosuvastatin) have become intolerance.   -start PCSK9i if financially feasible  -stop rosuvastatin    If myopathy becomes limiting for statin, bempedoic acid (which should not effect skeletal muscle) or a PCSK9 inhibitor may be promising options. She is OK continuing rosuvastatin for now but may want to pursue one of these options in the future.       The patient states understanding and is agreeable with plan.     I spent a total of 42 minutes on the day of the visit.   Time spent by me doing chart review, history and exam, documentation and further activities per the note    Jamal Marr MD  Cardiology    CC  JAMAL MARR

## 2024-07-30 NOTE — NURSING NOTE
Chief Complaint   Patient presents with    Follow Up     44 year old female with history of HOCM, positive genotype presenting for yearly follow up.         Vitals were taken, medications reconciled and EKG performed.    Paige Thurner, Facilitator  9:10 AM

## 2024-07-30 NOTE — TELEPHONE ENCOUNTER
Prior Authorization Approval    Authorization Effective Date: 6/30/2024  Authorization Expiration Date: 7/30/2025  Medication: Repatha 140mg/ml  Approved Dose/Quantity:   Reference #:     Insurance Company: [employer] Non-Specialty PA's - Phone [phone] Fax [phone]  Expected CoPay:       CoPay Card Available:      Foundation Assistance Needed:    Which Pharmacy is filling the prescription (Not needed for infusion/clinic administered): [facility] - [place], [place] - [address] [place] [address]

## 2024-08-01 LAB
ATRIAL RATE - MUSE: 57 BPM
DIASTOLIC BLOOD PRESSURE - MUSE: NORMAL MMHG
INTERPRETATION ECG - MUSE: NORMAL
P AXIS - MUSE: 46 DEGREES
PR INTERVAL - MUSE: 152 MS
QRS DURATION - MUSE: 80 MS
QT - MUSE: 434 MS
QTC - MUSE: 422 MS
R AXIS - MUSE: 50 DEGREES
SYSTOLIC BLOOD PRESSURE - MUSE: NORMAL MMHG
T AXIS - MUSE: -28 DEGREES
VENTRICULAR RATE- MUSE: 57 BPM

## 2024-08-06 ENCOUNTER — TELEPHONE (OUTPATIENT)
Dept: LAB | Facility: CLINIC | Age: 44
End: 2024-08-06
Payer: COMMERCIAL

## 2024-08-06 NOTE — PROGRESS NOTES
I called Marie to discuss the Utript message I sent her regarding the insurance update for her genetic testing as patient had authorized proceeding with her test but did not confirm which test to proceed with.     I was able to reach Marie and we discussed the prior authorization outcome for her test. No authorization is required. We reviewed insurance's policy with her provider and Marie does not meet criteria for insurance to cover her test. The full cost of the test would be her responsibility. We also discussed the second option CARMINE Lay had shared, if Marie was still interested in testing.     Marie appreciated this information and for my call to clarify this information for her. Marie expressed understanding and stated that she DOES NOT want to proceed with [facility]'s test due to likelihood of test getting denied by insurance and high cost of testing.     Marie expressed interest in the alternative option mentioned by CARMINE Lay. Marie has CARMINE Lay's phone number to reach her but she is requesting the GC to reach out to her to provide more information on this test. [facility] staff states she will update the GC to reach out to patient. Marie thanked [facility] staff for the call and agrees with this plan. Marie had no further questions.      Star Machado  Genomics Billing    [facility]  [facility]  [address] [address]  [email or web address]  [email or web address]  Office: [phone]  Fax:   Employed by [employer]

## 2024-08-13 ENCOUNTER — TELEPHONE (OUTPATIENT)
Dept: CONSULT | Facility: CLINIC | Age: 44
End: 2024-08-13
Payer: COMMERCIAL

## 2024-08-13 NOTE — TELEPHONE ENCOUNTER
Spoke with Marie and reviewed the option to complete her whole exome sequencing testing at [facility] for a patient pay mckeon of 1000 dollars. Marie consented to this option given that her insurance company will not cover the cost of the testing. Marie will give my phone number to her aunt, two uncles and brother and ask them to call me to discuss participation in this testing. Once I have heard from Marie's family members or heard from her that her family won't be participating, I will put in the orders for this testing.    Rosanne Souza MS [facility]  Genetic Counselor  Division of Genetics and Metabolism  (p) [phone]  (f) [phone]

## 2024-08-17 ENCOUNTER — HEALTH MAINTENANCE LETTER (OUTPATIENT)
Age: 44
End: 2024-08-17

## 2024-09-09 ASSESSMENT — PATIENT HEALTH QUESTIONNAIRE - PHQ9
SUM OF ALL RESPONSES TO PHQ QUESTIONS 1-9: 2
SUM OF ALL RESPONSES TO PHQ QUESTIONS 1-9: 2
10. IF YOU CHECKED OFF ANY PROBLEMS, HOW DIFFICULT HAVE THESE PROBLEMS MADE IT FOR YOU TO DO YOUR WORK, TAKE CARE OF THINGS AT HOME, OR GET ALONG WITH OTHER PEOPLE: SOMEWHAT DIFFICULT

## 2024-09-10 ENCOUNTER — OFFICE VISIT (OUTPATIENT)
Dept: PEDIATRICS | Facility: CLINIC | Age: 44
End: 2024-09-10
Payer: COMMERCIAL

## 2024-09-10 VITALS
WEIGHT: 153 LBS | HEART RATE: 70 BPM | OXYGEN SATURATION: 99 % | SYSTOLIC BLOOD PRESSURE: 110 MMHG | RESPIRATION RATE: 16 BRPM | DIASTOLIC BLOOD PRESSURE: 70 MMHG | TEMPERATURE: 98.8 F | HEIGHT: 64 IN | BODY MASS INDEX: 26.12 KG/M2

## 2024-09-10 DIAGNOSIS — I42.1 HYPERTROPHIC OBSTRUCTIVE CARDIOMYOPATHY (H): ICD-10-CM

## 2024-09-10 DIAGNOSIS — E78.5 HYPERLIPIDEMIA LDL GOAL <100: ICD-10-CM

## 2024-09-10 DIAGNOSIS — Z12.11 SCREEN FOR COLON CANCER: ICD-10-CM

## 2024-09-10 DIAGNOSIS — K31.84 DIABETIC GASTROPARESIS (H): ICD-10-CM

## 2024-09-10 DIAGNOSIS — Z12.31 VISIT FOR SCREENING MAMMOGRAM: ICD-10-CM

## 2024-09-10 DIAGNOSIS — N18.31 CHRONIC KIDNEY DISEASE, STAGE 3A (H): ICD-10-CM

## 2024-09-10 DIAGNOSIS — K21.9 GASTROESOPHAGEAL REFLUX DISEASE WITHOUT ESOPHAGITIS: ICD-10-CM

## 2024-09-10 DIAGNOSIS — K44.9 HIATAL HERNIA: ICD-10-CM

## 2024-09-10 DIAGNOSIS — Z79.4 TYPE 2 DIABETES MELLITUS WITH DIABETIC NEPHROPATHY, WITH LONG-TERM CURRENT USE OF INSULIN (H): ICD-10-CM

## 2024-09-10 DIAGNOSIS — E11.21 TYPE 2 DIABETES MELLITUS WITH DIABETIC NEPHROPATHY, WITH LONG-TERM CURRENT USE OF INSULIN (H): ICD-10-CM

## 2024-09-10 DIAGNOSIS — Z00.00 ROUTINE GENERAL MEDICAL EXAMINATION AT A HEALTH CARE FACILITY: Primary | ICD-10-CM

## 2024-09-10 DIAGNOSIS — Q99.9 MITOCHONDRIAL DNA MUTATION: ICD-10-CM

## 2024-09-10 DIAGNOSIS — F33.1 MODERATE EPISODE OF RECURRENT MAJOR DEPRESSIVE DISORDER (H): ICD-10-CM

## 2024-09-10 DIAGNOSIS — E11.43 DIABETIC GASTROPARESIS (H): ICD-10-CM

## 2024-09-10 PROCEDURE — 99214 OFFICE O/P EST MOD 30 MIN: CPT | Mod: 25 | Performed by: INTERNAL MEDICINE

## 2024-09-10 PROCEDURE — 99396 PREV VISIT EST AGE 40-64: CPT | Performed by: INTERNAL MEDICINE

## 2024-09-10 ASSESSMENT — PAIN SCALES - GENERAL: PAINLEVEL: NO PAIN (0)

## 2024-09-10 NOTE — PROGRESS NOTES
Preventive Care Visit  [facility] CATRINA Paredes MD, Internal Medicine - Pediatrics  Sep 10, 2024      Assessment & Plan     (Z00.00) Routine general medical examination at a health care facility  (primary encounter diagnosis)    (Q99.9) Mitochondrial DNA mutation  Comment:   Plan: managed by genetics and metabolism clinic [facility].  Medications to avoid: valproic acid; statins; metformin; high-dose acetaminophen; and selected antibiotics, including aminoglycosides, linezolid, tetracycline, azithromycin, and erythromycin, propofol.   anesthesia precautions prior to elective surgeries, consult genetics/metabolism  Monitor for signs and symptoms of MELAS including changes in behavior, vomiting, abdominal pain, fatigue,muscle weakness, weakness or paralysis of an arm or leg or one side of the body, imbalance and falling and confusion.     (I42.1) Hypertrophic obstructive cardiomyopathy (H)  Comment:  associated with mitochondrial syndrome  Plan: managed by cardiology.  Has ILR, no significant arrythmias to date    (E78.5) Hyperlipidemia LDL goal <100  Comment:   Plan:   unable to take statin.  Tolerating Repatha    (E11.21,  Z79.4) Type 2 diabetes mellitus with diabetic nephropathy, with long-term current use of insulin (H)  Comment:   Lab Results   Component Value Date    A1C 6.4 04/02/2024    A1C 6.5 07/05/2021    Plan:    well controlled, managed by endocrinology.  Humalog via omnipod. Jardiance, semaglutide    (N18.31) Chronic kidney disease, stage 3a (H)  Comment:   Lab Results   Component Value Date    CR 1.19 04/02/2024    CR 1.14 10/17/2023   Plan: stable, continue ARB    (K21.9) Gastroesophageal reflux disease without esophagitis  (K44.9) Hiatal hernia  Comment: GERD symptoms with hiatal hernia.  Persistent symptoms on omeprazole 40 mg daily.  Increase omeprazole to 40 mg twice daily as needed.  Patient has questions about repeat EGD and potential surgery for hiatal hernia aggravating reflux  "symptoms.   Patient will follow-up by [facility] if symptoms persist.  Plan: omeprazole (PRILOSEC) 40 MG DR capsule          (F33.1) Moderate episode of recurrent major depressive disorder (H)  Comment:   Plan: Mood stable, continue sertraline.  Complicated social history: Single parent, shares custody of her children with ex-.  In addition also lives with extended family and her current partner/  ending relationship with her current partner who will move out of the home in the next month or 2.    (Z12.11) Screen for colon cancer  Comment:   Plan: Colonoscopy Screening  Referral          (Z12.31) Visit for screening mammogram  Comment:   Plan: MA Screening Bilateral w/ Janak                    BMI  Estimated body mass index is 26.28 kg/m  as calculated from the following:    Height as of this encounter: 1.625 m (5' 3.98\").    Weight as of this encounter: 69.4 kg (153 lb).       Counseling  Appropriate preventive services were addressed with this patient via screening, questionnaire, or discussion as appropriate for fall prevention, nutrition, physical activity, Tobacco-use cessation, social engagement, weight loss and cognition.  Checklist reviewing preventive services available has been given to the patient.  Reviewed patient's diet, addressing concerns and/or questions.   She is at risk for lack of exercise and has been provided with information to increase physical activity for the benefit of her well-being.   She is at risk for psychosocial distress and has been provided with information to reduce risk.           Gabby Salazar is a 44 year old, presenting for the following:  Physical        9/10/2024    10:47 AM   Additional Questions   Roomed by Marylou DRUMMOND   Accompanied by Son         9/10/2024    10:47 AM   Patient Reported Additional Medications   Patient reports taking the following new medications No        Via the Health Maintenance questionnaire, the patient has reported the following " services have been completed -Eye Exam: [facility], this information has not been sent to the abstraction team.  Health Care Directive  Patient does not have a Health Care Directive or Living Will: Discussed advance care planning with patient; information given to patient to review.    HPI              2024   General Health   How would you rate your overall physical health? Good   Feel stress (tense, anxious, or unable to sleep) Only a little      (!) STRESS CONCERN      2024   Nutrition   Three or more servings of calcium each day? (!) NO   Diet: Diabetic   How many servings of fruit and vegetables per day? (!) 2-3   How many sweetened beverages each day? 0-1            2024   Exercise   Days per week of moderate/strenous exercise 2 days   Average minutes spent exercising at this level 20 min      (!) EXERCISE CONCERN      2024   Social Factors   Frequency of gathering with friends or relatives Once a week   Worry food won't last until get money to buy more No   Food not last or not have enough money for food? No   Do you have housing? (Housing is defined as stable permanent housing and does not include staying ouside in a car, in a tent, in an abandoned building, in an overnight shelter, or couch-surfing.) Yes   Are you worried about losing your housing? No   Lack of transportation? No   Unable to get utilities (heat,electricity)? No            2024   Dental   Dentist two times every year? Yes             Today's PHQ-9 Score:       2024    11:11 AM   PHQ-9 SCORE   PHQ-9 Total Score MyChart 2 (Minimal depression)   PHQ-9 Total Score 2         2024   Substance Use   Alcohol more than 3/day or more than 7/wk No   Do you use any other substances recreationally? No        Social History     Tobacco Use    Smoking status: Former     Current packs/day: 0.00     Types: Cigarettes     Quit date: 10/24/2005     Years since quittin.8    Smokeless tobacco: Never    Tobacco comments:      States only smokes when drinks maybe 2x/year   Substance Use Topics    Alcohol use: Not Currently    Drug use: Never                  9/9/2024   STI Screening   New sexual partner(s) since last STI/HIV test? No            Latest Ref Rng & Units 10/19/2023     8:29 AM 11/29/2019     3:15 PM 11/29/2019     1:10 PM   PAP / HPV   PAP  Negative for Intraepithelial Lesion or Malignancy (NILM)      PAP (Historical)   NIL     HPV 16 DNA Negative Negative   Negative    HPV 18 DNA Negative Negative   Negative    Other HR HPV Negative Negative   Negative      ASCVD Risk   The 10-year ASCVD risk score (Kath HAMPTON, et al., 2019) is: 1.5%    Values used to calculate the score:      Age: 44 years      Sex: Female      Is Non- : No      Diabetic: Yes      Tobacco smoker: No      Systolic Blood Pressure: 110 mmHg      Is BP treated: Yes      HDL Cholesterol: 47 mg/dL      Total Cholesterol: 162 mg/dL        9/9/2024   Contraception/Family Planning   Questions about contraception or family planning No           Reviewed and updated as needed this visit by Provider                    Patient Active Problem List   Diagnosis    HYPERLIPIDEMIA LDL GOAL <100    Microalbuminuria    Insulin pump in place    Vitamin D deficiency    Nut allergy    Type 2 diabetes mellitus with diabetic nephropathy    Diabetic gastroparesis (H)    Hypertrophic obstructive cardiomyopathy (H)    PFO (patent foramen ovale)    Other migraine without status migrainosus, not intractable    Scars of posterior pole of chorioretina, bilateral    Cervical high risk HPV (human papillomavirus) test positive    Mirena IUD inserted 11/29/19: Due for removal 11/29/2024    Other insomnia    Moderate episode of recurrent major depressive disorder (H)    Mitochondrial DNA mutation    Chronic kidney disease, stage 3a (H)    Secondary hyperparathyroidism (H24)     Past Surgical History:   Procedure Laterality Date    ABDOMEN SURGERY      C  IUD,MIRENA  8/10/10, 7/28/15    C/SECTION, LOW TRANSVERSE  6/25/10    , Low Transverse    CHOLECYSTECTOMY, LAPOROSCOPIC      Cholecystectomy, Laparoscopic    ESOPHAGOSCOPY, GASTROSCOPY, DUODENOSCOPY (EGD), COMBINED N/A 2016    Procedure: COMBINED ESOPHAGOSCOPY, GASTROSCOPY, DUODENOSCOPY (EGD), BIOPSY SINGLE OR MULTIPLE;  Surgeon: Fadi Hunter MD;  Location: [facility]    HC ESOPHAGEAL MOTILITY STUDY N/A 2016    Procedure: ESOPHAGEAL MOTILITY STUDY;  Surgeon: Fadi Hunter MD;  Location: [facility]    HC REMOVE TONSILS/ADENOIDS,<11 Y/O      T &A    IR RENAL BIOPSY RIGHT  2/3/2023    ZZC INDUCED ABORTN BY D&C             Social History     Tobacco Use    Smoking status: Former     Current packs/day: 0.00     Types: Cigarettes     Quit date: 10/24/2005     Years since quittin.8    Smokeless tobacco: Never    Tobacco comments:     States only smokes when drinks maybe 2x/year   Substance Use Topics    Alcohol use: Not Currently     Family History   Problem Relation Age of Onset    Cardiovascular Mother         hypertrophic cardiomyopathy    Genitourinary Problems Mother         gallbladder disease    Diabetes Mother         Transplnant induced/     Other - See Comments Mother         heart transplant 2005    Depression Mother     Diabetes Father         type 2    Hypertension Father     Hyperlipidemia Father     Genitourinary Problems Maternal Grandmother         gallbladder disease    Genitourinary Problems Paternal Grandmother         gallbladder disease    Hypertension Paternal Grandfather         Deaceased    Cerebrovascular Disease Paternal Grandfather             Cardiovascular Brother         hypertrophic cardiomyopathy    Diabetes Brother     Diabetes Brother         type 2    Diabetes Other         Type 2    Diabetes Daughter     Genetic Disorder Daughter         Mitochondrial disorder    Depression Daughter     Anxiety Disorder Daughter      Genetic Disorder Daughter         Mitochondrial disorder    Obesity Daughter     Glaucoma No family hx of     Macular Degeneration No family hx of          Current Outpatient Medications   Medication Sig Dispense Refill    albuterol (PROAIR HFA/PROVENTIL HFA/VENTOLIN HFA) 108 (90 Base) MCG/ACT inhaler Inhale 2 puffs into the lungs every 4 hours as needed for shortness of breath or wheezing 18 g 11    ASPIRIN NOT PRESCRIBED (INTENTIONAL) Please choose reason not prescribed from choices below.      atenolol (TENORMIN) 25 MG tablet Take 0.5 tablets (12.5 mg) by mouth daily 90 tablet 3    blood glucose (PEPE CONTOUR NEXT) test strip Check 4 times/day 300 each 0    blood glucose monitoring (NO BRAND SPECIFIED) meter device kit Use to test blood sugar 6 times daily and as needed. 1 kit 0    cetirizine (ZYRTEC) 10 MG tablet Take 10 mg by mouth 2 times daily  90 tablet 3    clotrimazole (LOTRIMIN) 1 % external cream Apply topically 2 times daily 60 g 3    co-enzyme Q-10 100 MG CAPS capsule Take 1 capsule (100 mg) by mouth daily 90 capsule 11    Continuous Blood Gluc Sensor (DEXCOM G6 SENSOR) MISC Apply one sensor to the skin every 10 days 9 each 3    Continuous Blood Gluc Transmit (DEXCOM G6 TRANSMITTER) MISC Change every 90 days.  (Dexcom G6 Transmitter, #[id number]) 1 each 1    diphenhydrAMINE (BENADRYL) 50 MG capsule Take 1 capsule (50 mg) by mouth every 6 hours as needed for itching or allergies Administer 1 hour pre - IV contrast injection and patient must have a . 1 capsule 0    empagliflozin (JARDIANCE) 25 MG TABS tablet Take 1 tablet (25 mg) by mouth daily 90 tablet 2    EPINEPHrine (ANY BX GENERIC EQUIV) 0.3 MG/0.3ML injection 2-pack Inject 0.3 mLs (0.3 mg) into the muscle once as needed for anaphylaxis 0.3 mL 11    evolocumab (REPATHA SURECLICK) 140 MG/ML prefilled autoinjector Inject 1 mL (140 mg) subcutaneously every 14 days 4 mL 5    fluticasone (FLONASE) 50 MCG/ACT spray Spray 1-2 sprays into both  "nostrils daily 1 Bottle 0    Glucagon, rDNA, (GLUCAGON EMERGENCY) 1 MG KIT For Intramuscular use for low Blood glucose episode. 1 kit 0    ibuprofen (ADVIL/MOTRIN) 200 MG tablet Take 200 mg by mouth every 4 hours as needed       Insulin Disposable Pump (OMNIPOD 5 G6 POD, GEN 5,) MISC Change every 3 days. 30 each 11    insulin glargine (LANTUS PEN) 100 UNIT/ML pen Take 20 units in case of pump malfunction only. 15 mL 0    insulin lispro (HUMALOG VIAL) 100 UNIT/ML vial USE WITH INSULIN PUMP AS DIRECTED, USES ABOUT 80 UNITS PER DAY 30 mL 2    insulin lispro (HUMALOG) 100 UNIT/ML vial USE WITH INSULIN PUMP AS DIRECTED, USES ABOUT 80 UNITS PER DAY 80 mL 0    INSULIN PUMP - OUTPATIENT Updated 8/3/21:  OmniPod Dash  BASAL:  00:00: 0.75 units/hr  CARB RATIO:  00:00: 10  Sensitivity:  00:00: 40 mg/dL  BLOOD GLUCOSE TARGET and times:  12   AM (midnight): 120-120  Active Insulin Time: 4 hours  Sensor: Nadia/ Nadia Link Donna  [employer]:   [email or web address]  Password [email or web address]      insulin syringe-needle U-100 (29G X 1/2\" 1 ML) 29G X 1/2\" 1 ML miscellaneous Use 1 syringe once daily or as needed 100 each 1    Lancets (MICROLET) MISC 1 Device See Admin Instructions. Use up to 5 times daily for blood sugar checks. 100 each prn    levonorgestrel (MIRENA) 20 MCG/24HR IUD 1 each (20 mcg) by Intrauterine route once      losartan (COZAAR) 50 MG tablet Take 1 tablet (50 mg) by mouth in the morning and take one-half tablet (25 mg) by mouth in the evening. 135 tablet 3    MAGNESIUM OXIDE PO Take 400 mg by mouth daily      Multiple Vitamins-Minerals (MULTI VITAMIN/MINERALS PO) Take 1 each by mouth daily.      Semaglutide, 1 MG/DOSE, (OZEMPIC) 4 MG/3ML pen Inject 1 mg Subcutaneous every 7 days 9 mL 2    sertraline (ZOLOFT) 25 MG tablet Take 1 tablet (25 mg) by mouth daily 90 tablet 3    SUMAtriptan (IMITREX) 25 MG tablet TAKE ONE TO FOUR TABLETS BY MOUTH ONE TIME. MAY REPEAT 1 TABLET EVERY TWO HOURS UP TO MAXIMUM OF 200MG IN 24 HOURS " "20 tablet 11    traZODone (DESYREL) 50 MG tablet TAKE ONE-HALF TO ONE TABLET BY MOUTH NIGHTLY AS NEEDED FOR SLEEP 60 tablet 3    Urine Glucose-Ketones Test STRP Daily as needed 25 strip 11    Vitamins-Lipotropics (BALANCE B-100) TABS Take 1 tablet by mouth daily ATake 1 tablet by mouth daily Appointment needed for further refills please call [phone] to schedule 60 tablet 1    omeprazole (PRILOSEC) 40 MG DR capsule Take 1 capsule (40 mg) by mouth 2 times daily as needed (reflux). 180 capsule 3         Review of Systems  Constitutional, neuro, ENT, endocrine, pulmonary, cardiac, gastrointestinal, genitourinary, musculoskeletal, integument and psychiatric systems are negative, except as otherwise noted.     Objective    Exam  /70 (BP Location: Right arm, Patient Position: Sitting, Cuff Size: Adult Regular)   Pulse 70   Temp 98.8  F (37.1  C) (Tympanic)   Resp 16   Ht 1.625 m (5' 3.98\")   Wt 69.4 kg (153 lb)   SpO2 99%   BMI 26.28 kg/m     Estimated body mass index is 26.28 kg/m  as calculated from the following:    Height as of this encounter: 1.625 m (5' 3.98\").    Weight as of this encounter: 69.4 kg (153 lb).    Physical Exam  GENERAL: alert and no distress  EYES: Eyes grossly normal to inspection, PERRL and conjunctivae and sclerae normal  HENT: ear canals and TM's normal, nose and mouth without ulcers or lesions  NECK: no adenopathy, no asymmetry, masses, or scars  RESP: lungs clear to auscultation - no rales, rhonchi or wheezes  CV: regular rate and rhythm, normal S1 S2, no S3 or S4, no murmur, click or rub, no peripheral edema  ABDOMEN: soft, nontender, no hepatosplenomegaly, no masses and bowel sounds normal  MS: no gross musculoskeletal defects noted, no edema  SKIN: no suspicious lesions or rashes  NEURO: Normal strength and tone, mentation intact and speech normal  PSYCH: mentation appears normal, affect normal/bright        Signed Electronically by: Alan Paredes MD    Answers submitted " by the patient for this visit:  Patient Health Questionnaire (Submitted on 9/9/2024)  If you checked off any problems, how difficult have these problems made it for you to do your work, take care of things at home, or get along with other people?: Somewhat difficult  PHQ9 TOTAL SCORE: 2

## 2024-09-10 NOTE — PATIENT INSTRUCTIONS
Patient Education   Preventive Care Advice   This is general advice given by our system to help you stay healthy. However, your care team may have specific advice just for you. Please talk to your care team about your preventive care needs.  Nutrition  Eat 5 or more servings of fruits and vegetables each day.  Try wheat bread, brown rice and whole grain pasta (instead of white bread, rice, and pasta).  Get enough calcium and vitamin D. Check the label on foods and aim for 100% of the RDA (recommended daily allowance).  Lifestyle  Exercise at least 150 minutes each week  (30 minutes a day, 5 days a week).  Do muscle strengthening activities 2 days a week. These help control your weight and prevent disease.  No smoking.  Wear sunscreen to prevent skin cancer.  Have a dental exam and cleaning every 6 months.  Yearly exams  See your health care team every year to talk about:  Any changes in your health.  Any medicines your care team has prescribed.  Preventive care, family planning, and ways to prevent chronic diseases.  Shots (vaccines)   HPV shots (up to age 26), if you've never had them before.  Hepatitis B shots (up to age 59), if you've never had them before.  COVID-19 shot: Get this shot when it's due.  Flu shot: Get a flu shot every year.  Tetanus shot: Get a tetanus shot every 10 years.  Pneumococcal, hepatitis A, and RSV shots: Ask your care team if you need these based on your risk.  Shingles shot (for age 50 and up)  General health tests  Diabetes screening:  Starting at age 35, Get screened for diabetes at least every 3 years.  If you are younger than age 35, ask your care team if you should be screened for diabetes.  Cholesterol test: At age 39, start having a cholesterol test every 5 years, or more often if advised.  Bone density scan (DEXA): At age 50, ask your care team if you should have this scan for osteoporosis (brittle bones).  Hepatitis C: Get tested at least once in your life.  STIs (sexually  transmitted infections)  Before age 24: Ask your care team if you should be screened for STIs.  After age 24: Get screened for STIs if you're at risk. You are at risk for STIs (including HIV) if:  You are sexually active with more than one person.  You don't use condoms every time.  You or a partner was diagnosed with a sexually transmitted infection.  If you are at risk for HIV, ask about PrEP medicine to prevent HIV.  Get tested for HIV at least once in your life, whether you are at risk for HIV or not.  Cancer screening tests  Cervical cancer screening: If you have a cervix, begin getting regular cervical cancer screening tests starting at age 21.  Breast cancer scan (mammogram): If you've ever had breasts, begin having regular mammograms starting at age 40. This is a scan to check for breast cancer.  Colon cancer screening: It is important to start screening for colon cancer at age 45.  Have a colonoscopy test every 10 years (or more often if you're at risk) Or, ask your provider about stool tests like a FIT test every year or Cologuard test every 3 years.  To learn more about your testing options, visit:   .  For help making a decision, visit:   https://bit.ly/lg89772.  Prostate cancer screening test: If you have a prostate, ask your care team if a prostate cancer screening test (PSA) at age 55 is right for you.  Lung cancer screening: If you are a current or former smoker ages 50 to 80, ask your care team if ongoing lung cancer screenings are right for you.  For informational purposes only. Not to replace the advice of your health care provider. Copyright   2023 [employer]. All rights reserved. Clinically reviewed by the [facility]. [employer] [id number] - REV 01/24.

## 2024-09-12 ENCOUNTER — TELEPHONE (OUTPATIENT)
Dept: CARDIOLOGY | Facility: CLINIC | Age: 44
End: 2024-09-12
Payer: COMMERCIAL

## 2024-09-12 PROBLEM — S92.902K CLOSED FRACTURE OF LEFT FOOT WITH NONUNION: Status: RESOLVED | Noted: 2022-07-26 | Resolved: 2024-09-12

## 2024-09-12 PROBLEM — F32.A DEPRESSION: Status: RESOLVED | Noted: 2021-01-14 | Resolved: 2024-09-12

## 2024-09-12 RX ORDER — OMEPRAZOLE 40 MG/1
40 CAPSULE, DELAYED RELEASE ORAL 2 TIMES DAILY PRN
Qty: 180 CAPSULE | Refills: 3 | Status: SHIPPED | OUTPATIENT
Start: 2024-09-12

## 2024-09-18 NOTE — TELEPHONE ENCOUNTER
Spoke with Marie to notify her of a reclassified of genetic test result.  Marie had genetic testing for the hypertrophic cardiomyopathy (HCM) and comprehensive cardiomyopathy panel in 2023.  Results of testing 56 total genes revealed that Marie did NOT have a mutation or other variant identified.       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 This report supersedes all previous reports.    The c.2149-9C>G intronic variant results from a C to G substitution 9 nucleotides upstream from coding exon 23 in the MYBPC3 gene. This  variant has been observed in at least one individual with a personal and/or family history that is consistent with hypertrophic cardiomyopathy  ([facility] internal data). This nucleotide position is poorly conserved, meaning it is used to changes. Computer models predict this  alteration will alter the splice site, resulting in a changed protein product. RNA studies have demonstrated that this alteration results in abnormal splicing in the set of samples tested ([facility] internal data). This variant is considered to be rare based on population databases.     Marie was very excited to learn about these results since HCM is so prevalent in her family. Knowing the genetic cause will be helpful for predicting risk in family members.  It also could be beneficial for screening recommendations.    Given the autosomal dominant inheritance of this mutation, each of Marie's children and siblings has a 50% risk of inheriting this mutation. Genetic testing is available to determine who is at risk for developing HCM. However, age of onset and severity cannot be determined.   [facility] is currently offering free testing to at risk familiy member for 90 days from  the time of results.  This offer does NOT include the cost of genetic counseling.    Reviewed recommendation for ongoing cardiac screening, including clinical exam, EKG, and ECHO for all first degree relatives. Testing may also include heart monitor, stress testing, and MRI.  Clinical cardiac screening should be performed regardless of decision to pursue genetic testing.   A summary letter and copy of the results will be sent to patient. All questions answered at this time.     Bhargavi Lara MS, [facility]  Licensed, Certified Genetic Counselor  [facility]  [facility]

## 2024-09-23 DIAGNOSIS — I42.1 HYPERTROPHIC OBSTRUCTIVE CARDIOMYOPATHY (H): ICD-10-CM

## 2024-09-24 ENCOUNTER — MYC REFILL (OUTPATIENT)
Dept: ENDOCRINOLOGY | Facility: CLINIC | Age: 44
End: 2024-09-24
Payer: COMMERCIAL

## 2024-09-24 DIAGNOSIS — E11.21 TYPE 2 DIABETES MELLITUS WITH DIABETIC NEPHROPATHY, WITH LONG-TERM CURRENT USE OF INSULIN (H): ICD-10-CM

## 2024-09-24 DIAGNOSIS — Z79.4 TYPE 2 DIABETES MELLITUS WITH DIABETIC NEPHROPATHY, WITH LONG-TERM CURRENT USE OF INSULIN (H): ICD-10-CM

## 2024-09-24 RX ORDER — PROCHLORPERAZINE 25 MG/1
SUPPOSITORY RECTAL
Qty: 1 EACH | Refills: 0 | Status: SHIPPED | OUTPATIENT
Start: 2024-09-24

## 2024-09-24 RX ORDER — ATENOLOL 25 MG/1
12.5 TABLET ORAL DAILY
Qty: 45 TABLET | Refills: 0 | Status: SHIPPED | OUTPATIENT
Start: 2024-09-24

## 2024-09-24 NOTE — TELEPHONE ENCOUNTER
Per [employer] message, pt lost meter and needs replacement. Pharmacy informed. Transmitter has not been released yet.

## 2024-09-24 NOTE — TELEPHONE ENCOUNTER
RN called and spoke with patient. Patient states she did miss a few days last week of taking atenolol because she fell asleep before taking her night time medication. Patient last took medication last night. Patient denies any other missed doses. Patient takes 0.5 tablet daily. Refilled.     Issa CRUZ RN 9/24/2024 at 11:58 AM

## 2024-09-24 NOTE — TELEPHONE ENCOUNTER
Requested Prescriptions   Pending Prescriptions Disp Refills    Continuous Glucose Transmitter (DEXCOM G6 TRANSMITTER) MISC 1 each 1     Sig: Change every 90 days.  (Dexcom G6 Transmitter, #[id number])       Diabetic Supplies Protocol Passed - 9/24/2024  1:25 PM        Passed - Medication is active on med list        Passed - Recent (12 month) or future (90 days) visit with authorizing provider s specialty     The patient must have completed an in-person or virtual visit within the past 12 months or has a future visit scheduled within the next 90 days with the authorizing provider s specialty.  Urgent care and e-visits do not quality as an office visit for this protocol.          Passed - Medication indicated for associated diagnosis        Passed - Patient is 18 years of age or older

## 2024-09-24 NOTE — TELEPHONE ENCOUNTER
Clinic RN: Please contact patient because patient should have run out of this medication on around January 2024. Break in medication > 90 days. Confirm patient is taking this medication as prescribed. Document findings and route refill encounter to provider for approval or denial.     Aura DRUMMOND RN  [facility]

## 2024-09-24 NOTE — TELEPHONE ENCOUNTER
Pharmacy calling stating the received a device kit prescription. They are wondering if this is intended for the Dexcom G6 transmitter or what because that's what they requested was the Dexcom G6 transmitter. Patient is at pharmacy right now, please call pharmacy to verify ASAP

## 2024-09-30 ENCOUNTER — MYC MEDICAL ADVICE (OUTPATIENT)
Dept: ENDOCRINOLOGY | Facility: CLINIC | Age: 44
End: 2024-09-30
Payer: COMMERCIAL

## 2024-10-02 ENCOUNTER — TELEPHONE (OUTPATIENT)
Dept: GASTROENTEROLOGY | Facility: CLINIC | Age: 44
End: 2024-10-02
Payer: COMMERCIAL

## 2024-10-02 NOTE — TELEPHONE ENCOUNTER
"**PROPOFOL ALLERGY**    Endoscopy Scheduling Screen    Have you had any respiratory illness or flu-like symptoms in the last 10 days?  No      What is your communication preference for Instructions and/or Bowel Prep?   MyChart      What insurance is in the chart?  Other:  bcbs    Ordering/Referring Provider: JANETT ESTRADA   (If ordering provider performs procedure, schedule with ordering provider unless otherwise instructed. )    BMI: Estimated body mass index is 26.28 kg/m  as calculated from the following:    Height as of 9/10/24: 1.625 m (5' 3.98\").    Weight as of 9/10/24: 69.4 kg (153 lb).       Sedation Ordered  moderate sedation.   If patient BMI > 50 do not schedule in [facility].    If patient BMI > 45 do not schedule at [facility].    Are you taking methadone or Suboxone?  NO, No RN review required.    Have you been diagnosed and are being treated for severe PTSD or severe anxiety?  NO, No RN review required.  depression    Are you taking any prescription medications for pain 3 or more times per week?   NO, No RN review required.      Do you have a history of malignant hyperthermia?  No - NO PROPOFOL D/T MITOCHONDRIAL DISEASE      (Females) Are you currently pregnant?   No       Have you been diagnosed or told you have pulmonary hypertension?   No      Do you have an LVAD?  No      Have you been told you have moderate to severe sleep apnea?  No.      Have you been told you have COPD, asthma, or any other lung disease?  No      Do you have any heart conditions?  Yes     In the past year, have you had any hospitalizations for heart related issues including cardiomyopathy, heart attack, or stent placement?  No    Do you have any implantable devices in your body (pacemaker, ICD)?  Other LOOP MONITOR    Do you take nitroglycerine?  No      Have you ever had or are you waiting for an organ transplant?  No. Continue scheduling, no site restrictions.      Have you had a stroke or transient ischemic attack (TIA aka \"mini stroke\" " "in the last 6 months?   No      Have you been diagnosed with or been told you have cirrhosis of the liver?   No.      Are you currently on dialysis?   No      Do you need assistance transferring?   No    BMI: Estimated body mass index is 26.28 kg/m  as calculated from the following:    Height as of 9/10/24: 1.625 m (5' 3.98\").    Weight as of 9/10/24: 69.4 kg (153 lb).     Is patients BMI > 40 and scheduling location [facility]?  No      Do you take an injectable or oral medication for weight loss or diabetes (excluding insulin)?  Yes, hold time can be up to 7 days. Please consult with you prescribing provider to discuss endoscopy recommendations. (Please schedule at least 7 days out.)  OZEMPIC    Do you take the medication Naltrexone?  No      Do you take blood thinners?  No       Prep   Are you currently on dialysis or do you have chronic kidney disease?  Yes (Golytely Prep)      Do you have a diagnosis of diabetes?  Yes (Golytely Prep)      Do you have a diagnosis of cystic fibrosis (CF)?  No    On a regular basis do you go 3 -5 days between bowel movements?  SOMETIMES      BMI > 40?  No    Preferred Pharmacy:    [facility] - [place], [place] - [address] [place] [address]  Phone: [phone] Fax: [phone]      Final Scheduling Details     Procedure scheduled  Colonoscopy    Surgeon:  MAGGIE     Date of procedure:  10/18/24     Pre-OP / PAC:   No - Not required for this site.    Location  [facility] - Patient preference.    Sedation   Moderate Sedation - Per order.      Patient Reminders:   You will receive a call from a Nurse to review instructions and health history.  This assessment must be completed prior to your procedure.  Failure to complete the Nurse assessment may result in the procedure being cancelled.      On the day of your procedure, please designate an adult(s) who can drive you home stay with you for the next 24 hours. The medicines used in the exam " will make you sleepy. You will not be able to drive.      You cannot take public transportation, ride share services, or non-medical taxi service without a responsible caregiver.  Medical transport services are allowed with the requirement that a responsible caregiver will receive you at your destination.  We require that drivers and caregivers are confirmed prior to your procedure.

## 2024-10-07 ENCOUNTER — MYC MEDICAL ADVICE (OUTPATIENT)
Dept: PEDIATRICS | Facility: CLINIC | Age: 44
End: 2024-10-07
Payer: COMMERCIAL

## 2024-10-07 ENCOUNTER — TELEPHONE (OUTPATIENT)
Dept: GASTROENTEROLOGY | Facility: CLINIC | Age: 44
End: 2024-10-07
Payer: COMMERCIAL

## 2024-10-07 DIAGNOSIS — Z12.11 SPECIAL SCREENING FOR MALIGNANT NEOPLASMS, COLON: Primary | ICD-10-CM

## 2024-10-07 RX ORDER — BISACODYL 5 MG/1
TABLET, DELAYED RELEASE ORAL
Qty: 4 TABLET | Refills: 0 | Status: SHIPPED | OUTPATIENT
Start: 2024-10-07

## 2024-10-07 NOTE — TELEPHONE ENCOUNTER
Attempted to contact patient in order to complete pre assessment questions. Pre visit planning completed below.     No answer. Left message to return call to [phone] option 2. MyChart message sent.        Kat Mitchell RN  Endoscopy Procedure Pre Assessment RN

## 2024-10-07 NOTE — TELEPHONE ENCOUNTER
Pre visit planning completed.      Procedure details:    Patient scheduled for Colonoscopy on 10/18/24.     Arrival time: 0700. Procedure time 0745    Facility location: [facility]; [place] [address]. Check in location: Main entrance, door #1 on the North side of the building under roundabout awning. DO NOT GO TO SURGERY/ED ENTRANCE.     Sedation type: Conscious sedation     Pre op exam needed? No.    Indication for procedure: screening       Chart review:     Electronic implanted devices? Yes: loop recorder    Recent diagnosis of diverticulitis within the last 6 weeks? No      Medication review:    Diabetic? Yes. Oral diabetic medications: Jardiance (empagliflozin): HOLD  3 days before procedure.  Diabetic injectables: Ozempic (Semaglutide). Weekly dosing of medication.  HOLD 7 days before procedure.  Follow up with managing provider.   Insulin: Consult with managing provider.     Anticoagulants? No    Weight loss medication/injectable? No. Patient is on GLP-1 medication but for DM (see above).    Other medication HOLDING recommendations:  N/A      Prep for procedure:     Bowel prep recommendation: Standard Golytely. Bowel prep prescription sent to [facility] - [place], [place] - [address]    Due to: GLP-1 agonist medication noted in chart.     Prep instructions sent via Rezzcard          Kat Mitchell RN  Endoscopy Procedure Pre Assessment RN  [phone] option 2

## 2024-10-08 NOTE — TELEPHONE ENCOUNTER
Pre assessment completed for upcoming procedure.   (Please see previous telephone encounter notes for complete details)    Patient  returned call.       Procedure details:    Arrival time and facility location reviewed.    Pre op exam needed? No.    Designated  policy reviewed. Instructed to have someone stay 6  hours post procedure.       Medication review:    Medications reviewed. Please see supporting documentation below. Holding recommendations discussed (if applicable).       Prep for procedure:     Procedure prep instructions reviewed. Note - extended Golytely prep.       Any additional information needed:  N/A      Patient  verbalized understanding and had no questions or concerns at this time.      Nikki Whitney RN  Endoscopy Procedure Pre Assessment   [phone] option 2

## 2024-10-18 ENCOUNTER — HOSPITAL ENCOUNTER (OUTPATIENT)
Facility: CLINIC | Age: 44
Discharge: HOME | End: 2024-10-18
Attending: INTERNAL MEDICINE | Admitting: INTERNAL MEDICINE
Payer: COMMERCIAL

## 2024-10-18 VITALS
RESPIRATION RATE: 14 BRPM | WEIGHT: 140 LBS | OXYGEN SATURATION: 98 % | BODY MASS INDEX: 23.9 KG/M2 | HEART RATE: 71 BPM | DIASTOLIC BLOOD PRESSURE: 76 MMHG | SYSTOLIC BLOOD PRESSURE: 121 MMHG | HEIGHT: 64 IN

## 2024-10-18 LAB — COLONOSCOPY: NORMAL

## 2024-10-18 PROCEDURE — 250N000011 HC RX IP 250 OP 636: Performed by: INTERNAL MEDICINE

## 2024-10-18 PROCEDURE — G0500 MOD SEDAT ENDO SERVICE >5YRS: HCPCS | Performed by: INTERNAL MEDICINE

## 2024-10-18 PROCEDURE — G0121 COLON CA SCRN NOT HI RSK IND: HCPCS | Performed by: INTERNAL MEDICINE

## 2024-10-18 PROCEDURE — 45378 DIAGNOSTIC COLONOSCOPY: CPT | Performed by: INTERNAL MEDICINE

## 2024-10-18 RX ORDER — ATROPINE SULFATE 0.1 MG/ML
1 INJECTION INTRAVENOUS
Status: DISCONTINUED | OUTPATIENT
Start: 2024-10-18 | End: 2024-10-18 | Stop reason: HOSPADM

## 2024-10-18 RX ORDER — NALOXONE HYDROCHLORIDE 0.4 MG/ML
0.4 INJECTION, SOLUTION INTRAMUSCULAR; INTRAVENOUS; SUBCUTANEOUS
Status: DISCONTINUED | OUTPATIENT
Start: 2024-10-18 | End: 2024-10-18 | Stop reason: HOSPADM

## 2024-10-18 RX ORDER — ONDANSETRON 2 MG/ML
4 INJECTION INTRAMUSCULAR; INTRAVENOUS
Status: DISCONTINUED | OUTPATIENT
Start: 2024-10-18 | End: 2024-10-18 | Stop reason: HOSPADM

## 2024-10-18 RX ORDER — NALOXONE HYDROCHLORIDE 0.4 MG/ML
0.2 INJECTION, SOLUTION INTRAMUSCULAR; INTRAVENOUS; SUBCUTANEOUS
Status: DISCONTINUED | OUTPATIENT
Start: 2024-10-18 | End: 2024-10-18 | Stop reason: HOSPADM

## 2024-10-18 RX ORDER — FLUMAZENIL 0.1 MG/ML
0.2 INJECTION, SOLUTION INTRAVENOUS
Status: DISCONTINUED | OUTPATIENT
Start: 2024-10-18 | End: 2024-10-18 | Stop reason: HOSPADM

## 2024-10-18 RX ORDER — DIPHENHYDRAMINE HYDROCHLORIDE 50 MG/ML
25-50 INJECTION INTRAMUSCULAR; INTRAVENOUS
Status: DISCONTINUED | OUTPATIENT
Start: 2024-10-18 | End: 2024-10-18 | Stop reason: HOSPADM

## 2024-10-18 RX ORDER — ONDANSETRON 2 MG/ML
4 INJECTION INTRAMUSCULAR; INTRAVENOUS EVERY 6 HOURS PRN
Status: DISCONTINUED | OUTPATIENT
Start: 2024-10-18 | End: 2024-10-18 | Stop reason: HOSPADM

## 2024-10-18 RX ORDER — FENTANYL CITRATE 50 UG/ML
50-100 INJECTION, SOLUTION INTRAMUSCULAR; INTRAVENOUS EVERY 5 MIN PRN
Status: DISCONTINUED | OUTPATIENT
Start: 2024-10-18 | End: 2024-10-18 | Stop reason: HOSPADM

## 2024-10-18 RX ORDER — EPINEPHRINE 1 MG/ML
0.1 INJECTION, SOLUTION, CONCENTRATE INTRAVENOUS
Status: DISCONTINUED | OUTPATIENT
Start: 2024-10-18 | End: 2024-10-18 | Stop reason: HOSPADM

## 2024-10-18 RX ORDER — ONDANSETRON 4 MG/1
4 TABLET, ORALLY DISINTEGRATING ORAL EVERY 6 HOURS PRN
Status: DISCONTINUED | OUTPATIENT
Start: 2024-10-18 | End: 2024-10-18 | Stop reason: HOSPADM

## 2024-10-18 RX ORDER — SIMETHICONE 40MG/0.6ML
133 SUSPENSION, DROPS(FINAL DOSAGE FORM)(ML) ORAL
Status: DISCONTINUED | OUTPATIENT
Start: 2024-10-18 | End: 2024-10-18 | Stop reason: HOSPADM

## 2024-10-18 RX ADMIN — MIDAZOLAM 2 MG: 1 INJECTION INTRAMUSCULAR; INTRAVENOUS at 07:52

## 2024-10-18 RX ADMIN — FENTANYL CITRATE 100 MCG: 0.05 INJECTION, SOLUTION INTRAMUSCULAR; INTRAVENOUS at 07:52

## 2024-10-18 ASSESSMENT — ACTIVITIES OF DAILY LIVING (ADL)
ADLS_ACUITY_SCORE: 35
ADLS_ACUITY_SCORE: 35

## 2024-10-18 NOTE — H&P
Pre-Endoscopy History and Physical     Marie Vogel MRN# [id number]   YOB: 1980 Age: 44 year old     Date of Procedure: 10/18/2024  Primary care provider: Alan Paredes  Type of Endoscopy: Colonoscopy with possible biopsy, possible polypectomy  Reason for Procedure: screen  Type of Anesthesia Anticipated: Conscious Sedation    HPI:    Marie is a 44 year old female who will be undergoing the above procedure.      A history and physical has been performed. The patient's medications and allergies have been reviewed. The risks and benefits of the procedure and the sedation options and risks were discussed with the patient.  All questions were answered and informed consent was obtained.      She denies a personal or family history of anesthesia complications or bleeding disorders.     Patient Active Problem List   Diagnosis    HYPERLIPIDEMIA LDL GOAL <100    Microalbuminuria    Insulin pump in place    Vitamin D deficiency    Nut allergy    Type 2 diabetes mellitus with diabetic nephropathy    Diabetic gastroparesis (H)    Hypertrophic obstructive cardiomyopathy (H)    PFO (patent foramen ovale)    Other migraine without status migrainosus, not intractable    Scars of posterior pole of chorioretina, bilateral    Cervical high risk HPV (human papillomavirus) test positive    Mirena IUD inserted 11/29/19: Due for removal 11/29/2024    Other insomnia    Moderate episode of recurrent major depressive disorder (H)    Mitochondrial DNA mutation    Chronic kidney disease, stage 3a (H)    Secondary hyperparathyroidism (H)        Past Medical History:   Diagnosis Date    Allergic rhinitis due to pollen     Atypical glandular cells on Pap smear 3/1108    Cervical high risk HPV (human papillomavirus) test positive 11/27/2018    See problem list    Chronic kidney disease 2023    Depressive disorder 2-3 years ago    Gastroesophageal reflux disease     Mitochondrial disease (H) 09/2022    PFO (patent  foramen ovale)     Stargardt's disease 2019    Type II or unspecified type diabetes mellitus without mention of complication, not stated as uncontrolled     onset was gestational in         Past Surgical History:   Procedure Laterality Date    ABDOMEN SURGERY      C IUD,MIRENA  8/10/10, 7/28/15    C/SECTION, LOW TRANSVERSE  6/25/10    , Low Transverse    CHOLECYSTECTOMY, LAPOROSCOPIC      Cholecystectomy, Laparoscopic    ESOPHAGOSCOPY, GASTROSCOPY, DUODENOSCOPY (EGD), COMBINED N/A 2016    Procedure: COMBINED ESOPHAGOSCOPY, GASTROSCOPY, DUODENOSCOPY (EGD), BIOPSY SINGLE OR MULTIPLE;  Surgeon: Fadi Hunter MD;  Location:  GI    HC ESOPHAGEAL MOTILITY STUDY N/A 2016    Procedure: ESOPHAGEAL MOTILITY STUDY;  Surgeon: Fadi Hunter MD;  Location:  GI     REMOVE TONSILS/ADENOIDS,<13 Y/O      T &A    IR RENAL BIOPSY RIGHT  2/3/2023    ZZC INDUCED ABORTN BY D&C             Social History     Tobacco Use    Smoking status: Former     Current packs/day: 0.00     Types: Cigarettes     Quit date: 10/24/2005     Years since quittin.9    Smokeless tobacco: Never    Tobacco comments:     States only smokes when drinks maybe 2x/year   Substance Use Topics    Alcohol use: Not Currently       Family History   Problem Relation Age of Onset    Cardiovascular Mother         hypertrophic cardiomyopathy    Genitourinary Problems Mother         gallbladder disease    Diabetes Mother         Transplnant induced/     Other - See Comments Mother         heart transplant 2005    Depression Mother     Diabetes Father         type 2    Hypertension Father     Hyperlipidemia Father     Genitourinary Problems Maternal Grandmother         gallbladder disease    Genitourinary Problems Paternal Grandmother         gallbladder disease    Hypertension Paternal Grandfather         Deaceased    Cerebrovascular Disease Paternal Grandfather              Cardiovascular Brother         hypertrophic cardiomyopathy    Diabetes Brother     Diabetes Brother         type 2    Diabetes Other         Type 2    Diabetes Daughter     Genetic Disorder Daughter         Mitochondrial disorder    Depression Daughter     Anxiety Disorder Daughter     Genetic Disorder Daughter         Mitochondrial disorder    Obesity Daughter     Glaucoma No family hx of     Macular Degeneration No family hx of        Prior to Admission medications    Medication Sig Start Date End Date Taking? Authorizing Provider   albuterol (PROAIR HFA/PROVENTIL HFA/VENTOLIN HFA) 108 (90 Base) MCG/ACT inhaler Inhale 2 puffs into the lungs every 4 hours as needed for shortness of breath or wheezing 3/11/24   Alan Paredes MD   ASPIRIN NOT PRESCRIBED (INTENTIONAL) Please choose reason not prescribed from choices below. 10/12/22   Zita Fuentes MD   atenolol (TENORMIN) 25 MG tablet Take 0.5 tablets (12.5 mg) by mouth daily. 9/24/24   Alan Paredes MD   bisacodyl (DULCOLAX) 5 MG EC tablet Two days prior to exam take two (2) tablets at 4pm. One day prior to exam take two (2) tablets at 4pm 10/7/24   Alan Gilliam MD   blood glucose (PEPE CONTOUR NEXT) test strip Check 4 times/day 7/6/20   Zita Fuentes MD   blood glucose monitoring (NO BRAND SPECIFIED) meter device kit Use to test blood sugar 2 times daily or as directed. 9/24/24   Zita Fuentes MD   blood glucose monitoring (NO BRAND SPECIFIED) meter device kit Use to test blood sugar 6 times daily and as needed. 11/1/16   Alan Paredes MD   cetirizine (ZYRTEC) 10 MG tablet Take 10 mg by mouth 2 times daily  9/11/12   Maldonado Bruner MD   clotrimazole (LOTRIMIN) 1 % external cream Apply topically 2 times daily 7/27/21   Alan Paredes MD   co-enzyme Q-10 100 MG CAPS capsule Take 1 capsule (100 mg) by mouth daily 7/8/24   Chucho Saavedra MD   Continuous Blood Gluc Sensor (DEXCOM G6 SENSOR) MISC Apply one  sensor to the skin every 10 days 4/8/24   Zita Fuentes MD   Continuous Glucose Transmitter (DEXCOM G6 TRANSMITTER) MISC Change every 90 days.  (Dexcom G6 Transmitter, #[id number]) 9/24/24   Zita Fuentes MD   diphenhydrAMINE (BENADRYL) 50 MG capsule Take 1 capsule (50 mg) by mouth every 6 hours as needed for itching or allergies Administer 1 hour pre - IV contrast injection and patient must have a . 7/30/24   Jamal Marr MD   empagliflozin (JARDIANCE) 25 MG TABS tablet Take 1 tablet (25 mg) by mouth daily 4/8/24   Zita Fuentes MD   EPINEPHrine (ANY BX GENERIC EQUIV) 0.3 MG/0.3ML injection 2-pack Inject 0.3 mLs (0.3 mg) into the muscle once as needed for anaphylaxis 3/11/24   Alan Paredes MD   evolocumab (REPATHA SURECLICK) 140 MG/ML prefilled autoinjector Inject 1 mL (140 mg) subcutaneously every 14 days 7/30/24   Jamal Marr MD   fluticasone (FLONASE) 50 MCG/ACT spray Spray 1-2 sprays into both nostrils daily 12/2/17   Kennedy Puri MD   Glucagon, rDNA, (GLUCAGON EMERGENCY) 1 MG KIT For Intramuscular use for low Blood glucose episode. 4/25/22   Zita Fuentes MD   ibuprofen (ADVIL/MOTRIN) 200 MG tablet Take 200 mg by mouth every 4 hours as needed     Reported, Patient   Insulin Disposable Pump (OMNIPOD 5 G6 POD, GEN 5,) MISC Change every 3 days. 1/2/24   Alan Paredes MD   insulin glargine (LANTUS PEN) 100 UNIT/ML pen Take 20 units in case of pump malfunction only. 7/14/20   Zita Fuentes MD   insulin lispro (HUMALOG VIAL) 100 UNIT/ML vial USE WITH INSULIN PUMP AS DIRECTED, USES ABOUT 80 UNITS PER DAY 4/8/24   Zita Fuentes MD   insulin lispro (HUMALOG) 100 UNIT/ML vial USE WITH INSULIN PUMP AS DIRECTED, USES ABOUT 80 UNITS PER DAY 3/16/23   Zita Fuentes MD   INSULIN PUMP - OUTPATIENT Updated 8/3/21:  OmniPod Dash  BASAL:  00:00: 0.75 units/hr  CARB RATIO:  00:00: 10  Sensitivity:  00:00: 40 mg/dL  BLOOD GLUCOSE  "TARGET and times:  12   AM (midnight): 120-120  Active Insulin Time: 4 hours  Sensor: Nadia/ Nadia Link Donna  Amish:   [email or web address]  Password [email or web address] 8/4/21   Zita Fuentes MD   insulin syringe-needle U-100 (29G X 1/2\" 1 ML) 29G X 1/2\" 1 ML miscellaneous Use 1 syringe once daily or as needed 7/6/21   Zita Fuentes MD   Lancets (MICROLET) MISC 1 Device See Admin Instructions. Use up to 5 times daily for blood sugar checks. 12/29/10   Rachele Aguilera, NP   levonorgestrel (MIRENA) 20 MCG/24HR IUD 1 each (20 mcg) by Intrauterine route once    Sabal, Joel Echeverria MD   losartan (COZAAR) 50 MG tablet Take 1 tablet (50 mg) by mouth in the morning and take one-half tablet (25 mg) by mouth in the evening. 11/22/23   Alan Paredes MD   MAGNESIUM OXIDE PO Take 400 mg by mouth daily    Reported, Patient   Multiple Vitamins-Minerals (MULTI VITAMIN/MINERALS PO) Take 1 each by mouth daily.    Reported, Patient   omeprazole (PRILOSEC) 40 MG DR capsule Take 1 capsule (40 mg) by mouth 2 times daily as needed (reflux). 9/12/24   Alan Paredes MD   polyethylene glycol (GOLYTELY) 236 g suspension Two days before procedure at 5PM fill first container with water. Mix and drink an 8 oz glass every 15 minutes until HALF of the container is gone. Place the remainder in the refrigerator. One day before procedure at 5PM drink second half of bowel prep. Drink an 8 oz glass every 15 minutes until it is gone. Day of procedure 6 hours before arrival time fill the 2nd container with water. Mix and drink an 8 oz glass every 15 minutes until HALF of the container is gone. Discard the remaining solution. 10/7/24   Alan Gilliam MD   Semaglutide, 1 MG/DOSE, (OZEMPIC) 4 MG/3ML pen Inject 1 mg Subcutaneous every 7 days 4/8/24   Zita Fuentes MD   sertraline (ZOLOFT) 25 MG tablet Take 1 tablet (25 mg) by mouth daily 11/22/23   Alan Paredes MD   SUMAtriptan (IMITREX) 25 MG tablet " "TAKE ONE TO FOUR TABLETS BY MOUTH ONE TIME. MAY REPEAT 1 TABLET EVERY TWO HOURS UP TO MAXIMUM OF 200MG IN 24 HOURS 3/11/24   Alan Paredes MD   traZODone (DESYREL) 50 MG tablet TAKE ONE-HALF TO ONE TABLET BY MOUTH NIGHTLY AS NEEDED FOR SLEEP 3/11/24   Alan Paredes MD   Urine Glucose-Ketones Test STRP Daily as needed 10/18/23   Zita Fuentes MD   Vitamins-Lipotropics (BALANCE B-100) TABS Take 1 tablet by mouth daily ATake 1 tablet by mouth daily Appointment needed for further refills please call [phone] to schedule 7/8/24   Chucho Saavedra MD       Allergies   Allergen Reactions    Corylus Anaphylaxis     Patient reports anaphylaxis was caused by hazelnut, not avocado    Ivp Dye [Contrast Dye] Itching     Pt reports that throat itches    Nuts Anaphylaxis     Mariola nuts only    Bactrim Swelling     Pt reaction was swelling in mouth and tongue and itchy mouth.  Resolved with benadryl and prednisone    Pcn [Penicillins] Hives    Cephalosporins Itching    Compazine [Prochlorperazine] Other (See Comments)     Pt states \"psychosis\"    Diatrizoate Itching     throat irritation    Erythromycin      Mitochondrial disorder    Lactated Ringers Other (See Comments)     Mitochondrial disorder    Propofol      Mitochondrial disorder    Reglan [Metoclopramide] Other (See Comments)     Pt sates \"psychosis\"    Seasonal Allergies Other (See Comments)     Molds, trees, grass, dust..  Upper congestion    Sulfa Antibiotics Itching    Valproic Acid      Mitochondrial disorder    Atorvastatin      myalgias    Shellfish Allergy Rash     Clams only        REVIEW OF SYSTEMS:   5 point ROS negative except as noted above in HPI, including Gen., Resp., CV, GI &  system review.    PHYSICAL EXAM:   There were no vitals taken for this visit. Estimated body mass index is 26.28 kg/m  as calculated from the following:    Height as of 9/10/24: 1.625 m (5' 3.98\").    Weight as of 9/10/24: 69.4 kg (153 lb).   GENERAL " APPEARANCE: alert, and oriented  MENTAL STATUS: alert  AIRWAY EXAM: Mallampatti Class I (visualization of the soft palate, fauces, uvula, anterior and posterior pillars)  RESP: lungs clear to auscultation - no rales, rhonchi or wheezes  CV: regular rates and rhythm  DIAGNOSTICS:    Not indicated    IMPRESSION   ASA Class 2 - Mild systemic disease    PLAN:   Plan for Colonoscopy with possible biopsy, possible polypectomy. We discussed the risks, benefits and alternatives and the patient wished to proceed.    The above has been forwarded to the consulting provider.      Signed Electronically by: Alan Gilliam MD  October 18, 2024

## 2024-10-22 ENCOUNTER — E-VISIT (OUTPATIENT)
Dept: PEDIATRICS | Facility: CLINIC | Age: 44
End: 2024-10-22
Payer: COMMERCIAL

## 2024-10-22 DIAGNOSIS — Z79.4 TYPE 2 DIABETES MELLITUS WITH DIABETIC NEPHROPATHY, WITH LONG-TERM CURRENT USE OF INSULIN (H): Primary | ICD-10-CM

## 2024-10-22 DIAGNOSIS — E11.21 TYPE 2 DIABETES MELLITUS WITH DIABETIC NEPHROPATHY, WITH LONG-TERM CURRENT USE OF INSULIN (H): Primary | ICD-10-CM

## 2024-10-22 PROCEDURE — 99421 OL DIG E/M SVC 5-10 MIN: CPT | Performed by: INTERNAL MEDICINE

## 2024-10-23 NOTE — PATIENT INSTRUCTIONS
Thank you for choosing us for your care. I have placed the below referral(s) for you:  Orders Placed This Encounter   Procedures     Adult Endocrinology  Referral     Please click the link for your After Visit Summary to view scheduling instructions for your referral. In most cases, you will be contacted within 2 business days to schedule. If you do not hear from a representative within that time, please call [phone] to be connected to a .

## 2024-10-29 ENCOUNTER — TELEPHONE (OUTPATIENT)
Dept: PEDIATRICS | Facility: CLINIC | Age: 44
End: 2024-10-29
Payer: COMMERCIAL

## 2024-10-29 NOTE — TELEPHONE ENCOUNTER
Pt would like endocrinology referral faxed to [facility]     Printed referral in outbasket.  Please notify pt when faxed

## 2024-11-12 DIAGNOSIS — I42.1 HYPERTROPHIC OBSTRUCTIVE CARDIOMYOPATHY (H): ICD-10-CM

## 2024-11-12 RX ORDER — ATENOLOL 25 MG/1
TABLET ORAL
Qty: 45 TABLET | Refills: 0 | OUTPATIENT
Start: 2024-11-12

## 2024-11-14 ENCOUNTER — MYC MEDICAL ADVICE (OUTPATIENT)
Dept: PEDIATRICS | Facility: CLINIC | Age: 44
End: 2024-11-14
Payer: COMMERCIAL

## 2024-11-14 DIAGNOSIS — I42.1 HYPERTROPHIC OBSTRUCTIVE CARDIOMYOPATHY (H): ICD-10-CM

## 2024-11-14 RX ORDER — ATENOLOL 25 MG/1
12.5 TABLET ORAL DAILY
Qty: 45 TABLET | Refills: 6 | Status: SHIPPED | OUTPATIENT
Start: 2024-11-14

## 2024-11-16 DIAGNOSIS — Z79.4 TYPE 2 DIABETES MELLITUS WITH DIABETIC NEPHROPATHY, WITH LONG-TERM CURRENT USE OF INSULIN (H): ICD-10-CM

## 2024-11-16 DIAGNOSIS — E11.21 TYPE 2 DIABETES MELLITUS WITH DIABETIC NEPHROPATHY, WITH LONG-TERM CURRENT USE OF INSULIN (H): ICD-10-CM

## 2024-11-18 RX ORDER — INSULIN LISPRO 100 [IU]/ML
INJECTION, SOLUTION INTRAVENOUS; SUBCUTANEOUS
Qty: 30 ML | Refills: 2 | OUTPATIENT
Start: 2024-11-18

## 2024-11-18 NOTE — TELEPHONE ENCOUNTER
Pt switched to [facility]     Requested Prescriptions   Pending Prescriptions Disp Refills    insulin lispro (HUMALOG VIAL) 100 UNIT/ML vial [Pharmacy Med Name: insulin lispro (U-100) 100 unit/mL subcutaneous solution (HUMALOG; ADMELOG)] 30 mL 2     Sig: USE WITH INSULIN PUMP AS DIRECTED, USES ABOUT 80 UNITS PER DAY       Insulin Protocol Failed - 11/18/2024 10:40 AM        Failed - HgbA1C in past 3 or 6 months     If HgbA1C is 8 or greater, it needs to be on file within the past 3 months.  If less than 8, must be on file within the past 6 months.     Recent Labs   Lab Test 04/02/24  1015   A1C 6.4*             Failed - Has GFR on file in past 12 months and most recent value is normal        Failed - Recent (6 mo) or future (90 days) visit within the authorizing provider's specialty     The patient must have completed an in-person or virtual visit within the past 6 months or has a future visit scheduled within the next 90 days with the authorizing provider s specialty.  Urgent care and e-visits do not quality as an office visit for this protocol.          Failed - Chart review required     Review Chart.    Do not approve if insulin is used in a pump.  Instead, direct refill request to the patient's endocrinologist.  If the patient doesn't have an endocrinologist, then send the refill to the patient's PCP for review            Passed - Medication is active on med list        Passed - Medication indicated for associated diagnosis     Medication is associated with one or more of the following diagnoses:   - Type 1 diabetes mellitus  - Type 2 diabetes mellitus  - Diabetic nephropathy; Prophylaxis  - Neuropathy due to diabetes mellitus; Prophylaxis  - Retinopathy due to diabetes mellitus; Prophylaxis  - Diabetes mellitus associated with cystic fibrosis  - Disorder of cardiovascular system; Prophylaxis - Type 1 diabetes mellitus   - Disorder of cardiovascular system; Prophylaxis - Type 2 diabetes mellitus            Passed -  Patient is 18 years of age or older

## 2024-11-26 DIAGNOSIS — Z79.4 TYPE 2 DIABETES MELLITUS WITH DIABETIC NEPHROPATHY, WITH LONG-TERM CURRENT USE OF INSULIN (H): ICD-10-CM

## 2024-11-26 DIAGNOSIS — E11.21 TYPE 2 DIABETES MELLITUS WITH DIABETIC NEPHROPATHY, WITH LONG-TERM CURRENT USE OF INSULIN (H): ICD-10-CM

## 2024-11-26 DIAGNOSIS — F33.1 MODERATE EPISODE OF RECURRENT MAJOR DEPRESSIVE DISORDER (H): ICD-10-CM

## 2024-11-26 RX ORDER — PROCHLORPERAZINE 25 MG/1
SUPPOSITORY RECTAL
Refills: 0 | OUTPATIENT
Start: 2024-11-26

## 2024-11-26 NOTE — TELEPHONE ENCOUNTER
Requested Prescriptions   Pending Prescriptions Disp Refills    Continuous Glucose  (DEXCOM G6 ) BETO [Pharmacy Med Name: Dexcom G6  misc (Blood-Glucose Meter,Continuous)]  0     Sig: Use as directed for continuous glucose monitoring  (Dexcom G6 , [id number])       There is no refill protocol information for this order          
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  Lives with wife and daughter in [place].  Performs own ADLs.  Denies alcohol use, drug use, or smoking history.

## 2024-11-27 RX ORDER — SERTRALINE HYDROCHLORIDE 25 MG/1
25 TABLET, FILM COATED ORAL DAILY
Qty: 90 TABLET | Refills: 3 | Status: SHIPPED | OUTPATIENT
Start: 2024-11-27

## 2024-12-02 DIAGNOSIS — Z79.4 TYPE 2 DIABETES MELLITUS WITH DIABETIC NEPHROPATHY, WITH LONG-TERM CURRENT USE OF INSULIN (H): ICD-10-CM

## 2024-12-02 DIAGNOSIS — E11.21 TYPE 2 DIABETES MELLITUS WITH DIABETIC NEPHROPATHY, WITH LONG-TERM CURRENT USE OF INSULIN (H): ICD-10-CM

## 2024-12-02 RX ORDER — PROCHLORPERAZINE 25 MG/1
SUPPOSITORY RECTAL
Qty: 1 EACH | Refills: 0 | OUTPATIENT
Start: 2024-12-02

## 2024-12-02 NOTE — TELEPHONE ENCOUNTER
Pt transferred to [facility]    Requested Prescriptions   Pending Prescriptions Disp Refills    Continuous Glucose Transmitter (DEXCOM G6 TRANSMITTER) MISC 1 each 0     Sig: Change every 90 days.  (Dexcom G6 Transmitter, #[id number])       Diabetic Supplies Protocol Passed - 12/2/2024  3:04 PM        Passed - Medication is active on med list        Passed - Recent (12 month) or future (90 days) visit with authorizing provider s specialty     The patient must have completed an in-person or virtual visit within the past 12 months or has a future visit scheduled within the next 90 days with the authorizing provider s specialty.  Urgent care and e-visits do not qualify as an office visit for this protocol.          Passed - Medication indicated for associated diagnosis        Passed - Patient is 18 years of age or older

## 2024-12-12 ENCOUNTER — PATIENT OUTREACH (OUTPATIENT)
Dept: CARE COORDINATION | Facility: CLINIC | Age: 44
End: 2024-12-12
Payer: COMMERCIAL

## 2024-12-28 ENCOUNTER — HEALTH MAINTENANCE LETTER (OUTPATIENT)
Age: 44
End: 2024-12-28

## 2025-01-02 DIAGNOSIS — E11.21 TYPE 2 DIABETES MELLITUS WITH DIABETIC NEPHROPATHY, WITH LONG-TERM CURRENT USE OF INSULIN (H): ICD-10-CM

## 2025-01-02 DIAGNOSIS — R80.9 MICROALBUMINURIA: ICD-10-CM

## 2025-01-02 DIAGNOSIS — Z79.4 TYPE 2 DIABETES MELLITUS WITH DIABETIC NEPHROPATHY, WITH LONG-TERM CURRENT USE OF INSULIN (H): ICD-10-CM

## 2025-01-02 RX ORDER — LOSARTAN POTASSIUM 50 MG/1
TABLET ORAL
Qty: 135 TABLET | Refills: 3 | Status: SHIPPED | OUTPATIENT
Start: 2025-01-02

## 2025-01-02 RX ORDER — PROCHLORPERAZINE 25 MG/1
SUPPOSITORY RECTAL
Refills: 0 | OUTPATIENT
Start: 2025-01-02

## 2025-01-05 NOTE — TELEPHONE ENCOUNTER
"Called patient, no history of asthma or other lung disease. Reports her last albuterol inhaler is from 2014, reports she is currently furloughed and outside a lot and due to all of her allergies (grass, \"basically everything outdoors) not on her allergy list and seasonal allergies she wants to have albuterol inhaler just in case. Reports her allergist has told her she also may have activity induced asthma    Educated patient on national shortage and that there is no [facility] for her request, patient needs to start an evisit for her allergy symptoms to be assessed by provider and to determine appropriateness of albuterol inhaler request. Patient states she will start an evisit.       " 95

## 2025-01-13 ENCOUNTER — MYC REFILL (OUTPATIENT)
Dept: PEDIATRICS | Facility: CLINIC | Age: 45
End: 2025-01-13
Payer: COMMERCIAL

## 2025-01-13 DIAGNOSIS — K44.9 HIATAL HERNIA: ICD-10-CM

## 2025-01-13 DIAGNOSIS — K21.9 GASTROESOPHAGEAL REFLUX DISEASE WITHOUT ESOPHAGITIS: ICD-10-CM

## 2025-01-13 RX ORDER — OMEPRAZOLE 40 MG/1
40 CAPSULE, DELAYED RELEASE ORAL 2 TIMES DAILY PRN
Qty: 180 CAPSULE | Refills: 3 | OUTPATIENT
Start: 2025-01-13

## 2025-02-05 ENCOUNTER — E-VISIT (OUTPATIENT)
Dept: PEDIATRICS | Facility: CLINIC | Age: 45
End: 2025-02-05
Payer: COMMERCIAL

## 2025-02-05 DIAGNOSIS — F33.1 MODERATE EPISODE OF RECURRENT MAJOR DEPRESSIVE DISORDER (H): Primary | ICD-10-CM

## 2025-02-05 PROCEDURE — 99207 PR NON-BILLABLE SERV PER CHARTING: CPT | Performed by: INTERNAL MEDICINE

## 2025-02-06 ENCOUNTER — VIRTUAL VISIT (OUTPATIENT)
Dept: PEDIATRICS | Facility: CLINIC | Age: 45
End: 2025-02-06
Payer: COMMERCIAL

## 2025-02-06 DIAGNOSIS — F33.1 MODERATE EPISODE OF RECURRENT MAJOR DEPRESSIVE DISORDER (H): Primary | ICD-10-CM

## 2025-02-06 ASSESSMENT — PATIENT HEALTH QUESTIONNAIRE - PHQ9
SUM OF ALL RESPONSES TO PHQ QUESTIONS 1-9: 10
10. IF YOU CHECKED OFF ANY PROBLEMS, HOW DIFFICULT HAVE THESE PROBLEMS MADE IT FOR YOU TO DO YOUR WORK, TAKE CARE OF THINGS AT HOME, OR GET ALONG WITH OTHER PEOPLE: SOMEWHAT DIFFICULT
SUM OF ALL RESPONSES TO PHQ QUESTIONS 1-9: 10

## 2025-02-06 NOTE — PROGRESS NOTES
Marie is a 44 year old who is being evaluated via a billable video visit.          Assessment & Plan     (F33.1) Moderate episode of recurrent major depressive disorder (H)  (primary encounter diagnosis)  Comment: increase sertraline to 50mg, virtual follow-up 4 weeks.  Continue counseling  Plan: sertraline (ZOLOFT) 50 MG tablet           Subjective   Marie is a 44 year old, presenting for the following health issues:  No chief complaint on file.    HPI     Depression   How are you doing with your depression since your last visit? Worse in past few months.       Sleeping more, less motivation.  Has been meeting with counselor/discussed sertraline dose change.    Are you having other symptoms that might be associated with depression? Recent changes at home-daughter moved out  Have you had a significant life event?  Ex-boyfriend moved out   Are you feeling anxious or having panic attacks?   No  Do you have any concerns with your use of alcohol or other drugs? No    Social History     Tobacco Use    Smoking status: Former     Current packs/day: 0.00     Types: Cigarettes     Quit date: 10/24/2005     Years since quittin.3    Smokeless tobacco: Never    Tobacco comments:     States only smokes when drinks maybe 2x/year   Substance Use Topics    Alcohol use: Not Currently    Drug use: Never         2023     2:46 PM 2024    11:11 AM 2025    11:10 AM   PHQ   PHQ-9 Total Score 3 2 10    Q9: Thoughts of better off dead/self-harm past 2 weeks Not at all Not at all Not at all       Patient-reported         10/5/2020    12:11 PM 2020     9:42 AM 2023     1:07 PM   ALEXSANDRA-7 SCORE   Total Score 5 (mild anxiety)  4 (minimal anxiety)   Total Score 5 2 4              Patient Active Problem List   Diagnosis    HYPERLIPIDEMIA LDL GOAL <100    Microalbuminuria    Insulin pump in place    Vitamin D deficiency    Nut allergy    Type 2 diabetes mellitus with diabetic nephropathy    Diabetic gastroparesis (H)     Hypertrophic obstructive cardiomyopathy (H)    PFO (patent foramen ovale)    Other migraine without status migrainosus, not intractable    Scars of posterior pole of chorioretina, bilateral    Cervical high risk HPV (human papillomavirus) test positive    Mirena IUD inserted 11/29/19: Due for removal 11/29/2024    Other insomnia    Moderate episode of recurrent major depressive disorder (H)    Mitochondrial DNA mutation    Chronic kidney disease, stage 3a (H)    Secondary hyperparathyroidism     Current Outpatient Medications   Medication Sig Dispense Refill    sertraline (ZOLOFT) 50 MG tablet Take 1 tablet (50 mg) by mouth daily. 90 tablet 3    albuterol (PROAIR HFA/PROVENTIL HFA/VENTOLIN HFA) 108 (90 Base) MCG/ACT inhaler Inhale 2 puffs into the lungs every 4 hours as needed for shortness of breath or wheezing 18 g 11    ASPIRIN NOT PRESCRIBED (INTENTIONAL) Please choose reason not prescribed from choices below.      atenolol (TENORMIN) 25 MG tablet Take 0.5 tablets (12.5 mg) by mouth daily. 45 tablet 6    bisacodyl (DULCOLAX) 5 MG EC tablet Two days prior to exam take two (2) tablets at 4pm. One day prior to exam take two (2) tablets at 4pm 4 tablet 0    blood glucose (PEPE CONTOUR NEXT) test strip Check 4 times/day 300 each 0    blood glucose monitoring (NO BRAND SPECIFIED) meter device kit Use to test blood sugar 2 times daily or as directed. 1 kit 0    blood glucose monitoring (NO BRAND SPECIFIED) meter device kit Use to test blood sugar 6 times daily and as needed. 1 kit 0    cetirizine (ZYRTEC) 10 MG tablet Take 10 mg by mouth 2 times daily  90 tablet 3    clotrimazole (LOTRIMIN) 1 % external cream Apply topically 2 times daily 60 g 3    co-enzyme Q-10 100 MG CAPS capsule Take 1 capsule (100 mg) by mouth daily 90 capsule 11    Continuous Blood Gluc Sensor (DEXCOM G6 SENSOR) MISC Apply one sensor to the skin every 10 days 9 each 3    Continuous Glucose Transmitter (DEXCOM G6 TRANSMITTER) MISC Change every 90  "days.  (Dexcom G6 Transmitter, #[id number]) 1 each 0    diphenhydrAMINE (BENADRYL) 50 MG capsule Take 1 capsule (50 mg) by mouth every 6 hours as needed for itching or allergies Administer 1 hour pre - IV contrast injection and patient must have a . 1 capsule 0    empagliflozin (JARDIANCE) 25 MG TABS tablet Take 1 tablet (25 mg) by mouth daily 90 tablet 2    EPINEPHrine (ANY BX GENERIC EQUIV) 0.3 MG/0.3ML injection 2-pack Inject 0.3 mLs (0.3 mg) into the muscle once as needed for anaphylaxis 0.3 mL 11    evolocumab (REPATHA SURECLICK) 140 MG/ML prefilled autoinjector Inject 1 mL (140 mg) subcutaneously every 14 days 4 mL 5    fluticasone (FLONASE) 50 MCG/ACT spray Spray 1-2 sprays into both nostrils daily 1 Bottle 0    Glucagon, rDNA, (GLUCAGON EMERGENCY) 1 MG KIT For Intramuscular use for low Blood glucose episode. 1 kit 0    ibuprofen (ADVIL/MOTRIN) 200 MG tablet Take 200 mg by mouth every 4 hours as needed       Insulin Disposable Pump (OMNIPOD 5 G6 POD, GEN 5,) MISC Change every 3 days. 30 each 11    insulin glargine (LANTUS PEN) 100 UNIT/ML pen Take 20 units in case of pump malfunction only. 15 mL 0    insulin lispro (HUMALOG VIAL) 100 UNIT/ML vial USE WITH INSULIN PUMP AS DIRECTED, USES ABOUT 80 UNITS PER DAY 30 mL 2    insulin lispro (HUMALOG) 100 UNIT/ML vial USE WITH INSULIN PUMP AS DIRECTED, USES ABOUT 80 UNITS PER DAY 80 mL 0    INSULIN PUMP - OUTPATIENT Updated 8/3/21:  OmniPod Dash  BASAL:  00:00: 0.75 units/hr  CARB RATIO:  00:00: 10  Sensitivity:  00:00: 40 mg/dL  BLOOD GLUCOSE TARGET and times:  12   AM (midnight): 120-120  Active Insulin Time: 4 hours  Sensor: Nadia/ Nadia Link Donna  Alyxo:   [email or web address]  Password [email or web address]      insulin syringe-needle U-100 (29G X 1/2\" 1 ML) 29G X 1/2\" 1 ML miscellaneous Use 1 syringe once daily or as needed 100 each 1    Lancets (MICROLET) MISC 1 Device See Admin Instructions. Use up to 5 times daily for blood sugar checks. 100 each prn    " levonorgestrel (MIRENA) 20 MCG/24HR IUD 1 each (20 mcg) by Intrauterine route once      losartan (COZAAR) 50 MG tablet Take 1 tablet (50 mg) by mouth in the morning and take one-half tablet (25 mg) by mouth in the evening. 135 tablet 3    MAGNESIUM OXIDE PO Take 400 mg by mouth daily      Multiple Vitamins-Minerals (MULTI VITAMIN/MINERALS PO) Take 1 each by mouth daily.      omeprazole (PRILOSEC) 40 MG DR capsule Take 1 capsule (40 mg) by mouth 2 times daily as needed (reflux). 180 capsule 3    Semaglutide, 1 MG/DOSE, (OZEMPIC) 4 MG/3ML pen Inject 1 mg Subcutaneous every 7 days 9 mL 2    SUMAtriptan (IMITREX) 25 MG tablet TAKE ONE TO FOUR TABLETS BY MOUTH ONE TIME. MAY REPEAT 1 TABLET EVERY TWO HOURS UP TO MAXIMUM OF 200MG IN 24 HOURS 20 tablet 11    traZODone (DESYREL) 50 MG tablet TAKE ONE-HALF TO ONE TABLET BY MOUTH NIGHTLY AS NEEDED FOR SLEEP 60 tablet 3    Urine Glucose-Ketones Test STRP Daily as needed 25 strip 11    Vitamins-Lipotropics (BALANCE B-100) TABS Take 1 tablet by mouth daily ATake 1 tablet by mouth daily Appointment needed for further refills please call [phone] to schedule 60 tablet 1              Objective           Vitals:  No vitals were obtained today due to virtual visit.    Physical Exam   GENERAL: alert and no distress  EYES: Eyes grossly normal to inspection.  No discharge or erythema, or obvious scleral/conjunctival abnormalities.  RESP: No audible wheeze, cough, or visible cyanosis.    SKIN: Visible skin clear. No significant rash, abnormal pigmentation or lesions.  NEURO: Cranial nerves grossly intact.  Mentation and speech appropriate for age.  PSYCH: Appropriate affect, tone, and pace of words          Video-Visit Details    Type of service:  Video Visit   Video start:  1:12pm  Video end:  1:20pm  Originating Location (pt. Location): Home    Distant Location (provider location):  On-site  Platform used for Video Visit: Jessica  Signed Electronically by: Alan Paredes MD

## 2025-04-03 DIAGNOSIS — Z15.89 MUTATION IN MT-TL1 GENE: ICD-10-CM

## 2025-04-03 DIAGNOSIS — Z79.4 TYPE 2 DIABETES MELLITUS WITH DIABETIC NEPHROPATHY, WITH LONG-TERM CURRENT USE OF INSULIN (H): ICD-10-CM

## 2025-04-03 DIAGNOSIS — E11.21 TYPE 2 DIABETES MELLITUS WITH DIABETIC NEPHROPATHY, WITH LONG-TERM CURRENT USE OF INSULIN (H): ICD-10-CM

## 2025-04-03 RX ORDER — VITAMIN B COMPLEX/FOLIC ACID 0.4 MG
1 TABLET ORAL DAILY
Qty: 60 TABLET | Refills: 1 | Status: SHIPPED | OUTPATIENT
Start: 2025-04-03

## 2025-04-03 RX ORDER — PROCHLORPERAZINE 25 MG/1
SUPPOSITORY RECTAL
Refills: 0 | OUTPATIENT
Start: 2025-04-03

## 2025-04-03 NOTE — TELEPHONE ENCOUNTER
Requested Prescriptions   Pending Prescriptions Disp Refills    Continuous Glucose  (DEXCOM G6 ) BETO [Pharmacy Med Name: Dexcom G6  misc (Blood-Glucose Meter,Continuous)]  0     Sig: Use as directed for continuous glucose monitoring  (Dexcom G6 , [id number])       There is no refill protocol information for this order

## 2025-04-13 ENCOUNTER — HEALTH MAINTENANCE LETTER (OUTPATIENT)
Age: 45
End: 2025-04-13

## 2025-04-18 ENCOUNTER — TRANSFERRED RECORDS (OUTPATIENT)
Dept: HEALTH INFORMATION MANAGEMENT | Facility: CLINIC | Age: 45
End: 2025-04-18
Payer: COMMERCIAL

## 2025-07-08 DIAGNOSIS — G47.00 INSOMNIA, UNSPECIFIED TYPE: ICD-10-CM

## 2025-07-08 DIAGNOSIS — Z91.018 NUT ALLERGY: ICD-10-CM

## 2025-07-08 DIAGNOSIS — G43.809 OTHER MIGRAINE WITHOUT STATUS MIGRAINOSUS, NOT INTRACTABLE: ICD-10-CM

## 2025-07-08 DIAGNOSIS — R05.1 ACUTE COUGH: ICD-10-CM

## 2025-07-08 RX ORDER — TRAZODONE HYDROCHLORIDE 50 MG/1
TABLET ORAL
Qty: 60 TABLET | Refills: 0 | Status: SHIPPED | OUTPATIENT
Start: 2025-07-08

## 2025-07-08 RX ORDER — ALBUTEROL SULFATE 90 UG/1
2 INHALANT RESPIRATORY (INHALATION) EVERY 4 HOURS PRN
Qty: 18 G | Refills: 1 | Status: SHIPPED | OUTPATIENT
Start: 2025-07-08

## 2025-07-08 RX ORDER — EPINEPHRINE 0.3 MG/.3ML
0.3 INJECTION SUBCUTANEOUS
Qty: 2 EACH | Refills: 1 | Status: SHIPPED | OUTPATIENT
Start: 2025-07-08

## 2025-07-08 RX ORDER — SUMATRIPTAN SUCCINATE 25 MG/1
TABLET ORAL
Qty: 20 TABLET | Refills: 11 | Status: SHIPPED | OUTPATIENT
Start: 2025-07-08

## 2025-07-08 RX ORDER — URINE ACETONE TEST STRIPS
STRIP MISCELLANEOUS
Refills: 11 | OUTPATIENT
Start: 2025-07-08

## 2025-07-27 ENCOUNTER — HEALTH MAINTENANCE LETTER (OUTPATIENT)
Age: 45
End: 2025-07-27

## 2025-08-11 ENCOUNTER — OFFICE VISIT (OUTPATIENT)
Dept: PEDIATRICS | Facility: CLINIC | Age: 45
End: 2025-08-11
Attending: PEDIATRICS
Payer: COMMERCIAL

## 2025-08-11 VITALS
BODY MASS INDEX: 25.56 KG/M2 | WEIGHT: 149.69 LBS | DIASTOLIC BLOOD PRESSURE: 78 MMHG | SYSTOLIC BLOOD PRESSURE: 116 MMHG | HEART RATE: 79 BPM | HEIGHT: 64 IN | OXYGEN SATURATION: 94 %

## 2025-08-11 DIAGNOSIS — Z15.89 MUTATION IN MT-TL1 GENE: ICD-10-CM

## 2025-08-11 DIAGNOSIS — Z79.4 TYPE 2 DIABETES MELLITUS WITH DIABETIC NEPHROPATHY, WITH LONG-TERM CURRENT USE OF INSULIN (H): ICD-10-CM

## 2025-08-11 DIAGNOSIS — E11.21 TYPE 2 DIABETES MELLITUS WITH DIABETIC NEPHROPATHY, WITH LONG-TERM CURRENT USE OF INSULIN (H): ICD-10-CM

## 2025-08-11 DIAGNOSIS — I42.2 AUTOSOMAL DOMINANT FAMILIAL HYPERTROPHIC CARDIOMYOPATHY TYPE 4 ASSOCIATED WITH MUTATION IN MYBPC3 GENE (H): ICD-10-CM

## 2025-08-11 DIAGNOSIS — Q99.9 MITOCHONDRIAL DNA MUTATION: Primary | ICD-10-CM

## 2025-08-11 DIAGNOSIS — N18.31 CHRONIC KIDNEY DISEASE, STAGE 3A (H): ICD-10-CM

## 2025-08-11 DIAGNOSIS — I42.1 HYPERTROPHIC OBSTRUCTIVE CARDIOMYOPATHY (H): ICD-10-CM

## 2025-08-11 LAB
CK SERPL-CCNC: 89 U/L (ref 26–192)
LACTATE SERPL-SCNC: 1.9 MMOL/L (ref 0.7–2)
PTH-INTACT SERPL-MCNC: 85 PG/ML (ref 15–65)
TSH SERPL DL<=0.005 MIU/L-ACNC: 1.8 UIU/ML (ref 0.3–4.2)

## 2025-08-11 PROCEDURE — 83970 ASSAY OF PARATHORMONE: CPT | Performed by: PEDIATRICS

## 2025-08-11 PROCEDURE — 84443 ASSAY THYROID STIM HORMONE: CPT | Performed by: PEDIATRICS

## 2025-08-11 PROCEDURE — 99213 OFFICE O/P EST LOW 20 MIN: CPT | Performed by: PEDIATRICS

## 2025-08-11 PROCEDURE — 83605 ASSAY OF LACTIC ACID: CPT | Performed by: PEDIATRICS

## 2025-08-11 PROCEDURE — 82550 ASSAY OF CK (CPK): CPT | Performed by: PEDIATRICS

## 2025-08-11 PROCEDURE — 36415 COLL VENOUS BLD VENIPUNCTURE: CPT | Performed by: PEDIATRICS

## 2025-08-11 RX ORDER — METOPROLOL SUCCINATE 25 MG/1
25 TABLET, EXTENDED RELEASE ORAL DAILY
COMMUNITY
Start: 2025-04-22

## 2025-08-13 LAB
ACYLCARNITINE SERPL-SCNC: 10 UMOL/L
CARN ESTERS/C0 SERPL-SRTO: 0.3 {RATIO}
CARNITINE FREE SERPL-SCNC: 29 UMOL/L
CARNITINE SERPL-SCNC: 39 UMOL/L

## (undated) DEVICE — KIT ENDO TURNOVER/PROCEDURE W/CLEAN A SCOPE LINERS 103888

## (undated) RX ORDER — FENTANYL CITRATE 50 UG/ML
INJECTION, SOLUTION INTRAMUSCULAR; INTRAVENOUS
Status: DISPENSED
Start: 2024-10-18

## (undated) RX ORDER — SODIUM CHLORIDE 9 MG/ML
INJECTION, SOLUTION INTRAVENOUS
Status: DISPENSED
Start: 2023-02-03

## (undated) RX ORDER — FENTANYL CITRATE 50 UG/ML
INJECTION, SOLUTION INTRAMUSCULAR; INTRAVENOUS
Status: DISPENSED
Start: 2023-02-03

## (undated) RX ORDER — LIDOCAINE HYDROCHLORIDE 10 MG/ML
INJECTION, SOLUTION EPIDURAL; INFILTRATION; INTRACAUDAL; PERINEURAL
Status: DISPENSED
Start: 2023-02-03